# Patient Record
Sex: FEMALE | Race: WHITE | Employment: OTHER | URBAN - METROPOLITAN AREA
[De-identification: names, ages, dates, MRNs, and addresses within clinical notes are randomized per-mention and may not be internally consistent; named-entity substitution may affect disease eponyms.]

---

## 2017-01-27 ENCOUNTER — TRANSFERRED RECORDS (OUTPATIENT)
Dept: HEALTH INFORMATION MANAGEMENT | Facility: CLINIC | Age: 72
End: 2017-01-27

## 2017-03-08 DIAGNOSIS — E03.9 HYPOTHYROIDISM, UNSPECIFIED TYPE: ICD-10-CM

## 2017-03-08 DIAGNOSIS — Z13.220 LIPID SCREENING: ICD-10-CM

## 2017-03-08 DIAGNOSIS — Z79.899 MEDICATION MANAGEMENT: ICD-10-CM

## 2017-03-08 LAB
ERYTHROCYTE [DISTWIDTH] IN BLOOD BY AUTOMATED COUNT: 14.9 % (ref 10–15)
HCT VFR BLD AUTO: 44.4 % (ref 35–47)
HCV AB SERPL QL IA: NORMAL
HGB BLD-MCNC: 14.7 G/DL (ref 11.7–15.7)
MCH RBC QN AUTO: 28.5 PG (ref 26.5–33)
MCHC RBC AUTO-ENTMCNC: 33.1 G/DL (ref 31.5–36.5)
MCV RBC AUTO: 86 FL (ref 78–100)
PLATELET # BLD AUTO: 263 10E9/L (ref 150–450)
RBC # BLD AUTO: 5.15 10E12/L (ref 3.8–5.2)
WBC # BLD AUTO: 6.8 10E9/L (ref 4–11)

## 2017-03-08 PROCEDURE — 80061 LIPID PANEL: CPT | Performed by: INTERNAL MEDICINE

## 2017-03-08 PROCEDURE — 36415 COLL VENOUS BLD VENIPUNCTURE: CPT | Performed by: INTERNAL MEDICINE

## 2017-03-08 PROCEDURE — 86803 HEPATITIS C AB TEST: CPT | Performed by: INTERNAL MEDICINE

## 2017-03-08 PROCEDURE — 84443 ASSAY THYROID STIM HORMONE: CPT | Performed by: INTERNAL MEDICINE

## 2017-03-08 PROCEDURE — 85027 COMPLETE CBC AUTOMATED: CPT | Performed by: INTERNAL MEDICINE

## 2017-03-08 PROCEDURE — 80053 COMPREHEN METABOLIC PANEL: CPT | Performed by: INTERNAL MEDICINE

## 2017-03-08 NOTE — LETTER
[facility]  [address] [place] [address] [facility]  [address]  [place]  [address]  Tel: [phone]    March 10, 2017    Yola LYNNE Maria Esther  [address]  [facility] [address]        Dear Ms. Mojicaers,    TSH which is thyroid hormone is normal.   Stay on current dose of levothyroxine.   Recheck TSH in 6 months   Your total cholesterol is normal.   HDL which is called good cholesterol is normal.   Your LDL cholesterol is normal.  This is often call bad cholesterol and high levels increase the risk for heart attacks and strokes.   Your triglycerides are normal.   Hepatitis C Antibody is negative.   Basic metabolic panel which includes electrolytes and blood sugar is satisfactory.   CBC result which includes white count Hemoglobin and  Platelet Counts is normal.   ALT which is liver test is slightly elevated.   Hepatitis C Antibody is negative.     If you have any further questions or problems, please contact our office.    Sincerely,    Zoraida Braga MD/           Enclosure: Lab Results              Results for orders placed or performed in visit on 03/08/17   Hepatitis C antibody   Result Value Ref Range    Hepatitis C Antibody  NR     Nonreactive   Assay performance characteristics have not been established for newborns,   infants, and children     TSH with free T4 reflex   Result Value Ref Range    TSH 1.48 0.40 - 4.00 mU/L   Lipid Profile   Result Value Ref Range    Cholesterol 152 <200 mg/dL    Triglycerides 59 <150 mg/dL    HDL Cholesterol 72 >49 mg/dL    LDL Cholesterol Calculated 68 <100 mg/dL    Non HDL Cholesterol 80 <130 mg/dL   Comprehensive metabolic panel   Result Value Ref Range    Sodium 140 133 - 144 mmol/L    Potassium 3.7 3.4 - 5.3 mmol/L    Chloride 105 94 - 109 mmol/L    Carbon Dioxide 27 20 - 32 mmol/L    Anion Gap 8 3 - 14 mmol/L    Glucose 106 (H) 70 - 99 mg/dL    Urea Nitrogen 14 7 - 30 mg/dL    Creatinine 0.69 0.52 - 1.04 mg/dL    GFR Estimate 84 >60 mL/min/1.7m2    GFR Estimate If  Black >90   GFR Calc   >60 mL/min/1.7m2    Calcium 8.9 8.5 - 10.1 mg/dL    Bilirubin Total 0.4 0.2 - 1.3 mg/dL    Albumin 4.0 3.4 - 5.0 g/dL    Protein Total 7.9 6.8 - 8.8 g/dL    Alkaline Phosphatase 73 40 - 150 U/L    ALT 66 (H) 0 - 50 U/L    AST 35 0 - 45 U/L   CBC with platelets   Result Value Ref Range    WBC 6.8 4.0 - 11.0 10e9/L    RBC Count 5.15 3.8 - 5.2 10e12/L    Hemoglobin 14.7 11.7 - 15.7 g/dL    Hematocrit 44.4 35.0 - 47.0 %    MCV 86 78 - 100 fl    MCH 28.5 26.5 - 33.0 pg    MCHC 33.1 31.5 - 36.5 g/dL    RDW 14.9 10.0 - 15.0 %    Platelet Count 263 150 - 450 10e9/L

## 2017-03-09 LAB
ALBUMIN SERPL-MCNC: 4 G/DL (ref 3.4–5)
ALP SERPL-CCNC: 73 U/L (ref 40–150)
ALT SERPL W P-5'-P-CCNC: 66 U/L (ref 0–50)
ANION GAP SERPL CALCULATED.3IONS-SCNC: 8 MMOL/L (ref 3–14)
AST SERPL W P-5'-P-CCNC: 35 U/L (ref 0–45)
BILIRUB SERPL-MCNC: 0.4 MG/DL (ref 0.2–1.3)
BUN SERPL-MCNC: 14 MG/DL (ref 7–30)
CALCIUM SERPL-MCNC: 8.9 MG/DL (ref 8.5–10.1)
CHLORIDE SERPL-SCNC: 105 MMOL/L (ref 94–109)
CHOLEST SERPL-MCNC: 152 MG/DL
CO2 SERPL-SCNC: 27 MMOL/L (ref 20–32)
CREAT SERPL-MCNC: 0.69 MG/DL (ref 0.52–1.04)
GFR SERPL CREATININE-BSD FRML MDRD: 84 ML/MIN/1.7M2
GLUCOSE SERPL-MCNC: 106 MG/DL (ref 70–99)
HDLC SERPL-MCNC: 72 MG/DL
LDLC SERPL CALC-MCNC: 68 MG/DL
NONHDLC SERPL-MCNC: 80 MG/DL
POTASSIUM SERPL-SCNC: 3.7 MMOL/L (ref 3.4–5.3)
PROT SERPL-MCNC: 7.9 G/DL (ref 6.8–8.8)
SODIUM SERPL-SCNC: 140 MMOL/L (ref 133–144)
TRIGL SERPL-MCNC: 59 MG/DL
TSH SERPL DL<=0.005 MIU/L-ACNC: 1.48 MU/L (ref 0.4–4)

## 2017-03-10 ENCOUNTER — OFFICE VISIT (OUTPATIENT)
Dept: FAMILY MEDICINE | Facility: CLINIC | Age: 72
End: 2017-03-10
Payer: COMMERCIAL

## 2017-03-10 VITALS
HEIGHT: 63 IN | TEMPERATURE: 97.6 F | OXYGEN SATURATION: 98 % | SYSTOLIC BLOOD PRESSURE: 139 MMHG | DIASTOLIC BLOOD PRESSURE: 70 MMHG | HEART RATE: 66 BPM | BODY MASS INDEX: 30.56 KG/M2 | WEIGHT: 172.5 LBS

## 2017-03-10 DIAGNOSIS — E03.9 HYPOTHYROIDISM, UNSPECIFIED TYPE: ICD-10-CM

## 2017-03-10 DIAGNOSIS — R73.03 PREDIABETES: ICD-10-CM

## 2017-03-10 DIAGNOSIS — I10 ESSENTIAL HYPERTENSION, BENIGN: ICD-10-CM

## 2017-03-10 DIAGNOSIS — Z23 NEED FOR PNEUMOCOCCAL VACCINATION: ICD-10-CM

## 2017-03-10 DIAGNOSIS — Z23 NEED FOR VACCINATION: Primary | ICD-10-CM

## 2017-03-10 DIAGNOSIS — Z00.00 ROUTINE HISTORY AND PHYSICAL EXAMINATION OF ADULT: ICD-10-CM

## 2017-03-10 PROCEDURE — 90732 PPSV23 VACC 2 YRS+ SUBQ/IM: CPT | Performed by: INTERNAL MEDICINE

## 2017-03-10 PROCEDURE — G0439 PPPS, SUBSEQ VISIT: HCPCS | Performed by: INTERNAL MEDICINE

## 2017-03-10 PROCEDURE — G0009 ADMIN PNEUMOCOCCAL VACCINE: HCPCS | Performed by: INTERNAL MEDICINE

## 2017-03-10 RX ORDER — METFORMIN HCL 500 MG
500 TABLET, EXTENDED RELEASE 24 HR ORAL
Qty: 90 TABLET | Refills: 3 | Status: SHIPPED | OUTPATIENT
Start: 2017-03-10 | End: 2018-05-01

## 2017-03-10 RX ORDER — LEVOTHYROXINE SODIUM 112 UG/1
112 TABLET ORAL DAILY
Qty: 90 TABLET | Refills: 3 | Status: SHIPPED | OUTPATIENT
Start: 2017-03-10 | End: 2018-03-25

## 2017-03-10 RX ORDER — HYDROCHLOROTHIAZIDE 25 MG/1
12.5 TABLET ORAL DAILY
Qty: 45 TABLET | Refills: 3 | Status: SHIPPED | OUTPATIENT
Start: 2017-03-10 | End: 2017-08-21

## 2017-03-10 RX ORDER — AMLODIPINE BESYLATE 5 MG/1
5 TABLET ORAL DAILY
Qty: 90 TABLET | Refills: 3 | Status: SHIPPED | OUTPATIENT
Start: 2017-03-10 | End: 2018-05-01

## 2017-03-10 NOTE — NURSING NOTE
"Chief Complaint   Patient presents with     Wellness Visit       Initial /73 (BP Location: Right arm, Patient Position: Chair, Cuff Size: Adult Large)  Pulse 66  Temp 97.6  F (36.4  C) (Oral)  Ht 5' 3\" (1.6 m)  Wt 172 lb 8 oz (78.2 kg)  SpO2 98%  BMI 30.56 kg/m2 Estimated body mass index is 30.56 kg/(m^2) as calculated from the following:    Height as of this encounter: 5' 3\" (1.6 m).    Weight as of this encounter: 172 lb 8 oz (78.2 kg).  Medication Reconciliation: complete   Genesis Navarro MA  "

## 2017-03-10 NOTE — MR AVS SNAPSHOT
After Visit Summary   3/10/2017    Yola Mayo    MRN: [id number]           Patient Information     Date Of Birth          1945        Visit Information        Provider Department      3/10/2017 8:30 AM Zoraida Braga MD [facility]        Today's Diagnoses     Routine history and physical examination of adult    -  1    Essential hypertension, benign        Prediabetes        Hypothyroidism, unspecified type        Need for pneumococcal vaccination          Care Instructions      Preventive Health Recommendations    Female Ages 65 +    Yearly exam:     See your health care provider every year in order to  o Review health changes.   o Discuss preventive care.    o Review your medicines if your doctor has prescribed any.      You no longer need a yearly Pap test unless you've had an abnormal Pap test in the past 10 years. If you have vaginal symptoms, such as bleeding or discharge, be sure to talk with your provider about a Pap test.      Every 1 to 2 years, have a mammogram.  If you are over 69, talk with your health care provider about whether or not you want to continue having screening mammograms.      Every 10 years, have a colonoscopy. Or, have a yearly FIT test (stool test). These exams will check for colon cancer.       Have a cholesterol test every 5 years, or more often if your doctor advises it.       Have a diabetes test (fasting glucose) every three years. If you are at risk for diabetes, you should have this test more often.       At age 65, have a bone density scan (DEXA) to check for osteoporosis (brittle bone disease).    Shots:    Get a flu shot each year.    Get a tetanus shot every 10 years.    Talk to your doctor about your pneumonia vaccines. There are now two you should receive - Pneumovax (PPSV 23) and Prevnar (PCV 13).    Talk to your doctor about the shingles vaccine.    Talk to your doctor about the hepatitis B vaccine.    Nutrition:     Eat at least 5  servings of fruits and vegetables each day.      Eat whole-grain bread, whole-wheat pasta and brown rice instead of white grains and rice.      Talk to your provider about Calcium and Vitamin D.     Lifestyle    Exercise at least 150 minutes a week (30 minutes a day, 5 days a week). This will help you control your weight and prevent disease.      Limit alcohol to one drink per day.      No smoking.       Wear sunscreen to prevent skin cancer.       See your dentist twice a year for an exam and cleaning.      See your eye doctor every 1 to 2 years to screen for conditions such as glaucoma, macular degeneration and cataracts.    Pneumonia shot today   Monitor your blood pressure once a week  at home.  Bring those readings on your next visit.  Notify us if your blood pressure readings consistently stays greater than 140/90.  Follow up in a year for physical   Seek sooner medical attention if there is any new or worsening symptoms    Pneumonia shot today  Monitor your blood pressure once a week  at home.  Bring those readings on your next visit.  Notify us if your blood pressure readings consistently stays greater than 140/90.  Follow up in one year for physical   Seek sooner medical attention if there is any worsening of symptoms or problems.          Follow-ups after your visit        Who to contact     If you have questions or need follow up information about today's clinic visit or your schedule please contact [facility] directly at [phone].  Normal or non-critical lab and imaging results will be communicated to you by MyChart, letter or phone within 4 business days after the clinic has received the results. If you do not hear from us within 7 days, please contact the clinic through MyChart or phone. If you have a critical or abnormal lab result, we will notify you by phone as soon as possible.  Submit refill requests through [employer] or call your pharmacy and they will forward the refill request to  "us. Please allow 3 business days for your refill to be completed.          Additional Information About Your Visit        Care EveryWhere ID     This is your Care EveryWhere ID. This could be used by other organizations to access your Olivia medical records  [id number]        Your Vitals Were     Pulse Temperature Height Pulse Oximetry BMI (Body Mass Index)       66 97.6  F (36.4  C) (Oral) 5' 3\" (1.6 m) 98% 30.56 kg/m2        Blood Pressure from Last 3 Encounters:   03/10/17 139/70   04/29/16 142/80   04/20/16 162/83    Weight from Last 3 Encounters:   03/10/17 172 lb 8 oz (78.2 kg)   04/29/16 167 lb (75.8 kg)   04/20/16 169 lb (76.7 kg)              We Performed the Following     PNEUMOVOCCAL VACCINE 23 VALENT (PNEUMOVAX 23)          Today's Medication Changes          These changes are accurate as of: 3/10/17  9:02 AM.  If you have any questions, ask your nurse or doctor.               These medicines have changed or have updated prescriptions.        Dose/Directions    hydrochlorothiazide 25 MG tablet   Commonly known as:  HYDRODIURIL   This may have changed:  See the new instructions.   Used for:  Essential hypertension, benign   Changed by:  Zoraida Braga MD        Dose:  12.5 mg   Take 0.5 tablets (12.5 mg) by mouth daily   Quantity:  45 tablet   Refills:  3         Stop taking these medicines if you haven't already. Please contact your care team if you have questions.     aspirin  MG EC tablet   Stopped by:  Zoraida Braga MD                Where to get your medicines      These medications were sent to [facility] [address] [place] [facility] [address] [address]  [address] [address]     Phone:  [phone]     amLODIPine 5 MG tablet    hydrochlorothiazide 25 MG tablet    levothyroxine 112 MCG tablet    metFORMIN 500 MG 24 hr tablet                Primary Care Provider Office Phone # Fax #    Zoraida Braga [phone]       [facility]   "  [address] [facility] [address]  [facility] [address]        Thank you!     Thank you for choosing [facility]  for your care. Our goal is always to provide you with excellent care. Hearing back from our patients is one way we can continue to improve our services. Please take a few minutes to complete the written survey that you may receive in the mail after your visit with us. Thank you!             Your Updated Medication List - Protect others around you: Learn how to safely use, store and throw away your medicines at www.disposemymeds.org.          This list is accurate as of: 3/10/17  9:02 AM.  Always use your most recent med list.                   Brand Name Dispense Instructions for use    amLODIPine 5 MG tablet    NORVASC    90 tablet    Take 1 tablet (5 mg) by mouth daily       calcium-vitamin D 600-400 MG-UNIT per tablet    CALTRATE    60 tablet    Take 1 tablet by mouth daily       cholecalciferol 1000 UNIT tablet    vitamin D    100 tablet    Take 1 tablet (1,000 Units) by mouth daily       hydrochlorothiazide 25 MG tablet    HYDRODIURIL    45 tablet    Take 0.5 tablets (12.5 mg) by mouth daily       levothyroxine 112 MCG tablet    SYNTHROID    90 tablet    Take 1 tablet (112 mcg) by mouth daily       metFORMIN 500 MG 24 hr tablet    GLUCOPHAGE-XR    90 tablet    Take 1 tablet (500 mg) by mouth daily (with dinner)       multivitamin, therapeutic with minerals Tabs tablet      Take 1 tablet by mouth daily       VITAMIN B 12 PO      Take 1 tablet by mouth daily

## 2017-03-10 NOTE — PATIENT INSTRUCTIONS
Preventive Health Recommendations    Female Ages 65 +    Yearly exam:     See your health care provider every year in order to  o Review health changes.   o Discuss preventive care.    o Review your medicines if your doctor has prescribed any.      You no longer need a yearly Pap test unless you've had an abnormal Pap test in the past 10 years. If you have vaginal symptoms, such as bleeding or discharge, be sure to talk with your provider about a Pap test.      Every 1 to 2 years, have a mammogram.  If you are over 69, talk with your health care provider about whether or not you want to continue having screening mammograms.      Every 10 years, have a colonoscopy. Or, have a yearly FIT test (stool test). These exams will check for colon cancer.       Have a cholesterol test every 5 years, or more often if your doctor advises it.       Have a diabetes test (fasting glucose) every three years. If you are at risk for diabetes, you should have this test more often.       At age 65, have a bone density scan (DEXA) to check for osteoporosis (brittle bone disease).    Shots:    Get a flu shot each year.    Get a tetanus shot every 10 years.    Talk to your doctor about your pneumonia vaccines. There are now two you should receive - Pneumovax (PPSV 23) and Prevnar (PCV 13).    Talk to your doctor about the shingles vaccine.    Talk to your doctor about the hepatitis B vaccine.    Nutrition:     Eat at least 5 servings of fruits and vegetables each day.      Eat whole-grain bread, whole-wheat pasta and brown rice instead of white grains and rice.      Talk to your provider about Calcium and Vitamin D.     Lifestyle    Exercise at least 150 minutes a week (30 minutes a day, 5 days a week). This will help you control your weight and prevent disease.      Limit alcohol to one drink per day.      No smoking.       Wear sunscreen to prevent skin cancer.       See your dentist twice a year for an exam and cleaning.      See your  eye doctor every 1 to 2 years to screen for conditions such as glaucoma, macular degeneration and cataracts.    Pneumonia shot today   Monitor your blood pressure once a week  at home.  Bring those readings on your next visit.  Notify us if your blood pressure readings consistently stays greater than 140/90.  Follow up in a year for physical   Seek sooner medical attention if there is any new or worsening symptoms    Pneumonia shot today  Monitor your blood pressure once a week  at home.  Bring those readings on your next visit.  Notify us if your blood pressure readings consistently stays greater than 140/90.  Follow up in one year for physical   Seek sooner medical attention if there is any worsening of symptoms or problems.

## 2017-03-10 NOTE — NURSING NOTE
Screening Questionnaire for Adult Immunization    Are you sick today?   No   Do you have allergies to medications, food, a vaccine component or latex?   yes   Have you ever had a serious reaction after receiving a vaccination?   No   Do you have a long-term health problem with heart disease, lung disease, asthma, kidney disease, metabolic disease (e.g. diabetes), anemia, or other blood disorder?   No   Do you have cancer, leukemia, HIV/AIDS, or any other immune system problem?   No   In the past 3 months, have you taken medications that affect  your immune system, such as prednisone, other steroids, or anticancer drugs; drugs for the treatment of rheumatoid arthritis, Crohn s disease, or psoriasis; or have you had radiation treatments?   No   Have you had a seizure, or a brain or other nervous system problem?   No   During the past year, have you received a transfusion of blood or blood     products, or been given immune (gamma) globulin or antiviral drug?   No   For women: Are you pregnant or is there a chance you could become        pregnant during the next month?   No   Have you received any vaccinations in the past 4 weeks?   No     Immunization questionnaire answers were all negative except allergy to ace inhibitors and Oxycodone. OK per Dr. Braga.     MNVFC doesn't apply on this patient    Per orders of Dr. Braga, injection of Pneumovax 23 given by Genesis Navarro. Patient instructed to remain in clinic for 20 minutes afterwards, and to report any adverse reaction to me immediately.       Screening performed by Genesis Navarro on 3/10/2017 at 10:41 AM.

## 2017-03-10 NOTE — PROGRESS NOTES
SUBJECTIVE:                                                            Yola Mayo is a 71 year old female who presents for Preventive Visit.  Are you in the first 12 months of your Medicare Part B coverage?  No    Healthy Habits:    Do you get at least three servings of calcium containing foods daily (dairy, green leafy vegetables, etc.)? yes    Amount of exercise or daily activities, outside of work: 3 day(s) per week    Problems taking medications regularly No    Medication side effects: No    Have you had an eye exam in the past two years? yes    Do you see a dentist twice per year? yes    Do you have sleep apnea, excessive snoring or daytime drowsiness?no    COGNITIVE SCREEN  1) Repeat 3 items (Banana, Sunrise, Chair)    2) Clock draw: NORMAL  3) 3 item recall: Recalls 3 objects  Results: 3 items recalled: COGNITIVE IMPAIRMENT LESS LIKELY    Mini-CogTM Copyright S [employer]. Licensed by the author for use in [facility]; reprinted with permission (sophie@[facility]). All rights reserved.        Reviewed and updated as needed this visit by clinical staff  Tobacco  Allergies  Soc Hx        Reviewed and updated as needed this visit by Provider        Social History   Substance Use Topics     Smoking status: Never Smoker     Smokeless tobacco: Never Used     Alcohol use 0.0 oz/week     0 Standard drinks or equivalent per week      Comment: Approx 1-2 times monthy       The patient does not drink >3 drinks per day nor >7 drinks per week.    Today's PHQ-2 Score:   PHQ-2 ( 1999 Pfizer) 9/21/2015 5/19/2015   Q1: Little interest or pleasure in doing things 0 0   Q2: Feeling down, depressed or hopeless 0 0   PHQ-2 Score 0 0     PHQ-9 SCORE 3/27/2015 9/21/2015 3/31/2016   Total Score 9 - -   Total Score - 3 3     Do you feel safe in your environment - Yes    Do you have a Health Care Directive?: Yes: Advance Directive has been received and scanned.    Current providers sharing in care for this patient  include:   Patient Care Team:  Zoraida Braga MD as PCP - General (Internal Medicine)      Hearing impairment: No    Ability to successfully perform activities of daily living: Yes, no assistance needed     Fall risk:  Fallen 2 or more times in the past year?: No  Any fall with injury in the past year?: No    Home safety:  none identified    The following health maintenance items are reviewed in Epic and correct as of today:  Health Maintenance   Topic Date Due     PNEUMOCOCCAL (2 of 2 - PPSV23) 03/27/2016     TETANUS IMMUNIZATION (SYSTEM ASSIGNED)  07/28/2016     PHQ-9 Q6 MONTHS (NO INBASKET)  09/30/2016     FALL RISK ASSESSMENT  03/31/2017     DEPRESSION ACTION PLAN Q1 YR (NO INBASKET)  03/31/2017     INFLUENZA VACCINE (SYSTEM ASSIGNED)  09/01/2017     TSH Q1 YEAR (NO INBASKET)  03/08/2018     MAMMO SCREEN Q2 YR (SYSTEM ASSIGNED)  11/28/2018     COLONOSCOPY Q10 YR INBASKET MESSAGE  05/01/2019     ADVANCE DIRECTIVE PLANNING Q5 YRS (NO INBASKET)  08/20/2020     LIPID SCREEN Q5 YR FEMALE (SYSTEM ASSIGNED)  03/08/2022     DEXA SCAN SCREENING (SYSTEM ASSIGNED)  Completed     HEPATITIS C SCREENING  Completed     Pneumonia Vaccine:Adults age 65+ who received Pneumovax (PPSV23) at 65 years or older: Should be given PCV13 > 1 year after their most recent PPSV23  Mammogram Screening: Patient over age 50, mutual decision to screen reflected in health maintenance.     ROS:  Constitutional, HEENT, cardiovascular, pulmonary, gi and gu systems are negative, except as otherwise noted.    Problem list, Medication list, Allergies, and Medical histories reviewed in [facility] and updated as appropriate.    This document serves as a record of the services and decisions personally performed and made by Zoraida Braga MD. It was created on her behalf by Crys Nelson, a trained medical scribe. The creation of this document is based the provider's statements to the medical scribe.    Melva Nelson 8:49 AM, March 10,  "2017    OBJECTIVE:                                                            /73 (BP Location: Right arm, Patient Position: Chair, Cuff Size: Adult Large)  Pulse 66  Temp 97.6  F (36.4  C) (Oral)  Ht 5' 3\" (1.6 m)  Wt 172 lb 8 oz (78.2 kg)  SpO2 98%  BMI 30.56 kg/m2 Estimated body mass index is 28.67 kg/(m^2) as calculated from the following:    Height as of 4/29/16: 5' 4\" (1.626 m).    Weight as of 4/29/16: 167 lb (75.8 kg).  EXAM:   GENERAL APPEARANCE: healthy, alert and no distress  EYES: Eyes grossly normal to inspection, PERRL and conjunctivae and sclerae normal  HENT: ear canals and TM's normal, nose and mouth without ulcers or lesions, oropharynx clear and oral mucous membranes moist  NECK: no adenopathy,   RESP: lungs clear to auscultation - no rales, rhonchi or wheezes  BREAST: normal without masses, tenderness or nipple discharge and no palpable axillary masses or adenopathy  CV: regular rate and rhythm, normal S1 S2, no S3 or S4, no murmur, click or rub, mild bilateral LE edema and peripheral pulses strong  ABDOMEN: soft, nontender, no hepatosplenomegaly, no masses and bowel sounds normal  MS: no musculoskeletal defects are noted and gait is age appropriate without ataxia  SKIN: scattered cherry angiomas on back and chest, no suspicious lesions or rashes. Multiple spider veins on legs. Follows with dermatologist.  NEURO: Normal strength and tone, sensory exam grossly normal, mentation intact and speech normal  PSYCH: mentation appears normal and affect normal/bright    Reviewed health maintenance. Due for pneumococcal and Td vaccines.  Reviewed 2010 Dexa scan   ASSESSMENT / PLAN:                                                            Yola was seen today for wellness visit.    Diagnoses and all orders for this visit:    Routine history and physical examination of adult    Essential hypertension, benign  Per patient, home BP readings are under good control. Doing well with medication.  She " "will continue to monitor BP at home, if consistently high will consider increasing dose.  -     amLODIPine (NORVASC) 5 MG tablet; Take 1 tablet (5 mg) by mouth daily  -     hydrochlorothiazide (HYDRODIURIL) 25 MG tablet; Take 0.5 tablets (12.5 mg) by mouth daily    Prediabetes  Controlled   -     metFORMIN (GLUCOPHAGE-XR) 500 MG 24 hr tablet; Take 1 tablet (500 mg) by mouth daily (with dinner)  Lab Results   Component Value Date    A1C 5.8 09/18/2015    A1C 6.0 05/19/2015       Hypothyroidism, unspecified type  Controlled, doing well with medication  -     levothyroxine (SYNTHROID) 112 MCG tablet; Take 1 tablet (112 mcg) by mouth daily    Need for pneumococcal vaccination  -     PNEUMOVOCCAL VACCINE 23 VALENT (PNEUMOVAX 23)    Per patient, depression has resolved. Depression care package was removed.  Patient declined for Td as she had reaction/arm soreness in the past     End of Life Planning:  Patient currently has an advanced directive: Yes.  Practitioner is supportive of decision.    COUNSELING:  Reviewed preventive health counseling, as reflected in patient instructions       Regular exercise       Healthy diet/nutrition       Vaccinated for: Pneumococcal        Osteoporosis Prevention/Bone Health    Estimated body mass index is 28.67 kg/(m^2) as calculated from the following:    Height as of 4/29/16: 5' 4\" (1.626 m).    Weight as of 4/29/16: 167 lb (75.8 kg).     reports that she has never smoked. She has never used smokeless tobacco.    Monitor your blood pressure once a week  at home.  Bring those readings on your next visit.  Notify us if your blood pressure readings consistently stays greater than 140/90.  Follow up in one year for physical   Seek sooner medical attention if there is any worsening of symptoms or problems  Appropriate preventive services were discussed with this patient, including applicable screening as appropriate for cardiovascular disease, diabetes, osteopenia/osteoporosis, and " glaucoma.  As appropriate for age/gender, discussed screening for colorectal cancer, prostate cancer, breast cancer, and cervical cancer. Checklist reviewing preventive services available has been given to the patient.    Reviewed patients plan of care and provided an AVS. The Basic Care Plan (routine screening as documented in Health Maintenance) for Yola meets the Care Plan requirement. This Care Plan has been established and reviewed with the Patient.    Counseling Resources:  ATP IV Guidelines  Pooled Cohorts Equation Calculator  Breast Cancer Risk Calculator  FRAX Risk Assessment  ICSI Preventive Guidelines  Dietary Guidelines for Americans, 2010  [employer]'s MyPlate  ASA Prophylaxis  Lung CA Screening    The information in this document, created by the medical scribe for me, accurately reflects the services I personally performed and the decisions made by me. I have reviewed and approved this document for accuracy prior to leaving the patient care area.  Zoraida Braga MD  9:00 AM, 03/10/17    Zoraida Braga MD  [facility]

## 2017-03-10 NOTE — PROGRESS NOTES
Please Notify the patient  TSH which is thyroid hormone is normal.  Stay on current dose of levothyroxine.  Recheck TSH in 6 months  Your total cholesterol is normal.  HDL which is called good cholesterol is normal.  Your LDL cholesterol is normal.  This is often call bad cholesterol and high levels increase the risk for heart attacks and strokes.  Your triglycerides are normal.  Hepatitis C Antibody is negative.  Basic metabolic panel which includes electrolytes and blood sugar is satisfactory.  CBC result which includes white count Hemoglobin and  Platelet Counts is normal.   ALT which is liver test is slightly elevated.  Hepatitis C Antibody is negative.  Please send the copy of lab results also to this patient.

## 2017-06-28 ENCOUNTER — TRANSFERRED RECORDS (OUTPATIENT)
Dept: HEALTH INFORMATION MANAGEMENT | Facility: CLINIC | Age: 72
End: 2017-06-28

## 2017-07-01 ENCOUNTER — TRANSFERRED RECORDS (OUTPATIENT)
Dept: HEALTH INFORMATION MANAGEMENT | Facility: CLINIC | Age: 72
End: 2017-07-01

## 2017-08-04 ENCOUNTER — OFFICE VISIT (OUTPATIENT)
Dept: FAMILY MEDICINE | Facility: CLINIC | Age: 72
End: 2017-08-04
Payer: COMMERCIAL

## 2017-08-04 VITALS
OXYGEN SATURATION: 98 % | SYSTOLIC BLOOD PRESSURE: 163 MMHG | BODY MASS INDEX: 31.54 KG/M2 | TEMPERATURE: 97.1 F | HEART RATE: 63 BPM | DIASTOLIC BLOOD PRESSURE: 73 MMHG | WEIGHT: 178 LBS | HEIGHT: 63 IN

## 2017-08-04 DIAGNOSIS — M79.672 PAIN IN BOTH FEET: ICD-10-CM

## 2017-08-04 DIAGNOSIS — R35.0 URINARY FREQUENCY: ICD-10-CM

## 2017-08-04 DIAGNOSIS — R42 LIGHTHEADEDNESS: ICD-10-CM

## 2017-08-04 DIAGNOSIS — R60.0 EDEMA OF LOWER EXTREMITY: ICD-10-CM

## 2017-08-04 DIAGNOSIS — M79.671 PAIN IN BOTH FEET: ICD-10-CM

## 2017-08-04 DIAGNOSIS — I10 ESSENTIAL HYPERTENSION: Primary | ICD-10-CM

## 2017-08-04 LAB
ALBUMIN UR-MCNC: NEGATIVE MG/DL
APPEARANCE UR: CLEAR
BILIRUB UR QL STRIP: NEGATIVE
COLOR UR AUTO: YELLOW
ERYTHROCYTE [DISTWIDTH] IN BLOOD BY AUTOMATED COUNT: 14.2 % (ref 10–15)
GLUCOSE UR STRIP-MCNC: NEGATIVE MG/DL
HCT VFR BLD AUTO: 43.8 % (ref 35–47)
HGB BLD-MCNC: 14.7 G/DL (ref 11.7–15.7)
HGB UR QL STRIP: NEGATIVE
KETONES UR STRIP-MCNC: NEGATIVE MG/DL
LEUKOCYTE ESTERASE UR QL STRIP: NEGATIVE
MCH RBC QN AUTO: 29.3 PG (ref 26.5–33)
MCHC RBC AUTO-ENTMCNC: 33.6 G/DL (ref 31.5–36.5)
MCV RBC AUTO: 87 FL (ref 78–100)
NITRATE UR QL: NEGATIVE
PH UR STRIP: 7 PH (ref 5–7)
PLATELET # BLD AUTO: 264 10E9/L (ref 150–450)
RBC # BLD AUTO: 5.02 10E12/L (ref 3.8–5.2)
SP GR UR STRIP: 1.01 (ref 1–1.03)
URN SPEC COLLECT METH UR: NORMAL
UROBILINOGEN UR STRIP-ACNC: 0.2 EU/DL (ref 0.2–1)
WBC # BLD AUTO: 5.7 10E9/L (ref 4–11)

## 2017-08-04 PROCEDURE — 36415 COLL VENOUS BLD VENIPUNCTURE: CPT | Performed by: INTERNAL MEDICINE

## 2017-08-04 PROCEDURE — 99214 OFFICE O/P EST MOD 30 MIN: CPT | Mod: 25 | Performed by: INTERNAL MEDICINE

## 2017-08-04 PROCEDURE — 93000 ELECTROCARDIOGRAM COMPLETE: CPT | Performed by: INTERNAL MEDICINE

## 2017-08-04 PROCEDURE — 80053 COMPREHEN METABOLIC PANEL: CPT | Performed by: INTERNAL MEDICINE

## 2017-08-04 PROCEDURE — 85027 COMPLETE CBC AUTOMATED: CPT | Performed by: INTERNAL MEDICINE

## 2017-08-04 PROCEDURE — 81003 URINALYSIS AUTO W/O SCOPE: CPT | Performed by: INTERNAL MEDICINE

## 2017-08-04 RX ORDER — METOPROLOL SUCCINATE 25 MG/1
25 TABLET, EXTENDED RELEASE ORAL DAILY
Qty: 90 TABLET | Refills: 1 | Status: SHIPPED | OUTPATIENT
Start: 2017-08-04 | End: 2017-10-26

## 2017-08-04 NOTE — LETTER
[facility]  [address]  [address]  [place]  [address]  Tel: [phone]    August 7, 2017    Yola BATISTA Maria Esther  [address] [facility] [address] [facility] [address]        Dear Ms. Mayo,    Urine test is negative for infection.  CBC result which includes white count Hemoglobin and  Platelet Counts is normal.   The testing of your kidney function, liver function and electrolytes was satisfactory   ALT which is liver test is only slightly elevated.  But It is better than before.      If you have any further questions or problems, please contact our office.      Sincerely,    Zoraida Braga MD/JORGE HAYES      Enclosure: Lab Results    Results for orders placed or performed in visit on 08/04/17   CBC with platelets   Result Value Ref Range    WBC 5.7 4.0 - 11.0 10e9/L    RBC Count 5.02 3.8 - 5.2 10e12/L    Hemoglobin 14.7 11.7 - 15.7 g/dL    Hematocrit 43.8 35.0 - 47.0 %    MCV 87 78 - 100 fl    MCH 29.3 26.5 - 33.0 pg    MCHC 33.6 31.5 - 36.5 g/dL    RDW 14.2 10.0 - 15.0 %    Platelet Count 264 150 - 450 10e9/L   Comprehensive metabolic panel   Result Value Ref Range    Sodium 139 133 - 144 mmol/L    Potassium 4.3 3.4 - 5.3 mmol/L    Chloride 104 94 - 109 mmol/L    Carbon Dioxide 29 20 - 32 mmol/L    Anion Gap 6 3 - 14 mmol/L    Glucose 91 70 - 99 mg/dL    Urea Nitrogen 13 7 - 30 mg/dL    Creatinine 0.77 0.52 - 1.04 mg/dL    GFR Estimate 74 >60 mL/min/1.7m2    GFR Estimate If Black 89 >60 mL/min/1.7m2    Calcium 9.2 8.5 - 10.1 mg/dL    Bilirubin Total 0.3 0.2 - 1.3 mg/dL    Albumin 4.1 3.4 - 5.0 g/dL    Protein Total 7.9 6.8 - 8.8 g/dL    Alkaline Phosphatase 65 40 - 150 U/L    ALT 51 (H) 0 - 50 U/L    AST 30 0 - 45 U/L   UA reflex to Microscopic   Result Value Ref Range    Color Urine Yellow     Appearance Urine Clear     Glucose Urine Negative NEG mg/dL    Bilirubin Urine Negative NEG    Ketones Urine Negative NEG mg/dL    Specific Gravity Urine 1.015 1.003 - 1.035    Blood Urine Negative NEG    pH Urine  7.0 5.0 - 7.0 pH    Protein Albumin Urine Negative NEG mg/dL    Urobilinogen Urine 0.2 0.2 - 1.0 EU/dL    Nitrite Urine Negative NEG    Leukocyte Esterase Urine Negative NEG    Source Midstream Urine

## 2017-08-04 NOTE — MR AVS SNAPSHOT
After Visit Summary   8/4/2017    Yola Mayo    MRN: [id number]           Patient Information     Date Of Birth          1945        Visit Information        Provider Department      8/4/2017 9:30 AM Zoraida Braga MD [facility]        Today's Diagnoses     Essential hypertension    -  1    Lightheadedness        Edema of lower extremity        Urinary frequency        Pain in both feet          Care Instructions    Try wearing compression stockings for leg swelling   Start new Blood Pressure medication and it is in addition to your current medication   Monitor your blood pressure once a week  at home.  Bring those readings on your next visit.  Notify us if your blood pressure readings consistently stays greater than 140/90.  Labs today  Follow up in one month  Seek sooner medical attention if there is any worsening of symptoms or problems.            Follow-ups after your visit        Additional Services     PODIATRY/FOOT & ANKLE SURGERY REFERRAL       Your provider has referred you to: JANIE: [facility] [phone]   [email or web address]    Please be aware that coverage of these services is subject to the terms and limitations of your health insurance plan.  Call member services at your health plan with any benefit or coverage questions.      Please bring the following to your appointment:  >>   Any x-rays, CTs or MRIs which have been performed.  Contact the facility where they were done to arrange for  prior to your scheduled appointment.    >>   List of current medications   >>   This referral request   >>   Any documents/labs given to you for this referral                  Who to contact     If you have questions or need follow up information about today's clinic visit or your schedule please contact [facility] directly at [phone].  Normal or non-critical lab and imaging results will be communicated to you by  "MyChart, letter or phone within 4 business days after the clinic has received the results. If you do not hear from us within 7 days, please contact the clinic through [employer] or phone. If you have a critical or abnormal lab result, we will notify you by phone as soon as possible.  Submit refill requests through [employer] or call your pharmacy and they will forward the refill request to us. Please allow 3 business days for your refill to be completed.          Additional Information About Your Visit        [employer] Information     [employer] lets you send messages to your doctor, view your test results, renew your prescriptions, schedule appointments and more. To sign up, go to [email or web address] . Click on \"Log in\" on the left side of the screen, which will take you to the Welcome page. Then click on \"Sign up Now\" on the right side of the page.     You will be asked to enter the access code listed below, as well as some personal information. Please follow the directions to create your username and password.     Your access code is: [email or web address]  Expires: 2017 10:03 AM     Your access code will  in 90 days. If you need help or a new code, please call your [facility] clinic or [phone].        Care EveryWhere ID     This is your Care EveryWhere ID. This could be used by other organizations to access your [facility] medical records  [id number]        Your Vitals Were     Pulse Temperature Height Pulse Oximetry Breastfeeding? BMI (Body Mass Index)    63 97.1  F (36.2  C) (Oral) 5' 3\" (1.6 m) 98% No 31.53 kg/m2       Blood Pressure from Last 3 Encounters:   17 163/73   03/10/17 139/70   16 142/80    Weight from Last 3 Encounters:   17 178 lb (80.7 kg)   03/10/17 172 lb 8 oz (78.2 kg)   16 167 lb (75.8 kg)              We Performed the Following     CBC with platelets     Comprehensive metabolic panel     EKG 12-lead complete w/read - Clinics     PODIATRY/FOOT & ANKLE SURGERY REFERRAL  "    UA reflex to Microscopic          Today's Medication Changes          These changes are accurate as of: 8/4/17 10:04 AM.  If you have any questions, ask your nurse or doctor.               Start taking these medicines.        Dose/Directions    metoprolol 25 MG 24 hr tablet   Commonly known as:  TOPROL-XL   Used for:  Essential hypertension   Started by:  Zoraida Braga MD        Dose:  25 mg   Take 1 tablet (25 mg) by mouth daily   Quantity:  90 tablet   Refills:  1       order for DME   Used for:  Edema of lower extremity   Started by:  Zoraida Braga MD        Compression Stockings Knee high 20/30 compression   Quantity:  2 each   Refills:  1            Where to get your medicines      These medications were sent to [place] [address] [place] [facility] [address] [place] [address]  [address] [address]     Phone:  [phone]     metoprolol 25 MG 24 hr tablet         Some of these will need a paper prescription and others can be bought over the counter.  Ask your nurse if you have questions.     Bring a paper prescription for each of these medications     order for DME                Primary Care Provider Office Phone # Fax #    Zoraida Braga [phone]       [facility]    [address]  [facility] [address]        Equal Access to Services     MIKALA RENAE AH: Hadii aad ku hadasho Soomaali, waaxda luqadaha, qaybta kaalmada adeegyada, waxay idalmisin hayjose g prasad. So [facility] [phone].    ATENCIÓN: Si habla español, tiene a sarah disposición servicios gratuitos de asistencia lingüística. Llame al [phone].    We comply with applicable federal civil rights laws and [place] laws. We do not discriminate on the basis of race, color, national origin, age, disability sex, sexual orientation or gender identity.            Thank you!     Thank you for choosing [facility]  for your care. Our goal is always to provide  you with excellent care. Hearing back from our patients is one way we can continue to improve our services. Please take a few minutes to complete the written survey that you may receive in the mail after your visit with us. Thank you!             Your Updated Medication List - Protect others around you: Learn how to safely use, store and throw away your medicines at www.disposemymeds.org.          This list is accurate as of: 8/4/17 10:04 AM.  Always use your most recent med list.                   Brand Name Dispense Instructions for use Diagnosis    amLODIPine 5 MG tablet    NORVASC    90 tablet    Take 1 tablet (5 mg) by mouth daily    Essential hypertension, benign       aspirin 81 MG tablet      Take 81 mg by mouth daily        calcium-vitamin D 600-400 MG-UNIT per tablet    CALTRATE    60 tablet    Take 1 tablet by mouth daily    Preventive measure       cholecalciferol 1000 UNIT tablet    vitamin D    100 tablet    Take 1 tablet (1,000 Units) by mouth daily    Vitamin deficiency       hydrochlorothiazide 25 MG tablet    HYDRODIURIL    45 tablet    Take 0.5 tablets (12.5 mg) by mouth daily    Essential hypertension, benign       levothyroxine 112 MCG tablet    SYNTHROID    90 tablet    Take 1 tablet (112 mcg) by mouth daily    Hypothyroidism, unspecified type       metFORMIN 500 MG 24 hr tablet    GLUCOPHAGE-XR    90 tablet    Take 1 tablet (500 mg) by mouth daily (with dinner)    Prediabetes       metoprolol 25 MG 24 hr tablet    TOPROL-XL    90 tablet    Take 1 tablet (25 mg) by mouth daily    Essential hypertension       multivitamin, therapeutic with minerals Tabs tablet      Take 1 tablet by mouth daily        order for DME     2 each    Compression Stockings Knee high 20/30 compression    Edema of lower extremity       VITAMIN B 12 PO      Take 1 tablet by mouth daily VIT B12

## 2017-08-04 NOTE — PATIENT INSTRUCTIONS
Try wearing compression stockings for leg swelling   Start new Blood Pressure medication and it is in addition to your current medication   Monitor your blood pressure once a week  at home.  Bring those readings on your next visit.  Notify us if your blood pressure readings consistently stays greater than 140/90.  Labs today  Follow up in one month  Seek sooner medical attention if there is any worsening of symptoms or problems.

## 2017-08-04 NOTE — PROGRESS NOTES
SUBJECTIVE:                                                    Yola Mayo is a 72 year old female who presents to clinic today for the following health issues:      Hypertension Follow-up    Outpatient blood pressures are being checked at home and per patient it has been on the higher side. Results are 150's / 70's -80 .    Low Salt Diet: low salt    Reviewed echo from 2013 and previous EKG    EKG was normal and echo showed mild diastolic dysfunction   Additionally notes frequent lightheadedness  Episodes usually last a few minutes and resolve on their own  Has not fainted but feels like it is interfering with her physical activity  She has not been able to bike as she is afraid to fall    Head pain/pressure  In addition, notes mild pain near left mastoid process   Unsure if related to ear infection, sinusitis or muscular   She does feel slight pressure/earache on the left   Denies ear or nasal drainage  Hearing is minimally decreased but no issues when talking/listening through the phone    Of note, she has been noticing nocturia   Wakes up 3-4 times a night to urinate   Notes mild LE edema  She usually empties her bladder before going to bed and also restricts fluids after 7pm  She denies dysuria       Amount of exercise or physical activity: water aerobics 3 days per week and hasn't biked this summer due to lightheadedness     Problems taking medications regularly: No    Medication side effects: none    Diet: low salt      Problem list and histories reviewed & adjusted, as indicated.  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     BASAL CELL CANCER -NOSE     Family history of diabetes mellitus     Family history of ischemic heart disease     Family history of malignant neoplasm of ovary     LOC PRIM OSTEOARTH-L 1ST METATARSAL-CUNEIFORM JOINT     Hypothyroidism     DERMATITIS       Disease of tricuspid valve     Symptomatic menopausal or female climacteric states     Primary pulmonary hypertension (H)     Generalized osteoarthrosis, unspecified site     HYPERLIPIDEMIA LDL GOAL <130     Advanced directives, counseling/discussion     Health Care Home     Depressive disorder, not elsewhere classified     Degenerative arthritis of knee     Aftercare following knee joint replacement surgery, unspecified laterality     Anemia due to blood loss, acute     Essential hypertension     Past Surgical History:   Procedure Laterality Date     ARTHROPLASTY KNEE Left 2016    Procedure: ARTHROPLASTY KNEE;  Surgeon: Giuseppe Lopez MD;  Location: [facility] OR     ARTHROPLASTY MINIMALLY INVASIVE HIP  2012    Procedure:ARTHROPLASTY MINIMALLY INVASIVE HIP; RIGHT TWO INCISION MINIMALLY INVASIVE HIP ARTHROPLASTY (YUVAL)^ (NO CELL SAVER) (C-ARM); Surgeon:GIUSEPPE LOPEZ; Location:[facility] OR     C APPENDECTOMY      [facility] appi [facility]     C CHEMOSURG MOHS 1ST STAGE      Mohs-nose     C REPAIR CRUCIATE LIGAMENT,KNEE  2003    L   ACL reconstruction     C TOTAL ABDOM HYSTERECTOMY      W/ BSO fibroids, hx PID     HC COLONOSCOPY THRU STOMA, DIAGNOSTIC      normal, rec repeat 10 yrs     HYSTERECTOMY, PAP NO LONGER INDICATED       SURGICAL HISTORY OF -   Mult-~    infertility, laparotomies       Social History   Substance Use Topics     Smoking status: Never Smoker     Smokeless tobacco: Never Used     Alcohol use 0.0 oz/week     0 Standard drinks or equivalent per week      Comment: Approx 1-2 times monthy     Family History   Problem Relation Age of Onset     CANCER Mother      ovarian  of it  at 51yo.     DIABETES Father      Adult onset     Hypertension Father      C.A.D. Father      Fatal Heart attack at 70.     CEREBROVASCULAR DISEASE Father      Thyroid Disease Sister      Thyroid Disease Sister      Thyroid Disease Sister      CANCER Daughter      Thyroid Disease Daughter       Breast Cancer No family hx of      Cancer - colorectal No family hx of          Current Outpatient Prescriptions   Medication Sig Dispense Refill     aspirin 81 MG tablet Take 81 mg by mouth daily       amLODIPine (NORVASC) 5 MG tablet Take 1 tablet (5 mg) by mouth daily 90 tablet 3     hydrochlorothiazide (HYDRODIURIL) 25 MG tablet Take 0.5 tablets (12.5 mg) by mouth daily 45 tablet 3     levothyroxine (SYNTHROID) 112 MCG tablet Take 1 tablet (112 mcg) by mouth daily 90 tablet 3     metFORMIN (GLUCOPHAGE-XR) 500 MG 24 hr tablet Take 1 tablet (500 mg) by mouth daily (with dinner) 90 tablet 3     multivitamin, therapeutic with minerals (THERA-VIT-M) TABS Take 1 tablet by mouth daily       Cyanocobalamin (VITAMIN B 12 PO) Take 1 tablet by mouth daily VIT B12       cholecalciferol (VITAMIN D) 1000 UNIT tablet Take 1 tablet (1,000 Units) by mouth daily 100 tablet 3     calcium-vitamin D (CALTRATE) 600-400 MG-UNIT per tablet Take 1 tablet by mouth daily 60 tablet 11     Allergies   Allergen Reactions     Ace Inhibitors      ACE throat clearing     Oxycodone Nausea and Vomiting     Tetanus Toxoid Swelling     Hard red lump        Reviewed and updated as needed this visit by clinical staff  Tobacco  Allergies  Soc Hx      Reviewed and updated as needed this visit by Provider         ROS:  Constitutional, neuro, ENT, endocrine, pulmonary, cardiac, gastrointestinal, genitourinary, musculoskeletal, integument and psychiatric systems are negative, except as otherwise noted.    This document serves as a record of the services and decisions personally performed and made by Zoraida Braga MD. It was created on her behalf by Crys Nelson, a trained medical scribe. The creation of this document is based the provider's statements to the medical scribe.    Scrsari Nelson 9:55 AM, August 4, 2017    OBJECTIVE:                                                    /73 (BP Location: Left arm, Patient Position: Chair, Cuff  "Size: Adult Regular)  Pulse 63  Temp 97.1  F (36.2  C) (Oral)  Ht 5' 3\" (1.6 m)  Wt 178 lb (80.7 kg)  SpO2 98%  Breastfeeding? No  BMI 31.53 kg/m2  Body mass index is 31.53 kg/(m^2).    GENERAL APPEARANCE: healthy, alert and no distress  EYES: Eyes grossly normal to inspection, PERRL and conjunctivae and sclerae normal  HENT: ear canals and TM's normal and nose and mouth without ulcers or lesions  NECK: no adenopathy  RESP: lungs clear to auscultation - no rales, rhonchi or wheezes  CV: regular rates and rhythm, normal S1 S2, no S3  Mild LE edema, venous legs      ASSESSMENT/PLAN:                                                    Yola was seen today for hypertension.    Diagnoses and all orders for this visit:    Essential hypertension  -     Comprehensive metabolic panel  -     metoprolol (TOPROL-XL) 25 MG 24 hr tablet; Take 1 tablet (25 mg) by mouth daily  Needs better control as readings have been on the higher side despite current treatment   Cannot increase HCTZ as it will worsen nocturia   She did not tolerate Ace Inhibitor in the past  Will try metoprolol, hopefully this also helps with her probable diastolic dysfunction of left ventricle.  She will continue to monitor bp once a week  at home and bring those readings on next visit.    Lightheadedness  -     CBC with platelets  -     EKG 12-lead complete w/read - Clinics  Unclear cause. Can be related to BP been out of control  Hopefully controlling BP helps with lightheadedness   She will inform if any worsening and further work up can be ordered like Holter and Echocardiogram     Edema of lower extremity  -     order for DME; Compression Stockings  Knee high  20/30 compression  This should also help with nocturia     Urinary frequency  -     UA reflex to Microscopic  Will need to r/o UTI due to nocturia     Pain in both feet  -     PODIATRY/FOOT & ANKLE SURGERY REFERRAL  Podiatry referral was given as requested       Patient Instructions   Try " wearing compression stockings for leg swelling   Start new Blood Pressure medication and it is in addition to your current medication   Monitor your blood pressure once a week  at home.  Bring those readings on your next visit.  Notify us if your blood pressure readings consistently stays greater than 140/90.  Labs today  Follow up in one month  Seek sooner medical attention if there is any worsening of symptoms or problems.    The information in this document, created by the medical scribe for me, accurately reflects the services I personally performed and the decisions made by me. I have reviewed and approved this document for accuracy prior to leaving the patient care area.  Zoraida Braga MD  10:10 AM, 08/04/17  Zoraida Braga MD  [facility]

## 2017-08-05 LAB
ALBUMIN SERPL-MCNC: 4.1 G/DL (ref 3.4–5)
ALP SERPL-CCNC: 65 U/L (ref 40–150)
ALT SERPL W P-5'-P-CCNC: 51 U/L (ref 0–50)
ANION GAP SERPL CALCULATED.3IONS-SCNC: 6 MMOL/L (ref 3–14)
AST SERPL W P-5'-P-CCNC: 30 U/L (ref 0–45)
BILIRUB SERPL-MCNC: 0.3 MG/DL (ref 0.2–1.3)
BUN SERPL-MCNC: 13 MG/DL (ref 7–30)
CALCIUM SERPL-MCNC: 9.2 MG/DL (ref 8.5–10.1)
CHLORIDE SERPL-SCNC: 104 MMOL/L (ref 94–109)
CO2 SERPL-SCNC: 29 MMOL/L (ref 20–32)
CREAT SERPL-MCNC: 0.77 MG/DL (ref 0.52–1.04)
GFR SERPL CREATININE-BSD FRML MDRD: 74 ML/MIN/1.7M2
GLUCOSE SERPL-MCNC: 91 MG/DL (ref 70–99)
POTASSIUM SERPL-SCNC: 4.3 MMOL/L (ref 3.4–5.3)
PROT SERPL-MCNC: 7.9 G/DL (ref 6.8–8.8)
SODIUM SERPL-SCNC: 139 MMOL/L (ref 133–144)

## 2017-08-05 ASSESSMENT — PATIENT HEALTH QUESTIONNAIRE - PHQ9: SUM OF ALL RESPONSES TO PHQ QUESTIONS 1-9: 2

## 2017-08-05 NOTE — PROGRESS NOTES
Please Notify the patient  Urine test is negative for infection.  CBC result which includes white count Hemoglobin and  Platelet Counts is normal.   The testing of your kidney function, liver function and electrolytes was satisfactory   ALT which is liver test is only slightly elevated.  But It is better than before.  Please send the copy of lab results also to this patient.

## 2017-08-15 ENCOUNTER — RADIANT APPOINTMENT (OUTPATIENT)
Dept: GENERAL RADIOLOGY | Facility: CLINIC | Age: 72
End: 2017-08-15
Attending: PODIATRIST
Payer: COMMERCIAL

## 2017-08-15 ENCOUNTER — OFFICE VISIT (OUTPATIENT)
Dept: PODIATRY | Facility: CLINIC | Age: 72
End: 2017-08-15
Payer: COMMERCIAL

## 2017-08-15 VITALS
BODY MASS INDEX: 31.54 KG/M2 | HEIGHT: 63 IN | SYSTOLIC BLOOD PRESSURE: 135 MMHG | WEIGHT: 178 LBS | HEART RATE: 64 BPM | TEMPERATURE: 97.9 F | DIASTOLIC BLOOD PRESSURE: 67 MMHG

## 2017-08-15 DIAGNOSIS — M21.42 PES PLANUS OF BOTH FEET: ICD-10-CM

## 2017-08-15 DIAGNOSIS — M19.071 PRIMARY OSTEOARTHRITIS OF BOTH FEET: ICD-10-CM

## 2017-08-15 DIAGNOSIS — M77.50 TENDONITIS OF ANKLE: ICD-10-CM

## 2017-08-15 DIAGNOSIS — M79.672 PAIN IN BOTH FEET: ICD-10-CM

## 2017-08-15 DIAGNOSIS — M19.071 PRIMARY OSTEOARTHRITIS OF BOTH FEET: Primary | ICD-10-CM

## 2017-08-15 DIAGNOSIS — M21.41 PES PLANUS OF BOTH FEET: ICD-10-CM

## 2017-08-15 DIAGNOSIS — M19.072 PRIMARY OSTEOARTHRITIS OF BOTH FEET: ICD-10-CM

## 2017-08-15 DIAGNOSIS — M19.072 PRIMARY OSTEOARTHRITIS OF BOTH FEET: Primary | ICD-10-CM

## 2017-08-15 DIAGNOSIS — M79.671 PAIN IN BOTH FEET: ICD-10-CM

## 2017-08-15 PROCEDURE — 99203 OFFICE O/P NEW LOW 30 MIN: CPT | Performed by: PODIATRIST

## 2017-08-15 PROCEDURE — 73630 X-RAY EXAM OF FOOT: CPT | Mod: LT

## 2017-08-15 NOTE — NURSING NOTE
"Chief Complaint   Patient presents with     Foot Pain     Pain in both feet       Initial Temp 97.9  F (36.6  C) (Tympanic)  Ht 5' 3\" (1.6 m)  Wt 178 lb (80.7 kg)  BMI 31.53 kg/m2 Estimated body mass index is 31.53 kg/(m^2) as calculated from the following:    Height as of this encounter: 5' 3\" (1.6 m).    Weight as of this encounter: 178 lb (80.7 kg).  Medication Reconciliation: rod Self MA August 15, 2017 8:46 AM  "

## 2017-08-15 NOTE — MR AVS SNAPSHOT
After Visit Summary   8/15/2017    Yola Mayo    MRN: [id number]           Patient Information     Date Of Birth          1945        Visit Information        Provider Department      8/15/2017 8:45 AM Brice Tran DPM [facility]        Today's Diagnoses     Primary osteoarthritis of both feet    -  1    Tendonitis of ankle        Pain in both feet          Care Instructions    OSTEOARTHRITIS OF THE FOOT & ANKLE  Osteoarthritis is a condition characterized by the breakdown and eventual loss of cartilage in one or more joints. Cartilage (the connective tissue found at the end of the bones in the joints) protects and cushions the bones during movement. When cartilage deteriorates or is lost, symptoms develop that can restrict one s ability to easily perform daily activities.  Osteoarthritis is also known as degenerative arthritis, reflecting its nature to develop as part of the aging process. As the most common form of arthritis, osteoarthritis affects millions of Americans. Some people refer to osteoarthritis simply as arthritis, even though there are many different types of arthritis.  Osteoarthritis appears at various joints throughout the body, including the hands, feet, spine, hips, and knees. In the foot, the disease most frequently occurs in the big toe, although it is also often found in the midfoot and ankle.  CAUSES  Osteoarthritis is considered a  wear and tear  disease because the cartilage in the joint wears down with repeated stress and use over time. As the cartilage deteriorates and gets thinner, the bones lose their protective covering and eventually may rub together, causing pain and inflammation of the joint.  An injury may also lead to osteoarthritis, although it may take months or years after the injury for the condition to develop. For example, osteoarthritis in the big toe is often caused by kicking or jamming the toe, or by dropping something on the  toe. Osteoarthritis in the midfoot is often caused by dropping something on it, or by a sprain or fracture. In the ankle, osteoarthritis is usually caused by a fracture and occasionally by a severe sprain.  Sometimes osteoarthritis develops as a result of abnormal foot mechanics such as flat feet or high arches. A flat foot causes less stability in the ligaments (bands of tissue that connect bones), resulting in excessive strain on the joints, which can cause arthritis. A high arch is rigid and lacks mobility, causing a jamming of joints that creates an increased risk of arthritis.  SYMPTOMS  People with osteoarthritis in the foot or ankle experience, in varying degrees, one or more of the following: Pain and stiffness in the joint, swelling in or near the joint, or difficulty walking or bending the joint.   Some patients with osteoarthritis also develop a bone spur (a bony protrusion) at the affected joint. Shoe pressure may cause pain at the site of a bone spur, and in some cases blisters or calluses may form over its surface. Bone spurs can also limit the movement of the joint.    DIAGNOSIS  In diagnosing osteoarthritis, the foot and ankle surgeon will examine the foot thoroughly, looking for swelling in the joint, limited mobility, and pain with movement. In some cases, deformity and/or enlargement (spur) of the joint may be noted. X-rays may be ordered to evaluate the extent of the disease.  NON-SURGICAL TREATMENT  To help relieve symptoms, the surgeon may begin treating osteoarthritis with one or more of the following non-surgical approaches:  Oral medications. Nonsteroidal anti-inflammatory drugs (NSAIDs), such as ibuprofen, are often helpful in reducing the inflammation and pain. Occasionally a prescription for a steroid medication is needed to adequately reduce symptoms.   Orthotic devices. Custom orthotic devices (shoe inserts) are often prescribed to provide support to improve the foot s mechanics or  cushioning to help minimize pain.   Bracing. Bracing, which restricts motion and supports the joint, can reduce pain during walking and help prevent further deformity.   Immobilization. Protecting the foot from movement by wearing a cast or removable cast-boot may be necessary to allow the inflammation to resolve.   Steroid injections. In some cases, steroid injections are applied to the affected joint to deliver anti-inflammatory medication.   Physical therapy. Exercises to strengthen the muscles, especially when the osteoarthritis occurs in the ankle, may give the patient greater stability and help avoid injury that might worsen the condition.   DO I NEED SURGERY?  When osteoarthritis has progressed substantially or failed to improve with non-surgical treatment, surgery may be recommended. In advanced cases, surgery may be the only option. The goal of surgery is to decrease pain and improve function. The foot and ankle surgeon will consider a number of factors when selecting the procedure best suited to the patient s condition and lifestyle.    PRICE THERAPY    Many aches and pains throughout the foot and ankle can be helped with many simple treatments. This is usually described as PRICE Therapy.      P - Protection - often times, inflammation/pain in the lower extremity is not able to improve simply because the areas involved are never allowed to rest. Every step we take can bother the problematic area. Protecting those areas is an important step in the healing process. This may involve a walking cast boot, a special insert/orthotic device, an ankle brace, or simply avoiding barefoot walking.    R - Rest - in addition to protecting the foot/ankle, resting is an important, but often times difficult, treatment option. Getting off your feet when they bother you, and specifically avoiding activities that cause pain/discomfort, are very beneficial to prevent, and treat, foot/ankle pain.      I - Ice - icing regularly  can help to decrease inflammation and swelling in the foot, thus decreasing pain. Using an ice pack or a bag of frozen veggies works very well. Ice for 20 minutes multiple times per day as needed.  Do not place the ice directly on the skin as this can cause tissue damage.    C - Compression - using a compression wrap or an ACE wrap can help to decrease swelling, which can help to decrease pain. Wearing the wraps is generally not needed at night, but they should be worn on a regular basis when you are going to be on your feet for prolonged periods as gravity tends to pull fluids down to your feet/ankles.    E - Elevation - elevating your lower extremities multiple times daily for 15-20 minutes can help to decrease swelling, which works well in decreasing pain levels.    NSAID/Tylenol - Anti-inflammatories like Aleve or ibuprofen, and/or a pain medication, such as Tylenol, can help to improve pain levels and get the issue resolved sooner rather than later. Anyone with liver issues should be careful with Tylenol, and anyone with high blood pressure or heart, stomach or kidney issues should be careful with anti-inflammatories. Please ask if you have questions about these medications, including dosage.      Over the Counter Inserts    Super Feet are the most common and easiest to find.    Locations include any [employer] Store, [employer] [place] [employer] in [place] on [address] and in [place] on [address], [facility] Running Room in [facility] on [address], [facility] Running Room in [place] on [address], [employer] in [place] on [address] and [employer] Sport Shop in [place] on [address] and in [place] on [address].    Spenco can be found online and at [employer]oe Shop in [place] on [address], [employer] in [facility] on [address], Gear Running Store in [place] on [address], [employer] in [place] on [address] and [employer] in [place] on [address].    Power Step  can be a little harder to find.  Locations include [employer] in [place] on [address], [place] in [place], [employer] Store in [place] on [address] and online    [employer] - [employer]     Formerly named [facility]    **  A good high quality over the counter insert can cost around $40-$50.    Tendonitis    Tendons are the strong fibrous portions of muscles that attach to bones and allow the muscle to move a joint when it contracts. Tendons are very strong because they have a lot of force exerted on them. Sometimes tendons can become painful because they have suffered an acute injury, in which too much force was exerted at one time, or an overuse injury, in which a normal force was exerted too frequently or over a prolonged period of time. As a result, there is damage to the tendon and its surrounding soft tissue structures and they become inflamed. Because tendons do not have a great blood supply, they do not heal rapidly and the inflammation can become chronic.   Conservative treatment for tendinitis involves rest and anti-inflammatory measures. Ice is applied 15 minutes 2-3 times daily. Anti-inflammatory medications called NSAIDs (ibuprofen, example) can be taken provided they are used with caution, as they can lead to internal bleeding and increase the risk of stroke and heart attack. Often your doctor will use a special shoe or removable walking cast to immobilize the tendon, allowing it to heal without further damage from use. These devices are very useful in helping tendons heal, but they may slow you down or make you feel like your hip, knee, or back are out of alignment. This is temporary and should go away once you are out of the immobilization. You should not use a walking cast when showering or driving.   If conservative measures fail, your physician may need to surgically repair the tendon by removing any chronic inflammatory tissue and sewing it back together. Sometimes it is sewn to an  adjacent tendon with similar function for support and sometimes it is lengthened. Sometimes the bones around the tendon need to be realigned or reshaped to better support the tendon or prevent further damage. Your foot and ankle surgeon will discuss the specifics of your surgery with you, should you need it.            Follow-ups after your visit        Additional Services     ORTHOTICS REFERRAL       Please be aware that coverage of these services is subject to the terms and limitations of your health insurance plan.  Call member services at your health plan with any benefit or coverage questions.      Please bring the following to your appointment:    >>   Any x-rays, CTs or MRIs which have been performed.  Contact the facility where they were done to arrange for  prior to your scheduled appointment.  Any new CT, MRI or other procedures ordered by your specialist must be performed at a [facility] facility or coordinated by your clinic's referral office.    >>   List of current medications   >>   This referral request   >>   Any documents/labs given to you for this referral    ==This Referral PRINTS in the [facility] (ORTHOTICS & PROSTHETICS) Central scheduling office ==     The [facility] Central Scheduling staff will contact patient to arrange appointments. Central Scheduling Phone #:  [place], [place]  [phone]     Orthotics: Foot Orthotics                  Who to contact     If you have questions or need follow up information about today's clinic visit or your schedule please contact [facility] [place] directly at [phone].  Normal or non-critical lab and imaging results will be communicated to you by MyChart, letter or phone within 4 business days after the clinic has received the results. If you do not hear from us within 7 days, please contact the clinic through MyChart or phone. If you have a critical or abnormal lab result, we will notify you by phone as soon as  "possible.  Submit refill requests through [employer] or call your pharmacy and they will forward the refill request to us. Please allow 3 business days for your refill to be completed.          Additional Information About Your Visit        [employer] Information     [employer] lets you send messages to your doctor, view your test results, renew your prescriptions, schedule appointments and more. To sign up, go to [email or web address] . Click on \"Log in\" on the left side of the screen, which will take you to the Welcome page. Then click on \"Sign up Now\" on the right side of the page.     You will be asked to enter the access code listed below, as well as some personal information. Please follow the directions to create your username and password.     Your access code is: [email or web address]  Expires: 2017 10:03 AM     Your access code will  in 90 days. If you need help or a new code, please call your [facility] clinic or [phone].        Care EveryWhere ID     This is your Care EveryWhere ID. This could be used by other organizations to access your [facility] medical records  [id number]        Your Vitals Were     Temperature Height BMI (Body Mass Index)             97.9  F (36.6  C) (Tympanic) 5' 3\" (1.6 m) 31.53 kg/m2          Blood Pressure from Last 3 Encounters:   17 163/73   03/10/17 139/70   16 142/80    Weight from Last 3 Encounters:   08/15/17 178 lb (80.7 kg)   17 178 lb (80.7 kg)   03/10/17 172 lb 8 oz (78.2 kg)              We Performed the Following     ORTHOTICS REFERRAL        Primary Care Provider Office Phone # Fax #    Zoraida Braga [phone] [id number] [facility]  [place]                [place] [address]        Equal Access to Services     MIKALA RENAE : Jacqueline Lawler, venecia huddleston, qanatalia kaelsa black. [facility] [phone].    ATENCIÓN: Si radha stapleton, tiene a sarah disposición servicios " nella de asistencia lingüística. Tanisha nuñez [phone].    We comply with applicable federal civil rights laws and [place] laws. We do not discriminate on the basis of race, color, national origin, age, disability sex, sexual orientation or gender identity.            Thank you!     Thank you for choosing [facility]  for your care. Our goal is always to provide you with excellent care. Hearing back from our patients is one way we can continue to improve our services. Please take a few minutes to complete the written survey that you may receive in the mail after your visit with us. Thank you!             Your Updated Medication List - Protect others around you: Learn how to safely use, store and throw away your medicines at www.disposemymeds.org.          This list is accurate as of: 8/15/17  9:11 AM.  Always use your most recent med list.                   Brand Name Dispense Instructions for use Diagnosis    amLODIPine 5 MG tablet    NORVASC    90 tablet    Take 1 tablet (5 mg) by mouth daily    Essential hypertension, benign       aspirin 81 MG tablet      Take 81 mg by mouth daily        calcium-vitamin D 600-400 MG-UNIT per tablet    CALTRATE    60 tablet    Take 1 tablet by mouth daily    Preventive measure       cholecalciferol 1000 UNIT tablet    vitamin D    100 tablet    Take 1 tablet (1,000 Units) by mouth daily    Vitamin deficiency       hydrochlorothiazide 25 MG tablet    HYDRODIURIL    45 tablet    Take 0.5 tablets (12.5 mg) by mouth daily    Essential hypertension, benign       levothyroxine 112 MCG tablet    SYNTHROID    90 tablet    Take 1 tablet (112 mcg) by mouth daily    Hypothyroidism, unspecified type       metFORMIN 500 MG 24 hr tablet    GLUCOPHAGE-XR    90 tablet    Take 1 tablet (500 mg) by mouth daily (with dinner)    Prediabetes       metoprolol 25 MG 24 hr tablet    TOPROL-XL    90 tablet    Take 1 tablet (25 mg) by mouth daily    Essential hypertension        multivitamin, therapeutic with minerals Tabs tablet      Take 1 tablet by mouth daily        order for DME     2 each    Compression Stockings Knee high 20/30 compression    Edema of lower extremity       VITAMIN B 12 PO      Take 1 tablet by mouth daily VIT B12

## 2017-08-15 NOTE — PROGRESS NOTES
PATIENT HISTORY:  Yola Maoy is a 72 year old female who presents to clinic for b/l dorsal midfoot pain, L medial ankle pain.  I was requested to see this patient for these issues by Dr. Braga.  Intermittent pain for months with activity.  Advil can help.  2-10/10 pain.  Worse in the AM and at night.  Pt had orthotics many years ago.  No injury or sudden increase in pain/swelling.  Hx of OA of feet per pt.      Review of Systems:  Patient denies fever, chills, rash, wound, numbness, weakness, heart burn, blood in stool, chest pain with activity, calf pain when walking, shortness of breath with activity, chronic cough, easy bleeding/bruising, excessive thirst, depression, anxiety.  Patient admits to stiffness, limping, fatigue, swelling of ankle.     PAST MEDICAL HISTORY:   Past Medical History:   Diagnosis Date     BASAL CELL CANCER -NOSE      Family history of diabetes mellitus     Father     Family history of ischemic heart disease     Father     Family history of malignant neoplasm of ovary     Mother     HX DISRUPT L ANT CRUCIATE S/P REPAIR 2003      HYPERTENSION       HYPOTHYROIDISM      LOC PRIM OSTEOARTH-L 1ST METATARSAL-CUNEIFORM JOINT 3/10/2004     Major depressive disorder, single episode, mild (H) 11/24/2014     MILD-MOD TRICUSPID REGURG. MOD PULM HTN 6/23/2005    ECHO  5/05     SPLENIC CYST         PAST SURGICAL HISTORY:   Past Surgical History:   Procedure Laterality Date     ARTHROPLASTY KNEE Left 4/13/2016    Procedure: ARTHROPLASTY KNEE;  Surgeon: Giuseppe Lopez MD;  Location:  OR     ARTHROPLASTY MINIMALLY INVASIVE HIP  4/18/2012    Procedure:ARTHROPLASTY MINIMALLY INVASIVE HIP; RIGHT TWO INCISION MINIMALLY INVASIVE HIP ARTHROPLASTY (YUVAL)^ (NO CELL SAVER) (C-ARM); Surgeon:GIUSEPPE LOPEZ; Location: OR     C APPENDECTOMY  8/87    incidental appi at s     C CHEMOSURG MOHS 1ST STAGE  9-99    Mohs-nose     C REPAIR CRUCIATE LIGAMENT,KNEE  8/2003    L   ACL reconstruction     C  TOTAL ABDOM HYSTERECTOMY  8/87    W/ BSO fibroids, hx PID     HC COLONOSCOPY THRU STOMA, DIAGNOSTIC  4/03    normal, rec repeat 10 yrs     HYSTERECTOMY, PAP NO LONGER INDICATED       SURGICAL HISTORY OF -   Mult-~1978    infertility, laparotomies        MEDICATIONS:   Current Outpatient Prescriptions:      aspirin 81 MG tablet, Take 81 mg by mouth daily, Disp: , Rfl:      order for DME, Compression Stockings Knee high 20/30 compression, Disp: 2 each, Rfl: 1     metoprolol (TOPROL-XL) 25 MG 24 hr tablet, Take 1 tablet (25 mg) by mouth daily, Disp: 90 tablet, Rfl: 1     amLODIPine (NORVASC) 5 MG tablet, Take 1 tablet (5 mg) by mouth daily, Disp: 90 tablet, Rfl: 3     hydrochlorothiazide (HYDRODIURIL) 25 MG tablet, Take 0.5 tablets (12.5 mg) by mouth daily, Disp: 45 tablet, Rfl: 3     levothyroxine (SYNTHROID) 112 MCG tablet, Take 1 tablet (112 mcg) by mouth daily, Disp: 90 tablet, Rfl: 3     metFORMIN (GLUCOPHAGE-XR) 500 MG 24 hr tablet, Take 1 tablet (500 mg) by mouth daily (with dinner), Disp: 90 tablet, Rfl: 3     multivitamin, therapeutic with minerals (THERA-VIT-M) TABS, Take 1 tablet by mouth daily, Disp: , Rfl:      Cyanocobalamin (VITAMIN B 12 PO), Take 1 tablet by mouth daily VIT B12, Disp: , Rfl:      cholecalciferol (VITAMIN D) 1000 UNIT tablet, Take 1 tablet (1,000 Units) by mouth daily, Disp: 100 tablet, Rfl: 3     calcium-vitamin D (CALTRATE) 600-400 MG-UNIT per tablet, Take 1 tablet by mouth daily, Disp: 60 tablet, Rfl: 11     ALLERGIES:    Allergies   Allergen Reactions     Ace Inhibitors      ACE throat clearing     Oxycodone Nausea and Vomiting     Tetanus Toxoid Swelling     Hard red lump         SOCIAL HISTORY:   Social History     Social History     Marital status:      Spouse name: FABIAN CUADRA     Number of children: 2     Years of education: N/A     Occupational History     [employer]       [employer]     Social History Main Topics     Smoking status: Never Smoker     Smokeless  "tobacco: Never Used     Alcohol use 0.0 oz/week     0 Standard drinks or equivalent per week      Comment: Approx 1-2 times monthy     Drug use: No     Sexual activity: Yes     Partners: Male     Other Topics Concern      Service No     Blood Transfusions No     Caffeine Concern No     Occupational Exposure No     Hobby Hazards No     Sleep Concern No     Stress Concern No     Weight Concern Yes     Special Diet No     Back Care No     Exercise Yes     Approx 3 times a week for about 30 minutes     Bike Helmet Yes     Seat Belt Yes     Self-Exams Yes     Social History Narrative        FAMILY HISTORY:   Family History   Problem Relation Age of Onset     CANCER Mother      ovarian  of it  at 49yo.     DIABETES Father      Adult onset     Hypertension Father      C.A.D. Father      Fatal Heart attack at 70.     CEREBROVASCULAR DISEASE Father      Thyroid Disease Sister      Thyroid Disease Sister      Thyroid Disease Sister      CANCER Daughter      Thyroid Disease Daughter      Breast Cancer No family hx of      Cancer - colorectal No family hx of         EXAM:Vitals: /67 (BP Location: Right arm, Patient Position: Chair, Cuff Size: Adult Large)  Pulse 64  Temp 97.9  F (36.6  C) (Tympanic)  Ht 5' 3\" (1.6 m)  Wt 178 lb (80.7 kg)  BMI 31.53 kg/m2  BMI= Body mass index is 31.53 kg/(m^2).    General appearance: Patient is alert and fully cooperative with history & exam.  No sign of distress is noted during the visit.     Psychiatric: Affect is pleasant & appropriate.  Patient appears motivated to improve health.     Respiratory: Breathing is regular & unlabored while sitting.     HEENT: Hearing is intact to spoken word.  Speech is clear.  No gross evidence of visual impairment that would impact ambulation.     Dermatologic: Skin is intact to both feet without significant lesions, rash or abrasion.  No paronychia or evidence of soft tissue infection is noted.     Vascular: DP & PT pulses are intact & " regular bilaterally.  No significant edema or varicosities noted.  CFT and skin temperature are normal to both lower extremities.     Neurologic: Lower extremity sensation is intact to light touch.  No evidence of weakness or contracture in the lower extremities.  No evidence of neuropathy.     Musculoskeletal: b/l pes planus with dorsal midfoot bony enlargement, mild pain to palpation.  Mild pain to palpation along the left medial ankle, course of the posterior tibial tendon.  Patient is ambulatory without assistive device or brace.  No gross ankle deformity noted.  No foot or ankle joint effusion is noted.    XRs of feet reviewed with pt.  Midfoot degenerative changes b/l, mostly along TMTJs.  No acute findings.     ASSESSMENT:   B/l osteoarthritis of foot  B/l pes planus  L medial ankle tendonitis (posterior tibial)     PLAN:  Reviewed patient's chart in epic.  Discussed condition and treatment options including pros and cons.    Discussed arthritis.  This is a progressive issue.      Treatment options for posterior tibial tendonitis/flatfoot were discussed.      Advised ALMAGUER, NSAIDs prn, stiff soled shoes, no barefoot walking.  Orthotics ordered.  Discussed injections for arthritis, pt deferred.  Discussed surgical fusion as a last resort.    F/u prn.    Handouts given.     Brice Tran, CHUCK, FACFAS

## 2017-08-15 NOTE — PROGRESS NOTES
Weight management plan: Patient was referred to their PCP to discuss a diet and exercise plan.     KEN Self MA August 15, 2017 8:46 AM

## 2017-08-15 NOTE — PATIENT INSTRUCTIONS
OSTEOARTHRITIS OF THE FOOT & ANKLE  Osteoarthritis is a condition characterized by the breakdown and eventual loss of cartilage in one or more joints. Cartilage (the connective tissue found at the end of the bones in the joints) protects and cushions the bones during movement. When cartilage deteriorates or is lost, symptoms develop that can restrict one s ability to easily perform daily activities.  Osteoarthritis is also known as degenerative arthritis, reflecting its nature to develop as part of the aging process. As the most common form of arthritis, osteoarthritis affects millions of Americans. Some people refer to osteoarthritis simply as arthritis, even though there are many different types of arthritis.  Osteoarthritis appears at various joints throughout the body, including the hands, feet, spine, hips, and knees. In the foot, the disease most frequently occurs in the big toe, although it is also often found in the midfoot and ankle.  CAUSES  Osteoarthritis is considered a  wear and tear  disease because the cartilage in the joint wears down with repeated stress and use over time. As the cartilage deteriorates and gets thinner, the bones lose their protective covering and eventually may rub together, causing pain and inflammation of the joint.  An injury may also lead to osteoarthritis, although it may take months or years after the injury for the condition to develop. For example, osteoarthritis in the big toe is often caused by kicking or jamming the toe, or by dropping something on the toe. Osteoarthritis in the midfoot is often caused by dropping something on it, or by a sprain or fracture. In the ankle, osteoarthritis is usually caused by a fracture and occasionally by a severe sprain.  Sometimes osteoarthritis develops as a result of abnormal foot mechanics such as flat feet or high arches. A flat foot causes less stability in the ligaments (bands of tissue that connect bones), resulting in excessive  strain on the joints, which can cause arthritis. A high arch is rigid and lacks mobility, causing a jamming of joints that creates an increased risk of arthritis.  SYMPTOMS  People with osteoarthritis in the foot or ankle experience, in varying degrees, one or more of the following: Pain and stiffness in the joint, swelling in or near the joint, or difficulty walking or bending the joint.   Some patients with osteoarthritis also develop a bone spur (a bony protrusion) at the affected joint. Shoe pressure may cause pain at the site of a bone spur, and in some cases blisters or calluses may form over its surface. Bone spurs can also limit the movement of the joint.    DIAGNOSIS  In diagnosing osteoarthritis, the foot and ankle surgeon will examine the foot thoroughly, looking for swelling in the joint, limited mobility, and pain with movement. In some cases, deformity and/or enlargement (spur) of the joint may be noted. X-rays may be ordered to evaluate the extent of the disease.  NON-SURGICAL TREATMENT  To help relieve symptoms, the surgeon may begin treating osteoarthritis with one or more of the following non-surgical approaches:  Oral medications. Nonsteroidal anti-inflammatory drugs (NSAIDs), such as ibuprofen, are often helpful in reducing the inflammation and pain. Occasionally a prescription for a steroid medication is needed to adequately reduce symptoms.   Orthotic devices. Custom orthotic devices (shoe inserts) are often prescribed to provide support to improve the foot s mechanics or cushioning to help minimize pain.   Bracing. Bracing, which restricts motion and supports the joint, can reduce pain during walking and help prevent further deformity.   Immobilization. Protecting the foot from movement by wearing a cast or removable cast-boot may be necessary to allow the inflammation to resolve.   Steroid injections. In some cases, steroid injections are applied to the affected joint to deliver  anti-inflammatory medication.   Physical therapy. Exercises to strengthen the muscles, especially when the osteoarthritis occurs in the ankle, may give the patient greater stability and help avoid injury that might worsen the condition.   DO I NEED SURGERY?  When osteoarthritis has progressed substantially or failed to improve with non-surgical treatment, surgery may be recommended. In advanced cases, surgery may be the only option. The goal of surgery is to decrease pain and improve function. The foot and ankle surgeon will consider a number of factors when selecting the procedure best suited to the patient s condition and lifestyle.    PRICE THERAPY    Many aches and pains throughout the foot and ankle can be helped with many simple treatments. This is usually described as PRICE Therapy.      P - Protection - often times, inflammation/pain in the lower extremity is not able to improve simply because the areas involved are never allowed to rest. Every step we take can bother the problematic area. Protecting those areas is an important step in the healing process. This may involve a walking cast boot, a special insert/orthotic device, an ankle brace, or simply avoiding barefoot walking.    R - Rest - in addition to protecting the foot/ankle, resting is an important, but often times difficult, treatment option. Getting off your feet when they bother you, and specifically avoiding activities that cause pain/discomfort, are very beneficial to prevent, and treat, foot/ankle pain.      I - Ice - icing regularly can help to decrease inflammation and swelling in the foot, thus decreasing pain. Using an ice pack or a bag of frozen veggies works very well. Ice for 20 minutes multiple times per day as needed.  Do not place the ice directly on the skin as this can cause tissue damage.    C - Compression - using a compression wrap or an ACE wrap can help to decrease swelling, which can help to decrease pain. Wearing the wraps  is generally not needed at night, but they should be worn on a regular basis when you are going to be on your feet for prolonged periods as gravity tends to pull fluids down to your feet/ankles.    E - Elevation - elevating your lower extremities multiple times daily for 15-20 minutes can help to decrease swelling, which works well in decreasing pain levels.    NSAID/Tylenol - Anti-inflammatories like Aleve or ibuprofen, and/or a pain medication, such as Tylenol, can help to improve pain levels and get the issue resolved sooner rather than later. Anyone with liver issues should be careful with Tylenol, and anyone with high blood pressure or heart, stomach or kidney issues should be careful with anti-inflammatories. Please ask if you have questions about these medications, including dosage.      Over the Counter Inserts    Super Feet are the most common and easiest to find.    Locations include any [employer], [employer] in [place] on [address]  and in [place] on [address] [id number], [facility] in [facility] on [address], [facility] Running Room in [place] on [address], [employer] in [place] on [address] and [employer] in [facility] on [address] and in [place] on [address].    Spenco can be found online and at [employer] in [facility] on [address], [employer] in [facility] on [address], [employer] in [place] on [address], [employer] in [place] on [address] and [employer] in [place] on [address].    Power Step can be a little harder to find.  Locations include [employer] in [place] on [address], [place] in [facility], [employer] in [place] on [address] and online    [employer] - [employer]     [facility]    **  A good high quality over the counter insert can cost around $40-$50.    Tendonitis    Tendons are the strong fibrous portions of muscles that attach to bones and allow the muscle  to move a joint when it contracts. Tendons are very strong because they have a lot of force exerted on them. Sometimes tendons can become painful because they have suffered an acute injury, in which too much force was exerted at one time, or an overuse injury, in which a normal force was exerted too frequently or over a prolonged period of time. As a result, there is damage to the tendon and its surrounding soft tissue structures and they become inflamed. Because tendons do not have a great blood supply, they do not heal rapidly and the inflammation can become chronic.   Conservative treatment for tendinitis involves rest and anti-inflammatory measures. Ice is applied 15 minutes 2-3 times daily. Anti-inflammatory medications called NSAIDs (ibuprofen, example) can be taken provided they are used with caution, as they can lead to internal bleeding and increase the risk of stroke and heart attack. Often your doctor will use a special shoe or removable walking cast to immobilize the tendon, allowing it to heal without further damage from use. These devices are very useful in helping tendons heal, but they may slow you down or make you feel like your hip, knee, or back are out of alignment. This is temporary and should go away once you are out of the immobilization. You should not use a walking cast when showering or driving.   If conservative measures fail, your physician may need to surgically repair the tendon by removing any chronic inflammatory tissue and sewing it back together. Sometimes it is sewn to an adjacent tendon with similar function for support and sometimes it is lengthened. Sometimes the bones around the tendon need to be realigned or reshaped to better support the tendon or prevent further damage. Your foot and ankle surgeon will discuss the specifics of your surgery with you, should you need it.

## 2017-08-19 DIAGNOSIS — I10 ESSENTIAL HYPERTENSION, BENIGN: ICD-10-CM

## 2017-08-21 RX ORDER — HYDROCHLOROTHIAZIDE 25 MG/1
TABLET ORAL
Qty: 45 TABLET | Refills: 3 | Status: SHIPPED | OUTPATIENT
Start: 2017-08-21 | End: 2018-04-16

## 2017-10-26 ENCOUNTER — OFFICE VISIT (OUTPATIENT)
Dept: FAMILY MEDICINE | Facility: CLINIC | Age: 72
End: 2017-10-26
Payer: COMMERCIAL

## 2017-10-26 VITALS
OXYGEN SATURATION: 100 % | HEIGHT: 63 IN | SYSTOLIC BLOOD PRESSURE: 144 MMHG | DIASTOLIC BLOOD PRESSURE: 70 MMHG | BODY MASS INDEX: 32.07 KG/M2 | WEIGHT: 181 LBS | HEART RATE: 61 BPM

## 2017-10-26 DIAGNOSIS — R06.02 SOB (SHORTNESS OF BREATH) ON EXERTION: ICD-10-CM

## 2017-10-26 DIAGNOSIS — I27.20 PULMONARY HYPERTENSION (H): ICD-10-CM

## 2017-10-26 DIAGNOSIS — I10 ESSENTIAL HYPERTENSION: Primary | ICD-10-CM

## 2017-10-26 DIAGNOSIS — Z23 NEED FOR PROPHYLACTIC VACCINATION AND INOCULATION AGAINST INFLUENZA: ICD-10-CM

## 2017-10-26 PROCEDURE — 90471 IMMUNIZATION ADMIN: CPT | Performed by: INTERNAL MEDICINE

## 2017-10-26 PROCEDURE — 99214 OFFICE O/P EST MOD 30 MIN: CPT | Mod: 25 | Performed by: INTERNAL MEDICINE

## 2017-10-26 PROCEDURE — 90662 IIV NO PRSV INCREASED AG IM: CPT | Performed by: INTERNAL MEDICINE

## 2017-10-26 RX ORDER — METOPROLOL SUCCINATE 50 MG/1
50 TABLET, EXTENDED RELEASE ORAL DAILY
Qty: 90 TABLET | Refills: 1 | Status: SHIPPED | OUTPATIENT
Start: 2017-10-26 | End: 2018-02-05 | Stop reason: ALTCHOICE

## 2017-10-26 NOTE — PATIENT INSTRUCTIONS
Dose of metoprolol was increased to 50 mg daily  Schedule echocardiogram at your earliest convenience   Please call [phone] to schedule Echocardiogram .  Done at [address]  Follow up in 4 months  Seek sooner medical attention if there is any worsening of symptoms or problems.

## 2017-10-26 NOTE — PROGRESS NOTES
SUBJECTIVE:   Yola Mayo is a 72 year old female who presents to clinic today for the following health issues:      Hypertension Follow-up      Outpatient blood pressures are being checked at home.  Results initially were <140/90 mmHg but lately have been on the higher side around 140's/90's mmHg.    Low Salt Diet: yes    We recently started metoprolol 25 mg daily     Has been tolerating well along with amlodipine and HCTZ    But has noticed dyspnea on exertion     Specially when going up a flight of stairs or walking     Unsure if related to new medication     She has past history of similar symptoms which were related to mild tricuspid regurgitation and mild pulmonary HTN seen on 2013 echo    Has not had another echo since then         Amount of exercise or physical activity: 2-3 days/week, mostly water related spor    Problems taking medications regularly: No    Medication side effects: none    Diet: low salt      Problem list and histories reviewed & adjusted, as indicated.  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     BASAL CELL CANCER -NOSE     Family history of diabetes mellitus     Family history of ischemic heart disease     Family history of malignant neoplasm of ovary     LOC PRIM OSTEOARTH-L 1ST METATARSAL-CUNEIFORM JOINT     Hypothyroidism     DERMATITIS      Disease of tricuspid valve     Symptomatic menopausal or female climacteric states     Primary pulmonary hypertension (H)     Generalized osteoarthrosis, unspecified site     HYPERLIPIDEMIA LDL GOAL <130     Advanced directives, counseling/discussion     Health Care Home     Depressive disorder, not elsewhere classified     Degenerative arthritis of knee     Aftercare following knee joint replacement surgery, unspecified laterality     Anemia due to blood loss, acute     Essential hypertension     Past  Surgical History:   Procedure Laterality Date     ARTHROPLASTY KNEE Left 2016    Procedure: ARTHROPLASTY KNEE;  Surgeon: Giuseppe Lopez MD;  Location: [facility] OR     ARTHROPLASTY MINIMALLY INVASIVE HIP  2012    Procedure:ARTHROPLASTY MINIMALLY INVASIVE HIP; RIGHT TWO INCISION MINIMALLY INVASIVE HIP ARTHROPLASTY (YUVAL)^ (NO CELL SAVER) (C-ARM); Surgeon:GIUSEPPE LOPEZ; Location:[facility] OR     C APPENDECTOMY      incidental appi at hys     C CHEMOSURG MOHS 1ST STAGE      Mohs-nose     C REPAIR CRUCIATE LIGAMENT,KNEE  2003    L   ACL reconstruction     C TOTAL ABDOM HYSTERECTOMY      W/ BSO fibroids, hx PID     HC COLONOSCOPY THRU STOMA, DIAGNOSTIC      normal, rec repeat 10 yrs     HYSTERECTOMY, PAP NO LONGER INDICATED       SURGICAL HISTORY OF -   Mul-~    infertility, laparotomies       Social History   Substance Use Topics     Smoking status: Never Smoker     Smokeless tobacco: Never Used     Alcohol use 0.0 oz/week     0 Standard drinks or equivalent per week      Comment: Approx 1-2 times monthy     Family History   Problem Relation Age of Onset     CANCER Mother      ovarian  of it  at 51yo.     DIABETES Father      Adult onset     Hypertension Father      C.A.D. Father      Fatal Heart attack at 70.     CEREBROVASCULAR DISEASE Father      Thyroid Disease Sister      Thyroid Disease Sister      Thyroid Disease Sister      CANCER Daughter      Thyroid Disease Daughter      Breast Cancer No family hx of      Cancer - colorectal No family hx of          Current Outpatient Prescriptions   Medication Sig Dispense Refill     metoprolol (TOPROL-XL) 50 MG 24 hr tablet Take 1 tablet (50 mg) by mouth daily 90 tablet 1     hydrochlorothiazide (HYDRODIURIL) 25 MG tablet TAKE ONE-HALF TABLET BY MOUTH DAILY 45 tablet 3     aspirin 81 MG tablet Take 81 mg by mouth daily       order for DME Compression Stockings  Knee high  20/30 compression 2 each 1     amLODIPine (NORVASC) 5 MG tablet  "Take 1 tablet (5 mg) by mouth daily 90 tablet 3     levothyroxine (SYNTHROID) 112 MCG tablet Take 1 tablet (112 mcg) by mouth daily 90 tablet 3     metFORMIN (GLUCOPHAGE-XR) 500 MG 24 hr tablet Take 1 tablet (500 mg) by mouth daily (with dinner) 90 tablet 3     multivitamin, therapeutic with minerals (THERA-VIT-M) TABS Take 1 tablet by mouth daily       Cyanocobalamin (VITAMIN B 12 PO) Take 1 tablet by mouth daily VIT B12       cholecalciferol (VITAMIN D) 1000 UNIT tablet Take 1 tablet (1,000 Units) by mouth daily 100 tablet 3     calcium-vitamin D (CALTRATE) 600-400 MG-UNIT per tablet Take 1 tablet by mouth daily 60 tablet 11     [DISCONTINUED] metoprolol (TOPROL-XL) 25 MG 24 hr tablet Take 1 tablet (25 mg) by mouth daily 90 tablet 1     Allergies   Allergen Reactions     Ace Inhibitors      ACE throat clearing     Oxycodone Nausea and Vomiting     Tetanus Toxoid Swelling     Hard red lump      Labs reviewed in EPIC      Reviewed and updated as needed this visit by clinical staff     Reviewed and updated as needed this visit by Provider         ROS:  Constitutional, HEENT, cardiovascular, pulmonary, gi and gu systems are negative, except as otherwise noted.  All other review of system is negative  This document serves as a record of the services and decisions personally performed and made by Zoraida Braga MD. It was created on her behalf by Crys Nelson, a trained medical scribe. The creation of this document is based the provider's statements to the medical scribe.    Melva Nelson 10:27 AM, October 26, 2017    OBJECTIVE:                                                    /70 (BP Location: Right arm, Cuff Size: Adult Large)  Pulse 61  Ht 5' 3\" (1.6 m)  Wt 181 lb (82.1 kg)  SpO2 100%  Breastfeeding? No  BMI 32.06 kg/m2  Body mass index is 32.06 kg/(m^2).      GENERAL APPEARANCE: healthy, alert and no distress  EYES: Eyes grossly normal to inspection, PERRL and conjunctivae and sclerae " normal  HENT: ear canals and TM's normal and nose and mouth without ulcers or lesions  NECK: no adenopathy  RESP: lungs clear to auscultation - no rales, rhonchi or wheezes  CV: regular rates and rhythm, normal S1 S2, mild systolic murmur     Echocardiogram reviewed      ASSESSMENT/PLAN:                                                    Yola was seen today for hypertension.    Diagnoses and all orders for this visit:    Essential hypertension  Home readings on the higher side  Dose of metoprolol was increased to 50 mg daily   She will continue monitoring BP at home  Update me if readings stay >140/90mmHg  -     metoprolol (TOPROL-XL) 50 MG 24 hr tablet; Take 1 tablet (50 mg) by mouth daily    SOB (shortness of breath) on exertion'  No chest pain and nothing out of ordinary only on strenuous activities  Has past history of similar symptoms which were related to mild tricuspid regurgitation and mild pulmonary HTN seen echo from 2013  Will repeat echo to recheck   -     Echocardiogram Complete; Future    Pulmonary hypertension  Comments:  seen on echocardiogram from 2013  Orders:  -     Echocardiogram Complete; Future      Patient Instructions   Dose of metoprolol was increased to 50 mg daily  Schedule echocardiogram at your earliest convenience   Please call [phone] to schedule Echocardiogram .  Done at [address]  Follow up in 4 months  Seek sooner medical attention if there is any worsening of symptoms or problems.    The information in this document, created by the medical scribe for me, accurately reflects the services I personally performed and the decisions made by me. I have reviewed and approved this document for accuracy prior to leaving the patient care area.  Zoraida Braga MD  10:32 AM, 10/26/17    Zoraida Braga MD  [facility]

## 2017-10-26 NOTE — MR AVS SNAPSHOT
"              After Visit Summary   10/26/2017    Yola Mayo    MRN: [id number]           Patient Information     Date Of Birth          1945        Visit Information        Provider Department      10/26/2017 10:00 AM Zoraida Braga MD [facility]        Today's Diagnoses     Essential hypertension    -  1    SOB (shortness of breath) on exertion        Pulmonary hypertension          Care Instructions    Dose of metoprolol was increased to 50 mg daily  Schedule echocardiogram at your earliest convenience   Please call [phone] to schedule Echocardiogram .  Done at [address]  Follow up in 4 months  Seek sooner medical attention if there is any worsening of symptoms or problems.            Follow-ups after your visit        Future tests that were ordered for you today     Open Future Orders        Priority Expected Expires Ordered    Echocardiogram Complete Routine  10/26/2018 10/26/2017            Who to contact     If you have questions or need follow up information about today's clinic visit or your schedule please contact [facility] directly at [phone].  Normal or non-critical lab and imaging results will be communicated to you by Infectioushart, letter or phone within 4 business days after the clinic has received the results. If you do not hear from us within 7 days, please contact the clinic through [employer] or phone. If you have a critical or abnormal lab result, we will notify you by phone as soon as possible.  Submit refill requests through [employer] or call your pharmacy and they will forward the refill request to us. Please allow 3 business days for your refill to be completed.          Additional Information About Your Visit        Infectioushart Information     [employer] lets you send messages to your doctor, view your test results, renew your prescriptions, schedule appointments and more. To sign up, go to [email or web address] . Click on \"Log in\" on the left side of the " "screen, which will take you to the Welcome page. Then click on \"Sign up Now\" on the right side of the page.     You will be asked to enter the access code listed below, as well as some personal information. Please follow the directions to create your username and password.     Your access code is: [email or web address]  Expires: 2017 10:03 AM     Your access code will  in 90 days. If you need help or a new code, please call your [facility] or [phone].        Care EveryWhere ID     This is your Care EveryWhere ID. This could be used by other organizations to access your [place] medical records  [id number]        Your Vitals Were     Pulse Height Pulse Oximetry Breastfeeding? BMI (Body Mass Index)       61 5' 3\" (1.6 m) 100% No 32.06 kg/m2        Blood Pressure from Last 3 Encounters:   10/26/17 144/70   08/15/17 135/67   17 163/73    Weight from Last 3 Encounters:   10/26/17 181 lb (82.1 kg)   08/15/17 178 lb (80.7 kg)   17 178 lb (80.7 kg)                 Today's Medication Changes          These changes are accurate as of: 10/26/17 10:31 AM.  If you have any questions, ask your nurse or doctor.               These medicines have changed or have updated prescriptions.        Dose/Directions    metoprolol 50 MG 24 hr tablet   Commonly known as:  TOPROL-XL   This may have changed:    - medication strength  - how much to take   Used for:  Essential hypertension   Changed by:  Zoraida Braga MD        Dose:  50 mg   Take 1 tablet (50 mg) by mouth daily   Quantity:  90 tablet   Refills:  1            Where to get your medicines      These medications were sent to [facility] [address] [facility] [employer] [address] [facility] [address]  [address] [address]     Phone:  [phone]     metoprolol 50 MG 24 hr tablet                Primary Care Provider Office Phone # Fax #    Zoraida Braga [phone] [address]  [facility] [address]   "      Equal Access to Services     [facility] Hospital[facility]ACKELINE : Hadii aad ku hadronaldtd Lissetnikolai, wadebrada luqadaha, qaybta kasusanaelsa allen. So [facility] [phone].    ATENCIÓN: Si habla español, tiene a sarah disposición servicios gratuitos de asistencia lingüística. Tanisha al [phone].    We comply with applicable federal civil rights laws and [place] laws. We do not discriminate on the basis of race, color, national origin, age, disability, sex, sexual orientation, or gender identity.            Thank you!     Thank you for choosing [facility]  for your care. Our goal is always to provide you with excellent care. Hearing back from our patients is one way we can continue to improve our services. Please take a few minutes to complete the written survey that you may receive in the mail after your visit with us. Thank you!             Your Updated Medication List - Protect others around you: Learn how to safely use, store and throw away your medicines at www.disposemymeds.org.          This list is accurate as of: 10/26/17 10:31 AM.  Always use your most recent med list.                   Brand Name Dispense Instructions for use Diagnosis    amLODIPine 5 MG tablet    NORVASC    90 tablet    Take 1 tablet (5 mg) by mouth daily    Essential hypertension, benign       aspirin 81 MG tablet      Take 81 mg by mouth daily        calcium-vitamin D 600-400 MG-UNIT per tablet    CALTRATE    60 tablet    Take 1 tablet by mouth daily    Preventive measure       cholecalciferol 1000 UNIT tablet    vitamin D3    100 tablet    Take 1 tablet (1,000 Units) by mouth daily    Vitamin deficiency       hydrochlorothiazide 25 MG tablet    HYDRODIURIL    45 tablet    TAKE ONE-HALF TABLET BY MOUTH DAILY    Essential hypertension, benign       levothyroxine 112 MCG tablet    SYNTHROID    90 tablet    Take 1 tablet (112 mcg) by mouth daily    Hypothyroidism, unspecified type       metFORMIN 500 MG 24 hr  tablet    GLUCOPHAGE-XR    90 tablet    Take 1 tablet (500 mg) by mouth daily (with dinner)    Prediabetes       metoprolol 50 MG 24 hr tablet    TOPROL-XL    90 tablet    Take 1 tablet (50 mg) by mouth daily    Essential hypertension       multivitamin, therapeutic with minerals Tabs tablet      Take 1 tablet by mouth daily        order for DME     2 each    Compression Stockings Knee high 20/30 compression    Edema of lower extremity       VITAMIN B 12 PO      Take 1 tablet by mouth daily VIT B12

## 2017-10-26 NOTE — NURSING NOTE
"Chief Complaint   Patient presents with     Hypertension     recheck from 8-4-17 with trial metoprolol       Initial /70  Pulse 61  Ht 5' 3\" (1.6 m)  Wt 181 lb (82.1 kg)  SpO2 100%  Breastfeeding? No  BMI 32.06 kg/m2 Estimated body mass index is 32.06 kg/(m^2) as calculated from the following:    Height as of this encounter: 5' 3\" (1.6 m).    Weight as of this encounter: 181 lb (82.1 kg).  Medication Reconciliation: complete       Injectable Influenza Immunization Documentation    1.  Is the person to be vaccinated sick today?  No    2. Does the person to be vaccinated have an allergy to eggs or to a component of the vaccine?  No    3. Has the person to be vaccinated today ever had a serious reaction to influenza vaccine in the past?  No    4. Has the person to be vaccinated ever had Guillain-Eldred syndrome?  No     Form completed verbally with patient. Sveta RIDER MA       "

## 2017-10-30 ENCOUNTER — HOSPITAL ENCOUNTER (OUTPATIENT)
Dept: CARDIOLOGY | Facility: CLINIC | Age: 72
Discharge: HOME | End: 2017-10-30
Attending: INTERNAL MEDICINE | Admitting: INTERNAL MEDICINE
Payer: MEDICARE

## 2017-10-30 DIAGNOSIS — R06.02 SOB (SHORTNESS OF BREATH) ON EXERTION: ICD-10-CM

## 2017-10-30 DIAGNOSIS — I27.20 PULMONARY HYPERTENSION (H): ICD-10-CM

## 2017-10-30 PROCEDURE — 93306 TTE W/DOPPLER COMPLETE: CPT

## 2017-10-30 PROCEDURE — 93306 TTE W/DOPPLER COMPLETE: CPT | Mod: 26 | Performed by: INTERNAL MEDICINE

## 2017-10-30 NOTE — LETTER
[facility]  [address]  [place]  [address]  Tel: [phone]    2017    Yola Cuadra  [address] [place] [facility] [address]        Timothy Aceves,    This is to inform you regarding your test result.    I made attempts to contact you but could not get hold of you.  You Echocardiogram shows that you have pulmonary hypertension that means pressure on right side is higher   that could be the reason for your shortness of breath  Please make appointment to see cardiologist.  Please call the following number to make appointment .  [facility] [phone]       Sincerely,      Dr.Nasima Maria Luz MD,FACP / manoj          Resulted Orders   Echocardiogram Complete    Narrative    [id number]  [facility]  [id number]  [id number]^MARIA LUZ^MARTY^R        [facility]  [facility]  Echocardiography Laboratory  [address]  [place] [address]  Phone [phone]  Fax [phone]        Name: YOLA CUADRA  MRN: [id number]  : 1945  Study Date: 10/30/2017 02:00 PM  Age: 72 yrs  Gender: Female  Patient [place] [facility]  Reason For Study: VERA WILSON  Ordering Physician: MARTY CANSECO  Referring Physician: MARTY CANSECO  Performed By: Umu Roy RDCS     BSA: 1.9 m2  Height: 63 in  Weight: 181 lb  HR: 51  BP: 144/70 mmHg  _____________________________________________________________________________  __     Procedure  Complete Echo Adult.     _____________________________________________________________________________  __        Interpretation Summary     The visual ejection fraction is estimated at 55-60%.  Left ventricular systolic function is normal.  Right ventricular systolic pressure is elevated, consistent with mild to  moderate pulmonary hypertension.  Pulmonary pressures are higher on this study than  before.  _____________________________________________________________________________  __        Left Ventricle  The left ventricle is normal in size. There is normal left ventricular wall  thickness. The visual ejection fraction is estimated at 55-60%. Left  ventricular systolic function is normal. Grade I or early diastolic  dysfunction. E by E prime ratio is between 8 and 15, which is indeterminate  for assessment of left ventricular filling pressures.     Right Ventricle  The right ventricle is normal in size and function.     Atria  Normal left atrial size. Right atrial size is normal. There is no color  Doppler evidence of an atrial shunt.     Mitral Valve  The mitral valve leaflets are mildly thickened. The mitral valve chordae are  thickened and/or calcified. There is trace mitral regurgitation.     Tricuspid Valve  There is mild (1+) tricuspid regurgitation. The right ventricular systolic  pressure is approximated at 38.3 mmHg plus the right atrial pressure. Right  ventricular systolic pressure is elevated, consistent with mild to moderate  pulmonary hypertension.        Aortic Valve  The aortic valve is trileaflet. There is mild trileaflet aortic sclerosis.  There is discrete nodular thickening of the non- coronary cusp. No aortic  regurgitation is present. No hemodynamically significant valvular aortic  stenosis.     Pulmonic Valve  There is trace pulmonic valvular regurgitation.     Vessels  The IVC is normal in size and reactivity with respiration, suggesting normal  central venous pressure.     Pericardium  There is no pericardial effusion.     Rhythm  The rhythm was sinus bradycardia. The patient exhibited frequent PVCs.     _____________________________________________________________________________  __  MMode/2D Measurements & Calculations  IVSd: 0.69 cm  LVIDd: 4.6 cm  LVIDs: 3.3 cm  LVPWd: 0.72 cm  FS: 28.6 %  EDV(Teich): 95.3 ml  ESV(Teich): 42.7 ml  LV mass(C)d: 99.0 grams  LV mass(C)dI: 53.4  grams/m2  Ao root diam: 2.6 cm  LA dimension: 3.9 cm     asc Aorta Diam: 2.9 cm  LA/Ao: 1.5  LVOT diam: 2.1 cm  LVOT area: 3.4 cm2  LA Volume (BP): 45.1 ml  LA Volume Index (BP): 24.4 ml/m2  RWT: 0.32        Doppler Measurements & Calculations  MV E max cuco: 77.1 cm/sec  MV A max cuco: 112.6 cm/sec  MV E/A: 0.68  MV dec time: 0.20 sec  PA acc time: 0.13 sec     TR max cuco: 309.5 cm/sec  TR max P.3 mmHg  E/E' av.5  Lateral E/e': 10.2  Med E to E': 14.9  Medial E/e': 14.9           _____________________________________________________________________________  __           Report approved by: Deborah Reyes 10/30/2017 06:00 PM

## 2017-11-02 DIAGNOSIS — I27.20 PULMONARY HYPERTENSION (H): Primary | ICD-10-CM

## 2017-11-02 NOTE — PROGRESS NOTES
Please notify patient by sending following letter with copy of test results      Timothy Aceves,    This is to inform you regarding your test result.    I made attempts to contact you but could not get hold of you.  You Echocardiogram shows that you have pulmonary hypertension that means pressure on right side is higher   that could be the reason for your shortness of breath  Please make appointment to see cardiologist.  Please call the following number to make appointment .  [facility] [phone]       Sincerely,      Dr.Nasima Maria Luz MD,FACP

## 2017-11-30 ENCOUNTER — OFFICE VISIT (OUTPATIENT)
Dept: CARDIOLOGY | Facility: CLINIC | Age: 72
End: 2017-11-30
Attending: INTERNAL MEDICINE
Payer: COMMERCIAL

## 2017-11-30 VITALS
HEART RATE: 67 BPM | WEIGHT: 185 LBS | SYSTOLIC BLOOD PRESSURE: 157 MMHG | DIASTOLIC BLOOD PRESSURE: 76 MMHG | HEIGHT: 63 IN | BODY MASS INDEX: 32.78 KG/M2

## 2017-11-30 DIAGNOSIS — R06.09 DOE (DYSPNEA ON EXERTION): ICD-10-CM

## 2017-11-30 DIAGNOSIS — I10 BENIGN ESSENTIAL HYPERTENSION: Primary | ICD-10-CM

## 2017-11-30 PROCEDURE — 99204 OFFICE O/P NEW MOD 45 MIN: CPT | Performed by: INTERNAL MEDICINE

## 2017-11-30 RX ORDER — LOSARTAN POTASSIUM 25 MG/1
25 TABLET ORAL DAILY
Qty: 90 TABLET | Refills: 3 | Status: SHIPPED | OUTPATIENT
Start: 2017-11-30 | End: 2018-02-05

## 2017-11-30 NOTE — LETTER
"11/30/2017    Zoraida Braga MD  [address] [facility] [address]  [place] [address]    RE: Yola Mayo       Dear Colleague,    I had the pleasure of seeing Yola Mayo in the [facility].    Cardiology Consultation      Yola Mayo MRN# [id number]   YOB: 1945 Age: 72 year old   Date of Visit 11/30/2017     Reason for consult:  dyspnea on exertion            Assessment and Plan:     1. Dyspnea on exertion, suspect secondary to medications    Trial of discontinuing the Toprol.  Have asked her to watch for improvement in her exertional capacity but there may be more symptoms from her PVCs.    Will start losartan 25 mg daily to replace the Toprol in her antihypertensive regimen.    Follow up in two months with labs and Krystal Ekman for titration of medications and review of symptoms.    This note was transcribed using electronic voice recognition software, typographical errors may be present.                Chief Complaint:   Heart Problem (new consult )           History of Present Illness:   This patient is a very pleasant 72 year old female that I am meeting for the first time.  I reviewed her echocardiogram as she has mild to moderate pulmonary hypertension but overall pretty similar to previous 10 years ago.  She has noticed more dyspnea on exertion over the past few months.  She is also noticed that her heart rate does not get above 90 bpm on her beta blocker when exerting herself on an exercise machine.  She is on the beta blocker for PVCs and for hypertension.  She did not tolerate lisinopril secondary to cough and has not been on losartan.  She does have a little bit of nocturia with her hydrochlorothiazide but tolerable.      No exertional chest pain  No PND / orthopnea  No presyncope / syncope           Physical Exam:     Vitals: /76  Pulse 67  Ht 1.6 m (5' 3\")  Wt 83.9 kg (185 lb)  BMI 32.77 kg/m2  Constitutional:  cooperative, alert and " oriented, well developed, well nourished, in no acute distress        Skin:  warm and dry to the touch, no apparent skin lesions or masses noted        Head:  normocephalic, no masses or lesions        Eyes:  pupils equal and round, conjunctivae and lids unremarkable, sclera white, no xanthalasma, EOMS intact, no nystagmus        ENT:  no pallor or cyanosis, dentition good        Neck:  JVP normal        Chest:  normal breath sounds, clear to auscultation, normal A-P diameter, normal symmetry, normal respiratory excursion, no use of accessory muscles        Cardiac: regular rhythm occasional premature beats                Abdomen:      benign    Extremities and Back:  no deformities, clubbing, cyanosis, erythema observed        Neurological:  no gross motor deficits;affect appropriate                    Past Medical History:   I have reviewed this patient's past medical history  Past Medical History:   Diagnosis Date     BASAL CELL CANCER -NOSE      Family history of diabetes mellitus     Father     Family history of ischemic heart disease     Father     Family history of malignant neoplasm of ovary     Mother     HX DISRUPT L ANT CRUCIATE S/P REPAIR 2003      HYPERTENSION       HYPOTHYROIDISM      LOC PRIM OSTEOARTH-L 1ST METATARSAL-CUNEIFORM JOINT 3/10/2004     Major depressive disorder, single episode, mild (H) 11/24/2014     MILD-MOD TRICUSPID REGURG. MOD PULM HTN 6/23/2005    ECHO  5/05     SPLENIC CYST              Past Surgical History:   I have reviewed this patient's past surgical history  Past Surgical History:   Procedure Laterality Date     ARTHROPLASTY KNEE Left 4/13/2016    Procedure: ARTHROPLASTY KNEE;  Surgeon: Giuseppe Lopez MD;  Location:  OR     ARTHROPLASTY MINIMALLY INVASIVE HIP  4/18/2012    Procedure:ARTHROPLASTY MINIMALLY INVASIVE HIP; RIGHT TWO INCISION MINIMALLY INVASIVE HIP ARTHROPLASTY (YUVAL)^ (NO CELL SAVER) (C-ARM); Surgeon:GIUSEPPE LOPEZ; Location: OR       APPENDECTOMY      incidental appi at s     C CHEMOSURG MOHS 1ST STAGE      Mohs-nose     C REPAIR CRUCIATE LIGAMENT,KNEE  2003    L   ACL reconstruction     C TOTAL ABDOM HYSTERECTOMY      W/ BSO fibroids, hx PID     HC COLONOSCOPY THRU STOMA, DIAGNOSTIC      normal, rec repeat 10 yrs     HYSTERECTOMY, PAP NO LONGER INDICATED       SURGICAL HISTORY OF -   Mult-~    infertility, laparotomies               Social History:   I have reviewed this patient's social history  Social History   Substance Use Topics     Smoking status: Never Smoker     Smokeless tobacco: Never Used     Alcohol use 0.0 oz/week     0 Standard drinks or equivalent per week      Comment: Approx 1-2 times monthy             Family History:   I have reviewed this patient's family history  Family History   Problem Relation Age of Onset     CANCER Mother      ovarian  of it  at 51yo.     DIABETES Father      Adult onset     Hypertension Father      C.A.D. Father      Fatal Heart attack at 70.     CEREBROVASCULAR DISEASE Father      Thyroid Disease Sister      Thyroid Disease Sister      Thyroid Disease Sister      CANCER Daughter      Thyroid Disease Daughter      Breast Cancer No family hx of      Cancer - colorectal No family hx of              Allergies:     Allergies   Allergen Reactions     Ace Inhibitors      ACE throat clearing     Oxycodone Nausea and Vomiting     Tetanus Toxoid Swelling     Hard red lump              Medications:   I have reviewed this patient's current medications  Current Outpatient Prescriptions   Medication Sig Dispense Refill     losartan (COZAAR) 25 MG tablet Take 1 tablet (25 mg) by mouth daily 90 tablet 3     metoprolol (TOPROL-XL) 50 MG 24 hr tablet Take 1 tablet (50 mg) by mouth daily 90 tablet 1     hydrochlorothiazide (HYDRODIURIL) 25 MG tablet TAKE ONE-HALF TABLET BY MOUTH DAILY 45 tablet 3     aspirin 81 MG tablet Take 81 mg by mouth daily       order for DME Compression  Stockings  Knee high  20/30 compression 2 each 1     amLODIPine (NORVASC) 5 MG tablet Take 1 tablet (5 mg) by mouth daily 90 tablet 3     levothyroxine (SYNTHROID) 112 MCG tablet Take 1 tablet (112 mcg) by mouth daily 90 tablet 3     metFORMIN (GLUCOPHAGE-XR) 500 MG 24 hr tablet Take 1 tablet (500 mg) by mouth daily (with dinner) 90 tablet 3     multivitamin, therapeutic with minerals (THERA-VIT-M) TABS Take 1 tablet by mouth daily       Cyanocobalamin (VITAMIN B 12 PO) Take 1 tablet by mouth daily VIT B12       cholecalciferol (VITAMIN D) 1000 UNIT tablet Take 1 tablet (1,000 Units) by mouth daily 100 tablet 3     calcium-vitamin D (CALTRATE) 600-400 MG-UNIT per tablet Take 1 tablet by mouth daily 60 tablet 11               Review of Systems:     Review of Systems:  Skin:  Negative     Eyes:  Positive for glasses  ENT:  Negative    Respiratory:  Positive for dyspnea on exertion  Cardiovascular:    Positive for;dizziness  Gastroenterology: Negative    Genitourinary:  Negative    Musculoskeletal:  Negative    Neurologic:  Negative    Psychiatric:  Negative    Heme/Lymph/Imm:  Negative    Endocrine:  Negative                       Data:   All laboratory data reviewed  Lab Results   Component Value Date    CHOL 152 03/08/2017     Lab Results   Component Value Date    HDL 72 03/08/2017     Lab Results   Component Value Date    LDL 68 03/08/2017     Lab Results   Component Value Date    TRIG 59 03/08/2017     Lab Results   Component Value Date    CHOLHDLRATIO 1.6 03/27/2015     TSH   Date Value Ref Range Status   03/08/2017 1.48 0.40 - 4.00 mU/L Final     Last Basic Metabolic Panel:  Lab Results   Component Value Date     08/04/2017      Lab Results   Component Value Date    POTASSIUM 4.3 08/04/2017     Lab Results   Component Value Date    CHLORIDE 104 08/04/2017     Lab Results   Component Value Date    JOSEPH 9.2 08/04/2017     Lab Results   Component Value Date    CO2 29 08/04/2017     Lab Results   Component  Value Date    BUN 13 08/04/2017     Lab Results   Component Value Date    CR 0.77 08/04/2017     Lab Results   Component Value Date    GLC 91 08/04/2017     Lab Results   Component Value Date    WBC 5.7 08/04/2017     Lab Results   Component Value Date    RBC 5.02 08/04/2017     Lab Results   Component Value Date    HGB 14.7 08/04/2017     Lab Results   Component Value Date    HCT 43.8 08/04/2017     Lab Results   Component Value Date    MCV 87 08/04/2017     Lab Results   Component Value Date    MCH 29.3 08/04/2017     Lab Results   Component Value Date    MCHC 33.6 08/04/2017     Lab Results   Component Value Date    RDW 14.2 08/04/2017     Lab Results   Component Value Date     08/04/2017     Thank you for allowing me to participate in the care of your patient.    Sincerely,     Albino Howard MD     [facility]

## 2017-11-30 NOTE — PATIENT INSTRUCTIONS
We will have you stop your TOPROL and see if your energy is better. If you get more skipping heart beats, we might have to add back a smaller dose of it.    Let's add LOSARTAN which doesn't cause cough, but can work similar to and ACE-I.    If your blood pressure is consistently above 140 or under 110, please call and let us know.    See us back around Feb with labs to adjust your medications and maybe a stress test if symptoms continue.    We won't get the medications perfectly right off the bat, so please call to communicate in the meantime to make some adjustments if needed.

## 2017-11-30 NOTE — PROGRESS NOTES
"Cardiology Consultation      Yola Mayo MRN# [id number]   YOB: 1945 Age: 72 year old   Date of Visit 11/30/2017     Reason for consult:  dyspnea on exertion            Assessment and Plan:     1. Dyspnea on exertion, suspect secondary to medications    Trial of discontinuing the Toprol.  Have asked her to watch for improvement in her exertional capacity but there may be more symptoms from her PVCs.    Will start losartan 25 mg daily to replace the Toprol in her antihypertensive regimen.    Follow up in two months with labs and Krystal Tesfayeman for titration of medications and review of symptoms.    This note was transcribed using electronic voice recognition software, typographical errors may be present.                Chief Complaint:   Heart Problem (new consult )           History of Present Illness:   This patient is a very pleasant 72 year old female that I am meeting for the first time.  I reviewed her echocardiogram as she has mild to moderate pulmonary hypertension but overall pretty similar to previous 10 years ago.  She has noticed more dyspnea on exertion over the past few months.  She is also noticed that her heart rate does not get above 90 bpm on her beta blocker when exerting herself on an exercise machine.  She is on the beta blocker for PVCs and for hypertension.  She did not tolerate lisinopril secondary to cough and has not been on losartan.  She does have a little bit of nocturia with her hydrochlorothiazide but tolerable.      No exertional chest pain  No PND / orthopnea  No presyncope / syncope           Physical Exam:     Vitals: /76  Pulse 67  Ht 1.6 m (5' 3\")  Wt 83.9 kg (185 lb)  BMI 32.77 kg/m2  Constitutional:  cooperative, alert and oriented, well developed, well nourished, in no acute distress        Skin:  warm and dry to the touch, no apparent skin lesions or masses noted        Head:  normocephalic, no masses or lesions        Eyes:  pupils equal and round, " conjunctivae and lids unremarkable, sclera white, no xanthalasma, EOMS intact, no nystagmus        ENT:  no pallor or cyanosis, dentition good        Neck:  JVP normal        Chest:  normal breath sounds, clear to auscultation, normal A-P diameter, normal symmetry, normal respiratory excursion, no use of accessory muscles        Cardiac: regular rhythm occasional premature beats                Abdomen:      benign    Extremities and Back:  no deformities, clubbing, cyanosis, erythema observed        Neurological:  no gross motor deficits;affect appropriate                    Past Medical History:   I have reviewed this patient's past medical history  Past Medical History:   Diagnosis Date     BASAL CELL CANCER -NOSE      Family history of diabetes mellitus     Father     Family history of ischemic heart disease     Father     Family history of malignant neoplasm of ovary     Mother     HX DISRUPT L ANT CRUCIATE S/P REPAIR 2003      HYPERTENSION       HYPOTHYROIDISM      LOC PRIM OSTEOARTH-L 1ST METATARSAL-CUNEIFORM JOINT 3/10/2004     Major depressive disorder, single episode, mild (H) 11/24/2014     MILD-MOD TRICUSPID REGURG. MOD PULM HTN 6/23/2005    ECHO  5/05     SPLENIC CYST              Past Surgical History:   I have reviewed this patient's past surgical history  Past Surgical History:   Procedure Laterality Date     ARTHROPLASTY KNEE Left 4/13/2016    Procedure: ARTHROPLASTY KNEE;  Surgeon: Giuseppe Lopez MD;  Location: [facility] OR     ARTHROPLASTY MINIMALLY INVASIVE HIP  4/18/2012    Procedure:ARTHROPLASTY MINIMALLY INVASIVE HIP; RIGHT TWO INCISION MINIMALLY INVASIVE HIP ARTHROPLASTY (YUVAL)^ (NO CELL SAVER) (C-ARM); Surgeon:GIUSEPPE LOPEZ; Location: OR     C APPENDECTOMY  8/87    incidental appi at [facility]     C CHEMOSURG MOHS 1ST STAGE  9-99    Mohs-nose     C REPAIR CRUCIATE LIGAMENT,KNEE  8/2003    L   ACL reconstruction     C TOTAL ABDOM HYSTERECTOMY  8/87    W/ BSO fibroids, hx PID     HC  COLONOSCOPY THRU STOMA, DIAGNOSTIC      normal, rec repeat 10 yrs     HYSTERECTOMY, PAP NO LONGER INDICATED       SURGICAL HISTORY OF -   Mult-~    infertility, laparotomies               Social History:   I have reviewed this patient's social history  Social History   Substance Use Topics     Smoking status: Never Smoker     Smokeless tobacco: Never Used     Alcohol use 0.0 oz/week     0 Standard drinks or equivalent per week      Comment: Approx 1-2 times monthy             Family History:   I have reviewed this patient's family history  Family History   Problem Relation Age of Onset     CANCER Mother      ovarian  of it  at 49yo.     DIABETES Father      Adult onset     Hypertension Father      C.A.D. Father      Fatal Heart attack at 70.     CEREBROVASCULAR DISEASE Father      Thyroid Disease Sister      Thyroid Disease Sister      Thyroid Disease Sister      CANCER Daughter      Thyroid Disease Daughter      Breast Cancer No family hx of      Cancer - colorectal No family hx of              Allergies:     Allergies   Allergen Reactions     Ace Inhibitors      ACE throat clearing     Oxycodone Nausea and Vomiting     Tetanus Toxoid Swelling     Hard red lump              Medications:   I have reviewed this patient's current medications  Current Outpatient Prescriptions   Medication Sig Dispense Refill     losartan (COZAAR) 25 MG tablet Take 1 tablet (25 mg) by mouth daily 90 tablet 3     metoprolol (TOPROL-XL) 50 MG 24 hr tablet Take 1 tablet (50 mg) by mouth daily 90 tablet 1     hydrochlorothiazide (HYDRODIURIL) 25 MG tablet TAKE ONE-HALF TABLET BY MOUTH DAILY 45 tablet 3     aspirin 81 MG tablet Take 81 mg by mouth daily       order for DME Compression Stockings  Knee high  20/30 compression 2 each 1     amLODIPine (NORVASC) 5 MG tablet Take 1 tablet (5 mg) by mouth daily 90 tablet 3     levothyroxine (SYNTHROID) 112 MCG tablet Take 1 tablet (112 mcg) by mouth daily 90 tablet 3     metFORMIN  (GLUCOPHAGE-XR) 500 MG 24 hr tablet Take 1 tablet (500 mg) by mouth daily (with dinner) 90 tablet 3     multivitamin, therapeutic with minerals (THERA-VIT-M) TABS Take 1 tablet by mouth daily       Cyanocobalamin (VITAMIN B 12 PO) Take 1 tablet by mouth daily VIT B12       cholecalciferol (VITAMIN D) 1000 UNIT tablet Take 1 tablet (1,000 Units) by mouth daily 100 tablet 3     calcium-vitamin D (CALTRATE) 600-400 MG-UNIT per tablet Take 1 tablet by mouth daily 60 tablet 11               Review of Systems:     Review of Systems:  Skin:  Negative     Eyes:  Positive for glasses  ENT:  Negative    Respiratory:  Positive for dyspnea on exertion  Cardiovascular:    Positive for;dizziness  Gastroenterology: Negative    Genitourinary:  Negative    Musculoskeletal:  Negative    Neurologic:  Negative    Psychiatric:  Negative    Heme/Lymph/Imm:  Negative    Endocrine:  Negative                       Data:   All laboratory data reviewed  Lab Results   Component Value Date    CHOL 152 03/08/2017     Lab Results   Component Value Date    HDL 72 03/08/2017     Lab Results   Component Value Date    LDL 68 03/08/2017     Lab Results   Component Value Date    TRIG 59 03/08/2017     Lab Results   Component Value Date    CHOLHDLRATIO 1.6 03/27/2015     TSH   Date Value Ref Range Status   03/08/2017 1.48 0.40 - 4.00 mU/L Final     Last Basic Metabolic Panel:  Lab Results   Component Value Date     08/04/2017      Lab Results   Component Value Date    POTASSIUM 4.3 08/04/2017     Lab Results   Component Value Date    CHLORIDE 104 08/04/2017     Lab Results   Component Value Date    JOSEPH 9.2 08/04/2017     Lab Results   Component Value Date    CO2 29 08/04/2017     Lab Results   Component Value Date    BUN 13 08/04/2017     Lab Results   Component Value Date    CR 0.77 08/04/2017     Lab Results   Component Value Date    GLC 91 08/04/2017     Lab Results   Component Value Date    WBC 5.7 08/04/2017     Lab Results   Component Value  Date    RBC 5.02 08/04/2017     Lab Results   Component Value Date    HGB 14.7 08/04/2017     Lab Results   Component Value Date    HCT 43.8 08/04/2017     Lab Results   Component Value Date    MCV 87 08/04/2017     Lab Results   Component Value Date    MCH 29.3 08/04/2017     Lab Results   Component Value Date    MCHC 33.6 08/04/2017     Lab Results   Component Value Date    RDW 14.2 08/04/2017     Lab Results   Component Value Date     08/04/2017

## 2017-11-30 NOTE — MR AVS SNAPSHOT
After Visit Summary   11/30/2017    Yola Mayo    MRN: [id number]           Patient Information     Date Of Birth          1945        Visit Information        Provider Department      11/30/2017 1:15 PM Albino Howard MD [facility]        Today's Diagnoses     Benign essential hypertension    -  1    GALINDO (dyspnea on exertion)          Care Instructions    We will have you stop your TOPROL and see if your energy is better. If you get more skipping heart beats, we might have to add back a smaller dose of it.    Let's add LOSARTAN which doesn't cause cough, but can work similar to and ACE-I.    If your blood pressure is consistently above 140 or under 110, please call and let us know.    See us back around Feb with labs to adjust your medications and maybe a stress test if symptoms continue.    We won't get the medications perfectly right off the bat, so please call to communicate in the meantime to make some adjustments if needed.          Follow-ups after your visit        Additional Services     Follow-Up with Cardiac Advanced Practice Provider                 Future tests that were ordered for you today     Open Future Orders        Priority Expected Expires Ordered    Follow-Up with Cardiac Advanced Practice Provider Routine 2/1/2018 11/30/2018 11/30/2017    Basic metabolic panel Routine 2/1/2018 11/30/2018 11/30/2017            Who to contact     If you have questions or need follow up information about today's clinic visit or your schedule please contact [facility] directly at [phone].  Normal or non-critical lab and imaging results will be communicated to you by MyChart, letter or phone within 4 business days after the clinic has received the results. If you do not hear from us within 7 days, please contact the clinic through MyChart or phone. If you have a critical or abnormal lab result, we will  "notify you by phone as soon as possible.  Submit refill requests through [employer] or call your pharmacy and they will forward the refill request to us. Please allow 3 business days for your refill to be completed.          Additional Information About Your Visit        PadlocharPDV Information     [employer]eeper lets you send messages to your doctor, view your test results, renew your prescriptions, schedule appointments and more. To sign up, go to [email or web address] . Click on \"Log in\" on the left side of the screen, which will take you to the Welcome page. Then click on \"Sign up Now\" on the right side of the page.     You will be asked to enter the access code listed below, as well as some personal information. Please follow the directions to create your username and password.     Your access code is: [email or web address]  Expires: 2018  1:36 PM     Your access code will  in 90 days. If you need help or a new code, please call your [place] clinic or [phone].        Care EveryWhere ID     This is your Care EveryWhere ID. This could be used by other organizations to access your [facility] medical records  [id number]        Your Vitals Were     Pulse Height BMI (Body Mass Index)             67 1.6 m (5' 3\") 32.77 kg/m2          Blood Pressure from Last 3 Encounters:   17 157/76   10/26/17 144/70   08/15/17 135/67    Weight from Last 3 Encounters:   17 83.9 kg (185 lb)   10/26/17 82.1 kg (181 lb)   08/15/17 80.7 kg (178 lb)               Primary Care Provider Office Phone # Fax #    Zoraida Braga [phone] [address]  [facility] [address]        Equal Access to Services     [facility] CenterJACKELINE : Jacqueline Lawler, wadebrada karo, qaybta kaalmalilli doyle, elsa prasad. So [facility] [phone].    ATENCIÓN: Si habla español, tiene a sarah disposición servicios gratuitos de asistencia lingüística. Llame al [phone].    We " comply with applicable federal civil rights laws and [place] laws. We do not discriminate on the basis of race, color, national origin, age, disability, sex, sexual orientation, or gender identity.            Thank you!     Thank you for choosing [facility]  for your care. Our goal is always to provide you with excellent care. Hearing back from our patients is one way we can continue to improve our services. Please take a few minutes to complete the written survey that you may receive in the mail after your visit with us. Thank you!             Your Updated Medication List - Protect others around you: Learn how to safely use, store and throw away your medicines at www.disposemymeds.org.          This list is accurate as of: 11/30/17  1:36 PM.  Always use your most recent med list.                   Brand Name Dispense Instructions for use Diagnosis    amLODIPine 5 MG tablet    NORVASC    90 tablet    Take 1 tablet (5 mg) by mouth daily    Essential hypertension, benign       aspirin 81 MG tablet      Take 81 mg by mouth daily        calcium-vitamin D 600-400 MG-UNIT per tablet    CALTRATE    60 tablet    Take 1 tablet by mouth daily    Preventive measure       cholecalciferol 1000 UNIT tablet    vitamin D3    100 tablet    Take 1 tablet (1,000 Units) by mouth daily    Vitamin deficiency       hydrochlorothiazide 25 MG tablet    HYDRODIURIL    45 tablet    TAKE ONE-HALF TABLET BY MOUTH DAILY    Essential hypertension, benign       levothyroxine 112 MCG tablet    SYNTHROID    90 tablet    Take 1 tablet (112 mcg) by mouth daily    Hypothyroidism, unspecified type       metFORMIN 500 MG 24 hr tablet    GLUCOPHAGE-XR    90 tablet    Take 1 tablet (500 mg) by mouth daily (with dinner)    Prediabetes       metoprolol 50 MG 24 hr tablet    TOPROL-XL    90 tablet    Take 1 tablet (50 mg) by mouth daily    Essential hypertension       multivitamin, therapeutic with minerals Tabs tablet       Take 1 tablet by mouth daily        order for DME     2 each    Compression Stockings Knee high 20/30 compression    Edema of lower extremity       VITAMIN B 12 PO      Take 1 tablet by mouth daily VIT B12

## 2018-01-02 ENCOUNTER — TRANSFERRED RECORDS (OUTPATIENT)
Dept: HEALTH INFORMATION MANAGEMENT | Facility: CLINIC | Age: 73
End: 2018-01-02

## 2018-02-05 ENCOUNTER — OFFICE VISIT (OUTPATIENT)
Dept: CARDIOLOGY | Facility: CLINIC | Age: 73
End: 2018-02-05
Attending: INTERNAL MEDICINE
Payer: COMMERCIAL

## 2018-02-05 VITALS
WEIGHT: 184 LBS | BODY MASS INDEX: 32.6 KG/M2 | DIASTOLIC BLOOD PRESSURE: 83 MMHG | SYSTOLIC BLOOD PRESSURE: 150 MMHG | HEART RATE: 60 BPM | HEIGHT: 63 IN

## 2018-02-05 DIAGNOSIS — I10 BENIGN ESSENTIAL HYPERTENSION: ICD-10-CM

## 2018-02-05 DIAGNOSIS — I27.0 PRIMARY PULMONARY HYPERTENSION (H): Primary | ICD-10-CM

## 2018-02-05 DIAGNOSIS — R06.09 DOE (DYSPNEA ON EXERTION): ICD-10-CM

## 2018-02-05 DIAGNOSIS — I07.9 DISEASE OF TRICUSPID VALVE: ICD-10-CM

## 2018-02-05 LAB
ANION GAP SERPL CALCULATED.3IONS-SCNC: 13.2 MMOL/L (ref 6–17)
BUN SERPL-MCNC: 17 MG/DL (ref 7–30)
CALCIUM SERPL-MCNC: 9.6 MG/DL (ref 8.5–10.5)
CHLORIDE SERPL-SCNC: 102 MMOL/L (ref 98–107)
CO2 SERPL-SCNC: 29 MMOL/L (ref 23–29)
CREAT SERPL-MCNC: 0.93 MG/DL (ref 0.7–1.3)
GFR SERPL CREATININE-BSD FRML MDRD: 59 ML/MIN/1.7M2
GLUCOSE SERPL-MCNC: 157 MG/DL (ref 70–105)
POTASSIUM SERPL-SCNC: 4.2 MMOL/L (ref 3.5–5.1)
SODIUM SERPL-SCNC: 140 MMOL/L (ref 136–145)

## 2018-02-05 PROCEDURE — 36415 COLL VENOUS BLD VENIPUNCTURE: CPT | Performed by: INTERNAL MEDICINE

## 2018-02-05 PROCEDURE — 80048 BASIC METABOLIC PNL TOTAL CA: CPT | Performed by: INTERNAL MEDICINE

## 2018-02-05 PROCEDURE — 99214 OFFICE O/P EST MOD 30 MIN: CPT | Performed by: NURSE PRACTITIONER

## 2018-02-05 RX ORDER — LOSARTAN POTASSIUM 50 MG/1
50 TABLET ORAL DAILY
Qty: 90 TABLET | Refills: 3 | Status: SHIPPED | OUTPATIENT
Start: 2018-02-05 | End: 2018-07-09

## 2018-02-05 NOTE — LETTER
2/5/2018    Zoraida Braga MD  [address]  [facility] [address]    RE: Yola Mayo       Dear Colleague,    I had the pleasure of seeing Yola Mayo in the [facility].    Cardiology Clinic Progress Note  Yola Mayo MRN# [id number]   YOB: 1945 Age: 72 year old     Reason for visit: Hypertension           Assessment and Plan:     1. Hypertension, goal less than 130/80    Improvement of dyspnea on exertion with the discontinuation of Toprol    Elevated in clinic today at 150/83    Increase losartan to 50 mg daily    Return to clinic in 2 months with BMP         History of Presenting Illness:    Yola Mayo is a very pleasant 72 year old patient of Dr. Howard who presents today for follow-up hypertension and dyspnea on exertion.  She is a past medical history showing pulse moderate pulmonary hypertension echocardiogram that is stable from 10 years prior.  She was previously on a beta-blocker for PVCs and for hypertension.  at her last visit with Dr. Howard she noted some increased dyspnea on exertion.  He asked her to discontinue Toprol to see if her dyspnea on exertion improves,  and he had losartan 25 mg daily in place of Toprol for antihypertensive support. She had a cough previously on Lisinopril.    She states her blood pressure is traditionally in the high 130s over 70s. Since the discontinuation of the Toprol, she has noted a decrease in her dyspnea on exertion.  Today in clinic her blood pressure was 150/83, and remained elevated upon my recheck. She denies chest pain,PND, orthopnea, presyncope, edema, heart racing, or palpitations.  BMP today was stable.          This note was completed in part using Dragon voice recognition software. Although reviewed after completion, some word and grammatical errors may occur       Review of Systems:   Review of Systems:  Skin:  Negative     Eyes:  Positive for glasses  ENT:  Negative   "  Respiratory:  Positive for dyspnea on exertion (has improved)  Cardiovascular:  Negative    Gastroenterology: Negative    Genitourinary:  not assessed    Musculoskeletal:  Negative    Neurologic:  Negative    Psychiatric:  Negative    Heme/Lymph/Imm:  Positive for allergies  Endocrine:  Positive for thyroid disorder;diabetes              Physical Exam:     Vitals: /83  Pulse 60  Ht 1.6 m (5' 3\")  Wt 83.5 kg (184 lb)  BMI 32.59 kg/m2  Constitutional:  cooperative, alert and oriented, well developed, well nourished, in no acute distress        Skin:  warm and dry to the touch, no apparent skin lesions or masses noted        Head:  normocephalic, no masses or lesions        Eyes:  pupils equal and round;conjunctivae and lids unremarkable        ENT:  no pallor or cyanosis, dentition good        Neck:  JVP normal        Chest:  clear to auscultation;normal symmetry        Cardiac: regular rhythm occasional premature beats                Extremities and Back:  no edema        Neurological:  no gross motor deficits;affect appropriate               Medications:     Current Outpatient Prescriptions   Medication Sig Dispense Refill     losartan (COZAAR) 50 MG tablet Take 1 tablet (50 mg) by mouth daily 90 tablet 3     hydrochlorothiazide (HYDRODIURIL) 25 MG tablet TAKE ONE-HALF TABLET BY MOUTH DAILY 45 tablet 3     aspirin 81 MG tablet Take 81 mg by mouth daily       amLODIPine (NORVASC) 5 MG tablet Take 1 tablet (5 mg) by mouth daily 90 tablet 3     levothyroxine (SYNTHROID) 112 MCG tablet Take 1 tablet (112 mcg) by mouth daily 90 tablet 3     metFORMIN (GLUCOPHAGE-XR) 500 MG 24 hr tablet Take 1 tablet (500 mg) by mouth daily (with dinner) 90 tablet 3     multivitamin, therapeutic with minerals (THERA-VIT-M) TABS Take 1 tablet by mouth daily       Cyanocobalamin (VITAMIN B 12 PO) Take 1 tablet by mouth daily VIT B12       cholecalciferol (VITAMIN D) 1000 UNIT tablet Take 1 tablet (1,000 Units) by mouth daily 100 " tablet 3     calcium-vitamin D (CALTRATE) 600-400 MG-UNIT per tablet Take 1 tablet by mouth daily 60 tablet 11     [DISCONTINUED] losartan (COZAAR) 25 MG tablet Take 1 tablet (25 mg) by mouth daily 90 tablet 3     order for DME Compression Stockings  Knee high  20/30 compression 2 each 1           Family History   Problem Relation Age of Onset     CANCER Mother      ovarian  of it  at 49yo.     DIABETES Father      Adult onset     Hypertension Father      C.A.D. Father      Fatal Heart attack at 70.     CEREBROVASCULAR DISEASE Father      Thyroid Disease Sister      Thyroid Disease Sister      Thyroid Disease Sister      CANCER Daughter      Thyroid Disease Daughter      Breast Cancer No family hx of      Cancer - colorectal No family hx of        Social History     Social History     Marital status:      Spouse name: FABIAN CUADRA     Number of children: 2     Years of education: N/A     Occupational History     [employer]       [employer]     Social History Main Topics     Smoking status: Never Smoker     Smokeless tobacco: Never Used     Alcohol use 0.0 oz/week     0 Standard drinks or equivalent per week      Comment: Approx 1-2 times monthy     Drug use: No     Sexual activity: Yes     Partners: Male     Other Topics Concern      Service No     Blood Transfusions No     Caffeine Concern No     Occupational Exposure No     Hobby Hazards No     Sleep Concern No     Stress Concern No     Weight Concern Yes     Special Diet No     Back Care No     Exercise Yes     Approx 3 times a week for about 30 minutes     Bike Helmet Yes     Seat Belt Yes     Self-Exams Yes     Social History Narrative            Past Medical History:     Past Medical History:   Diagnosis Date     BASAL CELL CANCER -NOSE      Family history of diabetes mellitus     Father     Family history of ischemic heart disease     Father     Family history of malignant neoplasm of ovary     Mother     HX DISRUPT L ANT CRUCIATE S/P  REPAIR 2003      HYPERTENSION       HYPOTHYROIDISM      LOC PRIM OSTEOARTH-L 1ST METATARSAL-CUNEIFORM JOINT 3/10/2004     Major depressive disorder, single episode, mild (H) 11/24/2014     MILD-MOD TRICUSPID REGURG. MOD PULM HTN 6/23/2005    ECHO  5/05     SPLENIC CYST               Past Surgical History:     Past Surgical History:   Procedure Laterality Date     ARTHROPLASTY KNEE Left 4/13/2016    Procedure: ARTHROPLASTY KNEE;  Surgeon: Giuseppe Lopez MD;  Location: [facility] OR     ARTHROPLASTY MINIMALLY INVASIVE HIP  4/18/2012    Procedure:ARTHROPLASTY MINIMALLY INVASIVE HIP; RIGHT TWO INCISION MINIMALLY INVASIVE HIP ARTHROPLASTY (YUVAL)^ (NO CELL SAVER) (C-ARM); Surgeon:GIUSEPPE LOPEZ; Location:[facility] OR     C APPENDECTOMY  8/87    incidental appi at [facility]     C CHEMOSURG MOHS 1ST STAGE  9-99    Mohs-nose     C REPAIR CRUCIATE LIGAMENT,KNEE  8/2003    L   ACL reconstruction     C TOTAL ABDOM HYSTERECTOMY  8/87    W/ BSO fibroids, hx PID     HC COLONOSCOPY THRU STOMA, DIAGNOSTIC  4/03    normal, rec repeat 10 yrs     HYSTERECTOMY, PAP NO LONGER INDICATED       SURGICAL HISTORY OF -   Mult-~1978    infertility, laparotomies              Allergies:   Ace inhibitors; Oxycodone; Tetanus toxoid; and Shellfish-derived products       Data:   All laboratory data reviewed:    LAST CHOLESTEROL:  Lab Results   Component Value Date    CHOL 152 03/08/2017     Lab Results   Component Value Date    HDL 72 03/08/2017     Lab Results   Component Value Date    LDL 68 03/08/2017     Lab Results   Component Value Date    TRIG 59 03/08/2017     Lab Results   Component Value Date    CHOLHDLRATIO 1.6 03/27/2015       LAST BMP:  Lab Results   Component Value Date     02/05/2018      Lab Results   Component Value Date    POTASSIUM 4.2 02/05/2018     Lab Results   Component Value Date    CHLORIDE 102 02/05/2018     Lab Results   Component Value Date    JOSEPH 9.6 02/05/2018     Lab Results   Component Value Date    CO2 29 02/05/2018      Lab Results   Component Value Date    BUN 17 02/05/2018     Lab Results   Component Value Date    CR 0.93 02/05/2018     Lab Results   Component Value Date     02/05/2018       LAST CBC:  Lab Results   Component Value Date    WBC 5.7 08/04/2017     Lab Results   Component Value Date    RBC 5.02 08/04/2017     Lab Results   Component Value Date    HGB 14.7 08/04/2017     Lab Results   Component Value Date    HCT 43.8 08/04/2017     Lab Results   Component Value Date    MCV 87 08/04/2017     Lab Results   Component Value Date    MCH 29.3 08/04/2017     Lab Results   Component Value Date    MCHC 33.6 08/04/2017     Lab Results   Component Value Date    RDW 14.2 08/04/2017     Lab Results   Component Value Date     08/04/2017         FATMATA PEREIRA, CNP    Thank you for allowing me to participate in the care of your patient.      Sincerely,     FATMATA PEREIRA CNP     [facility]    cc:   Albino Howard MD  [address]  [place] [address]

## 2018-02-05 NOTE — MR AVS SNAPSHOT
After Visit Summary   2/5/2018    Yola Mayo    MRN: [id number]           Patient Information     Date Of Birth          1945        Visit Information        Provider Department      2/5/2018 10:00 AM Krystal Neal APRN CNP [facility]   [place]        Today's Diagnoses     Primary pulmonary hypertension (H)    -  1    GALINDO (dyspnea on exertion)        Benign essential hypertension        Disease of tricuspid valve          Care Instructions    1) Increase Losartan to 50 mg daily.     2) Continue to monitor blood pressure with goal of 130/80    3) Return in 2 months with labs work          Follow-ups after your visit        Additional Services     Follow-Up with Cardiac Advanced Practice Provider                 Future tests that were ordered for you today     Open Future Orders        Priority Expected Expires Ordered    Basic metabolic panel Routine 4/7/2018 2/5/2019 2/5/2018    Follow-Up with Cardiac Advanced Practice Provider Routine 4/7/2018 2/5/2019 2/5/2018            Who to contact     If you have questions or need follow up information about today's clinic visit or your schedule please contact [facility] directly at [phone].  Normal or non-critical lab and imaging results will be communicated to you by Adenioshart, letter or phone within 4 business days after the clinic has received the results. If you do not hear from us within 7 days, please contact the clinic through Eldariont or phone. If you have a critical or abnormal lab result, we will notify you by phone as soon as possible.  Submit refill requests through Instabeat or call your pharmacy and they will forward the refill request to us. Please allow 3 business days for your refill to be completed.          Additional Information About Your Visit        MyChart Information     Instabeat lets you send messages to your doctor, view your test results, renew your  "prescriptions, schedule appointments and more. To sign up, go to [email or web address] . Click on \"Log in\" on the left side of the screen, which will take you to the Welcome page. Then click on \"Sign up Now\" on the right side of the page.     You will be asked to enter the access code listed below, as well as some personal information. Please follow the directions to create your username and password.     Your access code is: [email or web address]  Expires: 2018  1:36 PM     Your access code will  in 90 days. If you need help or a new code, please call your [place] clinic or [phone].        Care EveryWhere ID     This is your Care EveryWhere ID. This could be used by other organizations to access your [facility] medical records  [id number]        Your Vitals Were     Pulse Height BMI (Body Mass Index)             60 1.6 m (5' 3\") 32.59 kg/m2          Blood Pressure from Last 3 Encounters:   18 150/83   17 157/76   10/26/17 144/70    Weight from Last 3 Encounters:   18 83.5 kg (184 lb)   17 83.9 kg (185 lb)   10/26/17 82.1 kg (181 lb)              We Performed the Following     Follow-Up with Cardiac Advanced Practice Provider          Today's Medication Changes          These changes are accurate as of 18 10:18 AM.  If you have any questions, ask your nurse or doctor.               These medicines have changed or have updated prescriptions.        Dose/Directions    losartan 50 MG tablet   Commonly known as:  COZAAR   This may have changed:    - medication strength  - how much to take   Used for:  Benign essential hypertension, GALINDO (dyspnea on exertion)   Changed by:  Krystal eNal, FATMATA CNP        Dose:  50 mg   Take 1 tablet (50 mg) by mouth daily   Quantity:  90 tablet   Refills:  3         Stop taking these medicines if you haven't already. Please contact your care team if you have questions.     metoprolol succinate 50 MG 24 hr tablet   Commonly known as:  TOPROL-XL "   Stopped by:  Krystal Neal APRN CNP                Where to get your medicines      These medications were sent to [facility] [id number] [facility] - [place], [place] - [address]  [address] [facility] [address] [address]     Phone:  [phone]     losartan 50 MG tablet                Primary Care Provider Office Phone # Fax #    Zoraida VIVEROS MD Maria Luz [phone] [address] [facility] [address]  [facility] [address]        Equal Access to Services     [facility]: Hadii aad ku hadasho Soomaali, waaxda luqadaha, qaybta kaalmada adeegyada, waxay idiin hayaan adeeg kharamaggie oconnell . So [facility] [phone].    ATENCIÓN: Si habla español, tiene a sarah disposición servicios gratuitos de asistencia lingüística. [facility] [phone].    We comply with applicable federal civil rights laws and [place] laws. We do not discriminate on the basis of race, color, national origin, age, disability, sex, sexual orientation, or gender identity.            Thank you!     Thank you for choosing [facility]  for your care. Our goal is always to provide you with excellent care. Hearing back from our patients is one way we can continue to improve our services. Please take a few minutes to complete the written survey that you may receive in the mail after your visit with us. Thank you!             Your Updated Medication List - Protect others around you: Learn how to safely use, store and throw away your medicines at www.disposemymeds.org.          This list is accurate as of 2/5/18 10:18 AM.  Always use your most recent med list.                   Brand Name Dispense Instructions for use Diagnosis    amLODIPine 5 MG tablet    NORVASC    90 tablet    Take 1 tablet (5 mg) by mouth daily    Essential hypertension, benign       aspirin 81 MG tablet      Take 81 mg by mouth daily        calcium-vitamin D 600-400 MG-UNIT per tablet    CALTRATE    60 tablet    Take 1 tablet by mouth  daily    Preventive measure       cholecalciferol 1000 UNIT tablet    vitamin D3    100 tablet    Take 1 tablet (1,000 Units) by mouth daily    Vitamin deficiency       hydrochlorothiazide 25 MG tablet    HYDRODIURIL    45 tablet    TAKE ONE-HALF TABLET BY MOUTH DAILY    Essential hypertension, benign       levothyroxine 112 MCG tablet    SYNTHROID    90 tablet    Take 1 tablet (112 mcg) by mouth daily    Hypothyroidism, unspecified type       losartan 50 MG tablet    COZAAR    90 tablet    Take 1 tablet (50 mg) by mouth daily    Benign essential hypertension, GALINDO (dyspnea on exertion)       metFORMIN 500 MG 24 hr tablet    GLUCOPHAGE-XR    90 tablet    Take 1 tablet (500 mg) by mouth daily (with dinner)    Prediabetes       multivitamin, therapeutic with minerals Tabs tablet      Take 1 tablet by mouth daily        order for DME     2 each    Compression Stockings Knee high 20/30 compression    Edema of lower extremity       VITAMIN B 12 PO      Take 1 tablet by mouth daily VIT B12

## 2018-02-05 NOTE — PATIENT INSTRUCTIONS
1) Increase Losartan to 50 mg daily.     2) Continue to monitor blood pressure with goal of 130/80    3) Return in 2 months with labs work

## 2018-02-05 NOTE — PROGRESS NOTES
Cardiology Clinic Progress Note  Yola Mayo MRN# [id number]   YOB: 1945 Age: 72 year old     Reason for visit: Hypertension           Assessment and Plan:     1. Hypertension, goal less than 130/80    Improvement of dyspnea on exertion with the discontinuation of Toprol    Elevated in clinic today at 150/83    Increase losartan to 50 mg daily    Return to clinic in 2 months with BMP         History of Presenting Illness:    Yola Mayo is a very pleasant 72 year old patient of Dr. Howard who presents today for follow-up hypertension and dyspnea on exertion.  She is a past medical history showing pulse moderate pulmonary hypertension echocardiogram that is stable from 10 years prior.  She was previously on a beta-blocker for PVCs and for hypertension.  at her last visit with Dr. Howard she noted some increased dyspnea on exertion.  He asked her to discontinue Toprol to see if her dyspnea on exertion improves,  and he had losartan 25 mg daily in place of Toprol for antihypertensive support. She had a cough previously on Lisinopril.    She states her blood pressure is traditionally in the high 130s over 70s. Since the discontinuation of the Toprol, she has noted a decrease in her dyspnea on exertion.  Today in clinic her blood pressure was 150/83, and remained elevated upon my recheck. She denies chest pain,PND, orthopnea, presyncope, edema, heart racing, or palpitations.  BMP today was stable.          This note was completed in part using Dragon voice recognition software. Although reviewed after completion, some word and grammatical errors may occur       Review of Systems:   Review of Systems:  Skin:  Negative     Eyes:  Positive for glasses  ENT:  Negative    Respiratory:  Positive for dyspnea on exertion (has improved)  Cardiovascular:  Negative    Gastroenterology: Negative    Genitourinary:  not assessed    Musculoskeletal:  Negative    Neurologic:  Negative    Psychiatric:  Negative   "  Heme/Lymph/Imm:  Positive for allergies  Endocrine:  Positive for thyroid disorder;diabetes              Physical Exam:     Vitals: /83  Pulse 60  Ht 1.6 m (5' 3\")  Wt 83.5 kg (184 lb)  BMI 32.59 kg/m2  Constitutional:  cooperative, alert and oriented, well developed, well nourished, in no acute distress        Skin:  warm and dry to the touch, no apparent skin lesions or masses noted        Head:  normocephalic, no masses or lesions        Eyes:  pupils equal and round;conjunctivae and lids unremarkable        ENT:  no pallor or cyanosis, dentition good        Neck:  JVP normal        Chest:  clear to auscultation;normal symmetry        Cardiac: regular rhythm occasional premature beats                Extremities and Back:  no edema        Neurological:  no gross motor deficits;affect appropriate               Medications:     Current Outpatient Prescriptions   Medication Sig Dispense Refill     losartan (COZAAR) 50 MG tablet Take 1 tablet (50 mg) by mouth daily 90 tablet 3     hydrochlorothiazide (HYDRODIURIL) 25 MG tablet TAKE ONE-HALF TABLET BY MOUTH DAILY 45 tablet 3     aspirin 81 MG tablet Take 81 mg by mouth daily       amLODIPine (NORVASC) 5 MG tablet Take 1 tablet (5 mg) by mouth daily 90 tablet 3     levothyroxine (SYNTHROID) 112 MCG tablet Take 1 tablet (112 mcg) by mouth daily 90 tablet 3     metFORMIN (GLUCOPHAGE-XR) 500 MG 24 hr tablet Take 1 tablet (500 mg) by mouth daily (with dinner) 90 tablet 3     multivitamin, therapeutic with minerals (THERA-VIT-M) TABS Take 1 tablet by mouth daily       Cyanocobalamin (VITAMIN B 12 PO) Take 1 tablet by mouth daily VIT B12       cholecalciferol (VITAMIN D) 1000 UNIT tablet Take 1 tablet (1,000 Units) by mouth daily 100 tablet 3     calcium-vitamin D (CALTRATE) 600-400 MG-UNIT per tablet Take 1 tablet by mouth daily 60 tablet 11     [DISCONTINUED] losartan (COZAAR) 25 MG tablet Take 1 tablet (25 mg) by mouth daily 90 tablet 3     order for DME " Compression Stockings  Knee high  20/30 compression 2 each 1           Family History   Problem Relation Age of Onset     CANCER Mother      ovarian  of it  at 49yo.     DIABETES Father      Adult onset     Hypertension Father      C.A.D. Father      Fatal Heart attack at 70.     CEREBROVASCULAR DISEASE Father      Thyroid Disease Sister      Thyroid Disease Sister      Thyroid Disease Sister      CANCER Daughter      Thyroid Disease Daughter      Breast Cancer No family hx of      Cancer - colorectal No family hx of        Social History     Social History     Marital status:      Spouse name: FABIAN CUADRA     Number of children: 2     Years of education: N/A     Occupational History     [employer]       [employer]     Social History Main Topics     Smoking status: Never Smoker     Smokeless tobacco: Never Used     Alcohol use 0.0 oz/week     0 Standard drinks or equivalent per week      Comment: Approx 1-2 times monthy     Drug use: No     Sexual activity: Yes     Partners: Male     Other Topics Concern      Service No     Blood Transfusions No     Caffeine Concern No     Occupational Exposure No     Hobby Hazards No     Sleep Concern No     Stress Concern No     Weight Concern Yes     Special Diet No     Back Care No     Exercise Yes     Approx 3 times a week for about 30 minutes     Bike Helmet Yes     Seat Belt Yes     Self-Exams Yes     Social History Narrative            Past Medical History:     Past Medical History:   Diagnosis Date     BASAL CELL CANCER -NOSE      Family history of diabetes mellitus     Father     Family history of ischemic heart disease     Father     Family history of malignant neoplasm of ovary     Mother     HX DISRUPT L ANT CRUCIATE S/P REPAIR       HYPERTENSION       HYPOTHYROIDISM      LOC PRIM OSTEOARTH-L 1ST METATARSAL-CUNEIFORM JOINT 3/10/2004     Major depressive disorder, single episode, mild (H) 2014     MILD-MOD TRICUSPID REGURG. MOD PULM HTN  6/23/2005    ECHO  5/05     SPLENIC CYST               Past Surgical History:     Past Surgical History:   Procedure Laterality Date     ARTHROPLASTY KNEE Left 4/13/2016    Procedure: ARTHROPLASTY KNEE;  Surgeon: Giuseppe Lopez MD;  Location: [facility] OR     ARTHROPLASTY MINIMALLY INVASIVE HIP  4/18/2012    Procedure:ARTHROPLASTY MINIMALLY INVASIVE HIP; RIGHT TWO INCISION MINIMALLY INVASIVE HIP ARTHROPLASTY (YUVAL)^ (NO CELL SAVER) (C-ARM); Surgeon:GIUSEPPE LOPEZ; Location:[facility] OR     C APPENDECTOMY  8/87    incidental appi at s     C CHEMOSURG MOHS 1ST STAGE  9-99    Mohs-nose     C REPAIR CRUCIATE LIGAMENT,KNEE  8/2003    L   ACL reconstruction     C TOTAL ABDOM HYSTERECTOMY  8/87    W/ BSO fibroids, hx PID     HC COLONOSCOPY THRU STOMA, DIAGNOSTIC  4/03    normal, rec repeat 10 yrs     HYSTERECTOMY, PAP NO LONGER INDICATED       SURGICAL HISTORY OF -   Mult-~1978    infertility, laparotomies              Allergies:   Ace inhibitors; Oxycodone; Tetanus toxoid; and Shellfish-derived products       Data:   All laboratory data reviewed:    LAST CHOLESTEROL:  Lab Results   Component Value Date    CHOL 152 03/08/2017     Lab Results   Component Value Date    HDL 72 03/08/2017     Lab Results   Component Value Date    LDL 68 03/08/2017     Lab Results   Component Value Date    TRIG 59 03/08/2017     Lab Results   Component Value Date    CHOLHDLRATIO 1.6 03/27/2015       LAST BMP:  Lab Results   Component Value Date     02/05/2018      Lab Results   Component Value Date    POTASSIUM 4.2 02/05/2018     Lab Results   Component Value Date    CHLORIDE 102 02/05/2018     Lab Results   Component Value Date    JOSEPH 9.6 02/05/2018     Lab Results   Component Value Date    CO2 29 02/05/2018     Lab Results   Component Value Date    BUN 17 02/05/2018     Lab Results   Component Value Date    CR 0.93 02/05/2018     Lab Results   Component Value Date     02/05/2018       LAST CBC:  Lab Results   Component Value  Date    WBC 5.7 08/04/2017     Lab Results   Component Value Date    RBC 5.02 08/04/2017     Lab Results   Component Value Date    HGB 14.7 08/04/2017     Lab Results   Component Value Date    HCT 43.8 08/04/2017     Lab Results   Component Value Date    MCV 87 08/04/2017     Lab Results   Component Value Date    MCH 29.3 08/04/2017     Lab Results   Component Value Date    MCHC 33.6 08/04/2017     Lab Results   Component Value Date    RDW 14.2 08/04/2017     Lab Results   Component Value Date     08/04/2017         SUE CARBONE, APRN, CNP

## 2018-03-25 DIAGNOSIS — E03.9 HYPOTHYROIDISM, UNSPECIFIED TYPE: ICD-10-CM

## 2018-03-26 RX ORDER — LEVOTHYROXINE SODIUM 112 UG/1
TABLET ORAL
Qty: 30 TABLET | Refills: 0 | Status: SHIPPED | OUTPATIENT
Start: 2018-03-26 | End: 2018-04-24

## 2018-03-26 NOTE — TELEPHONE ENCOUNTER
Medication is being filled for 1 time refill only due to:  Patient needs to be seen because it has been more than one year since last visit. Due for annual exam and labs  Amie Curiel RN

## 2018-04-16 ENCOUNTER — OFFICE VISIT (OUTPATIENT)
Dept: CARDIOLOGY | Facility: CLINIC | Age: 73
End: 2018-04-16
Attending: NURSE PRACTITIONER
Payer: COMMERCIAL

## 2018-04-16 VITALS
BODY MASS INDEX: 33.43 KG/M2 | WEIGHT: 188.7 LBS | SYSTOLIC BLOOD PRESSURE: 162 MMHG | HEIGHT: 63 IN | HEART RATE: 56 BPM | DIASTOLIC BLOOD PRESSURE: 78 MMHG

## 2018-04-16 DIAGNOSIS — I10 BENIGN ESSENTIAL HYPERTENSION: ICD-10-CM

## 2018-04-16 DIAGNOSIS — I10 ESSENTIAL HYPERTENSION, BENIGN: ICD-10-CM

## 2018-04-16 DIAGNOSIS — I07.9 DISEASE OF TRICUSPID VALVE: ICD-10-CM

## 2018-04-16 DIAGNOSIS — I27.0 PRIMARY PULMONARY HYPERTENSION (H): ICD-10-CM

## 2018-04-16 LAB
ANION GAP SERPL CALCULATED.3IONS-SCNC: 13.7 MMOL/L (ref 6–17)
BUN SERPL-MCNC: 16 MG/DL (ref 7–30)
CALCIUM SERPL-MCNC: 9.6 MG/DL (ref 8.5–10.5)
CHLORIDE SERPL-SCNC: 102 MMOL/L (ref 98–107)
CO2 SERPL-SCNC: 29 MMOL/L (ref 23–29)
CREAT SERPL-MCNC: 0.99 MG/DL (ref 0.7–1.3)
GFR SERPL CREATININE-BSD FRML MDRD: 55 ML/MIN/1.7M2
GLUCOSE SERPL-MCNC: 165 MG/DL (ref 70–105)
POTASSIUM SERPL-SCNC: 3.7 MMOL/L (ref 3.5–5.1)
SODIUM SERPL-SCNC: 141 MMOL/L (ref 136–145)

## 2018-04-16 PROCEDURE — 36415 COLL VENOUS BLD VENIPUNCTURE: CPT | Performed by: NURSE PRACTITIONER

## 2018-04-16 PROCEDURE — 80048 BASIC METABOLIC PNL TOTAL CA: CPT | Performed by: NURSE PRACTITIONER

## 2018-04-16 PROCEDURE — 99214 OFFICE O/P EST MOD 30 MIN: CPT | Performed by: NURSE PRACTITIONER

## 2018-04-16 RX ORDER — HYDROCHLOROTHIAZIDE 25 MG/1
25 TABLET ORAL DAILY
Qty: 90 TABLET | Refills: 3 | Status: SHIPPED | OUTPATIENT
Start: 2018-04-16 | End: 2019-04-25

## 2018-04-16 NOTE — PATIENT INSTRUCTIONS
1. Increase HCTZ to 1 tablet or 25 mg daily  2. Follow up in 1 month with labs  3. Continue home BP monitoring and exercise

## 2018-04-16 NOTE — PROGRESS NOTES
Cardiology Clinic Progress Note  Yola Mayo MRN# [id number]   YOB: 1945 Age: 72 year old     Reason for visit: Hypertension           Assessment and Plan:     1. Hypertension, goal less than 130/80    Slight improvement of dyspnea on exertion with the discontinuation of Toprol    Elevated in clinic today at 162/78 (home SBP readings 130-140)    Continue losartan to 50 mg daily    Increase HCTZ to 25 mg daily due to continued elevated BP and weight gain    Return to clinic in 1 months with BMP         History of Presenting Illness:    Yola Mayo is a very pleasant 72 year old patient of Dr. Howard who presents today for follow-up hypertension and dyspnea on exertion.  She is a past medical history positive for moderate pulmonary hypertension on echocardiogram that is stable from 10 years prior.  She was previously on a beta-blocker for PVCs and for hypertension.  At her last visit with Dr. Howard she noted some increased dyspnea on exertion that improved with discontinuation of Toprol XL. At our last visit her Losartan was increased to 50 mg from 25 mg daily    Today in clinic she noted an initial improvement in BP, but now she noted her BP rising again. She has also noted a 4 lbs weight gain in 2 months despite water aerobics and cardio exercises at the gym. Her dyspnea on exertion has somewhat improved in the last 2 months. She has mild bilateral lower extremity edema, but denies chest discomfort.          This note was completed in part using Dragon voice recognition software. Although reviewed after completion, some word and grammatical errors may occur       Review of Systems:   Review of Systems:  Skin:  Negative     Eyes:  Positive for glasses  ENT:  Negative    Respiratory:  Positive for    Cardiovascular:  Negative;palpitations;chest pain;lightheadedness;dizziness;syncope or near-syncope;cyanosis Positive for;fatigue;lower extremity symptoms;edema  Gastroenterology: Positive for excessive  "gas or bloating  Genitourinary:  Positive for urinary frequency;nocturia  Musculoskeletal:  Positive for back pain  Neurologic:  Positive for headaches  Psychiatric:  Negative    Heme/Lymph/Imm:  Positive for allergies  Endocrine:  Positive for thyroid disorder;diabetes              Physical Exam:     Vitals: /78 (BP Location: Right arm, Cuff Size: Adult Large)  Pulse 56  Ht 1.6 m (5' 3\")  Wt 85.6 kg (188 lb 11.2 oz)  BMI 33.43 kg/m2  Constitutional:  cooperative, alert and oriented, well developed, well nourished, in no acute distress        Skin:  warm and dry to the touch, no apparent skin lesions or masses noted        Head:  normocephalic, no masses or lesions        Eyes:  pupils equal and round;conjunctivae and lids unremarkable        ENT:  no pallor or cyanosis, dentition good        Neck:  JVP normal        Chest:  clear to auscultation;normal symmetry        Cardiac: regular rhythm occasional premature beats                Extremities and Back:           Neurological:  no gross motor deficits;affect appropriate               Medications:     Current Outpatient Prescriptions   Medication Sig Dispense Refill     hydrochlorothiazide (HYDRODIURIL) 25 MG tablet Take 1 tablet (25 mg) by mouth daily 90 tablet 3     levothyroxine (SYNTHROID/LEVOTHROID) 112 MCG tablet TAKE 1 TABLET (112 MCG) BY MOUTH DAILY 30 tablet 0     losartan (COZAAR) 50 MG tablet Take 1 tablet (50 mg) by mouth daily 90 tablet 3     aspirin 81 MG tablet Take 81 mg by mouth daily       amLODIPine (NORVASC) 5 MG tablet Take 1 tablet (5 mg) by mouth daily 90 tablet 3     metFORMIN (GLUCOPHAGE-XR) 500 MG 24 hr tablet Take 1 tablet (500 mg) by mouth daily (with dinner) 90 tablet 3     multivitamin, therapeutic with minerals (THERA-VIT-M) TABS Take 1 tablet by mouth daily       Cyanocobalamin (VITAMIN B 12 PO) Take 1 tablet by mouth daily VIT B12       cholecalciferol (VITAMIN D) 1000 UNIT tablet Take 1 tablet (1,000 Units) by mouth daily " 100 tablet 3     calcium-vitamin D (CALTRATE) 600-400 MG-UNIT per tablet Take 1 tablet by mouth daily 60 tablet 11     [DISCONTINUED] hydrochlorothiazide (HYDRODIURIL) 25 MG tablet TAKE ONE-HALF TABLET BY MOUTH DAILY 45 tablet 3     order for DME Compression Stockings  Knee high  20/30 compression (Patient not taking: Reported on 2018) 2 each 1           Family History   Problem Relation Age of Onset     CANCER Mother      ovarian  of it  at 49yo.     DIABETES Father      Adult onset     Hypertension Father      C.A.D. Father      Fatal Heart attack at 70.     CEREBROVASCULAR DISEASE Father      Thyroid Disease Sister      Thyroid Disease Sister      Thyroid Disease Sister      CANCER Daughter      Thyroid Disease Daughter      Breast Cancer No family hx of      Cancer - colorectal No family hx of        Social History     Social History     Marital status:      Spouse name: FABIAN CUADRA     Number of children: 2     Years of education: N/A     Occupational History     [employer]       [employer]     Social History Main Topics     Smoking status: Never Smoker     Smokeless tobacco: Never Used     Alcohol use 0.0 oz/week     0 Standard drinks or equivalent per week      Comment: Approx 1-2 times monthy     Drug use: No     Sexual activity: Yes     Partners: Male     Other Topics Concern      Service No     Blood Transfusions No     Caffeine Concern No     Occupational Exposure No     Hobby Hazards No     Sleep Concern No     Stress Concern No     Weight Concern Yes     Special Diet No     Back Care No     Exercise Yes     Approx 3 times a week for about 30 minutes     Bike Helmet Yes     Seat Belt Yes     Self-Exams Yes     Social History Narrative            Past Medical History:     Past Medical History:   Diagnosis Date     BASAL CELL CANCER -NOSE      Family history of diabetes mellitus     Father     Family history of ischemic heart disease     Father     Family history of malignant  neoplasm of ovary     Mother     HX DISRUPT L ANT CRUCIATE S/P REPAIR 2003      HYPERTENSION       HYPOTHYROIDISM      LOC PRIM OSTEOARTH-L 1ST METATARSAL-CUNEIFORM JOINT 3/10/2004     Major depressive disorder, single episode, mild (H) 11/24/2014     MILD-MOD TRICUSPID REGURG. MOD PULM HTN 6/23/2005    ECHO  5/05     SPLENIC CYST               Past Surgical History:     Past Surgical History:   Procedure Laterality Date     ARTHROPLASTY KNEE Left 4/13/2016    Procedure: ARTHROPLASTY KNEE;  Surgeon: Giuseppe Lopez MD;  Location: [facility] OR     ARTHROPLASTY MINIMALLY INVASIVE HIP  4/18/2012    Procedure:ARTHROPLASTY MINIMALLY INVASIVE HIP; RIGHT TWO INCISION MINIMALLY INVASIVE HIP ARTHROPLASTY (YUVAL)^ (NO CELL SAVER) (C-ARM); Surgeon:GIUSEPPE LOPEZ; Location:[facility] OR     C APPENDECTOMY  8/87    incidental appi at s     C CHEMOSURG MOHS 1ST STAGE  9-99    Mohs-nose     C REPAIR CRUCIATE LIGAMENT,KNEE  8/2003    L   ACL reconstruction     C TOTAL ABDOM HYSTERECTOMY  8/87    W/ BSO fibroids, hx PID     HC COLONOSCOPY THRU STOMA, DIAGNOSTIC  4/03    normal, rec repeat 10 yrs     HYSTERECTOMY, PAP NO LONGER INDICATED       SURGICAL HISTORY OF -   Mult-~1978    infertility, laparotomies              Allergies:   Ace inhibitors; Oxycodone; Tetanus toxoid; and Shellfish-derived products       Data:   All laboratory data reviewed:    Last Basic Metabolic Panel:  Lab Results   Component Value Date     04/16/2018      Lab Results   Component Value Date    POTASSIUM 3.7 04/16/2018     Lab Results   Component Value Date    CHLORIDE 102 04/16/2018     Lab Results   Component Value Date    JOSEPH 9.6 04/16/2018     Lab Results   Component Value Date    CO2 29 04/16/2018     Lab Results   Component Value Date    BUN 16 04/16/2018     Lab Results   Component Value Date    CR 0.99 04/16/2018     Lab Results   Component Value Date     04/16/2018       SUE CARBONE, APRN, CNP

## 2018-04-16 NOTE — LETTER
4/16/2018    Zoraida Braga MD  [address]  [facility] [address]    RE: Yola Mayo       Dear Colleague,    I had the pleasure of seeing Yola Mayo in the [facility].    Cardiology Clinic Progress Note  Yola Mayo MRN# [id number]   YOB: 1945 Age: 72 year old     Reason for visit: Hypertension           Assessment and Plan:     1. Hypertension, goal less than 130/80    Slight improvement of dyspnea on exertion with the discontinuation of Toprol    Elevated in clinic today at 162/78 (home SBP readings 130-140)    Continue losartan to 50 mg daily    Increase HCTZ to 25 mg daily due to continued elevated BP and weight gain    Return to clinic in 1 months with BMP         History of Presenting Illness:    Yola Mayo is a very pleasant 72 year old patient of Dr. Howard who presents today for follow-up hypertension and dyspnea on exertion.  She is a past medical history positive for moderate pulmonary hypertension on echocardiogram that is stable from 10 years prior.  She was previously on a beta-blocker for PVCs and for hypertension.  At her last visit with Dr. Howard she noted some increased dyspnea on exertion that improved with discontinuation of Toprol XL. At our last visit her Losartan was increased to 50 mg from 25 mg daily    Today in clinic she noted an initial improvement in BP, but now she noted her BP rising again. She has also noted a 4 lbs weight gain in 2 months despite water aerobics and cardio exercises at the gym. Her dyspnea on exertion has somewhat improved in the last 2 months. She has mild bilateral lower extremity edema, but denies chest discomfort.          This note was completed in part using Dragon voice recognition software. Although reviewed after completion, some word and grammatical errors may occur       Review of Systems:   Review of Systems:  Skin:  Negative     Eyes:  Positive for glasses  ENT:   "Negative    Respiratory:  Positive for    Cardiovascular:  Negative;palpitations;chest pain;lightheadedness;dizziness;syncope or near-syncope;cyanosis Positive for;fatigue;lower extremity symptoms;edema  Gastroenterology: Positive for excessive gas or bloating  Genitourinary:  Positive for urinary frequency;nocturia  Musculoskeletal:  Positive for back pain  Neurologic:  Positive for headaches  Psychiatric:  Negative    Heme/Lymph/Imm:  Positive for allergies  Endocrine:  Positive for thyroid disorder;diabetes              Physical Exam:     Vitals: /78 (BP Location: Right arm, Cuff Size: Adult Large)  Pulse 56  Ht 1.6 m (5' 3\")  Wt 85.6 kg (188 lb 11.2 oz)  BMI 33.43 kg/m2  Constitutional:  cooperative, alert and oriented, well developed, well nourished, in no acute distress        Skin:  warm and dry to the touch, no apparent skin lesions or masses noted        Head:  normocephalic, no masses or lesions        Eyes:  pupils equal and round;conjunctivae and lids unremarkable        ENT:  no pallor or cyanosis, dentition good        Neck:  JVP normal        Chest:  clear to auscultation;normal symmetry        Cardiac: regular rhythm occasional premature beats                Extremities and Back:           Neurological:  no gross motor deficits;affect appropriate               Medications:     Current Outpatient Prescriptions   Medication Sig Dispense Refill     hydrochlorothiazide (HYDRODIURIL) 25 MG tablet Take 1 tablet (25 mg) by mouth daily 90 tablet 3     levothyroxine (SYNTHROID/LEVOTHROID) 112 MCG tablet TAKE 1 TABLET (112 MCG) BY MOUTH DAILY 30 tablet 0     losartan (COZAAR) 50 MG tablet Take 1 tablet (50 mg) by mouth daily 90 tablet 3     aspirin 81 MG tablet Take 81 mg by mouth daily       amLODIPine (NORVASC) 5 MG tablet Take 1 tablet (5 mg) by mouth daily 90 tablet 3     metFORMIN (GLUCOPHAGE-XR) 500 MG 24 hr tablet Take 1 tablet (500 mg) by mouth daily (with dinner) 90 tablet 3     " multivitamin, therapeutic with minerals (THERA-VIT-M) TABS Take 1 tablet by mouth daily       Cyanocobalamin (VITAMIN B 12 PO) Take 1 tablet by mouth daily VIT B12       cholecalciferol (VITAMIN D) 1000 UNIT tablet Take 1 tablet (1,000 Units) by mouth daily 100 tablet 3     calcium-vitamin D (CALTRATE) 600-400 MG-UNIT per tablet Take 1 tablet by mouth daily 60 tablet 11     [DISCONTINUED] hydrochlorothiazide (HYDRODIURIL) 25 MG tablet TAKE ONE-HALF TABLET BY MOUTH DAILY 45 tablet 3     order for DME Compression Stockings  Knee high  20/30 compression (Patient not taking: Reported on 2018) 2 each 1           Family History   Problem Relation Age of Onset     CANCER Mother      ovarian  of it  at 49yo.     DIABETES Father      Adult onset     Hypertension Father      C.A.D. Father      Fatal Heart attack at 70.     CEREBROVASCULAR DISEASE Father      Thyroid Disease Sister      Thyroid Disease Sister      Thyroid Disease Sister      CANCER Daughter      Thyroid Disease Daughter      Breast Cancer No family hx of      Cancer - colorectal No family hx of        Social History     Social History     Marital status:      Spouse name: FABIAN CUADRA     Number of children: 2     Years of education: N/A     Occupational History     [employer]       [employer]     Social History Main Topics     Smoking status: Never Smoker     Smokeless tobacco: Never Used     Alcohol use 0.0 oz/week     0 Standard drinks or equivalent per week      Comment: Approx 1-2 times monthy     Drug use: No     Sexual activity: Yes     Partners: Male     Other Topics Concern      Service No     Blood Transfusions No     Caffeine Concern No     Occupational Exposure No     Hobby Hazards No     Sleep Concern No     Stress Concern No     Weight Concern Yes     Special Diet No     Back Care No     Exercise Yes     Approx 3 times a week for about 30 minutes     Bike Helmet Yes     Seat Belt Yes     Self-Exams Yes     Social  History Narrative            Past Medical History:     Past Medical History:   Diagnosis Date     BASAL CELL CANCER -NOSE      Family history of diabetes mellitus     Father     Family history of ischemic heart disease     Father     Family history of malignant neoplasm of ovary     Mother     HX DISRUPT L ANT CRUCIATE S/P REPAIR 2003      HYPERTENSION       HYPOTHYROIDISM      LOC PRIM OSTEOARTH-L 1ST METATARSAL-CUNEIFORM JOINT 3/10/2004     Major depressive disorder, single episode, mild (H) 11/24/2014     MILD-MOD TRICUSPID REGURG. MOD PULM HTN 6/23/2005    ECHO  5/05     SPLENIC CYST               Past Surgical History:     Past Surgical History:   Procedure Laterality Date     ARTHROPLASTY KNEE Left 4/13/2016    Procedure: ARTHROPLASTY KNEE;  Surgeon: Giuseppe Lopez MD;  Location: [facility] OR     ARTHROPLASTY MINIMALLY INVASIVE HIP  4/18/2012    Procedure:ARTHROPLASTY MINIMALLY INVASIVE HIP; RIGHT TWO INCISION MINIMALLY INVASIVE HIP ARTHROPLASTY (YUVAL)^ (NO CELL SAVER) (C-ARM); Surgeon:GIUSEPPE LOPEZ; Location: OR     C APPENDECTOMY  8/87    incidental appi at [facility]     C CHEMOSURG MOHS 1ST STAGE  9-99    Mohs-nose     C REPAIR CRUCIATE LIGAMENT,KNEE  8/2003    L   ACL reconstruction     C TOTAL ABDOM HYSTERECTOMY  8/87    W/ BSO fibroids, hx PID     HC COLONOSCOPY THRU STOMA, DIAGNOSTIC  4/03    normal, rec repeat 10 yrs     HYSTERECTOMY, PAP NO LONGER INDICATED       SURGICAL HISTORY OF -   Mult-~1978    infertility, laparotomies              Allergies:   Ace inhibitors; Oxycodone; Tetanus toxoid; and Shellfish-derived products       Data:   All laboratory data reviewed:    Last Basic Metabolic Panel:  Lab Results   Component Value Date     04/16/2018      Lab Results   Component Value Date    POTASSIUM 3.7 04/16/2018     Lab Results   Component Value Date    CHLORIDE 102 04/16/2018     Lab Results   Component Value Date    JOSEPH 9.6 04/16/2018     Lab Results   Component Value Date    CO2 29  04/16/2018     Lab Results   Component Value Date    BUN 16 04/16/2018     Lab Results   Component Value Date    CR 0.99 04/16/2018     Lab Results   Component Value Date     04/16/2018       FATMATA PEREIRA, LUIS FELIPE    Thank you for allowing me to participate in the care of your patient.      Sincerely,     FATMATA PEREIRA CNP     [facility]    cc:   FATMATA Cruz CNP  [facility]  [address]  [place] [address]

## 2018-04-16 NOTE — LETTER
4/16/2018    Zoraida Braga MD  [address]  [facility] [address]    RE: Yola Mayo       Dear Colleague,    I had the pleasure of seeing Yola Mayo in the [facility].    Cardiology Clinic Progress Note  Yola Mayo MRN# [id number]   YOB: 1945 Age: 72 year old     Reason for visit: Hypertension           Assessment and Plan:     1. Hypertension, goal less than 130/80    Slight improvement of dyspnea on exertion with the discontinuation of Toprol    Elevated in clinic today at 162/78 (home SBP readings 130-140)    Continue losartan to 50 mg daily    Increase HCTZ to 25 mg daily due to continued elevated BP and weight gain    Return to clinic in 1 months with BMP         History of Presenting Illness:    Yola Mayo is a very pleasant 72 year old patient of Dr. Howard who presents today for follow-up hypertension and dyspnea on exertion.  She is a past medical history positive for moderate pulmonary hypertension on echocardiogram that is stable from 10 years prior.  She was previously on a beta-blocker for PVCs and for hypertension.  At her last visit with Dr. Howard she noted some increased dyspnea on exertion that improved with discontinuation of Toprol XL. At our last visit her Losartan was increased to 50 mg from 25 mg daily    Today in clinic she noted an initial improvement in BP, but now she noted her BP rising again. She has also noted a 4 lbs weight gain in 2 months despite water aerobics and cardio exercises at the gym. Her dyspnea on exertion has somewhat improved in the last 2 months. She has mild bilateral lower extremity edema, but denies chest discomfort.          This note was completed in part using Dragon voice recognition software. Although reviewed after completion, some word and grammatical errors may occur       Review of Systems:   Review of Systems:  Skin:  Negative     Eyes:  Positive for glasses  ENT:   "Negative    Respiratory:  Positive for    Cardiovascular:  Negative;palpitations;chest pain;lightheadedness;dizziness;syncope or near-syncope;cyanosis Positive for;fatigue;lower extremity symptoms;edema  Gastroenterology: Positive for excessive gas or bloating  Genitourinary:  Positive for urinary frequency;nocturia  Musculoskeletal:  Positive for back pain  Neurologic:  Positive for headaches  Psychiatric:  Negative    Heme/Lymph/Imm:  Positive for allergies  Endocrine:  Positive for thyroid disorder;diabetes              Physical Exam:     Vitals: /78 (BP Location: Right arm, Cuff Size: Adult Large)  Pulse 56  Ht 1.6 m (5' 3\")  Wt 85.6 kg (188 lb 11.2 oz)  BMI 33.43 kg/m2  Constitutional:  cooperative, alert and oriented, well developed, well nourished, in no acute distress        Skin:  warm and dry to the touch, no apparent skin lesions or masses noted        Head:  normocephalic, no masses or lesions        Eyes:  pupils equal and round;conjunctivae and lids unremarkable        ENT:  no pallor or cyanosis, dentition good        Neck:  JVP normal        Chest:  clear to auscultation;normal symmetry        Cardiac: regular rhythm occasional premature beats                Extremities and Back:           Neurological:  no gross motor deficits;affect appropriate               Medications:     Current Outpatient Prescriptions   Medication Sig Dispense Refill     hydrochlorothiazide (HYDRODIURIL) 25 MG tablet Take 1 tablet (25 mg) by mouth daily 90 tablet 3     levothyroxine (SYNTHROID/LEVOTHROID) 112 MCG tablet TAKE 1 TABLET (112 MCG) BY MOUTH DAILY 30 tablet 0     losartan (COZAAR) 50 MG tablet Take 1 tablet (50 mg) by mouth daily 90 tablet 3     aspirin 81 MG tablet Take 81 mg by mouth daily       amLODIPine (NORVASC) 5 MG tablet Take 1 tablet (5 mg) by mouth daily 90 tablet 3     metFORMIN (GLUCOPHAGE-XR) 500 MG 24 hr tablet Take 1 tablet (500 mg) by mouth daily (with dinner) 90 tablet 3     " multivitamin, therapeutic with minerals (THERA-VIT-M) TABS Take 1 tablet by mouth daily       Cyanocobalamin (VITAMIN B 12 PO) Take 1 tablet by mouth daily VIT B12       cholecalciferol (VITAMIN D) 1000 UNIT tablet Take 1 tablet (1,000 Units) by mouth daily 100 tablet 3     calcium-vitamin D (CALTRATE) 600-400 MG-UNIT per tablet Take 1 tablet by mouth daily 60 tablet 11     [DISCONTINUED] hydrochlorothiazide (HYDRODIURIL) 25 MG tablet TAKE ONE-HALF TABLET BY MOUTH DAILY 45 tablet 3     order for DME Compression Stockings  Knee high  20/30 compression (Patient not taking: Reported on 2018) 2 each 1           Family History   Problem Relation Age of Onset     CANCER Mother      ovarian  of it  at 51yo.     DIABETES Father      Adult onset     Hypertension Father      C.A.D. Father      Fatal Heart attack at 70.     CEREBROVASCULAR DISEASE Father      Thyroid Disease Sister      Thyroid Disease Sister      Thyroid Disease Sister      CANCER Daughter      Thyroid Disease Daughter      Breast Cancer No family hx of      Cancer - colorectal No family hx of        Social History     Social History     Marital status:      Spouse name: FABIAN CUADRA     Number of children: 2     Years of education: N/A     Occupational History     [employer]       [employer]     Social History Main Topics     Smoking status: Never Smoker     Smokeless tobacco: Never Used     Alcohol use 0.0 oz/week     0 Standard drinks or equivalent per week      Comment: Approx 1-2 times monthy     Drug use: No     Sexual activity: Yes     Partners: Male     Other Topics Concern      Service No     Blood Transfusions No     Caffeine Concern No     Occupational Exposure No     Hobby Hazards No     Sleep Concern No     Stress Concern No     Weight Concern Yes     Special Diet No     Back Care No     Exercise Yes     Approx 3 times a week for about 30 minutes     Bike Helmet Yes     Seat Belt Yes     Self-Exams Yes     Social  History Narrative            Past Medical History:     Past Medical History:   Diagnosis Date     BASAL CELL CANCER -NOSE      Family history of diabetes mellitus     Father     Family history of ischemic heart disease     Father     Family history of malignant neoplasm of ovary     Mother     HX DISRUPT L ANT CRUCIATE S/P REPAIR 2003      HYPERTENSION       HYPOTHYROIDISM      LOC PRIM OSTEOARTH-L 1ST METATARSAL-CUNEIFORM JOINT 3/10/2004     Major depressive disorder, single episode, mild (H) 11/24/2014     MILD-MOD TRICUSPID REGURG. MOD PULM HTN 6/23/2005    ECHO  5/05     SPLENIC CYST               Past Surgical History:     Past Surgical History:   Procedure Laterality Date     ARTHROPLASTY KNEE Left 4/13/2016    Procedure: ARTHROPLASTY KNEE;  Surgeon: Giuseppe Lopez MD;  Location: [facility] OR     ARTHROPLASTY MINIMALLY INVASIVE HIP  4/18/2012    Procedure:ARTHROPLASTY MINIMALLY INVASIVE HIP; RIGHT TWO INCISION MINIMALLY INVASIVE HIP ARTHROPLASTY (YUVAL)^ (NO CELL SAVER) (C-ARM); Surgeon:GIUSEPPE LOPEZ; Location: OR     C APPENDECTOMY  8/87    incidental appi at [facility]     C CHEMOSURG MOHS 1ST STAGE  9-99    Mohs-nose     C REPAIR CRUCIATE LIGAMENT,KNEE  8/2003    L   ACL reconstruction     C TOTAL ABDOM HYSTERECTOMY  8/87    W/ BSO fibroids, hx PID     HC COLONOSCOPY THRU STOMA, DIAGNOSTIC  4/03    normal, rec repeat 10 yrs     HYSTERECTOMY, PAP NO LONGER INDICATED       SURGICAL HISTORY OF -   Mult-~1978    infertility, laparotomies              Allergies:   Ace inhibitors; Oxycodone; Tetanus toxoid; and Shellfish-derived products       Data:   All laboratory data reviewed:    Last Basic Metabolic Panel:  Lab Results   Component Value Date     04/16/2018      Lab Results   Component Value Date    POTASSIUM 3.7 04/16/2018     Lab Results   Component Value Date    CHLORIDE 102 04/16/2018     Lab Results   Component Value Date    JOSEPH 9.6 04/16/2018     Lab Results   Component Value Date    CO2 29  04/16/2018     Lab Results   Component Value Date    BUN 16 04/16/2018     Lab Results   Component Value Date    CR 0.99 04/16/2018     Lab Results   Component Value Date     04/16/2018       Thank you for allowing me to participate in the care of your patient.    Sincerely,     FATMATA PEREIRA [facility]

## 2018-04-16 NOTE — MR AVS SNAPSHOT
After Visit Summary   4/16/2018    Yola Mayo    MRN: [id number]           Patient Information     Date Of Birth          1945        Visit Information        Provider Department      4/16/2018 12:00 PM Krystal Neal APRN CNP [facility]   [place]        Today's Diagnoses     Benign essential hypertension        Primary pulmonary hypertension (H)        Disease of tricuspid valve        Essential hypertension, benign          Care Instructions    1. Increase HCTZ to 1 tablet or 25 mg daily  2. Follow up in 1 month with labs  3. Continue home BP monitoring and exercise            Follow-ups after your visit        Additional Services     Follow-Up with Cardiac Advanced Practice Provider                 Future tests that were ordered for you today     Open Future Orders        Priority Expected Expires Ordered    Basic metabolic panel Routine 5/16/2018 4/16/2019 4/16/2018    Follow-Up with Cardiac Advanced Practice Provider Routine 5/16/2018 4/16/2019 4/16/2018            Who to contact     If you have questions or need follow up information about today's clinic visit or your schedule please contact [facility] directly at [phone].  Normal or non-critical lab and imaging results will be communicated to you by [employer], letter or phone within 4 business days after the clinic has received the results. If you do not hear from us within 7 days, please contact the clinic through [employer] or phone. If you have a critical or abnormal lab result, we will notify you by phone as soon as possible.  Submit refill requests through [employer] or call your pharmacy and they will forward the refill request to us. Please allow 3 business days for your refill to be completed.          Additional Information About Your Visit        MyChart Information     [employer] lets you send messages to your doctor, view your test results, renew your  "prescriptions, schedule appointments and more. To sign up, go to [email or web address] . Click on \"Log in\" on the left side of the screen, which will take you to the Welcome page. Then click on \"Sign up Now\" on the right side of the page.     You will be asked to enter the access code listed below, as well as some personal information. Please follow the directions to create your username and password.     Your access code is: [email or web address]  Expires: 7/15/2018 12:18 PM     Your access code will  in 90 days. If you need help or a new code, please call your [place] clinic or [phone].        Care EveryWhere ID     This is your Care EveryWhere ID. This could be used by other organizations to access your [place] medical records  [id number]        Your Vitals Were     Pulse Height BMI (Body Mass Index)             56 1.6 m (5' 3\") 33.43 kg/m2          Blood Pressure from Last 3 Encounters:   18 162/78   18 150/83   17 157/76    Weight from Last 3 Encounters:   18 85.6 kg (188 lb 11.2 oz)   18 83.5 kg (184 lb)   17 83.9 kg (185 lb)              We Performed the Following     Follow-Up with Cardiac Advanced Practice Provider          Today's Medication Changes          These changes are accurate as of 18 12:18 PM.  If you have any questions, ask your nurse or doctor.               These medicines have changed or have updated prescriptions.        Dose/Directions    hydrochlorothiazide 25 MG tablet   Commonly known as:  HYDRODIURIL   This may have changed:  See the new instructions.   Used for:  Essential hypertension, benign   Changed by:  Krystal Neal Nettie, APRN CNP        Dose:  25 mg   Take 1 tablet (25 mg) by mouth daily   Quantity:  90 tablet   Refills:  3            Where to get your medicines      These medications were sent to [place] [address] [place] [facility] [address] [place] [address]  [address] [address]     Phone:  [phone]     " hydrochlorothiazide 25 MG tablet                Primary Care Provider Office Phone # Fax #    Zoraida JACKELINE Braga [phone] [address] [place] [address]        Equal Access to Services     MIKALA RENAE : Hadii aad ku hadronaldo Nuris, waaxda luqadaha, qaybta kaalmada adesuzanneyada, elsa idalmisin hayaan mary gabinomaggie prasad. So [facility] [phone].    ATENCIÓN: Si habla español, tiene a sarah disposición servicios gratuitos de asistencia lingüística. Llame al [phone].    We comply with applicable federal civil rights laws and [place] laws. We do not discriminate on the basis of race, color, national origin, age, disability, sex, sexual orientation, or gender identity.            Thank you!     Thank you for choosing [facility]  for your care. Our goal is always to provide you with excellent care. Hearing back from our patients is one way we can continue to improve our services. Please take a few minutes to complete the written survey that you may receive in the mail after your visit with us. Thank you!             Your Updated Medication List - Protect others around you: Learn how to safely use, store and throw away your medicines at www.disposemymeds.org.          This list is accurate as of 4/16/18 12:18 PM.  Always use your most recent med list.                   Brand Name Dispense Instructions for use Diagnosis    amLODIPine 5 MG tablet    NORVASC    90 tablet    Take 1 tablet (5 mg) by mouth daily    Essential hypertension, benign       aspirin 81 MG tablet      Take 81 mg by mouth daily        calcium-vitamin D 600-400 MG-UNIT per tablet    CALTRATE    60 tablet    Take 1 tablet by mouth daily    Preventive measure       cholecalciferol 1000 UNIT tablet    vitamin D3    100 tablet    Take 1 tablet (1,000 Units) by mouth daily    Vitamin deficiency       hydrochlorothiazide 25 MG tablet    HYDRODIURIL    90 tablet    Take 1  tablet (25 mg) by mouth daily    Essential hypertension, benign       levothyroxine 112 MCG tablet    SYNTHROID/LEVOTHROID    30 tablet    TAKE 1 TABLET (112 MCG) BY MOUTH DAILY    Hypothyroidism, unspecified type       losartan 50 MG tablet    COZAAR    90 tablet    Take 1 tablet (50 mg) by mouth daily    Benign essential hypertension, GALINDO (dyspnea on exertion)       metFORMIN 500 MG 24 hr tablet    GLUCOPHAGE-XR    90 tablet    Take 1 tablet (500 mg) by mouth daily (with dinner)    Prediabetes       multivitamin, therapeutic with minerals Tabs tablet      Take 1 tablet by mouth daily        order for DME     2 each    Compression Stockings Knee high 20/30 compression    Edema of lower extremity       VITAMIN B 12 PO      Take 1 tablet by mouth daily VIT B12

## 2018-04-24 DIAGNOSIS — E03.9 HYPOTHYROIDISM, UNSPECIFIED TYPE: ICD-10-CM

## 2018-04-25 RX ORDER — LEVOTHYROXINE SODIUM 112 UG/1
TABLET ORAL
Qty: 30 TABLET | Refills: 0 | Status: SHIPPED | OUTPATIENT
Start: 2018-04-25 | End: 2018-05-01

## 2018-04-25 NOTE — TELEPHONE ENCOUNTER
Pt has physical scheduled for 4/30 with Dr. Braga she is requesting for us to proceed with  The refill request

## 2018-04-25 NOTE — TELEPHONE ENCOUNTER
"Levothyroxine 112 mcg    Last Written Prescription Date:  03/26/18  Last Fill Quantity: 30 tablets,  # refills: 0   Last office visit: 10/26/2017 with prescribing provider:  Maria Luz   Future Office Visit:   Next 5 appointments (look out 90 days)     May 23, 2018 11:00 AM CDT   Return Visit with FATMATA Cruz CNP   [facility] ([facility])    [address] [place] [address] [phone] OPT 2                 Requested Prescriptions   Pending Prescriptions Disp Refills     levothyroxine (SYNTHROID/LEVOTHROID) 112 MCG tablet [Pharmacy Med Name: LEVOTHYROXINE 112 MCG TABLET] 30 tablet 0     Sig: TAKE 1 TABLET BY MOUTH EVERY DAY    Thyroid Protocol Failed    4/24/2018  5:13 PM       Failed - Normal TSH on file in past 12 months    Recent Labs   Lab Test  03/08/17   0827   TSH  1.48             Passed - Patient is 12 years or older       Passed - Recent (12 mo) or future (30 days) visit within the authorizing provider's specialty    Patient had office visit in the last 12 months or has a visit in the next 30 days with authorizing provider or within the authorizing provider's specialty.  See \"Patient Info\" tab in inbasket, or \"Choose Columns\" in Meds & Orders section of the refill encounter.           Passed - No active pregnancy on record    If patient is pregnant or has had a positive pregnancy test, please check TSH.         Passed - No positive pregnancy test in past 12 months    If patient is pregnant or has had a positive pregnancy test, please check TSH.            "

## 2018-04-25 NOTE — TELEPHONE ENCOUNTER
Left non detailed VM for pt asking that they callback and schedule (appt and labs)  1 month nohemi period Rx sent 3/26/2018  Will await pt's callback  Maricruz BAILEY RN

## 2018-04-25 NOTE — TELEPHONE ENCOUNTER
Prescription approved per [facility] Refill Protocol.    Emerita Arce, BS, RN, PHN

## 2018-05-01 ENCOUNTER — OFFICE VISIT (OUTPATIENT)
Dept: FAMILY MEDICINE | Facility: CLINIC | Age: 73
End: 2018-05-01
Payer: COMMERCIAL

## 2018-05-01 VITALS
DIASTOLIC BLOOD PRESSURE: 70 MMHG | OXYGEN SATURATION: 97 % | TEMPERATURE: 97.2 F | BODY MASS INDEX: 32.96 KG/M2 | SYSTOLIC BLOOD PRESSURE: 134 MMHG | HEART RATE: 71 BPM | WEIGHT: 186 LBS | HEIGHT: 63 IN

## 2018-05-01 DIAGNOSIS — Z00.00 ROUTINE HISTORY AND PHYSICAL EXAMINATION OF ADULT: Primary | ICD-10-CM

## 2018-05-01 DIAGNOSIS — I10 ESSENTIAL HYPERTENSION, BENIGN: ICD-10-CM

## 2018-05-01 DIAGNOSIS — R73.03 PREDIABETES: ICD-10-CM

## 2018-05-01 DIAGNOSIS — E03.9 HYPOTHYROIDISM, UNSPECIFIED TYPE: ICD-10-CM

## 2018-05-01 DIAGNOSIS — Z13.220 LIPID SCREENING: ICD-10-CM

## 2018-05-01 PROCEDURE — 99397 PER PM REEVAL EST PAT 65+ YR: CPT | Performed by: INTERNAL MEDICINE

## 2018-05-01 RX ORDER — LEVOTHYROXINE SODIUM 112 UG/1
112 TABLET ORAL DAILY
Qty: 90 TABLET | Refills: 3 | Status: SHIPPED | OUTPATIENT
Start: 2018-05-01 | End: 2019-05-07

## 2018-05-01 RX ORDER — METFORMIN HCL 500 MG
500 TABLET, EXTENDED RELEASE 24 HR ORAL
Qty: 90 TABLET | Refills: 3 | Status: SHIPPED | OUTPATIENT
Start: 2018-05-01 | End: 2019-05-07

## 2018-05-01 RX ORDER — AMLODIPINE BESYLATE 5 MG/1
5 TABLET ORAL DAILY
Qty: 90 TABLET | Refills: 3 | Status: SHIPPED | OUTPATIENT
Start: 2018-05-01 | End: 2018-07-20

## 2018-05-01 NOTE — PATIENT INSTRUCTIONS
Preventive Health Recommendations  Female Ages 65 +    Yearly exam:     See your health care provider every year in order to  o Review health changes.   o Discuss preventive care.    o Review your medicines if your doctor has prescribed any.      You no longer need a yearly Pap test unless you've had an abnormal Pap test in the past 10 years. If you have vaginal symptoms, such as bleeding or discharge, be sure to talk with your provider about a Pap test.      Every 1 to 2 years, have a mammogram.  If you are over 69, talk with your health care provider about whether or not you want to continue having screening mammograms.      Every 10 years, have a colonoscopy. Or, have a yearly FIT test (stool test). These exams will check for colon cancer.       Have a cholesterol test every 5 years, or more often if your doctor advises it.       Have a diabetes test (fasting glucose) every three years. If you are at risk for diabetes, you should have this test more often.       At age 65, have a bone density scan (DEXA) to check for osteoporosis (brittle bone disease).    Shots:    Get a flu shot each year.    Get a tetanus shot every 10 years.    Talk to your doctor about your pneumonia vaccines. There are now two you should receive - Pneumovax (PPSV 23) and Prevnar (PCV 13).    Talk to your doctor about the shingles vaccine.    Talk to your doctor about the hepatitis B vaccine.    Nutrition:     Eat at least 5 servings of fruits and vegetables each day.      Eat whole-grain bread, whole-wheat pasta and brown rice instead of white grains and rice.      Talk to your provider about Calcium and Vitamin D.     Lifestyle    Exercise at least 150 minutes a week (30 minutes a day, 5 days a week). This will help you control your weight and prevent disease.      Limit alcohol to one drink per day.      No smoking.       Wear sunscreen to prevent skin cancer.       See your dentist twice a year for an exam and cleaning.      See your  eye doctor every 1 to 2 years to screen for conditions such as glaucoma, macular degeneration, cataracts, etc     Preventive Health Recommendations    Female Ages 65 +    Yearly exam:     See your health care provider every year in order to  o Review health changes.   o Discuss preventive care.    o Review your medicines if your doctor has prescribed any.      You no longer need a yearly Pap test unless you've had an abnormal Pap test in the past 10 years. If you have vaginal symptoms, such as bleeding or discharge, be sure to talk with your provider about a Pap test.      Every 1 to 2 years, have a mammogram.  If you are over 69, talk with your health care provider about whether or not you want to continue having screening mammograms.      Every 10 years, have a colonoscopy. Or, have a yearly FIT test (stool test). These exams will check for colon cancer.       Have a cholesterol test every 5 years, or more often if your doctor advises it.       Have a diabetes test (fasting glucose) every three years. If you are at risk for diabetes, you should have this test more often.       At age 65, have a bone density scan (DEXA) to check for osteoporosis (brittle bone disease).    Shots:    Get a flu shot each year.    Get a tetanus shot every 10 years.    Talk to your doctor about your pneumonia vaccines. There are now two you should receive - Pneumovax (PPSV 23) and Prevnar (PCV 13).    Talk to your doctor about the shingles vaccine.    Talk to your doctor about the hepatitis B vaccine.    Nutrition:     Eat at least 5 servings of fruits and vegetables each day.      Eat whole-grain bread, whole-wheat pasta and brown rice instead of white grains and rice.      Talk to your provider about Calcium and Vitamin D.     Lifestyle    Exercise at least 150 minutes a week (30 minutes a day, 5 days a week). This will help you control your weight and prevent disease.      Limit alcohol to one drink per day.      No smoking.        Wear sunscreen to prevent skin cancer.       See your dentist twice a year for an exam and cleaning.      See your eye doctor every 1 to 2 years to screen for conditions such as glaucoma, macular degeneration and cataracts.    Schedule a fasting lab appointment at your earliest convenience  You are due for mammogram.  Please call the following number to make appointment :  [phone]  It is located at [facility]  Check with your insurance company about coverage for new Shingles vaccine, which is now available  It's called SHINGRIX and is a series of two shots, 6 months apart  Follow up in one year for physical  Seek sooner medical attention if there is any worsening of symptoms or problems

## 2018-05-01 NOTE — NURSING NOTE
"Chief Complaint   Patient presents with     Wellness Visit     Not fasting       Initial /75 (BP Location: Right arm, Cuff Size: Adult Large)  Pulse 71  Temp 97.2  F (36.2  C) (Oral)  Ht 5' 3\" (1.6 m)  Wt 186 lb (84.4 kg)  SpO2 97%  BMI 32.95 kg/m2 Estimated body mass index is 32.95 kg/(m^2) as calculated from the following:    Height as of this encounter: 5' 3\" (1.6 m).    Weight as of this encounter: 186 lb (84.4 kg).  Medication Reconciliation: complete       Yamila Gallegos CMA      "

## 2018-05-01 NOTE — PROGRESS NOTES
"   SUBJECTIVE:   CC: Yola Mayo is an 72 year old woman who presents for preventive health visit.     Healthy Habits:    Do you get at least three servings of calcium containing foods daily (dairy, green leafy vegetables, etc.)? {YES/NO, DAIRY INTAKE:357329::\"yes\"}    Amount of exercise or daily activities, outside of work: {AMOUNT EXERCISE:726127}    Problems taking medications regularly {Yes /No default:582103::\"No\"}    Medication side effects: {Yes /No default.:204008::\"No\"}    Have you had an eye exam in the past two years? {YESNOBLANK:619740}    Do you see a dentist twice per year? {YESNOBLANK:727043}    Do you have sleep apnea, excessive snoring or daytime drowsiness?{YESNOBLANK:765043}  {Outside tests to abstract? :195969}    {additional problems to add (Optional):187499}    Today's PHQ-2 Score:   PHQ-2 ( 1999 Pfizer) 10/26/2017 8/4/2017   Q1: Little interest or pleasure in doing things 0 1   Q2: Feeling down, depressed or hopeless 0 0   PHQ-2 Score 0 1     {PHQ-2 LOOK IN ASSESSMENTS (Optional) :367800}  Abuse: Current or Past(Physical, Sexual or Emotional)- {YES/NO/NA:899241}  Do you feel safe in your environment - {YES/NO/NA:376689}    Social History   Substance Use Topics     Smoking status: Never Smoker     Smokeless tobacco: Never Used     Alcohol use 0.0 oz/week     0 Standard drinks or equivalent per week      Comment: Approx 1-2 times monthy     If you drink alcohol do you typically have >3 drinks per day or >7 drinks per week? {ETOH :964403}                     Reviewed orders with patient.  Reviewed health maintenance and updated orders accordingly - {Yes/No:954742::\"Yes\"}  {Chronicprobdata (Optional):098803}    {Mammo Decision Support (Optional):931632}    Pertinent mammograms are reviewed under the imaging tab.  History of abnormal Pap smear: {PAP HX:379431}    Reviewed and updated as needed this visit by clinical staff         Reviewed and updated as needed this visit by Provider      " "  {HISTORY OPTIONS (Optional):267425}    ROS:  {FEMALE PREVENTATIVE ROS:067488}    OBJECTIVE:   There were no vitals taken for this visit.  EXAM:  {Exam Choices:854902}    ASSESSMENT/PLAN:   {Diag Picklist:748173}    COUNSELING:   {FEMALE COUNSELING MESSAGES:031166::\"Reviewed preventive health counseling, as reflected in patient instructions\"}    {BP Counseling- Complete if BP >= 120/80  (Optional):346255}     reports that she has never smoked. She has never used smokeless tobacco.  {Tobacco Cessation -- Complete if patient is a smoker (Optional):999844}  Estimated body mass index is 33.43 kg/(m^2) as calculated from the following:    Height as of 4/16/18: 5' 3\" (1.6 m).    Weight as of 4/16/18: 188 lb 11.2 oz (85.6 kg).   {Weight Management Plan (ACO) Complete if BMI is abnormal-  Ages 18-64  BMI >24.9.  Age 65+ with BMI <23 or >30 (Optional):717653}    Counseling Resources:  ATP IV Guidelines  Pooled Cohorts Equation Calculator  Breast Cancer Risk Calculator  FRAX Risk Assessment  ICSI Preventive Guidelines  Dietary Guidelines for Americans, 2010  USDA's MyPlate  ASA Prophylaxis  Lung CA Screening    Zoraida Braga MD  [facility]  "

## 2018-05-01 NOTE — MR AVS SNAPSHOT
After Visit Summary   5/1/2018    Yola Mayo    MRN: [id number]           Patient Information     Date Of Birth          1945        Visit Information        Provider Department      5/1/2018 2:30 PM Zoraida Braga MD [facility]        Today's Diagnoses     Routine history and physical examination of adult    -  1    Essential hypertension, benign        Hypothyroidism, unspecified type        Prediabetes        Lipid screening          Care Instructions      Preventive Health Recommendations  Female Ages 65 +    Yearly exam:     See your health care provider every year in order to  o Review health changes.   o Discuss preventive care.    o Review your medicines if your doctor has prescribed any.      You no longer need a yearly Pap test unless you've had an abnormal Pap test in the past 10 years. If you have vaginal symptoms, such as bleeding or discharge, be sure to talk with your provider about a Pap test.      Every 1 to 2 years, have a mammogram.  If you are over 69, talk with your health care provider about whether or not you want to continue having screening mammograms.      Every 10 years, have a colonoscopy. Or, have a yearly FIT test (stool test). These exams will check for colon cancer.       Have a cholesterol test every 5 years, or more often if your doctor advises it.       Have a diabetes test (fasting glucose) every three years. If you are at risk for diabetes, you should have this test more often.       At age 65, have a bone density scan (DEXA) to check for osteoporosis (brittle bone disease).    Shots:    Get a flu shot each year.    Get a tetanus shot every 10 years.    Talk to your doctor about your pneumonia vaccines. There are now two you should receive - Pneumovax (PPSV 23) and Prevnar (PCV 13).    Talk to your doctor about the shingles vaccine.    Talk to your doctor about the hepatitis B vaccine.    Nutrition:     Eat at least 5 servings of fruits and  vegetables each day.      Eat whole-grain bread, whole-wheat pasta and brown rice instead of white grains and rice.      Talk to your provider about Calcium and Vitamin D.     Lifestyle    Exercise at least 150 minutes a week (30 minutes a day, 5 days a week). This will help you control your weight and prevent disease.      Limit alcohol to one drink per day.      No smoking.       Wear sunscreen to prevent skin cancer.       See your dentist twice a year for an exam and cleaning.      See your eye doctor every 1 to 2 years to screen for conditions such as glaucoma, macular degeneration, cataracts, etc     Preventive Health Recommendations    Female Ages 65 +    Yearly exam:     See your health care provider every year in order to  o Review health changes.   o Discuss preventive care.    o Review your medicines if your doctor has prescribed any.      You no longer need a yearly Pap test unless you've had an abnormal Pap test in the past 10 years. If you have vaginal symptoms, such as bleeding or discharge, be sure to talk with your provider about a Pap test.      Every 1 to 2 years, have a mammogram.  If you are over 69, talk with your health care provider about whether or not you want to continue having screening mammograms.      Every 10 years, have a colonoscopy. Or, have a yearly FIT test (stool test). These exams will check for colon cancer.       Have a cholesterol test every 5 years, or more often if your doctor advises it.       Have a diabetes test (fasting glucose) every three years. If you are at risk for diabetes, you should have this test more often.       At age 65, have a bone density scan (DEXA) to check for osteoporosis (brittle bone disease).    Shots:    Get a flu shot each year.    Get a tetanus shot every 10 years.    Talk to your doctor about your pneumonia vaccines. There are now two you should receive - Pneumovax (PPSV 23) and Prevnar (PCV 13).    Talk to your doctor about the shingles  vaccine.    Talk to your doctor about the hepatitis B vaccine.    Nutrition:     Eat at least 5 servings of fruits and vegetables each day.      Eat whole-grain bread, whole-wheat pasta and brown rice instead of white grains and rice.      Talk to your provider about Calcium and Vitamin D.     Lifestyle    Exercise at least 150 minutes a week (30 minutes a day, 5 days a week). This will help you control your weight and prevent disease.      Limit alcohol to one drink per day.      No smoking.       Wear sunscreen to prevent skin cancer.       See your dentist twice a year for an exam and cleaning.      See your eye doctor every 1 to 2 years to screen for conditions such as glaucoma, macular degeneration and cataracts.    Schedule a fasting lab appointment at your earliest convenience  You are due for mammogram.  Please call the following number to make appointment :  [phone]  It is located at [facility] [address]  Check with your insurance company about coverage for new Shingles vaccine, which is now available  It's called SHINGRIX and is a series of two shots, 6 months apart  Follow up in one year for physical  Seek sooner medical attention if there is any worsening of symptoms or problems            Follow-ups after your visit        Your next 10 appointments already scheduled     May 23, 2018 10:00 AM CDT   LAB with [facility]   [facility] [place] ([facility])    [address] [place] [address] [phone]           Please do not eat 10-12 hours before your appointment if you are coming in fasting for labs on lipids, cholesterol, or glucose (sugar). This does not apply to pregnant women. Water, hot tea and black coffee (with nothing added) are okay. Do not drink other fluids, diet soda or chew gum.            May 23, 2018 11:00 AM CDT   Return Visit with FATMATA Cruz CNP   [facility] ([facility])    [facility]  "[facility] [address]  [place] [address] [phone] OPT 2              Future tests that were ordered for you today     Open Future Orders        Priority Expected Expires Ordered    Glucose Routine 5/2/2018 12/28/2018 5/1/2018    Hemoglobin A1c Routine 5/2/2018 12/28/2018 5/1/2018    TSH with free T4 reflex Routine 5/2/2018 12/28/2018 5/1/2018    Lipid panel reflex to direct LDL Fasting Routine 5/2/2018 12/28/2018 5/1/2018            Who to contact     If you have questions or need follow up information about today's clinic visit or your schedule please contact [facility] directly at [phone].  Normal or non-critical lab and imaging results will be communicated to you by MyChart, letter or phone within 4 business days after the clinic has received the results. If you do not hear from us within 7 days, please contact the clinic through MyChart or phone. If you have a critical or abnormal lab result, we will notify you by phone as soon as possible.  Submit refill requests through [employer] or call your pharmacy and they will forward the refill request to us. Please allow 3 business days for your refill to be completed.          Additional Information About Your Visit        Care EveryWhere ID     This is your Care EveryWhere ID. This could be used by other organizations to access your [place] medical records  [id number]        Your Vitals Were     Pulse Temperature Height Pulse Oximetry BMI (Body Mass Index)       71 97.2  F (36.2  C) (Oral) 5' 3\" (1.6 m) 97% 32.95 kg/m2        Blood Pressure from Last 3 Encounters:   05/01/18 134/70   04/16/18 162/78   02/05/18 150/83    Weight from Last 3 Encounters:   05/01/18 186 lb (84.4 kg)   04/16/18 188 lb 11.2 oz (85.6 kg)   02/05/18 184 lb (83.5 kg)                 Today's Medication Changes          These changes are accurate as of 5/1/18  3:03 PM.  If you have any questions, ask your nurse or doctor.               These medicines have " changed or have updated prescriptions.        Dose/Directions    levothyroxine 112 MCG tablet   Commonly known as:  SYNTHROID/LEVOTHROID   This may have changed:  See the new instructions.   Used for:  Hypothyroidism, unspecified type   Changed by:  Zoraida Braga MD        Dose:  112 mcg   Take 1 tablet (112 mcg) by mouth daily   Quantity:  90 tablet   Refills:  3            Where to get your medicines      These medications were sent to [place] [address] [facility] - [facility] [address] [address]  [address] [address], [facility] [address]     Phone:  [phone]     amLODIPine 5 MG tablet    levothyroxine 112 MCG tablet    metFORMIN 500 MG 24 hr tablet                Primary Care Provider Office Phone # Fax #    Zoraida Braga [phone] [address] [address]        Equal Access to Services     [facility]: Hadii rohan ku hadasho Nuris, waaxda luqadaha, qaybta kaalmada vivek, elsa oconnell . So [facility] [phone].    ATENCIÓN: Si habla español, tiene a sarah disposición servicios gratuitos de asistencia lingüística. Llame al [phone].    We comply with applicable federal civil rights laws and [place] laws. We do not discriminate on the basis of race, color, national origin, age, disability, sex, sexual orientation, or gender identity.            Thank you!     Thank you for choosing [facility]  for your care. Our goal is always to provide you with excellent care. Hearing back from our patients is one way we can continue to improve our services. Please take a few minutes to complete the written survey that you may receive in the mail after your visit with us. Thank you!             Your Updated Medication List - Protect others around you: Learn how to safely use, store and throw away your medicines at www.disposemymeds.org.          This list is accurate as of 5/1/18  3:03 PM.  Always use your most  recent med list.                   Brand Name Dispense Instructions for use Diagnosis    amLODIPine 5 MG tablet    NORVASC    90 tablet    Take 1 tablet (5 mg) by mouth daily    Essential hypertension, benign       aspirin 81 MG tablet      Take 81 mg by mouth daily        calcium-vitamin D 600-400 MG-UNIT per tablet    CALTRATE    60 tablet    Take 1 tablet by mouth daily    Preventive measure       cholecalciferol 1000 UNIT tablet    vitamin D3    100 tablet    Take 1 tablet (1,000 Units) by mouth daily    Vitamin deficiency       hydrochlorothiazide 25 MG tablet    HYDRODIURIL    90 tablet    Take 1 tablet (25 mg) by mouth daily    Essential hypertension, benign       levothyroxine 112 MCG tablet    SYNTHROID/LEVOTHROID    90 tablet    Take 1 tablet (112 mcg) by mouth daily    Hypothyroidism, unspecified type       losartan 50 MG tablet    COZAAR    90 tablet    Take 1 tablet (50 mg) by mouth daily    Benign essential hypertension, GALINDO (dyspnea on exertion)       metFORMIN 500 MG 24 hr tablet    GLUCOPHAGE-XR    90 tablet    Take 1 tablet (500 mg) by mouth daily (with dinner)    Prediabetes       multivitamin, therapeutic with minerals Tabs tablet      Take 1 tablet by mouth daily        order for DME     2 each    Compression Stockings Knee high 20/30 compression    Edema of lower extremity       VITAMIN B 12 PO      Take 1 tablet by mouth daily VIT B12

## 2018-05-01 NOTE — PROGRESS NOTES
SUBJECTIVE:   Yola Mayo is a 72 year old female who presents for Preventive Visit.    Are you in the first 12 months of your Medicare Part B coverage?  No    Healthy Habits:    Do you get at least three servings of calcium containing foods daily (dairy, green leafy vegetables, etc.)? yes    Amount of exercise or daily activities, outside of work: 3 day(s) per week    Problems taking medications regularly No    Medication side effects: No    Have you had an eye exam in the past two years? yes    Do you see a dentist twice per year? yes    Do you have sleep apnea, excessive snoring or daytime drowsiness?no      Ability to successfully perform activities of daily living: Yes, no assistance needed    Home safety:  lack of grab bars in the bathroom     Hearing impairment: No    Fall risk:  Fallen 2 or more times in the past year?: No  Any fall with injury in the past year?: No    COGNITIVE SCREEN  1) Repeat 3 items (Banana, Sunrise, Chair)    2) Clock draw: NORMAL  3) 3 item recall: Recalls 3 objects  Results: 3 items recalled: COGNITIVE IMPAIRMENT LESS LIKELY    Mini-CogTM Copyright JESSICA Hussein. Licensed by the author for use in [facility]; reprinted with permission (sophie@[facility]). All rights reserved.        Reviewed and updated as needed this visit by clinical staff  Tobacco  Allergies  Meds         Reviewed and updated as needed this visit by Provider        Social History   Substance Use Topics     Smoking status: Never Smoker     Smokeless tobacco: Never Used     Alcohol use 0.0 oz/week     0 Standard drinks or equivalent per week      Comment: Approx 1-2 times monthy       If you drink alcohol do you typically have >3 drinks per day or >7 drinks per week? No                        Today's PHQ-2 Score:   PHQ-2 ( 1999 [employer]) 5/1/2018 10/26/2017   Q1: Little interest or pleasure in doing things 0 0   Q2: Feeling down, depressed or hopeless 0 0   PHQ-2 Score 0 0       Do you feel safe in your  environment - Yes    Do you have a Health Care Directive?: Yes: Advance Directive has been received and scanned.    Current providers sharing in care for this patient include:   Patient Care Team:  Zoraida Braga MD as PCP - General (Internal Medicine)    The following health maintenance items are reviewed in Epic and correct as of today:  Health Maintenance   Topic Date Due     FALL RISK ASSESSMENT  03/10/2018     MAMMO SCREEN Q2 YR (SYSTEM ASSIGNED)  11/28/2018     COLONOSCOPY Q10 YR  05/01/2019     ADVANCE DIRECTIVE PLANNING Q5 YRS  08/20/2020     LIPID SCREEN Q5 YR FEMALE (SYSTEM ASSIGNED)  03/08/2022     DEXA SCAN SCREENING (SYSTEM ASSIGNED)  Completed     PNEUMOCOCCAL  Completed     INFLUENZA VACCINE  Completed     HEPATITIS C SCREENING  Completed     Labs reviewed in EPIC  Patient Active Problem List   Diagnosis     Constipation     Hemorrhage of rectum and anus     DISRUPT ANT CRUCIATE L, 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     BASAL CELL CANCER -NOSE     Family history of diabetes mellitus     Family history of ischemic heart disease     Family history of malignant neoplasm of ovary     LOC PRIM OSTEOARTH-L 1ST METATARSAL-CUNEIFORM JOINT     Hypothyroidism     DERMATITIS      Disease of tricuspid valve     Symptomatic menopausal or female climacteric states     Primary pulmonary hypertension (H)     Generalized osteoarthrosis, unspecified site     HYPERLIPIDEMIA LDL GOAL <130     Advanced directives, counseling/discussion     Health Care Home     Depressive disorder, not elsewhere classified     Degenerative arthritis of knee     Aftercare following knee joint replacement surgery, unspecified laterality     Anemia due to blood loss, acute     Essential hypertension     Pulmonary hypertension     Past Surgical History:   Procedure Laterality Date     ARTHROPLASTY KNEE Left 4/13/2016    Procedure: ARTHROPLASTY KNEE;  Surgeon: Giuseppe Mcgrath MD;  Location: [facility] OR     ARTHROPLASTY MINIMALLY INVASIVE HIP  4/18/2012     Procedure:ARTHROPLASTY MINIMALLY INVASIVE HIP; RIGHT TWO INCISION MINIMALLY INVASIVE HIP ARTHROPLASTY (YUVAL)^ (NO CELL SAVER) (C-ARM); Surgeon:LITO LOPEZ; Location:[facility] OR     C APPENDECTOMY      incidental appi at hys     C CHEMOSURG MOHS 1ST STAGE      Mohs-nose     C REPAIR CRUCIATE LIGAMENT,KNEE  2003    L   ACL reconstruction     C TOTAL ABDOM HYSTERECTOMY      W/ BSO fibroids, hx PID     [facility] COLONOSCOPY THRU STOMA, DIAGNOSTIC      normal, rec repeat 10 yrs     HYSTERECTOMY, PAP NO LONGER INDICATED       SURGICAL HISTORY OF -   Mult-~    infertility, laparotomies       Social History   Substance Use Topics     Smoking status: Never Smoker     Smokeless tobacco: Never Used     Alcohol use 0.0 oz/week     0 Standard drinks or equivalent per week      Comment: Approx 1-2 times monthy     Family History   Problem Relation Age of Onset     CANCER Mother      ovarian  of it  at 49yo.     DIABETES Father      Adult onset     Hypertension Father      C.A.D. Father      Fatal Heart attack at 70.     CEREBROVASCULAR DISEASE Father      Thyroid Disease Sister      Thyroid Disease Sister      Thyroid Disease Sister      CANCER Daughter      Thyroid Disease Daughter      Breast Cancer No family hx of      Cancer - colorectal No family hx of          Current Outpatient Prescriptions   Medication Sig Dispense Refill     amLODIPine (NORVASC) 5 MG tablet Take 1 tablet (5 mg) by mouth daily 90 tablet 3     aspirin 81 MG tablet Take 81 mg by mouth daily       calcium-vitamin D (CALTRATE) 600-400 MG-UNIT per tablet Take 1 tablet by mouth daily 60 tablet 11     cholecalciferol (VITAMIN D) 1000 UNIT tablet Take 1 tablet (1,000 Units) by mouth daily 100 tablet 3     Cyanocobalamin (VITAMIN B 12 PO) Take 1 tablet by mouth daily VIT B12       hydrochlorothiazide (HYDRODIURIL) 25 MG tablet Take 1 tablet (25 mg) by mouth daily 90 tablet 3     levothyroxine (SYNTHROID/LEVOTHROID) 112 MCG tablet Take 1  "tablet (112 mcg) by mouth daily 90 tablet 3     losartan (COZAAR) 50 MG tablet Take 1 tablet (50 mg) by mouth daily 90 tablet 3     metFORMIN (GLUCOPHAGE-XR) 500 MG 24 hr tablet Take 1 tablet (500 mg) by mouth daily (with dinner) 90 tablet 3     multivitamin, therapeutic with minerals (THERA-VIT-M) TABS Take 1 tablet by mouth daily       order for DME Compression Stockings  Knee high  20/30 compression 2 each 1     [DISCONTINUED] amLODIPine (NORVASC) 5 MG tablet Take 1 tablet (5 mg) by mouth daily 90 tablet 3     [DISCONTINUED] levothyroxine (SYNTHROID/LEVOTHROID) 112 MCG tablet TAKE 1 TABLET BY MOUTH EVERY DAY 30 tablet 0     [DISCONTINUED] metFORMIN (GLUCOPHAGE-XR) 500 MG 24 hr tablet Take 1 tablet (500 mg) by mouth daily (with dinner) 90 tablet 3     Allergies   Allergen Reactions     Ace Inhibitors      ACE throat clearing     Oxycodone Nausea and Vomiting     Tetanus Toxoid Swelling     Hard red lump      Shellfish-Derived Products Rash     possible       Pneumonia Vaccine:Adults age 65+ who received Pneumovax (PPSV23) at 65 years or older: Should be given PCV13 > 1 year after their most recent PPSV23  Mammogram Screening: Patient over age 50, mutual decision to screen reflected in health maintenance.    ROS:  Constitutional, HEENT, cardiovascular, pulmonary, GI, , musculoskeletal, neuro, skin, endocrine and psych systems are negative, except as otherwise noted.    This document serves as a record of the services and decisions personally performed and made by Zoraida Braga MD. It was created on her behalf by Leticia Diaz, a trained medical scribe. The creation of this document is based on the provider's statements to the medical scribe.  Leticia Diaz 2:45 PM May 1, 2018    OBJECTIVE:   /70  Pulse 71  Temp 97.2  F (36.2  C) (Oral)  Ht 1.6 m (5' 3\")  Wt 84.4 kg (186 lb)  SpO2 97%  BMI 32.95 kg/m2 Estimated body mass index is 32.95 kg/(m^2) as calculated from the following:    Height as of " "this encounter: 1.6 m (5' 3\").    Weight as of this encounter: 84.4 kg (186 lb).    EXAM:   GENERAL APPEARANCE: healthy, alert and no distress  EYES: Eyes grossly normal to inspection, PERRL and conjunctivae and sclerae normal  HENT: ear canals and TM's normal, nose and mouth without ulcers or lesions, oropharynx clear and oral mucous membranes moist  NECK: no adenopathy, no asymmetry, masses, or scars and thyroid normal to palpation  RESP: lungs clear to auscultation - no rales, rhonchi or wheezes  BREAST: normal without masses, tenderness or nipple discharge and no palpable axillary masses or adenopathy  CV: regular rate and rhythm, normal S1 S2, no S3 or S4, no murmur, click or rub, mild  peripheral edema and peripheral pulses strong, milld LL edema  ABDOMEN: soft, nontender, no hepatosplenomegaly, no masses and bowel sounds normal  MS: no musculoskeletal defects are noted and gait is age appropriate without ataxia  SKIN: numerous moles all over her body, cherry angiomas, prominent varicose veins, scar of left knee replacement  NEURO: Normal strength and tone, sensory exam grossly normal, mentation intact and speech normal  PSYCH: mentation appears normal and affect normal/bright    Reviewed and discussed mammogram done in 11/17  Reviewed and discussed colonoscopy done on 05/01/09  Reviewed and discussed labs done on 04/16/18  ASSESSMENT / PLAN:   Yola was seen today for wellness visit.    Diagnoses and all orders for this visit:    Routine history and physical examination of adult  Patient came in today for a wellness visit  Follows up with dermatology yearly  She is due for mammogram  She will schedule it at her convenience  Educated patient that ibuprofen, motrin, and aleve can cause GI bleeding if taken long-term    Essential hypertension, benign  -     amLODIPine (NORVASC) 5 MG tablet; Take 1 tablet (5 mg) by mouth daily  Compliant with medication  Patient checks BP multiple times daily  It has been " "controlled  Advised her to check it once weekly and report readings higher than 140/90    Hypothyroidism, unspecified type  -     levothyroxine (SYNTHROID/LEVOTHROID) 112 MCG tablet; Take 1 tablet (112 mcg) by mouth daily  -     TSH with free T4 reflex; Future  Doing well  Compliant with medication    Prediabetes  -     metFORMIN (GLUCOPHAGE-XR) 500 MG 24 hr tablet; Take 1 tablet (500 mg) by mouth daily (with dinner)  -     Glucose; Future  -     Hemoglobin A1c; Future  Doing well  Compliant with medication  Lab Results   Component Value Date    A1C 5.8 09/18/2015    A1C 6.0 05/19/2015     Lipid screening  -     Lipid panel reflex to direct LDL Fasting; Future    Patient follows dermatology yearly  Discussed compression soaks     Patient Instructions   Schedule a fasting lab appointment at your earliest convenience  You are due for mammogram.  Please call the following number to make appointment :  [phone]  It is located at [facility] [address]  Check with your insurance company about coverage for new Shingles vaccine, which is now available  It's called SHINGRIX and is a series of two shots, 6 months apart  Follow up in one year for physical  Seek sooner medical attention if there is any worsening of symptoms or problems  End of Life Planning:  Patient currently has an advanced directive: Yes.  Practitioner is supportive of decision.    COUNSELING:  Reviewed preventive health counseling, as reflected in patient instructions       Regular exercise       Healthy diet/nutrition    Estimated body mass index is 32.95 kg/(m^2) as calculated from the following:    Height as of this encounter: 1.6 m (5' 3\").    Weight as of this encounter: 84.4 kg (186 lb).  Weight management plan: Discussed healthy diet and exercise guidelines and patient will follow up in 12 months in clinic to re-evaluate.     reports that she has never smoked. She has never used smokeless tobacco.    Appropriate preventive services were discussed with this " patient, including applicable screening as appropriate for cardiovascular disease, diabetes, osteopenia/osteoporosis, and glaucoma.  As appropriate for age/gender, discussed screening for colorectal cancer, prostate cancer, breast cancer, and cervical cancer. Checklist reviewing preventive services available has been given to the patient.    Reviewed patients plan of care and provided an AVS. The Basic Care Plan (routine screening as documented in Health Maintenance) for Yola meets the Care Plan requirement. This Care Plan has been established and reviewed with the Patient.    Counseling Resources:  ATP IV Guidelines  Pooled Cohorts Equation Calculator  Breast Cancer Risk Calculator  FRAX Risk Assessment  ICSI Preventive Guidelines  Dietary Guidelines for Americans, 2010  USDA's MyPlate  ASA Prophylaxis  Lung CA Screening    The information in this document, created by the medical scribe for me, accurately reflects the services I personally performed and the decisions made by me. I have reviewed and approved this document for accuracy prior to leaving the patient care area.  May 1, 2018 3:03 PM    Zoraida Braga MD  [facility]

## 2018-05-03 DIAGNOSIS — R73.03 PREDIABETES: ICD-10-CM

## 2018-05-03 DIAGNOSIS — Z13.220 LIPID SCREENING: ICD-10-CM

## 2018-05-03 DIAGNOSIS — E03.9 HYPOTHYROIDISM, UNSPECIFIED TYPE: ICD-10-CM

## 2018-05-03 LAB
CHOLEST SERPL-MCNC: 132 MG/DL
GLUCOSE SERPL-MCNC: 108 MG/DL (ref 70–99)
HBA1C MFR BLD: 5.9 % (ref 0–5.6)
HDLC SERPL-MCNC: 80 MG/DL
LDLC SERPL CALC-MCNC: 47 MG/DL
NONHDLC SERPL-MCNC: 52 MG/DL
TRIGL SERPL-MCNC: 26 MG/DL
TSH SERPL DL<=0.005 MIU/L-ACNC: 0.78 MU/L (ref 0.4–4)

## 2018-05-03 PROCEDURE — 36415 COLL VENOUS BLD VENIPUNCTURE: CPT | Performed by: INTERNAL MEDICINE

## 2018-05-03 PROCEDURE — 83036 HEMOGLOBIN GLYCOSYLATED A1C: CPT | Performed by: INTERNAL MEDICINE

## 2018-05-03 PROCEDURE — 80061 LIPID PANEL: CPT | Performed by: INTERNAL MEDICINE

## 2018-05-03 PROCEDURE — 84443 ASSAY THYROID STIM HORMONE: CPT | Performed by: INTERNAL MEDICINE

## 2018-05-03 PROCEDURE — 82947 ASSAY GLUCOSE BLOOD QUANT: CPT | Performed by: INTERNAL MEDICINE

## 2018-05-03 RX ORDER — METFORMIN HCL 500 MG
TABLET, EXTENDED RELEASE 24 HR ORAL
Start: 2018-05-03

## 2018-05-03 NOTE — LETTER
[facility]  [address] [facility]  [address]  [place]  [address]  Tel: [phone]    May 4, 2018    Yola Mayo  [address]  [facility] [address]        Timothy Aceves,    This is to inform you regarding your test result.    Your total cholesterol is normal.  HDL which is called good cholesterol is normal.  Your LDL cholesterol is normal.  This is often call bad cholesterol and high levels increase the risk for heart attacks and strokes.  Your triglycerides are normal.  TSH which is thyroid hormone is normal.  Glucose which is your blood sugar is slightly elevated.  HbA1c which is average glucose during last 3 months is on upper end of normal  You are prediabetic  Eat low cholesterol low fat  diet and do regular physical activity.  Avoid high sugar containing food.  If you want to see dietician then let me know and I can put referral.  Follow up office visit in 6 months  Lab Results       Component                Value               Date                       A1C                      5.9                 05/03/2018                 A1C                      5.8                 09/18/2015                 A1C                      6.0                 05/19/2015            TSH which is thyroid hormone is normal.  Stay on current dose of levothyroxine.  Recheck TSH in 6 months        Sincerely,      Dr.Nasima Maria Luz MD,FACP / manoj      Enclosure: Lab Results  Resulted Orders   TSH with free T4 reflex   Result Value Ref Range    TSH 0.78 0.40 - 4.00 mU/L   Lipid panel reflex to direct LDL Fasting   Result Value Ref Range    Cholesterol 132 <200 mg/dL    Triglycerides 26 <150 mg/dL    HDL Cholesterol 80 >49 mg/dL    LDL Cholesterol Calculated 47 <100 mg/dL      Comment:      Desirable:       <100 mg/dl    Non HDL Cholesterol 52 <130 mg/dL   Glucose   Result Value Ref Range    Glucose 108 (H) 70 - 99 mg/dL   Hemoglobin A1c   Result Value Ref Range    Hemoglobin A1C 5.9 (H) 0 - 5.6 %      Comment:       Normal <5.7% Prediabetes 5.7-6.4%  Diabetes 6.5% or higher - adopted from ADA   consensus guidelines.

## 2018-05-03 NOTE — TELEPHONE ENCOUNTER
Last Written Prescription Date:  5/01/18  Last Fill Quantity: 90 tablet,  # refills: 3   Last office visit: 5/1/2018 with prescribing provider:  Maria Luz   Future Office Visit:   Next 5 appointments (look out 90 days)     May 23, 2018 11:00 AM CDT   Return Visit with FATMATA Cruz CNP   [facility] ([facility])    [address] [place] [id number] [phone] OPT 2                 Requested Prescriptions   Pending Prescriptions Disp Refills     metFORMIN (GLUCOPHAGE-XR) 500 MG 24 hr tablet [Pharmacy Med Name: METFORMIN  MG TABLET] 90 tablet 3     Sig: TAKE 1 TABLET (500 MG) BY MOUTH DAILY (WITH DINNER)    Biguanide Agents Failed    5/3/2018  3:00 AM       Failed - Patient has documented LDL within the past 12 mos.    Recent Labs   Lab Test  03/08/17   0827   LDL  68            Failed - Patient has had a Microalbumin in the past 12 mos.    No lab results found.         Failed - Patient has documented A1c within the specified period of time.    Recent Labs   Lab Test  05/03/18   0847   A1C  5.9*            Passed - Blood pressure less than 140/90 in past 6 months    BP Readings from Last 3 Encounters:   05/01/18 134/70   04/16/18 162/78   02/05/18 150/83                Passed - Patient is age 10 or older       Passed - Patient's CR is NOT>1.4 OR Patient's EGFR is NOT<45 within past 12 mos.    Recent Labs   Lab Test  04/16/18   1100   GFRESTIMATED  55*   GFRESTBLACK  67       Recent Labs   Lab Test  04/16/18   1100   CR  0.99            Passed - Patient does NOT have a diagnosis of CHF.       Passed - Patient is not pregnant       Passed - Patient has not had a positive pregnancy test within the past 12 mos.        Passed - Recent (6 mo) or future (30 days) visit within the authorizing provider's specialty    Patient had office visit in the last 6 months or has a visit in the next 30 days with authorizing provider or within the authorizing  "provider's specialty.  See \"Patient Info\" tab in inbasket, or \"Choose Columns\" in Meds & Orders section of the refill encounter.              "

## 2018-05-04 ENCOUNTER — HOSPITAL ENCOUNTER (OUTPATIENT)
Dept: MAMMOGRAPHY | Facility: CLINIC | Age: 73
Discharge: HOME | End: 2018-05-04
Attending: INTERNAL MEDICINE | Admitting: INTERNAL MEDICINE
Payer: MEDICARE

## 2018-05-04 DIAGNOSIS — Z12.31 VISIT FOR SCREENING MAMMOGRAM: ICD-10-CM

## 2018-05-04 PROCEDURE — 77063 BREAST TOMOSYNTHESIS BI: CPT

## 2018-05-04 NOTE — PROGRESS NOTES
Please notify patient by sending following letter with copy of test results      Timothy Yola,    This is to inform you regarding your test result.    Your total cholesterol is normal.  HDL which is called good cholesterol is normal.  Your LDL cholesterol is normal.  This is often call bad cholesterol and high levels increase the risk for heart attacks and strokes.  Your triglycerides are normal.  TSH which is thyroid hormone is normal.  Glucose which is your blood sugar is slightly elevated.  HbA1c which is average glucose during last 3 months is on upper end of normal  You are prediabetic  Eat low cholesterol low fat  diet and do regular physical activity.  Avoid high sugar containing food.  If you want to see dietician then let me know and I can put referral.  Follow up office visit in 6 months  Lab Results       Component                Value               Date                       A1C                      5.9                 05/03/2018                 A1C                      5.8                 09/18/2015                 A1C                      6.0                 05/19/2015            TSH which is thyroid hormone is normal.  Stay on current dose of levothyroxine.  Recheck TSH in 6 months        Sincerely,      Dr.Nasima Maria Luz MD,FACP

## 2018-05-23 ENCOUNTER — OFFICE VISIT (OUTPATIENT)
Dept: CARDIOLOGY | Facility: CLINIC | Age: 73
End: 2018-05-23
Attending: NURSE PRACTITIONER
Payer: COMMERCIAL

## 2018-05-23 VITALS
HEART RATE: 56 BPM | HEIGHT: 63 IN | DIASTOLIC BLOOD PRESSURE: 70 MMHG | SYSTOLIC BLOOD PRESSURE: 134 MMHG | WEIGHT: 184.8 LBS | BODY MASS INDEX: 32.74 KG/M2

## 2018-05-23 DIAGNOSIS — R07.89 CHEST DISCOMFORT: ICD-10-CM

## 2018-05-23 DIAGNOSIS — I10 BENIGN ESSENTIAL HYPERTENSION: ICD-10-CM

## 2018-05-23 DIAGNOSIS — I10 ESSENTIAL HYPERTENSION: Primary | ICD-10-CM

## 2018-05-23 DIAGNOSIS — I10 ESSENTIAL HYPERTENSION, BENIGN: ICD-10-CM

## 2018-05-23 LAB
ANION GAP SERPL CALCULATED.3IONS-SCNC: 6 MMOL/L (ref 3–14)
BUN SERPL-MCNC: 18 MG/DL (ref 7–30)
CALCIUM SERPL-MCNC: 9.2 MG/DL (ref 8.5–10.1)
CHLORIDE SERPL-SCNC: 104 MMOL/L (ref 94–109)
CO2 SERPL-SCNC: 30 MMOL/L (ref 20–32)
CREAT SERPL-MCNC: 0.64 MG/DL (ref 0.52–1.04)
GFR SERPL CREATININE-BSD FRML MDRD: >90 ML/MIN/1.7M2
GLUCOSE SERPL-MCNC: 125 MG/DL (ref 70–99)
POTASSIUM SERPL-SCNC: 3.4 MMOL/L (ref 3.4–5.3)
SODIUM SERPL-SCNC: 140 MMOL/L (ref 133–144)

## 2018-05-23 PROCEDURE — 80048 BASIC METABOLIC PNL TOTAL CA: CPT | Performed by: NURSE PRACTITIONER

## 2018-05-23 PROCEDURE — 36415 COLL VENOUS BLD VENIPUNCTURE: CPT | Performed by: NURSE PRACTITIONER

## 2018-05-23 PROCEDURE — 99214 OFFICE O/P EST MOD 30 MIN: CPT | Performed by: NURSE PRACTITIONER

## 2018-05-23 RX ORDER — AMLODIPINE BESYLATE 5 MG/1
TABLET ORAL
Qty: 90 TABLET | Refills: 3 | OUTPATIENT
Start: 2018-05-23

## 2018-05-23 NOTE — LETTER
5/23/2018    Zoraida Braga MD  [address]  [facility] [address]    RE: Yola Mayo       Dear Colleague,    I had the pleasure of seeing Yola Mayo in the [facility].    Cardiology Clinic Progress Note  Yola Mayo MRN# [id number]   YOB: 1945 Age: 72 year old     Reason for visit: Hypertension           Assessment and Plan:     1. Chest discomfort and dyspnea on exertion    Describes chest burning with exertion that is relieved with rest as well as improved dyspnea on exertion, but not resolved    Bradycardic and not on AV riky blocking agents    Stress echocardiogram to rule out ischemia and chronotropic incompetence    If study abnormal indicating either chronotropic incompetence or ischemia follow up to further discuss.    If study normal, follow up in 6-12 months    2. Hypertension, goal less than 130/80    Improved control with the increase of HCTZ to 25 mg daily    Continue losartan to 50 mg daily    BMP stable         History of Presenting Illness:    Yola Mayo is a very pleasant 72 year old patient of Dr. Howard who presents today for follow-up hypertension and dyspnea on exertion.  She is a past medical history positive for moderate pulmonary hypertension on echocardiogram that is stable from 10 years prior.  She was previously on a Toprol XL for PVCs and for hypertension which was discontinued due to dyspnea on exertion. Over the last few months Losartan and HCTZ have been added to her antihypertensive treatment with significant improvement in her blood pressure.    Today in clinic she notes that now that her blood pressure is controlled she has endorsed some chest burning after riding her bike.  The symptoms resolved with rest.  She did have one occasion that she had a headache with associated right arm pain that was relieved with aspirin and Tylenol.  Of note, she has resting bradycardia today at 56 bpm and she  "is not on AV riky blocking agents.  Her dyspnea on exertion has improved since the discontinuation of the Toprol XL, but has not completely resolved.          This note was completed in part using Dragon voice recognition software. Although reviewed after completion, some word and grammatical errors may occur       Review of Systems:   Review of Systems:  Skin:  Negative     Eyes:  Positive for glasses  ENT:  Negative    Respiratory:  Positive for dyspnea on exertion (has improved)  Cardiovascular:  palpitations;chest pain;lightheadedness;dizziness;syncope or near-syncope;cyanosis;fatigue;Negative Positive for;fatigue;lower extremity symptoms;edema  Gastroenterology: Positive for excessive gas or bloating  Genitourinary:  Positive for urinary frequency;nocturia  Musculoskeletal:  Negative back pain  Neurologic:  Positive for headaches  Psychiatric:  Negative    Heme/Lymph/Imm:  Positive for allergies  Endocrine:  Positive for thyroid disorder;diabetes              Physical Exam:     Vitals: /70  Pulse 56  Ht 1.6 m (5' 3\")  Wt 83.8 kg (184 lb 12.8 oz)  BMI 32.74 kg/m2  Constitutional:  cooperative, alert and oriented, well developed, well nourished, in no acute distress        Skin:  warm and dry to the touch, no apparent skin lesions or masses noted        Head:  normocephalic, no masses or lesions        Eyes:  pupils equal and round;conjunctivae and lids unremarkable        ENT:  no pallor or cyanosis, dentition good        Neck:  JVP normal        Chest:  clear to auscultation;normal symmetry        Cardiac: regular rhythm occasional premature beats                Extremities and Back:  no edema        Neurological:  no gross motor deficits;affect appropriate               Medications:     Current Outpatient Prescriptions   Medication Sig Dispense Refill     amLODIPine (NORVASC) 5 MG tablet Take 1 tablet (5 mg) by mouth daily 90 tablet 3     aspirin 81 MG tablet Take 81 mg by mouth daily       " calcium-vitamin D (CALTRATE) 600-400 MG-UNIT per tablet Take 1 tablet by mouth daily 60 tablet 11     cholecalciferol (VITAMIN D) 1000 UNIT tablet Take 1 tablet (1,000 Units) by mouth daily 100 tablet 3     Cyanocobalamin (VITAMIN B 12 PO) Take 1 tablet by mouth daily VIT B12       hydrochlorothiazide (HYDRODIURIL) 25 MG tablet Take 1 tablet (25 mg) by mouth daily 90 tablet 3     levothyroxine (SYNTHROID/LEVOTHROID) 112 MCG tablet Take 1 tablet (112 mcg) by mouth daily 90 tablet 3     losartan (COZAAR) 50 MG tablet Take 1 tablet (50 mg) by mouth daily 90 tablet 3     metFORMIN (GLUCOPHAGE-XR) 500 MG 24 hr tablet Take 1 tablet (500 mg) by mouth daily (with dinner) 90 tablet 3     multivitamin, therapeutic with minerals (THERA-VIT-M) TABS Take 1 tablet by mouth daily       order for DME Compression Stockings  Knee high  20/30 compression 2 each 1           Family History   Problem Relation Age of Onset     CANCER Mother      ovarian  of it  at 49yo.     DIABETES Father      Adult onset     Hypertension Father      C.A.D. Father      Fatal Heart attack at 70.     CEREBROVASCULAR DISEASE Father      Thyroid Disease Sister      Thyroid Disease Sister      Thyroid Disease Sister      CANCER Daughter      Thyroid Disease Daughter      Breast Cancer No family hx of      Cancer - colorectal No family hx of        Social History     Social History     Marital status:      Spouse name: FABIAN CUADRA     Number of children: 2     Years of education: N/A     Occupational History     [employer]       [employer]     Social History Main Topics     Smoking status: Never Smoker     Smokeless tobacco: Never Used     Alcohol use 0.0 oz/week     0 Standard drinks or equivalent per week      Comment: Approx 1-2 times monthy     Drug use: No     Sexual activity: Yes     Partners: Male     Other Topics Concern      Service No     Blood Transfusions No     Caffeine Concern No     Occupational Exposure No     Hobby  Hazards No     Sleep Concern No     Stress Concern No     Weight Concern Yes     Special Diet No     Back Care No     Exercise Yes     Approx 3 times a week for about 30 minutes     Bike Helmet Yes     Seat Belt Yes     Self-Exams Yes     Social History Narrative            Past Medical History:     Past Medical History:   Diagnosis Date     BASAL CELL CANCER -NOSE      Family history of diabetes mellitus     Father     Family history of ischemic heart disease     Father     Family history of malignant neoplasm of ovary     Mother     HX DISRUPT L ANT CRUCIATE S/P REPAIR 2003      HYPERTENSION       HYPOTHYROIDISM      LOC PRIM OSTEOARTH-L 1ST METATARSAL-CUNEIFORM JOINT 3/10/2004     Major depressive disorder, single episode, mild (H) 11/24/2014     MILD-MOD TRICUSPID REGURG. MOD PULM HTN 6/23/2005    ECHO  5/05     SPLENIC CYST               Past Surgical History:     Past Surgical History:   Procedure Laterality Date     ARTHROPLASTY KNEE Left 4/13/2016    Procedure: ARTHROPLASTY KNEE;  Surgeon: Giuseppe Lopez MD;  Location: [facility] OR     ARTHROPLASTY MINIMALLY INVASIVE HIP  4/18/2012    Procedure:ARTHROPLASTY MINIMALLY INVASIVE HIP; RIGHT TWO INCISION MINIMALLY INVASIVE HIP ARTHROPLASTY (YUVAL)^ (NO CELL SAVER) (C-ARM); Surgeon:GIUSEPPE LOPEZ; Location: OR     C APPENDECTOMY  8/87    incidental appi at [facility]     C CHEMOSURG MOHS 1ST STAGE  9-99    Mohs-nose     C REPAIR CRUCIATE LIGAMENT,KNEE  8/2003    L   ACL reconstruction     C TOTAL ABDOM HYSTERECTOMY  8/87    W/ BSO fibroids, hx PID     HC COLONOSCOPY THRU STOMA, DIAGNOSTIC  4/03    normal, rec repeat 10 yrs     HYSTERECTOMY, PAP NO LONGER INDICATED       SURGICAL HISTORY OF -   Mult-~1978    infertility, laparotomies              Allergies:   Ace inhibitors; Oxycodone; Tetanus toxoid; and Shellfish-derived products       Data:   All laboratory data reviewed:    Last Basic Metabolic Panel:  Lab Results   Component Value Date     05/23/2018       Lab Results   Component Value Date    POTASSIUM 3.4 05/23/2018     Lab Results   Component Value Date    CHLORIDE 104 05/23/2018     Lab Results   Component Value Date    JOSEPH 9.2 05/23/2018     Lab Results   Component Value Date    CO2 30 05/23/2018     Lab Results   Component Value Date    BUN 18 05/23/2018     Lab Results   Component Value Date    CR 0.64 05/23/2018     Lab Results   Component Value Date     05/23/2018       Thank you for allowing me to participate in the care of your patient.    Sincerely,     FATMATA PEREIRA [facility]

## 2018-05-23 NOTE — PATIENT INSTRUCTIONS
Today's Recommendations    1. Stress echoccardiogram  2. Will call with results, if normal follow up in 6-12 months. If abnormal follow up to discuss  3. Continue all other medications without changes.    Please send a [employer] message or call [phone] with questions or concerns.     Scheduling number [phone].

## 2018-05-23 NOTE — MR AVS SNAPSHOT
After Visit Summary   5/23/2018    Yola Mayo    MRN: [id number]           Patient Information     Date Of Birth          1945        Visit Information        Provider Department      5/23/2018 11:00 AM Krystal Neal APRN CNP [facility]        Today's Diagnoses     Essential hypertension    -  1    Benign essential hypertension        Chest discomfort          Care Instructions    Today's Recommendations    1. Stress echoccardiogram  2. Will call with results, if normal follow up in 6-12 months. If abnormal follow up to discuss  3. Continue all other medications without changes.    Please send a [employer] message or call [phone] with questions or concerns.     Scheduling number [phone].            Follow-ups after your visit        Future tests that were ordered for you today     Open Future Orders        Priority Expected Expires Ordered    Exercise Stress Echocardiogram Routine 5/30/2018 5/23/2019 5/23/2018            Who to contact     If you have questions or need follow up information about today's clinic visit or your schedule please contact [facility] directly at [phone].  Normal or non-critical lab and imaging results will be communicated to you by [employer], letter or phone within 4 business days after the clinic has received the results. If you do not hear from us within 7 days, please contact the clinic through [employer] or phone. If you have a critical or abnormal lab result, we will notify you by phone as soon as possible.  Submit refill requests through [employer] or call your pharmacy and they will forward the refill request to us. Please allow 3 business days for your refill to be completed.          Additional Information About Your Visit        Care EveryWhere ID     This is your Care EveryWhere ID. This could be used by other organizations to access your [facility]  "records  [id number]        Your Vitals Were     Pulse Height BMI (Body Mass Index)             56 1.6 m (5' 3\") 32.74 kg/m2          Blood Pressure from Last 3 Encounters:   05/23/18 134/70   05/01/18 134/70   04/16/18 162/78    Weight from Last 3 Encounters:   05/23/18 83.8 kg (184 lb 12.8 oz)   05/01/18 84.4 kg (186 lb)   04/16/18 85.6 kg (188 lb 11.2 oz)              We Performed the Following     Follow-Up with Cardiac Advanced Practice Provider        Primary Care Provider Office Phone # Fax #    Zoraida Braga [phone] [address] [address]        Equal Access to Services     MIKALA RENAE : Hadii aad ku hadashtd Lawler, waaxda luqadaha, qaybta kaalmada maryyada, elsa oconnell . So [facility] [phone].    ATENCIÓN: Si habla español, tiene a sarah disposición servicios gratuitos de asistencia lingüística. Llame al [phone].    We comply with applicable federal civil rights laws and [place] laws. We do not discriminate on the basis of race, color, national origin, age, disability, sex, sexual orientation, or gender identity.            Thank you!     Thank you for choosing [facility]  for your care. Our goal is always to provide you with excellent care. Hearing back from our patients is one way we can continue to improve our services. Please take a few minutes to complete the written survey that you may receive in the mail after your visit with us. Thank you!             Your Updated Medication List - Protect others around you: Learn how to safely use, store and throw away your medicines at www.disposemymeds.org.          This list is accurate as of 5/23/18 11:10 AM.  Always use your most recent med list.                   Brand Name Dispense Instructions for use Diagnosis    amLODIPine 5 MG tablet    NORVASC    90 tablet    Take 1 tablet (5 mg) by mouth daily    Essential " hypertension, benign       aspirin 81 MG tablet      Take 81 mg by mouth daily        calcium-vitamin D 600-400 MG-UNIT per tablet    CALTRATE    60 tablet    Take 1 tablet by mouth daily    Preventive measure       cholecalciferol 1000 UNIT tablet    vitamin D3    100 tablet    Take 1 tablet (1,000 Units) by mouth daily    Vitamin deficiency       hydrochlorothiazide 25 MG tablet    HYDRODIURIL    90 tablet    Take 1 tablet (25 mg) by mouth daily    Essential hypertension, benign       levothyroxine 112 MCG tablet    SYNTHROID/LEVOTHROID    90 tablet    Take 1 tablet (112 mcg) by mouth daily    Hypothyroidism, unspecified type       losartan 50 MG tablet    COZAAR    90 tablet    Take 1 tablet (50 mg) by mouth daily    Benign essential hypertension, GALINDO (dyspnea on exertion)       metFORMIN 500 MG 24 hr tablet    GLUCOPHAGE-XR    90 tablet    Take 1 tablet (500 mg) by mouth daily (with dinner)    Prediabetes       multivitamin, therapeutic with minerals Tabs tablet      Take 1 tablet by mouth daily        order for DME     2 each    Compression Stockings Knee high 20/30 compression    Edema of lower extremity       VITAMIN B 12 PO      Take 1 tablet by mouth daily VIT B12

## 2018-05-23 NOTE — PROGRESS NOTES
Cardiology Clinic Progress Note  Yola Mayo MRN# [id number]   YOB: 1945 Age: 72 year old     Reason for visit: Hypertension           Assessment and Plan:     1. Chest discomfort and dyspnea on exertion    Describes chest burning with exertion that is relieved with rest as well as improved dyspnea on exertion, but not resolved    Bradycardic and not on AV riky blocking agents    Stress echocardiogram to rule out ischemia and chronotropic incompetence    If study abnormal indicating either chronotropic incompetence or ischemia follow up to further discuss.    If study normal, follow up in 6-12 months    2. Hypertension, goal less than 130/80    Improved control with the increase of HCTZ to 25 mg daily    Continue losartan to 50 mg daily    BMP stable         History of Presenting Illness:    Yola Mayo is a very pleasant 72 year old patient of Dr. Howard who presents today for follow-up hypertension and dyspnea on exertion.  She is a past medical history positive for moderate pulmonary hypertension on echocardiogram that is stable from 10 years prior.  She was previously on a Toprol XL for PVCs and for hypertension which was discontinued due to dyspnea on exertion. Over the last few months Losartan and HCTZ have been added to her antihypertensive treatment with significant improvement in her blood pressure.    Today in clinic she notes that now that her blood pressure is controlled she has endorsed some chest burning after riding her bike.  The symptoms resolved with rest.  She did have one occasion that she had a headache with associated right arm pain that was relieved with aspirin and Tylenol.  Of note, she has resting bradycardia today at 56 bpm and she is not on AV riky blocking agents.  Her dyspnea on exertion has improved since the discontinuation of the Toprol XL, but has not completely resolved.          This note was completed in part using Dragon voice recognition software.  "Although reviewed after completion, some word and grammatical errors may occur       Review of Systems:   Review of Systems:  Skin:  Negative     Eyes:  Positive for glasses  ENT:  Negative    Respiratory:  Positive for dyspnea on exertion (has improved)  Cardiovascular:  palpitations;chest pain;lightheadedness;dizziness;syncope or near-syncope;cyanosis;fatigue;Negative Positive for;fatigue;lower extremity symptoms;edema  Gastroenterology: Positive for excessive gas or bloating  Genitourinary:  Positive for urinary frequency;nocturia  Musculoskeletal:  Negative back pain  Neurologic:  Positive for headaches  Psychiatric:  Negative    Heme/Lymph/Imm:  Positive for allergies  Endocrine:  Positive for thyroid disorder;diabetes              Physical Exam:     Vitals: /70  Pulse 56  Ht 1.6 m (5' 3\")  Wt 83.8 kg (184 lb 12.8 oz)  BMI 32.74 kg/m2  Constitutional:  cooperative, alert and oriented, well developed, well nourished, in no acute distress        Skin:  warm and dry to the touch, no apparent skin lesions or masses noted        Head:  normocephalic, no masses or lesions        Eyes:  pupils equal and round;conjunctivae and lids unremarkable        ENT:  no pallor or cyanosis, dentition good        Neck:  JVP normal        Chest:  clear to auscultation;normal symmetry        Cardiac: regular rhythm occasional premature beats                Extremities and Back:  no edema        Neurological:  no gross motor deficits;affect appropriate               Medications:     Current Outpatient Prescriptions   Medication Sig Dispense Refill     amLODIPine (NORVASC) 5 MG tablet Take 1 tablet (5 mg) by mouth daily 90 tablet 3     aspirin 81 MG tablet Take 81 mg by mouth daily       calcium-vitamin D (CALTRATE) 600-400 MG-UNIT per tablet Take 1 tablet by mouth daily 60 tablet 11     cholecalciferol (VITAMIN D) 1000 UNIT tablet Take 1 tablet (1,000 Units) by mouth daily 100 tablet 3     Cyanocobalamin (VITAMIN B 12 PO) " Take 1 tablet by mouth daily VIT B12       hydrochlorothiazide (HYDRODIURIL) 25 MG tablet Take 1 tablet (25 mg) by mouth daily 90 tablet 3     levothyroxine (SYNTHROID/LEVOTHROID) 112 MCG tablet Take 1 tablet (112 mcg) by mouth daily 90 tablet 3     losartan (COZAAR) 50 MG tablet Take 1 tablet (50 mg) by mouth daily 90 tablet 3     metFORMIN (GLUCOPHAGE-XR) 500 MG 24 hr tablet Take 1 tablet (500 mg) by mouth daily (with dinner) 90 tablet 3     multivitamin, therapeutic with minerals (THERA-VIT-M) TABS Take 1 tablet by mouth daily       order for DME Compression Stockings  Knee high  20/30 compression 2 each 1           Family History   Problem Relation Age of Onset     CANCER Mother      ovarian  of it  at 51yo.     DIABETES Father      Adult onset     Hypertension Father      C.A.D. Father      Fatal Heart attack at 70.     CEREBROVASCULAR DISEASE Father      Thyroid Disease Sister      Thyroid Disease Sister      Thyroid Disease Sister      CANCER Daughter      Thyroid Disease Daughter      Breast Cancer No family hx of      Cancer - colorectal No family hx of        Social History     Social History     Marital status:      Spouse name: FABIAN CUADRA     Number of children: 2     Years of education: N/A     Occupational History     [employer]       [employer]     Social History Main Topics     Smoking status: Never Smoker     Smokeless tobacco: Never Used     Alcohol use 0.0 oz/week     0 Standard drinks or equivalent per week      Comment: Approx 1-2 times monthy     Drug use: No     Sexual activity: Yes     Partners: Male     Other Topics Concern      Service No     Blood Transfusions No     Caffeine Concern No     Occupational Exposure No     Hobby Hazards No     Sleep Concern No     Stress Concern No     Weight Concern Yes     Special Diet No     Back Care No     Exercise Yes     Approx 3 times a week for about 30 minutes     Bike Helmet Yes     Seat Belt Yes     Self-Exams Yes      Social History Narrative            Past Medical History:     Past Medical History:   Diagnosis Date     BASAL CELL CANCER -NOSE      Family history of diabetes mellitus     Father     Family history of ischemic heart disease     Father     Family history of malignant neoplasm of ovary     Mother     HX DISRUPT L ANT CRUCIATE S/P REPAIR 2003      HYPERTENSION       HYPOTHYROIDISM      LOC PRIM OSTEOARTH-L 1ST METATARSAL-CUNEIFORM JOINT 3/10/2004     Major depressive disorder, single episode, mild (H) 11/24/2014     MILD-MOD TRICUSPID REGURG. MOD PULM HTN 6/23/2005    ECHO  5/05     SPLENIC CYST               Past Surgical History:     Past Surgical History:   Procedure Laterality Date     ARTHROPLASTY KNEE Left 4/13/2016    Procedure: ARTHROPLASTY KNEE;  Surgeon: Giuseppe Lopez MD;  Location: [facility] OR     ARTHROPLASTY MINIMALLY INVASIVE HIP  4/18/2012    Procedure:ARTHROPLASTY MINIMALLY INVASIVE HIP; RIGHT TWO INCISION MINIMALLY INVASIVE HIP ARTHROPLASTY (YUVAL)^ (NO CELL SAVER) (C-ARM); Surgeon:GIUSEPPE LOPEZ; Location: OR     C APPENDECTOMY  8/87    incidental appi at [facility]     C CHEMOSURG MOHS 1ST STAGE  9-99    Mohs-nose     C REPAIR CRUCIATE LIGAMENT,KNEE  8/2003    L   ACL reconstruction     C TOTAL ABDOM HYSTERECTOMY  8/87    W/ BSO fibroids, hx PID     HC COLONOSCOPY THRU STOMA, DIAGNOSTIC  4/03    normal, rec repeat 10 yrs     HYSTERECTOMY, PAP NO LONGER INDICATED       SURGICAL HISTORY OF -   Mult-~1978    infertility, laparotomies              Allergies:   Ace inhibitors; Oxycodone; Tetanus toxoid; and Shellfish-derived products       Data:   All laboratory data reviewed:    Last Basic Metabolic Panel:  Lab Results   Component Value Date     05/23/2018      Lab Results   Component Value Date    POTASSIUM 3.4 05/23/2018     Lab Results   Component Value Date    CHLORIDE 104 05/23/2018     Lab Results   Component Value Date    JOSEPH 9.2 05/23/2018     Lab Results   Component Value Date     CO2 30 05/23/2018     Lab Results   Component Value Date    BUN 18 05/23/2018     Lab Results   Component Value Date    CR 0.64 05/23/2018     Lab Results   Component Value Date     05/23/2018         FATMATA PEREIRA, CNP

## 2018-05-23 NOTE — TELEPHONE ENCOUNTER
"Last Written Prescription Date:  5/1/2018-PROFILE ONLY  Last Fill Quantity: 90,  # refills: 3   Last office visit: 5/1/2018 with prescribing provider:  5/1/2018   Future Office Visit:   Next 5 appointments (look out 90 days)     May 23, 2018 11:00 AM CDT   Return Visit with FATMATA Cruz CNP   [facility]    [address] [place] [id number] [phone] OPT 2                   Requested Prescriptions   Pending Prescriptions Disp Refills     amLODIPine (NORVASC) 5 MG tablet [Pharmacy Med Name: AMLODIPINE BESYLATE 5 MG TAB] 90 tablet 3     Sig: TAKE 1 TABLET (5 MG) BY MOUTH DAILY    Calcium Channel Blockers Protocol  Passed    5/23/2018  1:19 AM       Passed - Blood pressure under 140/90 in past 12 months    BP Readings from Last 3 Encounters:   05/01/18 134/70   04/16/18 162/78   02/05/18 150/83                Passed - Recent (12 mo) or future (30 days) visit within the authorizing provider's specialty    Patient had office visit in the last 12 months or has a visit in the next 30 days with authorizing provider or within the authorizing provider's specialty.  See \"Patient Info\" tab in inbasket, or \"Choose Columns\" in Meds & Orders section of the refill encounter.           Passed - Patient is age 18 or older       Passed - No active pregnancy on record       Passed - Normal serum creatinine on file in past 12 months    Recent Labs   Lab Test  04/16/18   1100   CR  0.99            Passed - No positive pregnancy test in past 12 months          "

## 2018-06-05 ENCOUNTER — HOSPITAL ENCOUNTER (OUTPATIENT)
Dept: CARDIOLOGY | Facility: CLINIC | Age: 73
Discharge: HOME | End: 2018-06-05
Attending: NURSE PRACTITIONER | Admitting: NURSE PRACTITIONER
Payer: MEDICARE

## 2018-06-05 DIAGNOSIS — R07.89 CHEST DISCOMFORT: ICD-10-CM

## 2018-06-05 DIAGNOSIS — I10 BENIGN ESSENTIAL HYPERTENSION: ICD-10-CM

## 2018-06-05 PROCEDURE — 93016 CV STRESS TEST SUPVJ ONLY: CPT | Performed by: INTERNAL MEDICINE

## 2018-06-05 PROCEDURE — 93325 DOPPLER ECHO COLOR FLOW MAPG: CPT | Mod: 26 | Performed by: INTERNAL MEDICINE

## 2018-06-05 PROCEDURE — 40000264 ECHO STRESS WITH OPTISON

## 2018-06-05 PROCEDURE — 93321 DOPPLER ECHO F-UP/LMTD STD: CPT | Mod: 26 | Performed by: INTERNAL MEDICINE

## 2018-06-05 PROCEDURE — 93350 STRESS TTE ONLY: CPT | Mod: 26 | Performed by: INTERNAL MEDICINE

## 2018-06-05 PROCEDURE — 93018 CV STRESS TEST I&R ONLY: CPT | Performed by: INTERNAL MEDICINE

## 2018-06-05 PROCEDURE — 25500064 ZZH RX 255 OP 636: Performed by: NURSE PRACTITIONER

## 2018-06-05 RX ADMIN — HUMAN ALBUMIN MICROSPHERES AND PERFLUTREN 9 ML: 10; .22 INJECTION, SOLUTION INTRAVENOUS at 09:17

## 2018-06-06 ENCOUNTER — TELEPHONE (OUTPATIENT)
Dept: CARDIOLOGY | Facility: CLINIC | Age: 73
End: 2018-06-06

## 2018-06-06 DIAGNOSIS — R07.9 CHEST PAIN: Primary | ICD-10-CM

## 2018-06-06 NOTE — TELEPHONE ENCOUNTER
RN called patient and left VM advising patient that based on her stress ECHO results we would like her to come back to clinic to discuss proceeding with a coronary angiogram. RN placed order for MAYELA f/u and provided direct phone number for patient to call back to discuss plan.

## 2018-06-13 ENCOUNTER — OFFICE VISIT (OUTPATIENT)
Dept: CARDIOLOGY | Facility: CLINIC | Age: 73
End: 2018-06-13
Attending: NURSE PRACTITIONER
Payer: COMMERCIAL

## 2018-06-13 VITALS
WEIGHT: 184 LBS | SYSTOLIC BLOOD PRESSURE: 160 MMHG | DIASTOLIC BLOOD PRESSURE: 78 MMHG | BODY MASS INDEX: 32.6 KG/M2 | HEIGHT: 63 IN | HEART RATE: 60 BPM

## 2018-06-13 DIAGNOSIS — R07.89 OTHER CHEST PAIN: ICD-10-CM

## 2018-06-13 DIAGNOSIS — I27.0 PRIMARY PULMONARY HYPERTENSION (H): Primary | ICD-10-CM

## 2018-06-13 DIAGNOSIS — I10 ESSENTIAL HYPERTENSION: ICD-10-CM

## 2018-06-13 DIAGNOSIS — R94.39 ABNORMAL CARDIOVASCULAR STRESS TEST: ICD-10-CM

## 2018-06-13 DIAGNOSIS — E78.5 HYPERLIPIDEMIA LDL GOAL <130: ICD-10-CM

## 2018-06-13 DIAGNOSIS — I07.9 DISEASE OF TRICUSPID VALVE: ICD-10-CM

## 2018-06-13 PROCEDURE — 99215 OFFICE O/P EST HI 40 MIN: CPT | Performed by: NURSE PRACTITIONER

## 2018-06-13 RX ORDER — NITROGLYCERIN 0.4 MG/1
TABLET SUBLINGUAL
Qty: 25 TABLET | Refills: 3 | Status: SHIPPED | OUTPATIENT
Start: 2018-06-13

## 2018-06-13 NOTE — PROGRESS NOTES
Cardiology Clinic Progress Note  Yola Mayo MRN# [id number]   YOB: 1945 Age: 73 year old     Reason for visit: Abnormal stress test with chest discomfort           Assessment and Plan:     1. Chest discomfort and dyspnea on exertion with abnormal stress echocardiogram    Stress echocardiogram showed distal septum, anterior, lateral wall and apex were hypokinetic at rest with mildly hypokinetic LV that does not become hyperdynamic that can be seen in hypertensive heart disease. No evidcence of stress induced ischemia on EKG.     She recent 86% of maximum predicted HR    Chest burning in recovery    Discussed with Dr. Howard and a coronary angiogram is recommended    Nitroglycerin as needed     Follow up 7-10 days post angiogram to review with BMP    2. Hyperlipidemia    Reevaluate statin therapy depending on coronary angiogram results.    3. Hypertension, goal less than 130/80    Blood pressure is elevated today, but patient is very concerned about abnormal stress test and upcoming angiogram.    Continue HCTZ 25 mg daily, amlodipine 5 mg daily and losartan 50 mg daily    Did not tolerate beta-blockers due to dyspnea on exertion.    Will discuss at follow up         History of Presenting Illness:    Yola Mayo is a very pleasant 73 year old patient of Dr. Howard who presents today for follow-up to review stress echocardiogram.  Her cardiac risk factors include hypertension, hyperlipidemia and diabetes.      Due to chest burning with exercise and shortness of breath on exertion she underwent a stress echocardiogram for assessment of both ischemia and chronotropic incompetence. The stress echocardiogram showed distal septum, anterior, lateral wall and apex were hypokinetic at rest with mildly hypokinetic LV that does not become hyperdynamic that can be seen in hypertensive heart disease. No evidcence of stress induced ischemia on EKG.  She recent 86% of maximum predicted HR.  She had chest burning  "during recovery.  Discussed the findings of the stress echocardiogram with Dr. Howard and was recommended that she proceed with invasive coronary angiography.    All risks and benefits for this procedure have been explained to this patient and accepted.  This includes but is not limited to death, heart attack, stroke, blood clots, bleeding including the need for blood transfusion and the risk thereof, allergic reaction to x-ray dye, arrhythmia necessitating cardioversion, dye nephropathy.  We talked about intracoronary stenting and the risks thereof and bypass surgery.      No history of bleeding problems or current bleeding, and no scheduled surgeries or procedures in the next year. Patient understands and wishes to proceed with it.    > 50% of this 30 minute visit was spent in care counseling and coordination.           This note was completed in part using Dragon voice recognition software. Although reviewed after completion, some word and grammatical errors may occur       Review of Systems:   Review of Systems:  Skin:  Negative     Eyes:  Positive for glasses  ENT:  Negative    Respiratory:  Positive for dyspnea on exertion  Cardiovascular:    Positive for (burning feeling in chest during and  after activity)  Gastroenterology: Negative    Genitourinary:  not assessed    Musculoskeletal:  Negative    Neurologic:  Negative    Psychiatric:  Negative    Heme/Lymph/Imm:  Positive for allergies  Endocrine:  Positive for thyroid disorder;diabetes              Physical Exam:     Vitals: /78  Pulse 60  Ht 1.6 m (5' 3\")  Wt 83.5 kg (184 lb)  BMI 32.59 kg/m2  Constitutional:  cooperative, alert and oriented, well developed, well nourished, in no acute distress        Skin:  warm and dry to the touch, no apparent skin lesions or masses noted        Head:  normocephalic, no masses or lesions        Eyes:  pupils equal and round;conjunctivae and lids unremarkable        ENT:  no pallor or cyanosis, dentition good    "     Neck:  JVP normal        Chest:  clear to auscultation;normal symmetry        Cardiac: regular rhythm occasional premature beats                Extremities and Back:  no edema        Neurological:  no gross motor deficits;affect appropriate               Medications:     Current Outpatient Prescriptions   Medication Sig Dispense Refill     amLODIPine (NORVASC) 5 MG tablet Take 1 tablet (5 mg) by mouth daily 90 tablet 3     aspirin 81 MG tablet Take 81 mg by mouth daily       calcium-vitamin D (CALTRATE) 600-400 MG-UNIT per tablet Take 1 tablet by mouth daily 60 tablet 11     cholecalciferol (VITAMIN D) 1000 UNIT tablet Take 1 tablet (1,000 Units) by mouth daily 100 tablet 3     Cyanocobalamin (VITAMIN B 12 PO) Take 1 tablet by mouth daily VIT B12       hydrochlorothiazide (HYDRODIURIL) 25 MG tablet Take 1 tablet (25 mg) by mouth daily 90 tablet 3     levothyroxine (SYNTHROID/LEVOTHROID) 112 MCG tablet Take 1 tablet (112 mcg) by mouth daily 90 tablet 3     losartan (COZAAR) 50 MG tablet Take 1 tablet (50 mg) by mouth daily 90 tablet 3     metFORMIN (GLUCOPHAGE-XR) 500 MG 24 hr tablet Take 1 tablet (500 mg) by mouth daily (with dinner) 90 tablet 3     multivitamin, therapeutic with minerals (THERA-VIT-M) TABS Take 1 tablet by mouth daily       nitroGLYcerin (NITROSTAT) 0.4 MG sublingual tablet For chest pain place 1 tablet under the tongue every 5 minutes for 3 doses. If symptoms persist 5 minutes after 1st dose call 911. 25 tablet 3     order for DME Compression Stockings  Knee high  20/30 compression 2 each 1           Family History   Problem Relation Age of Onset     CANCER Mother      ovarian  of it  at 51yo.     DIABETES Father      Adult onset     Hypertension Father      C.A.D. Father      Fatal Heart attack at 70.     CEREBROVASCULAR DISEASE Father      Thyroid Disease Sister      Thyroid Disease Sister      Thyroid Disease Sister      CANCER Daughter      Thyroid Disease Daughter      Breast Cancer  No family hx of      Cancer - colorectal No family hx of        Social History     Social History     Marital status:      Spouse name: FABIAN CUADRA     Number of children: 2     Years of education: N/A     Occupational History     [employer]       [employer]     Social History Main Topics     Smoking status: Never Smoker     Smokeless tobacco: Never Used     Alcohol use 0.0 oz/week     0 Standard drinks or equivalent per week      Comment: Approx 1-2 times monthy     Drug use: No     Sexual activity: Yes     Partners: Male     Other Topics Concern      Service No     Blood Transfusions No     Caffeine Concern No     Occupational Exposure No     Hobby Hazards No     Sleep Concern No     Stress Concern No     Weight Concern Yes     Special Diet No     Back Care No     Exercise Yes     Approx 3 times a week for about 30 minutes     Bike Helmet Yes     Seat Belt Yes     Self-Exams Yes     Social History Narrative            Past Medical History:     Past Medical History:   Diagnosis Date     BASAL CELL CANCER -NOSE      Family history of diabetes mellitus     Father     Family history of ischemic heart disease     Father     Family history of malignant neoplasm of ovary     Mother     HX DISRUPT L ANT CRUCIATE S/P REPAIR 2003      HYPERTENSION       HYPOTHYROIDISM      LOC PRIM OSTEOARTH-L 1ST METATARSAL-CUNEIFORM JOINT 3/10/2004     Major depressive disorder, single episode, mild (H) 11/24/2014     MILD-MOD TRICUSPID REGURG. MOD PULM HTN 6/23/2005    ECHO  5/05     SPLENIC CYST               Past Surgical History:     Past Surgical History:   Procedure Laterality Date     ARTHROPLASTY KNEE Left 4/13/2016    Procedure: ARTHROPLASTY KNEE;  Surgeon: Giuseppe Mcgrath MD;  Location: [facility] OR     ARTHROPLASTY MINIMALLY INVASIVE HIP  4/18/2012    Procedure:ARTHROPLASTY MINIMALLY INVASIVE HIP; RIGHT TWO INCISION MINIMALLY INVASIVE HIP ARTHROPLASTY (YUVAL)^ (NO CELL SAVER) (C-ARM); Surgeon:JOHN  LITO DELEON; Location:[facility] OR     C APPENDECTOMY  8/87    incidental appi at hys     C CHEMOSURG MOHS 1ST STAGE  9-99    Mohs-nose     C REPAIR CRUCIATE LIGAMENT,KNEE  8/2003    L   ACL reconstruction     C TOTAL ABDOM HYSTERECTOMY  8/87    W/ BSO fibroids, hx PID     HC COLONOSCOPY THRU STOMA, DIAGNOSTIC  4/03    normal, rec repeat 10 yrs     HYSTERECTOMY, PAP NO LONGER INDICATED       SURGICAL HISTORY OF -   Mult-~1978    infertility, laparotomies              Allergies:   Ace inhibitors; Oxycodone; Tetanus toxoid; and Shellfish-derived products       Data:   All laboratory data reviewed:    Last Basic Metabolic Panel:  Lab Results   Component Value Date     05/23/2018      Lab Results   Component Value Date    POTASSIUM 3.4 05/23/2018     Lab Results   Component Value Date    CHLORIDE 104 05/23/2018     Lab Results   Component Value Date    JOSEPH 9.2 05/23/2018     Lab Results   Component Value Date    CO2 30 05/23/2018     Lab Results   Component Value Date    BUN 18 05/23/2018     Lab Results   Component Value Date    CR 0.64 05/23/2018     Lab Results   Component Value Date     05/23/2018         SUE CARBONE, FATMATA, CNP

## 2018-06-13 NOTE — LETTER
6/13/2018    Zoraida Braga MD  [address]  [facility] [address]    RE: Yola Mayo       Dear Colleague,    I had the pleasure of seeing Yola Mayo in the [facility].    Cardiology Clinic Progress Note  Yola Mayo MRN# [id number]   YOB: 1945 Age: 73 year old     Reason for visit: Abnormal stress test with chest discomfort           Assessment and Plan:     1. Chest discomfort and dyspnea on exertion with abnormal stress echocardiogram    Stress echocardiogram showed distal septum, anterior, lateral wall and apex were hypokinetic at rest with mildly hypokinetic LV that does not become hyperdynamic that can be seen in hypertensive heart disease. No evidcence of stress induced ischemia on EKG.     She recent 86% of maximum predicted HR    Chest burning in recovery    Discussed with Dr. Howard and a coronary angiogram is recommended    Nitroglycerin as needed     Follow up 7-10 days post angiogram to review with BMP    2. Hyperlipidemia    Reevaluate statin therapy depending on coronary angiogram results.    3. Hypertension, goal less than 130/80    Blood pressure is elevated today, but patient is very concerned about abnormal stress test and upcoming angiogram.    Continue HCTZ 25 mg daily, amlodipine 5 mg daily and losartan 50 mg daily    Did not tolerate beta-blockers due to dyspnea on exertion.    Will discuss at follow up         History of Presenting Illness:    Yola Mayo is a very pleasant 73 year old patient of Dr. Howard who presents today for follow-up to review stress echocardiogram.  Her cardiac risk factors include hypertension, hyperlipidemia and diabetes.      Due to chest burning with exercise and shortness of breath on exertion she underwent a stress echocardiogram for assessment of both ischemia and chronotropic incompetence. The stress echocardiogram showed distal septum, anterior, lateral wall and apex were  "hypokinetic at rest with mildly hypokinetic LV that does not become hyperdynamic that can be seen in hypertensive heart disease. No evidcence of stress induced ischemia on EKG.  She recent 86% of maximum predicted HR.  She had chest burning during recovery.  Discussed the findings of the stress echocardiogram with Dr. Howard and was recommended that she proceed with invasive coronary angiography.    All risks and benefits for this procedure have been explained to this patient and accepted.  This includes but is not limited to death, heart attack, stroke, blood clots, bleeding including the need for blood transfusion and the risk thereof, allergic reaction to x-ray dye, arrhythmia necessitating cardioversion, dye nephropathy.  We talked about intracoronary stenting and the risks thereof and bypass surgery.      No history of bleeding problems or current bleeding, and no scheduled surgeries or procedures in the next year. Patient understands and wishes to proceed with it.    > 50% of this 30 minute visit was spent in care counseling and coordination.           This note was completed in part using Dragon voice recognition software. Although reviewed after completion, some word and grammatical errors may occur       Review of Systems:   Review of Systems:  Skin:  Negative     Eyes:  Positive for glasses  ENT:  Negative    Respiratory:  Positive for dyspnea on exertion  Cardiovascular:    Positive for (burning feeling in chest during and  after activity)  Gastroenterology: Negative    Genitourinary:  not assessed    Musculoskeletal:  Negative    Neurologic:  Negative    Psychiatric:  Negative    Heme/Lymph/Imm:  Positive for allergies  Endocrine:  Positive for thyroid disorder;diabetes              Physical Exam:     Vitals: /78  Pulse 60  Ht 1.6 m (5' 3\")  Wt 83.5 kg (184 lb)  BMI 32.59 kg/m2  Constitutional:  cooperative, alert and oriented, well developed, well nourished, in no acute distress        Skin:  " warm and dry to the touch, no apparent skin lesions or masses noted        Head:  normocephalic, no masses or lesions        Eyes:  pupils equal and round;conjunctivae and lids unremarkable        ENT:  no pallor or cyanosis, dentition good        Neck:  JVP normal        Chest:  clear to auscultation;normal symmetry        Cardiac: regular rhythm occasional premature beats                Extremities and Back:  no edema        Neurological:  no gross motor deficits;affect appropriate               Medications:     Current Outpatient Prescriptions   Medication Sig Dispense Refill     amLODIPine (NORVASC) 5 MG tablet Take 1 tablet (5 mg) by mouth daily 90 tablet 3     aspirin 81 MG tablet Take 81 mg by mouth daily       calcium-vitamin D (CALTRATE) 600-400 MG-UNIT per tablet Take 1 tablet by mouth daily 60 tablet 11     cholecalciferol (VITAMIN D) 1000 UNIT tablet Take 1 tablet (1,000 Units) by mouth daily 100 tablet 3     Cyanocobalamin (VITAMIN B 12 PO) Take 1 tablet by mouth daily VIT B12       hydrochlorothiazide (HYDRODIURIL) 25 MG tablet Take 1 tablet (25 mg) by mouth daily 90 tablet 3     levothyroxine (SYNTHROID/LEVOTHROID) 112 MCG tablet Take 1 tablet (112 mcg) by mouth daily 90 tablet 3     losartan (COZAAR) 50 MG tablet Take 1 tablet (50 mg) by mouth daily 90 tablet 3     metFORMIN (GLUCOPHAGE-XR) 500 MG 24 hr tablet Take 1 tablet (500 mg) by mouth daily (with dinner) 90 tablet 3     multivitamin, therapeutic with minerals (THERA-VIT-M) TABS Take 1 tablet by mouth daily       nitroGLYcerin (NITROSTAT) 0.4 MG sublingual tablet For chest pain place 1 tablet under the tongue every 5 minutes for 3 doses. If symptoms persist 5 minutes after 1st dose call 911. 25 tablet 3     order for DME Compression Stockings  Knee high  20/30 compression 2 each 1           Family History   Problem Relation Age of Onset     CANCER Mother      ovarian  of it  at 51yo.     DIABETES Father      Adult onset     Hypertension  Father      C.A.D. Father      Fatal Heart attack at 70.     CEREBROVASCULAR DISEASE Father      Thyroid Disease Sister      Thyroid Disease Sister      Thyroid Disease Sister      CANCER Daughter      Thyroid Disease Daughter      Breast Cancer No family hx of      Cancer - colorectal No family hx of        Social History     Social History     Marital status:      Spouse name: FABIAN CUADRA     Number of children: 2     Years of education: N/A     Occupational History     [employer]       [employer]     Social History Main Topics     Smoking status: Never Smoker     Smokeless tobacco: Never Used     Alcohol use 0.0 oz/week     0 Standard drinks or equivalent per week      Comment: Approx 1-2 times monthy     Drug use: No     Sexual activity: Yes     Partners: Male     Other Topics Concern      Service No     Blood Transfusions No     Caffeine Concern No     Occupational Exposure No     Hobby Hazards No     Sleep Concern No     Stress Concern No     Weight Concern Yes     Special Diet No     Back Care No     Exercise Yes     Approx 3 times a week for about 30 minutes     Bike Helmet Yes     Seat Belt Yes     Self-Exams Yes     Social History Narrative            Past Medical History:     Past Medical History:   Diagnosis Date     BASAL CELL CANCER -NOSE      Family history of diabetes mellitus     Father     Family history of ischemic heart disease     Father     Family history of malignant neoplasm of ovary     Mother     HX DISRUPT L ANT CRUCIATE S/P REPAIR 2003      HYPERTENSION       HYPOTHYROIDISM      LOC PRIM OSTEOARTH-L 1ST METATARSAL-CUNEIFORM JOINT 3/10/2004     Major depressive disorder, single episode, mild (H) 11/24/2014     MILD-MOD TRICUSPID REGURG. MOD PULM HTN 6/23/2005    ECHO  5/05     SPLENIC CYST             Past Surgical History:     Past Surgical History:   Procedure Laterality Date     ARTHROPLASTY KNEE Left 4/13/2016    Procedure: ARTHROPLASTY KNEE;  Surgeon: Alcides  Giuseppe DELEON MD;  Location: [facility] OR     ARTHROPLASTY MINIMALLY INVASIVE HIP  4/18/2012    Procedure:ARTHROPLASTY MINIMALLY INVASIVE HIP; RIGHT TWO INCISION MINIMALLY INVASIVE HIP ARTHROPLASTY (YUVAL)^ (NO CELL SAVER) (C-ARM); Surgeon:GIUSEPPE LOPEZ; Location:[facility] OR     C APPENDECTOMY  8/87    incidental appi at hys     C CHEMOSURG MOHS 1ST STAGE  9-99    Mohs-nose     C REPAIR CRUCIATE LIGAMENT,KNEE  8/2003    L   ACL reconstruction     C TOTAL ABDOM HYSTERECTOMY  8/87    W/ BSO fibroids, hx PID     HC COLONOSCOPY THRU STOMA, DIAGNOSTIC  4/03    normal, rec repeat 10 yrs     HYSTERECTOMY, PAP NO LONGER INDICATED       SURGICAL HISTORY OF -   Mult-~1978    infertility, laparotomies            Allergies:   Ace inhibitors; Oxycodone; Tetanus toxoid; and Shellfish-derived products       Data:   All laboratory data reviewed:    Last Basic Metabolic Panel:  Lab Results   Component Value Date     05/23/2018      Lab Results   Component Value Date    POTASSIUM 3.4 05/23/2018     Lab Results   Component Value Date    CHLORIDE 104 05/23/2018     Lab Results   Component Value Date    JOSEPH 9.2 05/23/2018     Lab Results   Component Value Date    CO2 30 05/23/2018     Lab Results   Component Value Date    BUN 18 05/23/2018     Lab Results   Component Value Date    CR 0.64 05/23/2018     Lab Results   Component Value Date     05/23/2018     Thank you for allowing me to participate in the care of your patient.    Sincerely,     FATMATA PEREIRA [facility]

## 2018-06-13 NOTE — PATIENT INSTRUCTIONS
Today's Recommendations    1. Coronary angiogram  2. Follow up after the procedure to discuss and check labs.  3. Nitroglycerin 1 tablet for chest burning, if not relieved with rest and/or 1 tablet can up to 3, but 5 minutes apart.  4. Please sit down when taking nitroglycerin  5. Continue all other medications without changes.    Please send a [employer] message or call [phone] with questions or concerns.     Scheduling number [phone].

## 2018-06-13 NOTE — MR AVS SNAPSHOT
After Visit Summary   6/13/2018    Yola Mayo    MRN: [id number]           Patient Information     Date Of Birth          1945        Visit Information        Provider Department      6/13/2018 10:00 AM Krystal Neal APRN CNP [facility]   [place]        Today's Diagnoses     Primary pulmonary hypertension (H)    -  1    Disease of tricuspid valve        Essential hypertension        Hyperlipidemia LDL goal <130        Abnormal cardiovascular stress test        Other chest pain          Care Instructions    Today's Recommendations    1. Coronary angiogram  2. Follow up after the procedure to discuss and check labs.  3. Nitroglycerin 1 tablet for chest burning, if not relieved with rest and/or 1 tablet can up to 3, but 5 minutes apart.  4. Please sit down when taking nitroglycerin  5. Continue all other medications without changes.    Please send a Allvoices message or call [phone] with questions or concerns.     Scheduling number [phone].            Follow-ups after your visit        Additional Services     Follow-Up with Cardiac Advanced Practice Provider (post procedure)       5-7 day post procedure follow-up.                  Future tests that were ordered for you today     Open Future Orders        Priority Expected Expires Ordered    Cardiac Cath: Coronary Angiography Adult Order Routine 6/20/2018 6/13/2019 6/13/2018    Basic metabolic panel Routine 6/27/2018 6/13/2019 6/13/2018    Follow-Up with Cardiac Advanced Practice Provider (post procedure) Routine 6/25/2018 6/13/2019 6/13/2018            Who to contact     If you have questions or need follow up information about today's clinic visit or your schedule please contact [facility] directly at [phone].  Normal or non-critical lab and imaging results will be communicated to you by MyChart, letter or phone within 4 business days after the clinic has  "received the results. If you do not hear from us within 7 days, please contact the clinic through [employer] or phone. If you have a critical or abnormal lab result, we will notify you by phone as soon as possible.  Submit refill requests through [employer]ume or call your pharmacy and they will forward the refill request to us. Please allow 3 business days for your refill to be completed.          Additional Information About Your Visit        OneBuck[employer]ume Information     [employer]ume gives you secure access to your electronic health record. If you see a primary care provider, you can also send messages to your care team and make appointments. If you have questions, please call your primary care clinic.  If you do not have a primary care provider, please call [phone] and they will assist you.        Care EveryWhere ID     This is your Care EveryWhere ID. This could be used by other organizations to access your [place] medical records  [id number]        Your Vitals Were     Pulse Height BMI (Body Mass Index)             60 1.6 m (5' 3\") 32.59 kg/m2          Blood Pressure from Last 3 Encounters:   06/13/18 160/78   05/23/18 134/70   05/01/18 134/70    Weight from Last 3 Encounters:   06/13/18 83.5 kg (184 lb)   05/23/18 83.8 kg (184 lb 12.8 oz)   05/01/18 84.4 kg (186 lb)              We Performed the Following     Follow-Up with Cardiac Advanced Practice Provider          Today's Medication Changes          These changes are accurate as of 6/13/18 10:35 AM.  If you have any questions, ask your nurse or doctor.               Start taking these medicines.        Dose/Directions    nitroGLYcerin 0.4 MG sublingual tablet   Commonly known as:  NITROSTAT   Used for:  Other chest pain, Abnormal cardiovascular stress test   Started by:  Krystal Neal APRN CNP        For chest pain place 1 tablet under the tongue every 5 minutes for 3 doses. If symptoms persist 5 minutes after 1st dose call 911.   Quantity:  25 tablet   Refills:  3 "            Where to get your medicines      These medications were sent to [facility] - [place], [place] - [address]  [address], [facility] [address]     Phone:  [phone]     nitroGLYcerin 0.4 MG sublingual tablet                Primary Care Provider Office Phone # Fax #    Zoraida JACKELINE Braga [phone] [address] [facility] [address]  [facility] [address]        Equal Access to Services     MIKALA RENAE : Hadii aad ku hadasho Soomaali, waaxda luqadaha, qaybta kaalmada adeegyada, waxay idiin hayaan adeeg kharash la'jose g . So [facility] [phone].    ATENCIÓN: Si habla español, tiene a sarah disposición servicios gratuitos de asistencia lingüística. [facility] [phone].    We comply with applicable federal civil rights laws and [place] laws. We do not discriminate on the basis of race, color, national origin, age, disability, sex, sexual orientation, or gender identity.            Thank you!     Thank you for choosing [facility]  for your care. Our goal is always to provide you with excellent care. Hearing back from our patients is one way we can continue to improve our services. Please take a few minutes to complete the written survey that you may receive in the mail after your visit with us. Thank you!             Your Updated Medication List - Protect others around you: Learn how to safely use, store and throw away your medicines at www.disposemymeds.org.          This list is accurate as of 6/13/18 10:35 AM.  Always use your most recent med list.                   Brand Name Dispense Instructions for use Diagnosis    amLODIPine 5 MG tablet    NORVASC    90 tablet    Take 1 tablet (5 mg) by mouth daily    Essential hypertension, benign       aspirin 81 MG tablet      Take 81 mg by mouth daily        calcium-vitamin D 600-400 MG-UNIT per tablet    CALTRATE    60 tablet    Take 1 tablet by mouth daily    Preventive  measure       cholecalciferol 1000 UNIT tablet    vitamin D3    100 tablet    Take 1 tablet (1,000 Units) by mouth daily    Vitamin deficiency       hydrochlorothiazide 25 MG tablet    HYDRODIURIL    90 tablet    Take 1 tablet (25 mg) by mouth daily    Essential hypertension, benign       levothyroxine 112 MCG tablet    SYNTHROID/LEVOTHROID    90 tablet    Take 1 tablet (112 mcg) by mouth daily    Hypothyroidism, unspecified type       losartan 50 MG tablet    COZAAR    90 tablet    Take 1 tablet (50 mg) by mouth daily    Benign essential hypertension, GALINDO (dyspnea on exertion)       metFORMIN 500 MG 24 hr tablet    GLUCOPHAGE-XR    90 tablet    Take 1 tablet (500 mg) by mouth daily (with dinner)    Prediabetes       multivitamin, therapeutic with minerals Tabs tablet      Take 1 tablet by mouth daily        nitroGLYcerin 0.4 MG sublingual tablet    NITROSTAT    25 tablet    For chest pain place 1 tablet under the tongue every 5 minutes for 3 doses. If symptoms persist 5 minutes after 1st dose call 911.    Other chest pain, Abnormal cardiovascular stress test       order for DME     2 each    Compression Stockings Knee high 20/30 compression    Edema of lower extremity       VITAMIN B 12 PO      Take 1 tablet by mouth daily VIT B12

## 2018-06-13 NOTE — LETTER
6/13/2018    Zoraida Braga MD  [address]  [facility] [address]    RE: Yola Mayo       Dear Colleague,    I had the pleasure of seeing Yola Mayo in the [facility].    Cardiology Clinic Progress Note  Yola Mayo MRN# [id number]   YOB: 1945 Age: 73 year old     Reason for visit: Abnormal stress test with chest discomfort           Assessment and Plan:     1. Chest discomfort and dyspnea on exertion with abnormal stress echocardiogram    Stress echocardiogram showed distal septum, anterior, lateral wall and apex were hypokinetic at rest with mildly hypokinetic LV that does not become hyperdynamic that can be seen in hypertensive heart disease. No evidcence of stress induced ischemia on EKG.     She recent 86% of maximum predicted HR    Chest burning in recovery    Discussed with Dr. Howard and a coronary angiogram is recommended    Nitroglycerin as needed     Follow up 7-10 days post angiogram to review with BMP    2. Hyperlipidemia    Reevaluate statin therapy depending on coronary angiogram results.    3. Hypertension, goal less than 130/80    Blood pressure is elevated today, but patient is very concerned about abnormal stress test and upcoming angiogram.    Continue HCTZ 25 mg daily, amlodipine 5 mg daily and losartan 50 mg daily    Did not tolerate beta-blockers due to dyspnea on exertion.    Will discuss at follow up         History of Presenting Illness:    Yola Mayo is a very pleasant 73 year old patient of Dr. Howard who presents today for follow-up to review stress echocardiogram.  Her cardiac risk factors include hypertension, hyperlipidemia and diabetes.      Due to chest burning with exercise and shortness of breath on exertion she underwent a stress echocardiogram for assessment of both ischemia and chronotropic incompetence. The stress echocardiogram showed distal septum, anterior, lateral wall and apex were  "hypokinetic at rest with mildly hypokinetic LV that does not become hyperdynamic that can be seen in hypertensive heart disease. No evidcence of stress induced ischemia on EKG.  She recent 86% of maximum predicted HR.  She had chest burning during recovery.  Discussed the findings of the stress echocardiogram with Dr. Howard and was recommended that she proceed with invasive coronary angiography.    All risks and benefits for this procedure have been explained to this patient and accepted.  This includes but is not limited to death, heart attack, stroke, blood clots, bleeding including the need for blood transfusion and the risk thereof, allergic reaction to x-ray dye, arrhythmia necessitating cardioversion, dye nephropathy.  We talked about intracoronary stenting and the risks thereof and bypass surgery.      No history of bleeding problems or current bleeding, and no scheduled surgeries or procedures in the next year. Patient understands and wishes to proceed with it.    > 50% of this 30 minute visit was spent in care counseling and coordination.           This note was completed in part using Dragon voice recognition software. Although reviewed after completion, some word and grammatical errors may occur       Review of Systems:   Review of Systems:  Skin:  Negative     Eyes:  Positive for glasses  ENT:  Negative    Respiratory:  Positive for dyspnea on exertion  Cardiovascular:    Positive for (burning feeling in chest during and  after activity)  Gastroenterology: Negative    Genitourinary:  not assessed    Musculoskeletal:  Negative    Neurologic:  Negative    Psychiatric:  Negative    Heme/Lymph/Imm:  Positive for allergies  Endocrine:  Positive for thyroid disorder;diabetes              Physical Exam:     Vitals: /78  Pulse 60  Ht 1.6 m (5' 3\")  Wt 83.5 kg (184 lb)  BMI 32.59 kg/m2  Constitutional:  cooperative, alert and oriented, well developed, well nourished, in no acute distress        Skin:  " warm and dry to the touch, no apparent skin lesions or masses noted        Head:  normocephalic, no masses or lesions        Eyes:  pupils equal and round;conjunctivae and lids unremarkable        ENT:  no pallor or cyanosis, dentition good        Neck:  JVP normal        Chest:  clear to auscultation;normal symmetry        Cardiac: regular rhythm occasional premature beats                Extremities and Back:  no edema        Neurological:  no gross motor deficits;affect appropriate               Medications:     Current Outpatient Prescriptions   Medication Sig Dispense Refill     amLODIPine (NORVASC) 5 MG tablet Take 1 tablet (5 mg) by mouth daily 90 tablet 3     aspirin 81 MG tablet Take 81 mg by mouth daily       calcium-vitamin D (CALTRATE) 600-400 MG-UNIT per tablet Take 1 tablet by mouth daily 60 tablet 11     cholecalciferol (VITAMIN D) 1000 UNIT tablet Take 1 tablet (1,000 Units) by mouth daily 100 tablet 3     Cyanocobalamin (VITAMIN B 12 PO) Take 1 tablet by mouth daily VIT B12       hydrochlorothiazide (HYDRODIURIL) 25 MG tablet Take 1 tablet (25 mg) by mouth daily 90 tablet 3     levothyroxine (SYNTHROID/LEVOTHROID) 112 MCG tablet Take 1 tablet (112 mcg) by mouth daily 90 tablet 3     losartan (COZAAR) 50 MG tablet Take 1 tablet (50 mg) by mouth daily 90 tablet 3     metFORMIN (GLUCOPHAGE-XR) 500 MG 24 hr tablet Take 1 tablet (500 mg) by mouth daily (with dinner) 90 tablet 3     multivitamin, therapeutic with minerals (THERA-VIT-M) TABS Take 1 tablet by mouth daily       nitroGLYcerin (NITROSTAT) 0.4 MG sublingual tablet For chest pain place 1 tablet under the tongue every 5 minutes for 3 doses. If symptoms persist 5 minutes after 1st dose call 911. 25 tablet 3     order for DME Compression Stockings  Knee high  20/30 compression 2 each 1           Family History   Problem Relation Age of Onset     CANCER Mother      ovarian  of it  at 51yo.     DIABETES Father      Adult onset     Hypertension  Father      C.A.D. Father      Fatal Heart attack at 70.     CEREBROVASCULAR DISEASE Father      Thyroid Disease Sister      Thyroid Disease Sister      Thyroid Disease Sister      CANCER Daughter      Thyroid Disease Daughter      Breast Cancer No family hx of      Cancer - colorectal No family hx of        Social History     Social History     Marital status:      Spouse name: FABIAN CUADRA     Number of children: 2     Years of education: N/A     Occupational History     [employer]       [employer]     Social History Main Topics     Smoking status: Never Smoker     Smokeless tobacco: Never Used     Alcohol use 0.0 oz/week     0 Standard drinks or equivalent per week      Comment: Approx 1-2 times monthy     Drug use: No     Sexual activity: Yes     Partners: Male     Other Topics Concern      Service No     Blood Transfusions No     Caffeine Concern No     Occupational Exposure No     Hobby Hazards No     Sleep Concern No     Stress Concern No     Weight Concern Yes     Special Diet No     Back Care No     Exercise Yes     Approx 3 times a week for about 30 minutes     Bike Helmet Yes     Seat Belt Yes     Self-Exams Yes     Social History Narrative            Past Medical History:     Past Medical History:   Diagnosis Date     BASAL CELL CANCER -NOSE      Family history of diabetes mellitus     Father     Family history of ischemic heart disease     Father     Family history of malignant neoplasm of ovary     Mother     HX DISRUPT L ANT CRUCIATE S/P REPAIR 2003      HYPERTENSION       HYPOTHYROIDISM      LOC PRIM OSTEOARTH-L 1ST METATARSAL-CUNEIFORM JOINT 3/10/2004     Major depressive disorder, single episode, mild (H) 11/24/2014     MILD-MOD TRICUSPID REGURG. MOD PULM HTN 6/23/2005    ECHO  5/05     SPLENIC CYST               Past Surgical History:     Past Surgical History:   Procedure Laterality Date     ARTHROPLASTY KNEE Left 4/13/2016    Procedure: ARTHROPLASTY KNEE;  Surgeon: Alcides  Giuseppe DELEON MD;  Location: [facility] OR     ARTHROPLASTY MINIMALLY INVASIVE HIP  4/18/2012    Procedure:ARTHROPLASTY MINIMALLY INVASIVE HIP; RIGHT TWO INCISION MINIMALLY INVASIVE HIP ARTHROPLASTY (YUVAL)^ (NO CELL SAVER) (C-ARM); Surgeon:GIUSEPPE LOPEZ; Location:[facility] OR     C APPENDECTOMY  8/87    incidental appi at hys     C CHEMOSURG MOHS 1ST STAGE  9-99    Mohs-nose     C REPAIR CRUCIATE LIGAMENT,KNEE  8/2003    L   ACL reconstruction     C TOTAL ABDOM HYSTERECTOMY  8/87    W/ BSO fibroids, hx PID     HC COLONOSCOPY THRU STOMA, DIAGNOSTIC  4/03    normal, rec repeat 10 yrs     HYSTERECTOMY, PAP NO LONGER INDICATED       SURGICAL HISTORY OF -   Mult-~1978    infertility, laparotomies              Allergies:   Ace inhibitors; Oxycodone; Tetanus toxoid; and Shellfish-derived products       Data:   All laboratory data reviewed:    Last Basic Metabolic Panel:  Lab Results   Component Value Date     05/23/2018      Lab Results   Component Value Date    POTASSIUM 3.4 05/23/2018     Lab Results   Component Value Date    CHLORIDE 104 05/23/2018     Lab Results   Component Value Date    JOSEPH 9.2 05/23/2018     Lab Results   Component Value Date    CO2 30 05/23/2018     Lab Results   Component Value Date    BUN 18 05/23/2018     Lab Results   Component Value Date    CR 0.64 05/23/2018     Lab Results   Component Value Date     05/23/2018         FATMATA PEREIRA, CNP    Thank you for allowing me to participate in the care of your patient.      Sincerely,     FATMATA PEREIRA CNP     [facility]    cc:   FATMATA Cruz CNP  [facility]  [address]  [place] [address]

## 2018-06-26 ENCOUNTER — TELEPHONE (OUTPATIENT)
Dept: CARDIOLOGY | Facility: CLINIC | Age: 73
End: 2018-06-26

## 2018-06-26 DIAGNOSIS — I10 BENIGN ESSENTIAL HYPERTENSION: Primary | ICD-10-CM

## 2018-06-26 RX ORDER — LIDOCAINE 40 MG/G
CREAM TOPICAL
Status: CANCELLED | OUTPATIENT
Start: 2018-06-26

## 2018-06-26 RX ORDER — ASPIRIN 81 MG/1
81 TABLET ORAL DAILY
Status: CANCELLED | OUTPATIENT
Start: 2018-06-26

## 2018-06-26 RX ORDER — POTASSIUM CHLORIDE 1500 MG/1
20 TABLET, EXTENDED RELEASE ORAL
Status: CANCELLED | OUTPATIENT
Start: 2018-06-26

## 2018-06-26 RX ORDER — SODIUM CHLORIDE 9 MG/ML
INJECTION, SOLUTION INTRAVENOUS CONTINUOUS
Status: CANCELLED | OUTPATIENT
Start: 2018-06-26

## 2018-06-26 NOTE — TELEPHONE ENCOUNTER
Called patient with Pre cath instructions:     Contrast allergy: no (listed shellfish possible, patient says she has had multiple times since she had rash and has had no issues)  Anticoagulation: no   Metformin: yes, will hold morning of procedure and two days after recheck BMP scheduled for 48 hours after procedure   Oral DM meds: yes metforming  Insulin: no  Diuretic: yes HCTZ will hold morning of procedure  Use of phosphodiesterase type 5 inhibitor: no  Aspirin: yes 81 mg   Pt informed to be NPO at midnight  Renal issues no  Pt has transportation and 24 hours post procedure monitoring set up.   Pt aware of no driving for 24 hours post procedure.     Pt aware of arrival time and location. Pt verbalized understanding of instructions.

## 2018-06-27 ENCOUNTER — HOSPITAL ENCOUNTER (OUTPATIENT)
Facility: CLINIC | Age: 73
Discharge: HOME | End: 2018-06-27
Attending: INTERNAL MEDICINE | Admitting: INTERNAL MEDICINE
Payer: MEDICARE

## 2018-06-27 ENCOUNTER — APPOINTMENT (OUTPATIENT)
Dept: CARDIOLOGY | Facility: CLINIC | Age: 73
End: 2018-06-27
Attending: NURSE PRACTITIONER
Payer: MEDICARE

## 2018-06-27 VITALS
TEMPERATURE: 97.9 F | HEIGHT: 64 IN | SYSTOLIC BLOOD PRESSURE: 149 MMHG | HEART RATE: 64 BPM | DIASTOLIC BLOOD PRESSURE: 74 MMHG | RESPIRATION RATE: 18 BRPM | OXYGEN SATURATION: 98 % | WEIGHT: 182.7 LBS | BODY MASS INDEX: 31.19 KG/M2

## 2018-06-27 DIAGNOSIS — I10 ESSENTIAL HYPERTENSION: ICD-10-CM

## 2018-06-27 DIAGNOSIS — I25.110 CORONARY ARTERY DISEASE INVOLVING NATIVE CORONARY ARTERY OF NATIVE HEART WITH UNSTABLE ANGINA PECTORIS (H): ICD-10-CM

## 2018-06-27 DIAGNOSIS — Z98.61 POSTSURGICAL PERCUTANEOUS TRANSLUMINAL CORONARY ANGIOPLASTY STATUS: ICD-10-CM

## 2018-06-27 DIAGNOSIS — R94.39 ABNORMAL CARDIOVASCULAR STRESS TEST: ICD-10-CM

## 2018-06-27 LAB
ANION GAP SERPL CALCULATED.3IONS-SCNC: 6 MMOL/L (ref 3–14)
APTT PPP: 30 SEC (ref 22–37)
BUN SERPL-MCNC: 18 MG/DL (ref 7–30)
CALCIUM SERPL-MCNC: 8.8 MG/DL (ref 8.5–10.1)
CHLORIDE SERPL-SCNC: 108 MMOL/L (ref 94–109)
CO2 SERPL-SCNC: 29 MMOL/L (ref 20–32)
CREAT SERPL-MCNC: 0.74 MG/DL (ref 0.52–1.04)
ERYTHROCYTE [DISTWIDTH] IN BLOOD BY AUTOMATED COUNT: 13.9 % (ref 10–15)
GFR SERPL CREATININE-BSD FRML MDRD: 77 ML/MIN/1.7M2
GLUCOSE SERPL-MCNC: 113 MG/DL (ref 70–99)
HCT VFR BLD AUTO: 40.9 % (ref 35–47)
HGB BLD-MCNC: 13.9 G/DL (ref 11.7–15.7)
INR PPP: 0.98 (ref 0.86–1.14)
KCT BLD-ACNC: 265 SEC (ref 75–150)
MCH RBC QN AUTO: 29.5 PG (ref 26.5–33)
MCHC RBC AUTO-ENTMCNC: 34 G/DL (ref 31.5–36.5)
MCV RBC AUTO: 87 FL (ref 78–100)
PLATELET # BLD AUTO: 239 10E9/L (ref 150–450)
POTASSIUM SERPL-SCNC: 3.5 MMOL/L (ref 3.4–5.3)
RBC # BLD AUTO: 4.71 10E12/L (ref 3.8–5.2)
SODIUM SERPL-SCNC: 143 MMOL/L (ref 133–144)
WBC # BLD AUTO: 6.3 10E9/L (ref 4–11)

## 2018-06-27 PROCEDURE — 27210759 ZZH DEVICE INFLATION CR6

## 2018-06-27 PROCEDURE — 27210795 ZZH PAD DEFIB QUICK CR4

## 2018-06-27 PROCEDURE — 27211089 ZZH KIT ACIST INJECTOR CR3

## 2018-06-27 PROCEDURE — C1887 CATHETER, GUIDING: HCPCS

## 2018-06-27 PROCEDURE — 99152 MOD SED SAME PHYS/QHP 5/>YRS: CPT

## 2018-06-27 PROCEDURE — 25000132 ZZH RX MED GY IP 250 OP 250 PS 637: Mod: GY | Performed by: INTERNAL MEDICINE

## 2018-06-27 PROCEDURE — 80048 BASIC METABOLIC PNL TOTAL CA: CPT | Performed by: INTERNAL MEDICINE

## 2018-06-27 PROCEDURE — 99153 MOD SED SAME PHYS/QHP EA: CPT

## 2018-06-27 PROCEDURE — C1760 CLOSURE DEV, VASC: HCPCS

## 2018-06-27 PROCEDURE — 85027 COMPLETE CBC AUTOMATED: CPT | Performed by: INTERNAL MEDICINE

## 2018-06-27 PROCEDURE — 40000065 ZZH STATISTIC EKG NON-CHARGEABLE

## 2018-06-27 PROCEDURE — 85347 COAGULATION TIME ACTIVATED: CPT

## 2018-06-27 PROCEDURE — A9270 NON-COVERED ITEM OR SERVICE: HCPCS | Mod: GY | Performed by: INTERNAL MEDICINE

## 2018-06-27 PROCEDURE — C1725 CATH, TRANSLUMIN NON-LASER: HCPCS

## 2018-06-27 PROCEDURE — 85730 THROMBOPLASTIN TIME PARTIAL: CPT | Performed by: INTERNAL MEDICINE

## 2018-06-27 PROCEDURE — 40000235 ZZH STATISTIC TELEMETRY

## 2018-06-27 PROCEDURE — 85610 PROTHROMBIN TIME: CPT | Performed by: INTERNAL MEDICINE

## 2018-06-27 PROCEDURE — C1874 STENT, COATED/COV W/DEL SYS: HCPCS

## 2018-06-27 PROCEDURE — 25000128 H RX IP 250 OP 636: Performed by: INTERNAL MEDICINE

## 2018-06-27 PROCEDURE — 36415 COLL VENOUS BLD VENIPUNCTURE: CPT

## 2018-06-27 PROCEDURE — 93010 ELECTROCARDIOGRAM REPORT: CPT | Mod: 59 | Performed by: INTERNAL MEDICINE

## 2018-06-27 PROCEDURE — 92928 PRQ TCAT PLMT NTRAC ST 1 LES: CPT | Mod: RC | Performed by: INTERNAL MEDICINE

## 2018-06-27 PROCEDURE — C1769 GUIDE WIRE: HCPCS

## 2018-06-27 PROCEDURE — 93458 L HRT ARTERY/VENTRICLE ANGIO: CPT | Mod: 26 | Performed by: INTERNAL MEDICINE

## 2018-06-27 PROCEDURE — 27210946 ZZH KIT HC TOTES DISP CR8

## 2018-06-27 PROCEDURE — 27210787 ZZH MANIFOLD CR2

## 2018-06-27 PROCEDURE — 25000125 ZZHC RX 250: Performed by: INTERNAL MEDICINE

## 2018-06-27 PROCEDURE — 93458 L HRT ARTERY/VENTRICLE ANGIO: CPT | Mod: XU

## 2018-06-27 PROCEDURE — 93005 ELECTROCARDIOGRAM TRACING: CPT

## 2018-06-27 PROCEDURE — 40000852 ZZH STATISTIC HEART CATH LAB OR EP LAB

## 2018-06-27 PROCEDURE — 99152 MOD SED SAME PHYS/QHP 5/>YRS: CPT | Performed by: INTERNAL MEDICINE

## 2018-06-27 PROCEDURE — C9600 PERC DRUG-EL COR STENT SING: HCPCS | Mod: RC

## 2018-06-27 RX ORDER — VERAPAMIL HYDROCHLORIDE 2.5 MG/ML
1-2.5 INJECTION, SOLUTION INTRAVENOUS
Status: DISCONTINUED | OUTPATIENT
Start: 2018-06-27 | End: 2018-06-27 | Stop reason: HOSPADM

## 2018-06-27 RX ORDER — DIPHENHYDRAMINE HYDROCHLORIDE 50 MG/ML
25-50 INJECTION INTRAMUSCULAR; INTRAVENOUS
Status: DISCONTINUED | OUTPATIENT
Start: 2018-06-27 | End: 2018-06-27 | Stop reason: HOSPADM

## 2018-06-27 RX ORDER — ATROPINE SULFATE 0.1 MG/ML
.5-1 INJECTION INTRAVENOUS
Status: DISCONTINUED | OUTPATIENT
Start: 2018-06-27 | End: 2018-06-27 | Stop reason: HOSPADM

## 2018-06-27 RX ORDER — DEXTROSE MONOHYDRATE 25 G/50ML
12.5-5 INJECTION, SOLUTION INTRAVENOUS EVERY 30 MIN PRN
Status: DISCONTINUED | OUTPATIENT
Start: 2018-06-27 | End: 2018-06-27 | Stop reason: HOSPADM

## 2018-06-27 RX ORDER — ADENOSINE 3 MG/ML
12-12000 INJECTION, SOLUTION INTRAVENOUS
Status: DISCONTINUED | OUTPATIENT
Start: 2018-06-27 | End: 2018-06-27 | Stop reason: HOSPADM

## 2018-06-27 RX ORDER — ASPIRIN 81 MG/1
81 TABLET ORAL DAILY
Status: DISCONTINUED | OUTPATIENT
Start: 2018-06-27 | End: 2018-06-27 | Stop reason: HOSPADM

## 2018-06-27 RX ORDER — NITROGLYCERIN 0.4 MG/1
0.4 TABLET SUBLINGUAL EVERY 5 MIN PRN
Status: DISCONTINUED | OUTPATIENT
Start: 2018-06-27 | End: 2018-06-27 | Stop reason: HOSPADM

## 2018-06-27 RX ORDER — METOPROLOL SUCCINATE 50 MG/1
50 TABLET, EXTENDED RELEASE ORAL DAILY
Qty: 90 TABLET | Refills: 3 | Status: SHIPPED | OUTPATIENT
Start: 2018-06-27 | End: 2018-07-09

## 2018-06-27 RX ORDER — EPINEPHRINE 1 MG/ML
0.3 INJECTION, SOLUTION, CONCENTRATE INTRAVENOUS
Status: DISCONTINUED | OUTPATIENT
Start: 2018-06-27 | End: 2018-06-27 | Stop reason: HOSPADM

## 2018-06-27 RX ORDER — METHYLPREDNISOLONE SODIUM SUCCINATE 125 MG/2ML
125 INJECTION, POWDER, LYOPHILIZED, FOR SOLUTION INTRAMUSCULAR; INTRAVENOUS
Status: DISCONTINUED | OUTPATIENT
Start: 2018-06-27 | End: 2018-06-27 | Stop reason: HOSPADM

## 2018-06-27 RX ORDER — POTASSIUM CHLORIDE 29.8 MG/ML
20 INJECTION INTRAVENOUS
Status: DISCONTINUED | OUTPATIENT
Start: 2018-06-27 | End: 2018-06-27 | Stop reason: HOSPADM

## 2018-06-27 RX ORDER — HYDRALAZINE HYDROCHLORIDE 20 MG/ML
10-20 INJECTION INTRAMUSCULAR; INTRAVENOUS
Status: DISCONTINUED | OUTPATIENT
Start: 2018-06-27 | End: 2018-06-27 | Stop reason: HOSPADM

## 2018-06-27 RX ORDER — SODIUM CHLORIDE 9 MG/ML
INJECTION, SOLUTION INTRAVENOUS CONTINUOUS
Status: DISCONTINUED | OUTPATIENT
Start: 2018-06-27 | End: 2018-06-27 | Stop reason: HOSPADM

## 2018-06-27 RX ORDER — LIDOCAINE 40 MG/G
CREAM TOPICAL
Status: DISCONTINUED | OUTPATIENT
Start: 2018-06-27 | End: 2018-06-27 | Stop reason: HOSPADM

## 2018-06-27 RX ORDER — ATORVASTATIN CALCIUM 10 MG/1
10 TABLET, FILM COATED ORAL DAILY
Qty: 90 TABLET | Refills: 3 | Status: SHIPPED | OUTPATIENT
Start: 2018-06-27 | End: 2019-05-07

## 2018-06-27 RX ORDER — FLUMAZENIL 0.1 MG/ML
0.2 INJECTION, SOLUTION INTRAVENOUS
Status: DISCONTINUED | OUTPATIENT
Start: 2018-06-27 | End: 2018-06-27 | Stop reason: HOSPADM

## 2018-06-27 RX ORDER — ASPIRIN 325 MG
325 TABLET ORAL
Status: DISCONTINUED | OUTPATIENT
Start: 2018-06-27 | End: 2018-06-27 | Stop reason: HOSPADM

## 2018-06-27 RX ORDER — PRASUGREL 10 MG/1
10-60 TABLET, FILM COATED ORAL
Status: DISCONTINUED | OUTPATIENT
Start: 2018-06-27 | End: 2018-06-27 | Stop reason: HOSPADM

## 2018-06-27 RX ORDER — IOPAMIDOL 755 MG/ML
200 INJECTION, SOLUTION INTRAVASCULAR ONCE
Status: COMPLETED | OUTPATIENT
Start: 2018-06-27 | End: 2018-06-27

## 2018-06-27 RX ORDER — LIDOCAINE HYDROCHLORIDE 10 MG/ML
30 INJECTION, SOLUTION EPIDURAL; INFILTRATION; INTRACAUDAL; PERINEURAL
Status: DISCONTINUED | OUTPATIENT
Start: 2018-06-27 | End: 2018-06-27 | Stop reason: HOSPADM

## 2018-06-27 RX ORDER — BUPIVACAINE HYDROCHLORIDE 2.5 MG/ML
1-10 INJECTION, SOLUTION EPIDURAL; INFILTRATION; INTRACAUDAL
Status: DISCONTINUED | OUTPATIENT
Start: 2018-06-27 | End: 2018-06-27 | Stop reason: HOSPADM

## 2018-06-27 RX ORDER — ATROPINE SULFATE 0.1 MG/ML
0.5 INJECTION INTRAVENOUS EVERY 5 MIN PRN
Status: DISCONTINUED | OUTPATIENT
Start: 2018-06-27 | End: 2018-06-27 | Stop reason: HOSPADM

## 2018-06-27 RX ORDER — MORPHINE SULFATE 2 MG/ML
1-2 INJECTION, SOLUTION INTRAMUSCULAR; INTRAVENOUS EVERY 5 MIN PRN
Status: DISCONTINUED | OUTPATIENT
Start: 2018-06-27 | End: 2018-06-27 | Stop reason: HOSPADM

## 2018-06-27 RX ORDER — ACETAMINOPHEN 325 MG/1
325-650 TABLET ORAL EVERY 4 HOURS PRN
Status: DISCONTINUED | OUTPATIENT
Start: 2018-06-27 | End: 2018-06-27 | Stop reason: HOSPADM

## 2018-06-27 RX ORDER — NALOXONE HYDROCHLORIDE 0.4 MG/ML
0.4 INJECTION, SOLUTION INTRAMUSCULAR; INTRAVENOUS; SUBCUTANEOUS EVERY 5 MIN PRN
Status: DISCONTINUED | OUTPATIENT
Start: 2018-06-27 | End: 2018-06-27 | Stop reason: HOSPADM

## 2018-06-27 RX ORDER — NIFEDIPINE 10 MG/1
10 CAPSULE ORAL
Status: DISCONTINUED | OUTPATIENT
Start: 2018-06-27 | End: 2018-06-27 | Stop reason: HOSPADM

## 2018-06-27 RX ORDER — FUROSEMIDE 10 MG/ML
20-100 INJECTION INTRAMUSCULAR; INTRAVENOUS
Status: DISCONTINUED | OUTPATIENT
Start: 2018-06-27 | End: 2018-06-27 | Stop reason: HOSPADM

## 2018-06-27 RX ORDER — HYDROCODONE BITARTRATE AND ACETAMINOPHEN 5; 325 MG/1; MG/1
1-2 TABLET ORAL EVERY 4 HOURS PRN
Status: DISCONTINUED | OUTPATIENT
Start: 2018-06-27 | End: 2018-06-27 | Stop reason: HOSPADM

## 2018-06-27 RX ORDER — FENTANYL CITRATE 50 UG/ML
25-50 INJECTION, SOLUTION INTRAMUSCULAR; INTRAVENOUS
Status: DISCONTINUED | OUTPATIENT
Start: 2018-06-27 | End: 2018-06-27 | Stop reason: HOSPADM

## 2018-06-27 RX ORDER — DOPAMINE HYDROCHLORIDE 160 MG/100ML
2-20 INJECTION, SOLUTION INTRAVENOUS CONTINUOUS PRN
Status: DISCONTINUED | OUTPATIENT
Start: 2018-06-27 | End: 2018-06-27 | Stop reason: HOSPADM

## 2018-06-27 RX ORDER — HEPARIN SODIUM 1000 [USP'U]/ML
1000-10000 INJECTION, SOLUTION INTRAVENOUS; SUBCUTANEOUS EVERY 5 MIN PRN
Status: DISCONTINUED | OUTPATIENT
Start: 2018-06-27 | End: 2018-06-27 | Stop reason: HOSPADM

## 2018-06-27 RX ORDER — PHENYLEPHRINE HCL IN 0.9% NACL 1 MG/10 ML
20-100 SYRINGE (ML) INTRAVENOUS
Status: DISCONTINUED | OUTPATIENT
Start: 2018-06-27 | End: 2018-06-27 | Stop reason: HOSPADM

## 2018-06-27 RX ORDER — NALOXONE HYDROCHLORIDE 0.4 MG/ML
.2-.4 INJECTION, SOLUTION INTRAMUSCULAR; INTRAVENOUS; SUBCUTANEOUS
Status: DISCONTINUED | OUTPATIENT
Start: 2018-06-27 | End: 2018-06-27 | Stop reason: HOSPADM

## 2018-06-27 RX ORDER — PROTAMINE SULFATE 10 MG/ML
1-5 INJECTION, SOLUTION INTRAVENOUS
Status: DISCONTINUED | OUTPATIENT
Start: 2018-06-27 | End: 2018-06-27 | Stop reason: HOSPADM

## 2018-06-27 RX ORDER — ENALAPRILAT 1.25 MG/ML
1.25-2.5 INJECTION INTRAVENOUS
Status: DISCONTINUED | OUTPATIENT
Start: 2018-06-27 | End: 2018-06-27 | Stop reason: HOSPADM

## 2018-06-27 RX ORDER — NITROGLYCERIN 20 MG/100ML
.07-2 INJECTION INTRAVENOUS CONTINUOUS PRN
Status: DISCONTINUED | OUTPATIENT
Start: 2018-06-27 | End: 2018-06-27 | Stop reason: HOSPADM

## 2018-06-27 RX ORDER — NITROGLYCERIN 5 MG/ML
100-500 VIAL (ML) INTRAVENOUS
Status: DISCONTINUED | OUTPATIENT
Start: 2018-06-27 | End: 2018-06-27 | Stop reason: HOSPADM

## 2018-06-27 RX ORDER — LORAZEPAM 2 MG/ML
.5-2 INJECTION INTRAMUSCULAR EVERY 4 HOURS PRN
Status: DISCONTINUED | OUTPATIENT
Start: 2018-06-27 | End: 2018-06-27 | Stop reason: HOSPADM

## 2018-06-27 RX ORDER — NITROGLYCERIN 5 MG/ML
100-200 VIAL (ML) INTRAVENOUS
Status: DISCONTINUED | OUTPATIENT
Start: 2018-06-27 | End: 2018-06-27 | Stop reason: HOSPADM

## 2018-06-27 RX ORDER — CLOPIDOGREL BISULFATE 75 MG/1
75 TABLET ORAL
Status: DISCONTINUED | OUTPATIENT
Start: 2018-06-27 | End: 2018-06-27 | Stop reason: HOSPADM

## 2018-06-27 RX ORDER — POTASSIUM CHLORIDE 1500 MG/1
20 TABLET, EXTENDED RELEASE ORAL
Status: COMPLETED | OUTPATIENT
Start: 2018-06-27 | End: 2018-06-27

## 2018-06-27 RX ORDER — ASPIRIN 81 MG/1
81-324 TABLET, CHEWABLE ORAL
Status: DISCONTINUED | OUTPATIENT
Start: 2018-06-27 | End: 2018-06-27 | Stop reason: HOSPADM

## 2018-06-27 RX ORDER — ONDANSETRON 2 MG/ML
4 INJECTION INTRAMUSCULAR; INTRAVENOUS EVERY 4 HOURS PRN
Status: DISCONTINUED | OUTPATIENT
Start: 2018-06-27 | End: 2018-06-27 | Stop reason: HOSPADM

## 2018-06-27 RX ORDER — DOBUTAMINE HYDROCHLORIDE 200 MG/100ML
2-20 INJECTION INTRAVENOUS CONTINUOUS PRN
Status: DISCONTINUED | OUTPATIENT
Start: 2018-06-27 | End: 2018-06-27 | Stop reason: HOSPADM

## 2018-06-27 RX ORDER — METOPROLOL SUCCINATE 50 MG/1
50 TABLET, EXTENDED RELEASE ORAL DAILY
Status: DISCONTINUED | OUTPATIENT
Start: 2018-06-27 | End: 2018-06-27 | Stop reason: HOSPADM

## 2018-06-27 RX ORDER — CLOPIDOGREL 300 MG/1
300-600 TABLET, FILM COATED ORAL
Status: DISCONTINUED | OUTPATIENT
Start: 2018-06-27 | End: 2018-06-27 | Stop reason: HOSPADM

## 2018-06-27 RX ORDER — PROTAMINE SULFATE 10 MG/ML
25-100 INJECTION, SOLUTION INTRAVENOUS EVERY 5 MIN PRN
Status: DISCONTINUED | OUTPATIENT
Start: 2018-06-27 | End: 2018-06-27 | Stop reason: HOSPADM

## 2018-06-27 RX ORDER — SODIUM NITROPRUSSIDE 25 MG/ML
100-200 INJECTION INTRAVENOUS
Status: DISCONTINUED | OUTPATIENT
Start: 2018-06-27 | End: 2018-06-27 | Stop reason: HOSPADM

## 2018-06-27 RX ORDER — LIDOCAINE HYDROCHLORIDE 10 MG/ML
1-10 INJECTION, SOLUTION EPIDURAL; INFILTRATION; INTRACAUDAL; PERINEURAL
Status: COMPLETED | OUTPATIENT
Start: 2018-06-27 | End: 2018-06-27

## 2018-06-27 RX ORDER — POTASSIUM CHLORIDE 7.45 MG/ML
10 INJECTION INTRAVENOUS
Status: DISCONTINUED | OUTPATIENT
Start: 2018-06-27 | End: 2018-06-27 | Stop reason: HOSPADM

## 2018-06-27 RX ORDER — NICARDIPINE HYDROCHLORIDE 2.5 MG/ML
100 INJECTION INTRAVENOUS
Status: DISCONTINUED | OUTPATIENT
Start: 2018-06-27 | End: 2018-06-27 | Stop reason: HOSPADM

## 2018-06-27 RX ORDER — NALOXONE HYDROCHLORIDE 0.4 MG/ML
.1-.4 INJECTION, SOLUTION INTRAMUSCULAR; INTRAVENOUS; SUBCUTANEOUS
Status: DISCONTINUED | OUTPATIENT
Start: 2018-06-27 | End: 2018-06-27 | Stop reason: HOSPADM

## 2018-06-27 RX ORDER — METOPROLOL TARTRATE 1 MG/ML
5 INJECTION, SOLUTION INTRAVENOUS EVERY 5 MIN PRN
Status: DISCONTINUED | OUTPATIENT
Start: 2018-06-27 | End: 2018-06-27 | Stop reason: HOSPADM

## 2018-06-27 RX ADMIN — MIDAZOLAM 1 MG: 1 INJECTION INTRAMUSCULAR; INTRAVENOUS at 08:44

## 2018-06-27 RX ADMIN — FENTANYL CITRATE 25 MCG: 50 INJECTION, SOLUTION INTRAMUSCULAR; INTRAVENOUS at 09:19

## 2018-06-27 RX ADMIN — FENTANYL CITRATE 25 MCG: 50 INJECTION, SOLUTION INTRAMUSCULAR; INTRAVENOUS at 09:08

## 2018-06-27 RX ADMIN — TICAGRELOR 180 MG: 90 TABLET ORAL at 08:55

## 2018-06-27 RX ADMIN — LIDOCAINE HYDROCHLORIDE 10 ML: 10 INJECTION, SOLUTION EPIDURAL; INFILTRATION; INTRACAUDAL; PERINEURAL at 08:42

## 2018-06-27 RX ADMIN — FENTANYL CITRATE 25 MCG: 50 INJECTION, SOLUTION INTRAMUSCULAR; INTRAVENOUS at 08:40

## 2018-06-27 RX ADMIN — MIDAZOLAM 0.5 MG: 1 INJECTION INTRAMUSCULAR; INTRAVENOUS at 08:38

## 2018-06-27 RX ADMIN — POTASSIUM CHLORIDE 20 MEQ: 1500 TABLET, EXTENDED RELEASE ORAL at 08:03

## 2018-06-27 RX ADMIN — MIDAZOLAM 0.5 MG: 1 INJECTION INTRAMUSCULAR; INTRAVENOUS at 09:21

## 2018-06-27 RX ADMIN — HEPARIN SODIUM 2000 UNITS: 1000 INJECTION, SOLUTION INTRAVENOUS; SUBCUTANEOUS at 09:10

## 2018-06-27 RX ADMIN — FENTANYL CITRATE 25 MCG: 50 INJECTION, SOLUTION INTRAMUSCULAR; INTRAVENOUS at 08:37

## 2018-06-27 RX ADMIN — IOPAMIDOL 200 ML: 755 INJECTION, SOLUTION INTRAVASCULAR at 09:45

## 2018-06-27 RX ADMIN — SODIUM CHLORIDE, PRESERVATIVE FREE: 5 INJECTION INTRAVENOUS at 07:23

## 2018-06-27 RX ADMIN — MIDAZOLAM 0.5 MG: 1 INJECTION INTRAMUSCULAR; INTRAVENOUS at 08:40

## 2018-06-27 RX ADMIN — HEPARIN SODIUM 8300 UNITS: 1000 INJECTION, SOLUTION INTRAVENOUS; SUBCUTANEOUS at 08:52

## 2018-06-27 NOTE — PROGRESS NOTES
1135- After using the bedpan there was a small firm area at the site. Pressure held for 5 minutes and site softened.

## 2018-06-27 NOTE — IP AVS SNAPSHOT
MRN:[id number]                      After Visit Summary   6/27/2018    Yola Mayo    MRN: [id number]           Visit Information        Department      6/27/2018  6:42 AM [facility]          Review of your medicines      START taking        Dose / Directions    atorvastatin 10 MG tablet   Commonly known as:  LIPITOR   Used for:  Coronary artery disease involving native coronary artery of native heart with unstable angina pectoris (H)        Dose:  10 mg   Take 1 tablet (10 mg) by mouth daily   Quantity:  90 tablet   Refills:  3       metoprolol succinate 50 MG 24 hr tablet   Commonly known as:  TOPROL-XL   Used for:  Coronary artery disease involving native coronary artery of native heart with unstable angina pectoris (H)        Dose:  50 mg   Take 1 tablet (50 mg) by mouth daily Hold IF heart rate less than 55.   Quantity:  90 tablet   Refills:  3       ticagrelor 90 MG tablet   Commonly known as:  BRILINTA   Used for:  Coronary artery disease involving native coronary artery of native heart with unstable angina pectoris (H)        Dose:  90 mg   Take 1 tablet (90 mg) by mouth 2 times daily Start this evening.   Quantity:  180 tablet   Refills:  3         CONTINUE these medicines which may have CHANGED, or have new prescriptions. If we are uncertain of the size of tablets/capsules you have at home, strength may be listed as something that might have changed.        Dose / Directions    * aspirin 81 MG tablet   This may have changed:  Another medication with the same name was added. Make sure you understand how and when to take each.        Dose:  81 mg   Take 81 mg by mouth daily   Refills:  0       * aspirin 81 MG EC tablet   This may have changed:  You were already taking a medication with the same name, and this prescription was added. Make sure you understand how and when to take each.   Used for:  Coronary artery disease involving native coronary artery of native  heart with unstable angina pectoris (H)        Dose:  81 mg   Start taking on:  6/28/2018   Take 1 tablet (81 mg) by mouth daily Start tomorrow morning.   Quantity:  90 tablet   Refills:  3       * Notice:  This list has 2 medication(s) that are the same as other medications prescribed for you. Read the directions carefully, and ask your doctor or other care provider to review them with you.      CONTINUE these medicines which have NOT CHANGED        Dose / Directions    amLODIPine 5 MG tablet   Commonly known as:  NORVASC   Used for:  Essential hypertension, benign        Dose:  5 mg   Take 1 tablet (5 mg) by mouth daily   Quantity:  90 tablet   Refills:  3       calcium-vitamin D 600-400 MG-UNIT per tablet   Commonly known as:  CALTRATE   Used for:  Preventive measure        Dose:  1 tablet   Take 1 tablet by mouth daily   Quantity:  60 tablet   Refills:  11       cholecalciferol 1000 UNIT tablet   Commonly known as:  vitamin D3   Used for:  Vitamin deficiency        Dose:  1000 Units   Take 1 tablet (1,000 Units) by mouth daily   Quantity:  100 tablet   Refills:  3       hydrochlorothiazide 25 MG tablet   Commonly known as:  HYDRODIURIL   Used for:  Essential hypertension, benign        Dose:  25 mg   Take 1 tablet (25 mg) by mouth daily   Quantity:  90 tablet   Refills:  3       levothyroxine 112 MCG tablet   Commonly known as:  SYNTHROID/LEVOTHROID   Used for:  Hypothyroidism, unspecified type        Dose:  112 mcg   Take 1 tablet (112 mcg) by mouth daily   Quantity:  90 tablet   Refills:  3       losartan 50 MG tablet   Commonly known as:  COZAAR   Used for:  Benign essential hypertension, GALINDO (dyspnea on exertion)        Dose:  50 mg   Take 1 tablet (50 mg) by mouth daily   Quantity:  90 tablet   Refills:  3       metFORMIN 500 MG 24 hr tablet   Commonly known as:  GLUCOPHAGE-XR   Used for:  Prediabetes        Dose:  500 mg   Take 1 tablet (500 mg) by mouth daily (with dinner)   Quantity:  90 tablet    Refills:  3       multivitamin, therapeutic with minerals Tabs tablet        Dose:  1 tablet   Take 1 tablet by mouth daily   Refills:  0       nitroGLYcerin 0.4 MG sublingual tablet   Commonly known as:  NITROSTAT   Used for:  Other chest pain, Abnormal cardiovascular stress test        For chest pain place 1 tablet under the tongue every 5 minutes for 3 doses. If symptoms persist 5 minutes after 1st dose call 911.   Quantity:  25 tablet   Refills:  3       order for DME   Used for:  Edema of lower extremity        Compression Stockings Knee high 20/30 compression   Quantity:  2 each   Refills:  1       VITAMIN B 12 PO        Dose:  1 tablet   Take 1 tablet by mouth daily VIT B12   Refills:  0            Where to get your medicines      Some of these will need a paper prescription and others can be bought over the counter. Ask your nurse if you have questions.     Bring a paper prescription for each of these medications     aspirin 81 MG EC tablet    atorvastatin 10 MG tablet    metoprolol succinate 50 MG 24 hr tablet    ticagrelor 90 MG tablet               Prescriptions were sent or printed at these locations (4 Prescriptions)                   Other Prescriptions                Printed at Department/Unit printer (4 of 4)         aspirin 81 MG EC tablet               atorvastatin (LIPITOR) 10 MG tablet               metoprolol succinate (TOPROL-XL) 50 MG 24 hr tablet               ticagrelor (BRILINTA) 90 MG tablet                 Protect others around you: Learn how to safely use, store and throw away your medicines at www.disposemymeds.org.         Follow-ups after your visit        Additional Services     Follow-Up with Cardiac Advanced Practice Provider                 Your next 10 appointments already scheduled     Jun 29, 2018  9:00 AM CDT   LAB with [facility]   [facility] [place] ([facility])    [address] [place] [id number] [phone]            Please do not eat 10-12 hours before your appointment if you are coming in fasting for labs on lipids, cholesterol, or glucose (sugar). This does not apply to pregnant women. Water, hot tea and black coffee (with nothing added) are okay. Do not drink other fluids, diet soda or chew gum.            Jul 09, 2018  9:10 AM CDT   LAB with [facility]   [facility] ([facility])    [address] [place] [id number]   [phone]           Please do not eat 10-12 hours before your appointment if you are coming in fasting for labs on lipids, cholesterol, or glucose (sugar). This does not apply to pregnant women. Water, hot tea and black coffee (with nothing added) are okay. Do not drink other fluids, diet soda or chew gum.            Jul 09, 2018 10:00 AM CDT   Return Visit with FATMATA Cruz CNP   [facility] ([facility])    [address] [place] [id number]   [phone] OPT 2               Care Instructions        After Care Instructions     Discharge Instructions - Follow up with [facility] Nurse Practitioner        Follow up with [facility] Nurse Practitioner at [facility] Clinic of patient preference in 7-10 days.            Discharge Instructions - IF on Metformin (Glucophage or Glucovance) or Metformin containing medications       IF on Metformin (Glucophage or Glucovance) or Metformin containing medications , schedule a Basic Metabolic Panel at [facility] Heart or Primary Clinic in 48 - 72 hours post procedure and PRIOR TO resuming the Metformin or Metformin containing medications.  Hold Metformin (Glucophage or Glucovance) or Metformin containing medications until after the Basic Metabolic Panel on the 2nd or 3rd day following the procedure.  May resume after blood draw is complete.                  Further instructions from your care team       Cardiac Stents x 2 Discharge  Instructions - Femoral  With Angioseal    After you go home:      Have an adult stay with you until tomorrow.    Drink extra fluids for 2 days.    You may resume your normal diet.    No smoking       For 24 hours - due to the sedation you received:    Relax and take it easy.    Do NOT make any important or legal decisions.    Do NOT drive or operate machines at home or at work.    Do NOT drink alcohol.    Care of Groin Puncture Site:      For the first 24 hrs - check the puncture site every 1-2 hours while awake.    For 2 days, when you cough, sneeze, laugh or move your bowels, hold your hand over the puncture site and press firmly.    Remove the bandaid after 24 hours. If there is minor oozing, apply another bandaid and remove it after 12 hours.    It is normal to have a small bruise or pea size lump at the site.    You may shower tomorrow. Do NOT take a bath, or use a hot tub or pool for at least 3 days. Do NOT scrub the site. Do not use lotion or powder near the puncture site.     Activity:            For 2 days:    No stooping or squatting    Do NOT do any heavy activity such as exercise, lifting, or straining.     No housework, yard work or any activity that make you sweat    Do NOT lift more than 10 pounds    Bleeding:      If you start bleeding from the site in your groin, lie down flat and press firmly on/above the site for 10 minutes.     Once bleeding stops, lay flat for 2 hours.     Call [facility] as soon as you can.       Call 911 right away if you have heavy bleeding or bleeding that does not stop.      Medicines:      You are taking an antiplatelet medication- Brilinta , do not stop taking it until you talk to your cardiologist.        You are on Metformin (Glucophage), do not restart it until you have blood tests (within 2 to 3 days after discharge).  After you have your blood drawn, you may restart the Metformin.     If you have stopped any medicines, check with your provider about when to restart  "them.    Follow Up Appointments:      Follow up with [facility] Nurse Practitioner at [facility] Clinic of patient preference in 7-10 days.    Cardiac Rehab will contact you for follow up care.    Call the clinic if:      You have increased pain or a large or growing hard lump around the site.    The site is red, swollen, hot or tender.    Blood or fluid is draining from the site.    You have chills or a fever greater than 101 F (38 C).    Your leg feels numb, cool or changes color.    You have hives, a rash or unusual itching.    New pain in the back or belly that you cannot control with Tylenol.    Any questions or concerns.      Other Instructions:      You received a stent - carry your stent card with you at all times.    You have an Angio-seal to your right groin. It will dissolve in 90 days. Carry the card with you until then.      [facility] [place]:    [phone] [facility] (7 days a week)         Additional Information About Your Visit        [employer] Information     [employer] gives you secure access to your electronic health record. If you see a primary care provider, you can also send messages to your care team and make appointments. If you have questions, please call your primary care clinic.  If you do not have a primary care provider, please call [phone] and they will assist you.        Care EveryWhere ID     This is your Care EveryWhere ID. This could be used by other organizations to access your [facility] medical records  [id number]        Your Vitals Were     Blood Pressure Pulse Temperature Respirations Height Weight    145/96 (BP Location: Right arm) 64 97.9  F (36.6  C) (Oral) 16 1.626 m (5' 4\") 82.9 kg (182 lb 11.2 oz)    Pulse Oximetry BMI (Body Mass Index)                98% 31.36 kg/m2           Primary Care Provider Office Phone # Fax #    Zoraida Braga [phone]      Equal Access to Services     MIKALA RENAE AH: Jacqueline edward " Nuris, toñitoda luhughadaha, qaybta kaalmada vivek, elsa idalmisin hayaan manjindersuzanne tianaherlinda laOtiliojose g shanice. So [facility] [phone].    ATENCIÓN: Si radha [place]leton, tiene a sarah disposición servicios gratuitos de asistencia lingüística. Tanisha al [phone].    We comply with applicable federal civil rights laws and [place] laws. We do not discriminate on the basis of race, color, national origin, age, disability, sex, sexual orientation, or gender identity.            Thank you!     Thank you for choosing [facility] for your care. Our goal is always to provide you with excellent care. Hearing back from our patients is one way we can continue to improve our services. Please take a few minutes to complete the written survey that you may receive in the mail after you visit with us. Thank you!             Medication List: This is a list of all your medications and when to take them. Check marks below indicate your daily home schedule. Keep this list as a reference.      Medications           Morning Afternoon Evening Bedtime As Needed    amLODIPine 5 MG tablet   Commonly known as:  NORVASC   Take 1 tablet (5 mg) by mouth daily                                * aspirin 81 MG tablet   Take 81 mg by mouth daily                                * aspirin 81 MG EC tablet   Take 1 tablet (81 mg) by mouth daily Start tomorrow morning.   Start taking on:  6/28/2018                                atorvastatin 10 MG tablet   Commonly known as:  LIPITOR   Take 1 tablet (10 mg) by mouth daily                                calcium-vitamin D 600-400 MG-UNIT per tablet   Commonly known as:  CALTRATE   Take 1 tablet by mouth daily                                cholecalciferol 1000 UNIT tablet   Commonly known as:  vitamin D3   Take 1 tablet (1,000 Units) by mouth daily                                hydrochlorothiazide 25 MG tablet   Commonly known as:  HYDRODIURIL   Take 1 tablet (25 mg) by mouth daily                                levothyroxine 112  MCG tablet   Commonly known as:  SYNTHROID/LEVOTHROID   Take 1 tablet (112 mcg) by mouth daily                                losartan 50 MG tablet   Commonly known as:  COZAAR   Take 1 tablet (50 mg) by mouth daily                                metFORMIN 500 MG 24 hr tablet   Commonly known as:  GLUCOPHAGE-XR   Take 1 tablet (500 mg) by mouth daily (with dinner)                                metoprolol succinate 50 MG 24 hr tablet   Commonly known as:  TOPROL-XL   Take 1 tablet (50 mg) by mouth daily Hold IF heart rate less than 55.                                multivitamin, therapeutic with minerals Tabs tablet   Take 1 tablet by mouth daily                                nitroGLYcerin 0.4 MG sublingual tablet   Commonly known as:  NITROSTAT   For chest pain place 1 tablet under the tongue every 5 minutes for 3 doses. If symptoms persist 5 minutes after 1st dose call 911.                                order for DME   Compression Stockings Knee high 20/30 compression                                ticagrelor 90 MG tablet   Commonly known as:  BRILINTA   Take 1 tablet (90 mg) by mouth 2 times daily Start this evening.   Last time this was given:  180 mg on 6/27/2018  8:55 AM                                VITAMIN B 12 PO   Take 1 tablet by mouth daily VIT B12                                * Notice:  This list has 2 medication(s) that are the same as other medications prescribed for you. Read the directions carefully, and ask your doctor or other care provider to review them with you.

## 2018-06-27 NOTE — PROGRESS NOTES
Care Suites Arrival    Reason for Visit: L heart cath    A/O. Denies pain. Labs WNL. IV flds infusing. Contrast D/C instructions reviewed with pt with verbal understanding received. Copy given to Pt. All questions & concerns addressed.     Skin assess issues - none    Pt resting in bed, denies additional needs at this time, call light within reach. Family at bedside.  Darrion will stay with pt overnight.       Dr at bedside to speak with pt & spouse. Consent signed at this time.    Pt to procedure area at this time.

## 2018-06-27 NOTE — PROGRESS NOTES
1300-assessed site, soft. Pt walked to BR. Came back ans laid down. Felt small hematoma, with some external bleeding noted, held pressure to site for 5 min, site now soft. Pt on bedrest for 1 hr and recheck q15min.

## 2018-06-27 NOTE — PROGRESS NOTES
Site is soft. Went over D/C paperwork, all questions answered and paperwork signed. Gave Rx medications to Pt. Pt left with all belongings. Pt escorted to ride ans placed in passenger seat.

## 2018-06-27 NOTE — IP AVS SNAPSHOT
[place] [address]    [place] [address]    Phone:  [phone]                                       After Visit Summary   6/27/2018    Yola Mayo    MRN: [id number]           After Visit Summary Signature Page     I have received my discharge instructions, and my questions have been answered. I have discussed any challenges I see with this plan with the nurse or doctor.    ..........................................................................................................................................  Patient/Patient Representative Signature      ..........................................................................................................................................  Patient Representative Print Name and Relationship to Patient    ..................................................               ................................................  Date                                            Time    ..........................................................................................................................................  Reviewed by Signature/Title    ...................................................              ..............................................  Date                                                            Time

## 2018-06-27 NOTE — DISCHARGE INSTRUCTIONS
Cardiac Stents x 2 Discharge Instructions - Femoral  With Angioseal    After you go home:      Have an adult stay with you until tomorrow.    Drink extra fluids for 2 days.    You may resume your normal diet.    No smoking       For 24 hours - due to the sedation you received:    Relax and take it easy.    Do NOT make any important or legal decisions.    Do NOT drive or operate machines at home or at work.    Do NOT drink alcohol.    Care of Groin Puncture Site:      For the first 24 hrs - check the puncture site every 1-2 hours while awake.    For 2 days, when you cough, sneeze, laugh or move your bowels, hold your hand over the puncture site and press firmly.    Remove the bandaid after 24 hours. If there is minor oozing, apply another bandaid and remove it after 12 hours.    It is normal to have a small bruise or pea size lump at the site.    You may shower tomorrow. Do NOT take a bath, or use a hot tub or pool for at least 3 days. Do NOT scrub the site. Do not use lotion or powder near the puncture site.     Activity:            For 2 days:    No stooping or squatting    Do NOT do any heavy activity such as exercise, lifting, or straining.     No housework, yard work or any activity that make you sweat    Do NOT lift more than 10 pounds    Bleeding:      If you start bleeding from the site in your groin, lie down flat and press firmly on/above the site for 10 minutes.     Once bleeding stops, lay flat for 2 hours.     Call [facility] as soon as you can.       Call 911 right away if you have heavy bleeding or bleeding that does not stop.      Medicines:      You are taking an antiplatelet medication- Brilinta , do not stop taking it until you talk to your cardiologist.        You are on Metformin (Glucophage), do not restart it until you have blood tests (within 2 to 3 days after discharge).  After you have your blood drawn, you may restart the Metformin.     If you have stopped any medicines, check with your  provider about when to restart them.    Follow Up Appointments:      Follow up with [facility] Nurse Practitioner at [facility] Clinic of patient preference in 7-10 days.    Cardiac Rehab will contact you for follow up care.    Call the clinic if:      You have increased pain or a large or growing hard lump around the site.    The site is red, swollen, hot or tender.    Blood or fluid is draining from the site.    You have chills or a fever greater than 101 F (38 C).    Your leg feels numb, cool or changes color.    You have hives, a rash or unusual itching.    New pain in the back or belly that you cannot control with Tylenol.    Any questions or concerns.      Other Instructions:      You received a stent - carry your stent card with you at all times.    You have an Angio-seal to your right groin. It will dissolve in 90 days. Carry the card with you until then.      [facility] [place]:    [phone] [facility] (7 days a week)

## 2018-06-28 ENCOUNTER — TELEPHONE (OUTPATIENT)
Dept: CARDIOLOGY | Facility: CLINIC | Age: 73
End: 2018-06-28

## 2018-06-28 DIAGNOSIS — I25.10 CAD (CORONARY ARTERY DISEASE): Primary | ICD-10-CM

## 2018-06-28 NOTE — TELEPHONE ENCOUNTER
Spoke with patient post discharge from care suites after coronary angiography.     Doing well on a cardiac standpoint, denies chest pain, shortness of breath, or  Palpitations.  Blood pressure was elevated post procedure. She was instructed to increase her metoprolol to 50mg daily and increase her Cozaar to 50mg BID. This morning blood pressure was 132/71.  Brilinta and aspirin.   Continue on all other medications.     Intervention: MALLY x 2 to prox /mid RCA    Access Site: Right Femoral Artery, she reports groin is soft, flat, and dry.     Instructions:   Patient may remove the Band-Aid after 24 hours, but if there is minor oozing apply another and may remove second Band-Aid after 12 hours. Also, no heavy exercise for 2 days, do not take a bath, or use a hot tub or pool for at least 3 days but may shower.     Reviewed with patient care of groin site to not scrub the site, for the first 2 days do not squat or stoop, and hold your hand over the puncture site when you cough, sneeze or have a bowel movement. It is normal to have a lump or bruise. No lifting >10 lbs for 3-5 days. Lie down and place firm pressure on groin site if it start bleeding.    Instructed patient to call 911 right away if the bleeding is heavy or does not stop. Call the clinic if there is a large or growing lump around the site, the site is red, swollen, hot, or tender, have fever >101 degrees F or chills, your arm or leg feels numb or cool, or hives, a rash, or unusual itching, shortness of breath, or chest discomfort.     Heart Follow Up:   BMP on 6/29/18, resume metformin if creatinine stable.   7/9/18 at 10 am with Krystal Ekelena, MAYELA, Labs prior.     After hours contact number [phone] option 2.     Anita Mejia  6/28/2018

## 2018-06-29 DIAGNOSIS — I10 BENIGN ESSENTIAL HYPERTENSION: ICD-10-CM

## 2018-06-29 LAB
ANION GAP SERPL CALCULATED.3IONS-SCNC: 14.7 MMOL/L (ref 6–17)
BUN SERPL-MCNC: 13 MG/DL (ref 7–30)
CALCIUM SERPL-MCNC: 9.7 MG/DL (ref 8.5–10.5)
CHLORIDE SERPL-SCNC: 101 MMOL/L (ref 98–107)
CO2 SERPL-SCNC: 25 MMOL/L (ref 23–29)
CREAT SERPL-MCNC: 0.91 MG/DL (ref 0.7–1.3)
GFR SERPL CREATININE-BSD FRML MDRD: 61 ML/MIN/1.7M2
GLUCOSE SERPL-MCNC: 146 MG/DL (ref 70–105)
INTERPRETATION ECG - MUSE: NORMAL
INTERPRETATION ECG - MUSE: NORMAL
POTASSIUM SERPL-SCNC: 3.7 MMOL/L (ref 3.5–5.1)
SODIUM SERPL-SCNC: 137 MMOL/L (ref 136–145)

## 2018-06-29 PROCEDURE — 80048 BASIC METABOLIC PNL TOTAL CA: CPT | Performed by: INTERNAL MEDICINE

## 2018-06-29 PROCEDURE — 36415 COLL VENOUS BLD VENIPUNCTURE: CPT | Performed by: INTERNAL MEDICINE

## 2018-07-09 ENCOUNTER — OFFICE VISIT (OUTPATIENT)
Dept: CARDIOLOGY | Facility: CLINIC | Age: 73
End: 2018-07-09
Attending: NURSE PRACTITIONER
Payer: COMMERCIAL

## 2018-07-09 VITALS
SYSTOLIC BLOOD PRESSURE: 130 MMHG | DIASTOLIC BLOOD PRESSURE: 80 MMHG | BODY MASS INDEX: 32.25 KG/M2 | WEIGHT: 182 LBS | HEART RATE: 59 BPM | OXYGEN SATURATION: 98 % | HEIGHT: 63 IN

## 2018-07-09 DIAGNOSIS — I10 BENIGN ESSENTIAL HYPERTENSION: ICD-10-CM

## 2018-07-09 DIAGNOSIS — R06.09 DOE (DYSPNEA ON EXERTION): ICD-10-CM

## 2018-07-09 DIAGNOSIS — I25.110 CORONARY ARTERY DISEASE INVOLVING NATIVE CORONARY ARTERY OF NATIVE HEART WITH UNSTABLE ANGINA PECTORIS (H): Primary | ICD-10-CM

## 2018-07-09 DIAGNOSIS — R94.39 ABNORMAL CARDIOVASCULAR STRESS TEST: ICD-10-CM

## 2018-07-09 DIAGNOSIS — I10 ESSENTIAL HYPERTENSION: ICD-10-CM

## 2018-07-09 LAB
ANION GAP SERPL CALCULATED.3IONS-SCNC: 14.2 MMOL/L (ref 6–17)
BUN SERPL-MCNC: 14 MG/DL (ref 7–30)
CALCIUM SERPL-MCNC: 9.5 MG/DL (ref 8.5–10.5)
CHLORIDE SERPL-SCNC: 102 MMOL/L (ref 98–107)
CO2 SERPL-SCNC: 27 MMOL/L (ref 23–29)
CREAT SERPL-MCNC: 0.82 MG/DL (ref 0.7–1.3)
GFR SERPL CREATININE-BSD FRML MDRD: 68 ML/MIN/1.7M2
GLUCOSE SERPL-MCNC: 102 MG/DL (ref 70–105)
POTASSIUM SERPL-SCNC: 3.2 MMOL/L (ref 3.5–5.1)
SODIUM SERPL-SCNC: 140 MMOL/L (ref 136–145)

## 2018-07-09 PROCEDURE — 36415 COLL VENOUS BLD VENIPUNCTURE: CPT | Performed by: NURSE PRACTITIONER

## 2018-07-09 PROCEDURE — 99214 OFFICE O/P EST MOD 30 MIN: CPT | Performed by: NURSE PRACTITIONER

## 2018-07-09 PROCEDURE — 80048 BASIC METABOLIC PNL TOTAL CA: CPT | Performed by: NURSE PRACTITIONER

## 2018-07-09 RX ORDER — METOPROLOL SUCCINATE 25 MG/1
25 TABLET, EXTENDED RELEASE ORAL DAILY
Qty: 90 TABLET | Refills: 3 | Status: ON HOLD | OUTPATIENT
Start: 2018-07-09 | End: 2018-10-15

## 2018-07-09 RX ORDER — CLOPIDOGREL BISULFATE 75 MG/1
75 TABLET ORAL DAILY
Qty: 90 TABLET | Refills: 3 | Status: SHIPPED | OUTPATIENT
Start: 2018-07-09 | End: 2019-06-17

## 2018-07-09 RX ORDER — LOSARTAN POTASSIUM 100 MG/1
100 TABLET ORAL DAILY
Qty: 90 TABLET | Refills: 3 | Status: SHIPPED | OUTPATIENT
Start: 2018-07-09 | End: 2019-05-07

## 2018-07-09 RX ORDER — METOPROLOL SUCCINATE 25 MG/1
50 TABLET, EXTENDED RELEASE ORAL DAILY
Qty: 90 TABLET | Refills: 3 | Status: SHIPPED | OUTPATIENT
Start: 2018-07-09 | End: 2018-07-09

## 2018-07-09 NOTE — MR AVS SNAPSHOT
After Visit Summary   7/9/2018    Yola Mayo    MRN: [id number]           Patient Information     Date Of Birth          1945        Visit Information        Provider Department      7/9/2018 10:00 AM Krystal Neal APRN CNP [facility]        Today's Diagnoses     Coronary artery disease involving native coronary artery of native heart with unstable angina pectoris (H)    -  1    Essential hypertension        Abnormal cardiovascular stress test        Benign essential hypertension        GALINDO (dyspnea on exertion)          Care Instructions    Today's Recommendations    1. DECREASE Metoprolol XL to 1/2 tablet or 25 mg daily with a refill for 25 mg tablets.  2. INCREASE Losartan to 100 mg daily  3. CONTINUE Brilinta, but when complete START Plavix.   4. On the first day of Plavix take 300 mg (4 tablets). On day 2 and moving forward take 75 mg daily for 1 year untinterrupted  5. CONTINUE Liptor  6. Continue all other medications without changes.  7. Please follow up with Dr. Howard in 6 months with stress test and check cholesterol.    Please send a [employer] message or call [phone] with questions or concerns.     Scheduling number [phone].            Follow-ups after your visit        Additional Services     Follow-Up with Cardiologist                 Your next 10 appointments already scheduled     Jul 11, 2018  1:00 PM CDT   Cardiac Evaluation with  Cardiac Rehab 3   [facility] ([facility])    [address], [address]  [facility] [address] [phone]              Future tests that were ordered for you today     Open Future Orders        Priority Expected Expires Ordered    Lipid Profile Routine 1/5/2019 7/9/2019 7/9/2018    ALT Routine 1/5/2019 7/9/2019 7/9/2018    Follow-Up with Cardiologist Routine 1/5/2019 7/9/2019 7/9/2018    Exercise Stress Echocardiogram Routine 1/5/2019 7/9/2019 7/9/2018     "        Who to contact     If you have questions or need follow up information about today's clinic visit or your schedule please contact [facility] directly at [phone].  Normal or non-critical lab and imaging results will be communicated to you by MyChart, letter or phone within 4 business days after the clinic has received the results. If you do not hear from us within 7 days, please contact the clinic through MyChart or phone. If you have a critical or abnormal lab result, we will notify you by phone as soon as possible.  Submit refill requests through [employer] or call your pharmacy and they will forward the refill request to us. Please allow 3 business days for your refill to be completed.          Additional Information About Your Visit        RIDER[employer]art Information     [employer] gives you secure access to your electronic health record. If you see a primary care provider, you can also send messages to your care team and make appointments. If you have questions, please call your primary care clinic.  If you do not have a primary care provider, please call [phone] and they will assist you.        Care EveryWhere ID     This is your Care EveryWhere ID. This could be used by other organizations to access your [place] medical records  [id number]        Your Vitals Were     Pulse Height Pulse Oximetry BMI (Body Mass Index)          59 1.6 m (5' 3\") 98% 32.24 kg/m2         Blood Pressure from Last 3 Encounters:   07/09/18 130/80   06/27/18 149/74   06/13/18 160/78    Weight from Last 3 Encounters:   07/09/18 82.6 kg (182 lb)   06/27/18 82.9 kg (182 lb 11.2 oz)   06/13/18 83.5 kg (184 lb)              We Performed the Following     Follow-Up with Cardiac Advanced Practice Provider (post procedure)          Today's Medication Changes          These changes are accurate as of 7/9/18 10:17 AM.  If you have any questions, ask your nurse or doctor.               Start taking " these medicines.        Dose/Directions    clopidogrel 75 MG tablet   Commonly known as:  PLAVIX   Used for:  Coronary artery disease involving native coronary artery of native heart with unstable angina pectoris (H)   Started by:  Krystal Neal APRN CNP        Dose:  75 mg   Take 1 tablet (75 mg) by mouth daily On day 1 take 4 tablet (300 mg) then 75 mg thereafter   Quantity:  90 tablet   Refills:  3         These medicines have changed or have updated prescriptions.        Dose/Directions    losartan 100 MG tablet   Commonly known as:  COZAAR   This may have changed:    - medication strength  - how much to take   Used for:  Benign essential hypertension, GALINDO (dyspnea on exertion)   Changed by:  Krystal Neal APRN CNP        Dose:  100 mg   Take 1 tablet (100 mg) by mouth daily   Quantity:  90 tablet   Refills:  3       metoprolol succinate 25 MG 24 hr tablet   Commonly known as:  TOPROL-XL   This may have changed:    - medication strength  - additional instructions   Used for:  Coronary artery disease involving native coronary artery of native heart with unstable angina pectoris (H)   Changed by:  Krystal Neal APRN CNP        Dose:  50 mg   Take 2 tablets (50 mg) by mouth daily   Quantity:  90 tablet   Refills:  3         Stop taking these medicines if you haven't already. Please contact your care team if you have questions.     ticagrelor 90 MG tablet   Commonly known as:  BRILINTA   Stopped by:  Krystal Neal APRN CNP                Where to get your medicines      These medications were sent to [facility] [address] [facility] [address] [address]  [address] [address]     Phone:  [phone]     clopidogrel 75 MG tablet    losartan 100 MG tablet    metoprolol succinate 25 MG 24 hr tablet                Primary Care Provider Office Phone # Fax #    Zoraida Braga [phone] [address]  [facility] [address]        Equal  Access to Services     [facility] Center[facility]ACKELINE : Hadii aad ku hadronaldtd Isidraali, wadebrada luqadaha, qaybta kasusanaelsa allen. So [facility] [phone].    ATENCIÓN: Si habla español, tiene a sarah disposición servicios gratuitos de asistencia lingüística. Llame al [phone].    We comply with applicable federal civil rights laws and [place] laws. We do not discriminate on the basis of race, color, national origin, age, disability, sex, sexual orientation, or gender identity.            Thank you!     Thank you for choosing [facility]  for your care. Our goal is always to provide you with excellent care. Hearing back from our patients is one way we can continue to improve our services. Please take a few minutes to complete the written survey that you may receive in the mail after your visit with us. Thank you!             Your Updated Medication List - Protect others around you: Learn how to safely use, store and throw away your medicines at www.disposemymeds.org.          This list is accurate as of 7/9/18 10:17 AM.  Always use your most recent med list.                   Brand Name Dispense Instructions for use Diagnosis    amLODIPine 5 MG tablet    NORVASC    90 tablet    Take 1 tablet (5 mg) by mouth daily    Essential hypertension, benign       aspirin 81 MG tablet      Take 81 mg by mouth daily        atorvastatin 10 MG tablet    LIPITOR    90 tablet    Take 1 tablet (10 mg) by mouth daily    Coronary artery disease involving native coronary artery of native heart with unstable angina pectoris (H)       calcium-vitamin D 600-400 MG-UNIT per tablet    CALTRATE    60 tablet    Take 1 tablet by mouth daily    Preventive measure       cholecalciferol 1000 UNIT tablet    vitamin D3    100 tablet    Take 1 tablet (1,000 Units) by mouth daily    Vitamin deficiency       clopidogrel 75 MG tablet    PLAVIX    90 tablet    Take 1 tablet (75 mg) by mouth  daily On day 1 take 4 tablet (300 mg) then 75 mg thereafter    Coronary artery disease involving native coronary artery of native heart with unstable angina pectoris (H)       hydrochlorothiazide 25 MG tablet    HYDRODIURIL    90 tablet    Take 1 tablet (25 mg) by mouth daily    Essential hypertension, benign       levothyroxine 112 MCG tablet    SYNTHROID/LEVOTHROID    90 tablet    Take 1 tablet (112 mcg) by mouth daily    Hypothyroidism, unspecified type       losartan 100 MG tablet    COZAAR    90 tablet    Take 1 tablet (100 mg) by mouth daily    Benign essential hypertension, GALINDO (dyspnea on exertion)       metFORMIN 500 MG 24 hr tablet    GLUCOPHAGE-XR    90 tablet    Take 1 tablet (500 mg) by mouth daily (with dinner)    Prediabetes       metoprolol succinate 25 MG 24 hr tablet    TOPROL-XL    90 tablet    Take 2 tablets (50 mg) by mouth daily    Coronary artery disease involving native coronary artery of native heart with unstable angina pectoris (H)       multivitamin, therapeutic with minerals Tabs tablet      Take 1 tablet by mouth daily        nitroGLYcerin 0.4 MG sublingual tablet    NITROSTAT    25 tablet    For chest pain place 1 tablet under the tongue every 5 minutes for 3 doses. If symptoms persist 5 minutes after 1st dose call 911.    Other chest pain, Abnormal cardiovascular stress test       order for DME     2 each    Compression Stockings Knee high 20/30 compression    Edema of lower extremity       VITAMIN B 12 PO      Take 1 tablet by mouth daily VIT B12

## 2018-07-09 NOTE — LETTER
7/9/2018    Zoraida Braga MD  [address]  [facility] [address]    RE: Yola Mayo       Dear Colleague,    [facility] the [facility].    Cardiology Clinic Progress Note  Yola Mayo MRN# [id number]   YOB: 1945 Age: 73 year old     Reason for visit: Follow up coronary angiogram           Assessment and Plan:     1. Coronary artery disease    Status post 2 drug eluting stents to the mid and distal RCA    Residual 50-60% mid LAD lesion with 100% occlusion of the first diagonal    Transition from Brilinta to Plavix due to cost with a 300 mg Plavix load on day 1 of transition then 75 mg there after    Follow-up in 6 months with Dr. Howard and a repeat stress echocardiogram for further evaluation of the LAD and first diagonal    2. Hyperlipidemia    Post angiogram started on atorvastatin 10 mg daily    Most recent LDL was 47    Repeat fasting lipids prior to next visit in 6 months    3. Hypertension, goal less than 130/80    Toprol XL 50 mg daily was commenced post coronary angiogram.  She has a history of fatigue with Toprol    Decrease Toprol-XL to 25 mg daily    Increase losartan to 100 mg daily    Continue amlodipine 5 mg daily and hydrochlorthiazide 25 mg daily         History of Presenting Illness:    Yola Mayo is a very pleasant 73 year old patient of Dr. Howard  who presents for a follow-up post coronary angiogram. Her cardiac risk factors include hypertension, hyperlipidemia and diabetes.      Due to chest burning with exercise and shortness of breath on exertion she underwent a stress echocardiogram for assessment of both ischemia and chronotropic incompetence. The stress echocardiogram showed distal septum, anterior, lateral wall and apex were hypoki complaints of netic at rest with mildly hypokinetic LV that does not become hyperdynamic that can be seen in hypertensive heart disease. No  "evidcence of stress induced ischemia on EKG.  She recent 86% of maximum predicted HR.  She had chest burning during recovery.  Therefore, an invasive coronary angiogram was recommended.     The angiogram was completed via right femoral approach.  This revealed a 50-60% narrowing of the proximal LAD with 100% occlusion of the first diagonal above the LAD lesion.  The left circumflex had a 20-30% proximal narrowing.  The right coronary artery had a 60-70% narrowing in the proximal portion and a 95% narrowing in the midportion at the takeoff of the RV branch which is 95% narrowed.  She subsequently underwent 2 drug-eluting stents that were back to back to the right coronary artery.    Post angiogram metoprolol XL was added to her regimen as well as atorvastatin 10 mg daily.  Patient states that since her procedure she has noticed increasing fatigue.  She was previously in tolerant of Toprol due to fatigue.  Her chest discomfort has completely resolved.  She will be attending cardiac rehabilitation starting later this week.            Review of Systems:   Review of Systems:  Skin:  Negative     Eyes:  Positive for glasses  ENT:  Negative    Respiratory:  Negative    Cardiovascular:  palpitations;chest pain;lightheadedness;dizziness;syncope or near-syncope;cyanosis;fatigue;Negative;edema    Gastroenterology: Negative    Genitourinary:  Positive for urinary frequency;nocturia  Musculoskeletal:  Negative back pain  Neurologic:  Negative    Psychiatric:  Negative    Heme/Lymph/Imm:  Positive for allergies  Endocrine:  Positive for thyroid disorder;diabetes              Physical Exam:     Vitals: /80 (BP Location: Left arm, Patient Position: Chair, Cuff Size: Adult Regular)  Pulse 59  Ht 1.6 m (5' 3\")  Wt 82.6 kg (182 lb)  SpO2 98%  BMI 32.24 kg/m2  Constitutional:  cooperative, alert and oriented, well developed, well nourished, in no acute distress        Skin:  warm and dry to the touch, no apparent skin " lesions or masses noted        Head:  normocephalic, no masses or lesions        Eyes:  pupils equal and round;conjunctivae and lids unremarkable        ENT:  no pallor or cyanosis, dentition good        Neck:  JVP normal        Chest:  clear to auscultation;normal symmetry        Cardiac: regular rhythm occasional premature beats                Extremities and Back:  no edema   Right femoral access site CDI without bruit. No hematoma with mild bruising    Neurological:  no gross motor deficits;affect appropriate               Medications:     Current Outpatient Prescriptions   Medication Sig Dispense Refill     amLODIPine (NORVASC) 5 MG tablet Take 1 tablet (5 mg) by mouth daily 90 tablet 3     aspirin 81 MG tablet Take 81 mg by mouth daily       atorvastatin (LIPITOR) 10 MG tablet Take 1 tablet (10 mg) by mouth daily 90 tablet 3     calcium-vitamin D (CALTRATE) 600-400 MG-UNIT per tablet Take 1 tablet by mouth daily 60 tablet 11     cholecalciferol (VITAMIN D) 1000 UNIT tablet Take 1 tablet (1,000 Units) by mouth daily 100 tablet 3     clopidogrel (PLAVIX) 75 MG tablet Take 1 tablet (75 mg) by mouth daily On day 1 take 4 tablet (300 mg) then 75 mg thereafter 90 tablet 3     Cyanocobalamin (VITAMIN B 12 PO) Take 1 tablet by mouth daily VIT B12       hydrochlorothiazide (HYDRODIURIL) 25 MG tablet Take 1 tablet (25 mg) by mouth daily 90 tablet 3     levothyroxine (SYNTHROID/LEVOTHROID) 112 MCG tablet Take 1 tablet (112 mcg) by mouth daily 90 tablet 3     losartan (COZAAR) 100 MG tablet Take 1 tablet (100 mg) by mouth daily 90 tablet 3     metFORMIN (GLUCOPHAGE-XR) 500 MG 24 hr tablet Take 1 tablet (500 mg) by mouth daily (with dinner) 90 tablet 3     metoprolol succinate (TOPROL-XL) 25 MG 24 hr tablet Take 1 tablet (25 mg) by mouth daily 90 tablet 3     multivitamin, therapeutic with minerals (THERA-VIT-M) TABS Take 1 tablet by mouth daily       nitroGLYcerin (NITROSTAT) 0.4 MG sublingual tablet For chest pain place  1 tablet under the tongue every 5 minutes for 3 doses. If symptoms persist 5 minutes after 1st dose call 911. 25 tablet 3     order for DME Compression Stockings  Knee high  20/30 compression 2 each 1     [DISCONTINUED] losartan (COZAAR) 50 MG tablet Take 1 tablet (50 mg) by mouth daily 90 tablet 3     [DISCONTINUED] metoprolol succinate (TOPROL-XL) 25 MG 24 hr tablet Take 2 tablets (50 mg) by mouth daily 90 tablet 3     [DISCONTINUED] metoprolol succinate (TOPROL-XL) 50 MG 24 hr tablet Take 1 tablet (50 mg) by mouth daily Hold IF heart rate less than 55. 90 tablet 3           Family History   Problem Relation Age of Onset     Cancer Mother      ovarian  of it  at 49yo.     Diabetes Father      Adult onset     Hypertension Father      C.A.D. Father      Fatal Heart attack at 70.     Cerebrovascular Disease Father      Thyroid Disease Sister      Thyroid Disease Sister      Thyroid Disease Sister      Cancer Daughter      Thyroid Disease Daughter      Breast Cancer No family hx of      Cancer - colorectal No family hx of        Social History     Social History     Marital status:      Spouse name: FABIAN CUADRA     Number of children: 2     Years of education: N/A     Occupational History     [employer]       [employer]     Social History Main Topics     Smoking status: Never Smoker     Smokeless tobacco: Never Used     Alcohol use 0.0 oz/week     0 Standard drinks or equivalent per week      Comment: Approx 1-2 times monthy     Drug use: No     Sexual activity: Yes     Partners: Male     Other Topics Concern      Service No     Blood Transfusions No     Caffeine Concern No     Occupational Exposure No     Hobby Hazards No     Sleep Concern No     Stress Concern No     Weight Concern Yes     Special Diet No     Back Care No     Exercise Yes     Approx 3 times a week for about 30 minutes     Bike Helmet Yes     Seat Belt Yes     Self-Exams Yes     Social History Narrative            Past  Medical History:     Past Medical History:   Diagnosis Date     BASAL CELL CANCER -NOSE      Family history of diabetes mellitus     Father     Family history of ischemic heart disease     Father     Family history of malignant neoplasm of ovary     Mother     HX DISRUPT L ANT CRUCIATE S/P REPAIR 2003      HYPERTENSION       HYPOTHYROIDISM      LOC PRIM OSTEOARTH-L 1ST METATARSAL-CUNEIFORM JOINT 3/10/2004     Major depressive disorder, single episode, mild (H) 11/24/2014     MILD-MOD TRICUSPID REGURG. MOD PULM HTN 6/23/2005    ECHO  5/05     SPLENIC CYST               Past Surgical History:     Past Surgical History:   Procedure Laterality Date     ARTHROPLASTY KNEE Left 4/13/2016    Procedure: ARTHROPLASTY KNEE;  Surgeon: Giuseppe Lopez MD;  Location: [facility] OR     ARTHROPLASTY MINIMALLY INVASIVE HIP  4/18/2012    Procedure:ARTHROPLASTY MINIMALLY INVASIVE HIP; RIGHT TWO INCISION MINIMALLY INVASIVE HIP ARTHROPLASTY (YUVAL)^ (NO CELL SAVER) (C-ARM); Surgeon:GIUSEPPE LOPEZ; Location: OR     C APPENDECTOMY  8/87    incidental appi at [facility]     C CHEMOSURG MOHS 1ST STAGE  9-99    Mohs-nose     C REPAIR CRUCIATE LIGAMENT,KNEE  8/2003    L   ACL reconstruction     C TOTAL ABDOM HYSTERECTOMY  8/87    W/ BSO fibroids, hx PID     HC COLONOSCOPY THRU STOMA, DIAGNOSTIC  4/03    normal, rec repeat 10 yrs     HYSTERECTOMY, PAP NO LONGER INDICATED       SURGICAL HISTORY OF -   Mult-~1978    infertility, laparotomies              Allergies:   Ace inhibitors; Oxycodone; Tetanus toxoid; and Shellfish-derived products       Data:   All laboratory data reviewed:    Last Basic Metabolic Panel:  Lab Results   Component Value Date     07/09/2018      Lab Results   Component Value Date    POTASSIUM 3.2 07/09/2018     Lab Results   Component Value Date    CHLORIDE 102 07/09/2018     Lab Results   Component Value Date    JOSEPH 9.5 07/09/2018     Lab Results   Component Value Date    CO2 27 07/09/2018     Lab Results   Component  Value Date    BUN 14 07/09/2018     Lab Results   Component Value Date    CR 0.82 07/09/2018     Lab Results   Component Value Date     07/09/2018           FATMATA PEREIRA, CNP    Thank you for allowing me to participate in the care of your patient.      Sincerely,     FATMATA PEREIRA CNP     [facility]    cc:   FATMATA Cruz CNP  [facility]  [address]  [place] [address]

## 2018-07-09 NOTE — LETTER
7/9/2018    Zoraida Braga MD  [address]  [facility] [address]    RE: Yola Mayo       Dear Colleague,    I had the pleasure of seeing Yola Mayo in the [facility].    Cardiology Clinic Progress Note  Yola Mayo MRN# [id number]   YOB: 1945 Age: 73 year old     Reason for visit: Follow up coronary angiogram           Assessment and Plan:     1. Coronary artery disease    Status post 2 drug eluting stents to the mid and distal RCA    Residual 50-60% mid LAD lesion with 100% occlusion of the first diagonal    Transition from Brilinta to Plavix due to cost with a 300 mg Plavix load on day 1 of transition then 75 mg there after    Follow-up in 6 months with Dr. Howard and a repeat stress echocardiogram for further evaluation of the LAD and first diagonal    2. Hyperlipidemia    Post angiogram started on atorvastatin 10 mg daily    Most recent LDL was 47    Repeat fasting lipids prior to next visit in 6 months    3. Hypertension, goal less than 130/80    Toprol XL 50 mg daily was commenced post coronary angiogram.  She has a history of fatigue with Toprol    Decrease Toprol-XL to 25 mg daily    Increase losartan to 100 mg daily    Continue amlodipine 5 mg daily and hydrochlorthiazide 25 mg daily         History of Presenting Illness:    Yola Mayo is a very pleasant 73 year old patient of Dr. Howard  who presents for a follow-up post coronary angiogram. Her cardiac risk factors include hypertension, hyperlipidemia and diabetes.      Due to chest burning with exercise and shortness of breath on exertion she underwent a stress echocardiogram for assessment of both ischemia and chronotropic incompetence. The stress echocardiogram showed distal septum, anterior, lateral wall and apex were hypoki complaints of netic at rest with mildly hypokinetic LV that does not become hyperdynamic that can be seen in hypertensive heart disease. No  "evidcence of stress induced ischemia on EKG.  She recent 86% of maximum predicted HR.  She had chest burning during recovery.  Therefore, an invasive coronary angiogram was recommended.     The angiogram was completed via right femoral approach.  This revealed a 50-60% narrowing of the proximal LAD with 100% occlusion of the first diagonal above the LAD lesion.  The left circumflex had a 20-30% proximal narrowing.  The right coronary artery had a 60-70% narrowing in the proximal portion and a 95% narrowing in the midportion at the takeoff of the RV branch which is 95% narrowed.  She subsequently underwent 2 drug-eluting stents that were back to back to the right coronary artery.    Post angiogram metoprolol XL was added to her regimen as well as atorvastatin 10 mg daily.  Patient states that since her procedure she has noticed increasing fatigue.  She was previously in tolerant of Toprol due to fatigue.  Her chest discomfort has completely resolved.  She will be attending cardiac rehabilitation starting later this week.            Review of Systems:   Review of Systems:  Skin:  Negative     Eyes:  Positive for glasses  ENT:  Negative    Respiratory:  Negative    Cardiovascular:  palpitations;chest pain;lightheadedness;dizziness;syncope or near-syncope;cyanosis;fatigue;Negative;edema    Gastroenterology: Negative    Genitourinary:  Positive for urinary frequency;nocturia  Musculoskeletal:  Negative back pain  Neurologic:  Negative    Psychiatric:  Negative    Heme/Lymph/Imm:  Positive for allergies  Endocrine:  Positive for thyroid disorder;diabetes              Physical Exam:     Vitals: /80 (BP Location: Left arm, Patient Position: Chair, Cuff Size: Adult Regular)  Pulse 59  Ht 1.6 m (5' 3\")  Wt 82.6 kg (182 lb)  SpO2 98%  BMI 32.24 kg/m2  Constitutional:  cooperative, alert and oriented, well developed, well nourished, in no acute distress        Skin:  warm and dry to the touch, no apparent skin " lesions or masses noted        Head:  normocephalic, no masses or lesions        Eyes:  pupils equal and round;conjunctivae and lids unremarkable        ENT:  no pallor or cyanosis, dentition good        Neck:  JVP normal        Chest:  clear to auscultation;normal symmetry        Cardiac: regular rhythm occasional premature beats                Extremities and Back:  no edema   Right femoral access site CDI without bruit. No hematoma with mild bruising    Neurological:  no gross motor deficits;affect appropriate               Medications:     Current Outpatient Prescriptions   Medication Sig Dispense Refill     amLODIPine (NORVASC) 5 MG tablet Take 1 tablet (5 mg) by mouth daily 90 tablet 3     aspirin 81 MG tablet Take 81 mg by mouth daily       atorvastatin (LIPITOR) 10 MG tablet Take 1 tablet (10 mg) by mouth daily 90 tablet 3     calcium-vitamin D (CALTRATE) 600-400 MG-UNIT per tablet Take 1 tablet by mouth daily 60 tablet 11     cholecalciferol (VITAMIN D) 1000 UNIT tablet Take 1 tablet (1,000 Units) by mouth daily 100 tablet 3     clopidogrel (PLAVIX) 75 MG tablet Take 1 tablet (75 mg) by mouth daily On day 1 take 4 tablet (300 mg) then 75 mg thereafter 90 tablet 3     Cyanocobalamin (VITAMIN B 12 PO) Take 1 tablet by mouth daily VIT B12       hydrochlorothiazide (HYDRODIURIL) 25 MG tablet Take 1 tablet (25 mg) by mouth daily 90 tablet 3     levothyroxine (SYNTHROID/LEVOTHROID) 112 MCG tablet Take 1 tablet (112 mcg) by mouth daily 90 tablet 3     losartan (COZAAR) 100 MG tablet Take 1 tablet (100 mg) by mouth daily 90 tablet 3     metFORMIN (GLUCOPHAGE-XR) 500 MG 24 hr tablet Take 1 tablet (500 mg) by mouth daily (with dinner) 90 tablet 3     metoprolol succinate (TOPROL-XL) 25 MG 24 hr tablet Take 1 tablet (25 mg) by mouth daily 90 tablet 3     multivitamin, therapeutic with minerals (THERA-VIT-M) TABS Take 1 tablet by mouth daily       nitroGLYcerin (NITROSTAT) 0.4 MG sublingual tablet For chest pain place  1 tablet under the tongue every 5 minutes for 3 doses. If symptoms persist 5 minutes after 1st dose call 911. 25 tablet 3     order for DME Compression Stockings  Knee high  20/30 compression 2 each 1     [DISCONTINUED] losartan (COZAAR) 50 MG tablet Take 1 tablet (50 mg) by mouth daily 90 tablet 3     [DISCONTINUED] metoprolol succinate (TOPROL-XL) 25 MG 24 hr tablet Take 2 tablets (50 mg) by mouth daily 90 tablet 3     [DISCONTINUED] metoprolol succinate (TOPROL-XL) 50 MG 24 hr tablet Take 1 tablet (50 mg) by mouth daily Hold IF heart rate less than 55. 90 tablet 3           Family History   Problem Relation Age of Onset     Cancer Mother      ovarian  of it  at 49yo.     Diabetes Father      Adult onset     Hypertension Father      C.A.D. Father      Fatal Heart attack at 70.     Cerebrovascular Disease Father      Thyroid Disease Sister      Thyroid Disease Sister      Thyroid Disease Sister      Cancer Daughter      Thyroid Disease Daughter      Breast Cancer No family hx of      Cancer - colorectal No family hx of        Social History     Social History     Marital status:      Spouse name: FABIAN CUADRA     Number of children: 2     Years of education: N/A     Occupational History     [employer]       [employer]     Social History Main Topics     Smoking status: Never Smoker     Smokeless tobacco: Never Used     Alcohol use 0.0 oz/week     0 Standard drinks or equivalent per week      Comment: Approx 1-2 times monthy     Drug use: No     Sexual activity: Yes     Partners: Male     Other Topics Concern      Service No     Blood Transfusions No     Caffeine Concern No     Occupational Exposure No     Hobby Hazards No     Sleep Concern No     Stress Concern No     Weight Concern Yes     Special Diet No     Back Care No     Exercise Yes     Approx 3 times a week for about 30 minutes     Bike Helmet Yes     Seat Belt Yes     Self-Exams Yes     Social History Narrative            Past  Medical History:     Past Medical History:   Diagnosis Date     BASAL CELL CANCER -NOSE      Family history of diabetes mellitus     Father     Family history of ischemic heart disease     Father     Family history of malignant neoplasm of ovary     Mother     HX DISRUPT L ANT CRUCIATE S/P REPAIR 2003      HYPERTENSION       HYPOTHYROIDISM      LOC PRIM OSTEOARTH-L 1ST METATARSAL-CUNEIFORM JOINT 3/10/2004     Major depressive disorder, single episode, mild (H) 11/24/2014     MILD-MOD TRICUSPID REGURG. MOD PULM HTN 6/23/2005    ECHO  5/05     SPLENIC CYST               Past Surgical History:     Past Surgical History:   Procedure Laterality Date     ARTHROPLASTY KNEE Left 4/13/2016    Procedure: ARTHROPLASTY KNEE;  Surgeon: Giuseppe Lopez MD;  Location: [facility] OR     ARTHROPLASTY MINIMALLY INVASIVE HIP  4/18/2012    Procedure:ARTHROPLASTY MINIMALLY INVASIVE HIP; RIGHT TWO INCISION MINIMALLY INVASIVE HIP ARTHROPLASTY (YUVAL)^ (NO CELL SAVER) (C-ARM); Surgeon:GIUSEPPE LOPEZ; Location: OR     C APPENDECTOMY  8/87    incidental appi at [facility]     C CHEMOSURG MOHS 1ST STAGE  9-99    Mohs-nose     C REPAIR CRUCIATE LIGAMENT,KNEE  8/2003    L   ACL reconstruction     C TOTAL ABDOM HYSTERECTOMY  8/87    W/ BSO fibroids, hx PID     HC COLONOSCOPY THRU STOMA, DIAGNOSTIC  4/03    normal, rec repeat 10 yrs     HYSTERECTOMY, PAP NO LONGER INDICATED       SURGICAL HISTORY OF -   Mult-~1978    infertility, laparotomies              Allergies:   Ace inhibitors; Oxycodone; Tetanus toxoid; and Shellfish-derived products       Data:   All laboratory data reviewed:    Last Basic Metabolic Panel:  Lab Results   Component Value Date     07/09/2018      Lab Results   Component Value Date    POTASSIUM 3.2 07/09/2018     Lab Results   Component Value Date    CHLORIDE 102 07/09/2018     Lab Results   Component Value Date    JOSEPH 9.5 07/09/2018     Lab Results   Component Value Date    CO2 27 07/09/2018     Lab Results   Component  Value Date    BUN 14 07/09/2018     Lab Results   Component Value Date    CR 0.82 07/09/2018     Lab Results   Component Value Date     07/09/2018       Thank you for allowing me to participate in the care of your patient.    Sincerely,     FATMATA PEREIRA [facility]

## 2018-07-09 NOTE — PATIENT INSTRUCTIONS
Today's Recommendations    1. DECREASE Metoprolol XL to 1/2 tablet or 25 mg daily with a refill for 25 mg tablets.  2. INCREASE Losartan to 100 mg daily  3. CONTINUE Brilinta, but when complete START Plavix.   4. On the first day of Plavix take 300 mg (4 tablets). On day 2 and moving forward take 75 mg daily for 1 year untinterrupted  5. CONTINUE Liptor  6. Continue all other medications without changes.  7. Please follow up with Dr. Howard in 6 months with stress test and check cholesterol.    Please send a [employer] message or call [phone] with questions or concerns.     Scheduling number [phone].

## 2018-07-09 NOTE — PROGRESS NOTES
Cardiology Clinic Progress Note  Yola Mayo MRN# [id number]   YOB: 1945 Age: 73 year old     Reason for visit: Follow up coronary angiogram           Assessment and Plan:     1. Coronary artery disease    Status post 2 drug eluting stents to the mid and distal RCA    Residual 50-60% mid LAD lesion with 100% occlusion of the first diagonal    Transition from Brilinta to Plavix due to cost with a 300 mg Plavix load on day 1 of transition then 75 mg there after    Follow-up in 6 months with Dr. Howard and a repeat stress echocardiogram for further evaluation of the LAD and first diagonal    2. Hyperlipidemia    Post angiogram started on atorvastatin 10 mg daily    Most recent LDL was 47    Repeat fasting lipids prior to next visit in 6 months    3. Hypertension, goal less than 130/80    Toprol XL 50 mg daily was commenced post coronary angiogram.  She has a history of fatigue with Toprol    Decrease Toprol-XL to 25 mg daily    Increase losartan to 100 mg daily    Continue amlodipine 5 mg daily and hydrochlorthiazide 25 mg daily         History of Presenting Illness:    Yola Mayo is a very pleasant 73 year old patient of Dr. Howard  who presents for a follow-up post coronary angiogram. Her cardiac risk factors include hypertension, hyperlipidemia and diabetes.      Due to chest burning with exercise and shortness of breath on exertion she underwent a stress echocardiogram for assessment of both ischemia and chronotropic incompetence. The stress echocardiogram showed distal septum, anterior, lateral wall and apex were hypoki complaints of netic at rest with mildly hypokinetic LV that does not become hyperdynamic that can be seen in hypertensive heart disease. No evidcence of stress induced ischemia on EKG.  She recent 86% of maximum predicted HR.  She had chest burning during recovery.  Therefore, an invasive coronary angiogram was recommended.     The angiogram was completed via right femoral  "approach.  This revealed a 50-60% narrowing of the proximal LAD with 100% occlusion of the first diagonal above the LAD lesion.  The left circumflex had a 20-30% proximal narrowing.  The right coronary artery had a 60-70% narrowing in the proximal portion and a 95% narrowing in the midportion at the takeoff of the RV branch which is 95% narrowed.  She subsequently underwent 2 drug-eluting stents that were back to back to the right coronary artery.    Post angiogram metoprolol XL was added to her regimen as well as atorvastatin 10 mg daily.  Patient states that since her procedure she has noticed increasing fatigue.  She was previously in tolerant of Toprol due to fatigue.  Her chest discomfort has completely resolved.  She will be attending cardiac rehabilitation starting later this week.            Review of Systems:   Review of Systems:  Skin:  Negative     Eyes:  Positive for glasses  ENT:  Negative    Respiratory:  Negative    Cardiovascular:  palpitations;chest pain;lightheadedness;dizziness;syncope or near-syncope;cyanosis;fatigue;Negative;edema    Gastroenterology: Negative    Genitourinary:  Positive for urinary frequency;nocturia  Musculoskeletal:  Negative back pain  Neurologic:  Negative    Psychiatric:  Negative    Heme/Lymph/Imm:  Positive for allergies  Endocrine:  Positive for thyroid disorder;diabetes              Physical Exam:     Vitals: /80 (BP Location: Left arm, Patient Position: Chair, Cuff Size: Adult Regular)  Pulse 59  Ht 1.6 m (5' 3\")  Wt 82.6 kg (182 lb)  SpO2 98%  BMI 32.24 kg/m2  Constitutional:  cooperative, alert and oriented, well developed, well nourished, in no acute distress        Skin:  warm and dry to the touch, no apparent skin lesions or masses noted        Head:  normocephalic, no masses or lesions        Eyes:  pupils equal and round;conjunctivae and lids unremarkable        ENT:  no pallor or cyanosis, dentition good        Neck:  JVP normal        Chest:  clear " to auscultation;normal symmetry        Cardiac: regular rhythm occasional premature beats                Extremities and Back:  no edema   Right femoral access site CDI without bruit. No hematoma with mild bruising    Neurological:  no gross motor deficits;affect appropriate               Medications:     Current Outpatient Prescriptions   Medication Sig Dispense Refill     amLODIPine (NORVASC) 5 MG tablet Take 1 tablet (5 mg) by mouth daily 90 tablet 3     aspirin 81 MG tablet Take 81 mg by mouth daily       atorvastatin (LIPITOR) 10 MG tablet Take 1 tablet (10 mg) by mouth daily 90 tablet 3     calcium-vitamin D (CALTRATE) 600-400 MG-UNIT per tablet Take 1 tablet by mouth daily 60 tablet 11     cholecalciferol (VITAMIN D) 1000 UNIT tablet Take 1 tablet (1,000 Units) by mouth daily 100 tablet 3     clopidogrel (PLAVIX) 75 MG tablet Take 1 tablet (75 mg) by mouth daily On day 1 take 4 tablet (300 mg) then 75 mg thereafter 90 tablet 3     Cyanocobalamin (VITAMIN B 12 PO) Take 1 tablet by mouth daily VIT B12       hydrochlorothiazide (HYDRODIURIL) 25 MG tablet Take 1 tablet (25 mg) by mouth daily 90 tablet 3     levothyroxine (SYNTHROID/LEVOTHROID) 112 MCG tablet Take 1 tablet (112 mcg) by mouth daily 90 tablet 3     losartan (COZAAR) 100 MG tablet Take 1 tablet (100 mg) by mouth daily 90 tablet 3     metFORMIN (GLUCOPHAGE-XR) 500 MG 24 hr tablet Take 1 tablet (500 mg) by mouth daily (with dinner) 90 tablet 3     metoprolol succinate (TOPROL-XL) 25 MG 24 hr tablet Take 1 tablet (25 mg) by mouth daily 90 tablet 3     multivitamin, therapeutic with minerals (THERA-VIT-M) TABS Take 1 tablet by mouth daily       nitroGLYcerin (NITROSTAT) 0.4 MG sublingual tablet For chest pain place 1 tablet under the tongue every 5 minutes for 3 doses. If symptoms persist 5 minutes after 1st dose call 911. 25 tablet 3     order for DME Compression Stockings  Knee high  20/30 compression 2 each 1     [DISCONTINUED] losartan (COZAAR) 50 MG  tablet Take 1 tablet (50 mg) by mouth daily 90 tablet 3     [DISCONTINUED] metoprolol succinate (TOPROL-XL) 25 MG 24 hr tablet Take 2 tablets (50 mg) by mouth daily 90 tablet 3     [DISCONTINUED] metoprolol succinate (TOPROL-XL) 50 MG 24 hr tablet Take 1 tablet (50 mg) by mouth daily Hold IF heart rate less than 55. 90 tablet 3           Family History   Problem Relation Age of Onset     Cancer Mother      ovarian  of it  at 51yo.     Diabetes Father      Adult onset     Hypertension Father      C.A.D. Father      Fatal Heart attack at 70.     Cerebrovascular Disease Father      Thyroid Disease Sister      Thyroid Disease Sister      Thyroid Disease Sister      Cancer Daughter      Thyroid Disease Daughter      Breast Cancer No family hx of      Cancer - colorectal No family hx of        Social History     Social History     Marital status:      Spouse name: FABIAN CUADRA     Number of children: 2     Years of education: N/A     Occupational History     [employer]       [employer]     Social History Main Topics     Smoking status: Never Smoker     Smokeless tobacco: Never Used     Alcohol use 0.0 oz/week     0 Standard drinks or equivalent per week      Comment: Approx 1-2 times monthy     Drug use: No     Sexual activity: Yes     Partners: Male     Other Topics Concern      Service No     Blood Transfusions No     Caffeine Concern No     Occupational Exposure No     Hobby Hazards No     Sleep Concern No     Stress Concern No     Weight Concern Yes     Special Diet No     Back Care No     Exercise Yes     Approx 3 times a week for about 30 minutes     Bike Helmet Yes     Seat Belt Yes     Self-Exams Yes     Social History Narrative            Past Medical History:     Past Medical History:   Diagnosis Date     BASAL CELL CANCER -NOSE      Family history of diabetes mellitus     Father     Family history of ischemic heart disease     Father     Family history of malignant neoplasm of ovary      Mother     HX DISRUPT L ANT CRUCIATE S/P REPAIR 2003      HYPERTENSION       HYPOTHYROIDISM      LOC PRIM OSTEOARTH-L 1ST METATARSAL-CUNEIFORM JOINT 3/10/2004     Major depressive disorder, single episode, mild (H) 11/24/2014     MILD-MOD TRICUSPID REGURG. MOD PULM HTN 6/23/2005    ECHO  5/05     SPLENIC CYST               Past Surgical History:     Past Surgical History:   Procedure Laterality Date     ARTHROPLASTY KNEE Left 4/13/2016    Procedure: ARTHROPLASTY KNEE;  Surgeon: Giuseppe Lopez MD;  Location: [facility] OR     ARTHROPLASTY MINIMALLY INVASIVE HIP  4/18/2012    Procedure:ARTHROPLASTY MINIMALLY INVASIVE HIP; RIGHT TWO INCISION MINIMALLY INVASIVE HIP ARTHROPLASTY (YUVAL)^ (NO CELL SAVER) (C-ARM); Surgeon:GIUSEPPE LOPEZ; Location: OR     C APPENDECTOMY  8/87    incidental appi at [facility]     C CHEMOSURG MOHS 1ST STAGE  9-99    Mohs-nose     C REPAIR CRUCIATE LIGAMENT,KNEE  8/2003    L   ACL reconstruction     C TOTAL ABDOM HYSTERECTOMY  8/87    W/ BSO fibroids, hx PID     HC COLONOSCOPY THRU STOMA, DIAGNOSTIC  4/03    normal, rec repeat 10 yrs     HYSTERECTOMY, PAP NO LONGER INDICATED       SURGICAL HISTORY OF -   Mult-~1978    infertility, laparotomies              Allergies:   Ace inhibitors; Oxycodone; Tetanus toxoid; and Shellfish-derived products       Data:   All laboratory data reviewed:    Last Basic Metabolic Panel:  Lab Results   Component Value Date     07/09/2018      Lab Results   Component Value Date    POTASSIUM 3.2 07/09/2018     Lab Results   Component Value Date    CHLORIDE 102 07/09/2018     Lab Results   Component Value Date    JOSEPH 9.5 07/09/2018     Lab Results   Component Value Date    CO2 27 07/09/2018     Lab Results   Component Value Date    BUN 14 07/09/2018     Lab Results   Component Value Date    CR 0.82 07/09/2018     Lab Results   Component Value Date     07/09/2018           SUE CARBONE, APRN, CNP

## 2018-07-11 ENCOUNTER — HOSPITAL ENCOUNTER (OUTPATIENT)
Dept: CARDIAC REHAB | Facility: CLINIC | Age: 73
End: 2018-07-11
Attending: INTERNAL MEDICINE
Payer: MEDICARE

## 2018-07-11 DIAGNOSIS — I25.10 CAD (CORONARY ARTERY DISEASE): ICD-10-CM

## 2018-07-11 PROCEDURE — 40000116 ZZH STATISTIC OP CR VISIT: Performed by: OCCUPATIONAL THERAPIST

## 2018-07-11 PROCEDURE — 93797 PHYS/QHP OP CAR RHAB WO ECG: CPT | Mod: XU | Performed by: OCCUPATIONAL THERAPIST

## 2018-07-11 PROCEDURE — 40000575 ZZH STATISTIC OP CARDIAC VISIT #2: Performed by: OCCUPATIONAL THERAPIST

## 2018-07-11 PROCEDURE — 93798 PHYS/QHP OP CAR RHAB W/ECG: CPT | Performed by: OCCUPATIONAL THERAPIST

## 2018-07-13 ENCOUNTER — HOSPITAL ENCOUNTER (OUTPATIENT)
Dept: CARDIAC REHAB | Facility: CLINIC | Age: 73
End: 2018-07-13
Attending: INTERNAL MEDICINE
Payer: MEDICARE

## 2018-07-13 PROCEDURE — 40000116 ZZH STATISTIC OP CR VISIT

## 2018-07-13 PROCEDURE — 93798 PHYS/QHP OP CAR RHAB W/ECG: CPT

## 2018-07-18 ENCOUNTER — HOSPITAL ENCOUNTER (OUTPATIENT)
Dept: CARDIAC REHAB | Facility: CLINIC | Age: 73
End: 2018-07-18
Attending: INTERNAL MEDICINE
Payer: MEDICARE

## 2018-07-18 PROCEDURE — 40000575 ZZH STATISTIC OP CARDIAC VISIT #2: Performed by: CLINICAL EXERCISE PHYSIOLOGIST

## 2018-07-18 PROCEDURE — 40000116 ZZH STATISTIC OP CR VISIT

## 2018-07-18 PROCEDURE — 93797 PHYS/QHP OP CAR RHAB WO ECG: CPT | Performed by: CLINICAL EXERCISE PHYSIOLOGIST

## 2018-07-18 PROCEDURE — 93798 PHYS/QHP OP CAR RHAB W/ECG: CPT

## 2018-07-20 ENCOUNTER — HOSPITAL ENCOUNTER (OUTPATIENT)
Dept: CARDIAC REHAB | Facility: CLINIC | Age: 73
End: 2018-07-20
Attending: INTERNAL MEDICINE
Payer: MEDICARE

## 2018-07-20 ENCOUNTER — MYC MEDICAL ADVICE (OUTPATIENT)
Dept: CARDIOLOGY | Facility: CLINIC | Age: 73
End: 2018-07-20

## 2018-07-20 PROCEDURE — 40000116 ZZH STATISTIC OP CR VISIT

## 2018-07-20 PROCEDURE — 93798 PHYS/QHP OP CAR RHAB W/ECG: CPT

## 2018-07-20 NOTE — TELEPHONE ENCOUNTER
Lynne message received:   I have been having headaches frequently.  My blood pressure is 106/72. I am also tired.  Could my headaches be related to any of my medications?     Last OV 7/9/18 with Krystal NP  3. Hypertension, goal less than 130/80    Toprol XL 50 mg daily was commenced post coronary angiogram.  She has a history of fatigue with Toprol    Decrease Toprol-XL to 25 mg daily    Increase losartan to 100 mg daily    Continue amlodipine 5 mg daily and hydrochlorthiazide 25 mg daily    Spoke to patient regarding her message above and she stated that she was noticing fatigue and headaches after stent placement (6/27/18) but it has seemed to be getting worse. Pt stated her systolic BP is ranging about 110-100. Denies lightheaded/dizziness, just fatigue and headaches.    Will route to Krystal NP to review and advise.

## 2018-07-20 NOTE — TELEPHONE ENCOUNTER
Spoke to patient and informed her of Krystal's recommendation below. Pt agreed to plan. Pt will contact team 4 if her symptoms do not improve. Med list updated.

## 2018-07-20 NOTE — TELEPHONE ENCOUNTER
Please have her try to stop amlodipine to see if her headaches and fatigue improve. Her fatigue could be related to low BP.     Thanks    FATMATA Shelton, CNP

## 2018-07-27 ENCOUNTER — HOSPITAL ENCOUNTER (OUTPATIENT)
Dept: CARDIAC REHAB | Facility: CLINIC | Age: 73
End: 2018-07-27
Attending: INTERNAL MEDICINE
Payer: MEDICARE

## 2018-07-27 PROCEDURE — 93798 PHYS/QHP OP CAR RHAB W/ECG: CPT | Performed by: CLINICAL EXERCISE PHYSIOLOGIST

## 2018-07-27 PROCEDURE — 40000116 ZZH STATISTIC OP CR VISIT: Performed by: CLINICAL EXERCISE PHYSIOLOGIST

## 2018-07-30 ENCOUNTER — HOSPITAL ENCOUNTER (OUTPATIENT)
Dept: CARDIAC REHAB | Facility: CLINIC | Age: 73
End: 2018-07-30
Attending: INTERNAL MEDICINE
Payer: MEDICARE

## 2018-07-30 PROCEDURE — 93797 PHYS/QHP OP CAR RHAB WO ECG: CPT | Mod: XU | Performed by: OCCUPATIONAL THERAPIST

## 2018-07-30 PROCEDURE — 40000575 ZZH STATISTIC OP CARDIAC VISIT #2: Performed by: OCCUPATIONAL THERAPIST

## 2018-07-30 PROCEDURE — 40000116 ZZH STATISTIC OP CR VISIT: Performed by: REHABILITATION PRACTITIONER

## 2018-07-30 PROCEDURE — 93798 PHYS/QHP OP CAR RHAB W/ECG: CPT | Performed by: REHABILITATION PRACTITIONER

## 2018-08-03 ENCOUNTER — HOSPITAL ENCOUNTER (OUTPATIENT)
Dept: CARDIAC REHAB | Facility: CLINIC | Age: 73
End: 2018-08-03
Attending: INTERNAL MEDICINE
Payer: MEDICARE

## 2018-08-03 PROCEDURE — 93798 PHYS/QHP OP CAR RHAB W/ECG: CPT

## 2018-08-03 PROCEDURE — 40000116 ZZH STATISTIC OP CR VISIT

## 2018-08-06 ENCOUNTER — HOSPITAL ENCOUNTER (OUTPATIENT)
Dept: CARDIAC REHAB | Facility: CLINIC | Age: 73
End: 2018-08-06
Attending: INTERNAL MEDICINE
Payer: MEDICARE

## 2018-08-06 PROCEDURE — 93797 PHYS/QHP OP CAR RHAB WO ECG: CPT | Mod: XU | Performed by: CLINICAL EXERCISE PHYSIOLOGIST

## 2018-08-06 PROCEDURE — 40000116 ZZH STATISTIC OP CR VISIT

## 2018-08-06 PROCEDURE — 40000575 ZZH STATISTIC OP CARDIAC VISIT #2: Performed by: CLINICAL EXERCISE PHYSIOLOGIST

## 2018-08-06 PROCEDURE — 93798 PHYS/QHP OP CAR RHAB W/ECG: CPT

## 2018-08-08 ENCOUNTER — HOSPITAL ENCOUNTER (OUTPATIENT)
Dept: CARDIAC REHAB | Facility: CLINIC | Age: 73
End: 2018-08-08
Attending: INTERNAL MEDICINE
Payer: MEDICARE

## 2018-08-08 PROCEDURE — 40000116 ZZH STATISTIC OP CR VISIT: Performed by: OCCUPATIONAL THERAPIST

## 2018-08-08 PROCEDURE — 93798 PHYS/QHP OP CAR RHAB W/ECG: CPT | Performed by: OCCUPATIONAL THERAPIST

## 2018-08-10 ENCOUNTER — HOSPITAL ENCOUNTER (OUTPATIENT)
Dept: CARDIAC REHAB | Facility: CLINIC | Age: 73
End: 2018-08-10
Attending: INTERNAL MEDICINE
Payer: MEDICARE

## 2018-08-10 PROCEDURE — 40000116 ZZH STATISTIC OP CR VISIT: Performed by: REHABILITATION PRACTITIONER

## 2018-08-10 PROCEDURE — 40000575 ZZH STATISTIC OP CARDIAC VISIT #2: Performed by: REHABILITATION PRACTITIONER

## 2018-08-10 PROCEDURE — 93797 PHYS/QHP OP CAR RHAB WO ECG: CPT | Mod: XU | Performed by: REHABILITATION PRACTITIONER

## 2018-08-10 PROCEDURE — 93798 PHYS/QHP OP CAR RHAB W/ECG: CPT | Performed by: REHABILITATION PRACTITIONER

## 2018-08-13 ENCOUNTER — HOSPITAL ENCOUNTER (OUTPATIENT)
Dept: CARDIAC REHAB | Facility: CLINIC | Age: 73
End: 2018-08-13
Attending: INTERNAL MEDICINE
Payer: MEDICARE

## 2018-08-13 PROCEDURE — 93798 PHYS/QHP OP CAR RHAB W/ECG: CPT

## 2018-08-13 PROCEDURE — 40000116 ZZH STATISTIC OP CR VISIT

## 2018-08-17 ENCOUNTER — HOSPITAL ENCOUNTER (OUTPATIENT)
Dept: CARDIAC REHAB | Facility: CLINIC | Age: 73
End: 2018-08-17
Attending: INTERNAL MEDICINE
Payer: MEDICARE

## 2018-08-17 PROCEDURE — 93798 PHYS/QHP OP CAR RHAB W/ECG: CPT | Performed by: CLINICAL EXERCISE PHYSIOLOGIST

## 2018-08-17 PROCEDURE — 40000116 ZZH STATISTIC OP CR VISIT: Performed by: CLINICAL EXERCISE PHYSIOLOGIST

## 2018-08-20 ENCOUNTER — HOSPITAL ENCOUNTER (OUTPATIENT)
Dept: CARDIAC REHAB | Facility: CLINIC | Age: 73
End: 2018-08-20
Attending: INTERNAL MEDICINE
Payer: MEDICARE

## 2018-08-20 PROCEDURE — 40000116 ZZH STATISTIC OP CR VISIT

## 2018-08-20 PROCEDURE — 93798 PHYS/QHP OP CAR RHAB W/ECG: CPT

## 2018-08-22 ENCOUNTER — HOSPITAL ENCOUNTER (OUTPATIENT)
Dept: CARDIAC REHAB | Facility: CLINIC | Age: 73
End: 2018-08-22
Attending: INTERNAL MEDICINE
Payer: MEDICARE

## 2018-08-22 PROCEDURE — 40000575 ZZH STATISTIC OP CARDIAC VISIT #2

## 2018-08-22 PROCEDURE — 93798 PHYS/QHP OP CAR RHAB W/ECG: CPT

## 2018-08-22 PROCEDURE — 40000116 ZZH STATISTIC OP CR VISIT

## 2018-08-22 PROCEDURE — 93797 PHYS/QHP OP CAR RHAB WO ECG: CPT

## 2018-08-24 ENCOUNTER — HOSPITAL ENCOUNTER (OUTPATIENT)
Dept: CARDIAC REHAB | Facility: CLINIC | Age: 73
End: 2018-08-24
Attending: INTERNAL MEDICINE
Payer: MEDICARE

## 2018-08-24 PROCEDURE — 40000116 ZZH STATISTIC OP CR VISIT: Performed by: OCCUPATIONAL THERAPIST

## 2018-08-24 PROCEDURE — 93798 PHYS/QHP OP CAR RHAB W/ECG: CPT | Performed by: OCCUPATIONAL THERAPIST

## 2018-08-27 ENCOUNTER — HOSPITAL ENCOUNTER (OUTPATIENT)
Dept: CARDIAC REHAB | Facility: CLINIC | Age: 73
End: 2018-08-27
Attending: INTERNAL MEDICINE
Payer: MEDICARE

## 2018-08-27 PROCEDURE — 40000116 ZZH STATISTIC OP CR VISIT

## 2018-08-27 PROCEDURE — 93798 PHYS/QHP OP CAR RHAB W/ECG: CPT

## 2018-08-29 ENCOUNTER — HOSPITAL ENCOUNTER (OUTPATIENT)
Dept: CARDIAC REHAB | Facility: CLINIC | Age: 73
End: 2018-08-29
Attending: INTERNAL MEDICINE
Payer: MEDICARE

## 2018-08-29 PROCEDURE — 93798 PHYS/QHP OP CAR RHAB W/ECG: CPT

## 2018-08-29 PROCEDURE — 40000116 ZZH STATISTIC OP CR VISIT

## 2018-08-31 ENCOUNTER — HOSPITAL ENCOUNTER (OUTPATIENT)
Dept: CARDIAC REHAB | Facility: CLINIC | Age: 73
End: 2018-08-31
Attending: INTERNAL MEDICINE
Payer: MEDICARE

## 2018-08-31 PROCEDURE — 93798 PHYS/QHP OP CAR RHAB W/ECG: CPT

## 2018-08-31 PROCEDURE — 40000116 ZZH STATISTIC OP CR VISIT

## 2018-09-05 ENCOUNTER — HOSPITAL ENCOUNTER (OUTPATIENT)
Dept: CARDIAC REHAB | Facility: CLINIC | Age: 73
End: 2018-09-05
Attending: INTERNAL MEDICINE
Payer: MEDICARE

## 2018-09-05 PROCEDURE — 93798 PHYS/QHP OP CAR RHAB W/ECG: CPT | Performed by: CLINICAL EXERCISE PHYSIOLOGIST

## 2018-09-05 PROCEDURE — 40000116 ZZH STATISTIC OP CR VISIT: Performed by: CLINICAL EXERCISE PHYSIOLOGIST

## 2018-09-10 ENCOUNTER — HOSPITAL ENCOUNTER (OUTPATIENT)
Dept: CARDIAC REHAB | Facility: CLINIC | Age: 73
End: 2018-09-10
Attending: INTERNAL MEDICINE
Payer: MEDICARE

## 2018-09-10 PROCEDURE — 93798 PHYS/QHP OP CAR RHAB W/ECG: CPT

## 2018-09-10 PROCEDURE — 40000116 ZZH STATISTIC OP CR VISIT

## 2018-09-12 ENCOUNTER — HOSPITAL ENCOUNTER (OUTPATIENT)
Dept: CARDIAC REHAB | Facility: CLINIC | Age: 73
End: 2018-09-12
Attending: INTERNAL MEDICINE
Payer: MEDICARE

## 2018-09-12 PROCEDURE — 93798 PHYS/QHP OP CAR RHAB W/ECG: CPT | Performed by: CLINICAL EXERCISE PHYSIOLOGIST

## 2018-09-12 PROCEDURE — 40000116 ZZH STATISTIC OP CR VISIT: Performed by: CLINICAL EXERCISE PHYSIOLOGIST

## 2018-09-14 ENCOUNTER — HOSPITAL ENCOUNTER (OUTPATIENT)
Dept: CARDIAC REHAB | Facility: CLINIC | Age: 73
End: 2018-09-14
Attending: INTERNAL MEDICINE
Payer: MEDICARE

## 2018-09-14 PROCEDURE — 93798 PHYS/QHP OP CAR RHAB W/ECG: CPT

## 2018-09-14 PROCEDURE — 40000116 ZZH STATISTIC OP CR VISIT

## 2018-09-17 ENCOUNTER — HOSPITAL ENCOUNTER (OUTPATIENT)
Dept: CARDIAC REHAB | Facility: CLINIC | Age: 73
End: 2018-09-17
Attending: INTERNAL MEDICINE
Payer: MEDICARE

## 2018-09-17 PROCEDURE — 40000116 ZZH STATISTIC OP CR VISIT

## 2018-09-17 PROCEDURE — 93798 PHYS/QHP OP CAR RHAB W/ECG: CPT

## 2018-09-19 ENCOUNTER — HOSPITAL ENCOUNTER (OUTPATIENT)
Dept: CARDIAC REHAB | Facility: CLINIC | Age: 73
End: 2018-09-19
Attending: INTERNAL MEDICINE
Payer: MEDICARE

## 2018-09-19 PROCEDURE — 93798 PHYS/QHP OP CAR RHAB W/ECG: CPT

## 2018-09-19 PROCEDURE — 40000116 ZZH STATISTIC OP CR VISIT

## 2018-09-21 ENCOUNTER — HOSPITAL ENCOUNTER (OUTPATIENT)
Dept: CARDIAC REHAB | Facility: CLINIC | Age: 73
End: 2018-09-21
Attending: INTERNAL MEDICINE
Payer: MEDICARE

## 2018-09-21 PROCEDURE — 40000116 ZZH STATISTIC OP CR VISIT: Performed by: OCCUPATIONAL THERAPIST

## 2018-09-21 PROCEDURE — 93798 PHYS/QHP OP CAR RHAB W/ECG: CPT | Performed by: OCCUPATIONAL THERAPIST

## 2018-09-25 ENCOUNTER — HOSPITAL ENCOUNTER (OUTPATIENT)
Dept: CARDIAC REHAB | Facility: CLINIC | Age: 73
End: 2018-09-25
Attending: INTERNAL MEDICINE
Payer: MEDICARE

## 2018-09-25 PROCEDURE — 40000575 ZZH STATISTIC OP CARDIAC VISIT #2

## 2018-09-25 PROCEDURE — 93797 PHYS/QHP OP CAR RHAB WO ECG: CPT | Mod: XU

## 2018-09-25 PROCEDURE — 40000116 ZZH STATISTIC OP CR VISIT

## 2018-09-25 PROCEDURE — 93798 PHYS/QHP OP CAR RHAB W/ECG: CPT

## 2018-10-14 ENCOUNTER — HOSPITAL ENCOUNTER (OUTPATIENT)
Facility: CLINIC | Age: 73
Setting detail: OBSERVATION
Discharge: HOME | End: 2018-10-15
Attending: EMERGENCY MEDICINE | Admitting: INTERNAL MEDICINE
Payer: MEDICARE

## 2018-10-14 ENCOUNTER — APPOINTMENT (OUTPATIENT)
Dept: GENERAL RADIOLOGY | Facility: CLINIC | Age: 73
End: 2018-10-14
Attending: EMERGENCY MEDICINE
Payer: MEDICARE

## 2018-10-14 DIAGNOSIS — I10 BENIGN ESSENTIAL HYPERTENSION: ICD-10-CM

## 2018-10-14 DIAGNOSIS — R94.39 POSITIVE CARDIAC STRESS TEST: ICD-10-CM

## 2018-10-14 DIAGNOSIS — Z98.61 CAD S/P PERCUTANEOUS CORONARY ANGIOPLASTY: ICD-10-CM

## 2018-10-14 DIAGNOSIS — R07.9 ACUTE CHEST PAIN: Primary | ICD-10-CM

## 2018-10-14 DIAGNOSIS — I25.10 CAD S/P PERCUTANEOUS CORONARY ANGIOPLASTY: ICD-10-CM

## 2018-10-14 DIAGNOSIS — R29.91 MUSCULOSKELETAL SYMPTOMS REFERABLE TO LIMBS: ICD-10-CM

## 2018-10-14 LAB
ANION GAP SERPL CALCULATED.3IONS-SCNC: 6 MMOL/L (ref 3–14)
BASOPHILS # BLD AUTO: 0 10E9/L (ref 0–0.2)
BASOPHILS NFR BLD AUTO: 0.3 %
BUN SERPL-MCNC: 18 MG/DL (ref 7–30)
CALCIUM SERPL-MCNC: 8.9 MG/DL (ref 8.5–10.1)
CHLORIDE SERPL-SCNC: 105 MMOL/L (ref 94–109)
CO2 SERPL-SCNC: 27 MMOL/L (ref 20–32)
CREAT SERPL-MCNC: 0.8 MG/DL (ref 0.52–1.04)
D DIMER PPP FEU-MCNC: 0.5 UG/ML FEU (ref 0–0.5)
DIFFERENTIAL METHOD BLD: NORMAL
EOSINOPHIL # BLD AUTO: 0.1 10E9/L (ref 0–0.7)
EOSINOPHIL NFR BLD AUTO: 1.2 %
ERYTHROCYTE [DISTWIDTH] IN BLOOD BY AUTOMATED COUNT: 14.3 % (ref 10–15)
GFR SERPL CREATININE-BSD FRML MDRD: 70 ML/MIN/1.7M2
GLUCOSE BLDC GLUCOMTR-MCNC: 105 MG/DL (ref 70–99)
GLUCOSE SERPL-MCNC: 113 MG/DL (ref 70–99)
HBA1C MFR BLD: 6 % (ref 0–5.6)
HCT VFR BLD AUTO: 40.6 % (ref 35–47)
HGB BLD-MCNC: 14 G/DL (ref 11.7–15.7)
IMM GRANULOCYTES # BLD: 0 10E9/L (ref 0–0.4)
IMM GRANULOCYTES NFR BLD: 0.2 %
LIPASE SERPL-CCNC: 225 U/L (ref 73–393)
LYMPHOCYTES # BLD AUTO: 1.2 10E9/L (ref 0.8–5.3)
LYMPHOCYTES NFR BLD AUTO: 13.3 %
MAGNESIUM SERPL-MCNC: 2 MG/DL (ref 1.6–2.3)
MCH RBC QN AUTO: 29.7 PG (ref 26.5–33)
MCHC RBC AUTO-ENTMCNC: 34.5 G/DL (ref 31.5–36.5)
MCV RBC AUTO: 86 FL (ref 78–100)
MONOCYTES # BLD AUTO: 0.8 10E9/L (ref 0–1.3)
MONOCYTES NFR BLD AUTO: 9.3 %
NEUTROPHILS # BLD AUTO: 6.8 10E9/L (ref 1.6–8.3)
NEUTROPHILS NFR BLD AUTO: 75.7 %
PLATELET # BLD AUTO: 242 10E9/L (ref 150–450)
POTASSIUM SERPL-SCNC: 4.3 MMOL/L (ref 3.4–5.3)
RBC # BLD AUTO: 4.71 10E12/L (ref 3.8–5.2)
SODIUM SERPL-SCNC: 138 MMOL/L (ref 133–144)
TROPONIN I SERPL-MCNC: 0.02 UG/L (ref 0–0.04)
TROPONIN I SERPL-MCNC: 0.02 UG/L (ref 0–0.04)
TSH SERPL DL<=0.005 MIU/L-ACNC: 3.18 MU/L (ref 0.4–4)
WBC # BLD AUTO: 8.9 10E9/L (ref 4–11)

## 2018-10-14 PROCEDURE — 25000132 ZZH RX MED GY IP 250 OP 250 PS 637: Mod: GY | Performed by: EMERGENCY MEDICINE

## 2018-10-14 PROCEDURE — 99220 ZZC INITIAL OBSERVATION CARE,LEVL III: CPT | Performed by: INTERNAL MEDICINE

## 2018-10-14 PROCEDURE — 80048 BASIC METABOLIC PNL TOTAL CA: CPT | Performed by: EMERGENCY MEDICINE

## 2018-10-14 PROCEDURE — 25000132 ZZH RX MED GY IP 250 OP 250 PS 637: Mod: GY

## 2018-10-14 PROCEDURE — 83690 ASSAY OF LIPASE: CPT | Performed by: EMERGENCY MEDICINE

## 2018-10-14 PROCEDURE — 00000146 ZZHCL STATISTIC GLUCOSE BY METER IP

## 2018-10-14 PROCEDURE — G0378 HOSPITAL OBSERVATION PER HR: HCPCS

## 2018-10-14 PROCEDURE — 93005 ELECTROCARDIOGRAM TRACING: CPT

## 2018-10-14 PROCEDURE — 85025 COMPLETE CBC W/AUTO DIFF WBC: CPT | Performed by: EMERGENCY MEDICINE

## 2018-10-14 PROCEDURE — 85379 FIBRIN DEGRADATION QUANT: CPT | Performed by: EMERGENCY MEDICINE

## 2018-10-14 PROCEDURE — A9270 NON-COVERED ITEM OR SERVICE: HCPCS | Mod: GY

## 2018-10-14 PROCEDURE — 84484 ASSAY OF TROPONIN QUANT: CPT | Performed by: EMERGENCY MEDICINE

## 2018-10-14 PROCEDURE — 83735 ASSAY OF MAGNESIUM: CPT | Performed by: EMERGENCY MEDICINE

## 2018-10-14 PROCEDURE — 25000125 ZZHC RX 250: Performed by: EMERGENCY MEDICINE

## 2018-10-14 PROCEDURE — 25000132 ZZH RX MED GY IP 250 OP 250 PS 637: Mod: GY | Performed by: INTERNAL MEDICINE

## 2018-10-14 PROCEDURE — A9270 NON-COVERED ITEM OR SERVICE: HCPCS | Mod: GY | Performed by: EMERGENCY MEDICINE

## 2018-10-14 PROCEDURE — 71046 X-RAY EXAM CHEST 2 VIEWS: CPT

## 2018-10-14 PROCEDURE — 84443 ASSAY THYROID STIM HORMONE: CPT | Performed by: EMERGENCY MEDICINE

## 2018-10-14 PROCEDURE — 99285 EMERGENCY DEPT VISIT HI MDM: CPT | Mod: 25

## 2018-10-14 PROCEDURE — 36415 COLL VENOUS BLD VENIPUNCTURE: CPT | Performed by: INTERNAL MEDICINE

## 2018-10-14 PROCEDURE — 84484 ASSAY OF TROPONIN QUANT: CPT | Mod: 91 | Performed by: INTERNAL MEDICINE

## 2018-10-14 PROCEDURE — 83036 HEMOGLOBIN GLYCOSYLATED A1C: CPT | Performed by: INTERNAL MEDICINE

## 2018-10-14 PROCEDURE — A9270 NON-COVERED ITEM OR SERVICE: HCPCS | Mod: GY | Performed by: INTERNAL MEDICINE

## 2018-10-14 RX ORDER — NALOXONE HYDROCHLORIDE 0.4 MG/ML
.1-.4 INJECTION, SOLUTION INTRAMUSCULAR; INTRAVENOUS; SUBCUTANEOUS
Status: DISCONTINUED | OUTPATIENT
Start: 2018-10-14 | End: 2018-10-15 | Stop reason: HOSPADM

## 2018-10-14 RX ORDER — ATORVASTATIN CALCIUM 10 MG/1
10 TABLET, FILM COATED ORAL DAILY
Status: DISCONTINUED | OUTPATIENT
Start: 2018-10-15 | End: 2018-10-15 | Stop reason: HOSPADM

## 2018-10-14 RX ORDER — LORAZEPAM 0.5 MG/1
0.5 TABLET ORAL 3 TIMES DAILY PRN
Status: DISCONTINUED | OUTPATIENT
Start: 2018-10-14 | End: 2018-10-15 | Stop reason: HOSPADM

## 2018-10-14 RX ORDER — NITROGLYCERIN 0.4 MG/1
0.4 TABLET SUBLINGUAL EVERY 5 MIN PRN
Status: DISCONTINUED | OUTPATIENT
Start: 2018-10-14 | End: 2018-10-14

## 2018-10-14 RX ORDER — ASPIRIN 81 MG/1
243 TABLET, CHEWABLE ORAL ONCE
Status: COMPLETED | OUTPATIENT
Start: 2018-10-14 | End: 2018-10-14

## 2018-10-14 RX ORDER — CLOPIDOGREL BISULFATE 75 MG/1
75 TABLET ORAL DAILY
Status: DISCONTINUED | OUTPATIENT
Start: 2018-10-15 | End: 2018-10-15 | Stop reason: HOSPADM

## 2018-10-14 RX ORDER — NICOTINE POLACRILEX 4 MG
15-30 LOZENGE BUCCAL
Status: DISCONTINUED | OUTPATIENT
Start: 2018-10-14 | End: 2018-10-15 | Stop reason: HOSPADM

## 2018-10-14 RX ORDER — ALUMINA, MAGNESIA, AND SIMETHICONE 2400; 2400; 240 MG/30ML; MG/30ML; MG/30ML
30 SUSPENSION ORAL EVERY 4 HOURS PRN
Status: DISCONTINUED | OUTPATIENT
Start: 2018-10-14 | End: 2018-10-15 | Stop reason: HOSPADM

## 2018-10-14 RX ORDER — LEVOTHYROXINE SODIUM 112 UG/1
112 TABLET ORAL DAILY
Status: DISCONTINUED | OUTPATIENT
Start: 2018-10-15 | End: 2018-10-15 | Stop reason: HOSPADM

## 2018-10-14 RX ORDER — METFORMIN HCL 500 MG
500 TABLET, EXTENDED RELEASE 24 HR ORAL
Status: DISCONTINUED | OUTPATIENT
Start: 2018-10-15 | End: 2018-10-15 | Stop reason: HOSPADM

## 2018-10-14 RX ORDER — ACETAMINOPHEN 325 MG/1
650 TABLET ORAL EVERY 4 HOURS PRN
Status: DISCONTINUED | OUTPATIENT
Start: 2018-10-14 | End: 2018-10-15 | Stop reason: HOSPADM

## 2018-10-14 RX ORDER — NITROGLYCERIN 0.4 MG/1
0.4 TABLET SUBLINGUAL EVERY 5 MIN PRN
Status: DISCONTINUED | OUTPATIENT
Start: 2018-10-14 | End: 2018-10-15 | Stop reason: HOSPADM

## 2018-10-14 RX ORDER — ASPIRIN 81 MG/1
81 TABLET ORAL DAILY
Status: DISCONTINUED | OUTPATIENT
Start: 2018-10-15 | End: 2018-10-15 | Stop reason: HOSPADM

## 2018-10-14 RX ORDER — HYDROMORPHONE HYDROCHLORIDE 1 MG/ML
0.3 INJECTION, SOLUTION INTRAMUSCULAR; INTRAVENOUS; SUBCUTANEOUS
Status: DISCONTINUED | OUTPATIENT
Start: 2018-10-14 | End: 2018-10-15 | Stop reason: HOSPADM

## 2018-10-14 RX ORDER — ACETAMINOPHEN 650 MG/1
650 SUPPOSITORY RECTAL EVERY 4 HOURS PRN
Status: DISCONTINUED | OUTPATIENT
Start: 2018-10-14 | End: 2018-10-15 | Stop reason: HOSPADM

## 2018-10-14 RX ORDER — NITROGLYCERIN 0.4 MG/1
TABLET SUBLINGUAL
Status: COMPLETED
Start: 2018-10-14 | End: 2018-10-14

## 2018-10-14 RX ORDER — DEXTROSE MONOHYDRATE 25 G/50ML
25-50 INJECTION, SOLUTION INTRAVENOUS
Status: DISCONTINUED | OUTPATIENT
Start: 2018-10-14 | End: 2018-10-15 | Stop reason: HOSPADM

## 2018-10-14 RX ORDER — NITROGLYCERIN 0.4 MG/1
0.4 TABLET SUBLINGUAL EVERY 5 MIN PRN
Status: COMPLETED | OUTPATIENT
Start: 2018-10-14 | End: 2018-10-14

## 2018-10-14 RX ORDER — METOPROLOL SUCCINATE 25 MG/1
25 TABLET, EXTENDED RELEASE ORAL DAILY
Status: DISCONTINUED | OUTPATIENT
Start: 2018-10-15 | End: 2018-10-15

## 2018-10-14 RX ORDER — LIDOCAINE 40 MG/G
CREAM TOPICAL
Status: DISCONTINUED | OUTPATIENT
Start: 2018-10-14 | End: 2018-10-15 | Stop reason: HOSPADM

## 2018-10-14 RX ORDER — LOSARTAN POTASSIUM 100 MG/1
100 TABLET ORAL DAILY
Status: DISCONTINUED | OUTPATIENT
Start: 2018-10-15 | End: 2018-10-15 | Stop reason: HOSPADM

## 2018-10-14 RX ADMIN — ACETAMINOPHEN 650 MG: 325 TABLET, FILM COATED ORAL at 21:50

## 2018-10-14 RX ADMIN — ASPIRIN 81 MG 243 MG: 81 TABLET ORAL at 19:15

## 2018-10-14 RX ADMIN — LIDOCAINE HYDROCHLORIDE 30 ML: 20 SOLUTION ORAL; TOPICAL at 20:07

## 2018-10-14 RX ADMIN — NITROGLYCERIN 0.4 MG: 0.4 TABLET SUBLINGUAL at 19:41

## 2018-10-14 RX ADMIN — NITROGLYCERIN 0.4 MG: 0.4 TABLET SUBLINGUAL at 19:51

## 2018-10-14 RX ADMIN — NITROGLYCERIN 0.4 MG: 0.4 TABLET SUBLINGUAL at 19:17

## 2018-10-14 ASSESSMENT — ENCOUNTER SYMPTOMS
SHORTNESS OF BREATH: 0
NAUSEA: 0
HEADACHES: 0
ABDOMINAL PAIN: 0
VOMITING: 0
FEVER: 0
DIARRHEA: 0

## 2018-10-14 NOTE — IP AVS SNAPSHOT
MRN:[id number]                      After Visit Summary   10/14/2018    Yola Mayo    MRN: [id number]           Thank you!     Thank you for choosing [facility] for your care. Our goal is always to provide you with excellent care. Hearing back from our patients is one way we can continue to improve our services. Please take a few minutes to complete the written survey that you may receive in the mail after you visit with us. Thank you!        Patient Information     Date Of Birth          1945        About your hospital stay     You were admitted on:  October 14, 2018 [facility]    You were discharged on:  October 15, 2018        Reason for your hospital stay       You were hospitalized for further evaluation and treatment of chest pain.                  Who to Call     For medical emergencies, please call 911.  For non-urgent questions about your medical care, please call your primary care provider or clinic, [phone]          Attending Provider     Provider Specialty    Dajuan Keenan MD Emergency Medicine    María Maria MD Internal Medicine       Primary Care Provider Office Phone # Fax #    Zoraida Braga [phone]      After Care Instructions     Activity       Your activity upon discharge: activity as tolerated            Diet       Follow this diet upon discharge: Resume home diet            Discharge Instructions       1) Nadolol 40 mg by mouth daily prescribed in place of Toprol XL (stop taking)  2) Amlodipine 2.5 mg by mouth daily prescribed at discharge   3) Pain control with tylenol, ibuprofen, and as needed tramadol. Do not take ibuprofen for more than three days   4) No additional medication changes   5) Follow-up with Cardiology for outpatient angiogram                  Follow-up Appointments     Follow-up and recommended labs and tests        Follow up with primary care provider, Zoraida Braga,  "within 7 days for hospital follow- up.  No follow up labs or test are needed.    Follow up with Cardiology for outpatient angiogram                  Additional Services     Follow-Up with Cardiac Advanced Practice Provider                 Pending Results     Date and Time Order Name Status Description    10/15/2018 0925 EKG 12-lead, tracing only Preliminary             Statement of Approval     Ordered          10/15/18 1631  I have reviewed and agree with all the recommendations and orders detailed in this document.  EFFECTIVE NOW     Approved and electronically signed by:  Cee Kebede PA-C             Admission Information     Date & Time Provider Department Dept. Phone    10/14/2018 María Maria MD [facility] [phone]      Your Vitals Were     Blood Pressure Pulse Temperature Respirations Height Weight    174/71 (BP Location: Right arm) 74 95.4  F (35.2  C) (Oral) 16 1.626 m (5' 4\") 82.1 kg (181 lb 1.6 oz)    Pulse Oximetry BMI (Body Mass Index)                96% 31.09 kg/m2          [facility] Information     [facility] gives you secure access to your electronic health record. If you see a primary care provider, you can also send messages to your care team and make appointments. If you have questions, please call your primary care clinic.  If you do not have a primary care provider, please call [phone] and they will assist you.        Care EveryWhere ID     This is your Care EveryWhere ID. This could be used by other organizations to access your [place] medical records  [id number]        Equal Access to Services     [facility] BATOOL : Hadii rohan adamso Sonikolai, waaxda luqadaha, qaybta kaalmada maryyada, elsa prasad. So [facility] [phone].    ATENCIÓN: Si habla español, tiene a sarah disposición servicios gratuitos de asistencia lingüística. Llame al [phone].    We comply with applicable federal civil rights laws and [place] laws. " We do not discriminate on the basis of race, color, national origin, age, disability, sex, sexual orientation, or gender identity.               Review of your medicines      START taking        Dose / Directions    acetaminophen 325 MG tablet   Commonly known as:  TYLENOL   Used for:  Musculoskeletal symptoms referable to limbs        Dose:  650 mg   Take 2 tablets (650 mg) by mouth 3 times daily   Quantity:  100 tablet   Refills:  0       amLODIPine 2.5 MG tablet   Commonly known as:  NORVASC   Used for:  Benign essential hypertension        Dose:  2.5 mg   Start taking on:  10/16/2018   Take 1 tablet (2.5 mg) by mouth daily   Quantity:  30 tablet   Refills:  0       ibuprofen 400 MG tablet   Commonly known as:  ADVIL/MOTRIN   Used for:  Musculoskeletal symptoms referable to limbs        Dose:  400 mg   Take 1 tablet (400 mg) by mouth 3 times daily   Quantity:  9 tablet   Refills:  0       nadolol 40 MG tablet   Commonly known as:  CORGARD        Dose:  40 mg   Start taking on:  10/16/2018   Take 1 tablet (40 mg) by mouth daily   Quantity:  30 tablet   Refills:  0       traMADol 50 MG tablet   Commonly known as:  ULTRAM   Used for:  Musculoskeletal symptoms referable to limbs        Dose:  50 mg   Take 1 tablet (50 mg) by mouth every 6 hours as needed for moderate pain   Quantity:  15 tablet   Refills:  0         CONTINUE these medicines which have NOT CHANGED        Dose / Directions    aspirin 81 MG tablet        Dose:  81 mg   Take 81 mg by mouth daily   Refills:  0       atorvastatin 10 MG tablet   Commonly known as:  LIPITOR   Used for:  Coronary artery disease involving native coronary artery of native heart with unstable angina pectoris (H)        Dose:  10 mg   Take 1 tablet (10 mg) by mouth daily   Quantity:  90 tablet   Refills:  3       calcium carbonate 600 mg-vitamin D 400 units 600-400 MG-UNIT per tablet   Commonly known as:  CALTRATE   Used for:  Preventive measure   Notes to Patient:  Resume taking  as you do at home        Dose:  1 tablet   Take 1 tablet by mouth daily   Quantity:  60 tablet   Refills:  11       cholecalciferol 1000 UNIT tablet   Commonly known as:  vitamin D3   Used for:  Vitamin deficiency   Notes to Patient:  Resume taking as you do at home        Dose:  1000 Units   Take 1 tablet (1,000 Units) by mouth daily   Quantity:  100 tablet   Refills:  3       clopidogrel 75 MG tablet   Commonly known as:  PLAVIX   Used for:  Coronary artery disease involving native coronary artery of native heart with unstable angina pectoris (H)        Dose:  75 mg   Take 1 tablet (75 mg) by mouth daily On day 1 take 4 tablet (300 mg) then 75 mg thereafter   Quantity:  90 tablet   Refills:  3       hydrochlorothiazide 25 MG tablet   Commonly known as:  HYDRODIURIL   Used for:  Essential hypertension, benign   Notes to Patient:  Resume taking as you do at home        Dose:  25 mg   Take 1 tablet (25 mg) by mouth daily   Quantity:  90 tablet   Refills:  3       levothyroxine 112 MCG tablet   Commonly known as:  SYNTHROID/LEVOTHROID   Used for:  Hypothyroidism, unspecified type        Dose:  112 mcg   Take 1 tablet (112 mcg) by mouth daily   Quantity:  90 tablet   Refills:  3       losartan 100 MG tablet   Commonly known as:  COZAAR   Used for:  Benign essential hypertension, GALINDO (dyspnea on exertion)        Dose:  100 mg   Take 1 tablet (100 mg) by mouth daily   Quantity:  90 tablet   Refills:  3       metFORMIN 500 MG 24 hr tablet   Commonly known as:  GLUCOPHAGE-XR   Used for:  Prediabetes        Dose:  500 mg   Take 1 tablet (500 mg) by mouth daily (with dinner)   Quantity:  90 tablet   Refills:  3       multivitamin, therapeutic with minerals Tabs tablet   Notes to Patient:  Resume taking as you do at home        Dose:  1 tablet   Take 1 tablet by mouth daily   Refills:  0       nitroGLYcerin 0.4 MG sublingual tablet   Commonly known as:  NITROSTAT   Used for:  Other chest pain, Abnormal cardiovascular stress  test        For chest pain place 1 tablet under the tongue every 5 minutes for 3 doses. If symptoms persist 5 minutes after 1st dose call 911.   Quantity:  25 tablet   Refills:  3       order for DME   Used for:  Edema of lower extremity        Compression Stockings Knee high 20/30 compression   Quantity:  2 each   Refills:  1       VITAMIN B 12 PO   Notes to Patient:  Resume taking as you do at home        Dose:  1 tablet   Take 1 tablet by mouth daily VIT B12 unknown otc dose   Refills:  0         STOP taking     metoprolol succinate 25 MG 24 hr tablet   Commonly known as:  TOPROL-XL                Where to get your medicines      These medications were sent to [facility] [place], [place] - [address]  [address], [place] [address]     Phone:  [phone]     acetaminophen 325 MG tablet    amLODIPine 2.5 MG tablet    ibuprofen 400 MG tablet    nadolol 40 MG tablet         Some of these will need a paper prescription and others can be bought over the counter. Ask your nurse if you have questions.     Bring a paper prescription for each of these medications     traMADol 50 MG tablet                Protect others around you: Learn how to safely use, store and throw away your medicines at www.disposemymeds.org.        Information about OPIOIDS     PRESCRIPTION OPIOIDS: WHAT YOU NEED TO KNOW   We gave you an opioid (narcotic) pain medicine. It is important to manage your pain, but opioids are not always the best choice. You should first try all the other options your care team gave you. Take this medicine for as short a time (and as few doses) as possible.    Some activities can increase your pain, such as bandage changes or therapy sessions. It may help to take your pain medicine 30 to 60 minutes before these activities. Reduce your stress by getting enough sleep, working on hobbies you enjoy and practicing relaxation or meditation. Talk to your care team about ways to manage your pain  beyond prescription opioids.    These medicines have risks:    DO NOT drive when on new or higher doses of pain medicine. These medicines can affect your alertness and reaction times, and you could be arrested for driving under the influence (DUI). If you need to use opioids long-term, talk to your care team about driving.    DO NOT operate heavy machinery    DO NOT do any other dangerous activities while taking these medicines.    DO NOT drink any alcohol while taking these medicines.     If the opioid prescribed includes acetaminophen, DO NOT take with any other medicines that contain acetaminophen. Read all labels carefully. Look for the word  acetaminophen  or  Tylenol.  Ask your pharmacist if you have questions or are unsure.    You can get addicted to pain medicines, especially if you have a history of addiction (chemical, alcohol or substance dependence). Talk to your care team about ways to reduce this risk.    All opioids tend to cause constipation. Drink plenty of water and eat foods that have a lot of fiber, such as fruits, vegetables, prune juice, apple juice and high-fiber cereal. Take a laxative (Miralax, milk of magnesia, Colace, Senna) if you don t move your bowels at least every other day. Other side effects include upset stomach, sleepiness, dizziness, throwing up, tolerance (needing more of the medicine to have the same effect), physical dependence and slowed breathing.    Store your pills in a secure place, locked if possible. We will not replace any lost or stolen medicine. If you don t finish your medicine, please throw away (dispose) as directed by your pharmacist. The [place] [employer]ution [employer] has more information about safe disposal: https://www.pca.[facility].[place].us/living-green/managing-unwanted-medications             Medication List: This is a list of all your medications and when to take them. Check marks below indicate your daily home schedule. Keep this list as a reference.       Medications           Morning Afternoon Evening Bedtime As Needed    acetaminophen 325 MG tablet   Commonly known as:  TYLENOL   Take 2 tablets (650 mg) by mouth 3 times daily   Last time this was given:  650 mg on 10/15/2018  1:57 PM                        Today 10/15           amLODIPine 2.5 MG tablet   Commonly known as:  NORVASC   Take 1 tablet (2.5 mg) by mouth daily   Start taking on:  10/16/2018   Last time this was given:  2.5 mg on 10/15/2018  9:38 AM            Tomorrow 10/16                       aspirin 81 MG tablet   Take 81 mg by mouth daily            Tomorrow 10/16                       atorvastatin 10 MG tablet   Commonly known as:  LIPITOR   Take 1 tablet (10 mg) by mouth daily   Last time this was given:  10 mg on 10/15/2018  8:20 AM            Tomorrow 10/16                       calcium carbonate 600 mg-vitamin D 400 units 600-400 MG-UNIT per tablet   Commonly known as:  CALTRATE   Take 1 tablet by mouth daily   Notes to Patient:  Resume taking as you do at home                                cholecalciferol 1000 UNIT tablet   Commonly known as:  vitamin D3   Take 1 tablet (1,000 Units) by mouth daily   Notes to Patient:  Resume taking as you do at home                                clopidogrel 75 MG tablet   Commonly known as:  PLAVIX   Take 1 tablet (75 mg) by mouth daily On day 1 take 4 tablet (300 mg) then 75 mg thereafter   Last time this was given:  75 mg on 10/15/2018  8:21 AM            Tomorrow 10/16                       hydrochlorothiazide 25 MG tablet   Commonly known as:  HYDRODIURIL   Take 1 tablet (25 mg) by mouth daily   Notes to Patient:  Resume taking as you do at home                                ibuprofen 400 MG tablet   Commonly known as:  ADVIL/MOTRIN   Take 1 tablet (400 mg) by mouth 3 times daily   Last time this was given:  400 mg on 10/15/2018  9:38 AM                        Today 10/15           levothyroxine 112 MCG tablet   Commonly known as:   SYNTHROID/LEVOTHROID   Take 1 tablet (112 mcg) by mouth daily   Last time this was given:  112 mcg on 10/15/2018  8:21 AM            Tomorrow 10/16                       losartan 100 MG tablet   Commonly known as:  COZAAR   Take 1 tablet (100 mg) by mouth daily   Last time this was given:  100 mg on 10/15/2018  8:21 AM                                   metFORMIN 500 MG 24 hr tablet   Commonly known as:  GLUCOPHAGE-XR   Take 1 tablet (500 mg) by mouth daily (with dinner)                    Today 10/15               multivitamin, therapeutic with minerals Tabs tablet   Take 1 tablet by mouth daily   Notes to Patient:  Resume taking as you do at home                                nadolol 40 MG tablet   Commonly known as:  CORGARD   Take 1 tablet (40 mg) by mouth daily   Start taking on:  10/16/2018            Tomorrow 10/16                       nitroGLYcerin 0.4 MG sublingual tablet   Commonly known as:  NITROSTAT   For chest pain place 1 tablet under the tongue every 5 minutes for 3 doses. If symptoms persist 5 minutes after 1st dose call 911.   Last time this was given:  0.4 mg on 10/14/2018  7:51 PM                                   order for DME   Compression Stockings Knee high 20/30 compression                                traMADol 50 MG tablet   Commonly known as:  ULTRAM   Take 1 tablet (50 mg) by mouth every 6 hours as needed for moderate pain   Last time this was given:  50 mg on 10/15/2018  9:38 AM                                   VITAMIN B 12 PO   Take 1 tablet by mouth daily VIT B12 unknown otc dose   Notes to Patient:  Resume taking as you do at home

## 2018-10-14 NOTE — ED PROVIDER NOTES
History     Chief Complaint:  Chest pain     HPI:   The history is provided by the patient.      Yola Mayo is a 73 year old female on Plavix with a history of CAD, diabetes mellitus, hypertension, hyperlipidemia, and hypothyroidism who presents to the emergency department with her spouse for evaluation of chest pain.  Of note, the patient had two cardiac stents placed in June 2018. This was done after occlusions were noted on an angiogram, however, there was never an occurrence of MI. She went through cardiac rehab after the surgery and did well with this. Other occlusions were noted during stent placement, but surgical intervention was not indicated at the time, per patient.     Yesterday, The patient reports onset of left-sided chest pain just below her breast and in her back. She had never had this pain in the past. She took Tylenol but was unable to sleep through the night due to the pain. She took Nitroglycerin around 1000 this morning with 4 Tylenol, and had good relief that lasted until about 1600 this afternoon. She took more Nitroglycerin around this time, but did not get the same relief. Due to her heart history they present for evaluation. Here, the patient says that she still has chest pain and notes that it has been constant since this afternoon, waxing and waning. She rates her pain a 7/10 in severity.  She denies any shortness of breath, abdominal pain, nausea, vomiting, diarrhea, constipation, recorded fever, headache, or visual disturbance. No history of reflux. She took all her usual medications this morning including a baby aspirin and Plavix.     CARDIAC RISK FACTORS:  Sex:    Female   Tobacco:   Negative   Hypertension:   Positive   Hyperlipidemia:  Positive   Diabetes:   Positive   Family History:  Positive     Allergies:  Ace Inhibitors   Oxycodone- nausea and vomiting   Tetanus Toxoid - swelling      Medications:    Aspirin 81 mg   Atorvastatin   Plavix   Hydrochlorothiazide  "  Levothyroxine   Cozaar   Metformin   Metoprolol succinate   Nitroglycerin   Order for DME      Past Medical History:    BCC  Diabetes mellitus   Ischemic heart disease   Malignant neoplasm of ovary   Hypertension   Hyperlipidemia   Anemia   Hypothyroidism   Major depressive disorder single episode   Tricuspid regurgitation   Splenic cyst     Past Surgical History:    Arthroplasty knee left   Arthroplasty minimally invasive hip right   Appendectomy   Mohs-nose  ACL reconstruction left   Hysterectomy with BSO   Colonoscopy  Thru stoma   Infertility laparotomies     Family History:    Mother -ovarian cancer  Father- diabetes mellitus, hypertension, CAD, cerebrovascular disease  Sister - thyroid disease  Daughter - thyroid disease, cancer    Social History:  Presents with spouse    Tobacco use: Never smoker   Alcohol use: occasional   PCP: Zoraida Braga    Marital Status:        Review of Systems   Constitutional: Negative for fever.   Eyes: Negative for visual disturbance.   Respiratory: Negative for shortness of breath.    Cardiovascular: Positive for chest pain.   Gastrointestinal: Negative for abdominal pain, diarrhea, nausea and vomiting.   Neurological: Negative for headaches.   All other systems reviewed and are negative.    Physical Exam     Patient Vitals for the past 24 hrs:   BP Temp Temp src Pulse Heart Rate Resp SpO2 Height Weight   10/14/18 2235 - - - - - 18 - - -   10/14/18 2122 157/59 96.4  F (35.8  C) Oral - 56 18 96 % 1.626 m (5' 4\") 82.1 kg (181 lb 1.6 oz)   10/14/18 2045 - - - - 63 19 97 % - -   10/14/18 2005 - - - - 64 11 92 % - -   10/14/18 2000 132/67 - - - 65 12 94 % - -   10/14/18 1955 - - - - 67 12 93 % - -   10/14/18 1950 (!) 139/95 - - - 62 12 92 % - -   10/14/18 1948 - - - - 63 13 94 % - -   10/1945 - - - - 64 13 93 % - -   10/14/18 1940 164/81 - - - 58 10 95 % - -   10/14/18 1930 154/77 - - - 61 - 94 % - -   10/14/18 1925 - - - - 67 10 93 % - -   10/14/18 1920 153/74 - - - " "68 9 93 % - -   10/14/18 1914 169/78 - - - 62 13 97 % - -   10/14/18 1903 - - - - 65 13 98 % - -   10/14/18 1852 - - - - - - 98 % - -   10/14/18 1851 193/80 - - - 71 16 - - -   10/14/18 1841 (!) 216/82 - - - - - - - -   10/14/18 1838 - 97.6  F (36.4  C) Oral 74 74 20 98 % 1.626 m (5' 4\") 81.6 kg (180 lb)        Physical Exam  General: Alert, appears well-developed and well-nourished. Cooperative.     In mild distress  HEENT:  Head:  Atraumatic  Ears:  External ears are normal  Mouth/Throat:  Oropharynx is without erythema or exudate and mucous membranes are moist.   Eyes:   Conjunctivae normal and EOM are normal. No scleral icterus.    Pupils are equal, round, and reactive to light.   Neck:   Normal range of motion. Neck supple.  CV:  Normal rate, regular rhythm, normal heart sounds and radial pulses are 2+ and symmetric.  No murmur.  Resp:  Breath sounds are clear bilaterally    Non-labored, no retractions or accessory muscle use  GI:  Abdomen is soft, no distension, no tenderness. No rebound or guarding.  No CVA tenderness bilaterally  MS:  Normal range of motion. No edema.    Normal strength in all 4 extremities.     Back atraumatic.    No midline cervical, thoracic, or lumbar tenderness  Skin:  Warm and dry.  No rash or lesions noted.  Neuro:   Alert. Normal strength.  GCS: 15  Psych: Normal mood and affect.         Emergency Department Course   ECG (18:42:03):  Rate 62 bpm. TX interval 166. QRS duration 82. QT/QTc 428/424. P-R-T axes 46 5 42. Normal sinus rhythm. Possible left atrial enlargement. Left ventricular hypertrophy. Possible lateral infarct, age undetermined. Abnormal ECG. Agree with computer interpretation. No significant changes from prior EKG on 6/27/18.  Interpreted at 1845 by Dajuan Keenan MD.     Imaging:  Radiographic findings were communicated with the patient and family who voiced understanding of the findings.     XR Chest, 2 views:  IMPRESSION: No acute cardiopulmonary disease.     Imaging " independently reviewed and agree with radiologist interpretation.     Laboratory:  1850: Troponin I: 0.21  CBC: WNL (WBC 8.9, HGB 14.0, )   BMP: Glucose 113 (H), ow WNL (Creatinine 0.80)   D-dimer Mandy: 0.5   Magnesium: 2.0  TSH with free T4 reflex: 3.18    Interventions:  1915: Aspirin 243 mg PO   1951: Nitroglycerin 0.4 mg sublingual     2007: GI Cocktail - Maalox 15 mL, Viscous Lidocaine 15 mL, 30 mL suspension PO       Emergency Department Course:  Past medical records, nursing notes, and vitals reviewed.  1902: I performed an exam of the patient and obtained history, as documented above.    IV inserted and blood drawn. This was sent to the lab for further testing, results above.     Patient had no change in pain with Nitroglycerin.     The patient was sent for a XR while in the emergency department, findings above.     I personally reviewed the laboratory results with the Patient and spouse and answered all related questions prior to admission.       Findings and plan explained to the Patient and spouse who consents to admission.     2007: Discussed the patient with Dr. Maria, who will admit the patient to a observation bed for further monitoring, evaluation, and treatment.      Impression & Plan      Medical Decision Making:  The patient is a 73 year old female with a complex past medical history pertinent for coronary artery disease with recent stent placement to the RCA who presents with left sided chest discomfort. Patient had some mild relief with Nitroglycerin this morning, but then retook Nitroglycerin this afternoon when the pain restarted in the left-side of her chest with no improvement in her symptoms. Tonight she has had pain since 4 PM and reassuringly her initial EKG showed no ischemic changes in comparison with prior EKG's. Her initial troponin is 0.021, unlikely to be representative of ACS. Patient had no improvement with Nitroglycerin here. Chest XR showed no evidence of acute  cardiopulmonary disease, and d-dimer was negative so lower concern for pulmonary embolism. Patient was given three additional baby aspirin here in the emergency department to complete a full dose aspirin. She has been taking her medications regularly as prescribed, including her Plavix. Ultimately, with patient's recent stent placement and active chest discomfort tonight, she will be admitted to observation for serial troponin and potential stress testing tomorrow. Of note she did receive a GI cocktail which moderately improved symptoms, although unclear if the chest pain is completely related to gastritis vs GERD.  Certain concern with recent stent placement this pain may represent anginal equivalent and observation admission recommended to which patient agreed.  Discussed the case with Dr. Maria, who agreed to observation admission at this time.     Diagnosis:    ICD-10-CM    1. Acute chest pain R07.9 Troponin I - Now then in 4 hours x 2      Hemoglobin A1c     Lipase     Lipase     Glucose by meter     Glucose by meter     CANCELED: Lipase       Disposition:  Admitted to Dr. Maria for further evaluation and care.      Scribe Disclosure:   I, Renard Torres, am serving as a scribe at 7:02 PM on 10/14/2018 to document services personally performed by Dajuan Keenan MD based on my observations and the provider's statements to me.       Dajuan Keenan MD  10/14/2018    EMERGENCY DEPARTMENT       Dajuan Keenan MD  10/15/18 0102

## 2018-10-14 NOTE — IP AVS SNAPSHOT
[facility] Observation Unit    [address] [place] [id number]    Phone:  [phone]                                       After Visit Summary   10/14/2018    Yola Mayo    MRN: [id number]           After Visit Summary Signature Page     I have received my discharge instructions, and my questions have been answered. I have discussed any challenges I see with this plan with the nurse or doctor.    ..........................................................................................................................................  Patient/Patient Representative Signature      ..........................................................................................................................................  Patient Representative Print Name and Relationship to Patient    ..................................................               ................................................  Date                                   Time    ..........................................................................................................................................  Reviewed by Signature/Title    ...................................................              ..............................................  Date                                               Time          22EPIC Rev 08/18

## 2018-10-15 ENCOUNTER — APPOINTMENT (OUTPATIENT)
Dept: CARDIOLOGY | Facility: CLINIC | Age: 73
End: 2018-10-15
Attending: INTERNAL MEDICINE
Payer: MEDICARE

## 2018-10-15 VITALS
BODY MASS INDEX: 30.92 KG/M2 | WEIGHT: 181.1 LBS | HEIGHT: 64 IN | RESPIRATION RATE: 16 BRPM | SYSTOLIC BLOOD PRESSURE: 174 MMHG | OXYGEN SATURATION: 96 % | HEART RATE: 74 BPM | TEMPERATURE: 95.4 F | DIASTOLIC BLOOD PRESSURE: 71 MMHG

## 2018-10-15 LAB
ALBUMIN SERPL-MCNC: 3.7 G/DL (ref 3.4–5)
ALP SERPL-CCNC: 70 U/L (ref 40–150)
ALT SERPL W P-5'-P-CCNC: 55 U/L (ref 0–50)
ANION GAP SERPL CALCULATED.3IONS-SCNC: 5 MMOL/L (ref 3–14)
AST SERPL W P-5'-P-CCNC: 35 U/L (ref 0–45)
BASOPHILS # BLD AUTO: 0.1 10E9/L (ref 0–0.2)
BASOPHILS NFR BLD AUTO: 0.7 %
BILIRUB SERPL-MCNC: 0.5 MG/DL (ref 0.2–1.3)
BUN SERPL-MCNC: 13 MG/DL (ref 7–30)
CALCIUM SERPL-MCNC: 8.8 MG/DL (ref 8.5–10.1)
CHLORIDE SERPL-SCNC: 103 MMOL/L (ref 94–109)
CO2 SERPL-SCNC: 29 MMOL/L (ref 20–32)
CREAT SERPL-MCNC: 0.71 MG/DL (ref 0.52–1.04)
DIFFERENTIAL METHOD BLD: NORMAL
EOSINOPHIL # BLD AUTO: 0.1 10E9/L (ref 0–0.7)
EOSINOPHIL NFR BLD AUTO: 0.8 %
ERYTHROCYTE [DISTWIDTH] IN BLOOD BY AUTOMATED COUNT: 14.2 % (ref 10–15)
GFR SERPL CREATININE-BSD FRML MDRD: 80 ML/MIN/1.7M2
GLUCOSE BLDC GLUCOMTR-MCNC: 102 MG/DL (ref 70–99)
GLUCOSE BLDC GLUCOMTR-MCNC: 103 MG/DL (ref 70–99)
GLUCOSE BLDC GLUCOMTR-MCNC: 85 MG/DL (ref 70–99)
GLUCOSE SERPL-MCNC: 151 MG/DL (ref 70–99)
HCT VFR BLD AUTO: 40.3 % (ref 35–47)
HGB BLD-MCNC: 13.6 G/DL (ref 11.7–15.7)
IMM GRANULOCYTES # BLD: 0 10E9/L (ref 0–0.4)
IMM GRANULOCYTES NFR BLD: 0.1 %
INTERPRETATION ECG - MUSE: NORMAL
LYMPHOCYTES # BLD AUTO: 0.9 10E9/L (ref 0.8–5.3)
LYMPHOCYTES NFR BLD AUTO: 11.8 %
MCH RBC QN AUTO: 29.4 PG (ref 26.5–33)
MCHC RBC AUTO-ENTMCNC: 33.7 G/DL (ref 31.5–36.5)
MCV RBC AUTO: 87 FL (ref 78–100)
MONOCYTES # BLD AUTO: 0.4 10E9/L (ref 0–1.3)
MONOCYTES NFR BLD AUTO: 5.4 %
NEUTROPHILS # BLD AUTO: 6 10E9/L (ref 1.6–8.3)
NEUTROPHILS NFR BLD AUTO: 81.2 %
NRBC # BLD AUTO: 0 10*3/UL
NRBC BLD AUTO-RTO: 0 /100
PLATELET # BLD AUTO: 209 10E9/L (ref 150–450)
POTASSIUM SERPL-SCNC: 4 MMOL/L (ref 3.4–5.3)
PROT SERPL-MCNC: 7.7 G/DL (ref 6.8–8.8)
RBC # BLD AUTO: 4.62 10E12/L (ref 3.8–5.2)
SODIUM SERPL-SCNC: 137 MMOL/L (ref 133–144)
TROPONIN I SERPL-MCNC: 0.02 UG/L (ref 0–0.04)
WBC # BLD AUTO: 7.4 10E9/L (ref 4–11)

## 2018-10-15 PROCEDURE — 36415 COLL VENOUS BLD VENIPUNCTURE: CPT | Performed by: INTERNAL MEDICINE

## 2018-10-15 PROCEDURE — 84484 ASSAY OF TROPONIN QUANT: CPT | Performed by: INTERNAL MEDICINE

## 2018-10-15 PROCEDURE — 93350 STRESS TTE ONLY: CPT | Mod: TC

## 2018-10-15 PROCEDURE — A9270 NON-COVERED ITEM OR SERVICE: HCPCS | Mod: GY | Performed by: INTERNAL MEDICINE

## 2018-10-15 PROCEDURE — 93321 DOPPLER ECHO F-UP/LMTD STD: CPT | Mod: 26 | Performed by: INTERNAL MEDICINE

## 2018-10-15 PROCEDURE — 80053 COMPREHEN METABOLIC PANEL: CPT | Performed by: PHYSICIAN ASSISTANT

## 2018-10-15 PROCEDURE — 99217 ZZC OBSERVATION CARE DISCHARGE: CPT | Performed by: PHYSICIAN ASSISTANT

## 2018-10-15 PROCEDURE — 96374 THER/PROPH/DIAG INJ IV PUSH: CPT

## 2018-10-15 PROCEDURE — 93350 STRESS TTE ONLY: CPT | Mod: 26 | Performed by: INTERNAL MEDICINE

## 2018-10-15 PROCEDURE — 93325 DOPPLER ECHO COLOR FLOW MAPG: CPT | Mod: 26 | Performed by: INTERNAL MEDICINE

## 2018-10-15 PROCEDURE — 00000146 ZZHCL STATISTIC GLUCOSE BY METER IP

## 2018-10-15 PROCEDURE — C9113 INJ PANTOPRAZOLE SODIUM, VIA: HCPCS | Performed by: INTERNAL MEDICINE

## 2018-10-15 PROCEDURE — 93018 CV STRESS TEST I&R ONLY: CPT | Performed by: INTERNAL MEDICINE

## 2018-10-15 PROCEDURE — 25000132 ZZH RX MED GY IP 250 OP 250 PS 637: Mod: GY | Performed by: INTERNAL MEDICINE

## 2018-10-15 PROCEDURE — 93016 CV STRESS TEST SUPVJ ONLY: CPT | Performed by: INTERNAL MEDICINE

## 2018-10-15 PROCEDURE — 36415 COLL VENOUS BLD VENIPUNCTURE: CPT | Performed by: PHYSICIAN ASSISTANT

## 2018-10-15 PROCEDURE — G0378 HOSPITAL OBSERVATION PER HR: HCPCS

## 2018-10-15 PROCEDURE — 25000128 H RX IP 250 OP 636: Performed by: INTERNAL MEDICINE

## 2018-10-15 PROCEDURE — 93005 ELECTROCARDIOGRAM TRACING: CPT | Mod: 59

## 2018-10-15 PROCEDURE — 96375 TX/PRO/DX INJ NEW DRUG ADDON: CPT | Mod: 59

## 2018-10-15 PROCEDURE — 85025 COMPLETE CBC W/AUTO DIFF WBC: CPT | Performed by: PHYSICIAN ASSISTANT

## 2018-10-15 PROCEDURE — 25500064 ZZH RX 255 OP 636: Performed by: INTERNAL MEDICINE

## 2018-10-15 PROCEDURE — 99214 OFFICE O/P EST MOD 30 MIN: CPT | Mod: 25 | Performed by: INTERNAL MEDICINE

## 2018-10-15 RX ORDER — METOPROLOL SUCCINATE 50 MG/1
50 TABLET, EXTENDED RELEASE ORAL DAILY
Status: DISCONTINUED | OUTPATIENT
Start: 2018-10-16 | End: 2018-10-15

## 2018-10-15 RX ORDER — NADOLOL 20 MG/1
40 TABLET ORAL DAILY
Status: DISCONTINUED | OUTPATIENT
Start: 2018-10-16 | End: 2018-10-15 | Stop reason: HOSPADM

## 2018-10-15 RX ORDER — IBUPROFEN 400 MG/1
400 TABLET, FILM COATED ORAL 3 TIMES DAILY
Status: DISCONTINUED | OUTPATIENT
Start: 2018-10-15 | End: 2018-10-15 | Stop reason: HOSPADM

## 2018-10-15 RX ORDER — AMLODIPINE BESYLATE 2.5 MG/1
2.5 TABLET ORAL DAILY
Qty: 30 TABLET | Refills: 0 | Status: SHIPPED | OUTPATIENT
Start: 2018-10-16 | End: 2018-10-24

## 2018-10-15 RX ORDER — NADOLOL 40 MG/1
40 TABLET ORAL DAILY
Qty: 30 TABLET | Refills: 0 | Status: SHIPPED | OUTPATIENT
Start: 2018-10-16 | End: 2018-10-24

## 2018-10-15 RX ORDER — TRAMADOL HYDROCHLORIDE 50 MG/1
50 TABLET ORAL EVERY 6 HOURS PRN
Status: DISCONTINUED | OUTPATIENT
Start: 2018-10-15 | End: 2018-10-15 | Stop reason: HOSPADM

## 2018-10-15 RX ORDER — ACETAMINOPHEN 325 MG/1
650 TABLET ORAL 3 TIMES DAILY
Status: DISCONTINUED | OUTPATIENT
Start: 2018-10-15 | End: 2018-10-15 | Stop reason: HOSPADM

## 2018-10-15 RX ORDER — AMLODIPINE BESYLATE 2.5 MG/1
2.5 TABLET ORAL DAILY
Status: DISCONTINUED | OUTPATIENT
Start: 2018-10-15 | End: 2018-10-15 | Stop reason: HOSPADM

## 2018-10-15 RX ORDER — ACETAMINOPHEN 325 MG/1
650 TABLET ORAL 3 TIMES DAILY
Qty: 100 TABLET | Refills: 0 | Status: SHIPPED | OUTPATIENT
Start: 2018-10-15 | End: 2023-03-03

## 2018-10-15 RX ORDER — IBUPROFEN 400 MG/1
400 TABLET, FILM COATED ORAL 3 TIMES DAILY
Qty: 9 TABLET | Refills: 0 | Status: SHIPPED | OUTPATIENT
Start: 2018-10-15 | End: 2020-06-12

## 2018-10-15 RX ORDER — AMLODIPINE BESYLATE 5 MG/1
10 TABLET ORAL DAILY
Status: DISCONTINUED | OUTPATIENT
Start: 2018-10-15 | End: 2018-10-15

## 2018-10-15 RX ORDER — TRAMADOL HYDROCHLORIDE 50 MG/1
50 TABLET ORAL EVERY 6 HOURS PRN
Qty: 15 TABLET | Refills: 0 | Status: SHIPPED | OUTPATIENT
Start: 2018-10-15 | End: 2019-05-07

## 2018-10-15 RX ADMIN — CLOPIDOGREL 75 MG: 75 TABLET, FILM COATED ORAL at 08:21

## 2018-10-15 RX ADMIN — Medication 0.3 MG: at 01:55

## 2018-10-15 RX ADMIN — TRAMADOL HYDROCHLORIDE 50 MG: 50 TABLET, COATED ORAL at 09:38

## 2018-10-15 RX ADMIN — ACETAMINOPHEN 650 MG: 325 TABLET, FILM COATED ORAL at 08:20

## 2018-10-15 RX ADMIN — HUMAN ALBUMIN MICROSPHERES AND PERFLUTREN 9 ML: 10; .22 INJECTION, SOLUTION INTRAVENOUS at 13:49

## 2018-10-15 RX ADMIN — ALUMINUM HYDROXIDE, MAGNESIUM HYDROXIDE, AND DIMETHICONE 30 ML: 400; 400; 40 SUSPENSION ORAL at 00:53

## 2018-10-15 RX ADMIN — ATORVASTATIN CALCIUM 10 MG: 10 TABLET, FILM COATED ORAL at 08:20

## 2018-10-15 RX ADMIN — IBUPROFEN 400 MG: 400 TABLET ORAL at 09:38

## 2018-10-15 RX ADMIN — ACETAMINOPHEN 650 MG: 325 TABLET, FILM COATED ORAL at 13:57

## 2018-10-15 RX ADMIN — ASPIRIN 81 MG: 81 TABLET, COATED ORAL at 08:21

## 2018-10-15 RX ADMIN — METOPROLOL SUCCINATE 25 MG: 25 TABLET, EXTENDED RELEASE ORAL at 08:21

## 2018-10-15 RX ADMIN — LOSARTAN POTASSIUM 100 MG: 100 TABLET, FILM COATED ORAL at 08:21

## 2018-10-15 RX ADMIN — PANTOPRAZOLE SODIUM 40 MG: 40 INJECTION, POWDER, FOR SOLUTION INTRAVENOUS at 08:22

## 2018-10-15 RX ADMIN — LEVOTHYROXINE SODIUM 112 MCG: 112 TABLET ORAL at 08:21

## 2018-10-15 RX ADMIN — AMLODIPINE BESYLATE 2.5 MG: 2.5 TABLET ORAL at 09:38

## 2018-10-15 NOTE — PLAN OF CARE
Problem: Patient Care Overview  Goal: Plan of Care/Patient Progress Review  Pt admitted at 2118. A&Ox4. VSS on RA. L/s clear. Tele NSR. Left chest and back pain at rest, rating 5/10. Gave PRN Tylenol. First troponin negative. On serial troponin check. D-Dimer WNL. Denies SOB. Up SBA. On cardiac diet/mod CHO diet. . PIV SL. Plan for Echocardiogram and Cardiology consult.

## 2018-10-15 NOTE — CONSULTS
Inpatient Cardiology Consultation   [facility]  Date of Admission:10/14/2018  Date of consult: 10/15/2018     REFERRAL SOURCE:  María Maria MD, Hospitalist Service.      PRIMARY CARDIOLOGY TEAM:  Albino Howard MD and FATMATA Ko, CNP.      REASON FOR REFERRAL:    Low back epigastric pain in a 73-year-old lady who is status post right coronary artery stenting in 06/2018.      HISTORY OF PRESENT ILLNESS:    Yola Mayo is new to me, but has established care with my cardiology partners as above.  She was recently seen by Krystal Davis in 07/2018.        The patient is a 73-year-old  female, postmenopausal, known to have type 2 diabetes mellitus (HbA1c 6% on oral agents), hypertension, excellent lipid panel (therefore only on 10 mg of atorvastatin), and a very strong family history of coronary artery disease on both sides of the family.  She is a lifelong never tobacco user and her BMI is 31 kg/m2.  Pertinently, patient is known to have coronary artery disease requiring stenting of the right coronary artery 4 months ago (06/2018).  Her symptoms leading to that were classic exertional angina occurring at 1 block that was relieved with rest.  Her exercise echocardiogram was positive. Coronary angiogram (I personally reviewed the images) showed multiple tight stenoses along a dominant right coronary artery which was successfully stented with 2 drug-eluting stents.  She also had a 60% proximal LAD lesion and a tiny diagonal that is chronically occluded (too small to intervene) and no significant disease in the circumflex.  She had normal biventricular systolic function and valves on echocardiogram.      Following her stenting, she has been consistently on aspirin and clopidogrel (switched from Brilinta due to cost effectiveness). During her initial hospitalization, she was put on Toprol-XL 25 mg for moderate hypertension (140-154/90s).  She did not tolerate higher doses due to  "significant fatigue.  Amlodipine 5 mg was discontinued recently due to headaches, presumably due to low blood pressure (headaches resolved after drug discontinuation).  She is on only 10 mg of atorvastatin due to an excellent lipid profile (total cholesterol 132, HDL 80, LDL 47, triglycerides 26).  She has been progressing well in cardiac rehabilitation where her blood pressures have been on the lower side.      Two nights ago she developed discomfort along her lower left ribcage at the back and her lower epigastrium.  The pain was partially relieved by Tylenol, is constant, sharp, non-radiating, and actually improves with walking.  She tellingly states, \"it is very different from my pain before the stents.\"  Because the pain kept recurring despite her taking Tylenol, she presented to the ER.  It is notable that during my visit with her, she was clearly in a lot of discomfort and was very tearful because the pain was debilitating her.  She is not known to have any aortic disorders or similarly concerning family history of the same.     ECG shows sinus rhythm with nonspecific T-wave changes in V1 and V2.  Her troponin-I is borderline up but flat (0.021, 0.019, 0.016).      Other labs:  Sodium 138, potassium 4.3, BUN 18, creatinine 0.8 with an estimated GFR of 70, TSH 3.18, lipid panel as above, HbA1c 6.0%, hemoglobin 14.0, platelets 242,000, leukocytes 8.9.      Coronary angiogram - 06/27/2018.  Images reviewed.  As above.      Exercise echocardiogram - 06/05/2018.  Reviewed.  As above.  Baseline LVEF 55%-60% with no resting wall motion abnormalities.      MEDICATIONS PRIOR TO ADMISSION:   1.  Aspirin 81 mg daily.   2.  Atorvastatin 10 mg daily.   3.  Clopidogrel 75 mg daily.   4.  Hydrochlorothiazide 25 mg daily.   5.  Levothyroxine 112 mcg daily.   6.  Losartan 100 mg daily.   7.  Metformin (Glucophage-XR) 500 mg daily.   8.  Metoprolol succinate (Toprol-XL) 25 mg daily.   9.  Sublingual nitroglycerin.   10.  Daily " multivitamin.   11.  Calcium with vitamin D.      Please see my attached note in Epic for review of systems, medications, allergies, past medical, surgical, social history, and vital signs.      PHYSICAL EXAMINATION:   VITAL SIGNS:  Temperature 96.5, blood pressure 162/66, pulse 74 per minute, respiratory rate 16 per minute, SATS 97% on room air, weight 81.6 kg, 180 pounds, height 1.6 meters, 5 feet 4 inches, BMI 31.0 kg/m2.   CONSTITUTIONAL:  Normal body habitus, clearly in discomfort and very tearful.  Cooperative, alert, oriented, well developed and nourished.   EYES:  No pallor.   ENT:  Good dentition.   NECK:  No thyromegaly.   RESPIRATORY:  Clear to auscultation.   CARDIOVASCULAR:  Normal JVP, carotid pulse, normal apical impulse.  No reproducible anterior chest wall discomfort.  Heart sounds are regular.  No pericardial friction rub or murmurs.   GI:  Soft, nontender.   SKIN:  No rash, erythema or ecchymosis.   MUSCULOSKELETAL:  She has classically reproducible significant discomfort on palpating at the spot just below her left ribcage on the back.     ABDOMEN:  Soft and nontender.  No hepatosplenomegaly and no pulsatile aorta.  No bruit.   NEUROPSYCHIATRIC:  Tearful, rest as above.   EXTREMITIES:  No edema.      DIAGNOSES:   1.  Non-cardiac, likely musculoskeletal back/epigastric pain.   2.  Known coronary artery disease without angina, status post stenting of the right coronary artery (06/2018) with residual 60% proximal LAD lesion.   3.  Fatigue with metoprolol succinate.   4.  Hypertension.   5.  Treated hypothyroidism.   6.  Type 2 diabetes mellitus, not on insulin, HbA1c 6.0%.   7.  Strong family history of premature coronary artery disease and fatal myocardial infarction.      ASSESSMENT/PLAN:    The patient herself voices her pain is very different from her angina which has basically not recurred since she has had her stenting done.  Her pain is very reproducible and the patient is in tears from the  discomfort.  This needs to be addressed first.  Also, she is hypertensive but this is in the setting of her discomfort.  She has side effects of headache with higher doses of amlodipine, an unspecified intolerance to lisinopril but is on maximum dose losartan, and significant fatigue with even low-dose metoprolol XL.  Although her lipid profile is very good indeed, given her strong family history on both sides of CAD, as well as presence of obstructive coronary artery disease despite having such a lipid profile, I recommend at least 40 mg of atorvastatin if she has no myalgic side effects.  This can be addressed as an outpatient.      RECOMMENDATIONS:   1.  Pain relief - Tramadol 50 mg, ibuprofen 400 mg, Tylenol (scheduled for 2 days).   2.  Trial of low-dose amlodipine 2.5 mg daily (do not escalate dose due to potential for drop in blood pressure).   3.  Discontinue metoprolol XL due to fatigue.  Instead start nadolol 40 mg daily, which is a better antihypertensive beta blocker and has less incidence of fatigue.   4.  If after 3 hours of the above, her pain has relieved, she can proceed to an exercise echocardiogram.  If the pain persists, she can be discharged home and we will arrange for an expedited stress test later this week.  Beta blocker may not be held prior to the stress test because of the potential for hypertension which might give a false positive result.   5.  Continue uninterrupted dual antiplatelet therapy with aspirin and clopidogrel.   6.  Up titrate atorvastatin dosage as an outpatient - as described above.   7.  Anticipated discharge - later today with or without the stress test prior to that.      Thank you for consulting Cardiology.         GUERLINE NEFF MD             D: 10/15/2018   T: 10/15/2018   MT: CC      Name:     MANISH CUADRA   MRN:      [id number]        Account:       [id number]   :      1945           Consult Date:  10/15/2018      Document: [id number]       cc:  Zoraida Braga MD

## 2018-10-15 NOTE — PROGRESS NOTES
PRIMARY DIAGNOSIS: CHEST PAIN  OUTPATIENT/OBSERVATION GOALS TO BE MET BEFORE DISCHARGE:  1. Ruled out acute coronary syndrome (negative or stable Troponin):  Yes  2. Pain Status: c/o 8/10 under L breast radiating to L back pain.   3. Appropriate provocative testing performed: No  - Procedure: Echo  - Interpretation of cardiac rhythm per telemetry tech: NSR    4. Cleared by Consultants (if applicable):No  5. Return to near baseline physical activity: Yes  Discharge Planner Nurse   Safe discharge environment identified: Yes  Barriers to discharge: Yes       Entered by: Rufina Mcgraw 10/15/2018      Please review provider order for any additional goals.   Nurse to notify provider when observation goals have been met and patient is ready for discharge.

## 2018-10-15 NOTE — DISCHARGE SUMMARY
[facility]    Discharge Summary  Hospitalist    Date of Admission:  10/14/2018  Date of Discharge:  10/15/2018  Discharging Provider: Cee Kebede PA-C  Date of Service (when I saw the patient): 10/15/18    Discharge Diagnoses   Atypical chest pain, likely musculoskeletal  Abnormal stress test    History of Present Illness   Yola Mayo is a 73 year old female with PMHx of CAD s/p RCA stent (6/2018), tobacco use disorder, hypothyroidism, pulmonary HTN, hyperlipidemia, HTN, and T2DM who was registered under observation status starting 10/14/18 for further evaluation of atypical chest pain. Pt hypertensive, all other vitals WNL. Pt currently stable.     No acute events reported overnight.  Patient notes pain is much improved after implantation of Tylenol, ibuprofen, and tramadol.  Afebrile.  Completed stress echo as below.  Cardiology followed patient throughout stay.    Hospital Course   Yola Mayo was admitted on 10/14/2018.  The following problems were addressed during her hospitalization:    Atypical chest pain  Abnormal stress test: Describes left thoracic paraspinous pain, reproducible upon palpation. Described some radiation up thoracic spine. No associated SOB, dizziness, lightheadedness. No recent URI sx, cough. No hx of GERD. Described as a constant pain, at times alleviated with movement. CMP, lipase, CBC WNL. Troponin negative X3. D-dimer 0.5. EKG shows NSR, note some T wave changes in V1-V2. Stress echo performed showing a small area of mid anterior ischemia (likely diagonal distribution).   -- Cardiology consulted on admission, appreciate assistance by Dr. Haley; this is likely in the distribution of her known small diagonal total occlusion (had been considered too small to intervene upon).  Do note she does have 50-60% proximal LAD lesion.  Patient was offered inpatient coronary angiography, but prefers to pursue this outpatient as her symptoms have  significantly resolved with pain medication.  Cardiology did recommend discontinuing prior to admission Toprol-XL 25 mg by mouth daily and changing it to nadolol 40 mg by mouth daily.  Amlodipine 2.5 mg by mouth daily was added to patient's antihypertensive regimen as well.  Prescriptions for these medications were sent to discharge pharmacy.  Cardiology follow-up established prior to discharge.  -Musculoskeletal pain control with scheduled Tylenol and ibuprofen per recommendation of cardiology.  Instructed patient to not use ibuprofen more than 3 days in a row as she is on ASA and Plavix and do not want to cause GI disturbance.  As needed tramadol prescribed at discharge as well.  Ice and heat as needed.  -Follow-up with primary care provider in the next week to discuss hospitalization     CAD s/p RCA stent X2 (6/2018)  Hypertension, hyperlipidemia   Pulmonary HTN: Plan as above.  Continue aspirin 81 mg by mouth daily, Lipitor 10 mg by mouth daily, Plavix 75 mg by mouth daily, hydrochlorothiazide 25 mg by mouth daily, losartan 100 mg by mouth daily.  Amlodipine and nadolol added as above.  Discontinue Toprol-XL.     T2DM, non-insulin dependent, controlled: A1C of 6 most recently.  Resume prior to admission metformin 500 mg by mouth daily at discharge.    Hypothyroidism: Continue PTA Synthroid at discharge    Cee Kebede PA-C    This patient was discussed with Dr. Maria of the Hospitalist Service who agrees with current plans as outlined above.     Significant Results and Procedures   See below    Pending Results   These results will be followed up by PCP  Unresulted Labs Ordered in the Past 30 Days of this Admission     No orders found for last 61 day(s).          Code Status   Full Code       Primary Care Physician   Zoraida Braga    Physical Exam   Temp: 95.4  F (35.2  C) Temp src: Oral BP: 174/71   Heart Rate: 69 Resp: 16 SpO2: 96 % O2 Device: None (Room air)    Vitals:    10/14/18 1838  10/14/18 2122   Weight: 81.6 kg (180 lb) 82.1 kg (181 lb 1.6 oz)     Vital Signs with Ranges  Temp:  [95.4  F (35.2  C)-96.7  F (35.9  C)] 95.4  F (35.2  C)  Heart Rate:  [58-69] 69  Resp:  [16-18] 16  BP: (158-174)/(59-73) 174/71  SpO2:  [94 %-97 %] 96 %  I/O last 3 completed shifts:  In: 100 [P.O.:100]  Out: -     CONSTITUTIONAL: Pt laying in bed, dressed in hospital garb. Appears comfortable. Cooperative with interview. Accompanied by  at bedside.  HEENT: Normocephalic, atraumatic. Negative for conjunctival redness or scleral icterus.  Oral mucosa pink and moist; negative for ulcerations, erythema, or exudates.  Dentition in good repair.   CARDIOVASCULAR: RRR, no murmurs, rubs, or extra heart sounds appreciated. Pulses +2/4 and regular in upper and lower extremities, bilaterally.   RESPIRATORY: No increased work of breathing.   CTA, bilat; no wheezes, rales, or rhonchi appreciated.  GASTROINTESTINAL:  Abdomen soft, non-distended. BS auscultated in all four quadrants. Negative for tenderness to palpation.  No masses or organomegaly noted.  MUSCULOSKELETAL: Pain reproducible upon palpation of left thoracic paraspinous region and chest well. No gross deformities noted. Normal muscle tone.   HEMATOLOGIC/LYMPHATIC/IMMUNOLOGIC:  Negative for lower extremity edema, bilaterally.  NEUROLOGIC: Alert and oriented to person, place, and time.  strength intact.   SKIN: Warm, dry, intact. No jaundice noted. Negative for suspicious lesions, rashes, bruising, open sores or abrasions.     Discharge Disposition   Discharged to home  Condition at discharge: Stable    Consultations This Hospital Stay   CARDIOLOGY IP CONSULT    Time Spent on this Encounter   I, Cee Kebede, personally saw the patient today and spent greater than 30 minutes discharging this patient.    Discharge Orders     Follow-Up with Cardiac Advanced Practice Provider     Reason for your hospital stay   You were hospitalized for  further evaluation and treatment of chest pain.     Follow-up and recommended labs and tests    Follow up with primary care provider, Zoraida Braga, within 7 days for hospital follow- up.  No follow up labs or test are needed.    Follow up with Cardiology for outpatient angiogram     Activity   Your activity upon discharge: activity as tolerated     Discharge Instructions   1) Nadolol 40 mg by mouth daily prescribed in place of Toprol XL (stop taking)  2) Amlodipine 2.5 mg by mouth daily prescribed at discharge   3) Pain control with tylenol, ibuprofen, and as needed tramadol. Do not take ibuprofen for more than three days   4) No additional medication changes   5) Follow-up with Cardiology for outpatient angiogram     Diet   Follow this diet upon discharge: Resume home diet       Discharge Medications   Discharge Medication List as of 10/15/2018  4:36 PM      START taking these medications    Details   acetaminophen (TYLENOL) 325 MG tablet Take 2 tablets (650 mg) by mouth 3 times daily, Disp-100 tablet, R-0, E-Prescribe      amLODIPine (NORVASC) 2.5 MG tablet Take 1 tablet (2.5 mg) by mouth daily, Disp-30 tablet, R-0, E-Prescribe      ibuprofen (ADVIL/MOTRIN) 400 MG tablet Take 1 tablet (400 mg) by mouth 3 times daily, Disp-9 tablet, R-0, E-Prescribe      nadolol (CORGARD) 40 MG tablet Take 1 tablet (40 mg) by mouth daily, Disp-30 tablet, R-0, E-Prescribe      traMADol (ULTRAM) 50 MG tablet Take 1 tablet (50 mg) by mouth every 6 hours as needed for moderate pain, Disp-15 tablet, R-0, Local Print         CONTINUE these medications which have NOT CHANGED    Details   aspirin 81 MG tablet Take 81 mg by mouth daily, Historical      atorvastatin (LIPITOR) 10 MG tablet Take 1 tablet (10 mg) by mouth daily, Disp-90 tablet, R-3, Local Print      calcium-vitamin D (CALTRATE) 600-400 MG-UNIT per tablet Take 1 tablet by mouth daily, Disp-60 tablet, R-11, E-Prescribe      cholecalciferol (VITAMIN D) 1000 UNIT tablet Take 1  tablet (1,000 Units) by mouth daily, Disp-100 tablet, R-3, E-Prescribe      clopidogrel (PLAVIX) 75 MG tablet Take 1 tablet (75 mg) by mouth daily On day 1 take 4 tablet (300 mg) then 75 mg thereafter, Disp-90 tablet, R-3, E-Prescribe      Cyanocobalamin (VITAMIN B 12 PO) Take 1 tablet by mouth daily VIT B12 unknown otc dose, Historical      hydrochlorothiazide (HYDRODIURIL) 25 MG tablet Take 1 tablet (25 mg) by mouth daily, Disp-90 tablet, R-3, E-Prescribe      levothyroxine (SYNTHROID/LEVOTHROID) 112 MCG tablet Take 1 tablet (112 mcg) by mouth daily, Disp-90 tablet, R-3, E-PrescribeProfile Rx: patient will contact pharmacy when needed      losartan (COZAAR) 100 MG tablet Take 1 tablet (100 mg) by mouth daily, Disp-90 tablet, R-3, E-Prescribe      metFORMIN (GLUCOPHAGE-XR) 500 MG 24 hr tablet Take 1 tablet (500 mg) by mouth daily (with dinner), Disp-90 tablet, R-3, E-PrescribeProfile Rx: patient will contact pharmacy when needed      multivitamin, therapeutic with minerals (THERA-VIT-M) TABS Take 1 tablet by mouth daily, Historical      nitroGLYcerin (NITROSTAT) 0.4 MG sublingual tablet For chest pain place 1 tablet under the tongue every 5 minutes for 3 doses. If symptoms persist 5 minutes after 1st dose call 911., Disp-25 tablet, R-3, E-Prescribe      order for DME Compression Stockings  Knee high  20/30 compressionDisp-2 each, R-1, Local Print         STOP taking these medications       metoprolol succinate (TOPROL-XL) 25 MG 24 hr tablet Comments:   Reason for Stopping:             Allergies   Allergies   Allergen Reactions     Toprol Xl [Metoprolol] Fatigue     Ace Inhibitors      ACE throat clearing     Oxycodone Nausea and Vomiting     Tetanus Toxoid Swelling     Hard red lump      Shellfish-Derived Products Rash     possible     Data   Most Recent 3 CBC's:  Recent Labs   Lab Test  10/15/18   0948  10/14/18   1850  06/27/18   0720   WBC  7.4  8.9  6.3   HGB  13.6  14.0  13.9   MCV  87  86  87   PLT  209  242   239      Most Recent 3 BMP's:  Recent Labs   Lab Test  10/15/18   0948  10/14/18   1850  07/09/18   0905   NA  137  138  140   POTASSIUM  4.0  4.3  3.2*   CHLORIDE  103  105  102   CO2  29  27  27   BUN  13  18  14   CR  0.71  0.80  0.82   ANIONGAP  5  6  14.2   JOSEPH  8.8  8.9  9.5   GLC  151*  113*  102     Most Recent 2 LFT's:  Recent Labs   Lab Test  10/15/18   0948  08/04/17   1028   AST  35  30   ALT  55*  51*   ALKPHOS  70  65   BILITOTAL  0.5  0.3     Most Recent INR's and Anticoagulation Dosing History:  Anticoagulation Dose History     Recent Dosing and Labs Latest Ref Rng & Units 6/27/2018    INR 0.86 - 1.14 0.98        Most Recent 3 Troponin's:  Recent Labs   Lab Test  10/15/18   0310  10/14/18   2245  10/14/18   1850   TROPI  0.016  0.019  0.021     Most Recent Cholesterol Panel:  Recent Labs   Lab Test  05/03/18   0847   CHOL  132   LDL  47   HDL  80   TRIG  26     Most Recent 6 Bacteria Isolates From Any Culture (See EPIC Reports for Culture Details):  Recent Labs   Lab Test  12/31/13   1430   CULT  50,000 to 100,000 colonies/mL Mixed gram positive candice Multiple species present, probable perineal contamination. Susceptibility testing  not routinely done     Most Recent TSH, T4 and A1c Labs:  Recent Labs   Lab Test  10/14/18   2245  10/14/18   1850   TSH   --   3.18   A1C  6.0*   --      Results for orders placed or performed during the hospital encounter of 10/14/18   XR Chest 2 Views    Narrative    CHEST TWO VIEWS   10/14/2018 8:15 PM     HISTORY: Chest pain.     COMPARISON: 1/27/2010.      Impression    IMPRESSION: No acute cardiopulmonary disease.     ASHLEY FERNÁNDEZ MD

## 2018-10-15 NOTE — PROGRESS NOTES
PRIMARY DIAGNOSIS: CHEST PAIN  OUTPATIENT/OBSERVATION GOALS TO BE MET BEFORE DISCHARGE:  1. Ruled out acute coronary syndrome (negative or stable Troponin):  Yes  2. Pain Status: Improved-controlled with oral pain medications.  3. Appropriate provocative testing performed: No  - Stress Test Procedure: Echo  - Interpretation of cardiac rhythm per telemetry tech: NSR    4. Cleared by Consultants (if applicable):No  5. Return to near baseline physical activity: Yes  Discharge Planner Nurse   Safe discharge environment identified: Yes  Barriers to discharge: Yes       Entered by: Rufina Mcgraw 10/15/2018      Please review provider order for any additional goals.   Nurse to notify provider when observation goals have been met and patient is ready for discharge.

## 2018-10-15 NOTE — H&P
[facility]    History and Physical  Hospitalist       Date of Admission:  10/14/2018    Assessment & Plan   Yola Mayo is a 73 year old female with history of  Coronary artery disease status post recent rca stent placement following angiogram 2 months ago, presents with left sided chest pain at rest.  Principal Problem:    Acute chest pain    Left sided epigastric pain that is radiating to the back, some localized tenderness can be noticed.  Lipase levels ordered.  Patient is an occasional alcohol user.  This pain is different from when she had a her CAD.  Currently the pain is occurring mostly at rest without any associated shortness of breath.    Continue IV fluids, cycle troponins    Consult cardiology, will order echo    Continue as needed nitroglycerin, it did not help her much in the ER.    CAD S/P PCI- rca-2 stents    Recent RCA stent placement history noted, current chest pain is different from prior.  First set of troponins negative.    Continue aspirin, Plavix, metoprolol, losartan and atorvastatin.    Hypothyroidism    Continue synthroid     Primary pulmonary hypertension (H)    Hyperlipidemia LDL goal <130    Continue statin     Essential hypertension    Continue losartan, metoprolol, hydrochlorothiazide on hold   Diabetes type 2     Continue metformin    Will place on sliding scale    DVT Prophylaxis: Pneumatic Compression Devices  Code Status: Full Code    Disposition: Expected discharge in 24 hours    María Maria MD    Primary Care Physician   Zoraiad Braga    Chief Complaint   Chest pain 2 occasions     History is obtained from the patient    History of Present Illness   Yola Mayo is a 73 year old female with a history of diabetes, hypertension, hyperlipidemia, CAD, hypothyroidism presents to the emergency department secondary to left-sided chest pain that is radiating to her back.  Her complaint started the night prior to admission while she was asleep she  suddenly noticed some soreness on her left chest wall which was radiating to the back she took some pain medication and it improved.  Again on the day of admission in the morning she was resting when she had left-sided chest pain which was radiating to the back within no associated nausea or diaphoresis and she took 2 nitro with some improvement.  Later during the day her pain returned and this time it concerned her and she came to the emergency department.  Here the nitro did not help her much and her pain is 5 out of 10 and constant.  Some of it can be reproducible by palpating her epigastric area.  Of note the patient had a PCI and angiogram 2 months ago and 2 RCA stents placed.  She has been regularly taking her medications which include aspirin and Plavix.  In the emergency department her BMP remained normal with a normal troponin TSH is 3.18 WBC count is normal, d-dimer is within normal limit and had a normal chest x-ray.  A lipase level has been ordered.  Past Medical History    I have reviewed this patient's medical history and updated it with pertinent information if needed.   Past Medical History:   Diagnosis Date     BASAL CELL CANCER -NOSE      Family history of diabetes mellitus     Father     Family history of ischemic heart disease     Father     Family history of malignant neoplasm of ovary     Mother     HX DISRUPT L ANT CRUCIATE S/P REPAIR 2003      HYPERTENSION       HYPOTHYROIDISM      LOC PRIM OSTEOARTH-L 1ST METATARSAL-CUNEIFORM JOINT 3/10/2004     Major depressive disorder, single episode, mild (H) 11/24/2014     MILD-MOD TRICUSPID REGURG. MOD PULM HTN 6/23/2005    ECHO  5/05     SPLENIC CYST        Past Surgical History   I have reviewed this patient's surgical history and updated it with pertinent information if needed.  Past Surgical History:   Procedure Laterality Date     ARTHROPLASTY KNEE Left 4/13/2016    Procedure: ARTHROPLASTY KNEE;  Surgeon: Giuseppe Mcgrath MD;  Location:  OR      ARTHROPLASTY MINIMALLY INVASIVE HIP  4/18/2012    Procedure:ARTHROPLASTY MINIMALLY INVASIVE HIP; RIGHT TWO INCISION MINIMALLY INVASIVE HIP ARTHROPLASTY (YUVAL)^ (NO CELL SAVER) (C-ARM); Surgeon:LITO LOPEZ; Location:[facility] OR     C APPENDECTOMY  8/87    incidental appi at hys     C CHEMOSURG MOHS 1ST STAGE  9-99    Mohs-nose     C REPAIR CRUCIATE LIGAMENT,KNEE  8/2003    L   ACL reconstruction     C TOTAL ABDOM HYSTERECTOMY  8/87    W/ BSO fibroids, hx PID     HC COLONOSCOPY THRU STOMA, DIAGNOSTIC  4/03    normal, rec repeat 10 yrs     HYSTERECTOMY, PAP NO LONGER INDICATED       SURGICAL HISTORY OF -   Mult-~1978    infertility, laparotomies       Prior to Admission Medications   Prior to Admission Medications   Prescriptions Last Dose Informant Patient Reported? Taking?   Cyanocobalamin (VITAMIN B 12 PO)  Self Yes No   Sig: Take 1 tablet by mouth daily VIT B12   aspirin 81 MG tablet   Yes No   Sig: Take 81 mg by mouth daily   atorvastatin (LIPITOR) 10 MG tablet   No No   Sig: Take 1 tablet (10 mg) by mouth daily   calcium-vitamin D (CALTRATE) 600-400 MG-UNIT per tablet  Self No No   Sig: Take 1 tablet by mouth daily   cholecalciferol (VITAMIN D) 1000 UNIT tablet  Self No No   Sig: Take 1 tablet (1,000 Units) by mouth daily   clopidogrel (PLAVIX) 75 MG tablet   No No   Sig: Take 1 tablet (75 mg) by mouth daily On day 1 take 4 tablet (300 mg) then 75 mg thereafter   hydrochlorothiazide (HYDRODIURIL) 25 MG tablet   No No   Sig: Take 1 tablet (25 mg) by mouth daily   levothyroxine (SYNTHROID/LEVOTHROID) 112 MCG tablet   No No   Sig: Take 1 tablet (112 mcg) by mouth daily   losartan (COZAAR) 100 MG tablet   No No   Sig: Take 1 tablet (100 mg) by mouth daily   metFORMIN (GLUCOPHAGE-XR) 500 MG 24 hr tablet   No No   Sig: Take 1 tablet (500 mg) by mouth daily (with dinner)   metoprolol succinate (TOPROL-XL) 25 MG 24 hr tablet   No No   Sig: Take 1 tablet (25 mg) by mouth daily   multivitamin, therapeutic with  minerals (THERA-VIT-M) TABS  Self Yes No   Sig: Take 1 tablet by mouth daily   nitroGLYcerin (NITROSTAT) 0.4 MG sublingual tablet   No No   Sig: For chest pain place 1 tablet under the tongue every 5 minutes for 3 doses. If symptoms persist 5 minutes after 1st dose call 911.   order for DME   No No   Sig: Compression Stockings  Knee high  20/30 compression      Facility-Administered Medications: None     Allergies   Allergies   Allergen Reactions     Ace Inhibitors      ACE throat clearing     Oxycodone Nausea and Vomiting     Tetanus Toxoid Swelling     Hard red lump      Shellfish-Derived Products Rash     possible       Social History   I have reviewed this patient's social history and updated it with pertinent information if needed. Yola Mayo  reports that she has never smoked. She has never used smokeless tobacco. She reports that she drinks alcohol. She reports that she does not use illicit drugs.    Family History   I have reviewed this patient's family history and updated it with pertinent information if needed.   Family History   Problem Relation Age of Onset     Cancer Mother      ovarian  of it  at 51yo.     Diabetes Father      Adult onset     Hypertension Father      C.A.D. Father      Fatal Heart attack at 70.     Cerebrovascular Disease Father      Thyroid Disease Sister      Thyroid Disease Sister      Thyroid Disease Sister      Cancer Daughter      Thyroid Disease Daughter      Breast Cancer No family hx of      Cancer - colorectal No family hx of        Review of Systems   The 10 point Review of Systems is negative other than noted in the HPI or here.     Physical Exam   Temp: 97.6  F (36.4  C) Temp src: Oral BP: 132/67 Pulse: 74 Heart Rate: 63 Resp: 19 SpO2: 97 % O2 Device: None (Room air)    Vital Signs with Ranges  Temp:  [97.6  F (36.4  C)] 97.6  F (36.4  C)  Pulse:  [74] 74  Heart Rate:  [58-74] 63  Resp:  [9-20] 19  BP: (132-216)/(67-95) 132/67  SpO2:  [92 %-98 %] 97 %  180 lbs 0  oz    Constitutional:Awake, alert, cooperative, no apparent distress.  Eyes: Conjunctiva and pupils examined and normal.  HEENT: Moist mucous membranes, normal dentition.  Some tenderness on palpation noted on left upper abdomen  Respiratory: Clear to auscultation bilaterally, no crackles or wheezing.  Cardiovascular: Regular rate and rhythm, normal S1 and S2, and no murmur noted.  GI: Soft, non-distended, non-tender, normal bowel sounds.  Lymph/Hematologic: No anterior cervical or supraclavicular adenopathy.  Skin: No rashes, no cyanosis, no edema.  Musculoskeletal: No joint swelling, erythema or tenderness.  Neurologic: Cranial nerves 2-12 intact, normal strength and sensation.  Psychiatric: Alert, oriented to person, place and time, no obvious anxiety or depression.    Data   Data reviewed today:  I personally reviewed   Recent Labs  Lab 10/14/18  1850   WBC 8.9   HGB 14.0   MCV 86         POTASSIUM 4.3   CHLORIDE 105   CO2 27   BUN 18   CR 0.80   ANIONGAP 6   JOSEPH 8.9   *   TROPI 0.021       Imaging:  Recent Results (from the past 24 hour(s))   XR Chest 2 Views    Narrative    CHEST TWO VIEWS   10/14/2018 8:15 PM     HISTORY: Chest pain.     COMPARISON: 1/27/2010.      Impression    IMPRESSION: No acute cardiopulmonary disease.

## 2018-10-15 NOTE — PROVIDER NOTIFICATION
MD Notification    Notified Person: MD    Notified Person Name: Dr Haley    Notification Date/Time: 10/15/18 2:50 PM     Notification Interaction: paged    Purpose of Notification: FYI stress echo results in epic    Orders Received:     Comments: Awaiting call back

## 2018-10-15 NOTE — PROGRESS NOTES
RECEIVING UNIT ED HANDOFF REVIEW    ED Nurse Handoff Report was reviewed by: Shawna Cao on October 14, 2018 at 9:04 PM

## 2018-10-15 NOTE — PLAN OF CARE
Problem: Patient Care Overview  Goal: Plan of Care/Patient Progress Review  Pt is A&OX4, VSS on RA except for elevated systolic sometimes. Received G.I cocktail x1and I.v dilaudid for back pain.Tele is NSR,trops has been -ve.She is full code, on mod CHO diet,low NA+ and fat diet. Plan for ECHO and cardio consult.

## 2018-10-15 NOTE — PHARMACY-ADMISSION MEDICATION HISTORY
Admission medication history interview status for the 10/14/2018  admission is complete. See EPIC admission navigator for prior to admission medications     Medication history source reliability:Good    Actions taken by pharmacist (provider contacted, etc): interviewed patient     Additional medication history information not noted on PTA med list :None    Medication reconciliation/reorder completed by provider prior to medication history? Yes    Time spent in this activity: 20 min    Prior to Admission medications    Medication Sig Last Dose Taking? Auth Provider   aspirin 81 MG tablet Take 81 mg by mouth daily 10/14/2018 at Unknown time Yes Reported, Patient   atorvastatin (LIPITOR) 10 MG tablet Take 1 tablet (10 mg) by mouth daily 10/14/2018 at Unknown time Yes Dick Mccarthy MD   calcium-vitamin D (CALTRATE) 600-400 MG-UNIT per tablet Take 1 tablet by mouth daily 10/14/2018 at Unknown time Yes Zoraida Braga MD   cholecalciferol (VITAMIN D) 1000 UNIT tablet Take 1 tablet (1,000 Units) by mouth daily 10/14/2018 at Unknown time Yes Zoraida Braga MD   clopidogrel (PLAVIX) 75 MG tablet Take 1 tablet (75 mg) by mouth daily On day 1 take 4 tablet (300 mg) then 75 mg thereafter 10/14/2018 at Unknown time Yes Krystal Neal APRN CNP   Cyanocobalamin (VITAMIN B 12 PO) Take 1 tablet by mouth daily VIT B12 unknown otc dose 10/14/2018 at Unknown time Yes Reported, Patient   hydrochlorothiazide (HYDRODIURIL) 25 MG tablet Take 1 tablet (25 mg) by mouth daily 10/14/2018 at Unknown time Yes Krystal Neal APRN CNP   levothyroxine (SYNTHROID/LEVOTHROID) 112 MCG tablet Take 1 tablet (112 mcg) by mouth daily 10/14/2018 at Unknown time Yes Zoraida Braga MD   losartan (COZAAR) 100 MG tablet Take 1 tablet (100 mg) by mouth daily 10/14/2018 at Unknown time Yes Krystal Neal APRN CNP   metFORMIN (GLUCOPHAGE-XR) 500 MG 24 hr tablet Take 1 tablet (500 mg) by mouth daily (with dinner) 10/14/2018 at Unknown time Yes  Zoraida Braga MD   metoprolol succinate (TOPROL-XL) 25 MG 24 hr tablet Take 1 tablet (25 mg) by mouth daily 10/14/2018 at Unknown time Yes Krystal Neal APRN CNP   multivitamin, therapeutic with minerals (THERA-VIT-M) TABS Take 1 tablet by mouth daily 10/14/2018 at Unknown time Yes Reported, Patient   nitroGLYcerin (NITROSTAT) 0.4 MG sublingual tablet For chest pain place 1 tablet under the tongue every 5 minutes for 3 doses. If symptoms persist 5 minutes after 1st dose call 911. prn Yes Krystal Neal APRN CNP   order for DME Compression Stockings  Knee high  20/30 compression   Zoraida Braga MD

## 2018-10-15 NOTE — ED NOTES
[facility]  ED Nurse Handoff Report    ED Chief complaint: Chest Pain (Pt reports L sided chest pain that wraps around under her breast and into her back. Denies SOB, nausea or diaphoresis. Pt has hx of stents x2 in June. Pt took 2 nitro at home with some relief, last dose around 1730)      ED Diagnosis:   Final diagnoses:   Acute chest pain       Code Status: Full Code    Allergies:   Allergies   Allergen Reactions     Ace Inhibitors      ACE throat clearing     Oxycodone Nausea and Vomiting     Tetanus Toxoid Swelling     Hard red lump      Shellfish-Derived Products Rash     possible       Activity level - Baseline/Home:  Independent    Activity Level - Current:   Stand with Assist     Needed?: No    Isolation: No  Infection: Not Applicable  Bariatric?: No    Vital Signs:   Vitals:    10/14/18 1950 10/14/18 1955 10/14/18 2000 10/14/18 2005   BP: (!) 139/95  132/67    Pulse:       Resp: 12 12 12 11   Temp:       TempSrc:       SpO2: 92% 93% 94% 92%   Weight:       Height:           Cardiac Rhythm: ,   Cardiac  Cardiac Rhythm: Normal sinus rhythm    Pain level: 0-10 Pain Scale: 7    Is this patient confused?: No   [place] - Suicide Severity Rating Scale Completed?  Yes  If yes, what color did the patient score?  White    Patient Report: Initial Complaint: c/o left sided CP that wraps around to back that started today. Took 2 nitroglycerin at home with relief but then the pain came back.   Focused Assessment: pt arrived to the ED with active CP, hypertensive upon arrival. I gave 3 nitroglycerin and CP was not relieved. Stated that the pain was a 7/10 but did not appear to be in distress. Gave a GI cocktail which did relieve some pain. The nitroglycerin did bring her BP down. Denies SOB. Pt did have stents placed back in June. States that this chest pain does not feel like it did when she had her MI.   Tests Performed: labs, CXR.   Abnormal Results: trop 0.021.   Treatments provided: 3  nitroglycerin, GI cocktail.     Family Comments:  at bedside. Involved with cares.     OBS brochure/video discussed/provided to patient/family: Yes              Name of person given brochure if not patient:               Relationship to patient:      ED Medications:   Medications   aspirin chewable tablet 243 mg (243 mg Oral Given 10/14/18 1915)   nitroGLYcerin (NITROSTAT) sublingual tablet 0.4 mg (0.4 mg Sublingual Given 10/14/18 1951)   lidocaine (viscous) (XYLOCAINE) 2 % 15 mL, alum & mag hydroxide-simethicone (MYLANTA ES/MAALOX  ES) 15 mL GI Cocktail (30 mLs Oral Given 10/14/18 2007)       Drips infusing?:  No    For the majority of the shift this patient was Green.   Interventions performed were monitor, meds, reposition, updated on plan.    Severe Sepsis OR Septic Shock Diagnosis Present: No    To be done/followed up on inpatient unit:  none    ED NURSE PHONE NUMBER: *[phone]

## 2018-10-15 NOTE — PROGRESS NOTES
I reviewed patient's stress test results with patient and her .  There was a small area of mid anterior ischemia at exercise capacity of 8.0 METs and low risk Duke treadmill score.  Discussed with the Dr. Mejia who interpreted the study.  This is likely in the distribution of her known small diagonal total occlusion (that had been considered too small to intervene upon).  On the other hand, she does have a 50-60% proximal LAD lesion.    From a symptomatic perspective, however, her low back/epigastric pain has completely resolved with pain medications.  Although she remains slightly hypertensive at 150-160 mmHg systolic, medication adjustments have been made.  She had a normal blood pressure response to exercise.    RECOMMENDATIONS:  1.  Discussed the option of inpatient coronary angiography with patient.  She would like to be discharged home today as she is feeling better, and pursue this as an outpatient.  I think this is a reasonable strategy.  The ischemia burden on the stress test is low and possibly related to the known diagonal occlusion.  We will also have a chance to optimize her antihypertensive and antianginal therapy as outline dim note from earlier today.    2.  I will schedule an outpatient visit with her established cardiology MAYELA Krystal Neal in 1 week.  At that time if her blood pressure is optimally controlled, an elective coronary angiogram with the fractional flow reserve (FFR) of the LAD can be considered.    3.  No exercise restriction.    4.  Patient can be discharged home today.  For her musculoskeletal type pain I would advise 3 days of scheduled Tylenol, ibuprofen and tramadol (which she received this morning).    5.  Continue uninterrupted dual antiplatelet therapy with aspirin 81 mg and clopidogrel 75 mg.    5.  Thank you for consulting cardiology.  We will sign off.    Dr. DONNY Haley MD [facility]  Cardiology

## 2018-10-15 NOTE — CONSULTS
Inpatient Cardiology Consultation   [facility]  Date of Admission:10/14/2018  Date of consult: 10/15/2018      RECOMMENDATIONS:  Inpatient cardiology consultation note has been dictated. Dictation number [id number]        Karen Haley MD [facility]  Cardiology              REVIEW OF SYSTEMS:  A comprehensive 10 point review of systems was completed and the pertinent positives are documented in history of present illness.    MEDICATIONS:  Prior to Admission Medications   Prescriptions Last Dose Informant Patient Reported? Taking?   Cyanocobalamin (VITAMIN B 12 PO) 10/14/2018 at Unknown time Self Yes Yes   Sig: Take 1 tablet by mouth daily VIT B12 unknown otc dose   aspirin 81 MG tablet 10/14/2018 at Unknown time Self Yes Yes   Sig: Take 81 mg by mouth daily   atorvastatin (LIPITOR) 10 MG tablet 10/14/2018 at Unknown time Self No Yes   Sig: Take 1 tablet (10 mg) by mouth daily   calcium-vitamin D (CALTRATE) 600-400 MG-UNIT per tablet 10/14/2018 at Unknown time Self No Yes   Sig: Take 1 tablet by mouth daily   cholecalciferol (VITAMIN D) 1000 UNIT tablet 10/14/2018 at Unknown time Self No Yes   Sig: Take 1 tablet (1,000 Units) by mouth daily   clopidogrel (PLAVIX) 75 MG tablet 10/14/2018 at Unknown time Self No Yes   Sig: Take 1 tablet (75 mg) by mouth daily On day 1 take 4 tablet (300 mg) then 75 mg thereafter   hydrochlorothiazide (HYDRODIURIL) 25 MG tablet 10/14/2018 at Unknown time Self No Yes   Sig: Take 1 tablet (25 mg) by mouth daily   levothyroxine (SYNTHROID/LEVOTHROID) 112 MCG tablet 10/14/2018 at Unknown time Self No Yes   Sig: Take 1 tablet (112 mcg) by mouth daily   losartan (COZAAR) 100 MG tablet 10/14/2018 at Unknown time Self No Yes   Sig: Take 1 tablet (100 mg) by mouth daily   metFORMIN (GLUCOPHAGE-XR) 500 MG 24 hr tablet 10/14/2018 at Unknown time Self No Yes   Sig: Take 1 tablet (500 mg) by mouth daily (with dinner)   metoprolol succinate (TOPROL-XL) 25 MG 24 hr tablet 10/14/2018 at  Unknown time Self No Yes   Sig: Take 1 tablet (25 mg) by mouth daily   multivitamin, therapeutic with minerals (THERA-VIT-M) TABS 10/14/2018 at Unknown time Self Yes Yes   Sig: Take 1 tablet by mouth daily   nitroGLYcerin (NITROSTAT) 0.4 MG sublingual tablet prn Self No Yes   Sig: For chest pain place 1 tablet under the tongue every 5 minutes for 3 doses. If symptoms persist 5 minutes after 1st dose call 911.   order for DME  Self No No   Sig: Compression Stockings  Knee high  20/30 compression      Facility-Administered Medications: None       ALLERGIES:  Allergies   Allergen Reactions     Toprol Xl [Metoprolol] Fatigue     Ace Inhibitors      ACE throat clearing     Oxycodone Nausea and Vomiting     Tetanus Toxoid Swelling     Hard red lump      Shellfish-Derived Products Rash     possible       PAST MEDICAL HISTORY:  Past Medical History:   Diagnosis Date     BASAL CELL CANCER -NOSE      Family history of diabetes mellitus     Father     Family history of ischemic heart disease     Father     Family history of malignant neoplasm of ovary     Mother     HX DISRUPT L ANT CRUCIATE S/P REPAIR 2003      HYPERTENSION       HYPOTHYROIDISM      LOC PRIM OSTEOARTH-L 1ST METATARSAL-CUNEIFORM JOINT 3/10/2004     Major depressive disorder, single episode, mild (H) 11/24/2014     MILD-MOD TRICUSPID REGURG. MOD PULM HTN 6/23/2005    ECHO  5/05     SPLENIC CYST        PAST SURGICAL HISTORY:  Past Surgical History:   Procedure Laterality Date     ARTHROPLASTY KNEE Left 4/13/2016    Procedure: ARTHROPLASTY KNEE;  Surgeon: Giuseppe Lopez MD;  Location:  OR     ARTHROPLASTY MINIMALLY INVASIVE HIP  4/18/2012    Procedure:ARTHROPLASTY MINIMALLY INVASIVE HIP; RIGHT TWO INCISION MINIMALLY INVASIVE HIP ARTHROPLASTY (YUVAL)^ (NO CELL SAVER) (C-ARM); Surgeon:GIUSEPPE LOPEZ; Location: OR     C APPENDECTOMY  8/87    incidental appi at [facility]     C CHEMOSURG MOHS 1ST STAGE  9-99    Mohs-nose     C REPAIR CRUCIATE LIGAMENT,KNEE   2003    L   ACL reconstruction     C TOTAL ABDOM HYSTERECTOMY      W/ BSO fibroids, hx PID     HC COLONOSCOPY THRU STOMA, DIAGNOSTIC      normal, rec repeat 10 yrs     HYSTERECTOMY, PAP NO LONGER INDICATED       SURGICAL HISTORY OF -   Mult-~    infertility, laparotomies       SOCIAL HISTORY:   Yola Mayo  reports that she has never smoked. She has never used smokeless tobacco. She reports that she drinks alcohol. She reports that she does not use illicit drugs.    FAMILY HISTORY:  Family History   Problem Relation Age of Onset     Cancer Mother      ovarian  of it  at 51yo.     Diabetes Father      Adult onset     Hypertension Father      C.A.D. Father      Fatal Heart attack at 70.     Cerebrovascular Disease Father      Thyroid Disease Sister      Thyroid Disease Sister      Thyroid Disease Sister      Cancer Daughter      Thyroid Disease Daughter      Breast Cancer No family hx of      Cancer - colorectal No family hx of        PHYSICAL EXAMINATION:  Temp: 96.5  F (35.8  C) Temp src: Oral BP: 162/66 Pulse: 74 Heart Rate: 60 Resp: 16 SpO2: 97 % O2 Device: None (Room air)    10/10 0700 - 10/15 0659  In: 100 [P.O.:100]  Out: -   Net: 100  Vitals:    10/14/18 1838 10/14/18 2122   Weight: 81.6 kg (180 lb) 82.1 kg (181 lb 1.6 oz)           Karen Haley MD

## 2018-10-15 NOTE — PROGRESS NOTES
PRIMARY DIAGNOSIS: CHEST PAIN  OUTPATIENT/OBSERVATION GOALS TO BE MET BEFORE DISCHARGE:  1. Ruled out acute coronary syndrome (negative or stable Troponin):  Yes  2. Pain Status: Pain free  3. Appropriate provocative testing performed: Yes  - Stress Test Procedure: Echo  - Interpretation of cardiac rhythm per telemetry tech: NSR    4. Cleared by Consultants (if applicable):Yes  5. Return to near baseline physical activity: Yes  Discharge Planner Nurse   Safe discharge environment identified: Yes  Barriers to discharge: No- PA notified        Entered by: Rufina Mcgraw 10/15/2018      Please review provider order for any additional goals.   Nurse to notify provider when observation goals have been met and patient is ready for discharge.

## 2018-10-15 NOTE — PLAN OF CARE
Problem: Patient Care Overview  Goal: Plan of Care/Patient Progress Review   Observation goals PRIOR TO DISCHARGE     Comments: List all goals to be met before discharge home:   - Serial troponins and stress test complete-Not met  - Seen and cleared by consultant if applicable -Not met  - Adequate pain control on oral analgesia -Not met  - Vital signs normal or at patient baseline -partially met, systolic slightly elevated  - Safe disposition plan has been identified -Not met  - Nurse to notify provider when observation goals have been met and patient is ready for discharge

## 2018-10-15 NOTE — PLAN OF CARE
Problem: Patient Care Overview  Goal: Plan of Care/Patient Progress Review  Outcome: Adequate for Discharge Date Met: 10/15/18  A&Ox4. Up independently. Cardiac, Mod CHO diet-  & 103.  BP elevated, otherwise VSS on RA. C/o 7-8/10 under L breast radiating to L back pain- given tylenol, ibuprofen, and tramadol with relief. Tele NSR. Trop (-)x3. Echo stress abnormal. Cardiology consult complete. Pt to have heart cath as outpatient.     Pt given discharge instructions. Questions answered. Discharge medications given and reviewed with patient. Follow up appointment to be scheduled by cardiology. Pt to DC home with .

## 2018-10-16 ENCOUNTER — CARE COORDINATION (OUTPATIENT)
Dept: CARDIOLOGY | Facility: CLINIC | Age: 73
End: 2018-10-16

## 2018-10-16 ENCOUNTER — TELEPHONE (OUTPATIENT)
Dept: FAMILY MEDICINE | Facility: CLINIC | Age: 73
End: 2018-10-16

## 2018-10-16 NOTE — TELEPHONE ENCOUNTER
ED / Discharge Outreach Protocol    Patient Contact    Cardiology care coordination did hospital outreach call    Next 5 appointments (look out 90 days)     Oct 24, 2018  1:30 PM CDT   Return Visit with FATMATA Cruz CNP   [facility] ([facility])    [address] [place] [id number] [phone] OPT 2                Lilian ROGERS RN

## 2018-10-16 NOTE — PROGRESS NOTES
Patient was evaluated by cardiology while inpatient for low back epigastric pain. Called patient to discuss any post hospital d/c questions, review PTA medications, and confirm f/u appts. Left a VM to call RN back.     Patient called back denied any questions regarding new medications or changes to PTA medications. Patient denied any SOB, or light headedness. States she felt funny/lightheaded? last noc but related that to the tramadol. Pt states she is still having epigastric/back discomfort but it has improved since her discharge from the hospital. RN connected pt to scheduling to schedule follow up appt. Patient advised to call clinic with any cardiac related questions or concerns prior to this loree't. Patient verbalized understanding and agreed with plan.

## 2018-10-18 ENCOUNTER — HOSPITAL ENCOUNTER (EMERGENCY)
Facility: CLINIC | Age: 73
Discharge: HOME | End: 2018-10-18
Attending: EMERGENCY MEDICINE | Admitting: EMERGENCY MEDICINE
Payer: MEDICARE

## 2018-10-18 VITALS
OXYGEN SATURATION: 98 % | HEIGHT: 64 IN | WEIGHT: 180 LBS | BODY MASS INDEX: 30.73 KG/M2 | DIASTOLIC BLOOD PRESSURE: 66 MMHG | HEART RATE: 69 BPM | TEMPERATURE: 97.9 F | SYSTOLIC BLOOD PRESSURE: 145 MMHG | RESPIRATION RATE: 18 BRPM

## 2018-10-18 DIAGNOSIS — B02.9 HERPES ZOSTER WITHOUT COMPLICATION: ICD-10-CM

## 2018-10-18 LAB — INTERPRETATION ECG - MUSE: NORMAL

## 2018-10-18 PROCEDURE — 25000132 ZZH RX MED GY IP 250 OP 250 PS 637: Mod: GY | Performed by: EMERGENCY MEDICINE

## 2018-10-18 PROCEDURE — A9270 NON-COVERED ITEM OR SERVICE: HCPCS | Mod: GY | Performed by: EMERGENCY MEDICINE

## 2018-10-18 PROCEDURE — 25000131 ZZH RX MED GY IP 250 OP 636 PS 637: Mod: GY | Performed by: EMERGENCY MEDICINE

## 2018-10-18 PROCEDURE — 99283 EMERGENCY DEPT VISIT LOW MDM: CPT

## 2018-10-18 RX ORDER — ONDANSETRON 4 MG/1
4 TABLET, ORALLY DISINTEGRATING ORAL ONCE
Status: COMPLETED | OUTPATIENT
Start: 2018-10-18 | End: 2018-10-18

## 2018-10-18 RX ORDER — PREDNISONE 10 MG/1
TABLET ORAL
Qty: 32 TABLET | Refills: 0 | Status: SHIPPED | OUTPATIENT
Start: 2018-10-18 | End: 2018-10-28

## 2018-10-18 RX ORDER — HYDROCODONE BITARTRATE AND ACETAMINOPHEN 5; 325 MG/1; MG/1
1 TABLET ORAL EVERY 6 HOURS PRN
Qty: 10 TABLET | Refills: 0 | Status: SHIPPED | OUTPATIENT
Start: 2018-10-18 | End: 2019-05-07

## 2018-10-18 RX ORDER — HYDROCODONE BITARTRATE AND ACETAMINOPHEN 5; 325 MG/1; MG/1
1 TABLET ORAL ONCE
Status: COMPLETED | OUTPATIENT
Start: 2018-10-18 | End: 2018-10-18

## 2018-10-18 RX ORDER — ACYCLOVIR 800 MG/1
800 TABLET ORAL
Qty: 35 TABLET | Refills: 0 | Status: SHIPPED | OUTPATIENT
Start: 2018-10-18 | End: 2019-05-07

## 2018-10-18 RX ORDER — ONDANSETRON 4 MG/1
4 TABLET, ORALLY DISINTEGRATING ORAL EVERY 8 HOURS PRN
Qty: 10 TABLET | Refills: 0 | Status: SHIPPED | OUTPATIENT
Start: 2018-10-18 | End: 2018-10-21

## 2018-10-18 RX ADMIN — HYDROCODONE BITARTRATE AND ACETAMINOPHEN 1 TABLET: 5; 325 TABLET ORAL at 06:18

## 2018-10-18 RX ADMIN — ONDANSETRON 4 MG: 4 TABLET, ORALLY DISINTEGRATING ORAL at 06:19

## 2018-10-18 NOTE — ED AVS SNAPSHOT
Emergency Department    [address] [facility] [address]    Phone:  [phone]    Fax:  [phone]                                       Yola Mayo   MRN: [id number]    Department:   Emergency Department   Date of Visit:  10/18/2018           Patient Information     Date Of Birth          1945        Your diagnoses for this visit were:     Herpes zoster without complication        You were seen by Giuseppe Islas MD.      Follow-up Information     Follow up with Zoraida Braga MD. Schedule an appointment as soon as possible for a visit in 3 days.    Specialty:  Internal Medicine    Why:  For wound re-check    Contact information:    [address] [id number] [phone]          Follow up with  Emergency Department.    Specialty:  EMERGENCY MEDICINE    Why:  If symptoms worsen    Contact information:    [address] [facility] [address] [phone]        Discharge Instructions         Shingles (Herpes Zoster)     Talk to your healthcare provider about the shingles vaccine.     Shingles is also called herpes zoster. It is a painful skin rash caused by the herpes zoster virus. This is the same virus that causes chickenpox. After a person has chickenpox, the virus remains inactive in the nerve cells. Years later, the virus can become active again and travel to the skin. Most people have shingles only once, but it is possible to have it more than once.  What are the risk factors for shingles?  Anyone who has ever had chickenpox can develop shingles. But your risk is greater if you:    Are 50 years of age or older    Have an illness that weakens your immune system, such as HIV/AIDS    Have cancer, especially Hodgkin disease or lymphoma    Take medicines that weaken your immune system  What are the symptoms of shingles?    The first sign of shingles is usually pain, burning, tingling, or itching on one part  of your face or body. You may also feel as if you have the flu, with fever and chills.    A red rash with small blisters appears within a few days. The rash may appear as follows:   ? The blisters can occur anywhere, but they re most common on the back, chest, or abdomen.  ? They usually appear on only one side of the body, spreading along the nerve pathway where the virus was inactive.   ? The rash can also form around an eye, along one side of the face or neck, or in the mouth.  ? In a few people, usually those with weakened immune systems, shingles appear on more than one part of the body at once.    After a few days, the blisters become dry and form a crust. The crust falls off in days to weeks. The blisters generally do not leave scars.  How is shingles treated?  For most people, shingles heals on its own in a few weeks. But treatment is recommended to help relieve pain, speed healing, and reduce the risk of complications. Antiviral medicines are prescribed within the first 72 hours of the appearance of the rash. To lessen symptoms:    Apply ice packs (wrapped in a thin towel) or cool compresses, or soak in a cool bath.    Use calamine lotion to calm itchy skin.    Ask your healthcare provider about over-the-counter pain relievers. If your pain is severe, your healthcare provider may prescribe stronger pain medicines.  What are the complications of shingles?  Shingles often goes away with no lasting effects. But some people have serious problems long after the blisters have healed:    Postherpetic neuralgia. This is the most common complication. It is severe nerve pain at the place where the rash used to be. It can last for months, or even years after you have had shingles. Medicines can be prescribed to help relieve the pain and improve quality of life.    Bacterial infection. Shingles blisters may become infected with bacteria. Antibiotic medicine is used to treat the infection.    Eye problems. A person with  shingles on the face should see his or her healthcare provider right away. Shingles can cause serious problems with vision, and even blindness.  Very rarely shingles can also lead to pneumonia, hearing problems, brain inflammation, or even death.   When to seek medical care  Contact your healthcare provider if you experience any of the following:    Symptoms that don t go away with treatment    A rash or blisters near your eye    Increased drainage, fever, or rash after treatment, or severe pain that doesn t go away   How can shingles be prevented?  You can only get shingles if you have had chickenpox in the past. Those who have never had chickenpox can get the virus from you. Although instead of developing shingles, the person may get chickenpox. Until your blisters form scabs, avoid contact with others, especially the following:    Pregnant women who have never had chickenpox or the vaccine    Infants who were born early (prematurely) or who had low weight at birth    People with weak immune system (for example, people receiving chemotherapy for cancer, people who have had organ transplants, or people with HIV infections)     The shingles vaccine  Shingles vaccines are available to help prevent shingles or make it less painful. Vaccination is recommended for adults 50 and older, even if you've had shingles in the past. Talk with your healthcare provider about the most appropriate time for you to get vaccinated, and which vaccine is best for you.   Date Last Reviewed: 10/1/2016    7571-8450 [employer]. [address] [place] [address]. All rights reserved. This information is not intended as a substitute for professional medical care. Always follow your healthcare professional's instructions.          Shingles  Shingles is a viral infection caused by the same virus as chicken pox. Anyone who has had chicken pox may get shingles later in life. The virus stays in the body, but remains dormant  (asleep). Shingles often occurs in older persons or persons with lowered immunity. But it can affect anyone at any age.  Shingles starts as a tingling patch of skin on one side of the body. Small, painful blisters may then appear. The rash does not spread to the rest of the body.  Exposure to shingles cannot cause shingles. However, it can cause chicken pox in anyone who has not had chicken pox or has not been vaccinated. The contagious period ends when all blisters have crusted over (generally about 2 weeks after the illness begins).  After the blisters heal, the affected skin may be sensitive or painful for months (neuralgia). This often gradually goes away.  A shingles vaccine is available. This can help prevent shingles or make it less painful. It is generally recommended for adults over the age of 60 who have had chicken pox in the past, but who have never had shingles. Adults over 60 who have had neither chicken pox nor shingles can prevent both diseases with the chicken pox vaccine. Ask your healthcare provider about these vaccines.  Home care    Medicines may be prescribed to help relieve pain. Take these medicines as directed. Ask your healthcare provider or pharmacist before using over-the-counter medicines for helping treat pain and itching.    In certain cases, antiviral medicines may be prescribed to reduce pain, shorten the illness, and prevent neuralgia. Take these medicines as directed.    Compresses made from a solution of cool water mixed with cornstarch or baking soda may help relieve pain and itching.     Gently wash skin daily with soap and water to help prevent infection.  Be certain to rinse off all of the soap, which can be irritating.    Trim fingernails and try not to scratch. Scratching the sores may leave scars.    Stay home from work or school until all blisters have formed a crust and you are no longer contagious.  Follow-up care  Follow up with your healthcare provider or as directed  by our staff.  When to seek medical advice    Fever of 100.4 F (38 C) or higher, or as directed by your healthcare provider    Affected skin is on the face or neck and any of the following occur:  ? Headache  ? Eye pain  ? Changes in vision  ? Sores near the eye  ? Weakness of facial muscles    Pain, redness, or swelling of a joint    Signs of skin infection: colored drainage from the sores, warmth, increasing redness, or increasing pain  Date Last Reviewed: 9/25/2015 2000-2017 [employer]. [address] [place] [address]. All rights reserved. This information is not intended as a substitute for professional medical care. Always follow your healthcare professional's instructions.          Your next 10 appointments already scheduled     Oct 24, 2018  1:30 PM CDT   Return Visit with FATMATA [facility] ([facility])    [address] [place] [id number] [phone] OPT 2              24 Hour Appointment Hotline       To make an appointment at any [facility], call [phone] ([phone]). If you don't have a family doctor or clinic, we will help you find one. [facility] are conveniently located to serve the needs of you and your family.             Review of your medicines      START taking        Dose / Directions Last dose taken    acyclovir 800 MG tablet   Commonly known as:  ZOVIRAX   Dose:  800 mg   Quantity:  35 tablet        Take 1 tablet (800 mg) by mouth 5 times daily   Refills:  0        HYDROcodone-acetaminophen 5-325 MG per tablet   Commonly known as:  NORCO   Dose:  1 tablet   Quantity:  10 tablet        Take 1 tablet by mouth every 6 hours as needed for severe pain   Refills:  0        ondansetron 4 MG ODT tab   Commonly known as:  ZOFRAN ODT   Dose:  4 mg   Quantity:  10 tablet        Take 1 tablet (4 mg) by mouth every 8 hours as needed for nausea   Refills:  0         predniSONE 10 MG tablet   Commonly known as:  DELTASONE   Quantity:  32 tablet        Take 4 tablets daily for 5 days,  take 2 tablets daily for 3 days, take 1 tablet daily for 3 days, take half a tablet for 3 days.   Refills:  0          Our records show that you are taking the medicines listed below. If these are incorrect, please call your family doctor or clinic.        Dose / Directions Last dose taken    acetaminophen 325 MG tablet   Commonly known as:  TYLENOL   Dose:  650 mg   Quantity:  100 tablet        Take 2 tablets (650 mg) by mouth 3 times daily   Refills:  0        amLODIPine 2.5 MG tablet   Commonly known as:  NORVASC   Dose:  2.5 mg   Quantity:  30 tablet        Take 1 tablet (2.5 mg) by mouth daily   Refills:  0        aspirin 81 MG tablet   Dose:  81 mg        Take 81 mg by mouth daily   Refills:  0        atorvastatin 10 MG tablet   Commonly known as:  LIPITOR   Dose:  10 mg   Quantity:  90 tablet        Take 1 tablet (10 mg) by mouth daily   Refills:  3        calcium carbonate 600 mg-vitamin D 400 units 600-400 MG-UNIT per tablet   Commonly known as:  CALTRATE   Dose:  1 tablet   Quantity:  60 tablet        Take 1 tablet by mouth daily   Refills:  11        cholecalciferol 1000 UNIT tablet   Commonly known as:  vitamin D3   Dose:  1000 Units   Quantity:  100 tablet        Take 1 tablet (1,000 Units) by mouth daily   Refills:  3        clopidogrel 75 MG tablet   Commonly known as:  PLAVIX   Dose:  75 mg   Quantity:  90 tablet        Take 1 tablet (75 mg) by mouth daily On day 1 take 4 tablet (300 mg) then 75 mg thereafter   Refills:  3        hydrochlorothiazide 25 MG tablet   Commonly known as:  HYDRODIURIL   Dose:  25 mg   Quantity:  90 tablet        Take 1 tablet (25 mg) by mouth daily   Refills:  3        ibuprofen 400 MG tablet   Commonly known as:  ADVIL/MOTRIN   Dose:  400 mg   Quantity:  9 tablet        Take 1 tablet (400 mg) by mouth 3 times daily   Refills:  0        levothyroxine 112  MCG tablet   Commonly known as:  SYNTHROID/LEVOTHROID   Dose:  112 mcg   Quantity:  90 tablet        Take 1 tablet (112 mcg) by mouth daily   Refills:  3        losartan 100 MG tablet   Commonly known as:  COZAAR   Dose:  100 mg   Quantity:  90 tablet        Take 1 tablet (100 mg) by mouth daily   Refills:  3        metFORMIN 500 MG 24 hr tablet   Commonly known as:  GLUCOPHAGE-XR   Dose:  500 mg   Quantity:  90 tablet        Take 1 tablet (500 mg) by mouth daily (with dinner)   Refills:  3        multivitamin, therapeutic with minerals Tabs tablet   Dose:  1 tablet        Take 1 tablet by mouth daily   Refills:  0        nadolol 40 MG tablet   Commonly known as:  CORGARD   Dose:  40 mg   Quantity:  30 tablet        Take 1 tablet (40 mg) by mouth daily   Refills:  0        nitroGLYcerin 0.4 MG sublingual tablet   Commonly known as:  NITROSTAT   Quantity:  25 tablet        For chest pain place 1 tablet under the tongue every 5 minutes for 3 doses. If symptoms persist 5 minutes after 1st dose call 911.   Refills:  3        order for DME   Quantity:  2 each        Compression Stockings Knee high 20/30 compression   Refills:  1        traMADol 50 MG tablet   Commonly known as:  ULTRAM   Dose:  50 mg   Quantity:  15 tablet        Take 1 tablet (50 mg) by mouth every 6 hours as needed for moderate pain   Refills:  0        VITAMIN B 12 PO   Dose:  1 tablet        Take 1 tablet by mouth daily VIT B12 unknown otc dose   Refills:  0                Information about OPIOIDS     PRESCRIPTION OPIOIDS: WHAT YOU NEED TO KNOW   We gave you an opioid (narcotic) pain medicine. It is important to manage your pain, but opioids are not always the best choice. You should first try all the other options your care team gave you. Take this medicine for as short a time (and as few doses) as possible.    Some activities can increase your pain, such as bandage changes or therapy sessions. It may help to take your pain medicine 30 to 60  minutes before these activities. Reduce your stress by getting enough sleep, working on hobbies you enjoy and practicing relaxation or meditation. Talk to your care team about ways to manage your pain beyond prescription opioids.    These medicines have risks:    DO NOT drive when on new or higher doses of pain medicine. These medicines can affect your alertness and reaction times, and you could be arrested for driving under the influence (DUI). If you need to use opioids long-term, talk to your care team about driving.    DO NOT operate heavy machinery    DO NOT do any other dangerous activities while taking these medicines.    DO NOT drink any alcohol while taking these medicines.     If the opioid prescribed includes acetaminophen, DO NOT take with any other medicines that contain acetaminophen. Read all labels carefully. Look for the word  acetaminophen  or  Tylenol.  Ask your pharmacist if you have questions or are unsure.    You can get addicted to pain medicines, especially if you have a history of addiction (chemical, alcohol or substance dependence). Talk to your care team about ways to reduce this risk.    All opioids tend to cause constipation. Drink plenty of water and eat foods that have a lot of fiber, such as fruits, vegetables, prune juice, apple juice and high-fiber cereal. Take a laxative (Miralax, milk of magnesia, Colace, Senna) if you don t move your bowels at least every other day. Other side effects include upset stomach, sleepiness, dizziness, throwing up, tolerance (needing more of the medicine to have the same effect), physical dependence and slowed breathing.    Store your pills in a secure place, locked if possible. We will not replace any lost or stolen medicine. If you don t finish your medicine, please throw away (dispose) as directed by your pharmacist. The [place] [employer] has more information about safe disposal:  https://www.pca.[email or web address].[email or web address]/living-green/[email or web address]-unwanted-medications        Prescriptions were sent or printed at these locations (4 Prescriptions)                   Other Prescriptions                Printed at Department/Unit printer (4 of 4)         acyclovir (ZOVIRAX) 800 MG tablet               HYDROcodone-acetaminophen (NORCO) 5-325 MG per tablet               ondansetron (ZOFRAN ODT) 4 MG ODT tab               predniSONE (DELTASONE) 10 MG tablet                Orders Needing Specimen Collection     None      Pending Results     No orders found from 10/16/2018 to 10/19/2018.            Pending Culture Results     No orders found from 10/16/2018 to 10/19/2018.            Pending Results Instructions     If you had any lab results that were not finalized at the time of your Discharge, you can call the ED Lab Result RN at [phone]. You will be contacted by this team for any positive Lab results or changes in treatment. The nurses are available 7 days a week from 10A to 6:30P.  You can leave a message 24 hours per day and they will return your call.        Test Results From Your Hospital Stay               Clinical Quality Measure: Blood Pressure Screening     Your blood pressure was checked while you were in the emergency department today. The last reading we obtained was  BP: 167/59 . Please read the guidelines below about what these numbers mean and what you should do about them.  If your systolic blood pressure (the top number) is less than 120 and your diastolic blood pressure (the bottom number) is less than 80, then your blood pressure is normal. There is nothing more that you need to do about it.  If your systolic blood pressure (the top number) is 120-139 or your diastolic blood pressure (the bottom number) is 80-89, your blood pressure may be higher than it should be. You should have your blood pressure rechecked within a year by a primary care provider.  If your systolic blood pressure (the top  number) is 140 or greater or your diastolic blood pressure (the bottom number) is 90 or greater, you may have high blood pressure. High blood pressure is treatable, but if left untreated over time it can put you at risk for heart attack, stroke, or kidney failure. You should have your blood pressure rechecked by a primary care provider within the next 4 weeks.  If your provider in the emergency department today gave you specific instructions to follow-up with your doctor or provider even sooner than that, you should follow that instruction and not wait for up to 4 weeks for your follow-up visit.        Thank you for choosing [facility]       Thank you for choosing [facility] for your care. Our goal is always to provide you with excellent care. Hearing back from our patients is one way we can continue to improve our services. Please take a few minutes to complete the written survey that you may receive in the mail after you visit with us. Thank you!        [facility] Information     [facility] gives you secure access to your electronic health record. If you see a primary care provider, you can also send messages to your care team and make appointments. If you have questions, please call your primary care clinic.  If you do not have a primary care provider, please call [phone] and they will assist you.        Care EveryWhere ID     This is your Care EveryWhere ID. This could be used by other organizations to access your [facility] medical records  [id number]        Equal Access to Services     MIKALA RENAE : Hadii rohan Lawler, wadebrada karo, qaybta kaalelsa concepcion. So [facility] [phone].    ATENCIÓN: Si habla español, tiene a sarah disposición servicios gratuitos de asistencia lingüística. Llame al [phone].    We comply with applicable federal civil rights laws and [place] laws. We do not discriminate on the basis of race, color, national origin, age,  disability, sex, sexual orientation, or gender identity.            After Visit Summary       This is your record. Keep this with you and show to your community pharmacist(s) and doctor(s) at your next visit.

## 2018-10-18 NOTE — DISCHARGE INSTRUCTIONS
Shingles (Herpes Zoster)     Talk to your healthcare provider about the shingles vaccine.     Shingles is also called herpes zoster. It is a painful skin rash caused by the herpes zoster virus. This is the same virus that causes chickenpox. After a person has chickenpox, the virus remains inactive in the nerve cells. Years later, the virus can become active again and travel to the skin. Most people have shingles only once, but it is possible to have it more than once.  What are the risk factors for shingles?  Anyone who has ever had chickenpox can develop shingles. But your risk is greater if you:    Are 50 years of age or older    Have an illness that weakens your immune system, such as HIV/AIDS    Have cancer, especially Hodgkin disease or lymphoma    Take medicines that weaken your immune system  What are the symptoms of shingles?    The first sign of shingles is usually pain, burning, tingling, or itching on one part of your face or body. You may also feel as if you have the flu, with fever and chills.    A red rash with small blisters appears within a few days. The rash may appear as follows:   ? The blisters can occur anywhere, but they re most common on the back, chest, or abdomen.  ? They usually appear on only one side of the body, spreading along the nerve pathway where the virus was inactive.   ? The rash can also form around an eye, along one side of the face or neck, or in the mouth.  ? In a few people, usually those with weakened immune systems, shingles appear on more than one part of the body at once.    After a few days, the blisters become dry and form a crust. The crust falls off in days to weeks. The blisters generally do not leave scars.  How is shingles treated?  For most people, shingles heals on its own in a few weeks. But treatment is recommended to help relieve pain, speed healing, and reduce the risk of complications. Antiviral medicines are prescribed within the first 72 hours of the  appearance of the rash. To lessen symptoms:    Apply ice packs (wrapped in a thin towel) or cool compresses, or soak in a cool bath.    Use calamine lotion to calm itchy skin.    Ask your healthcare provider about over-the-counter pain relievers. If your pain is severe, your healthcare provider may prescribe stronger pain medicines.  What are the complications of shingles?  Shingles often goes away with no lasting effects. But some people have serious problems long after the blisters have healed:    Postherpetic neuralgia. This is the most common complication. It is severe nerve pain at the place where the rash used to be. It can last for months, or even years after you have had shingles. Medicines can be prescribed to help relieve the pain and improve quality of life.    Bacterial infection. Shingles blisters may become infected with bacteria. Antibiotic medicine is used to treat the infection.    Eye problems. A person with shingles on the face should see his or her healthcare provider right away. Shingles can cause serious problems with vision, and even blindness.  Very rarely shingles can also lead to pneumonia, hearing problems, brain inflammation, or even death.   When to seek medical care  Contact your healthcare provider if you experience any of the following:    Symptoms that don t go away with treatment    A rash or blisters near your eye    Increased drainage, fever, or rash after treatment, or severe pain that doesn t go away   How can shingles be prevented?  You can only get shingles if you have had chickenpox in the past. Those who have never had chickenpox can get the virus from you. Although instead of developing shingles, the person may get chickenpox. Until your blisters form scabs, avoid contact with others, especially the following:    Pregnant women who have never had chickenpox or the vaccine    Infants who were born early (prematurely) or who had low weight at birth    People with weak immune  system (for example, people receiving chemotherapy for cancer, people who have had organ transplants, or people with HIV infections)     The shingles vaccine  Shingles vaccines are available to help prevent shingles or make it less painful. Vaccination is recommended for adults 50 and older, even if you've had shingles in the past. Talk with your healthcare provider about the most appropriate time for you to get vaccinated, and which vaccine is best for you.   Date Last Reviewed: 10/1/2016    1557-0674 [employer]. [address] [place] [address], [place] [address]. All rights reserved. This information is not intended as a substitute for professional medical care. Always follow your healthcare professional's instructions.          Shingles  Shingles is a viral infection caused by the same virus as chicken pox. Anyone who has had chicken pox may get shingles later in life. The virus stays in the body, but remains dormant (asleep). Shingles often occurs in older persons or persons with lowered immunity. But it can affect anyone at any age.  Shingles starts as a tingling patch of skin on one side of the body. Small, painful blisters may then appear. The rash does not spread to the rest of the body.  Exposure to shingles cannot cause shingles. However, it can cause chicken pox in anyone who has not had chicken pox or has not been vaccinated. The contagious period ends when all blisters have crusted over (generally about 2 weeks after the illness begins).  After the blisters heal, the affected skin may be sensitive or painful for months (neuralgia). This often gradually goes away.  A shingles vaccine is available. This can help prevent shingles or make it less painful. It is generally recommended for adults over the age of 60 who have had chicken pox in the past, but who have never had shingles. Adults over 60 who have had neither chicken pox nor shingles can prevent both diseases with the chicken pox vaccine. Ask  your healthcare provider about these vaccines.  Home care    Medicines may be prescribed to help relieve pain. Take these medicines as directed. Ask your healthcare provider or pharmacist before using over-the-counter medicines for helping treat pain and itching.    In certain cases, antiviral medicines may be prescribed to reduce pain, shorten the illness, and prevent neuralgia. Take these medicines as directed.    Compresses made from a solution of cool water mixed with cornstarch or baking soda may help relieve pain and itching.     Gently wash skin daily with soap and water to help prevent infection.  Be certain to rinse off all of the soap, which can be irritating.    Trim fingernails and try not to scratch. Scratching the sores may leave scars.    Stay home from work or school until all blisters have formed a crust and you are no longer contagious.  Follow-up care  Follow up with your healthcare provider or as directed by our staff.  When to seek medical advice    Fever of 100.4 F (38 C) or higher, or as directed by your healthcare provider    Affected skin is on the face or neck and any of the following occur:  ? Headache  ? Eye pain  ? Changes in vision  ? Sores near the eye  ? Weakness of facial muscles    Pain, redness, or swelling of a joint    Signs of skin infection: colored drainage from the sores, warmth, increasing redness, or increasing pain  Date Last Reviewed: 9/25/2015 2000-2017 [employer]. [address] [place] [address], [place] [address]. All rights reserved. This information is not intended as a substitute for professional medical care. Always follow your healthcare professional's instructions.

## 2018-10-18 NOTE — ED PROVIDER NOTES
"  History     Chief Complaint:\    Flank Pain    HPI   Yola Mayo is a 73 year old female who presents with continued abdominal and flank pain. She was recently discharged here 2 days ago after a thorough cardiac workup for her chest and flank pian, though now she states that she has a large rash that extends from her left flank to her abdomin. The rash is painful, has vesicles that are blistering over, and it constant. Denies any change in her chest pain, states this is the same as before.     Allergies:  Toprol XL  Ace inhibitors  Oxycodone  Tetanus toxoid  Shellfish-derived products     Medications:    Zovirax  Norco  Zofran  Deltasone  Norvasc  Lipitor  Aspirin  Caltrate  Plavix  Hydrodiuril  Synthroid/levothroid   Cozaar  Gluophage  Corgard  Nitrostat  Ultram     Past Medical History:    Basal cell cancer, nose  Hypertension  Hypothyroidism  Splenic cyst  Mild mod thricuspid regurg  Loc prim osteoarth    Past Surgical History:    Arthoplasty knee, hip  C Chemosurg MOHS 1 stag  Colonoscopy  Constipation  Hyperlipidemia   Dermatitis  Depression  Anemia due to blood loss   C.A.D.    Family History:    Mother: Ovarian cancer  Father: Diabetes, Hypertension, C.A.D., Cerebrovascular disease  Sister: Thyroid disease  Daughter: Cancer, Thyroid disease    Social History:  Smoking Status: Never Smoker  Smokeless Tobacco: Never Used  Alcohol Use: No  Marital Status:       Review of Systems   Skin: Positive for rash.   All other systems reviewed and are negative.      Physical Exam     Patient Vitals for the past 24 hrs:   BP Temp Temp src Pulse Heart Rate Resp SpO2 Height Weight   10/18/18 0657 145/66 - - - 73 - 98 % - -   10/18/18 0551 167/59 97.9  F (36.6  C) Oral 69 - 18 95 % 1.626 m (5' 4\") 81.6 kg (180 lb)     Physical Exam  Vitals: reviewed by me  General: Pt seen on [facility], pleasant, cooperative, and alert to conversation  Eyes: Tracking well, clear conjunctiva BL  ENT: MMM, midline trachea. "   Lungs:   No tachypnea, no accessory muscle use. No respiratory distress.   CV: Rate as above, regular rhythm.    Abd: Soft, non tender, no guarding, no rebound. Non distended  MSK: no peripheral edema or joint effusion.  No evidence of trauma  Skin: Does have a painful, raised, red and scabbing, vesicular rash that travels from left of paraspinal area wrapping around upper left abdomen, roughly in the T6-T7 distribution.  No evidence of secondary infection.  Neuro: Clear speech and no facial droop.  Psych: Not RIS, no e/o AH/VH      Emergency Department Course     Interventions:  0618 Norco 1 tablet PO  0619 Zofran 4 mg PO    Emergency Department Course:    0551 Nursing notes and vitals reviewed.    0647 I personally reviewed the exam results with the patient and answered all related questions prior to discharge.    Impression & Plan      Medical Decision Making:  Yola Mayo is a 73 year old female who presents to the emergency department today for what a appears to be Herpes zoster. I think this diagnosis is supported by that she has a large dermatoma rash coming from her left flank around to her abdomin. It does not cross midline, there is no evidence of infection, and it is vesicular. I do think she is a candidate for prednisone as well as a acyclovir as this is well in the 72 hour window of rash onset. Unclear if this is exactly the same type of pain she had while she was here in the hospital, which she had a thorough cardiac workout, and I see no indication at this is cardiac issue again as skin rash rash here in the ER, and this is clearly the source of her pain she states. We will discharge with very clear return to the ED precautions and wound check in the next 48-72 hours. Red flags for when to come back to the ED and signs of secondary infection were discussed, all questions were answered.     Diagnosis:    ICD-10-CM    1. Herpes zoster without complication B02.9      Disposition:   The patient is  discharged to home.    Discharge Medications:  Discharge Medication List as of 10/18/2018  6:51 AM      START taking these medications    Details   acyclovir (ZOVIRAX) 800 MG tablet Take 1 tablet (800 mg) by mouth 5 times daily, Disp-35 tablet, R-0, Local Print      HYDROcodone-acetaminophen (NORCO) 5-325 MG per tablet Take 1 tablet by mouth every 6 hours as needed for severe pain, Disp-10 tablet, R-0, Local Print      ondansetron (ZOFRAN ODT) 4 MG ODT tab Take 1 tablet (4 mg) by mouth every 8 hours as needed for nausea, Disp-10 tablet, R-0, Local Print      predniSONE (DELTASONE) 10 MG tablet Take 4 tablets daily for 5 days,  take 2 tablets daily for 3 days, take 1 tablet daily for 3 days, take half a tablet for 3 days., Disp-32 tablet, R-0, Local Print           Scribe Disclosure:  I, Orla Severson, am serving as a scribe at 6:49 AM on 10/18/2018 to document services personally performed by Giuseppe Islas based on my observations and the provider's statements to me.     EMERGENCY DEPARTMENT       Giuseppe Islas MD  10/18/18 2987

## 2018-10-18 NOTE — ED AVS SNAPSHOT
Emergency Department    [address] [facility] [address]    Phone:  [phone]    Fax:  [phone]                                       Yola Mayo   MRN: [id number]    Department:   Emergency Department   Date of Visit:  10/18/2018           After Visit Summary Signature Page     I have received my discharge instructions, and my questions have been answered. I have discussed any challenges I see with this plan with the nurse or doctor.    ..........................................................................................................................................  Patient/Patient Representative Signature      ..........................................................................................................................................  Patient Representative Print Name and Relationship to Patient    ..................................................               ................................................  Date                                   Time    ..........................................................................................................................................  Reviewed by Signature/Title    ...................................................              ..............................................  Date                                               Time          22EPIC Rev 08/18

## 2018-10-24 ENCOUNTER — OFFICE VISIT (OUTPATIENT)
Dept: CARDIOLOGY | Facility: CLINIC | Age: 73
End: 2018-10-24
Payer: COMMERCIAL

## 2018-10-24 VITALS
BODY MASS INDEX: 32.11 KG/M2 | DIASTOLIC BLOOD PRESSURE: 70 MMHG | SYSTOLIC BLOOD PRESSURE: 122 MMHG | HEIGHT: 63 IN | HEART RATE: 48 BPM | WEIGHT: 181.2 LBS

## 2018-10-24 DIAGNOSIS — R94.39 POSITIVE CARDIAC STRESS TEST: ICD-10-CM

## 2018-10-24 DIAGNOSIS — I10 BENIGN ESSENTIAL HYPERTENSION: ICD-10-CM

## 2018-10-24 DIAGNOSIS — I25.10 CAD S/P PERCUTANEOUS CORONARY ANGIOPLASTY: ICD-10-CM

## 2018-10-24 DIAGNOSIS — Z98.61 CAD S/P PERCUTANEOUS CORONARY ANGIOPLASTY: ICD-10-CM

## 2018-10-24 PROCEDURE — 99214 OFFICE O/P EST MOD 30 MIN: CPT | Performed by: NURSE PRACTITIONER

## 2018-10-24 RX ORDER — METOPROLOL SUCCINATE 25 MG/1
50 TABLET, EXTENDED RELEASE ORAL DAILY
Qty: 90 TABLET | Refills: 3 | Status: SHIPPED | OUTPATIENT
Start: 2018-10-24 | End: 2019-05-07

## 2018-10-24 RX ORDER — METOPROLOL SUCCINATE 50 MG/1
50 TABLET, EXTENDED RELEASE ORAL DAILY
Qty: 90 TABLET | Refills: 3 | Status: SHIPPED | OUTPATIENT
Start: 2018-10-24 | End: 2018-10-24

## 2018-10-24 NOTE — PATIENT INSTRUCTIONS
Today's Recommendations    1. STOP nadolol and amlodipine  2. Restart metoprolol XL 25 mg daily  3. Continue all other medications without changes.  4. Please follow up with Dr. Howard in February 2019.    Please send a [facility] message or call [phone] with questions or concerns.     Scheduling number [phone].

## 2018-10-24 NOTE — MR AVS SNAPSHOT
After Visit Summary   10/24/2018    Yola Mayo    MRN: [id number]           Patient Information     Date Of Birth          1945        Visit Information        Provider Department      10/24/2018 1:30 PM Krystal Neal APRN CNP [facility]        Today's Diagnoses     CAD S/P PCI- rca-2 stents        Positive cardiac stress test        Benign essential hypertension          Care Instructions    Today's Recommendations    1. STOP nadolol and amlodipine  2. Restart metoprolol XL 50 mg daily  3. Continue all other medications without changes.  4. Please follow up with Dr. Howard in February 2019.    Please send a [employer] message or call [phone] with questions or concerns.     Scheduling number [phone].            Follow-ups after your visit        Who to contact     If you have questions or need follow up information about today's clinic visit or your schedule please contact [facility] directly at [phone].  Normal or non-critical lab and imaging results will be communicated to you by [employer], letter or phone within 4 business days after the clinic has received the results. If you do not hear from us within 7 days, please contact the clinic through [employer] or phone. If you have a critical or abnormal lab result, we will notify you by phone as soon as possible.  Submit refill requests through [employer] or call your pharmacy and they will forward the refill request to us. Please allow 3 business days for your refill to be completed.          Additional Information About Your Visit        [employer] Information     [employer] gives you secure access to your electronic health record. If you see a primary care provider, you can also send messages to your care team and make appointments. If you have questions, please call your primary care clinic.  If you do not have a primary care provider, please call [phone] and  "they will assist you.        Care EveryWhere ID     This is your Care EveryWhere ID. This could be used by other organizations to access your [facility] medical records  [id number]        Your Vitals Were     Pulse Height BMI (Body Mass Index)             48 1.6 m (5' 3\") 32.1 kg/m2          Blood Pressure from Last 3 Encounters:   10/24/18 122/70   10/18/18 145/66   10/15/18 174/71    Weight from Last 3 Encounters:   10/24/18 82.2 kg (181 lb 3.2 oz)   10/18/18 81.6 kg (180 lb)   10/14/18 82.1 kg (181 lb 1.6 oz)              We Performed the Following     Follow-Up with Cardiac Advanced Practice Provider          Today's Medication Changes          These changes are accurate as of 10/24/18  1:58 PM.  If you have any questions, ask your nurse or doctor.               Start taking these medicines.        Dose/Directions    metoprolol succinate 50 MG 24 hr tablet   Commonly known as:  TOPROL-XL   Used for:  Benign essential hypertension   Started by:  Krystal Neal APRN CNP        Dose:  50 mg   Take 1 tablet (50 mg) by mouth daily   Quantity:  90 tablet   Refills:  3         Stop taking these medicines if you haven't already. Please contact your care team if you have questions.     amLODIPine 2.5 MG tablet   Commonly known as:  NORVASC   Stopped by:  Krystal Neal APRN CNP           nadolol 40 MG tablet   Commonly known as:  CORGARD   Stopped by:  Krystal Neal APRN CNP                Where to get your medicines      These medications were sent to [facility] [address] [place] [facility] [address] [address]  [address] [address]     Phone:  [phone]     metoprolol succinate 50 MG 24 hr tablet                Primary Care Provider Office Phone # Fax #    Zoraida Braga [phone] [address]  [facility] [address]        Equal Access to Services     MIKALA RENAE AH: Jacqueline Lawler, waaxda luqadaha, qaybta eduarda doyle, " elsa dunbarsuzanne hackett'aan ah. So [facility] [phone].    ATENCIÓN: Si abdulkadirla daya, tiene a sarah disposición servicios gratuitos de asistencia lingüística. Tanisha nuñez [phone].    We comply with applicable federal civil rights laws and [place] laws. We do not discriminate on the basis of race, color, national origin, age, disability, sex, sexual orientation, or gender identity.            Thank you!     Thank you for choosing [facility]  for your care. Our goal is always to provide you with excellent care. Hearing back from our patients is one way we can continue to improve our services. Please take a few minutes to complete the written survey that you may receive in the mail after your visit with us. Thank you!             Your Updated Medication List - Protect others around you: Learn how to safely use, store and throw away your medicines at www.disposemymeds.org.          This list is accurate as of 10/24/18  1:58 PM.  Always use your most recent med list.                   Brand Name Dispense Instructions for use Diagnosis    acetaminophen 325 MG tablet    TYLENOL    100 tablet    Take 2 tablets (650 mg) by mouth 3 times daily    Musculoskeletal symptoms referable to limbs       acyclovir 800 MG tablet    ZOVIRAX    35 tablet    Take 1 tablet (800 mg) by mouth 5 times daily        aspirin 81 MG tablet      Take 81 mg by mouth daily        atorvastatin 10 MG tablet    LIPITOR    90 tablet    Take 1 tablet (10 mg) by mouth daily    Coronary artery disease involving native coronary artery of native heart with unstable angina pectoris (H)       calcium carbonate 600 mg-vitamin D 400 units 600-400 MG-UNIT per tablet    CALTRATE    60 tablet    Take 1 tablet by mouth daily    Preventive measure       cholecalciferol 1000 UNIT tablet    vitamin D3    100 tablet    Take 1 tablet (1,000 Units) by mouth daily    Vitamin deficiency       clopidogrel 75 MG tablet     PLAVIX    90 tablet    Take 1 tablet (75 mg) by mouth daily On day 1 take 4 tablet (300 mg) then 75 mg thereafter    Coronary artery disease involving native coronary artery of native heart with unstable angina pectoris (H)       hydrochlorothiazide 25 MG tablet    HYDRODIURIL    90 tablet    Take 1 tablet (25 mg) by mouth daily    Essential hypertension, benign       HYDROcodone-acetaminophen 5-325 MG per tablet    NORCO    10 tablet    Take 1 tablet by mouth every 6 hours as needed for severe pain        ibuprofen 400 MG tablet    ADVIL/MOTRIN    9 tablet    Take 1 tablet (400 mg) by mouth 3 times daily    Musculoskeletal symptoms referable to limbs       levothyroxine 112 MCG tablet    SYNTHROID/LEVOTHROID    90 tablet    Take 1 tablet (112 mcg) by mouth daily    Hypothyroidism, unspecified type       losartan 100 MG tablet    COZAAR    90 tablet    Take 1 tablet (100 mg) by mouth daily    Benign essential hypertension, GALINDO (dyspnea on exertion)       metFORMIN 500 MG 24 hr tablet    GLUCOPHAGE-XR    90 tablet    Take 1 tablet (500 mg) by mouth daily (with dinner)    Prediabetes       metoprolol succinate 50 MG 24 hr tablet    TOPROL-XL    90 tablet    Take 1 tablet (50 mg) by mouth daily    Benign essential hypertension       multivitamin, therapeutic with minerals Tabs tablet      Take 1 tablet by mouth daily        nitroGLYcerin 0.4 MG sublingual tablet    NITROSTAT    25 tablet    For chest pain place 1 tablet under the tongue every 5 minutes for 3 doses. If symptoms persist 5 minutes after 1st dose call 911.    Other chest pain, Abnormal cardiovascular stress test       order for DME     2 each    Compression Stockings Knee high 20/30 compression    Edema of lower extremity       predniSONE 10 MG tablet    DELTASONE    32 tablet    Take 4 tablets daily for 5 days,  take 2 tablets daily for 3 days, take 1 tablet daily for 3 days, take half a tablet for 3 days.        traMADol 50 MG tablet    ULTRAM    15  tablet    Take 1 tablet (50 mg) by mouth every 6 hours as needed for moderate pain    Musculoskeletal symptoms referable to limbs       VITAMIN B 12 PO      Take 1 tablet by mouth daily VIT B12 unknown otc dose

## 2018-10-24 NOTE — LETTER
10/24/2018    Zoraida Braga MD  [address]  [place]                [place] [address]    RE: Yola Mayo       Dear Colleague,    I had the pleasure of seeing Yola Mayo in the [facility].    Cardiology Clinic Progress Note  Yola Mayo MRN# [id number]   YOB: 1945 Age: 73 year old     Reason for visit: Follow up hospital discharge           Assessment and Plan:     1. Coronary artery disease    Status post 2 drug eluting stents to the mid and distal RCA    Residual 50-60% mid LAD lesion with 100% occlusion of the first diagonal    Hospital admission in October 2018 for back and rib cage pain secondary to shingles    Recent stress echocardiogram showed small area of mid anterior ischemia 10/2018    DAPT with aspirin and Brilinta uninterrupted until June 2019. Then aspirin lifelong    Follow-up with Dr. Howard in February.    2. Hyperlipidemia    Post angiogram started on atorvastatin 10 mg daily    Most recent LDL was 47    Repeat fasting lipids prior to next visit in 6 months    3. Hypertension, goal less than 130/80    Discontinue nadolol due to bradycardia and fatigue    Discontinue amlodipine due to headaches    Restart Toprol-XL to 25 mg daily    Continue losartan to 100 mg daily and hydrochlorthiazide 25 mg daily         History of Presenting Illness:    Yola Mayo is a very pleasant 73 year old patient of Dr. Howard  who presents for a follow-up post hospital discharge. Her past medical history includes coronary artery disease status post PCI to the mid and distal RCA, hypertension, hyperlipidemia, very strong family history of premature coronary artery disease, and diabetes.      Due to symptoms of exertional angina after 1 block relieved with rest, she underwent a stress echocardiogram that was abnormal. Subsequently, a coronary angiogram revealed a 50-60% narrowing of the proximal LAD with 100% occlusion of the first diagonal above the LAD  lesion.  The left circumflex had a 20-30% proximal narrowing.  The right coronary artery had a 60-70% narrowing in the proximal portion and a 95% narrowing in the midportion at the takeoff of the RV branch which is 95% narrowed.  She received 2 drug-eluting stents that were back to back to the right coronary artery with resolution of her symptoms.     On 10/14/18 she presented to the hospital with complaints of discomfort on her left lower rib cage to her back and her lower epigastrium.  The pain is relieved with Tylenol and with sharp, constant and nonradiating.  She described as very different than her previous anginal symptoms.  She is evaluated by cardiology and she underwent a stress echocardiogram that showed a small area of mid anterior ischemia.  Due to elevated blood pressure she was re-trialed on amlodipine 2.5 mg daily (previously did not tolerate due to headaches) and her metoprolol XL was discontinued and she was started on nadolol 40 mg daily.  She was discharged home and recommended to return to clinic today as an outpatient to discuss her symptoms.    She then again presented to the emergency department on 10/18/2018 with ongoing abdominal and flank pain and large rash that extends from her left flank to her her abdomen with painful vesicles and blistering.  She was diagnosed with shingles and prescribed acyclovir.      She also notes a recurrence of her headaches with the today she states that with treatment of the shingles and pain relief her symptoms are nearly resolved.  Amlodipine and increased fatigue with the nadolol.  She requests to be discontinued off of amlodipine and nadolol and restarted metoprolol XL.            Review of Systems:   Review of Systems:  Skin:  Positive for rash   Eyes:  Positive for glasses  ENT:  Negative    Respiratory:  Negative dyspnea on exertion;shortness of breath;cough;wheezing  Cardiovascular:  palpitations;chest pain;lightheadedness;dizziness;syncope or  "near-syncope;cyanosis;fatigue;Negative;edema Positive for;dizziness;fatigue (burning feeling in chest during and  after activity)  Gastroenterology: Negative    Genitourinary:  Positive for urinary frequency;nocturia  Musculoskeletal:  Positive for back pain  Neurologic:  Positive for headaches  Psychiatric:  Negative    Heme/Lymph/Imm:  Positive for allergies  Endocrine:  Positive for thyroid disorder;diabetes              Physical Exam:     Vitals: /70  Pulse (!) 48  Ht 1.6 m (5' 3\")  Wt 82.2 kg (181 lb 3.2 oz)  BMI 32.1 kg/m2  Constitutional:  cooperative, alert and oriented, well developed, well nourished, in no acute distress        Skin:  warm and dry to the touch, no apparent skin lesions or masses noted        Head:  normocephalic, no masses or lesions        Eyes:  pupils equal and round;conjunctivae and lids unremarkable        ENT:  no pallor or cyanosis, dentition good        Neck:  JVP normal        Chest:  clear to auscultation;normal symmetry        Cardiac: regular rhythm occasional premature beats;bradycardic                Extremities and Back:  no edema        Neurological:  no gross motor deficits;affect appropriate               Medications:     Current Outpatient Prescriptions   Medication Sig Dispense Refill     acetaminophen (TYLENOL) 325 MG tablet Take 2 tablets (650 mg) by mouth 3 times daily 100 tablet 0     acyclovir (ZOVIRAX) 800 MG tablet Take 1 tablet (800 mg) by mouth 5 times daily 35 tablet 0     aspirin 81 MG tablet Take 81 mg by mouth daily       atorvastatin (LIPITOR) 10 MG tablet Take 1 tablet (10 mg) by mouth daily 90 tablet 3     calcium-vitamin D (CALTRATE) 600-400 MG-UNIT per tablet Take 1 tablet by mouth daily 60 tablet 11     cholecalciferol (VITAMIN D) 1000 UNIT tablet Take 1 tablet (1,000 Units) by mouth daily 100 tablet 3     clopidogrel (PLAVIX) 75 MG tablet Take 1 tablet (75 mg) by mouth daily On day 1 take 4 tablet (300 mg) then 75 mg thereafter 90 tablet 3 "     Cyanocobalamin (VITAMIN B 12 PO) Take 1 tablet by mouth daily VIT B12 unknown otc dose       hydrochlorothiazide (HYDRODIURIL) 25 MG tablet Take 1 tablet (25 mg) by mouth daily 90 tablet 3     levothyroxine (SYNTHROID/LEVOTHROID) 112 MCG tablet Take 1 tablet (112 mcg) by mouth daily 90 tablet 3     losartan (COZAAR) 100 MG tablet Take 1 tablet (100 mg) by mouth daily 90 tablet 3     metFORMIN (GLUCOPHAGE-XR) 500 MG 24 hr tablet Take 1 tablet (500 mg) by mouth daily (with dinner) 90 tablet 3     metoprolol succinate (TOPROL-XL) 50 MG 24 hr tablet Take 1 tablet (50 mg) by mouth daily 90 tablet 3     multivitamin, therapeutic with minerals (THERA-VIT-M) TABS Take 1 tablet by mouth daily       nitroGLYcerin (NITROSTAT) 0.4 MG sublingual tablet For chest pain place 1 tablet under the tongue every 5 minutes for 3 doses. If symptoms persist 5 minutes after 1st dose call 911. 25 tablet 3     order for DME Compression Stockings  Knee high  20/30 compression 2 each 1     predniSONE (DELTASONE) 10 MG tablet Take 4 tablets daily for 5 days,  take 2 tablets daily for 3 days, take 1 tablet daily for 3 days, take half a tablet for 3 days. 32 tablet 0     HYDROcodone-acetaminophen (NORCO) 5-325 MG per tablet Take 1 tablet by mouth every 6 hours as needed for severe pain (Patient not taking: Reported on 10/24/2018) 10 tablet 0     ibuprofen (ADVIL/MOTRIN) 400 MG tablet Take 1 tablet (400 mg) by mouth 3 times daily (Patient not taking: Reported on 10/24/2018) 9 tablet 0     traMADol (ULTRAM) 50 MG tablet Take 1 tablet (50 mg) by mouth every 6 hours as needed for moderate pain (Patient not taking: Reported on 10/24/2018) 15 tablet 0           Family History   Problem Relation Age of Onset     Cancer Mother      ovarian  of it  at 49yo.     Diabetes Father      Adult onset     Hypertension Father      C.A.D. Father      Fatal Heart attack at 70.     Cerebrovascular Disease Father      Thyroid Disease Sister      Thyroid Disease  Sister      Thyroid Disease Sister      Cancer Daughter      Thyroid Disease Daughter      Breast Cancer No family hx of      Cancer - colorectal No family hx of        Social History     Social History     Marital status:      Spouse name: FABIAN CUADRA     Number of children: 2     Years of education: N/A     Occupational History     [employer]       0[employer]     Social History Main Topics     Smoking status: Never Smoker     Smokeless tobacco: Never Used     Alcohol use 0.0 oz/week     0 Standard drinks or equivalent per week      Comment: occasional     Drug use: No     Sexual activity: Yes     Partners: Male     Other Topics Concern      Service No     Blood Transfusions No     Caffeine Concern No     Occupational Exposure No     Hobby Hazards No     Sleep Concern No     Stress Concern No     Weight Concern Yes     Special Diet No     Back Care No     Exercise Yes     Approx 3 times a week for about 30 minutes     Bike Helmet Yes     Seat Belt Yes     Self-Exams Yes     Social History Narrative            Past Medical History:     Past Medical History:   Diagnosis Date     BASAL CELL CANCER -NOSE      Family history of diabetes mellitus     Father     Family history of ischemic heart disease     Father     Family history of malignant neoplasm of ovary     Mother     HX DISRUPT L ANT CRUCIATE S/P REPAIR 2003      HYPERTENSION       HYPOTHYROIDISM      LOC PRIM OSTEOARTH-L 1ST METATARSAL-CUNEIFORM JOINT 3/10/2004     Major depressive disorder, single episode, mild (H) 11/24/2014     MILD-MOD TRICUSPID REGURG. MOD PULM HTN 6/23/2005    ECHO  5/05     SPLENIC CYST               Past Surgical History:     Past Surgical History:   Procedure Laterality Date     ARTHROPLASTY KNEE Left 4/13/2016    Procedure: ARTHROPLASTY KNEE;  Surgeon: Giuseppe Mcgrath MD;  Location: [facility] OR     ARTHROPLASTY MINIMALLY INVASIVE HIP  4/18/2012    Procedure:ARTHROPLASTY MINIMALLY INVASIVE HIP; RIGHT TWO INCISION  MINIMALLY INVASIVE HIP ARTHROPLASTY (YUVAL)^ (NO CELL SAVER) (C-ARM); Surgeon:LITO LOPEZ; Location:[facility] OR     C APPENDECTOMY  8/87    incidental appi at hys     C CHEMOSURG MOHS 1ST STAGE  9-99    Mohs-nose     C REPAIR CRUCIATE LIGAMENT,KNEE  8/2003    L   ACL reconstruction     C TOTAL ABDOM HYSTERECTOMY  8/87    W/ BSO fibroids, hx PID     HC COLONOSCOPY THRU STOMA, DIAGNOSTIC  4/03    normal, rec repeat 10 yrs     HYSTERECTOMY, PAP NO LONGER INDICATED       SURGICAL HISTORY OF -   Mult-~1978    infertility, laparotomies              Allergies:   Toprol xl [metoprolol]; Ace inhibitors; Oxycodone; Tetanus toxoid; and Shellfish-derived products       Data:   All laboratory data reviewed:    Last Comprehensive Metabolic Panel:  Sodium   Date Value Ref Range Status   10/15/2018 137 133 - 144 mmol/L Final     Potassium   Date Value Ref Range Status   10/15/2018 4.0 3.4 - 5.3 mmol/L Final     Chloride   Date Value Ref Range Status   10/15/2018 103 94 - 109 mmol/L Final     Carbon Dioxide   Date Value Ref Range Status   10/15/2018 29 20 - 32 mmol/L Final     Anion Gap   Date Value Ref Range Status   10/15/2018 5 3 - 14 mmol/L Final     Glucose   Date Value Ref Range Status   10/15/2018 151 (H) 70 - 99 mg/dL Final     Urea Nitrogen   Date Value Ref Range Status   10/15/2018 13 7 - 30 mg/dL Final     Creatinine   Date Value Ref Range Status   10/15/2018 0.71 0.52 - 1.04 mg/dL Final     GFR Estimate   Date Value Ref Range Status   10/15/2018 80 >60 mL/min/1.7m2 Final     Comment:     Non  GFR Calc     Calcium   Date Value Ref Range Status   10/15/2018 8.8 8.5 - 10.1 mg/dL Final     Lab Results   Component Value Date    CHOL 132 05/03/2018    CHOL 152 03/08/2017     Lab Results   Component Value Date    HDL 80 05/03/2018    HDL 72 03/08/2017     Lab Results   Component Value Date    LDL 47 05/03/2018    LDL 68 03/08/2017     Lab Results   Component Value Date    TRIG 26 05/03/2018    TRIG 59  03/08/2017     Lab Results   Component Value Date    CHOLHDLRATIO 1.6 03/27/2015    CHOLHDLRATIO 2.2 03/11/2014         Thank you for allowing me to participate in the care of your patient.    Sincerely,     FATMATA PEREIRA [facility]

## 2018-10-24 NOTE — LETTER
10/24/2018    Zoraida Braga MD  [address]  [place]                [place] [address]    RE: Yola Mayo       Dear Colleague,    I had the pleasure of seeing Yola Mayo in the [facility].    Cardiology Clinic Progress Note  Yola Mayo MRN# [id number]   YOB: 1945 Age: 73 year old     Reason for visit: Follow up hospital discharge           Assessment and Plan:     1. Coronary artery disease    Status post 2 drug eluting stents to the mid and distal RCA    Residual 50-60% mid LAD lesion with 100% occlusion of the first diagonal    Hospital admission in October 2018 for back and rib cage pain secondary to shingles    Recent stress echocardiogram showed small area of mid anterior ischemia 10/2018    DAPT with aspirin and Brilinta uninterrupted until June 2019. Then aspirin lifelong    Follow-up with Dr. Howard in February.    2. Hyperlipidemia    Post angiogram started on atorvastatin 10 mg daily    Most recent LDL was 47    Repeat fasting lipids prior to next visit in 6 months    3. Hypertension, goal less than 130/80    Discontinue nadolol due to bradycardia and fatigue    Discontinue amlodipine due to headaches    Restart Toprol-XL to 25 mg daily    Continue losartan to 100 mg daily and hydrochlorthiazide 25 mg daily         History of Presenting Illness:    Yola Mayo is a very pleasant 73 year old patient of Dr. Howard  who presents for a follow-up post hospital discharge. Her past medical history includes coronary artery disease status post PCI to the mid and distal RCA, hypertension, hyperlipidemia, very strong family history of premature coronary artery disease, and diabetes.      Due to symptoms of exertional angina after 1 block relieved with rest, she underwent a stress echocardiogram that was abnormal. Subsequently, a coronary angiogram revealed a 50-60% narrowing of the proximal LAD with 100% occlusion of the first diagonal above the LAD  lesion.  The left circumflex had a 20-30% proximal narrowing.  The right coronary artery had a 60-70% narrowing in the proximal portion and a 95% narrowing in the midportion at the takeoff of the RV branch which is 95% narrowed.  She received 2 drug-eluting stents that were back to back to the right coronary artery with resolution of her symptoms.     On 10/14/18 she presented to the hospital with complaints of discomfort on her left lower rib cage to her back and her lower epigastrium.  The pain is relieved with Tylenol and with sharp, constant and nonradiating.  She described as very different than her previous anginal symptoms.  She is evaluated by cardiology and she underwent a stress echocardiogram that showed a small area of mid anterior ischemia.  Due to elevated blood pressure she was re-trialed on amlodipine 2.5 mg daily (previously did not tolerate due to headaches) and her metoprolol XL was discontinued and she was started on nadolol 40 mg daily.  She was discharged home and recommended to return to clinic today as an outpatient to discuss her symptoms.    She then again presented to the emergency department on 10/18/2018 with ongoing abdominal and flank pain and large rash that extends from her left flank to her her abdomen with painful vesicles and blistering.  She was diagnosed with shingles and prescribed acyclovir.      She also notes a recurrence of her headaches with the today she states that with treatment of the shingles and pain relief her symptoms are nearly resolved.  Amlodipine and increased fatigue with the nadolol.  She requests to be discontinued off of amlodipine and nadolol and restarted metoprolol XL.            Review of Systems:   Review of Systems:  Skin:  Positive for rash   Eyes:  Positive for glasses  ENT:  Negative    Respiratory:  Negative dyspnea on exertion;shortness of breath;cough;wheezing  Cardiovascular:  palpitations;chest pain;lightheadedness;dizziness;syncope or  "near-syncope;cyanosis;fatigue;Negative;edema Positive for;dizziness;fatigue (burning feeling in chest during and  after activity)  Gastroenterology: Negative    Genitourinary:  Positive for urinary frequency;nocturia  Musculoskeletal:  Positive for back pain  Neurologic:  Positive for headaches  Psychiatric:  Negative    Heme/Lymph/Imm:  Positive for allergies  Endocrine:  Positive for thyroid disorder;diabetes              Physical Exam:     Vitals: /70  Pulse (!) 48  Ht 1.6 m (5' 3\")  Wt 82.2 kg (181 lb 3.2 oz)  BMI 32.1 kg/m2  Constitutional:  cooperative, alert and oriented, well developed, well nourished, in no acute distress        Skin:  warm and dry to the touch, no apparent skin lesions or masses noted        Head:  normocephalic, no masses or lesions        Eyes:  pupils equal and round;conjunctivae and lids unremarkable        ENT:  no pallor or cyanosis, dentition good        Neck:  JVP normal        Chest:  clear to auscultation;normal symmetry        Cardiac: regular rhythm occasional premature beats;bradycardic                Extremities and Back:  no edema        Neurological:  no gross motor deficits;affect appropriate               Medications:     Current Outpatient Prescriptions   Medication Sig Dispense Refill     acetaminophen (TYLENOL) 325 MG tablet Take 2 tablets (650 mg) by mouth 3 times daily 100 tablet 0     acyclovir (ZOVIRAX) 800 MG tablet Take 1 tablet (800 mg) by mouth 5 times daily 35 tablet 0     aspirin 81 MG tablet Take 81 mg by mouth daily       atorvastatin (LIPITOR) 10 MG tablet Take 1 tablet (10 mg) by mouth daily 90 tablet 3     calcium-vitamin D (CALTRATE) 600-400 MG-UNIT per tablet Take 1 tablet by mouth daily 60 tablet 11     cholecalciferol (VITAMIN D) 1000 UNIT tablet Take 1 tablet (1,000 Units) by mouth daily 100 tablet 3     clopidogrel (PLAVIX) 75 MG tablet Take 1 tablet (75 mg) by mouth daily On day 1 take 4 tablet (300 mg) then 75 mg thereafter 90 tablet 3 "     Cyanocobalamin (VITAMIN B 12 PO) Take 1 tablet by mouth daily VIT B12 unknown otc dose       hydrochlorothiazide (HYDRODIURIL) 25 MG tablet Take 1 tablet (25 mg) by mouth daily 90 tablet 3     levothyroxine (SYNTHROID/LEVOTHROID) 112 MCG tablet Take 1 tablet (112 mcg) by mouth daily 90 tablet 3     losartan (COZAAR) 100 MG tablet Take 1 tablet (100 mg) by mouth daily 90 tablet 3     metFORMIN (GLUCOPHAGE-XR) 500 MG 24 hr tablet Take 1 tablet (500 mg) by mouth daily (with dinner) 90 tablet 3     metoprolol succinate (TOPROL-XL) 50 MG 24 hr tablet Take 1 tablet (50 mg) by mouth daily 90 tablet 3     multivitamin, therapeutic with minerals (THERA-VIT-M) TABS Take 1 tablet by mouth daily       nitroGLYcerin (NITROSTAT) 0.4 MG sublingual tablet For chest pain place 1 tablet under the tongue every 5 minutes for 3 doses. If symptoms persist 5 minutes after 1st dose call 911. 25 tablet 3     order for DME Compression Stockings  Knee high  20/30 compression 2 each 1     predniSONE (DELTASONE) 10 MG tablet Take 4 tablets daily for 5 days,  take 2 tablets daily for 3 days, take 1 tablet daily for 3 days, take half a tablet for 3 days. 32 tablet 0     HYDROcodone-acetaminophen (NORCO) 5-325 MG per tablet Take 1 tablet by mouth every 6 hours as needed for severe pain (Patient not taking: Reported on 10/24/2018) 10 tablet 0     ibuprofen (ADVIL/MOTRIN) 400 MG tablet Take 1 tablet (400 mg) by mouth 3 times daily (Patient not taking: Reported on 10/24/2018) 9 tablet 0     traMADol (ULTRAM) 50 MG tablet Take 1 tablet (50 mg) by mouth every 6 hours as needed for moderate pain (Patient not taking: Reported on 10/24/2018) 15 tablet 0           Family History   Problem Relation Age of Onset     Cancer Mother      ovarian  of it  at 51yo.     Diabetes Father      Adult onset     Hypertension Father      C.A.D. Father      Fatal Heart attack at 70.     Cerebrovascular Disease Father      Thyroid Disease Sister      Thyroid Disease  Sister      Thyroid Disease Sister      Cancer Daughter      Thyroid Disease Daughter      Breast Cancer No family hx of      Cancer - colorectal No family hx of        Social History     Social History     Marital status:      Spouse name: FABIAN CUADRA     Number of children: 2     Years of education: N/A     Occupational History     [employer]       [employer]     Social History Main Topics     Smoking status: Never Smoker     Smokeless tobacco: Never Used     Alcohol use 0.0 oz/week     0 Standard drinks or equivalent per week      Comment: occasional     Drug use: No     Sexual activity: Yes     Partners: Male     Other Topics Concern      Service No     Blood Transfusions No     Caffeine Concern No     Occupational Exposure No     Hobby Hazards No     Sleep Concern No     Stress Concern No     Weight Concern Yes     Special Diet No     Back Care No     Exercise Yes     Approx 3 times a week for about 30 minutes     Bike Helmet Yes     Seat Belt Yes     Self-Exams Yes     Social History Narrative            Past Medical History:     Past Medical History:   Diagnosis Date     BASAL CELL CANCER -NOSE      Family history of diabetes mellitus     Father     Family history of ischemic heart disease     Father     Family history of malignant neoplasm of ovary     Mother     HX DISRUPT L ANT CRUCIATE S/P REPAIR 2003      HYPERTENSION       HYPOTHYROIDISM      LOC PRIM OSTEOARTH-L 1ST METATARSAL-CUNEIFORM JOINT 3/10/2004     Major depressive disorder, single episode, mild (H) 11/24/2014     MILD-MOD TRICUSPID REGURG. MOD PULM HTN 6/23/2005    ECHO  5/05     SPLENIC CYST               Past Surgical History:     Past Surgical History:   Procedure Laterality Date     ARTHROPLASTY KNEE Left 4/13/2016    Procedure: ARTHROPLASTY KNEE;  Surgeon: Giuseppe Mcgrath MD;  Location: [facility] OR     ARTHROPLASTY MINIMALLY INVASIVE HIP  4/18/2012    Procedure:ARTHROPLASTY MINIMALLY INVASIVE HIP; RIGHT TWO INCISION  MINIMALLY INVASIVE HIP ARTHROPLASTY (YUVAL)^ (NO CELL SAVER) (C-ARM); Surgeon:LITO LOPEZ; Location:[facility] OR     C APPENDECTOMY  8/87    incidental appi at hys     C CHEMOSURG MOHS 1ST STAGE  9-99    Mohs-nose     C REPAIR CRUCIATE LIGAMENT,KNEE  8/2003    L   ACL reconstruction     C TOTAL ABDOM HYSTERECTOMY  8/87    W/ BSO fibroids, hx PID     HC COLONOSCOPY THRU STOMA, DIAGNOSTIC  4/03    normal, rec repeat 10 yrs     HYSTERECTOMY, PAP NO LONGER INDICATED       SURGICAL HISTORY OF -   Mult-~1978    infertility, laparotomies              Allergies:   Toprol xl [metoprolol]; Ace inhibitors; Oxycodone; Tetanus toxoid; and Shellfish-derived products       Data:   All laboratory data reviewed:    Last Comprehensive Metabolic Panel:  Sodium   Date Value Ref Range Status   10/15/2018 137 133 - 144 mmol/L Final     Potassium   Date Value Ref Range Status   10/15/2018 4.0 3.4 - 5.3 mmol/L Final     Chloride   Date Value Ref Range Status   10/15/2018 103 94 - 109 mmol/L Final     Carbon Dioxide   Date Value Ref Range Status   10/15/2018 29 20 - 32 mmol/L Final     Anion Gap   Date Value Ref Range Status   10/15/2018 5 3 - 14 mmol/L Final     Glucose   Date Value Ref Range Status   10/15/2018 151 (H) 70 - 99 mg/dL Final     Urea Nitrogen   Date Value Ref Range Status   10/15/2018 13 7 - 30 mg/dL Final     Creatinine   Date Value Ref Range Status   10/15/2018 0.71 0.52 - 1.04 mg/dL Final     GFR Estimate   Date Value Ref Range Status   10/15/2018 80 >60 mL/min/1.7m2 Final     Comment:     Non  GFR Calc     Calcium   Date Value Ref Range Status   10/15/2018 8.8 8.5 - 10.1 mg/dL Final     Lab Results   Component Value Date    CHOL 132 05/03/2018    CHOL 152 03/08/2017     Lab Results   Component Value Date    HDL 80 05/03/2018    HDL 72 03/08/2017     Lab Results   Component Value Date    LDL 47 05/03/2018    LDL 68 03/08/2017     Lab Results   Component Value Date    TRIG 26 05/03/2018    TRIG 59  03/08/2017     Lab Results   Component Value Date    CHOLHDLRATIO 1.6 03/27/2015    CHOLHDLRATIO 2.2 03/11/2014           FATMATA PEREIRA, CNP    Thank you for allowing me to participate in the care of your patient.      Sincerely,     FATMATA PEREIRA CNP     [facility]    cc:   Zoraida Braga MD  [address] [address]

## 2018-10-24 NOTE — PROGRESS NOTES
Cardiology Clinic Progress Note  Yola Mayo MRN# [id number]   YOB: 1945 Age: 73 year old     Reason for visit: Follow up hospital discharge           Assessment and Plan:     1. Coronary artery disease    Status post 2 drug eluting stents to the mid and distal RCA    Residual 50-60% mid LAD lesion with 100% occlusion of the first diagonal    Hospital admission in October 2018 for back and rib cage pain secondary to shingles    Recent stress echocardiogram showed small area of mid anterior ischemia 10/2018    DAPT with aspirin and Brilinta uninterrupted until June 2019. Then aspirin lifelong    Follow-up with Dr. Howard in February.    2. Hyperlipidemia    Post angiogram started on atorvastatin 10 mg daily    Most recent LDL was 47    Repeat fasting lipids prior to next visit in 6 months    3. Hypertension, goal less than 130/80    Discontinue nadolol due to bradycardia and fatigue    Discontinue amlodipine due to headaches    Restart Toprol-XL to 25 mg daily    Continue losartan to 100 mg daily and hydrochlorthiazide 25 mg daily         History of Presenting Illness:    Yola Mayo is a very pleasant 73 year old patient of Dr. Howard  who presents for a follow-up post hospital discharge. Her past medical history includes coronary artery disease status post PCI to the mid and distal RCA, hypertension, hyperlipidemia, very strong family history of premature coronary artery disease, and diabetes.      Due to symptoms of exertional angina after 1 block relieved with rest, she underwent a stress echocardiogram that was abnormal. Subsequently, a coronary angiogram revealed a 50-60% narrowing of the proximal LAD with 100% occlusion of the first diagonal above the LAD lesion.  The left circumflex had a 20-30% proximal narrowing.  The right coronary artery had a 60-70% narrowing in the proximal portion and a 95% narrowing in the midportion at the takeoff of the RV branch which is 95% narrowed.  She  received 2 drug-eluting stents that were back to back to the right coronary artery with resolution of her symptoms.     On 10/14/18 she presented to the hospital with complaints of discomfort on her left lower rib cage to her back and her lower epigastrium.  The pain is relieved with Tylenol and with sharp, constant and nonradiating.  She described as very different than her previous anginal symptoms.  She is evaluated by cardiology and she underwent a stress echocardiogram that showed a small area of mid anterior ischemia.  Due to elevated blood pressure she was re-trialed on amlodipine 2.5 mg daily (previously did not tolerate due to headaches) and her metoprolol XL was discontinued and she was started on nadolol 40 mg daily.  She was discharged home and recommended to return to clinic today as an outpatient to discuss her symptoms.    She then again presented to the emergency department on 10/18/2018 with ongoing abdominal and flank pain and large rash that extends from her left flank to her her abdomen with painful vesicles and blistering.  She was diagnosed with shingles and prescribed acyclovir.      She also notes a recurrence of her headaches with the today she states that with treatment of the shingles and pain relief her symptoms are nearly resolved.  Amlodipine and increased fatigue with the nadolol.  She requests to be discontinued off of amlodipine and nadolol and restarted metoprolol XL.            Review of Systems:   Review of Systems:  Skin:  Positive for rash   Eyes:  Positive for glasses  ENT:  Negative    Respiratory:  Negative dyspnea on exertion;shortness of breath;cough;wheezing  Cardiovascular:  palpitations;chest pain;lightheadedness;dizziness;syncope or near-syncope;cyanosis;fatigue;Negative;edema Positive for;dizziness;fatigue (burning feeling in chest during and  after activity)  Gastroenterology: Negative    Genitourinary:  Positive for urinary frequency;nocturia  Musculoskeletal:   "Positive for back pain  Neurologic:  Positive for headaches  Psychiatric:  Negative    Heme/Lymph/Imm:  Positive for allergies  Endocrine:  Positive for thyroid disorder;diabetes              Physical Exam:     Vitals: /70  Pulse (!) 48  Ht 1.6 m (5' 3\")  Wt 82.2 kg (181 lb 3.2 oz)  BMI 32.1 kg/m2  Constitutional:  cooperative, alert and oriented, well developed, well nourished, in no acute distress        Skin:  warm and dry to the touch, no apparent skin lesions or masses noted        Head:  normocephalic, no masses or lesions        Eyes:  pupils equal and round;conjunctivae and lids unremarkable        ENT:  no pallor or cyanosis, dentition good        Neck:  JVP normal        Chest:  clear to auscultation;normal symmetry        Cardiac: regular rhythm occasional premature beats;bradycardic                Extremities and Back:  no edema        Neurological:  no gross motor deficits;affect appropriate               Medications:     Current Outpatient Prescriptions   Medication Sig Dispense Refill     acetaminophen (TYLENOL) 325 MG tablet Take 2 tablets (650 mg) by mouth 3 times daily 100 tablet 0     acyclovir (ZOVIRAX) 800 MG tablet Take 1 tablet (800 mg) by mouth 5 times daily 35 tablet 0     aspirin 81 MG tablet Take 81 mg by mouth daily       atorvastatin (LIPITOR) 10 MG tablet Take 1 tablet (10 mg) by mouth daily 90 tablet 3     calcium-vitamin D (CALTRATE) 600-400 MG-UNIT per tablet Take 1 tablet by mouth daily 60 tablet 11     cholecalciferol (VITAMIN D) 1000 UNIT tablet Take 1 tablet (1,000 Units) by mouth daily 100 tablet 3     clopidogrel (PLAVIX) 75 MG tablet Take 1 tablet (75 mg) by mouth daily On day 1 take 4 tablet (300 mg) then 75 mg thereafter 90 tablet 3     Cyanocobalamin (VITAMIN B 12 PO) Take 1 tablet by mouth daily VIT B12 unknown otc dose       hydrochlorothiazide (HYDRODIURIL) 25 MG tablet Take 1 tablet (25 mg) by mouth daily 90 tablet 3     levothyroxine (SYNTHROID/LEVOTHROID) " 112 MCG tablet Take 1 tablet (112 mcg) by mouth daily 90 tablet 3     losartan (COZAAR) 100 MG tablet Take 1 tablet (100 mg) by mouth daily 90 tablet 3     metFORMIN (GLUCOPHAGE-XR) 500 MG 24 hr tablet Take 1 tablet (500 mg) by mouth daily (with dinner) 90 tablet 3     metoprolol succinate (TOPROL-XL) 50 MG 24 hr tablet Take 1 tablet (50 mg) by mouth daily 90 tablet 3     multivitamin, therapeutic with minerals (THERA-VIT-M) TABS Take 1 tablet by mouth daily       nitroGLYcerin (NITROSTAT) 0.4 MG sublingual tablet For chest pain place 1 tablet under the tongue every 5 minutes for 3 doses. If symptoms persist 5 minutes after 1st dose call 911. 25 tablet 3     order for DME Compression Stockings  Knee high  20/30 compression 2 each 1     predniSONE (DELTASONE) 10 MG tablet Take 4 tablets daily for 5 days,  take 2 tablets daily for 3 days, take 1 tablet daily for 3 days, take half a tablet for 3 days. 32 tablet 0     HYDROcodone-acetaminophen (NORCO) 5-325 MG per tablet Take 1 tablet by mouth every 6 hours as needed for severe pain (Patient not taking: Reported on 10/24/2018) 10 tablet 0     ibuprofen (ADVIL/MOTRIN) 400 MG tablet Take 1 tablet (400 mg) by mouth 3 times daily (Patient not taking: Reported on 10/24/2018) 9 tablet 0     traMADol (ULTRAM) 50 MG tablet Take 1 tablet (50 mg) by mouth every 6 hours as needed for moderate pain (Patient not taking: Reported on 10/24/2018) 15 tablet 0           Family History   Problem Relation Age of Onset     Cancer Mother      ovarian  of it  at 49yo.     Diabetes Father      Adult onset     Hypertension Father      C.A.D. Father      Fatal Heart attack at 70.     Cerebrovascular Disease Father      Thyroid Disease Sister      Thyroid Disease Sister      Thyroid Disease Sister      Cancer Daughter      Thyroid Disease Daughter      Breast Cancer No family hx of      Cancer - colorectal No family hx of        Social History     Social History     Marital status:       Spouse name: FABIAN CUADRA     Number of children: 2     Years of education: N/A     Occupational History     [employer]       [employer]     Social History Main Topics     Smoking status: Never Smoker     Smokeless tobacco: Never Used     Alcohol use 0.0 oz/week     0 Standard drinks or equivalent per week      Comment: occasional     Drug use: No     Sexual activity: Yes     Partners: Male     Other Topics Concern      Service No     Blood Transfusions No     Caffeine Concern No     Occupational Exposure No     Hobby Hazards No     Sleep Concern No     Stress Concern No     Weight Concern Yes     Special Diet No     Back Care No     Exercise Yes     Approx 3 times a week for about 30 minutes     Bike Helmet Yes     Seat Belt Yes     Self-Exams Yes     Social History Narrative            Past Medical History:     Past Medical History:   Diagnosis Date     BASAL CELL CANCER -NOSE      Family history of diabetes mellitus     Father     Family history of ischemic heart disease     Father     Family history of malignant neoplasm of ovary     Mother     HX DISRUPT L ANT CRUCIATE S/P REPAIR 2003      HYPERTENSION       HYPOTHYROIDISM      LOC PRIM OSTEOARTH-L 1ST METATARSAL-CUNEIFORM JOINT 3/10/2004     Major depressive disorder, single episode, mild (H) 11/24/2014     MILD-MOD TRICUSPID REGURG. MOD PULM HTN 6/23/2005    ECHO  5/05     SPLENIC CYST               Past Surgical History:     Past Surgical History:   Procedure Laterality Date     ARTHROPLASTY KNEE Left 4/13/2016    Procedure: ARTHROPLASTY KNEE;  Surgeon: Giuseppe Lopez MD;  Location: [facility] OR     ARTHROPLASTY MINIMALLY INVASIVE HIP  4/18/2012    Procedure:ARTHROPLASTY MINIMALLY INVASIVE HIP; RIGHT TWO INCISION MINIMALLY INVASIVE HIP ARTHROPLASTY (YUVAL)^ (NO CELL SAVER) (C-ARM); Surgeon:GIUSEPPE LOPEZ; Location: OR     C APPENDECTOMY  8/87    incidental appi at hys     C CHEMOSURG MOHS 1ST STAGE  9-99    Mohs-nose     C REPAIR  CRUCIATE LIGAMENT,KNEE  8/2003    L   ACL reconstruction     C TOTAL ABDOM HYSTERECTOMY  8/87    W/ BSO fibroids, hx PID     HC COLONOSCOPY THRU STOMA, DIAGNOSTIC  4/03    normal, rec repeat 10 yrs     HYSTERECTOMY, PAP NO LONGER INDICATED       SURGICAL HISTORY OF -   Mult-~1978    infertility, laparotomies              Allergies:   Toprol xl [metoprolol]; Ace inhibitors; Oxycodone; Tetanus toxoid; and Shellfish-derived products       Data:   All laboratory data reviewed:    Last Comprehensive Metabolic Panel:  Sodium   Date Value Ref Range Status   10/15/2018 137 133 - 144 mmol/L Final     Potassium   Date Value Ref Range Status   10/15/2018 4.0 3.4 - 5.3 mmol/L Final     Chloride   Date Value Ref Range Status   10/15/2018 103 94 - 109 mmol/L Final     Carbon Dioxide   Date Value Ref Range Status   10/15/2018 29 20 - 32 mmol/L Final     Anion Gap   Date Value Ref Range Status   10/15/2018 5 3 - 14 mmol/L Final     Glucose   Date Value Ref Range Status   10/15/2018 151 (H) 70 - 99 mg/dL Final     Urea Nitrogen   Date Value Ref Range Status   10/15/2018 13 7 - 30 mg/dL Final     Creatinine   Date Value Ref Range Status   10/15/2018 0.71 0.52 - 1.04 mg/dL Final     GFR Estimate   Date Value Ref Range Status   10/15/2018 80 >60 mL/min/1.7m2 Final     Comment:     Non  GFR Calc     Calcium   Date Value Ref Range Status   10/15/2018 8.8 8.5 - 10.1 mg/dL Final     Lab Results   Component Value Date    CHOL 132 05/03/2018    CHOL 152 03/08/2017     Lab Results   Component Value Date    HDL 80 05/03/2018    HDL 72 03/08/2017     Lab Results   Component Value Date    LDL 47 05/03/2018    LDL 68 03/08/2017     Lab Results   Component Value Date    TRIG 26 05/03/2018    TRIG 59 03/08/2017     Lab Results   Component Value Date    CHOLHDLRATIO 1.6 03/27/2015    CHOLHDLRATIO 2.2 03/11/2014           SUE CARBONE, APRN, CNP

## 2019-02-26 ENCOUNTER — TRANSFERRED RECORDS (OUTPATIENT)
Dept: HEALTH INFORMATION MANAGEMENT | Facility: CLINIC | Age: 74
End: 2019-02-26

## 2019-03-01 ENCOUNTER — OFFICE VISIT (OUTPATIENT)
Dept: CARDIOLOGY | Facility: CLINIC | Age: 74
End: 2019-03-01
Payer: MEDICARE

## 2019-03-01 VITALS
BODY MASS INDEX: 32.66 KG/M2 | DIASTOLIC BLOOD PRESSURE: 62 MMHG | SYSTOLIC BLOOD PRESSURE: 154 MMHG | WEIGHT: 184.3 LBS | HEIGHT: 63 IN | HEART RATE: 60 BPM

## 2019-03-01 DIAGNOSIS — I10 ESSENTIAL HYPERTENSION: ICD-10-CM

## 2019-03-01 DIAGNOSIS — I25.10 CORONARY ARTERY DISEASE INVOLVING NATIVE CORONARY ARTERY OF NATIVE HEART WITHOUT ANGINA PECTORIS: Primary | ICD-10-CM

## 2019-03-01 DIAGNOSIS — I10 ESSENTIAL HYPERTENSION, BENIGN: ICD-10-CM

## 2019-03-01 DIAGNOSIS — I25.110 CORONARY ARTERY DISEASE INVOLVING NATIVE CORONARY ARTERY OF NATIVE HEART WITH UNSTABLE ANGINA PECTORIS (H): ICD-10-CM

## 2019-03-01 LAB
ALT SERPL W P-5'-P-CCNC: 40 U/L (ref 5–30)
CHOLEST SERPL-MCNC: 127 MG/DL
HDLC SERPL-MCNC: 71 MG/DL
LDLC SERPL CALC-MCNC: 46 MG/DL
NONHDLC SERPL-MCNC: 56 MG/DL
TRIGL SERPL-MCNC: 49 MG/DL

## 2019-03-01 PROCEDURE — 80061 LIPID PANEL: CPT | Performed by: NURSE PRACTITIONER

## 2019-03-01 PROCEDURE — 99214 OFFICE O/P EST MOD 30 MIN: CPT | Performed by: INTERNAL MEDICINE

## 2019-03-01 PROCEDURE — 84460 ALANINE AMINO (ALT) (SGPT): CPT | Performed by: NURSE PRACTITIONER

## 2019-03-01 PROCEDURE — 36415 COLL VENOUS BLD VENIPUNCTURE: CPT | Performed by: NURSE PRACTITIONER

## 2019-03-01 ASSESSMENT — MIFFLIN-ST. JEOR: SCORE: 1310.11

## 2019-03-01 NOTE — PATIENT INSTRUCTIONS
Please keep an eye on your blood pressures and if they're above 130-140 consistently, then we might want to help your diuretic (water pill) by either increasing the dose of your HYDROCHLOROTHIAZIDE or adding a combination helper pill like TRIAMTERENE.    Check the pressures especially on the days after salty meals. You might want to take an extra 1/2 pill of your HYDROCHLOROTHIAZIDE for a day or two.    In July, you may stop the CLOPIDOGREL.    Why don't you come back in 3 months to see Krystal and she can check your blood pressures.

## 2019-03-01 NOTE — LETTER
3/1/2019    Zoraida Braga MD  [address]  [facility] [address]    RE: Yola Mayo       Dear Colleague,    I had the pleasure of seeing Yola Mayo in the [facility].    Cardiology Progress Note          Assessment and Plan:     1. Coronary artery disease status post PCI of the RCA mid 2018    May discontinue clopidogrel mid 2019.    Continue medical management of the diagonal which does show up as ischemia on the stress test, but asymptomatic.      2. Hypertension, borderline control    Patient will continue to follow.  Could add triamterene to her hydrochlorothiazide if blood pressures remain elevated.    Follow-up in 3 months with Krystal Neal to revisit      This note was transcribed using electronic voice recognition software and there may be typographical errors present.                Interval History:   The patient is a very pleasant 73 year old whom I have been following, she had exertional chest burning in the middle of last year.  Stress test was positive for ischemia and cardiac catheterization showed severe stenosis of the RCA.  This was treated with percutaneous revascularization and her symptoms resolved.  She had residual diagonal disease that did show up on later stress test October 2018.  She does not feel any exertional chest discomfort at this time.  She feels like things are going pretty well.  Her blood pressure at home today was 127/76.  She has not had high blood pressure at home.                     Review of Systems:   Review of Systems:  Skin:  Negative     Eyes:  Positive for glasses  ENT:  Negative    Respiratory:  Negative    Cardiovascular:  Negative    Gastroenterology: Negative    Genitourinary:  Negative    Musculoskeletal:  Positive for back pain  Neurologic:  Positive for headaches  Psychiatric:  Negative    Heme/Lymph/Imm:  Positive for allergies  Endocrine:  Positive for thyroid disorder;diabetes               "Physical Exam:     Vitals: /62   Pulse 60   Ht 1.6 m (5' 3\")   Wt 83.6 kg (184 lb 4.8 oz)   BMI 32.65 kg/m     Constitutional:  cooperative, alert and oriented, well developed, well nourished, in no acute distress        Skin:  warm and dry to the touch, no apparent skin lesions or masses noted        Head:  normocephalic, no masses or lesions        Eyes:  pupils equal and round;conjunctivae and lids unremarkable        ENT:  no pallor or cyanosis, dentition good        Neck:  JVP normal        Chest:  clear to auscultation;normal symmetry        Cardiac: regular rhythm                  Abdomen:      benign    Extremities and Back:  no edema        Neurological:  no gross motor deficits;affect appropriate                 Medications:     Current Outpatient Medications   Medication Sig Dispense Refill     acetaminophen (TYLENOL) 325 MG tablet Take 2 tablets (650 mg) by mouth 3 times daily 100 tablet 0     aspirin 81 MG tablet Take 81 mg by mouth daily       atorvastatin (LIPITOR) 10 MG tablet Take 1 tablet (10 mg) by mouth daily 90 tablet 3     calcium-vitamin D (CALTRATE) 600-400 MG-UNIT per tablet Take 1 tablet by mouth daily 60 tablet 11     cholecalciferol (VITAMIN D) 1000 UNIT tablet Take 1 tablet (1,000 Units) by mouth daily 100 tablet 3     clopidogrel (PLAVIX) 75 MG tablet Take 1 tablet (75 mg) by mouth daily On day 1 take 4 tablet (300 mg) then 75 mg thereafter 90 tablet 3     Cyanocobalamin (VITAMIN B 12 PO) Take 1 tablet by mouth daily VIT B12 unknown otc dose       hydrochlorothiazide (HYDRODIURIL) 25 MG tablet Take 1 tablet (25 mg) by mouth daily 90 tablet 3     levothyroxine (SYNTHROID/LEVOTHROID) 112 MCG tablet Take 1 tablet (112 mcg) by mouth daily 90 tablet 3     losartan (COZAAR) 100 MG tablet Take 1 tablet (100 mg) by mouth daily 90 tablet 3     metFORMIN (GLUCOPHAGE-XR) 500 MG 24 hr tablet Take 1 tablet (500 mg) by mouth daily (with dinner) 90 tablet 3     metoprolol succinate " (TOPROL-XL) 25 MG 24 hr tablet Take 2 tablets (50 mg) by mouth daily (Patient taking differently: Take 25 mg by mouth daily ) 90 tablet 3     multivitamin, therapeutic with minerals (THERA-VIT-M) TABS Take 1 tablet by mouth daily       nitroGLYcerin (NITROSTAT) 0.4 MG sublingual tablet For chest pain place 1 tablet under the tongue every 5 minutes for 3 doses. If symptoms persist 5 minutes after 1st dose call 911. 25 tablet 3     order for DME Compression Stockings  Knee high  20/30 compression 2 each 1     acyclovir (ZOVIRAX) 800 MG tablet Take 1 tablet (800 mg) by mouth 5 times daily 35 tablet 0     HYDROcodone-acetaminophen (NORCO) 5-325 MG per tablet Take 1 tablet by mouth every 6 hours as needed for severe pain (Patient not taking: Reported on 10/24/2018) 10 tablet 0     ibuprofen (ADVIL/MOTRIN) 400 MG tablet Take 1 tablet (400 mg) by mouth 3 times daily (Patient not taking: Reported on 10/24/2018) 9 tablet 0     traMADol (ULTRAM) 50 MG tablet Take 1 tablet (50 mg) by mouth every 6 hours as needed for moderate pain (Patient not taking: Reported on 10/24/2018) 15 tablet 0                Data:   All laboratory data reviewed  Results for orders placed or performed in visit on 03/01/19 (from the past 24 hour(s))   ALT   Result Value Ref Range    ALT 40 (H) 5 - 30 U/L   Lipid Profile   Result Value Ref Range    Cholesterol 127 <200 mg/dL    Triglycerides 49 <150 mg/dL    HDL Cholesterol 71 >49 mg/dL    LDL Cholesterol Calculated 46 <100 mg/dL    Non HDL Cholesterol 56 <130 mg/dL       All laboratory data reviewed  Lab Results   Component Value Date    CHOL 127 03/01/2019     Lab Results   Component Value Date    HDL 71 03/01/2019     Lab Results   Component Value Date    LDL 46 03/01/2019     Lab Results   Component Value Date    TRIG 49 03/01/2019     Lab Results   Component Value Date    CHOLHDLRATIO 1.6 03/27/2015     TSH   Date Value Ref Range Status   10/14/2018 3.18 0.40 - 4.00 mU/L Final     Last Basic  Metabolic Panel:  Lab Results   Component Value Date     10/15/2018      Lab Results   Component Value Date    POTASSIUM 4.0 10/15/2018     Lab Results   Component Value Date    CHLORIDE 103 10/15/2018     Lab Results   Component Value Date    JOSEPH 8.8 10/15/2018     Lab Results   Component Value Date    CO2 29 10/15/2018     Lab Results   Component Value Date    BUN 13 10/15/2018     Lab Results   Component Value Date    CR 0.71 10/15/2018     Lab Results   Component Value Date     10/15/2018     Lab Results   Component Value Date    WBC 7.4 10/15/2018     Lab Results   Component Value Date    RBC 4.62 10/15/2018     Lab Results   Component Value Date    HGB 13.6 10/15/2018     Lab Results   Component Value Date    HCT 40.3 10/15/2018     Lab Results   Component Value Date    MCV 87 10/15/2018     Lab Results   Component Value Date    MCH 29.4 10/15/2018     Lab Results   Component Value Date    MCHC 33.7 10/15/2018     Lab Results   Component Value Date    RDW 14.2 10/15/2018     Lab Results   Component Value Date     10/15/2018                 Thank you for allowing me to participate in the care of your patient.      Sincerely,     Albino Howard MD     [facility]    cc:   No referring provider defined for this encounter.

## 2019-03-01 NOTE — LETTER
3/1/2019    Zoraida Braga MD  [address]  [facility] [address]    RE: Yola Mayo       Dear Colleague,    I had the pleasure of seeing Yola Mayo in the [facility].    Cardiology Progress Note          Assessment and Plan:     1. Coronary artery disease status post PCI of the RCA mid 2018    May discontinue clopidogrel mid 2019.    Continue medical management of the diagonal which does show up as ischemia on the stress test, but asymptomatic.      2. Hypertension, borderline control    Patient will continue to follow.  Could add triamterene to her hydrochlorothiazide if blood pressures remain elevated.    Follow-up in 3 months with Krystal Neal to revisit      This note was transcribed using electronic voice recognition software and there may be typographical errors present.                Interval History:   The patient is a very pleasant 73 year old whom I have been following, she had exertional chest burning in the middle of last year.  Stress test was positive for ischemia and cardiac catheterization showed severe stenosis of the RCA.  This was treated with percutaneous revascularization and her symptoms resolved.  She had residual diagonal disease that did show up on later stress test October 2018.  She does not feel any exertional chest discomfort at this time.  She feels like things are going pretty well.  Her blood pressure at home today was 127/76.  She has not had high blood pressure at home.                     Review of Systems:   Review of Systems:  Skin:  Negative     Eyes:  Positive for glasses  ENT:  Negative    Respiratory:  Negative    Cardiovascular:  Negative    Gastroenterology: Negative    Genitourinary:  Negative    Musculoskeletal:  Positive for back pain  Neurologic:  Positive for headaches  Psychiatric:  Negative    Heme/Lymph/Imm:  Positive for allergies  Endocrine:  Positive for thyroid disorder;diabetes               "Physical Exam:     Vitals: /62   Pulse 60   Ht 1.6 m (5' 3\")   Wt 83.6 kg (184 lb 4.8 oz)   BMI 32.65 kg/m     Constitutional:  cooperative, alert and oriented, well developed, well nourished, in no acute distress        Skin:  warm and dry to the touch, no apparent skin lesions or masses noted        Head:  normocephalic, no masses or lesions        Eyes:  pupils equal and round;conjunctivae and lids unremarkable        ENT:  no pallor or cyanosis, dentition good        Neck:  JVP normal        Chest:  clear to auscultation;normal symmetry        Cardiac: regular rhythm                  Abdomen:      benign    Extremities and Back:  no edema        Neurological:  no gross motor deficits;affect appropriate                 Medications:     Current Outpatient Medications   Medication Sig Dispense Refill     acetaminophen (TYLENOL) 325 MG tablet Take 2 tablets (650 mg) by mouth 3 times daily 100 tablet 0     aspirin 81 MG tablet Take 81 mg by mouth daily       atorvastatin (LIPITOR) 10 MG tablet Take 1 tablet (10 mg) by mouth daily 90 tablet 3     calcium-vitamin D (CALTRATE) 600-400 MG-UNIT per tablet Take 1 tablet by mouth daily 60 tablet 11     cholecalciferol (VITAMIN D) 1000 UNIT tablet Take 1 tablet (1,000 Units) by mouth daily 100 tablet 3     clopidogrel (PLAVIX) 75 MG tablet Take 1 tablet (75 mg) by mouth daily On day 1 take 4 tablet (300 mg) then 75 mg thereafter 90 tablet 3     Cyanocobalamin (VITAMIN B 12 PO) Take 1 tablet by mouth daily VIT B12 unknown otc dose       hydrochlorothiazide (HYDRODIURIL) 25 MG tablet Take 1 tablet (25 mg) by mouth daily 90 tablet 3     levothyroxine (SYNTHROID/LEVOTHROID) 112 MCG tablet Take 1 tablet (112 mcg) by mouth daily 90 tablet 3     losartan (COZAAR) 100 MG tablet Take 1 tablet (100 mg) by mouth daily 90 tablet 3     metFORMIN (GLUCOPHAGE-XR) 500 MG 24 hr tablet Take 1 tablet (500 mg) by mouth daily (with dinner) 90 tablet 3     metoprolol succinate " (TOPROL-XL) 25 MG 24 hr tablet Take 2 tablets (50 mg) by mouth daily (Patient taking differently: Take 25 mg by mouth daily ) 90 tablet 3     multivitamin, therapeutic with minerals (THERA-VIT-M) TABS Take 1 tablet by mouth daily       nitroGLYcerin (NITROSTAT) 0.4 MG sublingual tablet For chest pain place 1 tablet under the tongue every 5 minutes for 3 doses. If symptoms persist 5 minutes after 1st dose call 911. 25 tablet 3     order for DME Compression Stockings  Knee high  20/30 compression 2 each 1     acyclovir (ZOVIRAX) 800 MG tablet Take 1 tablet (800 mg) by mouth 5 times daily 35 tablet 0     HYDROcodone-acetaminophen (NORCO) 5-325 MG per tablet Take 1 tablet by mouth every 6 hours as needed for severe pain (Patient not taking: Reported on 10/24/2018) 10 tablet 0     ibuprofen (ADVIL/MOTRIN) 400 MG tablet Take 1 tablet (400 mg) by mouth 3 times daily (Patient not taking: Reported on 10/24/2018) 9 tablet 0     traMADol (ULTRAM) 50 MG tablet Take 1 tablet (50 mg) by mouth every 6 hours as needed for moderate pain (Patient not taking: Reported on 10/24/2018) 15 tablet 0                Data:   All laboratory data reviewed  Results for orders placed or performed in visit on 03/01/19 (from the past 24 hour(s))   ALT   Result Value Ref Range    ALT 40 (H) 5 - 30 U/L   Lipid Profile   Result Value Ref Range    Cholesterol 127 <200 mg/dL    Triglycerides 49 <150 mg/dL    HDL Cholesterol 71 >49 mg/dL    LDL Cholesterol Calculated 46 <100 mg/dL    Non HDL Cholesterol 56 <130 mg/dL       All laboratory data reviewed  Lab Results   Component Value Date    CHOL 127 03/01/2019     Lab Results   Component Value Date    HDL 71 03/01/2019     Lab Results   Component Value Date    LDL 46 03/01/2019     Lab Results   Component Value Date    TRIG 49 03/01/2019     Lab Results   Component Value Date    CHOLHDLRATIO 1.6 03/27/2015     TSH   Date Value Ref Range Status   10/14/2018 3.18 0.40 - 4.00 mU/L Final     Last Basic  Metabolic Panel:  Lab Results   Component Value Date     10/15/2018      Lab Results   Component Value Date    POTASSIUM 4.0 10/15/2018     Lab Results   Component Value Date    CHLORIDE 103 10/15/2018     Lab Results   Component Value Date    JOSEPH 8.8 10/15/2018     Lab Results   Component Value Date    CO2 29 10/15/2018     Lab Results   Component Value Date    BUN 13 10/15/2018     Lab Results   Component Value Date    CR 0.71 10/15/2018     Lab Results   Component Value Date     10/15/2018     Lab Results   Component Value Date    WBC 7.4 10/15/2018     Lab Results   Component Value Date    RBC 4.62 10/15/2018     Lab Results   Component Value Date    HGB 13.6 10/15/2018     Lab Results   Component Value Date    HCT 40.3 10/15/2018     Lab Results   Component Value Date    MCV 87 10/15/2018     Lab Results   Component Value Date    MCH 29.4 10/15/2018     Lab Results   Component Value Date    MCHC 33.7 10/15/2018     Lab Results   Component Value Date    RDW 14.2 10/15/2018     Lab Results   Component Value Date     10/15/2018         Thank you for allowing me to participate in the care of your patient.    Sincerely,     Albino Howard MD     [facility]

## 2019-03-01 NOTE — PROGRESS NOTES
"Cardiology Progress Note          Assessment and Plan:     1. Coronary artery disease status post PCI of the RCA mid 2018    May discontinue clopidogrel mid 2019.    Continue medical management of the diagonal which does show up as ischemia on the stress test, but asymptomatic.      2. Hypertension, borderline control    Patient will continue to follow.  Could add triamterene to her hydrochlorothiazide if blood pressures remain elevated.    Follow-up in 3 months with Krystal Neal to revisit      This note was transcribed using electronic voice recognition software and there may be typographical errors present.                Interval History:   The patient is a very pleasant 73 year old whom I have been following, she had exertional chest burning in the middle of last year.  Stress test was positive for ischemia and cardiac catheterization showed severe stenosis of the RCA.  This was treated with percutaneous revascularization and her symptoms resolved.  She had residual diagonal disease that did show up on later stress test October 2018.  She does not feel any exertional chest discomfort at this time.  She feels like things are going pretty well.  Her blood pressure at home today was 127/76.  She has not had high blood pressure at home.                     Review of Systems:   Review of Systems:  Skin:  Negative     Eyes:  Positive for glasses  ENT:  Negative    Respiratory:  Negative    Cardiovascular:  Negative    Gastroenterology: Negative    Genitourinary:  Negative    Musculoskeletal:  Positive for back pain  Neurologic:  Positive for headaches  Psychiatric:  Negative    Heme/Lymph/Imm:  Positive for allergies  Endocrine:  Positive for thyroid disorder;diabetes              Physical Exam:     Vitals: /62   Pulse 60   Ht 1.6 m (5' 3\")   Wt 83.6 kg (184 lb 4.8 oz)   BMI 32.65 kg/m    Constitutional:  cooperative, alert and oriented, well developed, well nourished, in no acute distress        Skin:  warm " and dry to the touch, no apparent skin lesions or masses noted        Head:  normocephalic, no masses or lesions        Eyes:  pupils equal and round;conjunctivae and lids unremarkable        ENT:  no pallor or cyanosis, dentition good        Neck:  JVP normal        Chest:  clear to auscultation;normal symmetry        Cardiac: regular rhythm                  Abdomen:      benign    Extremities and Back:  no edema        Neurological:  no gross motor deficits;affect appropriate                 Medications:     Current Outpatient Medications   Medication Sig Dispense Refill     acetaminophen (TYLENOL) 325 MG tablet Take 2 tablets (650 mg) by mouth 3 times daily 100 tablet 0     aspirin 81 MG tablet Take 81 mg by mouth daily       atorvastatin (LIPITOR) 10 MG tablet Take 1 tablet (10 mg) by mouth daily 90 tablet 3     calcium-vitamin D (CALTRATE) 600-400 MG-UNIT per tablet Take 1 tablet by mouth daily 60 tablet 11     cholecalciferol (VITAMIN D) 1000 UNIT tablet Take 1 tablet (1,000 Units) by mouth daily 100 tablet 3     clopidogrel (PLAVIX) 75 MG tablet Take 1 tablet (75 mg) by mouth daily On day 1 take 4 tablet (300 mg) then 75 mg thereafter 90 tablet 3     Cyanocobalamin (VITAMIN B 12 PO) Take 1 tablet by mouth daily VIT B12 unknown otc dose       hydrochlorothiazide (HYDRODIURIL) 25 MG tablet Take 1 tablet (25 mg) by mouth daily 90 tablet 3     levothyroxine (SYNTHROID/LEVOTHROID) 112 MCG tablet Take 1 tablet (112 mcg) by mouth daily 90 tablet 3     losartan (COZAAR) 100 MG tablet Take 1 tablet (100 mg) by mouth daily 90 tablet 3     metFORMIN (GLUCOPHAGE-XR) 500 MG 24 hr tablet Take 1 tablet (500 mg) by mouth daily (with dinner) 90 tablet 3     metoprolol succinate (TOPROL-XL) 25 MG 24 hr tablet Take 2 tablets (50 mg) by mouth daily (Patient taking differently: Take 25 mg by mouth daily ) 90 tablet 3     multivitamin, therapeutic with minerals (THERA-VIT-M) TABS Take 1 tablet by mouth daily       nitroGLYcerin  (NITROSTAT) 0.4 MG sublingual tablet For chest pain place 1 tablet under the tongue every 5 minutes for 3 doses. If symptoms persist 5 minutes after 1st dose call 911. 25 tablet 3     order for DME Compression Stockings  Knee high  20/30 compression 2 each 1     acyclovir (ZOVIRAX) 800 MG tablet Take 1 tablet (800 mg) by mouth 5 times daily 35 tablet 0     HYDROcodone-acetaminophen (NORCO) 5-325 MG per tablet Take 1 tablet by mouth every 6 hours as needed for severe pain (Patient not taking: Reported on 10/24/2018) 10 tablet 0     ibuprofen (ADVIL/MOTRIN) 400 MG tablet Take 1 tablet (400 mg) by mouth 3 times daily (Patient not taking: Reported on 10/24/2018) 9 tablet 0     traMADol (ULTRAM) 50 MG tablet Take 1 tablet (50 mg) by mouth every 6 hours as needed for moderate pain (Patient not taking: Reported on 10/24/2018) 15 tablet 0                Data:   All laboratory data reviewed  Results for orders placed or performed in visit on 03/01/19 (from the past 24 hour(s))   ALT   Result Value Ref Range    ALT 40 (H) 5 - 30 U/L   Lipid Profile   Result Value Ref Range    Cholesterol 127 <200 mg/dL    Triglycerides 49 <150 mg/dL    HDL Cholesterol 71 >49 mg/dL    LDL Cholesterol Calculated 46 <100 mg/dL    Non HDL Cholesterol 56 <130 mg/dL       All laboratory data reviewed  Lab Results   Component Value Date    CHOL 127 03/01/2019     Lab Results   Component Value Date    HDL 71 03/01/2019     Lab Results   Component Value Date    LDL 46 03/01/2019     Lab Results   Component Value Date    TRIG 49 03/01/2019     Lab Results   Component Value Date    CHOLHDLRATIO 1.6 03/27/2015     TSH   Date Value Ref Range Status   10/14/2018 3.18 0.40 - 4.00 mU/L Final     Last Basic Metabolic Panel:  Lab Results   Component Value Date     10/15/2018      Lab Results   Component Value Date    POTASSIUM 4.0 10/15/2018     Lab Results   Component Value Date    CHLORIDE 103 10/15/2018     Lab Results   Component Value Date    JOSEPH  8.8 10/15/2018     Lab Results   Component Value Date    CO2 29 10/15/2018     Lab Results   Component Value Date    BUN 13 10/15/2018     Lab Results   Component Value Date    CR 0.71 10/15/2018     Lab Results   Component Value Date     10/15/2018     Lab Results   Component Value Date    WBC 7.4 10/15/2018     Lab Results   Component Value Date    RBC 4.62 10/15/2018     Lab Results   Component Value Date    HGB 13.6 10/15/2018     Lab Results   Component Value Date    HCT 40.3 10/15/2018     Lab Results   Component Value Date    MCV 87 10/15/2018     Lab Results   Component Value Date    MCH 29.4 10/15/2018     Lab Results   Component Value Date    MCHC 33.7 10/15/2018     Lab Results   Component Value Date    RDW 14.2 10/15/2018     Lab Results   Component Value Date     10/15/2018

## 2019-03-26 ENCOUNTER — TRANSFERRED RECORDS (OUTPATIENT)
Dept: HEALTH INFORMATION MANAGEMENT | Facility: CLINIC | Age: 74
End: 2019-03-26

## 2019-04-05 ENCOUNTER — TRANSFERRED RECORDS (OUTPATIENT)
Dept: HEALTH INFORMATION MANAGEMENT | Facility: CLINIC | Age: 74
End: 2019-04-05

## 2019-04-25 DIAGNOSIS — I10 ESSENTIAL HYPERTENSION, BENIGN: ICD-10-CM

## 2019-04-25 RX ORDER — HYDROCHLOROTHIAZIDE 25 MG/1
25 TABLET ORAL DAILY
Qty: 90 TABLET | Refills: 3 | Status: SHIPPED | OUTPATIENT
Start: 2019-04-25 | End: 2020-04-07

## 2019-05-07 ENCOUNTER — OFFICE VISIT (OUTPATIENT)
Dept: FAMILY MEDICINE | Facility: CLINIC | Age: 74
End: 2019-05-07
Payer: MEDICARE

## 2019-05-07 VITALS
TEMPERATURE: 97.5 F | HEIGHT: 63 IN | OXYGEN SATURATION: 96 % | DIASTOLIC BLOOD PRESSURE: 74 MMHG | SYSTOLIC BLOOD PRESSURE: 138 MMHG | WEIGHT: 181.2 LBS | BODY MASS INDEX: 32.11 KG/M2 | HEART RATE: 65 BPM

## 2019-05-07 DIAGNOSIS — Z79.899 MEDICATION MANAGEMENT: ICD-10-CM

## 2019-05-07 DIAGNOSIS — E03.9 HYPOTHYROIDISM, UNSPECIFIED TYPE: ICD-10-CM

## 2019-05-07 DIAGNOSIS — Z13.220 LIPID SCREENING: ICD-10-CM

## 2019-05-07 DIAGNOSIS — Z00.01 ENCOUNTER FOR GENERAL ADULT MEDICAL EXAMINATION WITH ABNORMAL FINDINGS: Primary | ICD-10-CM

## 2019-05-07 DIAGNOSIS — Z86.19 HISTORY OF SHINGLES: ICD-10-CM

## 2019-05-07 DIAGNOSIS — Z85.828 HISTORY OF BASAL CELL CARCINOMA: ICD-10-CM

## 2019-05-07 DIAGNOSIS — R73.03 PREDIABETES: ICD-10-CM

## 2019-05-07 DIAGNOSIS — Z12.11 SCREEN FOR COLON CANCER: ICD-10-CM

## 2019-05-07 DIAGNOSIS — Z95.5 S/P DRUG ELUTING CORONARY STENT PLACEMENT: ICD-10-CM

## 2019-05-07 DIAGNOSIS — I27.20 PULMONARY HYPERTENSION (H): ICD-10-CM

## 2019-05-07 DIAGNOSIS — I10 BENIGN ESSENTIAL HYPERTENSION: ICD-10-CM

## 2019-05-07 LAB
ALBUMIN SERPL-MCNC: 3.6 G/DL (ref 3.4–5)
ALP SERPL-CCNC: 63 U/L (ref 40–150)
ALT SERPL W P-5'-P-CCNC: 77 U/L (ref 0–50)
ANION GAP SERPL CALCULATED.3IONS-SCNC: 11 MMOL/L (ref 3–14)
AST SERPL W P-5'-P-CCNC: 43 U/L (ref 0–45)
BILIRUB SERPL-MCNC: 0.5 MG/DL (ref 0.2–1.3)
BUN SERPL-MCNC: 16 MG/DL (ref 7–30)
CALCIUM SERPL-MCNC: 9.2 MG/DL (ref 8.5–10.1)
CHLORIDE SERPL-SCNC: 106 MMOL/L (ref 94–109)
CHOLEST SERPL-MCNC: 120 MG/DL
CO2 SERPL-SCNC: 25 MMOL/L (ref 20–32)
CREAT SERPL-MCNC: 0.84 MG/DL (ref 0.52–1.04)
GFR SERPL CREATININE-BSD FRML MDRD: 69 ML/MIN/{1.73_M2}
GLUCOSE SERPL-MCNC: 118 MG/DL (ref 70–99)
HBA1C MFR BLD: 6.5 % (ref 0–5.6)
HDLC SERPL-MCNC: 75 MG/DL
LDLC SERPL CALC-MCNC: 36 MG/DL
NONHDLC SERPL-MCNC: 45 MG/DL
POTASSIUM SERPL-SCNC: 3.3 MMOL/L (ref 3.4–5.3)
PROT SERPL-MCNC: 7.3 G/DL (ref 6.8–8.8)
SODIUM SERPL-SCNC: 142 MMOL/L (ref 133–144)
TRIGL SERPL-MCNC: 45 MG/DL
TSH SERPL DL<=0.005 MIU/L-ACNC: 0.94 MU/L (ref 0.4–4)

## 2019-05-07 PROCEDURE — 36415 COLL VENOUS BLD VENIPUNCTURE: CPT | Performed by: INTERNAL MEDICINE

## 2019-05-07 PROCEDURE — G0439 PPPS, SUBSEQ VISIT: HCPCS | Performed by: INTERNAL MEDICINE

## 2019-05-07 PROCEDURE — 83036 HEMOGLOBIN GLYCOSYLATED A1C: CPT | Performed by: INTERNAL MEDICINE

## 2019-05-07 PROCEDURE — 80061 LIPID PANEL: CPT | Performed by: INTERNAL MEDICINE

## 2019-05-07 PROCEDURE — 99213 OFFICE O/P EST LOW 20 MIN: CPT | Mod: 25 | Performed by: INTERNAL MEDICINE

## 2019-05-07 PROCEDURE — 80053 COMPREHEN METABOLIC PANEL: CPT | Performed by: INTERNAL MEDICINE

## 2019-05-07 PROCEDURE — 82274 ASSAY TEST FOR BLOOD FECAL: CPT | Performed by: INTERNAL MEDICINE

## 2019-05-07 PROCEDURE — 84443 ASSAY THYROID STIM HORMONE: CPT | Performed by: INTERNAL MEDICINE

## 2019-05-07 RX ORDER — LOSARTAN POTASSIUM 100 MG/1
100 TABLET ORAL DAILY
Qty: 90 TABLET | Refills: 3 | Status: SHIPPED | OUTPATIENT
Start: 2019-05-07 | End: 2020-06-12

## 2019-05-07 RX ORDER — METOPROLOL SUCCINATE 25 MG/1
25 TABLET, EXTENDED RELEASE ORAL DAILY
Qty: 90 TABLET | Refills: 3 | Status: SHIPPED | OUTPATIENT
Start: 2019-05-07 | End: 2020-04-08

## 2019-05-07 RX ORDER — ATORVASTATIN CALCIUM 10 MG/1
10 TABLET, FILM COATED ORAL DAILY
Qty: 90 TABLET | Refills: 3 | Status: SHIPPED | OUTPATIENT
Start: 2019-05-07 | End: 2020-04-08

## 2019-05-07 RX ORDER — LEVOTHYROXINE SODIUM 112 UG/1
112 TABLET ORAL DAILY
Qty: 90 TABLET | Refills: 3 | Status: SHIPPED | OUTPATIENT
Start: 2019-05-07 | End: 2020-04-08

## 2019-05-07 RX ORDER — METFORMIN HCL 500 MG
500 TABLET, EXTENDED RELEASE 24 HR ORAL
Qty: 90 TABLET | Refills: 3 | Status: SHIPPED | OUTPATIENT
Start: 2019-05-07 | End: 2019-05-09

## 2019-05-07 ASSESSMENT — MIFFLIN-ST. JEOR: SCORE: 1296.05

## 2019-05-07 ASSESSMENT — ACTIVITIES OF DAILY LIVING (ADL): CURRENT_FUNCTION: NO ASSISTANCE NEEDED

## 2019-05-07 NOTE — PATIENT INSTRUCTIONS
Patient Education   Personalized Prevention Plan  You are due for the preventive services outlined below.  Your care team is available to assist you in scheduling these services.  If you have already completed any of these items, please share that information with your care team to update in your medical record.  Health Maintenance Due   Topic Date Due     Zoster (Shingles) Vaccine (2 of 3) 09/23/2011     Annual Wellness Visit  05/01/2019     FALL RISK ASSESSMENT  05/01/2019     Colonoscopy - ever 10 years  05/01/2019     Preventive Health Recommendations    See your health care provider every year to    Review health changes.     Discuss preventive care.      Review your medicines if your doctor has prescribed any.    You no longer need a yearly Pap test unless you've had an abnormal Pap test in the past 10 years. If you have vaginal symptoms, such as bleeding or discharge, be sure to talk with your provider about a Pap test.    Every 1 to 2 years, have a mammogram.  If you are over 69, talk with your health care provider about whether or not you want to continue having screening mammograms.    Every 10 years, have a colonoscopy. Or, have a yearly FIT test (stool test). These exams will check for colon cancer.     Have a cholesterol test every 5 years, or more often if your doctor advises it.     Have a diabetes test (fasting glucose) every three years. If you are at risk for diabetes, you should have this test more often.     At age 65, have a bone density scan (DEXA) to check for osteoporosis (brittle bone disease).    Shots:    Get a flu shot each year.    Get a tetanus shot every 10 years.    Talk to your doctor about your pneumonia vaccines. There are now two you should receive - Pneumovax (PPSV 23) and Prevnar (PCV 13).    Talk to your pharmacist about the shingles vaccine.    Talk to your doctor about the hepatitis B vaccine.    Nutrition:     Eat at least 5 servings of fruits and vegetables each  day.    Eat whole-grain bread, whole-wheat pasta and brown rice instead of white grains and rice.    Get adequate Calcium and Vitamin D.     Lifestyle    Exercise at least 150 minutes a week (30 minutes a day, 5 days a week). This will help you control your weight and prevent disease.    Limit alcohol to one drink per day.    No smoking.     Wear sunscreen to prevent skin cancer.     See your dentist twice a year for an exam and cleaning.    See your eye doctor every 1 to 2 years to screen for conditions such as glaucoma, macular degeneration and cataracts.    Personalized Prevention Plan  You are due for the preventive services outlined below.  Your care team is available to assist you in scheduling these services.  If you have already completed any of these items, please share that information with your care team to update in your medical record.  Health Maintenance Due   Topic Date Due     Zoster (Shingles) Vaccine (2 of 3) 09/23/2011     Annual Wellness Visit  05/01/2019     FALL RISK ASSESSMENT  05/01/2019     Colonoscopy - ever 10 years  05/01/2019      labs today  There is this is a new shingles vaccine available called shingrex  It is a series of 2 shots 2-6 months apart.  Considered more than 90% effective.  Please go to any pharmacy to get the  vaccine  Follow up in one year for physical  Seek sooner medical attention if there is any worsening of symptoms or problems.

## 2019-05-07 NOTE — PROGRESS NOTES
"SUBJECTIVE:   Yola Mayo is a 73 year old female who presents for Preventive Visit.    Are you in the first 12 months of your Medicare coverage?  No    Healthy Habits:    In general, how would you rate your overall health?  Good    Frequency of exercise:  4-5 days/week    Duration of exercise:  45-60 minutes    Do you usually eat at least 4 servings of fruit and vegetables a day, include whole grains    & fiber and avoid regularly eating high fat or \"junk\" foods?  Yes    Taking medications regularly:  Yes    Barriers to taking medications:  None    Medication side effects:  None    Ability to successfully perform activities of daily living:  No assistance needed    Home Safety:  No safety concerns identified    Hearing Impairment:  No hearing concerns    In the past 6 months, have you been bothered by leaking of urine?  No    In general, how would you rate your overall mental or emotional health?  Good      PHQ-2 Total Score:    Additional concerns today:  No    Do you feel safe in your environment? Yes    Do you have a Health Care Directive? Yes: Advance Directive has been received and scanned.      Fall risk  Fallen 2 or more times in the past year?: No  Any fall with injury in the past year?: No    Cognitive Screening   1) Repeat 3 items (Leader, Season, Table)    2) Clock draw: NORMAL  3) 3 item recall: Recalls 3 objects  Results: 3 items recalled: COGNITIVE IMPAIRMENT LESS LIKELY    Mini-CogTM Copyright JESSICA Hussein. Licensed by the author for use in [facility]; reprinted with permission (sophie@.[facility]). All rights reserved.      Do you have sleep apnea, excessive snoring or daytime drowsiness?: no    Reviewed and updated as needed this visit by clinical staff  Tobacco  Allergies  Meds         Reviewed and updated as needed this visit by Provider        Social History     Tobacco Use     Smoking status: Never Smoker     Smokeless tobacco: Never Used   Substance Use Topics     Alcohol use: Yes " "    Alcohol/week: 0.0 oz     Comment: occasional     If you drink alcohol do you typically have >3 drinks per day or >7 drinks per week? No    Alcohol Use 5/7/2019   Prescreen: >3 drinks/day or >7 drinks/week? No               Current providers sharing in care for this patient include:   Patient Care Team:  Zoraida Braga MD as PCP - General (Internal Medicine)  Zoraida Braga MD as Assigned PCP    The following health maintenance items are reviewed in Epic and correct as of today:  Health Maintenance   Topic Date Due     ZOSTER IMMUNIZATION (2 of 3) 09/23/2011     MEDICARE ANNUAL WELLNESS VISIT  05/01/2019     FALL RISK ASSESSMENT  05/01/2019     COLONOSCOPY Q10 YR  05/01/2019     MAMMO SCREEN Q2 YR (SYSTEM ASSIGNED)  05/04/2020     ADVANCE DIRECTIVE PLANNING Q5 YRS  10/15/2023     LIPID SCREEN Q5 YR FEMALE (SYSTEM ASSIGNED)  03/01/2024     DEXA SCAN SCREENING (SYSTEM ASSIGNED)  Completed     INFLUENZA VACCINE  Completed     HEPATITIS C SCREENING  Completed     IPV IMMUNIZATION  Aged Out     MENINGITIS IMMUNIZATION  Aged Out     Labs reviewed in EPIC      Review of Systems  Constitutional, HEENT, cardiovascular, pulmonary, GI, , musculoskeletal, neuro, skin, endocrine and psych systems are negative, except as otherwise noted.  Patient was seen by the cardiologist and had a stent placement  She recently had basal cell carcinoma removed from her leg and she follows with dermatology on regular basis  She had shingle on her chest  Having some shingle pain  Having questions about cholesterol-lowering medication  She does not want any more medications  She is fasting today and would like to have another cholesterol testing done    OBJECTIVE:   /74 (BP Location: Right arm, Patient Position: Sitting, Cuff Size: Adult Regular)   Pulse 65   Temp 97.5  F (36.4  C) (Oral)   Ht 1.6 m (5' 3\")   Wt 82.2 kg (181 lb 3.2 oz)   SpO2 96%   BMI 32.10 kg/m   Estimated body mass index is 32.1 kg/m  as calculated from " "the following:    Height as of this encounter: 1.6 m (5' 3\").    Weight as of this encounter: 82.2 kg (181 lb 3.2 oz).  Physical Exam  GENERAL: healthy, alert and no distress  EYES: Eyes grossly normal to inspection, PERRL and conjunctivae and sclerae normal  HENT: ear canals and TM's normal, nose and mouth without ulcers or lesions  NECK: no adenopathy,   RESP: lungs clear to auscultation - no rales, rhonchi or wheezes  BREAST: normal without masses, tenderness or nipple discharge and no palpable axillary masses or adenopathy  CV: regular rate and rhythm, normal S1 S2, no S3 or S4, no murmur, click or rub, she has peripheral edema and peripheral pulses strong  ABDOMEN: soft, nontender, no hepatosplenomegaly, no masses and bowel sounds normal  MS: no gross musculoskeletal defects noted, no edema  SKIN: no suspicious lesions or rashes  muliple cherry angiomas   Prominent veins in LE  Bunion on both feet  NEURO: Normal strength and tone, mentation intact and speech normal  PSYCH: mentation appears normal, affect normal/bright        ASSESSMENT / PLAN:   Yola was seen today for physical.    Diagnoses and all orders for this visit:    Encounter for general adult medical examination with abnormal findings  Performed  Colonoscopy was in 2009 and she would prefer to do fit test  Last mammogram was May 4, 2018  She has not received the Shingrix yet due to insurance reason    Screen for colon cancer  -     Fecal colorectal cancer screen (FIT); Future    Lipid screening  -     Lipid panel reflex to direct LDL Fasting      Prediabetes  -     Hemoglobin A1c  -     metFORMIN (GLUCOPHAGE-XR) 500 MG 24 hr tablet; Take 1 tablet (500 mg) by mouth daily (with dinner)  She is also taking vitamin B12 as understands that it affects B12 absorption    Essential hypertension, benign  Well-controlled    Hypothyroidism, unspecified type  -     TSH with free T4 reflex  -     levothyroxine (SYNTHROID/LEVOTHROID) 112 MCG tablet; Take 1 " tablet (112 mcg) by mouth daily    Pulmonary hypertension (H)  Seen on Echocardiogram  Currently asymptomatic    History of shingles  Comments:  10/2018    Medication management  -     Comprehensive metabolic panel    History of basal cell carcinoma  Comments:  saw dermatologist and got removed from leg    Benign essential hypertension  -     metoprolol succinate ER (TOPROL-XL) 25 MG 24 hr tablet; Take 1 tablet (25 mg) by mouth daily  -     losartan (COZAAR) 100 MG tablet; Take 1 tablet (100 mg) by mouth daily    S/P drug eluting coronary stent placement  Comments:    Status post 2 drug eluting stents to the mid and distal RCA      Orders:  -     atorvastatin (LIPITOR) 10 MG tablet; Take 1 tablet (10 mg) by mouth daily    She had several questions which were answered including: Question about cholesterol  Explained to her that despite having normal numbers she needs to be on medication due to drug-eluting stents and coronary artery disease    Is followed by cardiology and plan is to discontinue her Plavix around mid of this year  Patient was very appreciative that she was referred to cardiology  And she said she felt bad as she did not get the shingles vaccine per our recommendation and ended up with shingles.  She could not afford it and her insurance would not cover it.  Planning to get it in future    She is followed by dermatology regularly    Disclaimer: This note consists of symbols derived from keyboarding, dictation and/or voice recognition software. As a result, there may be errors in the script that have gone undetected. Please consider this when interpreting information found in this chart.    End of Life Planning:  Patient currently has an advanced directive: Yes.  Practitioner is supportive of decision.    COUNSELING:  Reviewed preventive health counseling, as reflected in patient instructions       Regular exercise       Healthy diet/nutrition       Colon cancer screening    BP Readings from Last 1  "Encounters:   05/07/19 138/74     Estimated body mass index is 32.1 kg/m  as calculated from the following:    Height as of this encounter: 1.6 m (5' 3\").    Weight as of this encounter: 82.2 kg (181 lb 3.2 oz).      Weight management plan: Discussed healthy diet and exercise guidelines     reports that she has never smoked. She has never used smokeless tobacco.      Appropriate preventive services were discussed with this patient, including applicable screening as appropriate for cardiovascular disease, diabetes, osteopenia/osteoporosis, and glaucoma.  As appropriate for age/gender, discussed screening for colorectal cancer, prostate cancer, breast cancer, and cervical cancer. Checklist reviewing preventive services available has been given to the patient.    Reviewed patients plan of care and provided an AVS. The Basic Care Plan (routine screening as documented in Health Maintenance) for Yola meets the Care Plan requirement. This Care Plan has been established and reviewed with the Patient.    Counseling Resources:  ATP IV Guidelines  Pooled Cohorts Equation Calculator  Breast Cancer Risk Calculator  FRAX Risk Assessment  ICSI Preventive Guidelines  Dietary Guidelines for Americans, 2010  USDA's MyPlate  ASA Prophylaxis  Lung CA Screening    Zoraida Braga MD  [facility]    Identified Health Risks:  "

## 2019-05-07 NOTE — LETTER
[facility]  [address] [facility]  [address]  [place]  [address]  Tel: [phone]    May 8, 2019    Yola Mayo  [address]  [facility] [address]        Dear Ms. Maria Esther,    This is to inform you regarding your test result.     HbA1c which is average glucose during last 3 months is 6.5%.  That means you are diabetic per definition.   you are already on metformin.  I recommend that you should see diabetic educator   diabetics patients should:  1. Be on baby aspirin daily which you do  2. Be on cholesterol-lowering medications which you are  3. See an ophthalmologist yearly  4. Have diabetic foot exam yearly  5. See primary care provider every 3-4 months  It is important that you follow healthy eating and exercise habits for diabetic control   I will contact you and discuss this further.  TSH which is thyroid hormone is normal.  Stay on current dose of levothyroxine.  Recheck TSH in 6 months  Potassium is low  Eat food rich in potassium  Glucose which is your blood sugar is slightly elevated.  ALT which is liver test is elevated.  Your total cholesterol is normal.  HDL which is called good cholesterol is normal.  Your LDL cholesterol is normal.  This is often call bad cholesterol and high levels increase the risk for heart attacks and strokes.  Your triglycerides are normal.    If you have any further questions or problems, please contact our office.      Sincerely,    Zoraida Braga MD/JOYCELYN          Enclosure: Lab Results  Results for orders placed or performed in visit on 05/07/19   TSH with free T4 reflex   Result Value Ref Range    TSH 0.94 0.40 - 4.00 mU/L   Comprehensive metabolic panel   Result Value Ref Range    Sodium 142 133 - 144 mmol/L    Potassium 3.3 (L) 3.4 - 5.3 mmol/L    Chloride 106 94 - 109 mmol/L    Carbon Dioxide 25 20 - 32 mmol/L    Anion Gap 11 3 - 14 mmol/L    Glucose 118 (H) 70 - 99 mg/dL    Urea Nitrogen 16 7 - 30 mg/dL    Creatinine 0.84 0.52 - 1.04 mg/dL    GFR  Estimate 69 >60 mL/min/[1.73_m2]    GFR Estimate If Black 79 >60 mL/min/[1.73_m2]    Calcium 9.2 8.5 - 10.1 mg/dL    Bilirubin Total 0.5 0.2 - 1.3 mg/dL    Albumin 3.6 3.4 - 5.0 g/dL    Protein Total 7.3 6.8 - 8.8 g/dL    Alkaline Phosphatase 63 40 - 150 U/L    ALT 77 (H) 0 - 50 U/L    AST 43 0 - 45 U/L   Lipid panel reflex to direct LDL Fasting   Result Value Ref Range    Cholesterol 120 <200 mg/dL    Triglycerides 45 <150 mg/dL    HDL Cholesterol 75 >49 mg/dL    LDL Cholesterol Calculated 36 <100 mg/dL    Non HDL Cholesterol 45 <130 mg/dL   Hemoglobin A1c   Result Value Ref Range    Hemoglobin A1C 6.5 (H) 0 - 5.6 %

## 2019-05-08 ENCOUNTER — HOSPITAL ENCOUNTER (OUTPATIENT)
Dept: MAMMOGRAPHY | Facility: CLINIC | Age: 74
Discharge: HOME | End: 2019-05-08
Attending: INTERNAL MEDICINE | Admitting: INTERNAL MEDICINE
Payer: MEDICARE

## 2019-05-08 DIAGNOSIS — Z12.31 VISIT FOR SCREENING MAMMOGRAM: ICD-10-CM

## 2019-05-08 PROCEDURE — 77063 BREAST TOMOSYNTHESIS BI: CPT

## 2019-05-08 NOTE — RESULT ENCOUNTER NOTE
Timothy Aceves,    This is to inform you regarding your test result.    HbA1c which is average glucose during last 3 months is 6.5%.  That means you are diabetic per definition.   you are already on metformin.  I recommend that you should see diabetic educator   diabetics patients should:  1. Be on baby aspirin daily which you do  2. Be on cholesterol-lowering medications which you are  3. See an ophthalmologist yearly  4. Have diabetic foot exam yearly  5. See primary care provider every 3-4 months  It is important that you follow healthy eating and exercise habits for diabetic control   I will contact you and discuss this further.  TSH which is thyroid hormone is normal.  Stay on current dose of levothyroxine.  Recheck TSH in 6 months  Potassium is low  Eat food rich in potassium  Glucose which is your blood sugar is slightly elevated.  ALT which is liver test is elevated.  Your total cholesterol is normal.  HDL which is called good cholesterol is normal.  Your LDL cholesterol is normal.  This is often call bad cholesterol and high levels increase the risk for heart attacks and strokes.  Your triglycerides are normal.      Sincerely,      Dr.Nasima Maria Luz MD,FACP

## 2019-05-09 DIAGNOSIS — E11.9 TYPE 2 DIABETES MELLITUS WITHOUT COMPLICATION, WITHOUT LONG-TERM CURRENT USE OF INSULIN (H): ICD-10-CM

## 2019-05-09 DIAGNOSIS — E11.9 TYPE 2 DIABETES MELLITUS WITHOUT COMPLICATION, WITHOUT LONG-TERM CURRENT USE OF INSULIN (H): Primary | ICD-10-CM

## 2019-05-09 RX ORDER — METFORMIN HCL 500 MG
1000 TABLET, EXTENDED RELEASE 24 HR ORAL
Qty: 180 TABLET | Refills: 3 | Status: SHIPPED | OUTPATIENT
Start: 2019-05-09 | End: 2020-06-09

## 2019-05-09 NOTE — RESULT ENCOUNTER NOTE
I spoke to you on the phone but sending you a note also.  You have new diagnosis of type 2 diabetes  We are increasing the dose of metformin and you will take 500 mg 2 tablets daily  You should take OTC vitamin B12 1000 micrograms every day.  You are on Metformin that affects the absorption of vitamin B12.  New prescription is sent  I also sent a prescription of glucometer test strips and lancets  Please make the appointment to see diabetic educator by calling the following number:  [facility] [place]: Diabetes Education - [facility] [phone]

## 2019-05-11 LAB — HEMOCCULT STL QL IA: NEGATIVE

## 2019-05-12 NOTE — RESULT ENCOUNTER NOTE
Timothy Aceves,    This is to inform you regarding your test result.    Fecal colorectal cancer screen (FIT) is negative.      Sincerely,      Dr.Nasima Maria Luz MD,FACP

## 2019-05-13 DIAGNOSIS — Z12.11 SCREEN FOR COLON CANCER: ICD-10-CM

## 2019-05-29 ENCOUNTER — ALLIED HEALTH/NURSE VISIT (OUTPATIENT)
Dept: EDUCATION SERVICES | Facility: CLINIC | Age: 74
End: 2019-05-29
Payer: MEDICARE

## 2019-05-29 VITALS — BODY MASS INDEX: 32.58 KG/M2 | WEIGHT: 183.9 LBS

## 2019-05-29 DIAGNOSIS — E11.9 TYPE 2 DIABETES MELLITUS WITHOUT COMPLICATION, WITHOUT LONG-TERM CURRENT USE OF INSULIN (H): Primary | ICD-10-CM

## 2019-05-29 PROCEDURE — G0108 DIAB MANAGE TRN  PER INDIV: HCPCS

## 2019-05-29 RX ORDER — BLOOD-GLUCOSE METER
1 EACH MISCELLANEOUS ONCE
Qty: 1 KIT | Refills: 0 | COMMUNITY
Start: 2019-05-29

## 2019-05-29 NOTE — PROGRESS NOTES
"Diabetes Self-Management Education & Support    Diabetes Education Self Management & Training    SUBJECTIVE/OBJECTIVE:  Presents for: Initial Assessment for new diagnosis  Accompanied by: Self  Diabetes education in the past 24mo: No  Focus of Visit: Monitoring, Healthy Eating  Diabetes type: Type 2  Date of diagnosis: 5/9/19  Disease course: Worsening  How confident are you filling out medical forms by yourself:: Quite a bit  Transportation concerns: No  Other concerns:: Glasses  Cultural Influences/Ethnic Background:  American    Diabetes Symptoms & Complications  Blurred vision: No  Fatigue: No  Neuropathy: No  Foot ulcerations: No  Polydipsia: No  Polyphagia: No  Polyuria: Yes  Visual change: No  Weakness: Yes  Weight loss: No  Slow healing wounds: No  Recent Infection(s): Yes(Shingles in October - still has symptoms )  Symptom course: Stable  Weight trend: Stable  Autonomic neuropathy: No  CVA: No  Heart disease: No  Nephropathy: No  Peripheral neuropathy: No  Peripheral Vascular Disease: No  Retinopathy: No  Sexual dysfunction: No    Patient Problem List and Family Medical History reviewed for relevant medical history, current medical status, and diabetes risk factors.    Vitals:  Wt 83.4 kg (183 lb 14.4 oz)   BMI 32.58 kg/m    Estimated body mass index is 32.58 kg/m  as calculated from the following:    Height as of 5/7/19: 1.6 m (5' 3\").    Weight as of this encounter: 83.4 kg (183 lb 14.4 oz).   Last 3 BP:   BP Readings from Last 3 Encounters:   05/07/19 138/74   03/01/19 154/62   10/24/18 122/70       History   Smoking Status     Never Smoker   Smokeless Tobacco     Never Used       Labs:  Lab Results   Component Value Date    A1C 6.5 05/07/2019     Lab Results   Component Value Date     05/07/2019     Lab Results   Component Value Date    LDL 36 05/07/2019     HDL Cholesterol   Date Value Ref Range Status   05/07/2019 75 >49 mg/dL Final   ]  GFR Estimate   Date Value Ref Range Status   05/07/2019 " "69 >60 mL/min/[1.73_m2] Final     Comment:     Non  GFR Calc  Starting 12/18/2018, serum creatinine based estimated GFR (eGFR) will be   calculated using the Chronic Kidney Disease Epidemiology Collaboration   (CKD-EPI) equation.       GFR Estimate If Black   Date Value Ref Range Status   05/07/2019 79 >60 mL/min/[1.73_m2] Final     Comment:      GFR Calc  Starting 12/18/2018, serum creatinine based estimated GFR (eGFR) will be   calculated using the Chronic Kidney Disease Epidemiology Collaboration   (CKD-EPI) equation.       Lab Results   Component Value Date    CR 0.84 05/07/2019     No results found for: MICROALBUMIN    Healthy Eating  Healthy Eating Assessed Today: Yes  Cultural/Baptism diet restrictions?: No  Patient on a regular basis: Eats 3 meals a day  Meal planning: Low salt, Calorie counting(more fruits & vegetables )  Meals include: Breakfast, Lunch, Dinner, Snacks  Breakfast: 1/2 banana, grapes, gokul before gym AND bowl of cereal OR egg & toast after gym, coffee with SF creamer   Lunch: 1/2 sandwich OR smoothie OR soup, water   Dinner: meat (pork chop or salmon or chicken), vegetables, starch sometimes but not all of the time, water   Snacks: nuts, candy & dark chocolate last winter \"when depressed\", fruit - kiwis, cherries  Beverages: Water, Coffee  Has patient met with a dietitian in the past?: No    Being Active  Being Active Assessed Today: Yes  Exercise:: Yes  Days per week of moderate to strenuous exercise (like a brisk walk): 5  On average, minutes per day of exercise at this level: 40  How intense was your typical exercise? : Moderate (like brisk walking)([facility] )  Exercise Minutes per Week: 200  Barrier to exercise: None    Monitoring  Monitoring Assessed Today: Yes  Did patient bring glucose meter to appointment? : No(Meter taught today )    Taking Medications  Diabetes Medication(s)     Biguanides       metFORMIN " (GLUCOPHAGE-XR) 500 MG 24 hr tablet    Take 2 tablets (1,000 mg) by mouth daily (with dinner)          Taking Medication Assessed Today: Yes  Current Treatments: Oral Agent (monotherapy)  Problems taking diabetes medications regularly?: No  Diabetes medication side effects?: No  Treatment Compliance: All of the time    Problem Solving  Problem Solving Assessed Today: No    Reducing Risks  Reducing Risks Assessed Today: Yes  Diabetes Risks: Family History, Age over 45 years, Hyperlipidemia  CAD Risks: Diabetes Mellitus, Hypertension, Family history, Obesity  Has dilated eye exam at least once a year?: Yes  Sees dentist every 6 months?: Yes  Sees podiatrist (foot doctor)?: Yes    Healthy Coping  Healthy Coping Assessed Today: Yes  Emotional response to diabetes: Ready to learn, Acceptance  Informal Support system:: Children  Stage of change: PREPARATION (Decided to change - considering how)  Difficulty affording diabetes management supplies?: No  Support resources: None  Patient Activation Measure Survey Score:  LOY Score (Last Two) 2/15/2011   LOY Raw Score 43   Activation Score 68.5   LOY Level 4       ASSESSMENT:  Patient comes in today after new diagnosis of T2DM. She states her daughter is a nurse practitioner who works with children with diabetes. Her daughter had suggested she start on metformin when she was in the pre-diabetes range to prevent progression so patient wasn't surprised when she was found to have diabetes. She has increased her metformin dosing and is tolerating this well. She was sick through the winter with Shingles and so had not been exercising at all but has now restarted.     Goals Addressed as of 5/29/2019 at 11:03 AM        Patient Stated      Monitoring (pt-stated)     Added 5/29/19 by May Singh RD     My Goal: I will check blood sugar once daily    What I need to meet my goal: meter, blood sugar log     I plan to meet my goal by this date: at follow up appointment, 6/26             Patient's most recent   Lab Results   Component Value Date    A1C 6.5 05/07/2019    is meeting goal of <7.0    INTERVENTION:   Diabetes knowledge and skills assessment:     Patient is knowledgeable in diabetes management concepts related to: Healthy Eating and Being Active    Patient needs further education on the following diabetes management concepts: Healthy Eating, Being Active, Monitoring, Taking Medication, Problem Solving, Reducing Risks and Healthy Coping    Based on learning assessment above, most appropriate setting for further diabetes education would be: Group class or Individual setting.    Education provided today on:  AADE Self-Care Behaviors:  Diabetes Pathophysiology  Healthy Eating: carbohydrate counting, consistency in amount, composition, and timing of food intake, weight reduction, heart healthy diet, portion control, plate planning method and label reading  Being Active: relationship to blood glucose, describe appropriate activity program and precautions to take  Monitoring: purpose, proper technique, log and interpret results, individual blood glucose targets, frequency of monitoring and proper sharps disposal  Taking Medication: action of prescribed medication, side effects of prescribed medications and when to take medications  Problem Solving: high blood glucose - causes, signs/symptoms, treatment and prevention and low blood glucose - causes, signs/symptoms, treatment and prevention  Patient was instructed on Contour Next EZ meter and was able to provide an accurate return demonstration. Patient's blood glucose reading today was 112 mg/dL.    Opportunities for ongoing education and support in diabetes-self management were discussed.    Pt verbalized understanding of concepts discussed and recommendations provided today.       Education Materials Provided:  [place] Understanding Diabetes Booklet, BG Log Sheet, My Plate Planner and Contour Next EZ meter kit    PLAN:  See Patient  Instructions for co-developed, patient-stated behavior change goals.  AVS printed and provided to patient today. See Follow-Up section for recommended follow-up.    May Singh RD, LD   Time Spent: 50 minutes  Encounter Type: Individual    Any diabetes medication dose changes were made via the CDE Protocol and Collaborative Practice Agreement with the patient's referring provider. A copy of this encounter was shared with the provider.

## 2019-06-17 ENCOUNTER — OFFICE VISIT (OUTPATIENT)
Dept: CARDIOLOGY | Facility: CLINIC | Age: 74
End: 2019-06-17
Attending: INTERNAL MEDICINE
Payer: MEDICARE

## 2019-06-17 VITALS
OXYGEN SATURATION: 97 % | HEART RATE: 62 BPM | HEIGHT: 63 IN | SYSTOLIC BLOOD PRESSURE: 126 MMHG | BODY MASS INDEX: 32.43 KG/M2 | DIASTOLIC BLOOD PRESSURE: 82 MMHG | WEIGHT: 183 LBS

## 2019-06-17 DIAGNOSIS — I25.10 CORONARY ARTERY DISEASE INVOLVING NATIVE CORONARY ARTERY OF NATIVE HEART WITHOUT ANGINA PECTORIS: ICD-10-CM

## 2019-06-17 DIAGNOSIS — I10 ESSENTIAL HYPERTENSION, BENIGN: ICD-10-CM

## 2019-06-17 PROCEDURE — 99214 OFFICE O/P EST MOD 30 MIN: CPT | Performed by: NURSE PRACTITIONER

## 2019-06-17 ASSESSMENT — MIFFLIN-ST. JEOR: SCORE: 1299.21

## 2019-06-17 NOTE — PROGRESS NOTES
Cardiology Clinic Progress Note  Yola Mayo MRN# [id number]   YOB: 1945 Age: 74 year old          Assessment and Plan:     1. Coronary artery disease    Status post 2 drug eluting stents to the mid and distal RCA 6/27/2019    Residual 50-60% mid LAD lesion with 100% occlusion of the first diagonal    Stress echocardiogram showed small area of mid anterior ischemia from 10/2018    Can discontinue Plavix as 1 year post PCI    No recurrent symptoms of exertional angina    Follow up in 1 year with Dr. Howard or sooner if needed    2. Hyperlipidemia    Most recent LDL was 36 5/2019    Post angiogram started on atorvastatin 10 mg daily    3. Hypertension, goal less than 130/80    Toprol-XL to 25 mg daily, losartan to 100 mg daily and hydrochlorthiazide 25 mg daily         History of Presenting Illness:    Yola Mayo is a very pleasant 74 year old patient of Dr. Howard  who presents for a hypertension check. Her past medical history includes coronary artery disease status post PCI to the mid and distal RCA 6/2018, hypertension, hyperlipidemia, very strong family history of premature coronary artery disease, and diabetes.      Due to symptoms of exertional angina after 1 block relieved with rest, she underwent a stress echocardiogram that was abnormal. Subsequently, a coronary angiogram revealed a 50-60% narrowing of the proximal LAD with 100% occlusion of the first diagonal above the LAD lesion.  The left circumflex had a 20-30% proximal narrowing.  The right coronary artery had a 60-70% narrowing in the proximal portion and a 95% narrowing in the midportion at the takeoff of the RV branch which is 95% narrowed.  She received 2 drug-eluting stents that were back to back to the right coronary artery with resolution of her symptoms.     Today in clinic she has had no recurrence of her exertional angina.  Discussed rationale for continuing atorvastatin despite having excellent lipid control off statin  "therapy.            Review of Systems:   Review of Systems:  Skin:  Negative rash   Eyes:  Positive for glasses  ENT:  Negative    Respiratory:  Negative    Cardiovascular:  Negative Positive for;fatigue  Gastroenterology: Negative    Genitourinary:  Negative urinary frequency;nocturia  Musculoskeletal:  Positive for back pain  Neurologic:  Positive for headaches  Psychiatric:  Negative    Heme/Lymph/Imm:  Positive for allergies  Endocrine:  Positive for thyroid disorder;diabetes              Physical Exam:     Vitals: /82   Pulse 62   Ht 1.6 m (5' 3\")   Wt 83 kg (183 lb)   SpO2 97%   BMI 32.42 kg/m    Constitutional:  cooperative, alert and oriented, well developed, well nourished, in no acute distress        Skin:  warm and dry to the touch, no apparent skin lesions or masses noted        Head:  normocephalic, no masses or lesions        Eyes:  pupils equal and round;conjunctivae and lids unremarkable        ENT:  no pallor or cyanosis, dentition good        Neck:  not assessed this visit        Chest:  clear to auscultation;normal symmetry        Cardiac: regular rhythm occasional premature beats;bradycardic                Extremities and Back:  no edema   Right femoral access site CDI without bruit. No hematoma with mild bruising    Neurological:  no gross motor deficits;affect appropriate               Medications:     Current Outpatient Medications   Medication Sig Dispense Refill     acetaminophen (TYLENOL) 325 MG tablet Take 2 tablets (650 mg) by mouth 3 times daily 100 tablet 0     aspirin 81 MG tablet Take 81 mg by mouth daily       atorvastatin (LIPITOR) 10 MG tablet Take 1 tablet (10 mg) by mouth daily 90 tablet 3     blood glucose (CONTOUR NEXT TEST) test strip Use to test blood sugar 1 time daily or as directed. 50 each 11     blood glucose monitoring (NITO MICROLET) lancets Use to test blood sugar 1 time daily or as directed. 100 each 3     calcium-vitamin D (CALTRATE) 600-400 MG-UNIT per " tablet Take 1 tablet by mouth daily 60 tablet 11     cholecalciferol (VITAMIN D) 1000 UNIT tablet Take 1 tablet (1,000 Units) by mouth daily 100 tablet 3     CONTOUR NEXT EZ (CONTOUR NEXT EZ W/DEVICE KIT) w/Device KIT 1 kit once 1 kit 0     Cyanocobalamin (VITAMIN B 12 PO) Take 1 tablet by mouth daily VIT B12 unknown otc dose       hydrochlorothiazide (HYDRODIURIL) 25 MG tablet Take 1 tablet (25 mg) by mouth daily 90 tablet 3     ibuprofen (ADVIL/MOTRIN) 400 MG tablet Take 1 tablet (400 mg) by mouth 3 times daily 9 tablet 0     levothyroxine (SYNTHROID/LEVOTHROID) 112 MCG tablet Take 1 tablet (112 mcg) by mouth daily 90 tablet 3     losartan (COZAAR) 100 MG tablet Take 1 tablet (100 mg) by mouth daily 90 tablet 3     metFORMIN (GLUCOPHAGE-XR) 500 MG 24 hr tablet Take 2 tablets (1,000 mg) by mouth daily (with dinner) 180 tablet 3     metoprolol succinate ER (TOPROL-XL) 25 MG 24 hr tablet Take 1 tablet (25 mg) by mouth daily 90 tablet 3     multivitamin, therapeutic with minerals (THERA-VIT-M) TABS Take 1 tablet by mouth daily       nitroGLYcerin (NITROSTAT) 0.4 MG sublingual tablet For chest pain place 1 tablet under the tongue every 5 minutes for 3 doses. If symptoms persist 5 minutes after 1st dose call 911. 25 tablet 3           Family History   Problem Relation Age of Onset     Cancer Mother         ovarian  of it  at 49yo.     Diabetes Father         Adult onset     Hypertension Father      C.A.D. Father         Fatal Heart attack at 70.     Cerebrovascular Disease Father      Thyroid Disease Sister      Thyroid Disease Sister      Thyroid Disease Sister      Cancer Daughter      Thyroid Disease Daughter      Breast Cancer No family hx of      Cancer - colorectal No family hx of        Social History     Socioeconomic History     Marital status:      Spouse name: FABIAN CUADRA     Number of children: 2     Years of education: Not on file     Highest education level: Not on file   Occupational History      Occupation: [employer]     Employer: [email or web address]   Social Needs     Financial resource strain: Not on file     Food insecurity:     Worry: Not on file     Inability: Not on file     Transportation needs:     Medical: Not on file     Non-medical: Not on file   Tobacco Use     Smoking status: Never Smoker     Smokeless tobacco: Never Used   Substance and Sexual Activity     Alcohol use: Yes     Alcohol/week: 0.0 oz     Comment: occasional     Drug use: No     Sexual activity: Yes     Partners: Male   Lifestyle     Physical activity:     Days per week: Not on file     Minutes per session: Not on file     Stress: Not on file   Relationships     Social connections:     Talks on phone: Not on file     Gets together: Not on file     Attends Moravian service: Not on file     Active member of club or organization: Not on file     Attends meetings of clubs or organizations: Not on file     Relationship status: Not on file     Intimate partner violence:     Fear of current or ex partner: Not on file     Emotionally abused: Not on file     Physically abused: Not on file     Forced sexual activity: Not on file   Other Topics Concern      Service No     Blood Transfusions No     Caffeine Concern No     Occupational Exposure No     Hobby Hazards No     Sleep Concern No     Stress Concern No     Weight Concern Yes     Special Diet No     Back Care No     Exercise Yes     Comment: Approx 3 times a week for about 30 minutes     Bike Helmet Yes     Seat Belt Yes     Self-Exams Yes     Parent/sibling w/ CABG, MI or angioplasty before 65F 55M? Not Asked   Social History Narrative     Not on file            Past Medical History:     Past Medical History:   Diagnosis Date     BASAL CELL CANCER -NOSE      Family history of diabetes mellitus     Father     Family history of ischemic heart disease     Father     Family history of malignant neoplasm of ovary     Mother     HX DISRUPT L ANT CRUCIATE S/P REPAIR 2003       HYPERTENSION       HYPOTHYROIDISM      LOC PRIM OSTEOARTH-L 1ST METATARSAL-CUNEIFORM JOINT 3/10/2004     Major depressive disorder, single episode, mild (H) 11/24/2014     MILD-MOD TRICUSPID REGURG. MOD PULM HTN 6/23/2005    ECHO  5/05     SPLENIC CYST               Past Surgical History:     Past Surgical History:   Procedure Laterality Date     ARTHROPLASTY KNEE Left 4/13/2016    Procedure: ARTHROPLASTY KNEE;  Surgeon: Giuseppe Lopez MD;  Location: [facility] OR     ARTHROPLASTY MINIMALLY INVASIVE HIP  4/18/2012    Procedure:ARTHROPLASTY MINIMALLY INVASIVE HIP; RIGHT TWO INCISION MINIMALLY INVASIVE HIP ARTHROPLASTY (YUVAL)^ (NO CELL SAVER) (C-ARM); Surgeon:GIUSEPPE LOPEZ; Location:[facility] OR     C APPENDECTOMY  8/87    [facility]     C CHEMOSURG MOHS 1ST STAGE  9-99    Mohs-nose     C REPAIR CRUCIATE LIGAMENT,KNEE  8/2003    L   ACL reconstruction     C TOTAL ABDOM HYSTERECTOMY  8/87    W/ BSO fibroids, hx PID     HC COLONOSCOPY THRU STOMA, DIAGNOSTIC  4/03    normal, rec repeat 10 yrs     HYSTERECTOMY, PAP NO LONGER INDICATED       SURGICAL HISTORY OF -   Mult-~1978    infertility, laparotomies              Allergies:   Toprol xl [metoprolol]; Ace inhibitors; Oxycodone; Tetanus toxoid; and Shellfish-derived products       Data:   All laboratory data reviewed:    Last Comprehensive Metabolic Panel:  Sodium   Date Value Ref Range Status   05/07/2019 142 133 - 144 mmol/L Final     Potassium   Date Value Ref Range Status   05/07/2019 3.3 (L) 3.4 - 5.3 mmol/L Final     Chloride   Date Value Ref Range Status   05/07/2019 106 94 - 109 mmol/L Final     Carbon Dioxide   Date Value Ref Range Status   05/07/2019 25 20 - 32 mmol/L Final     Anion Gap   Date Value Ref Range Status   05/07/2019 11 3 - 14 mmol/L Final     Glucose   Date Value Ref Range Status   05/07/2019 118 (H) 70 - 99 mg/dL Final     Urea Nitrogen   Date Value Ref Range Status   05/07/2019 16 7 - 30 mg/dL Final     Creatinine   Date Value Ref  Range Status   05/07/2019 0.84 0.52 - 1.04 mg/dL Final     GFR Estimate   Date Value Ref Range Status   05/07/2019 69 >60 mL/min/[1.73_m2] Final     Comment:     Non  GFR Calc  Starting 12/18/2018, serum creatinine based estimated GFR (eGFR) will be   calculated using the Chronic Kidney Disease Epidemiology Collaboration   (CKD-EPI) equation.       Calcium   Date Value Ref Range Status   05/07/2019 9.2 8.5 - 10.1 mg/dL Final     Lab Results   Component Value Date    CHOL 132 05/03/2018    CHOL 152 03/08/2017     Lab Results   Component Value Date    HDL 80 05/03/2018    HDL 72 03/08/2017     Lab Results   Component Value Date    LDL 47 05/03/2018    LDL 68 03/08/2017     Lab Results   Component Value Date    TRIG 26 05/03/2018    TRIG 59 03/08/2017     Lab Results   Component Value Date    CHOLHDLRATIO 1.6 03/27/2015    CHOLHDLRATIO 2.2 03/11/2014           SUE CARBONE APRN, CNP

## 2019-06-17 NOTE — PATIENT INSTRUCTIONS
Today's Recommendations    1. Ok to stop Plavix  2. Continue all other medications without changes.  3. Please follow up with Dr. Howard in 1 year (or Krystal if he is not available).    Please send a [employer] message or call [phone] with questions or concerns.     Scheduling number [phone].

## 2019-06-17 NOTE — LETTER
6/17/2019    Zoraida Braga MD  [address]  [place] [address]    RE: Yola Mayo       Dear Colleague,    I had the pleasure of seeing Yola Mayo in the [facility].    Cardiology Clinic Progress Note  Yola Mayo MRN# [id number]   YOB: 1945 Age: 74 year old          Assessment and Plan:     1. Coronary artery disease    Status post 2 drug eluting stents to the mid and distal RCA 6/27/2019    Residual 50-60% mid LAD lesion with 100% occlusion of the first diagonal    Stress echocardiogram showed small area of mid anterior ischemia from 10/2018    Can discontinue Plavix as 1 year post PCI    No recurrent symptoms of exertional angina    Follow up in 1 year with Dr. Howard or sooner if needed    2. Hyperlipidemia    Most recent LDL was 36 5/2019    Post angiogram started on atorvastatin 10 mg daily    3. Hypertension, goal less than 130/80    Toprol-XL to 25 mg daily, losartan to 100 mg daily and hydrochlorthiazide 25 mg daily         History of Presenting Illness:    Yola Mayo is a very pleasant 74 year old patient of Dr. Howard  who presents for a hypertension check. Her past medical history includes coronary artery disease status post PCI to the mid and distal RCA 6/2018, hypertension, hyperlipidemia, very strong family history of premature coronary artery disease, and diabetes.      Due to symptoms of exertional angina after 1 block relieved with rest, she underwent a stress echocardiogram that was abnormal. Subsequently, a coronary angiogram revealed a 50-60% narrowing of the proximal LAD with 100% occlusion of the first diagonal above the LAD lesion.  The left circumflex had a 20-30% proximal narrowing.  The right coronary artery had a 60-70% narrowing in the proximal portion and a 95% narrowing in the midportion at the takeoff of the RV branch which is 95% narrowed.  She received 2 drug-eluting stents that were back to back to the right  "coronary artery with resolution of her symptoms.     Today in clinic she has had no recurrence of her exertional angina.  Discussed rationale for continuing atorvastatin despite having excellent lipid control off statin therapy.            Review of Systems:   Review of Systems:  Skin:  Negative rash   Eyes:  Positive for glasses  ENT:  Negative    Respiratory:  Negative    Cardiovascular:  Negative Positive for;fatigue  Gastroenterology: Negative    Genitourinary:  Negative urinary frequency;nocturia  Musculoskeletal:  Positive for back pain  Neurologic:  Positive for headaches  Psychiatric:  Negative    Heme/Lymph/Imm:  Positive for allergies  Endocrine:  Positive for thyroid disorder;diabetes              Physical Exam:     Vitals: /82   Pulse 62   Ht 1.6 m (5' 3\")   Wt 83 kg (183 lb)   SpO2 97%   BMI 32.42 kg/m     Constitutional:  cooperative, alert and oriented, well developed, well nourished, in no acute distress        Skin:  warm and dry to the touch, no apparent skin lesions or masses noted        Head:  normocephalic, no masses or lesions        Eyes:  pupils equal and round;conjunctivae and lids unremarkable        ENT:  no pallor or cyanosis, dentition good        Neck:  not assessed this visit        Chest:  clear to auscultation;normal symmetry        Cardiac: regular rhythm occasional premature beats;bradycardic                Extremities and Back:  no edema   Right femoral access site CDI without bruit. No hematoma with mild bruising    Neurological:  no gross motor deficits;affect appropriate               Medications:     Current Outpatient Medications   Medication Sig Dispense Refill     acetaminophen (TYLENOL) 325 MG tablet Take 2 tablets (650 mg) by mouth 3 times daily 100 tablet 0     aspirin 81 MG tablet Take 81 mg by mouth daily       atorvastatin (LIPITOR) 10 MG tablet Take 1 tablet (10 mg) by mouth daily 90 tablet 3     blood glucose (CONTOUR NEXT TEST) test strip Use to test " blood sugar 1 time daily or as directed. 50 each 11     blood glucose monitoring (NITO MICROLET) lancets Use to test blood sugar 1 time daily or as directed. 100 each 3     calcium-vitamin D (CALTRATE) 600-400 MG-UNIT per tablet Take 1 tablet by mouth daily 60 tablet 11     cholecalciferol (VITAMIN D) 1000 UNIT tablet Take 1 tablet (1,000 Units) by mouth daily 100 tablet 3     CONTOUR NEXT EZ (CONTOUR NEXT EZ W/DEVICE KIT) w/Device KIT 1 kit once 1 kit 0     Cyanocobalamin (VITAMIN B 12 PO) Take 1 tablet by mouth daily VIT B12 unknown otc dose       hydrochlorothiazide (HYDRODIURIL) 25 MG tablet Take 1 tablet (25 mg) by mouth daily 90 tablet 3     ibuprofen (ADVIL/MOTRIN) 400 MG tablet Take 1 tablet (400 mg) by mouth 3 times daily 9 tablet 0     levothyroxine (SYNTHROID/LEVOTHROID) 112 MCG tablet Take 1 tablet (112 mcg) by mouth daily 90 tablet 3     losartan (COZAAR) 100 MG tablet Take 1 tablet (100 mg) by mouth daily 90 tablet 3     metFORMIN (GLUCOPHAGE-XR) 500 MG 24 hr tablet Take 2 tablets (1,000 mg) by mouth daily (with dinner) 180 tablet 3     metoprolol succinate ER (TOPROL-XL) 25 MG 24 hr tablet Take 1 tablet (25 mg) by mouth daily 90 tablet 3     multivitamin, therapeutic with minerals (THERA-VIT-M) TABS Take 1 tablet by mouth daily       nitroGLYcerin (NITROSTAT) 0.4 MG sublingual tablet For chest pain place 1 tablet under the tongue every 5 minutes for 3 doses. If symptoms persist 5 minutes after 1st dose call 911. 25 tablet 3           Family History   Problem Relation Age of Onset     Cancer Mother         ovarian  of it  at 51yo.     Diabetes Father         Adult onset     Hypertension Father      C.A.D. Father         Fatal Heart attack at 70.     Cerebrovascular Disease Father      Thyroid Disease Sister      Thyroid Disease Sister      Thyroid Disease Sister      Cancer Daughter      Thyroid Disease Daughter      Breast Cancer No family hx of      Cancer - colorectal No family hx of         Social History     Socioeconomic History     Marital status:      Spouse name: FABIAN CUADRA     Number of children: 2     Years of education: Not on file     Highest education level: Not on file   Occupational History     Occupation: [employer]     Employer: [email or web address]   Social Needs     Financial resource strain: Not on file     Food insecurity:     Worry: Not on file     Inability: Not on file     Transportation needs:     Medical: Not on file     Non-medical: Not on file   Tobacco Use     Smoking status: Never Smoker     Smokeless tobacco: Never Used   Substance and Sexual Activity     Alcohol use: Yes     Alcohol/week: 0.0 oz     Comment: occasional     Drug use: No     Sexual activity: Yes     Partners: Male   Lifestyle     Physical activity:     Days per week: Not on file     Minutes per session: Not on file     Stress: Not on file   Relationships     Social connections:     Talks on phone: Not on file     Gets together: Not on file     Attends Religion service: Not on file     Active member of club or organization: Not on file     Attends meetings of clubs or organizations: Not on file     Relationship status: Not on file     Intimate partner violence:     Fear of current or ex partner: Not on file     Emotionally abused: Not on file     Physically abused: Not on file     Forced sexual activity: Not on file   Other Topics Concern      Service No     Blood Transfusions No     Caffeine Concern No     Occupational Exposure No     Hobby Hazards No     Sleep Concern No     Stress Concern No     Weight Concern Yes     Special Diet No     Back Care No     Exercise Yes     Comment: Approx 3 times a week for about 30 minutes     Bike Helmet Yes     Seat Belt Yes     Self-Exams Yes     Parent/sibling w/ CABG, MI or angioplasty before 65F 55M? Not Asked   Social History Narrative     Not on file            Past Medical History:     Past Medical History:   Diagnosis Date     BASAL CELL CANCER  -NOSE      Family history of diabetes mellitus     Father     Family history of ischemic heart disease     Father     Family history of malignant neoplasm of ovary     Mother     HX DISRUPT L ANT CRUCIATE S/P REPAIR 2003      HYPERTENSION       HYPOTHYROIDISM      LOC PRIM OSTEOARTH-L 1ST METATARSAL-CUNEIFORM JOINT 3/10/2004     Major depressive disorder, single episode, mild (H) 11/24/2014     MILD-MOD TRICUSPID REGURG. MOD PULM HTN 6/23/2005    ECHO  5/05     SPLENIC CYST               Past Surgical History:     Past Surgical History:   Procedure Laterality Date     ARTHROPLASTY KNEE Left 4/13/2016    Procedure: ARTHROPLASTY KNEE;  Surgeon: Giuseppe Lopez MD;  Location: [facility] OR     ARTHROPLASTY MINIMALLY INVASIVE HIP  4/18/2012    Procedure:ARTHROPLASTY MINIMALLY INVASIVE HIP; RIGHT TWO INCISION MINIMALLY INVASIVE HIP ARTHROPLASTY (YUVAL)^ (NO CELL SAVER) (C-ARM); Surgeon:GIUSEPPE LOPEZ; Location: OR     C APPENDECTOMY  8/87    incidental appi at [facility]     C CHEMOSURG MOHS 1ST STAGE  9-99    Mohs-nose     C REPAIR CRUCIATE LIGAMENT,KNEE  8/2003    L   ACL reconstruction     C TOTAL ABDOM HYSTERECTOMY  8/87    W/ BSO fibroids, hx PID     HC COLONOSCOPY THRU STOMA, DIAGNOSTIC  4/03    normal, rec repeat 10 yrs     HYSTERECTOMY, PAP NO LONGER INDICATED       SURGICAL HISTORY OF -   Mult-~1978    infertility, laparotomies              Allergies:   Toprol xl [metoprolol]; Ace inhibitors; Oxycodone; Tetanus toxoid; and Shellfish-derived products       Data:   All laboratory data reviewed:    Last Comprehensive Metabolic Panel:  Sodium   Date Value Ref Range Status   05/07/2019 142 133 - 144 mmol/L Final     Potassium   Date Value Ref Range Status   05/07/2019 3.3 (L) 3.4 - 5.3 mmol/L Final     Chloride   Date Value Ref Range Status   05/07/2019 106 94 - 109 mmol/L Final     Carbon Dioxide   Date Value Ref Range Status   05/07/2019 25 20 - 32 mmol/L Final     Anion Gap   Date Value Ref Range Status   05/07/2019  11 3 - 14 mmol/L Final     Glucose   Date Value Ref Range Status   05/07/2019 118 (H) 70 - 99 mg/dL Final     Urea Nitrogen   Date Value Ref Range Status   05/07/2019 16 7 - 30 mg/dL Final     Creatinine   Date Value Ref Range Status   05/07/2019 0.84 0.52 - 1.04 mg/dL Final     GFR Estimate   Date Value Ref Range Status   05/07/2019 69 >60 mL/min/[1.73_m2] Final     Comment:     Non  GFR Calc  Starting 12/18/2018, serum creatinine based estimated GFR (eGFR) will be   calculated using the Chronic Kidney Disease Epidemiology Collaboration   (CKD-EPI) equation.       Calcium   Date Value Ref Range Status   05/07/2019 9.2 8.5 - 10.1 mg/dL Final     Lab Results   Component Value Date    CHOL 132 05/03/2018    CHOL 152 03/08/2017     Lab Results   Component Value Date    HDL 80 05/03/2018    HDL 72 03/08/2017     Lab Results   Component Value Date    LDL 47 05/03/2018    LDL 68 03/08/2017     Lab Results   Component Value Date    TRIG 26 05/03/2018    TRIG 59 03/08/2017     Lab Results   Component Value Date    CHOLHDLRATIO 1.6 03/27/2015    CHOLHDLRATIO 2.2 03/11/2014           FATMATA PEREIRA, CNP    Thank you for allowing me to participate in the care of your patient.      Sincerely,     FATMATA PEREIRA CNP     [facility]

## 2019-06-26 ENCOUNTER — ALLIED HEALTH/NURSE VISIT (OUTPATIENT)
Dept: EDUCATION SERVICES | Facility: CLINIC | Age: 74
End: 2019-06-26
Payer: MEDICARE

## 2019-06-26 VITALS — BODY MASS INDEX: 32.43 KG/M2 | WEIGHT: 183.1 LBS

## 2019-06-26 DIAGNOSIS — E11.9 TYPE 2 DIABETES MELLITUS WITHOUT COMPLICATION, WITHOUT LONG-TERM CURRENT USE OF INSULIN (H): Primary | ICD-10-CM

## 2019-06-26 NOTE — PROGRESS NOTES
"Diabetes Self-Management Education & Support    Diabetes Education Self Management & Training    SUBJECTIVE/OBJECTIVE:  Presents for: Follow-up  Accompanied by: Self  Diabetes education in the past 24mo: Yes  Focus of Visit: Assistance w/ making life changes, Reducing Risks, Taking Medication  Diabetes type: Type 2  Date of diagnosis: 5/9/19  Disease course: Stable  How confident are you filling out medical forms by yourself:: Quite a bit  Transportation concerns: No  Other concerns:: Glasses  Cultural Influences/Ethnic Background:  American    Diabetes Symptoms & Complications  Blurred vision: No  Fatigue: No  Neuropathy: No  Foot ulcerations: No  Polydipsia: No  Polyphagia: No  Polyuria: Yes  Visual change: No  Weakness: Yes  Weight loss: No  Slow healing wounds: No  Recent Infection(s): Yes(Shingles in October - still has symptoms )  Symptom course: Stable  Weight trend: Stable  Autonomic neuropathy: No  CVA: No  Heart disease: No  Nephropathy: No  Peripheral neuropathy: No  Peripheral Vascular Disease: No  Retinopathy: No  Sexual dysfunction: No    Patient Problem List and Family Medical History reviewed for relevant medical history, current medical status, and diabetes risk factors.    Vitals:  Wt 83.1 kg (183 lb 1.6 oz)   BMI 32.43 kg/m    Estimated body mass index is 32.43 kg/m  as calculated from the following:    Height as of 6/17/19: 1.6 m (5' 3\").    Weight as of this encounter: 83.1 kg (183 lb 1.6 oz).   Last 3 BP:   BP Readings from Last 3 Encounters:   06/17/19 126/82   05/07/19 138/74   03/01/19 154/62       History   Smoking Status     Never Smoker   Smokeless Tobacco     Never Used       Labs:  Lab Results   Component Value Date    A1C 6.5 05/07/2019     Lab Results   Component Value Date     05/07/2019     Lab Results   Component Value Date    LDL 36 05/07/2019     HDL Cholesterol   Date Value Ref Range Status   05/07/2019 75 >49 mg/dL Final   ]  GFR Estimate   Date Value Ref Range Status " "  2019 69 >60 mL/min/[1.73_m2] Final     Comment:     Non  GFR Calc  Starting 2018, serum creatinine based estimated GFR (eGFR) will be   calculated using the Chronic Kidney Disease Epidemiology Collaboration   (CKD-EPI) equation.       GFR Estimate If Black   Date Value Ref Range Status   2019 79 >60 mL/min/[1.73_m2] Final     Comment:      GFR Calc  Starting 2018, serum creatinine based estimated GFR (eGFR) will be   calculated using the Chronic Kidney Disease Epidemiology Collaboration   (CKD-EPI) equation.       Lab Results   Component Value Date    CR 0.84 2019     No results found for: MICROALBUMIN    Healthy Eating  Healthy Eating Assessed Today: Yes  Cultural/Yazidi diet restrictions?: No  Patient on a regular basis: Eats 3 meals a day  Meal planning: Low salt, Calorie counting, Smaller portions(more fruits & vegetables )  Meals include: Breakfast, Lunch, Dinner, Snacks  Breakfast: 1/2 banana, grapes, gokul before gym AND bowl of cereal OR egg & toast after gym, coffee with SF creamer   Lunch: 1/2 sandwich OR smoothie OR soup, water   Dinner: meat (pork chop or salmon or chicken), vegetables, starch sometimes but not all of the time, water   Snacks: nuts, candy & dark chocolate last winter \"when depressed\", fruit - kiwis, cherries  Beverages: Water, Coffee  Has patient met with a dietitian in the past?: No    Being Active  Being Active Assessed Today: Yes  Exercise:: Yes  How intense was your typical exercise? : Light (like stretching or slow walking)  Barrier to exercise: None    Monitoring  Monitoring Assessed Today: Yes  Did patient bring glucose meter to appointment? : Yes  Blood Glucose Meter: Cream Stylet  Home Glucose (Sugar) Monitorin-2 times per day  Blood glucose trend: Fluctuating minimally  Low Glucose Range (mg/dL):   High Glucose Range (mg/dL): 130-140  Overall Range (mg/dL):           Taking " Medications  Diabetes Medication(s)     Biguanides       metFORMIN (GLUCOPHAGE-XR) 500 MG 24 hr tablet    Take 2 tablets (1,000 mg) by mouth daily (with dinner)          Taking Medication Assessed Today: Yes  Current Treatments: Oral Agent (monotherapy)  Problems taking diabetes medications regularly?: No  Diabetes medication side effects?: No  Treatment Compliance: All of the time    Problem Solving  Problem Solving Assessed Today: No    Reducing Risks  Reducing Risks Assessed Today: Yes  Diabetes Risks: Family History, Age over 45 years, Hyperlipidemia  CAD Risks: Diabetes Mellitus, Hypertension, Family history, Obesity  Has dilated eye exam at least once a year?: Yes  Sees dentist every 6 months?: Yes  Sees podiatrist (foot doctor)?: Yes    Healthy Coping  Healthy Coping Assessed Today: Yes  Emotional response to diabetes: Ready to learn, Acceptance  Informal Support system:: Children  Stage of change: PREPARATION (Decided to change - considering how)  Difficulty affording diabetes management supplies?: No  Support resources: None  Patient Activation Measure Survey Score:  LOY Score (Last Two) 2/15/2011   LOY Raw Score 43   Activation Score 68.5   LOY Level 4       ASSESSMENT:  Patient is frustrated that she hasn't seen improvement in weight after increasing exercise some and working on portion control. She does not have any immediate questions or concerns about diabetes today except is wondering what she should do with monitoring blood sugar.     Patient's most recent   Lab Results   Component Value Date    A1C 6.5 05/07/2019    is meeting goal of <7.0    INTERVENTION:   Diabetes knowledge and skills assessment:     Patient is knowledgeable in diabetes management concepts related to: Healthy Eating, Being Active, Monitoring and Taking Medication    Patient needs further education on the following diabetes management concepts: Monitoring and Reducing Risks    Based on learning assessment above, most appropriate  setting for further diabetes education would be: Group class or Individual setting.    Education provided today on:  AADE Self-Care Behaviors:  Monitoring: frequency of monitoring - recommended going down to a few times/week since blood sugars are all well within goal range  Taking Medication: action of prescribed medication and discussed reasons for dose adjustments, optimal dose of metformin but plan to keep patient's dosing where it is at for now  Reducing Risks: major complications of diabetes, prevention, early diagnostic measures and treatment of complications, foot care, annual eye exam, aspirin therapy, A1C - goals, relating to blood glucose levels, how often to check, lipids levels and goals and blood pressure and goals  Healthy Coping: utilize support systems    Opportunities for ongoing education and support in diabetes-self management were discussed.    Pt verbalized understanding of concepts discussed and recommendations provided today.       Education Materials Provided:  Diabetes Care Goals    PLAN:  See Patient Instructions for co-developed, patient-stated behavior change goals.  AVS printed and provided to patient today. See Follow-Up section for recommended follow-up.    May Singh RD, LD   Time Spent: 15 minutes  Encounter Type: Individual    Any diabetes medication dose changes were made via the CDE Protocol and Collaborative Practice Agreement with the patient's referring provider. A copy of this encounter was shared with the provider.

## 2019-06-26 NOTE — PATIENT INSTRUCTIONS
Bring blood glucose meter and logbook with you to all doctor and follow-up appointments.    Diabetes Education Telephone Visit Follow-up:    We realize your time is valuable and your health is important! We offer a convenient Telephone Visit follow up! It s a quick way to check in for a medication dose adjustment without having to come back to clinic as soon.    Telephone Visits are often covered by insurance. Please check with your insurance plan to see if this type of visit is covered. If not, the cost is less expensive than an office visit:      Up to 10 minutes (Code 13665): $30    11-20 minutes (Code 19610): $59    More than 20 minutes (Code 48765): $85    Talk with your Diabetes Educator if you want to learn more.      [facility]:  For Your Diabetes Education and Nutrition Appointments Call:  [phone]   For Diabetes Education or Nutrition Related Questions:   Phone: [phone]  E-mail: [email or web address]  Fax: [phone]   If you need a medication refill please contact your pharmacy. Please allow 3 business days for your refills to be completed.

## 2019-10-03 ENCOUNTER — HEALTH MAINTENANCE LETTER (OUTPATIENT)
Age: 74
End: 2019-10-03

## 2019-12-16 ENCOUNTER — HEALTH MAINTENANCE LETTER (OUTPATIENT)
Age: 74
End: 2019-12-16

## 2020-02-19 ENCOUNTER — TRANSFERRED RECORDS (OUTPATIENT)
Dept: HEALTH INFORMATION MANAGEMENT | Facility: CLINIC | Age: 75
End: 2020-02-19

## 2020-04-07 ENCOUNTER — MYC REFILL (OUTPATIENT)
Dept: FAMILY MEDICINE | Facility: CLINIC | Age: 75
End: 2020-04-07

## 2020-04-07 DIAGNOSIS — E03.9 HYPOTHYROIDISM, UNSPECIFIED TYPE: ICD-10-CM

## 2020-04-07 DIAGNOSIS — Z95.5 S/P DRUG ELUTING CORONARY STENT PLACEMENT: ICD-10-CM

## 2020-04-07 DIAGNOSIS — I10 ESSENTIAL HYPERTENSION, BENIGN: ICD-10-CM

## 2020-04-07 DIAGNOSIS — I10 BENIGN ESSENTIAL HYPERTENSION: ICD-10-CM

## 2020-04-07 RX ORDER — LEVOTHYROXINE SODIUM 112 UG/1
112 TABLET ORAL DAILY
Qty: 90 TABLET | Refills: 3 | Status: CANCELLED | OUTPATIENT
Start: 2020-04-07

## 2020-04-07 NOTE — TELEPHONE ENCOUNTER
"Requested Prescriptions   Pending Prescriptions Disp Refills     levothyroxine (SYNTHROID/LEVOTHROID) 112 MCG tablet [Pharmacy Med Name: LEVOTHYROXINE 112 MCG TABLET]  Last Written Prescription Date:  5/7/2019  Last Fill Quantity: 90 tablet,  # refills: 3   Last office visit: 5/7/2019 with prescribing provider:  Maria Luz   Future Office Visit:     90 tablet 3     Sig: TAKE 1 TABLET (112 MCG) BY MOUTH DAILY       Thyroid Protocol Passed - 4/7/2020 11:11 AM        Passed - Patient is 12 years or older        Passed - Recent (12 mo) or future (30 days) visit within the authorizing provider's specialty     Patient has had an office visit with the authorizing provider or a provider within the authorizing providers department within the previous 12 mos or has a future within next 30 days. See \"Patient Info\" tab in inbasket, or \"Choose Columns\" in Meds & Orders section of the refill encounter.              Passed - Medication is active on med list        Passed - Normal TSH on file in past 12 months     Recent Labs   Lab Test 05/07/19  0911   TSH 0.94              Passed - No active pregnancy on record     If patient is pregnant or has had a positive pregnancy test, please check TSH.          Passed - No positive pregnancy test in past 12 months     If patient is pregnant or has had a positive pregnancy test, please check TSH.             metoprolol succinate ER (TOPROL-XL) 25 MG 24 hr tablet [Pharmacy Med Name: METOPROLOL SUCC ER 25 MG TAB]  Last Written Prescription Date:  5/7/2019  Last Fill Quantity: 90 tablet,  # refills: 3   Last office visit: 5/7/2019 with prescribing provider:  Maria Luz   Future Office Visit:     90 tablet 3     Sig: TAKE 1 TABLET BY MOUTH EVERY DAY       Beta-Blockers Protocol Passed - 4/7/2020 11:11 AM        Passed - Blood pressure under 140/90 in past 12 months     BP Readings from Last 3 Encounters:   06/17/19 126/82   05/07/19 138/74   03/01/19 154/62                 Passed - Patient is age 6 or " "older        Passed - Recent (12 mo) or future (30 days) visit within the authorizing provider's specialty     Patient has had an office visit with the authorizing provider or a provider within the authorizing providers department within the previous 12 mos or has a future within next 30 days. See \"Patient Info\" tab in inbasket, or \"Choose Columns\" in Meds & Orders section of the refill encounter.              Passed - Medication is active on med list           atorvastatin (LIPITOR) 10 MG tablet [Pharmacy Med Name: ATORVASTATIN 10 MG TABLET]  Last Written Prescription Date:  5/7/2019  Last Fill Quantity: 90 tablet,  # refills: 3   Last office visit: 5/7/2019 with prescribing provider:  Maria Luz   Future Office Visit:     90 tablet 3     Sig: TAKE 1 TABLET BY MOUTH EVERY DAY       Statins Protocol Passed - 4/7/2020 11:11 AM        Passed - LDL on file in past 12 months     Recent Labs   Lab Test 05/07/19  0911   LDL 36             Passed - No abnormal creatine kinase in past 12 months     No lab results found.             Passed - Recent (12 mo) or future (30 days) visit within the authorizing provider's specialty     Patient has had an office visit with the authorizing provider or a provider within the authorizing providers department within the previous 12 mos or has a future within next 30 days. See \"Patient Info\" tab in inbasket, or \"Choose Columns\" in Meds & Orders section of the refill encounter.              Passed - Medication is active on med list        Passed - Patient is age 18 or older        Passed - No active pregnancy on record        Passed - No positive pregnancy test in past 12 months           hydrochlorothiazide (HYDRODIURIL) 25 MG tablet [Pharmacy Med Name: HYDROCHLOROTHIAZIDE 25 MG TAB]  Last Written Prescription Date:  4/25/2019  Last Fill Quantity: 90 tablet,  # refills: 3   Last office visit: 5/7/2019 with prescribing provider:  Maria Luz   Future Office Visit:     90 tablet 3     Sig: TAKE 1 " "TABLET BY MOUTH EVERY DAY       Diuretics (Including Combos) Protocol Failed - 4/7/2020 11:11 AM        Failed - Normal serum potassium on file in past 12 months     Recent Labs   Lab Test 05/07/19  0911   POTASSIUM 3.3*                    Passed - Blood pressure under 140/90 in past 12 months     BP Readings from Last 3 Encounters:   06/17/19 126/82   05/07/19 138/74   03/01/19 154/62                 Passed - Recent (12 mo) or future (30 days) visit within the authorizing provider's specialty     Patient has had an office visit with the authorizing provider or a provider within the authorizing providers department within the previous 12 mos or has a future within next 30 days. See \"Patient Info\" tab in inbasket, or \"Choose Columns\" in Meds & Orders section of the refill encounter.              Passed - Medication is active on med list        Passed - Patient is age 18 or older        Passed - No active pregancy on record        Passed - Normal serum creatinine on file in past 12 months     Recent Labs   Lab Test 05/07/19  0911   CR 0.84              Passed - Normal serum sodium on file in past 12 months     Recent Labs   Lab Test 05/07/19  0911                 Passed - No positive pregnancy test in past 12 months              "

## 2020-04-08 RX ORDER — HYDROCHLOROTHIAZIDE 25 MG/1
TABLET ORAL
Qty: 90 TABLET | Refills: 3 | OUTPATIENT
Start: 2020-04-08

## 2020-04-08 RX ORDER — LEVOTHYROXINE SODIUM 112 UG/1
112 TABLET ORAL DAILY
Qty: 90 TABLET | Refills: 0 | Status: SHIPPED | OUTPATIENT
Start: 2020-04-08 | End: 2020-06-12

## 2020-04-08 RX ORDER — METOPROLOL SUCCINATE 25 MG/1
TABLET, EXTENDED RELEASE ORAL
Qty: 90 TABLET | Refills: 0 | Status: SHIPPED | OUTPATIENT
Start: 2020-04-08 | End: 2020-06-12

## 2020-04-08 RX ORDER — ATORVASTATIN CALCIUM 10 MG/1
TABLET, FILM COATED ORAL
Qty: 90 TABLET | Refills: 0 | Status: SHIPPED | OUTPATIENT
Start: 2020-04-08 | End: 2020-06-12

## 2020-04-08 NOTE — TELEPHONE ENCOUNTER
Duplicate.    Filled per [facility] protocol.     HILARIO JerniganN, RN  [employer] [facility]

## 2020-05-01 DIAGNOSIS — E03.9 HYPOTHYROIDISM, UNSPECIFIED TYPE: ICD-10-CM

## 2020-05-01 RX ORDER — LEVOTHYROXINE SODIUM 112 UG/1
112 TABLET ORAL DAILY
Qty: 90 TABLET | Refills: 0 | OUTPATIENT
Start: 2020-05-01

## 2020-05-05 ENCOUNTER — MYC REFILL (OUTPATIENT)
Dept: FAMILY MEDICINE | Facility: CLINIC | Age: 75
End: 2020-05-05

## 2020-05-05 DIAGNOSIS — E03.9 HYPOTHYROIDISM, UNSPECIFIED TYPE: ICD-10-CM

## 2020-05-05 RX ORDER — LEVOTHYROXINE SODIUM 112 UG/1
112 TABLET ORAL DAILY
Qty: 90 TABLET | Refills: 0 | Status: CANCELLED | OUTPATIENT
Start: 2020-05-05

## 2020-06-06 DIAGNOSIS — E11.9 TYPE 2 DIABETES MELLITUS WITHOUT COMPLICATION, WITHOUT LONG-TERM CURRENT USE OF INSULIN (H): ICD-10-CM

## 2020-06-08 NOTE — TELEPHONE ENCOUNTER
Routing to [facility] to contact patient to inform due for appointment. Please route back once scheduled. Thank you.   Also sent [employer] message to pt

## 2020-06-12 ENCOUNTER — VIRTUAL VISIT (OUTPATIENT)
Dept: FAMILY MEDICINE | Facility: CLINIC | Age: 75
End: 2020-06-12
Payer: MEDICARE

## 2020-06-12 ENCOUNTER — MYC MEDICAL ADVICE (OUTPATIENT)
Dept: FAMILY MEDICINE | Facility: CLINIC | Age: 75
End: 2020-06-12

## 2020-06-12 ENCOUNTER — E-VISIT (OUTPATIENT)
Dept: FAMILY MEDICINE | Facility: CLINIC | Age: 75
End: 2020-06-12

## 2020-06-12 VITALS — DIASTOLIC BLOOD PRESSURE: 80 MMHG | SYSTOLIC BLOOD PRESSURE: 130 MMHG

## 2020-06-12 DIAGNOSIS — E03.9 HYPOTHYROIDISM, UNSPECIFIED TYPE: ICD-10-CM

## 2020-06-12 DIAGNOSIS — Z53.9 ERRONEOUS ENCOUNTER--DISREGARD: Primary | ICD-10-CM

## 2020-06-12 DIAGNOSIS — I10 BENIGN ESSENTIAL HYPERTENSION: ICD-10-CM

## 2020-06-12 DIAGNOSIS — E11.9 TYPE 2 DIABETES MELLITUS WITHOUT COMPLICATION, WITHOUT LONG-TERM CURRENT USE OF INSULIN (H): Primary | ICD-10-CM

## 2020-06-12 DIAGNOSIS — Z95.5 S/P DRUG ELUTING CORONARY STENT PLACEMENT: ICD-10-CM

## 2020-06-12 DIAGNOSIS — Z12.11 COLON CANCER SCREENING: ICD-10-CM

## 2020-06-12 DIAGNOSIS — R35.0 URINARY FREQUENCY: ICD-10-CM

## 2020-06-12 PROCEDURE — 99214 OFFICE O/P EST MOD 30 MIN: CPT | Mod: 95 | Performed by: INTERNAL MEDICINE

## 2020-06-12 RX ORDER — HYDROCHLOROTHIAZIDE 25 MG/1
25 TABLET ORAL DAILY
Qty: 90 TABLET | Refills: 3 | Status: SHIPPED | OUTPATIENT
Start: 2020-06-12 | End: 2021-03-19

## 2020-06-12 RX ORDER — METOPROLOL SUCCINATE 25 MG/1
25 TABLET, EXTENDED RELEASE ORAL DAILY
Qty: 90 TABLET | Refills: 3 | Status: SHIPPED | OUTPATIENT
Start: 2020-06-12 | End: 2021-03-19

## 2020-06-12 RX ORDER — LEVOTHYROXINE SODIUM 112 UG/1
112 TABLET ORAL DAILY
Qty: 90 TABLET | Refills: 3 | Status: SHIPPED | OUTPATIENT
Start: 2020-06-12 | End: 2021-03-19

## 2020-06-12 RX ORDER — ATORVASTATIN CALCIUM 10 MG/1
10 TABLET, FILM COATED ORAL DAILY
Qty: 90 TABLET | Refills: 3 | Status: SHIPPED | OUTPATIENT
Start: 2020-06-12 | End: 2021-03-19

## 2020-06-12 RX ORDER — LOSARTAN POTASSIUM 100 MG/1
100 TABLET ORAL DAILY
Qty: 90 TABLET | Refills: 3 | Status: SHIPPED | OUTPATIENT
Start: 2020-06-12 | End: 2021-03-19

## 2020-06-12 ASSESSMENT — PATIENT HEALTH QUESTIONNAIRE - PHQ9
SUM OF ALL RESPONSES TO PHQ QUESTIONS 1-9: 4
SUM OF ALL RESPONSES TO PHQ QUESTIONS 1-9: 4
10. IF YOU CHECKED OFF ANY PROBLEMS, HOW DIFFICULT HAVE THESE PROBLEMS MADE IT FOR YOU TO DO YOUR WORK, TAKE CARE OF THINGS AT HOME, OR GET ALONG WITH OTHER PEOPLE: NOT DIFFICULT AT ALL

## 2020-06-12 NOTE — PROGRESS NOTES
"Yola Mayo is a 75 year old female who is being evaluated via a billable video visit.      The patient has been notified of following:     \"This video visit will be conducted via a call between you and your physician/provider. We have found that certain health care needs can be provided without the need for an in-person physical exam.  This service lets us provide the care you need with a video conversation.  If a prescription is necessary we can send it directly to your pharmacy.  If lab work is needed we can place an order for that and you can then stop by our lab to have the test done at a later time.    Video visits are billed at different rates depending on your insurance coverage.  Please reach out to your insurance provider with any questions.    If during the course of the call the physician/provider feels a video visit is not appropriate, you will not be charged for this service.\"    Patient has given verbal consent for Video visit? Yes    Will anyone else be joining your video visit? No    Subjective     Yola Mayo is a 75 year old female who presents today via video visit for the following health issues:    HPI  Diabetes Follow-up    How often are you checking your blood sugar? A few times a week  What time of day are you checking your blood sugars (select all that apply)?  Before meals  Have you had any blood sugars above 200?  No  Have you had any blood sugars below 70?  No    What symptoms do you notice when your blood sugar is low?  None    What concerns do you have today about your diabetes? None     Do you have any of these symptoms? (Select all that apply)  No numbness or tingling in feet.  No redness, sores or blisters on feet.  No complaints of excessive thirst.  No reports of blurry vision.  No significant changes to weight.        Hyperlipidemia Follow-Up      Are you regularly taking any medication or supplement to lower your cholesterol?   Yes- atorvastatin    Are you having " muscle aches or other side effects that you think could be caused by your cholesterol lowering medication?  No    Hypertension Follow-up      Do you check your blood pressure regularly outside of the clinic? Yes     Are you following a low salt diet? Yes    Are your blood pressures ever more than 140 on the top number (systolic) OR more   than 90 on the bottom number (diastolic), for example 140/90? No    BP Readings from Last 2 Encounters:   06/17/19 126/82   05/07/19 138/74     Hemoglobin A1C (%)   Date Value   05/07/2019 6.5 (H)   10/14/2018 6.0 (H)     LDL Cholesterol Calculated (mg/dL)   Date Value   05/07/2019 36   03/01/2019 46         How many servings of fruits and vegetables do you eat daily?  4 or more    On average, how many sweetened beverages do you drink each day (Examples: soda, juice, sweet tea, etc.  Do NOT count diet or artificially sweetened beverages)?   0    How many days per week do you exercise enough to make your heart beat faster? 5    How many minutes a day do you exercise enough to make your heart beat faster? 30 - 60    How many days per week do you miss taking your medication? 0         Video Start Time: 10:20 AM    Patient had Difficulty with amwell connection.   so we used doximity.  Doing well.  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     BASAL CELL CANCER -NOSE     Family history of diabetes mellitus     Family history of ischemic heart disease     Family history of malignant neoplasm of ovary     LOC PRIM OSTEOARTH-L 1ST METATARSAL-CUNEIFORM JOINT     Hypothyroidism     DERMATITIS      Disease of tricuspid valve     Symptomatic menopausal or female climacteric states     Primary pulmonary hypertension (H)     Generalized osteoarthrosis, unspecified site     Hyperlipidemia LDL  goal <130     Health Care Home     Depressive disorder, not elsewhere classified     Degenerative arthritis of knee     Aftercare following knee joint replacement surgery, unspecified laterality     Anemia due to blood loss, acute     Essential hypertension     Pulmonary hypertension (H)     Acute chest pain     CAD S/P PCI- rca-2 stents     History of shingles     History of basal cell carcinoma     S/P drug eluting coronary stent placement     Type 2 diabetes mellitus without complication, without long-term current use of insulin (H)     Past Surgical History:   Procedure Laterality Date     ARTHROPLASTY KNEE Left 2016    Procedure: ARTHROPLASTY KNEE;  Surgeon: Giuseppe Lopez MD;  Location: [facility] OR     ARTHROPLASTY MINIMALLY INVASIVE HIP  2012    Procedure:ARTHROPLASTY MINIMALLY INVASIVE HIP; RIGHT TWO INCISION MINIMALLY INVASIVE HIP ARTHROPLASTY (YUVAL)^ (NO CELL SAVER) (C-ARM); Surgeon:GIUSEPPE LOPEZ; Location: OR     C APPENDECTOMY      incidental appi at [facility]     C CHEMOSURG MOHS 1ST STAGE      Mohs-nose     C REPAIR CRUCIATE LIGAMENT,KNEE  2003    L   ACL reconstruction     C TOTAL ABDOM HYSTERECTOMY      W/ BSO fibroids, hx PID     HC COLONOSCOPY THRU STOMA, DIAGNOSTIC      normal, rec repeat 10 yrs     HYSTERECTOMY, PAP NO LONGER INDICATED       SURGICAL HISTORY OF -   Mult-~    infertility, laparotomies       Social History     Tobacco Use     Smoking status: Never Smoker     Smokeless tobacco: Never Used   Substance Use Topics     Alcohol use: Yes     Alcohol/week: 0.0 standard drinks     Comment: occasional     Family History   Problem Relation Age of Onset     Cancer Mother         ovarian  of it  at 49yo.     Diabetes Father         Adult onset     Hypertension Father      C.A.D. Father         Fatal Heart attack at 70.     Cerebrovascular Disease Father      Thyroid Disease Sister      Thyroid Disease Sister      Thyroid Disease Sister      Cancer  Daughter      Thyroid Disease Daughter      Breast Cancer No family hx of      Cancer - colorectal No family hx of          Current Outpatient Medications   Medication Sig Dispense Refill     acetaminophen (TYLENOL) 325 MG tablet Take 2 tablets (650 mg) by mouth 3 times daily 100 tablet 0     amoxicillin (AMOXIL) 500 MG capsule Take 4 capsules half to one hour before the procedure. 4 capsule 3     aspirin 81 MG tablet Take 81 mg by mouth daily       atorvastatin (LIPITOR) 10 MG tablet Take 1 tablet (10 mg) by mouth daily for cholestrol 90 tablet 3     blood glucose (CONTOUR NEXT TEST) test strip Use to test blood sugar 1 time daily or as directed. 100 each 2     blood glucose monitoring (NITO MICROLET) lancets Use to test blood sugar 1 time daily or as directed. 100 each 3     calcium-vitamin D (CALTRATE) 600-400 MG-UNIT per tablet Take 1 tablet by mouth daily 60 tablet 11     cholecalciferol (VITAMIN D) 1000 UNIT tablet Take 1 tablet (1,000 Units) by mouth daily 100 tablet 3     CONTOUR NEXT EZ (CONTOUR NEXT EZ W/DEVICE KIT) w/Device KIT 1 kit once 1 kit 0     Cyanocobalamin (VITAMIN B 12 PO) Take 1 tablet by mouth daily VIT B12 unknown otc dose       hydrochlorothiazide (HYDRODIURIL) 25 MG tablet Take 1 tablet (25 mg) by mouth daily for Blood Pressure 90 tablet 3     levothyroxine (SYNTHROID/LEVOTHROID) 112 MCG tablet Take 1 tablet (112 mcg) by mouth daily for thyroid 90 tablet 3     losartan (COZAAR) 100 MG tablet Take 1 tablet (100 mg) by mouth daily for Blood Pressure 90 tablet 3     metFORMIN (GLUCOPHAGE-XR) 500 MG 24 hr tablet Take 2 tablets (1,000 mg) by mouth daily (with dinner) 180 tablet 0     metoprolol succinate ER (TOPROL-XL) 25 MG 24 hr tablet Take 1 tablet (25 mg) by mouth daily for Blood Pressure 90 tablet 3     multivitamin, therapeutic with minerals (THERA-VIT-M) TABS Take 1 tablet by mouth daily       nitroGLYcerin (NITROSTAT) 0.4 MG sublingual tablet For chest pain place 1 tablet under the  "tongue every 5 minutes for 3 doses. If symptoms persist 5 minutes after 1st dose call 911. 25 tablet 3       Reviewed and updated as needed this visit by Provider         Review of Systems   Constitutional, HEENT, cardiovascular, pulmonary, GI, , musculoskeletal, neuro, skin, endocrine and psych systems are negative, except as otherwise noted.      Objective    There were no vitals taken for this visit.  Estimated body mass index is 32.43 kg/m  as calculated from the following:    Height as of 6/17/19: 1.6 m (5' 3\").    Weight as of 6/26/19: 83.1 kg (183 lb 1.6 oz).  Physical Exam     GENERAL: Healthy, alert and no distress  EYES: Eyes grossly normal to inspection.  No discharge or erythema, or obvious scleral/conjunctival abnormalities.  RESP: No audible wheeze, cough, or visible cyanosis.  No visible retractions or increased work of breathing.    SKIN: Visible skin clear. No significant rash, abnormal pigmentation or lesions.  NEURO: Cranial nerves grossly intact.  Mentation and speech appropriate for age.  PSYCH: Mentation appears normal, affect normal/bright, judgement and insight intact, normal speech and appearance well-groomed.              Assessment & Plan     Yola was seen today for recheck medication.    Diagnoses and all orders for this visit:    Type 2 diabetes mellitus without complication, without long-term current use of insulin (H)  -     TSH with free T4 reflex; Future  -     Lipid panel reflex to direct LDL Fasting; Future  -     Comprehensive metabolic panel; Future  -     Hemoglobin A1c; Future  -     Albumin Random Urine Quantitative with Creat Ratio; Future  Lab Results   Component Value Date    A1C 6.5 05/07/2019    A1C 6.0 10/14/2018    A1C 5.9 05/03/2018    A1C 5.8 09/18/2015    A1C 6.0 05/19/2015       S/P drug eluting coronary stent placement  Comments:    Status post 2 drug eluting stents to the mid and distal RCA      Orders:  -     atorvastatin (LIPITOR) 10 MG tablet; Take 1 " "tablet (10 mg) by mouth daily for cholestrol  Doing well     Hypothyroidism, unspecified type  -     levothyroxine (SYNTHROID/LEVOTHROID) 112 MCG tablet; Take 1 tablet (112 mcg) by mouth daily for thyroid    Benign essential hypertension  -     losartan (COZAAR) 100 MG tablet; Take 1 tablet (100 mg) by mouth daily for Blood Pressure  -     metoprolol succinate ER (TOPROL-XL) 25 MG 24 hr tablet; Take 1 tablet (25 mg) by mouth daily for Blood Pressure  -     hydrochlorothiazide (HYDRODIURIL) 25 MG tablet; Take 1 tablet (25 mg) by mouth daily for Blood Pressure  Well controlled     Colon cancer screening  -     Fecal colorectal cancer screen (FIT); Future  Does not want colonoscopy.    Urinary frequency  -     UA with Microscopic reflex to Culture; Future  Patient has urinary frequency as she takes hydrochlorothiazide in evening   Advised to take every morning and take other Blood Pressure medication at night   Will do UA to rule out any infection.       BMI:   Estimated body mass index is 32.43 kg/m  as calculated from the following:    Height as of 6/17/19: 1.6 m (5' 3\").    Weight as of 6/26/19: 83.1 kg (183 lb 1.6 oz).   Weight management plan: Discussed healthy diet and exercise guidelines    Preventive health counseling was also done.    Disclaimer: This note consists of symbols derived from keyboarding, dictation and/or voice recognition software. As a result, there may be errors in the script that have gone undetected. Please consider this when interpreting information found in this chart.    See Patient Instructions  Patient Instructions   Continue present management   Take hydrochlorothiazide in morning to avoid getting up at night for urination.  Schedule labs   Follow up in 4 months  Seek sooner medical attention if there is any worsening of symptoms or problems.        Return in about 4 months (around 10/12/2020) for wellness visit.    Zoraida Braga MD  [facility]      Video-Visit " Details    Type of service:  Video Visit    Video End Time:10:28 AM    Originating Location (pt. Location): Home    Distant Location (provider location):  [facility]     Platform used for Video Visit: Doximity    Return in about 4 months (around 10/12/2020) for wellness visit.       Zoraida Braga MD

## 2020-06-12 NOTE — PATIENT INSTRUCTIONS
Continue present management   Take hydrochlorothiazide in morning to avoid getting up at night for urination.  Schedule labs   Follow up in 4 months  Seek sooner medical attention if there is any worsening of symptoms or problems.

## 2020-06-17 DIAGNOSIS — R35.0 URINARY FREQUENCY: ICD-10-CM

## 2020-06-17 DIAGNOSIS — N39.0 UTI (URINARY TRACT INFECTION), UNCOMPLICATED: Primary | ICD-10-CM

## 2020-06-17 DIAGNOSIS — Z12.11 COLON CANCER SCREENING: ICD-10-CM

## 2020-06-17 DIAGNOSIS — E11.9 TYPE 2 DIABETES MELLITUS WITHOUT COMPLICATION, WITHOUT LONG-TERM CURRENT USE OF INSULIN (H): ICD-10-CM

## 2020-06-17 DIAGNOSIS — R82.90 NONSPECIFIC FINDING ON EXAMINATION OF URINE: Primary | ICD-10-CM

## 2020-06-17 LAB
ALBUMIN SERPL-MCNC: 3.6 G/DL (ref 3.4–5)
ALBUMIN UR-MCNC: NEGATIVE MG/DL
ALP SERPL-CCNC: 59 U/L (ref 40–150)
ALT SERPL W P-5'-P-CCNC: 51 U/L (ref 0–50)
ANION GAP SERPL CALCULATED.3IONS-SCNC: 7 MMOL/L (ref 3–14)
APPEARANCE UR: CLEAR
AST SERPL W P-5'-P-CCNC: 29 U/L (ref 0–45)
BACTERIA #/AREA URNS HPF: ABNORMAL /HPF
BILIRUB SERPL-MCNC: 0.4 MG/DL (ref 0.2–1.3)
BILIRUB UR QL STRIP: NEGATIVE
BUN SERPL-MCNC: 13 MG/DL (ref 7–30)
CALCIUM SERPL-MCNC: 8.8 MG/DL (ref 8.5–10.1)
CHLORIDE SERPL-SCNC: 104 MMOL/L (ref 94–109)
CHOLEST SERPL-MCNC: 121 MG/DL
CO2 SERPL-SCNC: 26 MMOL/L (ref 20–32)
COLOR UR AUTO: YELLOW
CREAT SERPL-MCNC: 0.68 MG/DL (ref 0.52–1.04)
CREAT UR-MCNC: 91 MG/DL
GFR SERPL CREATININE-BSD FRML MDRD: 85 ML/MIN/{1.73_M2}
GLUCOSE SERPL-MCNC: 109 MG/DL (ref 70–99)
GLUCOSE UR STRIP-MCNC: NEGATIVE MG/DL
HBA1C MFR BLD: 5.9 % (ref 0–5.6)
HDLC SERPL-MCNC: 80 MG/DL
HGB UR QL STRIP: NEGATIVE
KETONES UR STRIP-MCNC: NEGATIVE MG/DL
LDLC SERPL CALC-MCNC: 31 MG/DL
LEUKOCYTE ESTERASE UR QL STRIP: ABNORMAL
MICROALBUMIN UR-MCNC: 9 MG/L
MICROALBUMIN/CREAT UR: 9.75 MG/G CR (ref 0–25)
NITRATE UR QL: NEGATIVE
NON-SQ EPI CELLS #/AREA URNS LPF: ABNORMAL /LPF
NONHDLC SERPL-MCNC: 41 MG/DL
PH UR STRIP: 7 PH (ref 5–7)
POTASSIUM SERPL-SCNC: 3.5 MMOL/L (ref 3.4–5.3)
PROT SERPL-MCNC: 7.7 G/DL (ref 6.8–8.8)
RBC #/AREA URNS AUTO: ABNORMAL /HPF
SODIUM SERPL-SCNC: 137 MMOL/L (ref 133–144)
SOURCE: ABNORMAL
SP GR UR STRIP: 1.01 (ref 1–1.03)
TRIGL SERPL-MCNC: 49 MG/DL
TSH SERPL DL<=0.005 MIU/L-ACNC: 1.37 MU/L (ref 0.4–4)
UROBILINOGEN UR STRIP-ACNC: 0.2 EU/DL (ref 0.2–1)
WBC #/AREA URNS AUTO: ABNORMAL /HPF

## 2020-06-17 PROCEDURE — 81001 URINALYSIS AUTO W/SCOPE: CPT | Performed by: INTERNAL MEDICINE

## 2020-06-17 PROCEDURE — 80061 LIPID PANEL: CPT | Performed by: INTERNAL MEDICINE

## 2020-06-17 PROCEDURE — 87086 URINE CULTURE/COLONY COUNT: CPT | Performed by: INTERNAL MEDICINE

## 2020-06-17 PROCEDURE — 80053 COMPREHEN METABOLIC PANEL: CPT | Performed by: INTERNAL MEDICINE

## 2020-06-17 PROCEDURE — 36415 COLL VENOUS BLD VENIPUNCTURE: CPT | Performed by: INTERNAL MEDICINE

## 2020-06-17 PROCEDURE — 83036 HEMOGLOBIN GLYCOSYLATED A1C: CPT | Performed by: INTERNAL MEDICINE

## 2020-06-17 PROCEDURE — 82043 UR ALBUMIN QUANTITATIVE: CPT | Performed by: INTERNAL MEDICINE

## 2020-06-17 PROCEDURE — 84443 ASSAY THYROID STIM HORMONE: CPT | Performed by: INTERNAL MEDICINE

## 2020-06-17 RX ORDER — CEPHALEXIN 500 MG/1
500 CAPSULE ORAL 2 TIMES DAILY
Qty: 10 CAPSULE | Refills: 0 | Status: SHIPPED | OUTPATIENT
Start: 2020-06-17 | End: 2020-06-25

## 2020-06-17 NOTE — RESULT ENCOUNTER NOTE
Timothy Aceves,    This is to inform you regarding your test result.    TSH which is thyroid hormone is normal.      Sincerely,      Dr.Nasima Maria Luz MD,FACP

## 2020-06-17 NOTE — RESULT ENCOUNTER NOTE
Timothy Aceves,    This is to inform you regarding your test result.    I spoke to you on phone but sending you a result note also.  Your urine test is positive for bladder infection  Take cephalexin 500 mg twice daily for 5 days  Script is sent to pharmacy.  Urine for Microalbumin is normal.  Your total cholesterol is normal.  HDL which is called good cholesterol is normal.  Your LDL cholesterol is normal.  This is often call bad cholesterol and high levels increase the risk for heart attacks and strokes.  Your triglycerides are normal.  HbA1c which is average glucose during last 3 months is 5.9%.  Your diabetes is under good control.  The testing of your kidney function, liver function and electrolytes was satisfactory     Sincerely,      Dr.Nasima Maria Luz MD,FACP

## 2020-06-18 LAB
BACTERIA SPEC CULT: NORMAL
SPECIMEN SOURCE: NORMAL

## 2020-06-18 PROCEDURE — 82274 ASSAY TEST FOR BLOOD FECAL: CPT | Performed by: INTERNAL MEDICINE

## 2020-06-18 NOTE — RESULT ENCOUNTER NOTE
Timothy Aceves,    This is to inform you regarding your test result.    Urine culture did not grow any significant organism.    Sincerely,      Dr.Nasima Maria Luz MD,FACP

## 2020-06-22 LAB — HEMOCCULT STL QL IA: NEGATIVE

## 2020-06-22 NOTE — RESULT ENCOUNTER NOTE
Timothy Acevse,    This is to inform you regarding your test result.    Fecal colorectal cancer screen (FIT) is negative.      Sincerely,      Dr.Nasima Maria Luz MD,FACP

## 2020-06-24 RX ORDER — METFORMIN HCL 500 MG
1000 TABLET, EXTENDED RELEASE 24 HR ORAL
Qty: 180 TABLET | Refills: 3 | OUTPATIENT
Start: 2020-06-24

## 2020-06-24 NOTE — TELEPHONE ENCOUNTER
This refill request is a duplicate request, previously received or sent.  Sent denial notification to pharmacy.  Mai Sparks, Triage RN  [facility]

## 2020-09-05 DIAGNOSIS — E11.9 TYPE 2 DIABETES MELLITUS WITHOUT COMPLICATION, WITHOUT LONG-TERM CURRENT USE OF INSULIN (H): ICD-10-CM

## 2020-09-08 RX ORDER — METFORMIN HCL 500 MG
1000 TABLET, EXTENDED RELEASE 24 HR ORAL
Qty: 180 TABLET | Refills: 0 | Status: SHIPPED | OUTPATIENT
Start: 2020-09-08 | End: 2020-12-14

## 2020-10-30 NOTE — TELEPHONE ENCOUNTER
hydrochlorothiazide (HYDRODIURIL) 25 MG tablet      Last Written Prescription Date: 3/10/17  Last Fill Quantity: 45, # refills: 3  Last Office Visit with G, P or [facility] prescribing provider: 8/4/17       Potassium   Date Value Ref Range Status   08/04/2017 4.3 3.4 - 5.3 mmol/L Final     Creatinine   Date Value Ref Range Status   08/04/2017 0.77 0.52 - 1.04 mg/dL Final     BP Readings from Last 3 Encounters:   08/15/17 135/67   08/04/17 163/73   03/10/17 139/70           Peggy Choi RT(R)     [New Patient/Consultation] : a new patient/consultation for

## 2020-12-11 DIAGNOSIS — E11.9 TYPE 2 DIABETES MELLITUS WITHOUT COMPLICATION, WITHOUT LONG-TERM CURRENT USE OF INSULIN (H): ICD-10-CM

## 2020-12-14 RX ORDER — METFORMIN HCL 500 MG
1000 TABLET, EXTENDED RELEASE 24 HR ORAL
Qty: 180 TABLET | Refills: 0 | Status: SHIPPED | OUTPATIENT
Start: 2020-12-14 | End: 2021-03-19

## 2020-12-14 NOTE — TELEPHONE ENCOUNTER
Medication is being filled for 1 time refill only due to:  Patient needs to be seen because over 6 mo since last OV.

## 2020-12-17 DIAGNOSIS — E11.9 TYPE 2 DIABETES MELLITUS WITHOUT COMPLICATION, WITHOUT LONG-TERM CURRENT USE OF INSULIN (H): ICD-10-CM

## 2020-12-18 NOTE — TELEPHONE ENCOUNTER
Filled per [facility] protocol. Patient had virtual visit with PCP.    HILARIO JerniganN, RN  [employer] [facility]

## 2021-01-15 ENCOUNTER — HEALTH MAINTENANCE LETTER (OUTPATIENT)
Age: 76
End: 2021-01-15

## 2021-02-02 ENCOUNTER — IMMUNIZATION (OUTPATIENT)
Dept: NURSING | Facility: CLINIC | Age: 76
End: 2021-02-02
Payer: MEDICARE

## 2021-02-02 PROCEDURE — 91300 PR COVID VAC PFIZER DIL RECON 30 MCG/0.3 ML IM: CPT

## 2021-02-02 PROCEDURE — 0001A PR COVID VAC PFIZER DIL RECON 30 MCG/0.3 ML IM: CPT

## 2021-02-23 ENCOUNTER — IMMUNIZATION (OUTPATIENT)
Dept: NURSING | Facility: CLINIC | Age: 76
End: 2021-02-23
Attending: INTERNAL MEDICINE
Payer: MEDICARE

## 2021-02-23 PROCEDURE — 0002A PR COVID VAC PFIZER DIL RECON 30 MCG/0.3 ML IM: CPT

## 2021-02-23 PROCEDURE — 91300 PR COVID VAC PFIZER DIL RECON 30 MCG/0.3 ML IM: CPT

## 2021-03-18 ASSESSMENT — ACTIVITIES OF DAILY LIVING (ADL): CURRENT_FUNCTION: NO ASSISTANCE NEEDED

## 2021-03-19 ENCOUNTER — OFFICE VISIT (OUTPATIENT)
Dept: FAMILY MEDICINE | Facility: CLINIC | Age: 76
End: 2021-03-19
Payer: MEDICARE

## 2021-03-19 VITALS
TEMPERATURE: 97.1 F | SYSTOLIC BLOOD PRESSURE: 175 MMHG | HEART RATE: 65 BPM | BODY MASS INDEX: 32.43 KG/M2 | HEIGHT: 63 IN | WEIGHT: 183 LBS | DIASTOLIC BLOOD PRESSURE: 84 MMHG | OXYGEN SATURATION: 95 %

## 2021-03-19 DIAGNOSIS — I16.0 HYPERTENSIVE URGENCY: ICD-10-CM

## 2021-03-19 DIAGNOSIS — Z00.01 ENCOUNTER FOR GENERAL ADULT MEDICAL EXAMINATION WITH ABNORMAL FINDINGS: Primary | ICD-10-CM

## 2021-03-19 DIAGNOSIS — J34.89 SINUS PAIN: ICD-10-CM

## 2021-03-19 DIAGNOSIS — I27.20 PULMONARY HYPERTENSION (H): ICD-10-CM

## 2021-03-19 DIAGNOSIS — Z13.0 SCREENING FOR DEFICIENCY ANEMIA: ICD-10-CM

## 2021-03-19 DIAGNOSIS — E03.9 HYPOTHYROIDISM, UNSPECIFIED TYPE: ICD-10-CM

## 2021-03-19 DIAGNOSIS — M25.511 ACUTE PAIN OF RIGHT SHOULDER: ICD-10-CM

## 2021-03-19 DIAGNOSIS — E11.9 TYPE 2 DIABETES MELLITUS WITHOUT COMPLICATION, WITHOUT LONG-TERM CURRENT USE OF INSULIN (H): ICD-10-CM

## 2021-03-19 DIAGNOSIS — I10 BENIGN ESSENTIAL HYPERTENSION: ICD-10-CM

## 2021-03-19 DIAGNOSIS — Z95.5 S/P DRUG ELUTING CORONARY STENT PLACEMENT: ICD-10-CM

## 2021-03-19 DIAGNOSIS — L71.9 ROSACEA: ICD-10-CM

## 2021-03-19 LAB
ANION GAP SERPL CALCULATED.3IONS-SCNC: 3 MMOL/L (ref 3–14)
BUN SERPL-MCNC: 11 MG/DL (ref 7–30)
CALCIUM SERPL-MCNC: 9.3 MG/DL (ref 8.5–10.1)
CHLORIDE SERPL-SCNC: 104 MMOL/L (ref 94–109)
CHOLEST SERPL-MCNC: 135 MG/DL
CO2 SERPL-SCNC: 30 MMOL/L (ref 20–32)
CREAT SERPL-MCNC: 0.7 MG/DL (ref 0.52–1.04)
ERYTHROCYTE [DISTWIDTH] IN BLOOD BY AUTOMATED COUNT: 14.6 % (ref 10–15)
GFR SERPL CREATININE-BSD FRML MDRD: 84 ML/MIN/{1.73_M2}
GLUCOSE SERPL-MCNC: 122 MG/DL (ref 70–99)
HBA1C MFR BLD: 6 % (ref 0–5.6)
HCT VFR BLD AUTO: 40.7 % (ref 35–47)
HDLC SERPL-MCNC: 80 MG/DL
HGB BLD-MCNC: 14.1 G/DL (ref 11.7–15.7)
LDLC SERPL CALC-MCNC: 42 MG/DL
MCH RBC QN AUTO: 30.7 PG (ref 26.5–33)
MCHC RBC AUTO-ENTMCNC: 34.6 G/DL (ref 31.5–36.5)
MCV RBC AUTO: 89 FL (ref 78–100)
NONHDLC SERPL-MCNC: 55 MG/DL
PLATELET # BLD AUTO: 278 10E9/L (ref 150–450)
POTASSIUM SERPL-SCNC: 3.6 MMOL/L (ref 3.4–5.3)
RBC # BLD AUTO: 4.6 10E12/L (ref 3.8–5.2)
SODIUM SERPL-SCNC: 137 MMOL/L (ref 133–144)
TRIGL SERPL-MCNC: 67 MG/DL
TSH SERPL DL<=0.005 MIU/L-ACNC: 1.93 MU/L (ref 0.4–4)
WBC # BLD AUTO: 7.2 10E9/L (ref 4–11)

## 2021-03-19 PROCEDURE — 36415 COLL VENOUS BLD VENIPUNCTURE: CPT | Performed by: INTERNAL MEDICINE

## 2021-03-19 PROCEDURE — 85027 COMPLETE CBC AUTOMATED: CPT | Performed by: INTERNAL MEDICINE

## 2021-03-19 PROCEDURE — 99214 OFFICE O/P EST MOD 30 MIN: CPT | Mod: 25 | Performed by: INTERNAL MEDICINE

## 2021-03-19 PROCEDURE — 84443 ASSAY THYROID STIM HORMONE: CPT | Performed by: INTERNAL MEDICINE

## 2021-03-19 PROCEDURE — 80048 BASIC METABOLIC PNL TOTAL CA: CPT | Performed by: INTERNAL MEDICINE

## 2021-03-19 PROCEDURE — G0439 PPPS, SUBSEQ VISIT: HCPCS | Performed by: INTERNAL MEDICINE

## 2021-03-19 PROCEDURE — 80061 LIPID PANEL: CPT | Performed by: INTERNAL MEDICINE

## 2021-03-19 PROCEDURE — 83036 HEMOGLOBIN GLYCOSYLATED A1C: CPT | Performed by: INTERNAL MEDICINE

## 2021-03-19 PROCEDURE — 99207 PR FOOT EXAM NO CHARGE: CPT | Mod: 25 | Performed by: INTERNAL MEDICINE

## 2021-03-19 RX ORDER — ATORVASTATIN CALCIUM 10 MG/1
10 TABLET, FILM COATED ORAL DAILY
Qty: 90 TABLET | Refills: 3 | Status: SHIPPED | OUTPATIENT
Start: 2021-03-19 | End: 2022-03-25

## 2021-03-19 RX ORDER — LOSARTAN POTASSIUM 100 MG/1
100 TABLET ORAL DAILY
Qty: 90 TABLET | Refills: 3 | Status: SHIPPED | OUTPATIENT
Start: 2021-03-19 | End: 2022-03-25

## 2021-03-19 RX ORDER — HYDROCHLOROTHIAZIDE 25 MG/1
25 TABLET ORAL DAILY
Qty: 90 TABLET | Refills: 3 | Status: SHIPPED | OUTPATIENT
Start: 2021-03-19 | End: 2022-03-25

## 2021-03-19 RX ORDER — METFORMIN HCL 500 MG
1000 TABLET, EXTENDED RELEASE 24 HR ORAL
Qty: 180 TABLET | Refills: 3 | Status: SHIPPED | OUTPATIENT
Start: 2021-03-19 | End: 2022-03-08

## 2021-03-19 RX ORDER — METOPROLOL SUCCINATE 25 MG/1
25 TABLET, EXTENDED RELEASE ORAL DAILY
Qty: 90 TABLET | Refills: 3 | Status: SHIPPED | OUTPATIENT
Start: 2021-03-19 | End: 2021-09-30

## 2021-03-19 RX ORDER — HYDRALAZINE HYDROCHLORIDE 25 MG/1
25 TABLET, FILM COATED ORAL 3 TIMES DAILY
Qty: 90 TABLET | Refills: 1 | Status: SHIPPED | OUTPATIENT
Start: 2021-03-19 | End: 2021-05-10

## 2021-03-19 RX ORDER — DOXYCYCLINE 100 MG/1
100 TABLET ORAL 2 TIMES DAILY
Qty: 20 TABLET | Refills: 0 | Status: SHIPPED | OUTPATIENT
Start: 2021-03-19 | End: 2021-04-05

## 2021-03-19 RX ORDER — LEVOTHYROXINE SODIUM 112 UG/1
112 TABLET ORAL DAILY
Qty: 90 TABLET | Refills: 3 | Status: SHIPPED | OUTPATIENT
Start: 2021-03-19 | End: 2022-03-25

## 2021-03-19 ASSESSMENT — ACTIVITIES OF DAILY LIVING (ADL): CURRENT_FUNCTION: NO ASSISTANCE NEEDED

## 2021-03-19 ASSESSMENT — MIFFLIN-ST. JEOR: SCORE: 1294.21

## 2021-03-19 NOTE — PROGRESS NOTES
"SUBJECTIVE:   Yola Mayo is a 75 year old female who presents for Preventive Visit.      Patient has been advised of split billing requirements and indicates understanding: Yes   Are you in the first 12 months of your Medicare coverage?  No    Healthy Habits:     In general, how would you rate your overall health?  Fair    Frequency of exercise:  4-5 days/week    Duration of exercise:  15-30 minutes    Do you usually eat at least 4 servings of fruit and vegetables a day, include whole grains    & fiber and avoid regularly eating high fat or \"junk\" foods?  Yes    Taking medications regularly:  Yes    Medication side effects:  Muscle aches and Significant flushing    Ability to successfully perform activities of daily living:  No assistance needed    Home Safety:  No safety concerns identified    Hearing Impairment:  No hearing concerns    In the past 6 months, have you been bothered by leaking of urine? Yes    In general, how would you rate your overall mental or emotional health?  Fair      PHQ-2 Total Score: 2    Do you feel safe in your environment? Yes    Have you ever done Advance Care Planning? (For example, a Health Directive, POLST, or a discussion with a medical provider or your loved ones about your wishes): Yes, advance care planning is on file.       Fall risk  Fallen 2 or more times in the past year?: No  Any fall with injury in the past year?: No    Cognitive Screening   1) Repeat 3 items (Leader, Season, Table)    2) Clock draw: NORMAL   3) 3 item recall: Recalls 3 objects  Results: normal clock and recalled 3 objects     Mini-CogTM Copyright JESSICA Hussein. Licensed by the author for use in [facility]; reprinted with permission (sophie@.[facility]). All rights reserved.      Do you have sleep apnea, excessive snoring or daytime drowsiness?: no    Reviewed and updated as needed this visit by clinical staff  Tobacco  Allergies  Meds  Problems             Reviewed and updated as needed this " "visit by Provider   Allergies  Meds  Problems            Social History     Tobacco Use     Smoking status: Never Smoker     Smokeless tobacco: Never Used   Substance Use Topics     Alcohol use: Yes     Alcohol/week: 0.0 standard drinks     Comment: occasional         Alcohol Use 3/18/2021   Prescreen: >3 drinks/day or >7 drinks/week? No   Prescreen: >3 drinks/day or >7 drinks/week? -           Flush concerns    Current providers sharing in care for this patient include:   Patient Care Team:  Zoraida Braga MD as PCP - General (Internal Medicine)  Zoraida Braga MD as Assigned PCP  May Singh RD as Diabetes Educator (Dietitian, Registered)    The following health maintenance items are reviewed in Epic and correct as of today:  Health Maintenance   Topic Date Due     FALL RISK ASSESSMENT  05/07/2020     INFLUENZA VACCINE (1) 09/01/2020     PHQ-9  12/12/2020     EYE EXAM  02/19/2021     MAMMO SCREENING  05/08/2021     MICROALBUMIN  06/17/2021     COLORECTAL CANCER SCREENING  06/18/2021     A1C  09/19/2021     MEDICARE ANNUAL WELLNESS VISIT  03/19/2022     BMP  03/19/2022     LIPID  03/19/2022     DIABETIC FOOT EXAM  03/19/2022     DEXA  05/05/2025     ADVANCE CARE PLANNING  03/19/2026     HEPATITIS C SCREENING  Completed     DEPRESSION ACTION PLAN  Completed     Pneumococcal Vaccine: Pediatrics (0 to 5 Years) and At-Risk Patients (6 to 64 Years)  Completed     Pneumococcal Vaccine: 65+ Years  Completed     ZOSTER IMMUNIZATION  Completed     COVID-19 Vaccine  Completed     IPV IMMUNIZATION  Aged Out     MENINGITIS IMMUNIZATION  Aged Out     DTAP/TDAP/TD IMMUNIZATION  Discontinued       Review of Systems  Constitutional, HEENT, cardiovascular, pulmonary, GI, , musculoskeletal, neuro, skin, endocrine and psych systems are negative, except as otherwise noted.    OBJECTIVE:   BP (!) 175/84   Pulse 65   Temp 97.1  F (36.2  C) (Temporal)   Ht 1.6 m (5' 3\")   Wt 83 kg (183 lb)   SpO2 95%   BMI 32.42 kg/m  " " Estimated body mass index is 32.42 kg/m  as calculated from the following:    Height as of this encounter: 1.6 m (5' 3\").    Weight as of this encounter: 83 kg (183 lb).  Physical Exam  GENERAL: healthy, alert and no distress  EYES: Eyes grossly normal to inspection, PERRL and conjunctivae and sclerae normal  HENT: ear canals and TM's normal, nose and mouth without ulcers or lesions  NECK: no adenopathy, no asymmetry, masses, or scars and thyroid normal to palpation  RESP: lungs clear to auscultation - no rales, rhonchi or wheezes  BREAST: normal without masses, tenderness or nipple discharge and no palpable axillary masses or adenopathy  CV: regular rate and rhythm, normal S1 S2, no S3 she has peripheral edema  ABDOMEN: soft, nontender, no hepatosplenomegaly, no masses and bowel sounds normal  MS: no gross musculoskeletal defects noted, no edema  SKIN: no suspicious lesions or rashes  NEURO: Normal strength and tone, mentation intact and speech normal  PSYCH: mentation appears normal, affect normal/bright  Numerous moles and freckles   Numerous spider veins on both LE  Varicose veins  Goes to dermatology specialist  Has rosacea      ASSESSMENT / PLAN:   Yola was seen today for physical.    Diagnoses and all orders for this visit:    Encounter for general adult medical examination with abnormal findings  Preventive health counseling was also done.  She goes to dermatology specialist.    Screening for deficiency anemia  -     CBC with platelets    Pulmonary hypertension (H)  Stable    Type 2 diabetes mellitus without complication, without long-term current use of insulin (H)  -     metFORMIN (GLUCOPHAGE-XR) 500 MG 24 hr tablet; Take 2 tablets (1,000 mg) by mouth daily (with dinner)  -     TSH with free T4 reflex  -     Lipid panel reflex to direct LDL Fasting  -     Hemoglobin A1c  -     Basic metabolic panel  -     SC FOOT EXAM NO CHARGE  Lab Results   Component Value Date    A1C 6.0 03/19/2021    A1C 5.9 " 06/17/2020    A1C 6.5 05/07/2019    A1C 6.0 10/14/2018    A1C 5.9 05/03/2018     Her blood sugars are under good control    S/P drug eluting coronary stent placement  Comments:    Status post 2 drug eluting stents to the mid and distal RCA      Orders:  -     atorvastatin (LIPITOR) 10 MG tablet; Take 1 tablet (10 mg) by mouth daily for cholestrol  Currently not having any chest pain or shortness of breath    Benign essential hypertension  -     hydrochlorothiazide (HYDRODIURIL) 25 MG tablet; Take 1 tablet (25 mg) by mouth daily for Blood Pressure  -     losartan (COZAAR) 100 MG tablet; Take 1 tablet (100 mg) by mouth daily for Blood Pressure  -     metoprolol succinate ER (TOPROL-XL) 25 MG 24 hr tablet; Take 1 tablet (25 mg) by mouth daily for Blood Pressure  Blood pressure is on higher side  She is not having any headache, chest pain or shortness of breath  She has been monitoring at home  It is not as high as it was recorded here  She says usually is in 140- 150  I added hydralazine 25 mg 3 times a day  Follow-up in 1 week  Keep appointment with cardiology  If you develop any chest pain shortness of breath or headache or any blood in the urine then seek attention    Hypothyroidism, unspecified type  -     levothyroxine (SYNTHROID/LEVOTHROID) 112 MCG tablet; Take 1 tablet (112 mcg) by mouth daily for thyroid  -     TSH with free T4 reflex    Acute pain of right shoulder  She has some pain on the lateral aspect of the right shoulder  Appears like tendinitis  She is able to move it  It is like tendinitis  I discussed the option of seeing orthopedics or physical therapy  At this point she says it is managed with Tylenol  If problem persist or does not improve then she would consider that    Rosacea  -     doxycycline monohydrate (ADOXA) 100 MG tablet; Take 1 tablet (100 mg) by mouth 2 times daily  She has very typical butterfly rash  At this point I recommended doxycycline because she also has sinusitis  This  "doxycycline \"  After that MetroGel can be prescribed  She will see a dermatologist  She has her own dermatologist    Sinus pain  Comments:  right maxillary  Orders:  -     doxycycline monohydrate (ADOXA) 100 MG tablet; Take 1 tablet (100 mg) by mouth 2 times daily  She is having sinus pain on the right side  She is having thick drainage  Doxy was prescribed  Drink plenty of fluids.  Take extra rest.  Use saline nasal spray.  Take Tylenol as needed for pain      Hypertensive urgency  -     hydrALAZINE (APRESOLINE) 25 MG tablet; Take 1 tablet (25 mg) by mouth 3 times daily  Blood pressure was quite high but then it came down  Added hydralazine  Advised to monitor at home  Follow-up in 1 week      Patient has been advised of split billing requirements and indicates understanding: Yes  COUNSELING:  Reviewed preventive health counseling, as reflected in patient instructions       Regular exercise       Healthy diet/nutrition    Estimated body mass index is 32.42 kg/m  as calculated from the following:    Height as of this encounter: 1.6 m (5' 3\").    Weight as of this encounter: 83 kg (183 lb).    Weight management plan: Discussed healthy diet and exercise guidelines    She reports that she has never smoked. She has never used smokeless tobacco.      Appropriate preventive services were discussed with this patient, including applicable screening as appropriate for cardiovascular disease, diabetes, osteopenia/osteoporosis, and glaucoma.  As appropriate for age/gender, discussed screening for colorectal cancer, prostate cancer, breast cancer, and cervical cancer. Checklist reviewing preventive services available has been given to the patient.    Reviewed patients plan of care and provided an AVS. The Basic Care Plan (routine screening as documented in Health Maintenance) for Yola meets the Care Plan requirement. This Care Plan has been established and reviewed with the Patient.    Counseling Resources:  ATP IV " Guidelines  Pooled Cohorts Equation Calculator  Breast Cancer Risk Calculator  Breast Cancer: Medication to Reduce Risk  FRAX Risk Assessment  ICSI Preventive Guidelines  Dietary Guidelines for Americans, 2010  USDA's MyPlate  ASA Prophylaxis  Lung CA Screening    Zoraida Braga MD  [facility]    Identified Health Risks:

## 2021-03-19 NOTE — PROGRESS NOTES
The patient was provided with suggestions to help her develop a healthy physical lifestyle.  Information on urinary incontinence and treatment options given to patient.  The patient was provided with suggestions to help her develop a healthy emotional lifestyle.

## 2021-03-19 NOTE — RESULT ENCOUNTER NOTE
Timothy Aceves,    This is to inform you regarding your test result.    Your total cholesterol is normal.  HDL which is called good cholesterol is normal.  Your LDL cholesterol is normal.  This is often call bad cholesterol and high levels increase the risk for heart attacks and strokes.  Your triglycerides are normal.  Basic metabolic panel which includes electrolytes and kidney fucntion is normal  Glucose which is your blood sugar is slightly elevated.  HbA1c which is average glucose during last 3 months is 6%  Your diabetes is under control but numbers have gone up slightly compared to the last time  CBC result which includes white count Hemoglobin and  Platelet Counts is normal.       Lab Results       Component                Value               Date                       A1C                      6.0                 03/19/2021                 A1C                      5.9                 06/17/2020                 A1C                      6.5                 05/07/2019                 A1C                      6.0                 10/14/2018                 A1C                      5.9                 05/03/2018                    Sincerely,      Dr.Nasima Maria Luz MD,FACP

## 2021-03-19 NOTE — PATIENT INSTRUCTIONS
Labs today  You are due for mammogram.  Please call the following number to make appointment :  [phone]  It is located in [address]  Labs today  Drink plenty of fluids.  Take extra rest.  Use saline nasal spray.  Take Tylenol as needed for pain  Make appointment with dermatology  Keep appointment with cardiology  Check Blood Pressure daily   Start taking hydralazine 25 mg three times a day  Hold for SBP less than 100  Follow up in one week  Seek sooner medical attention if there is any worsening of symptoms or problems.            Patient Education   Personalized Prevention Plan  You are due for the preventive services outlined below.  Your care team is available to assist you in scheduling these services.  If you have already completed any of these items, please share that information with your care team to update in your medical record.  Health Maintenance Due   Topic Date Due     Diabetic Foot Exam  Never done     FALL RISK ASSESSMENT  05/07/2020     Flu Vaccine (1) 09/01/2020     Depression Assessment  12/12/2020     A1C Lab  12/17/2020     Eye Exam  02/19/2021     Preventive Health Recommendations    See your health care provider every year to    Review health changes.     Discuss preventive care.      Review your medicines if your doctor has prescribed any.    You no longer need a yearly Pap test unless you've had an abnormal Pap test in the past 10 years. If you have vaginal symptoms, such as bleeding or discharge, be sure to talk with your provider about a Pap test.    Every 1 to 2 years, have a mammogram.  If you are over 69, talk with your health care provider about whether or not you want to continue having screening mammograms.    Every 10 years, have a colonoscopy. Or, have a yearly FIT test (stool test). These exams will check for colon cancer.     Have a cholesterol test every 5 years, or more often if your doctor advises it.     Have a diabetes test (fasting glucose) every three years. If you  are at risk for diabetes, you should have this test more often.     At age 65, have a bone density scan (DEXA) to check for osteoporosis (brittle bone disease).    Shots:    Get a flu shot each year.    Get a tetanus shot every 10 years.    Talk to your doctor about your pneumonia vaccines. There are now two you should receive - Pneumovax (PPSV 23) and Prevnar (PCV 13).    Talk to your pharmacist about the shingles vaccine.    Talk to your doctor about the hepatitis B vaccine.    Nutrition:     Eat at least 5 servings of fruits and vegetables each day.    Eat whole-grain bread, whole-wheat pasta and brown rice instead of white grains and rice.    Get adequate Calcium and Vitamin D.     Lifestyle    Exercise at least 150 minutes a week (30 minutes a day, 5 days a week). This will help you control your weight and prevent disease.    Limit alcohol to one drink per day.    No smoking.     Wear sunscreen to prevent skin cancer.     See your dentist twice a year for an exam and cleaning.    See your eye doctor every 1 to 2 years to screen for conditions such as glaucoma, macular degeneration and cataracts.    Personalized Prevention Plan  You are due for the preventive services outlined below.  Your care team is available to assist you in scheduling these services.  If you have already completed any of these items, please share that information with your care team to update in your medical record.  Health Maintenance   Topic Date Due     DIABETIC FOOT EXAM  Never done     FALL RISK ASSESSMENT  05/07/2020     INFLUENZA VACCINE (1) 09/01/2020     PHQ-9  12/12/2020     A1C  12/17/2020     EYE EXAM  02/19/2021     MAMMO SCREENING  05/08/2021     BMP  06/17/2021     LIPID  06/17/2021     MICROALBUMIN  06/17/2021     COLORECTAL CANCER SCREENING  06/18/2021     MEDICARE ANNUAL WELLNESS VISIT  03/19/2022     DEXA  05/05/2025     ADVANCE CARE PLANNING  03/19/2026     HEPATITIS C SCREENING  Completed     DEPRESSION ACTION PLAN   Completed     Pneumococcal Vaccine: Pediatrics (0 to 5 Years) and At-Risk Patients (6 to 64 Years)  Completed     Pneumococcal Vaccine: 65+ Years  Completed     ZOSTER IMMUNIZATION  Completed     COVID-19 Vaccine  Completed     IPV IMMUNIZATION  Aged Out     MENINGITIS IMMUNIZATION  Aged Out     DTAP/TDAP/TD IMMUNIZATION  Discontinued     Your Health Risk Assessment indicates you feel you are not in good health    A healthy lifestyle helps keep the body fit and the mind alert. It helps protect you from disease, helps you fight disease, and helps prevent chronic disease (disease that doesn't go away) from getting worse. This is important as you get older and begin to notice twinges in muscles and joints and a decline in the strength and stamina you once took for granted. A healthy lifestyle includes good healthcare, good nutrition, weight control, recreation, and regular exercise. Avoid harmful substances and do what you can to keep safe. Another part of a healthy lifestyle is stay mentally active and socially involved.    Good healthcare     Have a wellness visit every year.     If you have new symptoms, let us know right away. Don't wait until the next checkup.     Take medicines exactly as prescribed and keep your medicines in a safe place. Tell us if your medicine causes problems.   Healthy diet and weight control     Eat 3 or 4 small, nutritious, low-fat, high-fiber meals a day. Include a variety of fruits, vegetables, and whole-grain foods.     Make sure you get enough calcium in your diet. Calcium, vitamin D, and exercise help prevent osteoporosis (bone thinning).     If you live alone, try eating with others when you can. That way you get a good meal and have company while you eat it.     Try to keep a healthy weight. If you eat more calories than your body uses for energy, it will be stored as fat and you will gain weight.     Recreation   Recreation is not limited to sports and team events. It includes any  activity that provides relaxation, interest, enjoyment, and exercise. Recreation provides an outlet for physical, mental, and social energy. It can give a sense of worth and achievement. It can help you stay healthy.    Mental Exercise and Social Involvement  Mental and emotional health is as important as physical health. Keep in touch with friends and family. Stay as active as possible. Continue to learn and challenge yourself.   Things you can do to stay mentally active are:    Learn something new, like a foreign language or musical instrument.     Play SCRABBLE or do crossword puzzles. If you cannot find people to play these games with you at home, you can play them with others on your computer through the Internet.     Join a games club--anything from card games to chess or checkers or lawn bowling.     Start a new hobby.     Go back to school.     Volunteer.     Read.   Keep up with world events.    Urinary Incontinence, Female (Adult)   Urinary incontinence means loss of bladder control. This problem affects many women, especially as they get older. If you have incontinence, you may be embarrassed to ask for help. But know that this problem can be treated.   Types of Incontinence  There are different types of incontinence. Two of the main types are described here. You can have more than one type.     Stress incontinence. With this type, urine leaks when pressure (stress) is put on the bladder. This may happen when you cough, sneeze, or laugh. Stress incontinence most often occurs because the pelvic floor muscles that support the bladder and urethra are weak. This can happen after pregnancy and vaginal childbirth or a hysterectomy. It can also be due to excess body weight or hormone changes.    Urge incontinence (also called overactive bladder). With this type, a sudden urge to urinate is felt often. This may happen even though there may not be much urine in the bladder. The need to urinate often during the  night is common. Urge incontinence most often occurs because of bladder spasms. This may be due to bladder irritation or infection. Damage to bladder nerves or pelvic muscles, constipation, and certain medicines can also lead to urge incontinence.  Treatment depends on the cause. Further evaluation is needed to find the type you have. This will likely include an exam and certain tests. Based on the results, you and your healthcare provider can then plan treatment. Until a diagnosis is made, the home care tips below can help ease symptoms.   Home care    Do pelvic floor muscle exercises, if they are prescribed. The pelvic floor muscles help support the bladder and urethra. Many women find that their symptoms improve when doing special exercises that strengthen these muscles. To do the exercises, contract the muscles you would use to stop your stream of urine. But do this when you re not urinating. Hold for 10 seconds, then relax. Repeat 10 to 20 times in a row, at least 3 times a day. Your healthcare provider may give you other instructions for how to do the exercises and how often.    Keep a bladder diary. This helps track how often and how much you urinate over a set period of time. Bring this diary with you to your next visit with the provider. The information can help your provider learn more about your bladder problem.    Lose weight, if advised to by your provider. Extra weight puts pressure on the bladder. Your provider can help you create a weight-loss plan that s right for you. This may include exercising more and making certain diet changes.    Don't have foods and drinks that may irritate the bladder. These can include alcohol and caffeinated drinks.    Quit smoking. Smoking and other tobacco use can lead to a long-term (chronic) cough that strains the pelvic floor muscles. Smoking may also damage the bladder and urethra. Talk with your provider about treatments or methods you can use to quit  smoking.    If drinking large amounts of fluid makes you have symptoms, you may be advised to limit your fluid intake. You may also be advised to drink most of your fluids during the day and to limit fluids at night.    If you re worried about urine leakage or accidents, you may wear absorbent pads to catch urine. Change the pads often. This helps reduce discomfort. It may also reduce the risk of skin or bladder infections.    Follow-up care  Follow up with your healthcare provider, or as directed. It may take some to find the right treatment for your problem. But healthy lifestyle changes can be made right away. These include such things as exercising on a regular basis, eating a healthy diet, losing weight (if needed), and quitting smoking. Your treatment plan may include special therapies or medicines. Certain procedures or surgery may also be options. Talk about any questions you have with your provider.   When to seek medical advice  Call the healthcare provider right away if any of these occur:    Fever of 100.4 F (38 C) or higher, or as directed by your provider    Bladder pain or fullness    Belly swelling    Nausea or vomiting    Back pain    Weakness, dizziness, or fainting  [employer] last reviewed this educational content on 1/1/2020 2000-2020 [employer]. All rights reserved. This information is not intended as a substitute for professional medical care. Always follow your healthcare professional's instructions.        Your Health Risk Assessment indicates you feel you are not in good emotional health.    Recreation   Recreation is not limited to sports and team events. It includes any activity that provides relaxation, interest, enjoyment, and exercise. Recreation provides an outlet for physical, mental, and social energy. It can give a sense of worth and achievement. It can help you stay healthy.    Mental Exercise and Social Involvement  Mental and emotional health is as important as  physical health. Keep in touch with friends and family. Stay as active as possible. Continue to learn and challenge yourself.   Things you can do to stay mentally active are:    Learn something new, like a foreign language or musical instrument.     Play SCRABBLE or do crossword puzzles. If you cannot find people to play these games with you at home, you can play them with others on your computer through the Internet.     Join a games club--anything from card games to chess or checkers or lawn bowling.     Start a new hobby.     Go back to school.     Volunteer.     Read.   Keep up with world events.

## 2021-03-19 NOTE — LETTER
March 22, 2021      Yola Mayo  [address]  [facility] [address]        Dear ,    We are writing to inform you of your test results.    Timothy Aceves,     This is to inform you regarding your test result.     TSH which is thyroid hormone is normal.   Your total cholesterol is normal.   HDL which is called good cholesterol is normal.   Your LDL cholesterol is normal.  This is often call bad cholesterol and high levels increase the risk for heart attacks and strokes.   Your triglycerides are normal.   Basic metabolic panel which includes electrolytes and kidney fucntion is normal   Glucose which is your blood sugar is slightly elevated.   HbA1c which is average glucose during last 3 months is 6%   Your diabetes is under control but numbers have gone up slightly compared to the last time   CBC result which includes white count Hemoglobin and  Platelet Counts is norm       Lab Results        Component                Value               Date                        A1C                      6.0                 03/19/2021                  A1C                      5.9                 06/17/2020                  A1C                      6.5                 05/07/2019                  A1C                      6.0                 10/14/2018                  A1C                      5.9                 05/03/2018                           Resulted Orders   TSH with free T4 reflex   Result Value Ref Range    TSH 1.93 0.40 - 4.00 mU/L   Lipid panel reflex to direct LDL Fasting   Result Value Ref Range    Cholesterol 135 <200 mg/dL    Triglycerides 67 <150 mg/dL      Comment:      Fasting specimen    HDL Cholesterol 80 >49 mg/dL    LDL Cholesterol Calculated 42 <100 mg/dL      Comment:      Desirable:       <100 mg/dl    Non HDL Cholesterol 55 <130 mg/dL   Hemoglobin A1c   Result Value Ref Range    Hemoglobin A1C 6.0 (H) 0 - 5.6 %      Comment:      Normal <5.7% Prediabetes 5.7-6.4%  Diabetes 6.5% or  higher - adopted from ADA   consensus guidelines.     Basic metabolic panel   Result Value Ref Range    Sodium 137 133 - 144 mmol/L    Potassium 3.6 3.4 - 5.3 mmol/L    Chloride 104 94 - 109 mmol/L    Carbon Dioxide 30 20 - 32 mmol/L    Anion Gap 3 3 - 14 mmol/L    Glucose 122 (H) 70 - 99 mg/dL      Comment:      Fasting specimen    Urea Nitrogen 11 7 - 30 mg/dL    Creatinine 0.70 0.52 - 1.04 mg/dL    GFR Estimate 84 >60 mL/min/[1.73_m2]      Comment:      Non  GFR Calc  Starting 12/18/2018, serum creatinine based estimated GFR (eGFR) will be   calculated using the Chronic Kidney Disease Epidemiology Collaboration   (CKD-EPI) equation.      GFR Estimate If Black >90 >60 mL/min/[1.73_m2]      Comment:       GFR Calc  Starting 12/18/2018, serum creatinine based estimated GFR (eGFR) will be   calculated using the Chronic Kidney Disease Epidemiology Collaboration   (CKD-EPI) equation.      Calcium 9.3 8.5 - 10.1 mg/dL   CBC with platelets   Result Value Ref Range    WBC 7.2 4.0 - 11.0 10e9/L    RBC Count 4.60 3.8 - 5.2 10e12/L    Hemoglobin 14.1 11.7 - 15.7 g/dL    Hematocrit 40.7 35.0 - 47.0 %    MCV 89 78 - 100 fl    MCH 30.7 26.5 - 33.0 pg    MCHC 34.6 31.5 - 36.5 g/dL    RDW 14.6 10.0 - 15.0 %    Platelet Count 278 150 - 450 10e9/L       If you have any questions or concerns, please call the clinic at the number listed above.       Sincerely,       Dr.Nasima Maria Luz MD,FACP

## 2021-03-21 NOTE — RESULT ENCOUNTER NOTE
Please notify patient by sending following letter with copy of test results      Timothy Aceves,    This is to inform you regarding your test result.    TSH which is thyroid hormone is normal.  Your total cholesterol is normal.  HDL which is called good cholesterol is normal.  Your LDL cholesterol is normal.  This is often call bad cholesterol and high levels increase the risk for heart attacks and strokes.  Your triglycerides are normal.  Basic metabolic panel which includes electrolytes and kidney fucntion is normal  Glucose which is your blood sugar is slightly elevated.  HbA1c which is average glucose during last 3 months is 6%  Your diabetes is under control but numbers have gone up slightly compared to the last time  CBC result which includes white count Hemoglobin and  Platelet Counts is norm      Lab Results       Component                Value               Date                       A1C                      6.0                 03/19/2021                 A1C                      5.9                 06/17/2020                 A1C                      6.5                 05/07/2019                 A1C                      6.0                 10/14/2018                 A1C                      5.9                 05/03/2018              Sincerely,      Dr.Nasima Maria Luz MD,FACP

## 2021-03-24 ENCOUNTER — HOSPITAL ENCOUNTER (OUTPATIENT)
Dept: MAMMOGRAPHY | Facility: CLINIC | Age: 76
Discharge: HOME | End: 2021-03-24
Attending: INTERNAL MEDICINE | Admitting: INTERNAL MEDICINE
Payer: MEDICARE

## 2021-03-24 DIAGNOSIS — Z12.31 VISIT FOR SCREENING MAMMOGRAM: ICD-10-CM

## 2021-03-24 PROCEDURE — 77063 BREAST TOMOSYNTHESIS BI: CPT

## 2021-03-24 NOTE — RESULT ENCOUNTER NOTE
Timothy Aceves,    This is to inform you regarding your test result.    You need additional imaging of breast  Please call and schedule diagnostic mammogram and ultrasound     Sincerely,      Dr.Nasima Maria Luz MD,FACP

## 2021-03-26 ENCOUNTER — OFFICE VISIT (OUTPATIENT)
Dept: FAMILY MEDICINE | Facility: CLINIC | Age: 76
End: 2021-03-26
Payer: MEDICARE

## 2021-03-26 VITALS
TEMPERATURE: 97.7 F | BODY MASS INDEX: 32.42 KG/M2 | HEART RATE: 69 BPM | HEIGHT: 63 IN | OXYGEN SATURATION: 98 % | SYSTOLIC BLOOD PRESSURE: 162 MMHG | DIASTOLIC BLOOD PRESSURE: 82 MMHG

## 2021-03-26 DIAGNOSIS — L71.9 ROSACEA: ICD-10-CM

## 2021-03-26 DIAGNOSIS — I16.0 HYPERTENSIVE URGENCY: ICD-10-CM

## 2021-03-26 DIAGNOSIS — R21 BUTTERFLY RASH: Primary | ICD-10-CM

## 2021-03-26 LAB
CRP SERPL-MCNC: 5.8 MG/L (ref 0–8)
ERYTHROCYTE [SEDIMENTATION RATE] IN BLOOD BY WESTERGREN METHOD: 30 MM/H (ref 0–30)

## 2021-03-26 PROCEDURE — 99214 OFFICE O/P EST MOD 30 MIN: CPT | Performed by: INTERNAL MEDICINE

## 2021-03-26 PROCEDURE — 86140 C-REACTIVE PROTEIN: CPT | Performed by: INTERNAL MEDICINE

## 2021-03-26 PROCEDURE — 36415 COLL VENOUS BLD VENIPUNCTURE: CPT | Performed by: INTERNAL MEDICINE

## 2021-03-26 PROCEDURE — 85652 RBC SED RATE AUTOMATED: CPT | Performed by: INTERNAL MEDICINE

## 2021-03-26 PROCEDURE — 86039 ANTINUCLEAR ANTIBODIES (ANA): CPT | Performed by: INTERNAL MEDICINE

## 2021-03-26 PROCEDURE — 86038 ANTINUCLEAR ANTIBODIES: CPT | Performed by: INTERNAL MEDICINE

## 2021-03-26 RX ORDER — METRONIDAZOLE 7.5 MG/G
GEL TOPICAL 2 TIMES DAILY
Qty: 45 G | Refills: 1 | Status: SHIPPED | OUTPATIENT
Start: 2021-03-26 | End: 2021-06-08

## 2021-03-26 ASSESSMENT — PATIENT HEALTH QUESTIONNAIRE - PHQ9: SUM OF ALL RESPONSES TO PHQ QUESTIONS 1-9: 5

## 2021-03-26 NOTE — RESULT ENCOUNTER NOTE
Timothy Aceves,    This is to inform you regarding your test result.    Sed rate is normal  Other test results are pending.      Sincerely,      Dr.Nasima Maria Luz MD,FACP

## 2021-03-26 NOTE — PATIENT INSTRUCTIONS
Labs today  Use Metrogel at [facility] rash  Keep appointment with cardiology and dermatology  Monitor your blood pressure twice  week  at home.  Bring those readings on your next visit.  Notify us if your blood pressure readings consistently stays greater than 140/90.  Follow up in 2 months  Seek sooner medical attention if there is any worsening of symptoms or problems.      Patient Education     Rosacea   Rosacea is a long-lasting (chronic) skin condition that often affects the face. In the early stages it causes easy flushing or blushing. Redness may become long-term (permanent) as the small blood vessels of the face widen (dilate). You may have small, red, pus-filled bumps (pustules). It looks like a bad case of acne, and has been called adult acne. But it's not caused by the same things that cause acne.  You will have flare-ups that come and go. They may happen every few weeks or every few months. Experts don't know what causes rosacea. If not treated, it tends to get worse over time.  Some older men often have a more severe form of rosacea (rhinophyma). The oil glands on the skin of the nose become larger and the nose gets bigger. The cheeks also become puffy. Alcohol may make the flushing worse. But this condition is not caused by alcohol use.  Rosacea can be treated with gels and creams for the skin (topical). You may need antibiotic medicine by mouth for more severe cases. You should see improvement in the first 4 weeks. Dilated blood vessels can be treated with a small electric needle or laser surgery. Rhinophyma can be treated with surgery. Or it may be treated by surgically scraping the skin (dermabrasion).  Home care    Don't have foods or drinks that make your face red or flushed. These include hot drinks, spicy foods, caffeine, and alcohol.    Exercise indoors or in a cool area so you won't get overheated.    Limit your sun exposure. Use a sunscreen with at least SPF 30 on your face every day.    Stay  out of extreme hot or extreme cold weather.    Don't scrub your face. That will irritate the skin and make the redness worse.    Don't use harsh soaps or moisturizers with irritating ingredients. You may need to use hypoallergenic cosmetics.    Don't use over-the-counter treatments unless your healthcare provider advises it. Some of these treatments may make rosacea worse.    Try to figure out what triggers your flares. This might be stress, sun exposure, or certain foods.    Follow-up care  Follow up with your healthcare provider, or as advised. Contact your provider if your condition does not get better after taking the medicines you were given. Getting treatment early can stop rosacea from getting worse.  When to seek medical advice  Call your healthcare provider right away if you have redness, burning, or a gritty feeling in your eyes.  [email or web address] last reviewed this educational content on 11/1/2019 2000-2020 [employer]. All rights reserved. This information is not intended as a substitute for professional medical care. Always follow your healthcare professional's instructions.           Patient Education     Treating Rosacea     Use sunscreen with at least SPF 15 or more every day.    There's no cure for rosacea. But rosacea can be controlled in most cases, particularly with medical treatment to manage your symptoms.   Your healthcare provider may prescribe one or more treatments to put on your skin each day. You may also be given medicines to take by mouth if you have more severe rosacea symptoms. To relieve rosacea eye symptoms, you may need prescription eye drops. Stay away from things that can easily cause your rosacea to flare up. These include spicy foods, alcohol, getting embarrassed, and going from a cold environment to a warm environment.    You may have surgery to fix the more severe forms of rosacea that affect the nose (rhinophyma). The term means the redness and swelling that cause  the nose to enlarge.   Your role in medical treatment  How well your treatment works depends partly on you. Follow your healthcare provider s treatment plan. Rosacea symptoms often get better with medicines. But they tend to get worse again if you stop taking the medicines. If your symptoms continue or get worse, ask about other treatment options, including combinations of treatments.   Rosacea self-care  Besides sticking with your treatment plan, follow these tips to care for your skin:    Wash your face twice a day with a gentle facial cleanser. Rinse your skin well with warm (not hot) water. Pat your skin dry with a cotton towel.    Don t scrub your skin or use sponges, brushes, or other abrasive tools. Doing so can irritate your skin.    Don't use harsh scrubs or astringents. These products can irritate your skin.    If you shave your face, use an electric razor.    Apply sunscreen with at least SPF 15 or more every day. Sun exposure can make rosacea symptoms worse.    Choose skin care products and cosmetics that don't irritate the skin, and are oil-free and fragrance-free. If your rosacea tends to include pimples, look for the word noncomedogenic on your products.    Don't put steroids on the skin sores. Steroids may make rosacea worse.   Next step  Learning about rosacea and treating it early is the first step toward controlling this disease. With proper treatment and self-care, you can manage your symptoms and feel better about your skin. The National Rosacea Society is a great resource for information.   What causes rosacea flare-ups?  It s often hard to pinpoint the factors that cause rosacea flare-ups. Different people can be affected by different triggers. Common triggers include weather extremes, sun exposure, alcoholic or hot beverages, spicy food, physical exertion, stress, illness, some skin products, and medicines. To prevent flare-ups, keep a list of things that seem to make your rosacea worse and  try to stay away from them.   [employer] last reviewed this educational content on 8/1/2019 2000-2020 [employer]. All rights reserved. This information is not intended as a substitute for professional medical care. Always follow your healthcare professional's instructions.

## 2021-03-26 NOTE — PROGRESS NOTES
Assessment & Plan     Yola was seen today for recheck.    Diagnoses and all orders for this visit:    Butterfly rash  -     CRP, inflammation  -     Anti Nuclear Cristal IgG by IFA with Reflex  -     ESR: Erythrocyte sedimentation rate   she has butterfly rash.It looks like rosacea.    Doxycycline helped her little bit  I prescribed MetroGel  She has upcoming appointment with dermatologist  She has her own dermatologist  The appointment is sometime in April      Rosacea  -     metroNIDAZOLE (METROGEL) 0.75 % external gel; Apply topically 2 times daily    Hypertensive urgency    Her blood pressure has improved a lot  Her home blood pressure readings are under excellent control  It was higher here but when I checked it after resting and supporting her arm with proper technique it did improve  She has upcoming appointment with cardiology  Monitor your blood pressure once a week  at home.  Bring those readings on your next visit.  Notify us if your blood pressure readings consistently stays greater than 140/90.    Disclaimer: This note consists of symbols derived from keyboarding, dictation and/or voice recognition software. As a result, there may be errors in the script that have gone undetected. Please consider this when interpreting information found in this chart.    30 minutes spent on the date of the encounter doing chart review, history and exam, documentation and further activities as noted above    She has butterfly rash [id number]}     See Patient Instructions  Patient Instructions     Labs today  Use Metrogel at butterfly rash  Keep appointment with cardiology and dermatology  Monitor your blood pressure twice  week  at home.  Bring those readings on your next visit.  Notify us if your blood pressure readings consistently stays greater than 140/90.  Follow up in 2 months  Seek sooner medical attention if there is any worsening of symptoms or problems.      Patient Education     Rosacea   Rosacea is a long-lasting  (chronic) skin condition that often affects the face. In the early stages it causes easy flushing or blushing. Redness may become long-term (permanent) as the small blood vessels of the face widen (dilate). You may have small, red, pus-filled bumps (pustules). It looks like a bad case of acne, and has been called adult acne. But it's not caused by the same things that cause acne.  You will have flare-ups that come and go. They may happen every few weeks or every few months. Experts don't know what causes rosacea. If not treated, it tends to get worse over time.  Some older men often have a more severe form of rosacea (rhinophyma). The oil glands on the skin of the nose become larger and the nose gets bigger. The cheeks also become puffy. Alcohol may make the flushing worse. But this condition is not caused by alcohol use.  Rosacea can be treated with gels and creams for the skin (topical). You may need antibiotic medicine by mouth for more severe cases. You should see improvement in the first 4 weeks. Dilated blood vessels can be treated with a small electric needle or laser surgery. Rhinophyma can be treated with surgery. Or it may be treated by surgically scraping the skin (dermabrasion).  Home care    Don't have foods or drinks that make your face red or flushed. These include hot drinks, spicy foods, caffeine, and alcohol.    Exercise indoors or in a cool area so you won't get overheated.    Limit your sun exposure. Use a sunscreen with at least SPF 30 on your face every day.    Stay out of extreme hot or extreme cold weather.    Don't scrub your face. That will irritate the skin and make the redness worse.    Don't use harsh soaps or moisturizers with irritating ingredients. You may need to use hypoallergenic cosmetics.    Don't use over-the-counter treatments unless your healthcare provider advises it. Some of these treatments may make rosacea worse.    Try to figure out what triggers your flares. This might  be stress, sun exposure, or certain foods.    Follow-up care  Follow up with your healthcare provider, or as advised. Contact your provider if your condition does not get better after taking the medicines you were given. Getting treatment early can stop rosacea from getting worse.  When to seek medical advice  Call your healthcare provider right away if you have redness, burning, or a gritty feeling in your eyes.  Remember [employer] last reviewed this educational content on 11/1/2019 2000-2020 [employer]. All rights reserved. This information is not intended as a substitute for professional medical care. Always follow your healthcare professional's instructions.           Patient Education     Treating Rosacea     Use sunscreen with at least SPF 15 or more every day.    There's no cure for rosacea. But rosacea can be controlled in most cases, particularly with medical treatment to manage your symptoms.   Your healthcare provider may prescribe one or more treatments to put on your skin each day. You may also be given medicines to take by mouth if you have more severe rosacea symptoms. To relieve rosacea eye symptoms, you may need prescription eye drops. Stay away from things that can easily cause your rosacea to flare up. These include spicy foods, alcohol, getting embarrassed, and going from a cold environment to a warm environment.    You may have surgery to fix the more severe forms of rosacea that affect the nose (rhinophyma). The term means the redness and swelling that cause the nose to enlarge.   Your role in medical treatment  How well your treatment works depends partly on you. Follow your healthcare provider s treatment plan. Rosacea symptoms often get better with medicines. But they tend to get worse again if you stop taking the medicines. If your symptoms continue or get worse, ask about other treatment options, including combinations of treatments.   Rosacea self-care  Besides sticking with your  treatment plan, follow these tips to care for your skin:    Wash your face twice a day with a gentle facial cleanser. Rinse your skin well with warm (not hot) water. Pat your skin dry with a cotton towel.    Don t scrub your skin or use sponges, brushes, or other abrasive tools. Doing so can irritate your skin.    Don't use harsh scrubs or astringents. These products can irritate your skin.    If you shave your face, use an electric razor.    Apply sunscreen with at least SPF 15 or more every day. Sun exposure can make rosacea symptoms worse.    Choose skin care products and cosmetics that don't irritate the skin, and are oil-free and fragrance-free. If your rosacea tends to include pimples, look for the word noncomedogenic on your products.    Don't put steroids on the skin sores. Steroids may make rosacea worse.   Next step  Learning about rosacea and treating it early is the first step toward controlling this disease. With proper treatment and self-care, you can manage your symptoms and feel better about your skin. The National Rosacea Society is a great resource for information.   What causes rosacea flare-ups?  It s often hard to pinpoint the factors that cause rosacea flare-ups. Different people can be affected by different triggers. Common triggers include weather extremes, sun exposure, alcoholic or hot beverages, spicy food, physical exertion, stress, illness, some skin products, and medicines. To prevent flare-ups, keep a list of things that seem to make your rosacea worse and try to stay away from them.   [employer] last reviewed this educational content on 8/1/2019 2000-2020 [employer]. All rights reserved. This information is not intended as a substitute for professional medical care. Always follow your healthcare professional's instructions.               Return in about 2 months (around 5/26/2021) for Hypertension, medication follow up.    Zoraida Braga MD  [facility]  "TRINITY Funez is a 75 year old who presents for the following health issues     HPI     Follow up for Blood Pressure     she has appointment with dermatology on april 13th  Has upcoming appointment with cardiology  151/86  130/69  141/71  124/76  125/79    Home blood pressure readings are under good control   sinus infection is improving      Review of Systems   Constitutional, HEENT, cardiovascular, pulmonary, gi and gu systems are negative, except as otherwise noted.      Objective    BP (!) 162/82   Pulse 69   Temp 97.7  F (36.5  C) (Temporal)   Ht 1.6 m (5' 3\")   SpO2 98%   BMI 32.42 kg/m    Body mass index is 32.42 kg/m .  Physical Exam       GENERAL APPEARANCE: healthy, alert and no distress  EYES: Eyes grossly normal to inspection, PERRL and conjunctivae and sclerae normal  HENT: ear canals and TM's normal and nose and mouth without ulcers or lesions  NECK: no adenopathy  RESP: lungs clear to auscultation - no rales, rhonchi or wheezes  CV: regular rates and rhythm, normal S1 S2, no S3    She has butterfly rash which looks like rosacea        "

## 2021-03-27 NOTE — RESULT ENCOUNTER NOTE
Timothy Aceves,    This is to inform you regarding your test result.    C-reactive protein and sed rate which is test to check for inflammation is normal        Sincerely,      Dr.Nasima Maria Luz MD,FACP

## 2021-03-29 LAB
ANA PAT SER IF-IMP: ABNORMAL
ANA SER QL IF: ABNORMAL
ANA TITR SER IF: ABNORMAL {TITER}

## 2021-03-31 ENCOUNTER — HOSPITAL ENCOUNTER (OUTPATIENT)
Dept: MAMMOGRAPHY | Facility: CLINIC | Age: 76
End: 2021-03-31
Attending: INTERNAL MEDICINE
Payer: MEDICARE

## 2021-03-31 DIAGNOSIS — Z11.59 ENCOUNTER FOR SCREENING FOR OTHER VIRAL DISEASES: ICD-10-CM

## 2021-03-31 DIAGNOSIS — R92.8 ABNORMAL MAMMOGRAM: ICD-10-CM

## 2021-03-31 PROCEDURE — 76642 ULTRASOUND BREAST LIMITED: CPT | Mod: RT

## 2021-03-31 NOTE — RESULT ENCOUNTER NOTE
Timothy Aceves,    This is to inform you regarding your test result.    Your SOCRATES test for lupus is borderline positive  Please take the copy of all your lab results when you see your dermatologist  She may consider more investigation for this   How is your rash on your face ?    Sincerely,      Dr.Nasima Maria Luz MD,FACP

## 2021-04-02 ENCOUNTER — TRANSFERRED RECORDS (OUTPATIENT)
Dept: HEALTH INFORMATION MANAGEMENT | Facility: CLINIC | Age: 76
End: 2021-04-02

## 2021-04-02 LAB
RETINOPATHY: NEGATIVE
RETINOPATHY: NEGATIVE

## 2021-04-03 DIAGNOSIS — Z11.59 ENCOUNTER FOR SCREENING FOR OTHER VIRAL DISEASES: ICD-10-CM

## 2021-04-03 LAB
SARS-COV-2 RNA RESP QL NAA+PROBE: NORMAL
SPECIMEN SOURCE: NORMAL

## 2021-04-03 PROCEDURE — U0005 INFEC AGEN DETEC AMPLI PROBE: HCPCS | Performed by: INTERNAL MEDICINE

## 2021-04-03 PROCEDURE — U0003 INFECTIOUS AGENT DETECTION BY NUCLEIC ACID (DNA OR RNA); SEVERE ACUTE RESPIRATORY SYNDROME CORONAVIRUS 2 (SARS-COV-2) (CORONAVIRUS DISEASE [COVID-19]), AMPLIFIED PROBE TECHNIQUE, MAKING USE OF HIGH THROUGHPUT TECHNOLOGIES AS DESCRIBED BY CMS-2020-01-R: HCPCS | Performed by: INTERNAL MEDICINE

## 2021-04-04 LAB
LABORATORY COMMENT REPORT: NORMAL
SARS-COV-2 RNA RESP QL NAA+PROBE: NEGATIVE
SPECIMEN SOURCE: NORMAL

## 2021-04-04 NOTE — RESULT ENCOUNTER NOTE
Timothy Aceves,    This is to inform you regarding your test result.    Your PCR test for Covid-19 was negative .        Sincerely,      Dr.Nasima Maria Luz MD,FACP

## 2021-04-05 ENCOUNTER — OFFICE VISIT (OUTPATIENT)
Dept: CARDIOLOGY | Facility: CLINIC | Age: 76
End: 2021-04-05
Payer: MEDICARE

## 2021-04-05 VITALS
HEART RATE: 76 BPM | WEIGHT: 184 LBS | HEIGHT: 63 IN | SYSTOLIC BLOOD PRESSURE: 138 MMHG | BODY MASS INDEX: 32.6 KG/M2 | DIASTOLIC BLOOD PRESSURE: 72 MMHG

## 2021-04-05 DIAGNOSIS — I10 ESSENTIAL HYPERTENSION, BENIGN: Primary | ICD-10-CM

## 2021-04-05 DIAGNOSIS — I25.10 CORONARY ARTERY DISEASE INVOLVING NATIVE CORONARY ARTERY OF NATIVE HEART WITHOUT ANGINA PECTORIS: ICD-10-CM

## 2021-04-05 PROCEDURE — 99214 OFFICE O/P EST MOD 30 MIN: CPT | Performed by: NURSE PRACTITIONER

## 2021-04-05 ASSESSMENT — MIFFLIN-ST. JEOR: SCORE: 1298.75

## 2021-04-05 NOTE — PATIENT INSTRUCTIONS
Today's Recommendations    1. Continue exercising up 150 minutes a week  2. Continue all other medications without changes.  3. Please follow up with Krystal in 1 to 2 months.    Please send a [email or web address] message or call [phone] with questions or concerns.     Scheduling number [phone].

## 2021-04-05 NOTE — LETTER
4/5/2021    Zoraida Braga MD  [address]  [facility] [address]    RE: Yola Mayo       Dear Colleague,    I had the pleasure of seeing Yola Mayo in the [facility].  Cardiology Clinic Progress Note  Yola Mayo MRN# [id number]   YOB: 1945 Age: 75 year old     Primary cardiologist: Dr. Howard     Reason for visit: Annual follow-up    History of presenting illness:    Yola Mayo, a pleasant 75 year old patient who has a past medical history of coronary artery disease status post PCI to the mid and distal RCA 6/2018, hypertension, hyperlipidemia, very strong family history of premature coronary artery disease, and diabetes.       Due to symptoms of exertional angina after 1 block relieved with rest, she underwent a stress echocardiogram that was abnormal. Subsequently, a coronary angiogram revealed a 50-60% narrowing of the proximal LAD with 100% occlusion of the first diagonal above the LAD lesion.  The left circumflex had a 20-30% proximal narrowing.  The right coronary artery had a 60-70% narrowing in the proximal portion and a 95% narrowing in the midportion at the takeoff of the RV branch which is 95% narrowed.  She received 2 drug-eluting stents that were back to back to the right coronary artery with resolution of her symptoms.     Today Dee Dee returns for a follow-up.  She states that she has concerns regarding a banding chest tingling when she wakes up.  She notices the symptom when she lies on her right side and it resolves when she lies on her back.  Also she has right arm discomfort and has been diagnosed with bursitis and is treating it with Tylenol that improves her symptoms.  Recently hydralazine has been added given elevated blood pressures.  She brings in her home blood pressures that show her blood pressure is well controlled.    Over the last summer she was walking up to 2 miles a day  "without symptoms.  During the winter she was not as active and has recently started walking again.  She states that she is only able to walk 1 mile and has to stop due to fatigue and feeling \"winded\".  Her symptoms are not as severe as prior to her previous stenting in 2018.  She is undergoing a breast biopsy on Wednesday as there was abnormalities noted on a breast ultrasound and mammogram.           Assessment and Plan:     ASSESSMENT:    1. Coronary artery disease    Status post 2 drug eluting stents to the mid and distal RCA 6/27/2019    Residual 50-60% mid LAD lesion with 100% occlusion of the first diagonal    2. Hyperlipidemia, LDL goal<70    Most recent LDL was 42 3/19/21    Post angiogram started on atorvastatin 10 mg daily     3. Hypertension, goal less than 140/90    Toprol-XL to 25 mg daily, losartan to 100 mg daily and hydrochlorthiazide 25 mg daily hydralazine was recently added to her regiment    Home blood pressure readings shows blood pressure is well controlled with average around 130/70       PLAN:     1. Continue daily exercise for approximately 150 minutes a week  2. Try to increase endurance, but if symptoms progress and becomes more short of breath please let her clinic  3. Return to clinic in 1 to 2 months for follow-up            Review of Systems:     Review of Systems:  Skin:  Positive for rash   Eyes:  Positive for glasses  ENT:  Negative    Respiratory:  Positive for shortness of breath  Cardiovascular:  Negative Positive for;fatigue  Gastroenterology: Negative excessive gas or bloating  Genitourinary:  Negative urinary frequency;nocturia  Musculoskeletal:  Negative back pain  Neurologic:  Positive for headaches  Psychiatric:  Negative sleep disturbances;anxiety  Heme/Lymph/Imm:  Positive for allergies  Endocrine:  Positive for thyroid disorder;diabetes            Physical Exam:     hysical Exam:  Vitals: /72   Pulse 76   Ht 1.6 m (5' 3\")   Wt 83.5 kg (184 lb)   BMI 32.59 kg/m  "     Constitutional:  cooperative, alert and oriented, well developed, well nourished, in no acute distress        Skin:  warm and dry to the touch, no apparent skin lesions or masses noted        Head:  normocephalic, no masses or lesions        Eyes:  pupils equal and round;conjunctivae and lids unremarkable        ENT:  no pallor or cyanosis, dentition good        Neck:  not assessed this visit        Chest:  clear to auscultation;normal symmetry        Cardiac: regular rhythm occasional premature beats;bradycardic                Abdomen:      benign    Vascular:                                        Extremities and Back:  no edema   Right femoral access site CDI without bruit. No hematoma with mild bruising    Neurological:  no gross motor deficits;affect appropriate               Medications:     Current Outpatient Medications   Medication Sig Dispense Refill     acetaminophen (TYLENOL) 325 MG tablet Take 2 tablets (650 mg) by mouth 3 times daily 100 tablet 0     amoxicillin (AMOXIL) 500 MG capsule Take 4 capsules half to one hour before the procedure. 4 capsule 3     aspirin 81 MG tablet Take 81 mg by mouth daily       atorvastatin (LIPITOR) 10 MG tablet Take 1 tablet (10 mg) by mouth daily for cholestrol 90 tablet 3     calcium-vitamin D (CALTRATE) 600-400 MG-UNIT per tablet Take 1 tablet by mouth daily 60 tablet 11     cholecalciferol (VITAMIN D) 1000 UNIT tablet Take 1 tablet (1,000 Units) by mouth daily 100 tablet 3     Cyanocobalamin (VITAMIN B 12 PO) Take 1 tablet by mouth daily VIT B12 unknown otc dose       hydrALAZINE (APRESOLINE) 25 MG tablet Take 1 tablet (25 mg) by mouth 3 times daily 90 tablet 1     hydrochlorothiazide (HYDRODIURIL) 25 MG tablet Take 1 tablet (25 mg) by mouth daily for Blood Pressure 90 tablet 3     levothyroxine (SYNTHROID/LEVOTHROID) 112 MCG tablet Take 1 tablet (112 mcg) by mouth daily for thyroid 90 tablet 3     losartan (COZAAR) 100 MG tablet Take 1 tablet (100 mg) by mouth  daily for Blood Pressure 90 tablet 3     metFORMIN (GLUCOPHAGE-XR) 500 MG 24 hr tablet Take 2 tablets (1,000 mg) by mouth daily (with dinner) 180 tablet 3     metoprolol succinate ER (TOPROL-XL) 25 MG 24 hr tablet Take 1 tablet (25 mg) by mouth daily for Blood Pressure 90 tablet 3     metroNIDAZOLE (METROGEL) 0.75 % external gel Apply topically 2 times daily 45 g 1     multivitamin, therapeutic with minerals (THERA-VIT-M) TABS Take 1 tablet by mouth daily       nitroGLYcerin (NITROSTAT) 0.4 MG sublingual tablet For chest pain place 1 tablet under the tongue every 5 minutes for 3 doses. If symptoms persist 5 minutes after 1st dose call 911. 25 tablet 3     blood glucose (CONTOUR NEXT TEST) test strip Use to test blood sugar 1 time daily or as directed. 100 each 2     blood glucose monitoring (NITO MICROLET) lancets Use to test blood sugar 1 time daily or as directed. 100 each 3     CONTOUR NEXT EZ (CONTOUR NEXT EZ W/DEVICE KIT) w/Device KIT 1 kit once 1 kit 0       Family History   Problem Relation Age of Onset     Cancer Mother         ovarian  of it  at 51yo.     Other Cancer Mother      Diabetes Father         Adult onset     Hypertension Father      C.A.D. Father         Fatal Heart attack at 70.     Cerebrovascular Disease Father      Thyroid Disease Sister      Thyroid Disease Sister      Thyroid Disease Sister      Cancer Daughter      Thyroid Disease Daughter      Breast Cancer No family hx of      Cancer - colorectal No family hx of        Social History     Socioeconomic History     Marital status:      Spouse name: FABIAN CUADRA     Number of children: 2     Years of education: Not on file     Highest education level: Not on file   Occupational History     Occupation: [employer]     Employer: [employer]   Social Needs     Financial resource strain: Not on file     Food insecurity     Worry: Not on file     Inability: Not on file     Transportation needs     Medical: Not on file      Non-medical: Not on file   Tobacco Use     Smoking status: Never Smoker     Smokeless tobacco: Never Used   Substance and Sexual Activity     Alcohol use: Yes     Alcohol/week: 0.0 standard drinks     Comment: occasional     Drug use: No     Sexual activity: Yes     Partners: Male   Lifestyle     Physical activity     Days per week: Not on file     Minutes per session: Not on file     Stress: Not on file   Relationships     Social connections     Talks on phone: Not on file     Gets together: Not on file     Attends [employer] service: Not on file     Active member of club or organization: Not on file     Attends meetings of clubs or organizations: Not on file     Relationship status: Not on file     Intimate partner violence     Fear of current or ex partner: Not on file     Emotionally abused: Not on file     Physically abused: Not on file     Forced sexual activity: Not on file   Other Topics Concern      Service No     Blood Transfusions No     Caffeine Concern No     Occupational Exposure No     Hobby Hazards No     Sleep Concern No     Stress Concern No     Weight Concern Yes     Special Diet No     Back Care No     Exercise Yes     Comment: Approx 3 times a week for about 30 minutes     Bike Helmet Yes     Seat Belt Yes     Self-Exams Yes     Parent/sibling w/ CABG, MI or angioplasty before 65F 55M? Not Asked   Social History Narrative     Not on file            Past Medical History:     Past Medical History:   Diagnosis Date     BASAL CELL CANCER -NOSE      Diabetes (H)      Family history of diabetes mellitus     Father     Family history of ischemic heart disease     Father     Family history of malignant neoplasm of ovary     Mother     HX DISRUPT L ANT CRUCIATE S/P REPAIR 2003      HYPERTENSION       HYPOTHYROIDISM      LOC PRIM OSTEOARTH-L 1ST METATARSAL-CUNEIFORM JOINT 3/10/2004     Major depressive disorder, single episode, mild (H) 11/24/2014     MILD-MOD TRICUSPID REGURG. MOD PULM HTN  6/23/2005    ECHO  5/05     SPLENIC CYST               Past Surgical History:     Past Surgical History:   Procedure Laterality Date     ARTHROPLASTY KNEE Left 4/13/2016    Procedure: ARTHROPLASTY KNEE;  Surgeon: Giuseppe Lopez MD;  Location: [facility] OR     ARTHROPLASTY MINIMALLY INVASIVE HIP  4/18/2012    Procedure:ARTHROPLASTY MINIMALLY INVASIVE HIP; RIGHT TWO INCISION MINIMALLY INVASIVE HIP ARTHROPLASTY (YUVAL)^ (NO CELL SAVER) (C-ARM); Surgeon:GIUSEPPE LOPEZ; Location:[facility] OR     C APPENDECTOMY  8/87    incidental appi at s     C CHEMOSURG MOHS 1ST STAGE  9-99    Mohs-nose     C REPAIR CRUCIATE LIGAMENT,KNEE  8/2003    L   ACL reconstruction     C TOTAL ABDOM HYSTERECTOMY  8/87    W/ BSO fibroids, hx PID     HC COLONOSCOPY THRU STOMA, DIAGNOSTIC  4/03    normal, rec repeat 10 yrs     HYSTERECTOMY, PAP NO LONGER INDICATED       SURGICAL HISTORY OF -   Mult-~1978    infertility, laparotomies              Allergies:   Toprol xl [metoprolol], Ace inhibitors, Oxycodone, Tetanus toxoid, and Shellfish-derived products       Data:   All laboratory data reviewed:    Recent Labs   Lab Test 03/19/21  0857 06/17/20  0815 05/07/19  0911   LDL 42 31 36   HDL 80 80 75   NHDL 55 41 45   CHOL 135 121 120   TRIG 67 49 45   TSH 1.93 1.37 0.94       Lab Results   Component Value Date    WBC 7.2 03/19/2021    RBC 4.60 03/19/2021    HGB 14.1 03/19/2021    HCT 40.7 03/19/2021    MCV 89 03/19/2021    MCH 30.7 03/19/2021    MCHC 34.6 03/19/2021    RDW 14.6 03/19/2021     03/19/2021       Lab Results   Component Value Date     03/19/2021    POTASSIUM 3.6 03/19/2021    CHLORIDE 104 03/19/2021    CO2 30 03/19/2021    ANIONGAP 3 03/19/2021     (H) 03/19/2021    BUN 11 03/19/2021    CR 0.70 03/19/2021    GFRESTIMATED 84 03/19/2021    GFRESTBLACK >90 03/19/2021    JOSEPH 9.3 03/19/2021      Lab Results   Component Value Date    AST 29 06/17/2020    ALT 51 (H) 06/17/2020       Lab Results   Component Value Date    A1C  6.0 (H) 03/19/2021       Lab Results   Component Value Date    INR 0.98 06/27/2018         FATMATA PEREIRA [facility]  Pager: [phone]  RN phone: [phone]    cc:   No referring provider defined for this encounter.

## 2021-04-05 NOTE — PROGRESS NOTES
"  Cardiology Clinic Progress Note  Yola Mayo MRN# [id number]   YOB: 1945 Age: 75 year old     Primary cardiologist: Dr. Howard     Reason for visit: Annual follow-up    History of presenting illness:    Yola Mayo, a pleasant 75 year old patient who has a past medical history of coronary artery disease status post PCI to the mid and distal RCA 6/2018, hypertension, hyperlipidemia, very strong family history of premature coronary artery disease, and diabetes.       Due to symptoms of exertional angina after 1 block relieved with rest, she underwent a stress echocardiogram that was abnormal. Subsequently, a coronary angiogram revealed a 50-60% narrowing of the proximal LAD with 100% occlusion of the first diagonal above the LAD lesion.  The left circumflex had a 20-30% proximal narrowing.  The right coronary artery had a 60-70% narrowing in the proximal portion and a 95% narrowing in the midportion at the takeoff of the RV branch which is 95% narrowed.  She received 2 drug-eluting stents that were back to back to the right coronary artery with resolution of her symptoms.     Today Dee Dee returns for a follow-up.  She states that she has concerns regarding a banding chest tingling when she wakes up.  She notices the symptom when she lies on her right side and it resolves when she lies on her back.  Also she has right arm discomfort and has been diagnosed with bursitis and is treating it with Tylenol that improves her symptoms.  Recently hydralazine has been added given elevated blood pressures.  She brings in her home blood pressures that show her blood pressure is well controlled.    Over the last summer she was walking up to 2 miles a day without symptoms.  During the winter she was not as active and has recently started walking again.  She states that she is only able to walk 1 mile and has to stop due to fatigue and feeling \"winded\".  Her symptoms are not as severe as prior to her previous " "stenting in 2018.  She is undergoing a breast biopsy on Wednesday as there was abnormalities noted on a breast ultrasound and mammogram.           Assessment and Plan:     ASSESSMENT:    1. Coronary artery disease    Status post 2 drug eluting stents to the mid and distal RCA 6/27/2019    Residual 50-60% mid LAD lesion with 100% occlusion of the first diagonal    2. Hyperlipidemia, LDL goal<70    Most recent LDL was 42 3/19/21    Post angiogram started on atorvastatin 10 mg daily     3. Hypertension, goal less than 140/90    Toprol-XL to 25 mg daily, losartan to 100 mg daily and hydrochlorthiazide 25 mg daily hydralazine was recently added to her regiment    Home blood pressure readings shows blood pressure is well controlled with average around 130/70       PLAN:     1. Continue daily exercise for approximately 150 minutes a week  2. Try to increase endurance, but if symptoms progress and becomes more short of breath please let her clinic  3. Return to clinic in 1 to 2 months for follow-up            Review of Systems:     Review of Systems:  Skin:  Positive for rash   Eyes:  Positive for glasses  ENT:  Negative    Respiratory:  Positive for shortness of breath  Cardiovascular:  Negative Positive for;fatigue  Gastroenterology: Negative excessive gas or bloating  Genitourinary:  Negative urinary frequency;nocturia  Musculoskeletal:  Negative back pain  Neurologic:  Positive for headaches  Psychiatric:  Negative sleep disturbances;anxiety  Heme/Lymph/Imm:  Positive for allergies  Endocrine:  Positive for thyroid disorder;diabetes            Physical Exam:     hysical Exam:  Vitals: /72   Pulse 76   Ht 1.6 m (5' 3\")   Wt 83.5 kg (184 lb)   BMI 32.59 kg/m      Constitutional:  cooperative, alert and oriented, well developed, well nourished, in no acute distress        Skin:  warm and dry to the touch, no apparent skin lesions or masses noted        Head:  normocephalic, no masses or lesions        Eyes:  " pupils equal and round;conjunctivae and lids unremarkable        ENT:  no pallor or cyanosis, dentition good        Neck:  not assessed this visit        Chest:  clear to auscultation;normal symmetry        Cardiac: regular rhythm occasional premature beats;bradycardic                Abdomen:      benign    Vascular:                                        Extremities and Back:  no edema   Right femoral access site CDI without bruit. No hematoma with mild bruising    Neurological:  no gross motor deficits;affect appropriate               Medications:     Current Outpatient Medications   Medication Sig Dispense Refill     acetaminophen (TYLENOL) 325 MG tablet Take 2 tablets (650 mg) by mouth 3 times daily 100 tablet 0     amoxicillin (AMOXIL) 500 MG capsule Take 4 capsules half to one hour before the procedure. 4 capsule 3     aspirin 81 MG tablet Take 81 mg by mouth daily       atorvastatin (LIPITOR) 10 MG tablet Take 1 tablet (10 mg) by mouth daily for cholestrol 90 tablet 3     calcium-vitamin D (CALTRATE) 600-400 MG-UNIT per tablet Take 1 tablet by mouth daily 60 tablet 11     cholecalciferol (VITAMIN D) 1000 UNIT tablet Take 1 tablet (1,000 Units) by mouth daily 100 tablet 3     Cyanocobalamin (VITAMIN B 12 PO) Take 1 tablet by mouth daily VIT B12 unknown otc dose       hydrALAZINE (APRESOLINE) 25 MG tablet Take 1 tablet (25 mg) by mouth 3 times daily 90 tablet 1     hydrochlorothiazide (HYDRODIURIL) 25 MG tablet Take 1 tablet (25 mg) by mouth daily for Blood Pressure 90 tablet 3     levothyroxine (SYNTHROID/LEVOTHROID) 112 MCG tablet Take 1 tablet (112 mcg) by mouth daily for thyroid 90 tablet 3     losartan (COZAAR) 100 MG tablet Take 1 tablet (100 mg) by mouth daily for Blood Pressure 90 tablet 3     metFORMIN (GLUCOPHAGE-XR) 500 MG 24 hr tablet Take 2 tablets (1,000 mg) by mouth daily (with dinner) 180 tablet 3     metoprolol succinate ER (TOPROL-XL) 25 MG 24 hr tablet Take 1 tablet (25 mg) by mouth daily for  Blood Pressure 90 tablet 3     metroNIDAZOLE (METROGEL) 0.75 % external gel Apply topically 2 times daily 45 g 1     multivitamin, therapeutic with minerals (THERA-VIT-M) TABS Take 1 tablet by mouth daily       nitroGLYcerin (NITROSTAT) 0.4 MG sublingual tablet For chest pain place 1 tablet under the tongue every 5 minutes for 3 doses. If symptoms persist 5 minutes after 1st dose call 911. 25 tablet 3     blood glucose (CONTOUR NEXT TEST) test strip Use to test blood sugar 1 time daily or as directed. 100 each 2     blood glucose monitoring (NITO MICROLET) lancets Use to test blood sugar 1 time daily or as directed. 100 each 3     CONTOUR NEXT EZ (CONTOUR NEXT EZ W/DEVICE KIT) w/Device KIT 1 kit once 1 kit 0       Family History   Problem Relation Age of Onset     Cancer Mother         ovarian  of it  at 49yo.     Other Cancer Mother      Diabetes Father         Adult onset     Hypertension Father      C.A.D. Father         Fatal Heart attack at 70.     Cerebrovascular Disease Father      Thyroid Disease Sister      Thyroid Disease Sister      Thyroid Disease Sister      Cancer Daughter      Thyroid Disease Daughter      Breast Cancer No family hx of      Cancer - colorectal No family hx of        Social History     Socioeconomic History     Marital status:      Spouse name: FABIAN CUADRA     Number of children: 2     Years of education: Not on file     Highest education level: Not on file   Occupational History     Occupation: [employer]     Employer: [employer]   Social Needs     Financial resource strain: Not on file     Food insecurity     Worry: Not on file     Inability: Not on file     Transportation needs     Medical: Not on file     Non-medical: Not on file   Tobacco Use     Smoking status: Never Smoker     Smokeless tobacco: Never Used   Substance and Sexual Activity     Alcohol use: Yes     Alcohol/week: 0.0 standard drinks     Comment: occasional     Drug use: No     Sexual activity: Yes      Partners: Male   Lifestyle     Physical activity     Days per week: Not on file     Minutes per session: Not on file     Stress: Not on file   Relationships     Social connections     Talks on phone: Not on file     Gets together: Not on file     Attends Nondenominational service: Not on file     Active member of club or organization: Not on file     Attends meetings of clubs or organizations: Not on file     Relationship status: Not on file     Intimate partner violence     Fear of current or ex partner: Not on file     Emotionally abused: Not on file     Physically abused: Not on file     Forced sexual activity: Not on file   Other Topics Concern      Service No     Blood Transfusions No     Caffeine Concern No     Occupational Exposure No     Hobby Hazards No     Sleep Concern No     Stress Concern No     Weight Concern Yes     Special Diet No     Back Care No     Exercise Yes     Comment: Approx 3 times a week for about 30 minutes     Bike Helmet Yes     Seat Belt Yes     Self-Exams Yes     Parent/sibling w/ CABG, MI or angioplasty before 65F 55M? Not Asked   Social History Narrative     Not on file            Past Medical History:     Past Medical History:   Diagnosis Date     BASAL CELL CANCER -NOSE      Diabetes (H)      Family history of diabetes mellitus     Father     Family history of ischemic heart disease     Father     Family history of malignant neoplasm of ovary     Mother     HX DISRUPT L ANT CRUCIATE S/P REPAIR 2003      HYPERTENSION       HYPOTHYROIDISM      LOC PRIM OSTEOARTH-L 1ST METATARSAL-CUNEIFORM JOINT 3/10/2004     Major depressive disorder, single episode, mild (H) 11/24/2014     MILD-MOD TRICUSPID REGURG. MOD PULM HTN 6/23/2005    ECHO  5/05     SPLENIC CYST               Past Surgical History:     Past Surgical History:   Procedure Laterality Date     ARTHROPLASTY KNEE Left 4/13/2016    Procedure: ARTHROPLASTY KNEE;  Surgeon: Giuseppe Mcgrath MD;  Location:  OR      ARTHROPLASTY MINIMALLY INVASIVE HIP  4/18/2012    Procedure:ARTHROPLASTY MINIMALLY INVASIVE HIP; RIGHT TWO INCISION MINIMALLY INVASIVE HIP ARTHROPLASTY (YUVAL)^ (NO CELL SAVER) (C-ARM); Surgeon:LITO LOPEZ; Location:[facility] OR     C APPENDECTOMY  8/87    incidental appi at [facility]     C CHEMOSURG MOHS 1ST STAGE  9-99    Mohs-nose     C REPAIR CRUCIATE LIGAMENT,KNEE  8/2003    L   ACL reconstruction     C TOTAL ABDOM HYSTERECTOMY  8/87    W/ BSO fibroids, hx PID     HC COLONOSCOPY THRU STOMA, DIAGNOSTIC  4/03    normal, rec repeat 10 yrs     HYSTERECTOMY, PAP NO LONGER INDICATED       SURGICAL HISTORY OF -   Mult-~1978    infertility, laparotomies              Allergies:   Toprol xl [metoprolol], Ace inhibitors, Oxycodone, Tetanus toxoid, and Shellfish-derived products       Data:   All laboratory data reviewed:    Recent Labs   Lab Test 03/19/21  0857 06/17/20  0815 05/07/19  0911   LDL 42 31 36   HDL 80 80 75   NHDL 55 41 45   CHOL 135 121 120   TRIG 67 49 45   TSH 1.93 1.37 0.94       Lab Results   Component Value Date    WBC 7.2 03/19/2021    RBC 4.60 03/19/2021    HGB 14.1 03/19/2021    HCT 40.7 03/19/2021    MCV 89 03/19/2021    MCH 30.7 03/19/2021    MCHC 34.6 03/19/2021    RDW 14.6 03/19/2021     03/19/2021       Lab Results   Component Value Date     03/19/2021    POTASSIUM 3.6 03/19/2021    CHLORIDE 104 03/19/2021    CO2 30 03/19/2021    ANIONGAP 3 03/19/2021     (H) 03/19/2021    BUN 11 03/19/2021    CR 0.70 03/19/2021    GFRESTIMATED 84 03/19/2021    GFRESTBLACK >90 03/19/2021    JOSEPH 9.3 03/19/2021      Lab Results   Component Value Date    AST 29 06/17/2020    ALT 51 (H) 06/17/2020       Lab Results   Component Value Date    A1C 6.0 (H) 03/19/2021       Lab Results   Component Value Date    INR 0.98 06/27/2018         FATMATA PEREIRA CNP  [facility]  Pager: [phone]  RN phone: [phone]

## 2021-04-07 ENCOUNTER — HOSPITAL ENCOUNTER (OUTPATIENT)
Dept: MAMMOGRAPHY | Facility: CLINIC | Age: 76
End: 2021-04-07
Attending: INTERNAL MEDICINE
Payer: MEDICARE

## 2021-04-07 DIAGNOSIS — R92.8 ABNORMAL MAMMOGRAM: ICD-10-CM

## 2021-04-07 PROCEDURE — 88377 M/PHMTRC ALYS ISHQUANT/SEMIQ: CPT | Mod: 26 | Performed by: MEDICAL GENETICS

## 2021-04-07 PROCEDURE — 88377 M/PHMTRC ALYS ISHQUANT/SEMIQ: CPT | Mod: TC | Performed by: PATHOLOGY

## 2021-04-07 PROCEDURE — 88305 TISSUE EXAM BY PATHOLOGIST: CPT | Mod: 26 | Performed by: PATHOLOGY

## 2021-04-07 PROCEDURE — 250N000009 HC RX 250: Performed by: INTERNAL MEDICINE

## 2021-04-07 PROCEDURE — 999N001020 HC STATISTIC H-SEND OUTS PREP: Performed by: INTERNAL MEDICINE

## 2021-04-07 PROCEDURE — 272N000031 US BREAST BIOPSY CORE NEEDLE RIGHT

## 2021-04-07 PROCEDURE — 999N001019 HC STATISTIC H-FISH PROCESS B/S: Performed by: INTERNAL MEDICINE

## 2021-04-07 PROCEDURE — 88360 TUMOR IMMUNOHISTOCHEM/MANUAL: CPT | Mod: 26 | Performed by: PATHOLOGY

## 2021-04-07 PROCEDURE — 88360 TUMOR IMMUNOHISTOCHEM/MANUAL: CPT | Mod: TC | Performed by: INTERNAL MEDICINE

## 2021-04-07 PROCEDURE — 88305 TISSUE EXAM BY PATHOLOGIST: CPT | Mod: TC | Performed by: INTERNAL MEDICINE

## 2021-04-07 PROCEDURE — 88342 IMHCHEM/IMCYTCHM 1ST ANTB: CPT | Mod: 26 | Performed by: PATHOLOGY

## 2021-04-07 PROCEDURE — 999N000065 MA POST PROCEDURE RIGHT

## 2021-04-07 PROCEDURE — 88342 IMHCHEM/IMCYTCHM 1ST ANTB: CPT | Mod: TC,XU | Performed by: INTERNAL MEDICINE

## 2021-04-07 RX ADMIN — LIDOCAINE HYDROCHLORIDE 5 ML: 10 INJECTION, SOLUTION INFILTRATION; PERINEURAL at 14:30

## 2021-04-07 NOTE — DISCHARGE INSTRUCTIONS

## 2021-04-09 LAB — COPATH REPORT: NORMAL

## 2021-04-12 ENCOUNTER — TELEPHONE (OUTPATIENT)
Dept: FAMILY MEDICINE | Facility: CLINIC | Age: 76
End: 2021-04-12

## 2021-04-12 DIAGNOSIS — C50.911 MALIGNANT NEOPLASM OF RIGHT BREAST IN FEMALE, ESTROGEN RECEPTOR POSITIVE, UNSPECIFIED SITE OF BREAST (H): Primary | ICD-10-CM

## 2021-04-12 DIAGNOSIS — Z17.0 MALIGNANT NEOPLASM OF RIGHT BREAST IN FEMALE, ESTROGEN RECEPTOR POSITIVE, UNSPECIFIED SITE OF BREAST (H): Primary | ICD-10-CM

## 2021-04-12 NOTE — TELEPHONE ENCOUNTER
I spoke to the patient  She has new diagnosis of breast cancer estrogen progesterone positive  HER-2 result is pending  She already has appointment to see Dr. Giuseppe Rogers  Advised to keep that appointment  I also referred her to oncologist  Referral is placed  Phone number is provided  I told the patient if she does not hear from them in 2 to 3 days that give them a call  Provided the name of the providers  She had no further questions  Dr.Nasima Maria Luz MD

## 2021-04-12 NOTE — PROGRESS NOTES
Malignant Path:  Pathology report reviewed with our breast radiologist Dr. Dick Blake, who confirmed the recent breast imaging is concordant with the final surgical pathology results below.    I phoned Ms. Yola Cuadra, confirmed her full name, date of birth, and notified patient of Ultrasound Guided Right Breast Biopsy results showing Invasive Ductal Carcinoma.  Estrogen/ Progesterone Receptors are (+), and HER2 is still pending.  Informed patient we will call her once results are available.    Patient states no problems with biopsy site.  Recommended follow up is Surgical Consultation.  Surgical Consult has been arranged with Dr Giuseppe Rogers on 4/15/21 at 12:00p.m., with a check in time of 11:45a.m. [facility].   Patient has directions and phone numbers.    Questions were answered and I explained my role as Breast Care Nurse Coordinator in assisting her with appointments, resources and social support.  New diagnosis information packet will be available for patient at surgical consult.  I will follow up with the patient. She has my phone number if she has further questions.  Patient verbalized understanding and agrees with the plan of care.  Ordering provider- Dr. Braga has been notified of the results, recommendations for follow up, and scheduled surgical consultation.  I will forward this note, along with the pathology results.   Cora Anthony RN, BSN    Cora Anthony RN, BSN  Breast Care Nurse Coordinator  [facility]  [phone]        Patient Name: YOLA CUADRA   MR#: [id number]   Specimen #: [id number]   Collected: 4/7/2021   Received: 4/7/2021   Reported: 4/9/2021 15:46   Ordering Phy(s): MARTY BRAGA   Additional Phy(s): DICK BLAKE     SPECIMEN(S):   Right breast ultrasound core biopsy, 9:00, 4.0cm FN     FINAL DIAGNOSIS:   Right breast ultrasound core biopsy, 9:00,  4.0cm FN:   - Invasive ductal carcinoma   - Will grade: II (of possible III)        - Will score 6 (of possible 9)        - Scoring criteria: tubules = 3, nuclear pleomorphism = 2, mitosis =   1.   - Lymphovascular invasion: None seen.   - Ductal carcinoma in-situ: None seen.   - Estrogen receptor:  POSITIVE (100% strong positive)   - Progesterone receptor:  POSITIVE (95% Strong to moderate positive)   - Her2/marek:  FISH analysis will be performed and reported by the   [facility]   [facility] in a separate report.   Additional findings: Rare calcification associated with malignancy     COMMENT:   This case was seen in intradepartmental consultation.     The breast reporting protocol is based on AJCC/UICC TNM, 8th edition.   Protocol web posting date: January 2018     Electronically signed out by:     JACKELINE Hathaway M.D.

## 2021-04-13 LAB — COPATH REPORT: NORMAL

## 2021-04-15 ENCOUNTER — TELEPHONE (OUTPATIENT)
Dept: SURGERY | Facility: CLINIC | Age: 76
End: 2021-04-15

## 2021-04-15 ENCOUNTER — TELEPHONE (OUTPATIENT)
Dept: FAMILY MEDICINE | Facility: CLINIC | Age: 76
End: 2021-04-15

## 2021-04-15 ENCOUNTER — OFFICE VISIT (OUTPATIENT)
Dept: SURGERY | Facility: CLINIC | Age: 76
End: 2021-04-15
Payer: MEDICARE

## 2021-04-15 VITALS
WEIGHT: 180 LBS | SYSTOLIC BLOOD PRESSURE: 146 MMHG | HEIGHT: 63 IN | BODY MASS INDEX: 31.89 KG/M2 | DIASTOLIC BLOOD PRESSURE: 78 MMHG | HEART RATE: 72 BPM | OXYGEN SATURATION: 98 %

## 2021-04-15 DIAGNOSIS — Z11.59 ENCOUNTER FOR SCREENING FOR OTHER VIRAL DISEASES: ICD-10-CM

## 2021-04-15 DIAGNOSIS — C50.911 INVASIVE DUCTAL CARCINOMA OF BREAST, FEMALE, RIGHT (H): ICD-10-CM

## 2021-04-15 PROCEDURE — 99205 OFFICE O/P NEW HI 60 MIN: CPT | Performed by: SURGERY

## 2021-04-15 ASSESSMENT — MIFFLIN-ST. JEOR: SCORE: 1280.6

## 2021-04-15 NOTE — TELEPHONE ENCOUNTER
Dr. Braga,  Pt is having a lumpectomy on 04/23. She had a office visit with you on 03/26.  Can you addend the visit notes to clear her for surgery or will she need to come in for a preop?

## 2021-04-15 NOTE — NURSING NOTE
Introduced self and explained my role of breast nurse navigator to patient. Accompanied Dee Dee to her surgical consultation today with Dr. Rogers.      Dee Dee was given the new patient packet which includes educational material and support resources such as American Cancer Society: For Women Facing Breast Cancer, What You Need to Know about Breast Conserving Surgery, [employer], Fighting Cancer through Diet and Lifestyle, and Cancer Risk Management.      At the end of the consultation, we reviewed plan of care and education.   -Plan to proceed with seed localized, right breast lumpectomy, right SLNB.     Informed Dee Dee to wear comfortable clothing morning of procedure and supportive bra with clasps in the front and no under wire.     Dee Dee has my contact information and knows to contact me in the future with any questions/concerns.     Diana Swartz RN, BSN, OCN  Breast Nurse Navigator  [facility]  [facility] [place]   Phone: [phone]

## 2021-04-15 NOTE — LETTER
April 15, 2021          Zoraida Braga MD  [address]  [place] [address]      RE:   Yola Mayo 1945      Dear Colleague,    Thank you for referring your patient, Yola Mayo, to [facility] [place] location. Please see a copy of my visit note below.    Surgery Consultation, [facility] [place] Rogers MD, MD     Yola Mayo MRN# [id number]   YOB: 1945 Age: 75 year old      PCP:  Zoraida Braga [phone]     Chief Complaint: New diagnosis right breast cancer     Pt was seen in consultation from Zoraida Braga.     History of Present Illness:  Yola Mayo is a 75 year old female who presented with an area of suspicion in the right breast on screening mammogram.  This was an area of architectural distortion in the upper outer right breast and follow-up ultrasound was obtained.  This revealed a 1-1/2 cm area of suspicion and biopsy was performed.  This revealed grade 2 invasive ductal carcinoma.  This was ER/NV strongly positive, HER-2/marek negative.  Patient has no previous history of breast issues.  No previous breast biopsies or infections.  No significant family history of breast cancer.     She is here to discuss her new diagnosis.      Assessment/plan: Pleasant 75-year-old female with a new diagnosis of grade 2 invasive ductal carcinoma in the right breast.  This is ER/NV strongly positive, HER-2/marek negative.  This is a small area, less than 2 cm.  I have recommended a seed localized right lumpectomy with sentinel node biopsy.  I explained the breast conservation therapy is typically followed by radiation.  Breast conservation therapy including radiation carries a similar 20-year survival to mastectomy.  We discussed the use of antiestrogen therapy in early stage breast cancer.  I briefly touched on the use of chemotherapy and breast cancer patients, and explained that final indications and  recommendations for chemotherapy would likely be made after Oncotype is evaluated.  Patient is interested in getting the surgery scheduled as soon as possible.  Surgical co-morbities include constipation, diabetes, hypothyroidism, hyperlipidemia.         Again, thank you for allowing me to participate in the care of your patient.      Sincerely,      Giuseppe Rogers MD

## 2021-04-15 NOTE — NURSING NOTE
Breast Patients    BREAST PATIENTS (ALL)    1-Do you have any of the following symptoms? Other: none  2-In which breast are you having the symptoms? right  3-Have you had a Mammogram? Sterling Walsh - Date:  4/2/21  4-Have you ever had a breast cyst drained? No  5-Have you ever had a breast biopsy? Yes:  Right  -  Date:  4/7/21  6-Have you ever had a Breast Cancer? No   7-Is there a history of Breast Cancer in your family? No  8-Have you ever had Ovarian Cancer? No  9-Is there a history of Ovarian Cancer in your family? Yes   Relationship to you:    Mother  10-Summarize your caffeine intake (i.e. coffee, tea, chocolate, soda etc.): 1 cup of coffee a day, chocolate    BREAST PATIENTS (FEMALE)    11-What age did your periods begin? 12  12-Date your last menstrual period began? hysterectomy at age 42  13-Number of full-term pregnancies: 1  14-Your age when your first child was born? 27  15-Did you nurse your children? Yes  16-Are you pregnant now? No  17-Have you begun menopause? Yes  18-Have you had either ovary removed? Yes  19-Do you have breast implants? No   20-Do you use hormone replacement therapy?  Yes   21-Have you taken oral contraceptive pills?  Yes  22-Have you had an intrauterine device (IUD) placed?  No  23-What is your current bra size?  38B

## 2021-04-15 NOTE — TELEPHONE ENCOUNTER
Type of surgery: seed localized right breast lumpectomy and right SLN biopsy  Location of surgery: [facility]  Date and time of surgery: 4/23/21 12:40pm  Surgeon: Dr Rogers  Pre-Op Appt Date: pt to schedule  Post-Op Appt Date: pt to schedule   Packet sent out: Yes  Pre-cert/Authorization completed:  Not Applicable  Date: 4/15/21

## 2021-04-15 NOTE — PROGRESS NOTES
Surgery Consultation, [facility]         Giuseppe Rogers MD, MD    Yola Mayo MRN# [id number]   YOB: 1945 Age: 75 year old     PCP:  Zoraida Braga [phone]    Chief Complaint: New diagnosis right breast cancer    Pt was seen in consultation from Zoraida Braga.    History of Present Illness:  Yola Mayo is a 75 year old female who presented with an area of suspicion in the right breast on screening mammogram.  This was an area of architectural distortion in the upper outer right breast and follow-up ultrasound was obtained.  This revealed a 1-1/2 cm area of suspicion and biopsy was performed.  This revealed grade 2 invasive ductal carcinoma.  This was ER/VT strongly positive, HER-2/marek negative.  Patient has no previous history of breast issues.  No previous breast biopsies or infections.  No significant family history of breast cancer.  She is here to discuss her new diagnosis.    PMH:  Yola Mayo  has a past medical history of BASAL CELL CANCER -NOSE, Diabetes (H), Family history of diabetes mellitus, Family history of ischemic heart disease, Family history of malignant neoplasm of ovary, HX DISRUPT L ANT CRUCIATE S/P REPAIR 2003, HYPERTENSION , HYPOTHYROIDISM, LOC PRIM OSTEOARTH-L 1ST METATARSAL-CUNEIFORM JOINT (3/10/2004), Major depressive disorder, single episode, mild (H) (11/24/2014), MILD-MOD TRICUSPID REGURG. MOD PULM HTN (6/23/2005), and SPLENIC CYST.  PSH:  Yola Mayo  has a past surgical history that includes TOTAL ABDOM HYSTERECTOMY (8/87); CHEMOSURG MOHS 1ST STAGE (9-99); REPAIR CRUCIATE LIGAMENT,KNEE (8/2003); surgical history of -  (Mult-~1978); COLONOSCOPY THRU STOMA, DIAGNOSTIC (4/03); APPENDECTOMY (8/87); hysterectomy, pap no longer indicated; Arthroplasty minimally invasive hip (4/18/2012); and Arthroplasty knee (Left, 4/13/2016).    Home medications and allergies reviewed.    Social History:  Yola Mayo  reports that she  "has never smoked. She has never used smokeless tobacco. She reports current alcohol use. She reports that she does not use drugs.  Family History:  Yola Mayo family history includes C.A.D. in her father; Cancer in her daughter and mother; Cerebrovascular Disease in her father; Diabetes in her father; Hypertension in her father; Other Cancer in her mother; Thyroid Disease in her daughter, sister, sister, and sister.    ROS:  The 10 point Review of Systems is negative other than noted in the HPI.  No fevers or chills.  No recent weight loss or weight gain.    Physical Exam:  Blood pressure (!) 146/78, pulse 72, height 1.6 m (5' 3\"), weight 81.6 kg (180 lb), SpO2 98 %, not currently breastfeeding.  180 lbs 0 oz  Pleasant older female in no distress.  Patient has a pleasant affect and communicates well.   Pupils equal round and reactive to light.   No cervical lymphadenopathy or thyromegaly.   Lung fields clear, breathing comfortably.   Heart normal sinus rhythm.  No murmurs rubs or gallops.  Bilateral breast exam performed.  No discrete masses on either side.  Some bruising along the right breast at the biopsy site.  No axillary lymphadenopathy.  Skin warm, dry.  No obvious rashes or lesions.    All new lab and imaging data was reviewed.  This includes mammographic images, outside clinic notes, and pathology reports.     Assessment/plan: Adriana 75-year-old female with a new diagnosis of grade 2 invasive ductal carcinoma in the right breast.  This is ER/HI strongly positive, HER-2/marek negative.  This is a small area, less than 2 cm.  I have recommended a seed localized right lumpectomy with sentinel node biopsy.  I explained the breast conservation therapy is typically followed by radiation.  Breast conservation therapy including radiation carries a similar 20-year survival to mastectomy.  We discussed the use of antiestrogen therapy in early stage breast cancer.  I briefly touched on the use of chemotherapy " and breast cancer patients, and explained that final indications and recommendations for chemotherapy would likely be made after Oncotype is evaluated.  Patient is interested in getting the surgery scheduled as soon as possible.  Surgical co-morbities include constipation, diabetes, hypothyroidism, hyperlipidemia.    Ace Rogers M.D.  [facility]  [phone]    Total time spent 60 minutes, 50 minutes of which was spent discussing treatment, diagnosis, and prognosis.    Please route or send letter to:  Primary Care Provider (PCP) and Referring Provider

## 2021-04-19 ENCOUNTER — VIRTUAL VISIT (OUTPATIENT)
Dept: FAMILY MEDICINE | Facility: CLINIC | Age: 76
End: 2021-04-19
Payer: MEDICARE

## 2021-04-19 VITALS — SYSTOLIC BLOOD PRESSURE: 135 MMHG | DIASTOLIC BLOOD PRESSURE: 72 MMHG

## 2021-04-19 DIAGNOSIS — L71.9 ROSACEA: ICD-10-CM

## 2021-04-19 DIAGNOSIS — Z95.5 S/P DRUG ELUTING CORONARY STENT PLACEMENT: ICD-10-CM

## 2021-04-19 DIAGNOSIS — C50.911 MALIGNANT NEOPLASM OF RIGHT BREAST IN FEMALE, ESTROGEN RECEPTOR POSITIVE, UNSPECIFIED SITE OF BREAST (H): ICD-10-CM

## 2021-04-19 DIAGNOSIS — Z01.818 PREOP GENERAL PHYSICAL EXAM: Primary | ICD-10-CM

## 2021-04-19 DIAGNOSIS — E11.9 TYPE 2 DIABETES MELLITUS WITHOUT COMPLICATION, WITHOUT LONG-TERM CURRENT USE OF INSULIN (H): ICD-10-CM

## 2021-04-19 DIAGNOSIS — Z17.0 MALIGNANT NEOPLASM OF RIGHT BREAST IN FEMALE, ESTROGEN RECEPTOR POSITIVE, UNSPECIFIED SITE OF BREAST (H): ICD-10-CM

## 2021-04-19 DIAGNOSIS — I10 ESSENTIAL HYPERTENSION: ICD-10-CM

## 2021-04-19 PROCEDURE — 99442 PR PHYSICIAN TELEPHONE EVALUATION 11-20 MIN: CPT | Mod: 95 | Performed by: INTERNAL MEDICINE

## 2021-04-19 NOTE — PATIENT INSTRUCTIONS
Avoid aspirin 7 days before the surgery. Avoid nonsteroidal anti-inflammatory pain medication like ibuprofen, Motrin, or Aleve 7 days before the surgery.  Tylenol is okay to use for pain.  Avoid any OTC multivitamins or herbal supplement 7 days before surgery   Resume after surgery  Avoid aspirin 7 days before the surgery. Avoid nonsteroidal anti-inflammatory pain medication like ibuprofen, Motrin, or Aleve 7 days before the surgery.  Tylenol is okay to use for pain.  Avoid any OTC multivitamins or herbal supplement 7 days before surgery   Resume after surgery  Do not take hydrochlorothiazide on morning of surgery

## 2021-04-19 NOTE — PROGRESS NOTES
Dee Dee is a 75 year old who is being evaluated via a billable telephone visit.      What phone number would you like to be contacted at? [phone]  How would you like to obtain your AVS? Lynne      Phone call duration: 11 minutes        PREOPERATIVE EVALUATION:  Today's date: 4/19/2021    Yola Mayo is a 75 year old female who presents for a preoperative evaluation.    Surgical Information:  Surgery/Procedure: SEED LOCALIZED RIGHT BREAST LUMPECTOMY. RIGHT SENTINEL LYMPH NODE BIOPSY, RIGHT  Surgery Location:  OR  Surgeon: Giuseppe Roldan,  Surgery Date: 04/23/2021  Time of Surgery: 12:40 PM  Where patient plans to recover: At home with family  Fax number for surgical facility: Note does not need to be faxed, will be available electronically in Epic.    Type of Anesthesia Anticipated: General        Preop Questions 4/19/2021   1. Have you ever had a heart attack or stroke? no   2. Have you ever had surgery on your heart or blood vessels, such as a stent placement, a coronary artery bypass, or surgery on an artery in your head, neck, heart, or legs? yes   3. Do you have chest pain with activity? no   4. Do you have a history of  heart failure? no   5. Do you currently have a cold, bronchitis or symptoms of other infection? no   6. Do you have a cough, shortness of breath, or wheezing? no   7. Do you or anyone in your family have previous history of blood clots? no   8. Do you or does anyone in your family have a serious bleeding problem such as prolonged bleeding following surgeries or cuts? no   9. Have you ever had problems with anemia or been told to take iron pills? no   10. Have you had any abnormal blood loss such as black, tarry or bloody stools, or abnormal vaginal bleeding? no   11. Have you ever had a blood transfusion? no   12. Are you willing to have a blood transfusion if it is medically needed before, during, or after your surgery? no   13. Have you or any of your relatives ever had  problems with anesthesia? no   14. Do you have sleep apnea, excessive snoring or daytime drowsiness? no   15. Do you have any artifical heart valves or other implanted medical devices like a pacemaker, defibrillator, or continuous glucose monitor? no   16. Do you have artificial joints? Yes, Left knee, Right hip   17. Are you allergic to latex? no   18. Is there any chance that you may be pregnant? no       Assessment & Plan     The proposed surgical procedure is considered LOW risk.    Yola was seen today for pre-op exam.    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  This is ER/IL strongly positive, HER-2/marek negative.    This is a small area, less than 2 cm.    Her surgeon has recommended a seed localized right lumpectomy with sentinel node biopsy.  Is a scheduled for that    Malignant neoplasm of right breast in female, estrogen receptor positive, unspecified site of breast (H)  Patient is going to have lumpectomy with sentinel node biopsy    Rosacea  Per patient it is responding to Brian  She was seen by her dermatologist also  Met[employer]Gel is working    Type 2 diabetes mellitus without complication, without long-term current use of insulin (H)  Well-controlled  Lab Results   Component Value Date    A1C 6.0 03/19/2021    A1C 5.9 06/17/2020    A1C 6.5 05/07/2019    A1C 6.0 10/14/2018    A1C 5.9 05/03/2018       S/P drug eluting coronary stent placement  She had drug eluting stents to mid and distal RCA on June 27, 2019  She is on aspirin, beta-blockers and statin  Currently asymptomatic  She was seen by cardiology on April 5, 2021  Please see their note    Hypertension:  At home her blood pressure is under good control  She is on  hydrochlorothiazide, Toprol XL, losartan and hydralazine was recently added  This is controlling her blood pressure really well and at home blood pressure is under control    Patient is intolerant to opioids  Per patient she ends up throwing up  She requested that opioids should be avoided    Risks and Recommendations:  The patient has the following additional risks and recommendations for perioperative complications:   - No identified additional risk factors other than previously addressed    Medication Instructions:  Patient is to take all scheduled medications on the day of surgery EXCEPT for modifications listed below:     Do not take hydrochlorothiazide on morning of surgery  Do not take Metformin on morning of surgery  Avoid aspirin 7 days before the surgery. Avoid nonsteroidal anti-inflammatory pain medication like ibuprofen, Motrin, or Aleve 7 days before the surgery.  Tylenol is okay to use for pain.  Avoid any OTC multivitamins or herbal supplement 7 days before surgery   Resume after surgery  She can resume after the surgery  She can continue other medication with sips of water    RECOMMENDATION:  APPROVAL GIVEN to proceed with proposed procedure, without further diagnostic evaluation.        Subjective      HPI related to upcoming procedure:       Health Care Directive:  Patient has a Health Care Directive on file      Preoperative Review of :   reviewed - no record of controlled substances prescribed.      Status of Chronic Conditions:  See problem list for active medical problems.  Problems all longstanding and stable, except as noted/documented.  See ROS for pertinent symptoms related to these conditions.      Review of Systems  Constitutional, neuro, ENT, endocrine, pulmonary, cardiac, gastrointestinal, genitourinary, musculoskeletal, integument and psychiatric systems are negative, except as otherwise noted.    Patient Active Problem List    Diagnosis Date Noted     Primary pulmonary  hypertension (H) 05/02/2007     Priority: High     Disease of tricuspid valve 06/23/2005     Priority: High     ECHO  5/05  Problem list name updated by automated process. Provider to review       Invasive ductal carcinoma of breast, female, right (H) 04/15/2021     Priority: Medium     Rosacea 03/19/2021     Priority: Medium     Type 2 diabetes mellitus without complication, without long-term current use of insulin (H) 05/09/2019     Priority: Medium     History of shingles 05/07/2019     Priority: Medium     History of basal cell carcinoma 05/07/2019     Priority: Medium     S/P drug eluting coronary stent placement 05/07/2019     Priority: Medium     Acute chest pain 10/14/2018     Priority: Medium     CAD S/P PCI- rca-2 stents 10/14/2018     Priority: Medium     Pulmonary hypertension (H) 11/02/2017     Priority: Medium     Seen on Echocardiogram        Essential hypertension 04/29/2016     Priority: Medium     Aftercare following knee joint replacement surgery, unspecified laterality 04/18/2016     Priority: Medium     Anemia due to blood loss, acute 04/18/2016     Priority: Medium     Degenerative arthritis of knee 04/13/2016     Priority: Medium     Depressive disorder, not elsewhere classified 11/24/2014     Priority: Medium     Hyperlipidemia LDL goal <130 10/31/2010     Priority: Medium     Generalized osteoarthrosis, unspecified site 08/29/2007     Priority: Medium     Symptomatic menopausal or female climacteric states 05/17/2006     Priority: Medium     DERMATITIS  04/10/2005     Priority: Medium     Hypothyroidism 05/04/2004     Priority: Medium     Problem list name updated by automated process. Provider to review       LOC PRIM OSTEOARTH-L 1ST METATARSAL-CUNEIFORM JOINT 03/10/2004     Priority: Medium     DISRUPT ANT CRUCIATE L, 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 02/29/2004     Priority: Medium     BASAL CELL CANCER -NOSE 02/29/2004     Priority: Medium     Problem list name updated by automated process. Provider to  review and confirm       Family history of diabetes mellitus 02/29/2004     Priority: Medium     Family history of ischemic heart disease 02/29/2004     Priority: Medium     Family history of malignant neoplasm of ovary 02/29/2004     Priority: Medium     Constipation 02/18/2003     Priority: Medium     Problem list name updated by automated process. Provider to review       Hemorrhage of rectum and anus 02/18/2003     Priority: Medium     hemorrhoid only, no bleeding       Health Care Home 04/23/2012     Priority: Low     EMERGENCY CARE PLAN  Presenting Problem Signs and Symptoms Treatment Plan    Questions or concerns during clinic hours    I will call the clinic directly     Questions or concerns outside clinic hours    I will call the 24 hour nurse line at [phone]    Patient needs to schedule an appointment    I will call the 24 hour scheduling team at [phone] or clinic directly    Same day treatment     I will call the clinic first, nurse line if after hours, urgent care and express care if needed                        Damari Leon RN, BSN  Care Coordinator  DX V65.8 REPLACED WITH [id number] HEALTH CARE HOME (04/08/2013)        Past Medical History:   Diagnosis Date     BASAL CELL CANCER -NOSE      Diabetes (H)      Family history of diabetes mellitus     Father     Family history of ischemic heart disease     Father     Family history of malignant neoplasm of ovary     Mother     HX DISRUPT L ANT CRUCIATE S/P REPAIR 2003      HYPERTENSION       HYPOTHYROIDISM      LOC PRIM OSTEOARTH-L 1ST METATARSAL-CUNEIFORM JOINT 3/10/2004     Major depressive disorder, single episode, mild (H) 11/24/2014     MILD-MOD TRICUSPID REGURG. MOD PULM HTN 6/23/2005    ECHO  5/05     SPLENIC CYST      Past Surgical History:   Procedure Laterality Date     ARTHROPLASTY KNEE Left 4/13/2016    Procedure: ARTHROPLASTY KNEE;  Surgeon: Giuseppe Mcgrath MD;  Location:  OR     ARTHROPLASTY MINIMALLY INVASIVE HIP  4/18/2012     Procedure:ARTHROPLASTY MINIMALLY INVASIVE HIP; RIGHT TWO INCISION MINIMALLY INVASIVE HIP ARTHROPLASTY (YUVAL)^ (NO CELL SAVER) (C-ARM); Surgeon:LITO LOPEZ; Location:[facility] OR     C APPENDECTOMY  8/87    incidental appi at [facility]     C CHEMOSURG MOHS 1ST STAGE  9-99    Mohs-nose     C REPAIR CRUCIATE LIGAMENT,KNEE  8/2003    L   ACL reconstruction     C TOTAL ABDOM HYSTERECTOMY  8/87    W/ BSO fibroids, hx PID     HC COLONOSCOPY THRU STOMA, DIAGNOSTIC  4/03    normal, rec repeat 10 yrs     HYSTERECTOMY, PAP NO LONGER INDICATED       SURGICAL HISTORY OF -   Mult-~1978    infertility, laparotomies     Current Outpatient Medications   Medication Sig Dispense Refill     acetaminophen (TYLENOL) 325 MG tablet Take 2 tablets (650 mg) by mouth 3 times daily 100 tablet 0     amoxicillin (AMOXIL) 500 MG capsule Take 4 capsules half to one hour before the procedure. 4 capsule 3     aspirin 81 MG tablet Take 81 mg by mouth daily       atorvastatin (LIPITOR) 10 MG tablet Take 1 tablet (10 mg) by mouth daily for cholestrol 90 tablet 3     blood glucose (CONTOUR NEXT TEST) test strip Use to test blood sugar 1 time daily or as directed. 100 each 2     blood glucose monitoring (CarenaET) lancets Use to test blood sugar 1 time daily or as directed. 100 each 3     calcium-vitamin D (CALTRATE) 600-400 MG-UNIT per tablet Take 1 tablet by mouth daily 60 tablet 11     cholecalciferol (VITAMIN D) 1000 UNIT tablet Take 1 tablet (1,000 Units) by mouth daily 100 tablet 3     CONTOUR NEXT EZ (CONTOUR NEXT EZ W/DEVICE KIT) w/Device KIT 1 kit once 1 kit 0     Cyanocobalamin (VITAMIN B 12 PO) Take 1 tablet by mouth daily VIT B12 unknown otc dose       hydrALAZINE (APRESOLINE) 25 MG tablet Take 1 tablet (25 mg) by mouth 3 times daily 90 tablet 1     hydrochlorothiazide (HYDRODIURIL) 25 MG tablet Take 1 tablet (25 mg) by mouth daily for Blood Pressure 90 tablet 3     levothyroxine (SYNTHROID/LEVOTHROID) 112 MCG tablet Take 1 tablet (112  mcg) by mouth daily for thyroid 90 tablet 3     losartan (COZAAR) 100 MG tablet Take 1 tablet (100 mg) by mouth daily for Blood Pressure 90 tablet 3     metFORMIN (GLUCOPHAGE-XR) 500 MG 24 hr tablet Take 2 tablets (1,000 mg) by mouth daily (with dinner) 180 tablet 3     metoprolol succinate ER (TOPROL-XL) 25 MG 24 hr tablet Take 1 tablet (25 mg) by mouth daily for Blood Pressure 90 tablet 3     metroNIDAZOLE (METROGEL) 0.75 % external gel Apply topically 2 times daily 45 g 1     multivitamin, therapeutic with minerals (THERA-VIT-M) TABS Take 1 tablet by mouth daily       nitroGLYcerin (NITROSTAT) 0.4 MG sublingual tablet For chest pain place 1 tablet under the tongue every 5 minutes for 3 doses. If symptoms persist 5 minutes after 1st dose call 911. 25 tablet 3       Allergies   Allergen Reactions     Toprol Xl [Metoprolol] Fatigue     Ace Inhibitors      ACE throat clearing     Oxycodone Nausea and Vomiting     Tetanus Toxoid Swelling     Hard red lump         Social History     Tobacco Use     Smoking status: Never Smoker     Smokeless tobacco: Never Used   Substance Use Topics     Alcohol use: Yes     Alcohol/week: 0.0 standard drinks     Comment: occasional       History   Drug Use No         Objective     There were no vitals taken for this visit.    Physical Exam  Physical Exam   healthy, alert and no distress  PSYCH: Alert and oriented times 3; coherent speech, normal   rate and volume, able to articulate logical thoughts, able   to abstract reason, no tangential thoughts, no hallucinations   or delusions  Her affect is normal  RESP: No cough, no audible wheezing, able to talk in full sentences  Remainder of exam unable to be completed due to telephone visits    Recent Labs   Lab Test 03/19/21  0857 06/17/20  0815   HGB 14.1  --      --     137   POTASSIUM 3.6 3.5   CR 0.70 0.68   A1C 6.0* 5.9*        Diagnostics:  No labs were ordered during this visit.   No EKG required for low risk surgery  (cataract, skin procedure, breast biopsy, etc).    Revised Cardiac Risk Index (RCRI):  The patient has the following serious cardiovascular risks for perioperative complications:   - No serious cardiac risks = 0 points     RCRI Interpretation: 0 points: Class I (very low risk - 0.4% complication rate)       Signed Electronically by: Zoraida Braga MD  Copy of this evaluation report is provided to requesting physician.

## 2021-04-20 DIAGNOSIS — Z11.59 ENCOUNTER FOR SCREENING FOR OTHER VIRAL DISEASES: ICD-10-CM

## 2021-04-20 LAB
LABORATORY COMMENT REPORT: NORMAL
SARS-COV-2 RNA RESP QL NAA+PROBE: NEGATIVE
SARS-COV-2 RNA RESP QL NAA+PROBE: NORMAL
SPECIMEN SOURCE: NORMAL
SPECIMEN SOURCE: NORMAL

## 2021-04-20 PROCEDURE — U0005 INFEC AGEN DETEC AMPLI PROBE: HCPCS | Performed by: SURGERY

## 2021-04-20 PROCEDURE — U0003 INFECTIOUS AGENT DETECTION BY NUCLEIC ACID (DNA OR RNA); SEVERE ACUTE RESPIRATORY SYNDROME CORONAVIRUS 2 (SARS-COV-2) (CORONAVIRUS DISEASE [COVID-19]), AMPLIFIED PROBE TECHNIQUE, MAKING USE OF HIGH THROUGHPUT TECHNOLOGIES AS DESCRIBED BY CMS-2020-01-R: HCPCS | Performed by: SURGERY

## 2021-04-22 ENCOUNTER — TELEPHONE (OUTPATIENT)
Dept: SURGERY | Facility: CLINIC | Age: 76
End: 2021-04-22

## 2021-04-22 NOTE — TELEPHONE ENCOUNTER
Dee Dee is scheduled for seed localized right breast lumpectomy, right SLNB on 4/23/2021 with Dr. Rogers. Pre procedure call placed to patient.     The following are our updated visitor restrictions:   -Adult surgical patients can have one visitor only before surgery. Once the surgery starts, your visitor will be asked to leave the hospital. They may come back to visit you if you are transferred to the floor within visitation hours.   -Visitation hours for adult inpatients are 8 a.m. to 8:30 p.m.  -One visitor is allowed for hospital visits, clinic appointments, and emergency department visits. It must be the same individual for the patient s entire stay.  -One or two visitors may attend a patient discharge meeting for hands-on training.  -Please wear a mask to your appointment.   -Please arrive at 10:15 morning of your surgery at [facility].     Diana Swartz RN, BSN, OCN  Breast Nurse Navigator  [facility]  [facility]  Phone: [phone]

## 2021-04-23 ENCOUNTER — ANESTHESIA EVENT (OUTPATIENT)
Dept: SURGERY | Facility: CLINIC | Age: 76
End: 2021-04-23
Payer: MEDICARE

## 2021-04-23 ENCOUNTER — HOSPITAL ENCOUNTER (OUTPATIENT)
Dept: MAMMOGRAPHY | Facility: CLINIC | Age: 76
End: 2021-04-23
Attending: SURGERY
Payer: MEDICARE

## 2021-04-23 ENCOUNTER — APPOINTMENT (OUTPATIENT)
Dept: SURGERY | Facility: PHYSICIAN GROUP | Age: 76
End: 2021-04-23
Payer: MEDICARE

## 2021-04-23 ENCOUNTER — HOSPITAL ENCOUNTER (OUTPATIENT)
Dept: NUCLEAR MEDICINE | Facility: CLINIC | Age: 76
Setting detail: NUCLEAR MEDICINE
Discharge: HOME | End: 2021-04-23
Attending: SURGERY | Admitting: SURGERY
Payer: MEDICARE

## 2021-04-23 ENCOUNTER — HOSPITAL ENCOUNTER (OUTPATIENT)
Facility: CLINIC | Age: 76
Discharge: HOME | End: 2021-04-23
Attending: SURGERY | Admitting: SURGERY
Payer: MEDICARE

## 2021-04-23 ENCOUNTER — ANESTHESIA (OUTPATIENT)
Dept: SURGERY | Facility: CLINIC | Age: 76
End: 2021-04-23
Payer: MEDICARE

## 2021-04-23 ENCOUNTER — TRANSFERRED RECORDS (OUTPATIENT)
Dept: HEALTH INFORMATION MANAGEMENT | Facility: CLINIC | Age: 76
End: 2021-04-23

## 2021-04-23 VITALS
SYSTOLIC BLOOD PRESSURE: 143 MMHG | OXYGEN SATURATION: 100 % | HEIGHT: 63 IN | HEART RATE: 53 BPM | RESPIRATION RATE: 16 BRPM | WEIGHT: 181 LBS | DIASTOLIC BLOOD PRESSURE: 67 MMHG | TEMPERATURE: 98.2 F | BODY MASS INDEX: 32.07 KG/M2

## 2021-04-23 DIAGNOSIS — C50.911 INVASIVE DUCTAL CARCINOMA OF BREAST, FEMALE, RIGHT (H): ICD-10-CM

## 2021-04-23 LAB
GLUCOSE SERPL-MCNC: 106 MG/DL (ref 70–99)
POTASSIUM SERPL-SCNC: 3.3 MMOL/L (ref 3.4–5.3)

## 2021-04-23 PROCEDURE — 88307 TISSUE EXAM BY PATHOLOGIST: CPT | Mod: TC | Performed by: SURGERY

## 2021-04-23 PROCEDURE — 38792 RA TRACER ID OF SENTINL NODE: CPT | Mod: MG

## 2021-04-23 PROCEDURE — 999N000141 HC STATISTIC PRE-PROCEDURE NURSING ASSESSMENT: Performed by: SURGERY

## 2021-04-23 PROCEDURE — 250N000011 HC RX IP 250 OP 636: Performed by: NURSE ANESTHETIST, CERTIFIED REGISTERED

## 2021-04-23 PROCEDURE — 710N000009 HC RECOVERY PHASE 1, LEVEL 1, PER MIN: Performed by: SURGERY

## 2021-04-23 PROCEDURE — 272N000001 HC OR GENERAL SUPPLY STERILE: Performed by: SURGERY

## 2021-04-23 PROCEDURE — 88342 IMHCHEM/IMCYTCHM 1ST ANTB: CPT | Mod: TC | Performed by: SURGERY

## 2021-04-23 PROCEDURE — 250N000009 HC RX 250: Performed by: SURGERY

## 2021-04-23 PROCEDURE — 370N000017 HC ANESTHESIA TECHNICAL FEE, PER MIN: Performed by: SURGERY

## 2021-04-23 PROCEDURE — 250N000025 HC SEVOFLURANE, PER MIN: Performed by: SURGERY

## 2021-04-23 PROCEDURE — 343N000001 HC RX 343: Performed by: SURGERY

## 2021-04-23 PROCEDURE — A9520 TC99 TILMANOCEPT DIAG 0.5MCI: HCPCS | Performed by: SURGERY

## 2021-04-23 PROCEDURE — 360N000082 HC SURGERY LEVEL 2 W/ FLUORO, PER MIN: Performed by: SURGERY

## 2021-04-23 PROCEDURE — 999N000065 MA POST PROCEDURE RIGHT

## 2021-04-23 PROCEDURE — 999N000104 MA BREAST SPECIMEN RIGHT OR

## 2021-04-23 PROCEDURE — 84132 ASSAY OF SERUM POTASSIUM: CPT | Performed by: ANESTHESIOLOGY

## 2021-04-23 PROCEDURE — A4648 IMPLANTABLE TISSUE MARKER: HCPCS

## 2021-04-23 PROCEDURE — 250N000009 HC RX 250: Performed by: NURSE ANESTHETIST, CERTIFIED REGISTERED

## 2021-04-23 PROCEDURE — 88342 IMHCHEM/IMCYTCHM 1ST ANTB: CPT | Mod: 26 | Performed by: PATHOLOGY

## 2021-04-23 PROCEDURE — 250N000013 HC RX MED GY IP 250 OP 250 PS 637: Performed by: ANESTHESIOLOGY

## 2021-04-23 PROCEDURE — 710N000012 HC RECOVERY PHASE 2, PER MINUTE: Performed by: SURGERY

## 2021-04-23 PROCEDURE — 88307 TISSUE EXAM BY PATHOLOGIST: CPT | Mod: 26 | Performed by: PATHOLOGY

## 2021-04-23 PROCEDURE — 82947 ASSAY GLUCOSE BLOOD QUANT: CPT | Performed by: ANESTHESIOLOGY

## 2021-04-23 PROCEDURE — 258N000003 HC RX IP 258 OP 636: Performed by: NURSE ANESTHETIST, CERTIFIED REGISTERED

## 2021-04-23 PROCEDURE — 36415 COLL VENOUS BLD VENIPUNCTURE: CPT | Performed by: ANESTHESIOLOGY

## 2021-04-23 PROCEDURE — 250N000011 HC RX IP 250 OP 636: Performed by: SURGERY

## 2021-04-23 RX ORDER — KETOROLAC TROMETHAMINE 30 MG/ML
INJECTION, SOLUTION INTRAMUSCULAR; INTRAVENOUS PRN
Status: DISCONTINUED | OUTPATIENT
Start: 2021-04-23 | End: 2021-04-23

## 2021-04-23 RX ORDER — BUPIVACAINE HYDROCHLORIDE AND EPINEPHRINE 5; 5 MG/ML; UG/ML
INJECTION, SOLUTION PERINEURAL PRN
Status: DISCONTINUED | OUTPATIENT
Start: 2021-04-23 | End: 2021-04-23 | Stop reason: HOSPADM

## 2021-04-23 RX ORDER — LIDOCAINE 40 MG/G
CREAM TOPICAL
Status: DISCONTINUED | OUTPATIENT
Start: 2021-04-23 | End: 2021-04-23 | Stop reason: HOSPADM

## 2021-04-23 RX ORDER — ONDANSETRON 2 MG/ML
4 INJECTION INTRAMUSCULAR; INTRAVENOUS EVERY 30 MIN PRN
Status: DISCONTINUED | OUTPATIENT
Start: 2021-04-23 | End: 2021-04-23 | Stop reason: HOSPADM

## 2021-04-23 RX ORDER — TRAMADOL HYDROCHLORIDE 50 MG/1
50 TABLET ORAL EVERY 6 HOURS PRN
Qty: 10 TABLET | Refills: 0 | Status: SHIPPED | OUTPATIENT
Start: 2021-04-23 | End: 2021-04-26

## 2021-04-23 RX ORDER — SODIUM CHLORIDE, SODIUM LACTATE, POTASSIUM CHLORIDE, CALCIUM CHLORIDE 600; 310; 30; 20 MG/100ML; MG/100ML; MG/100ML; MG/100ML
INJECTION, SOLUTION INTRAVENOUS CONTINUOUS
Status: DISCONTINUED | OUTPATIENT
Start: 2021-04-23 | End: 2021-04-23 | Stop reason: HOSPADM

## 2021-04-23 RX ORDER — NALOXONE HYDROCHLORIDE 0.4 MG/ML
0.4 INJECTION, SOLUTION INTRAMUSCULAR; INTRAVENOUS; SUBCUTANEOUS
Status: DISCONTINUED | OUTPATIENT
Start: 2021-04-23 | End: 2021-04-23 | Stop reason: HOSPADM

## 2021-04-23 RX ORDER — SODIUM CHLORIDE, SODIUM LACTATE, POTASSIUM CHLORIDE, CALCIUM CHLORIDE 600; 310; 30; 20 MG/100ML; MG/100ML; MG/100ML; MG/100ML
INJECTION, SOLUTION INTRAVENOUS CONTINUOUS PRN
Status: DISCONTINUED | OUTPATIENT
Start: 2021-04-23 | End: 2021-04-23

## 2021-04-23 RX ORDER — NALOXONE HYDROCHLORIDE 0.4 MG/ML
0.2 INJECTION, SOLUTION INTRAMUSCULAR; INTRAVENOUS; SUBCUTANEOUS
Status: DISCONTINUED | OUTPATIENT
Start: 2021-04-23 | End: 2021-04-23 | Stop reason: HOSPADM

## 2021-04-23 RX ORDER — LIDOCAINE HYDROCHLORIDE 20 MG/ML
INJECTION, SOLUTION INFILTRATION; PERINEURAL PRN
Status: DISCONTINUED | OUTPATIENT
Start: 2021-04-23 | End: 2021-04-23

## 2021-04-23 RX ORDER — ONDANSETRON 4 MG/1
4 TABLET, ORALLY DISINTEGRATING ORAL EVERY 30 MIN PRN
Status: DISCONTINUED | OUTPATIENT
Start: 2021-04-23 | End: 2021-04-23 | Stop reason: HOSPADM

## 2021-04-23 RX ORDER — HYDRALAZINE HYDROCHLORIDE 20 MG/ML
2.5-5 INJECTION INTRAMUSCULAR; INTRAVENOUS EVERY 10 MIN PRN
Status: DISCONTINUED | OUTPATIENT
Start: 2021-04-23 | End: 2021-04-23 | Stop reason: HOSPADM

## 2021-04-23 RX ORDER — CEFAZOLIN SODIUM 2 G/100ML
2 INJECTION, SOLUTION INTRAVENOUS SEE ADMIN INSTRUCTIONS
Status: DISCONTINUED | OUTPATIENT
Start: 2021-04-23 | End: 2021-04-23 | Stop reason: HOSPADM

## 2021-04-23 RX ORDER — PROPOFOL 10 MG/ML
INJECTION, EMULSION INTRAVENOUS PRN
Status: DISCONTINUED | OUTPATIENT
Start: 2021-04-23 | End: 2021-04-23

## 2021-04-23 RX ORDER — ONDANSETRON 2 MG/ML
INJECTION INTRAMUSCULAR; INTRAVENOUS PRN
Status: DISCONTINUED | OUTPATIENT
Start: 2021-04-23 | End: 2021-04-23

## 2021-04-23 RX ORDER — MEPERIDINE HYDROCHLORIDE 25 MG/ML
12.5 INJECTION INTRAMUSCULAR; INTRAVENOUS; SUBCUTANEOUS
Status: DISCONTINUED | OUTPATIENT
Start: 2021-04-23 | End: 2021-04-23 | Stop reason: HOSPADM

## 2021-04-23 RX ORDER — PROPOFOL 10 MG/ML
INJECTION, EMULSION INTRAVENOUS CONTINUOUS PRN
Status: DISCONTINUED | OUTPATIENT
Start: 2021-04-23 | End: 2021-04-23

## 2021-04-23 RX ORDER — ACETAMINOPHEN 325 MG/1
975 TABLET ORAL ONCE
Status: COMPLETED | OUTPATIENT
Start: 2021-04-23 | End: 2021-04-23

## 2021-04-23 RX ORDER — CEFAZOLIN SODIUM 2 G/100ML
2 INJECTION, SOLUTION INTRAVENOUS
Status: DISCONTINUED | OUTPATIENT
Start: 2021-04-23 | End: 2021-04-23 | Stop reason: HOSPADM

## 2021-04-23 RX ADMIN — SODIUM CHLORIDE, POTASSIUM CHLORIDE, SODIUM LACTATE AND CALCIUM CHLORIDE: 600; 310; 30; 20 INJECTION, SOLUTION INTRAVENOUS at 12:18

## 2021-04-23 RX ADMIN — LIDOCAINE HYDROCHLORIDE 5 ML: 10 INJECTION, SOLUTION INFILTRATION; PERINEURAL at 10:53

## 2021-04-23 RX ADMIN — KETOROLAC TROMETHAMINE 30 MG: 30 INJECTION, SOLUTION INTRAMUSCULAR at 13:08

## 2021-04-23 RX ADMIN — ONDANSETRON 4 MG: 2 INJECTION INTRAMUSCULAR; INTRAVENOUS at 13:08

## 2021-04-23 RX ADMIN — TILMANOCEPT 0.52 MILLICURIE: KIT at 11:40

## 2021-04-23 RX ADMIN — CEFAZOLIN SODIUM 2 G: 2 INJECTION, SOLUTION INTRAVENOUS at 12:28

## 2021-04-23 RX ADMIN — PROPOFOL 150 MCG/KG/MIN: 10 INJECTION, EMULSION INTRAVENOUS at 12:21

## 2021-04-23 RX ADMIN — PROPOFOL 150 MG: 10 INJECTION, EMULSION INTRAVENOUS at 12:23

## 2021-04-23 RX ADMIN — PHENYLEPHRINE HYDROCHLORIDE 100 MCG: 10 INJECTION INTRAVENOUS at 12:56

## 2021-04-23 RX ADMIN — DEXMEDETOMIDINE HYDROCHLORIDE 10 MCG: 100 INJECTION, SOLUTION INTRAVENOUS at 12:27

## 2021-04-23 RX ADMIN — ACETAMINOPHEN 975 MG: 325 TABLET, FILM COATED ORAL at 12:01

## 2021-04-23 RX ADMIN — DEXMEDETOMIDINE HYDROCHLORIDE 10 MCG: 100 INJECTION, SOLUTION INTRAVENOUS at 12:36

## 2021-04-23 RX ADMIN — LIDOCAINE HYDROCHLORIDE 80 MG: 20 INJECTION, SOLUTION INFILTRATION; PERINEURAL at 12:22

## 2021-04-23 RX ADMIN — PROPOFOL 50 MG: 10 INJECTION, EMULSION INTRAVENOUS at 12:24

## 2021-04-23 ASSESSMENT — MIFFLIN-ST. JEOR: SCORE: 1285.14

## 2021-04-23 NOTE — OR NURSING
Discharge instructions reviewed with abimbola Maier, over the phone in presence of pt. All questions answered. No further concerns at this point. Teach back method used to ensure patient and family/friend understanding. Narcotics sent home with seal in place. IV removed. All belongings returned. Pt escorted out of hospital via wheelchair.

## 2021-04-23 NOTE — PROGRESS NOTES
SBAR Seed Localization    SITUATION:  Patient to breast imaging center for imaging guided seed localizations before breast lumpectomy or excision biopsy with sentinel node injection.    BACKGROUND:  Breast imaging cancer, breast abnormality  Ordered procedure completed: Yes  Special needs identified: No     ASSESSMENT:  SBAR report called to patient care unit because of unexpected event in radiology: No  Allergies and medication list reviewed prior to procedure. Yes  Skin cleansed with ChloraPrep One-Step.  Anesthesia: approximately 3ml of 1% Lidocaine injection subcutaneous before seed insertion administered by the radiologist.   Gauze dressing over insertion site(s).  Post procedure mammogram completed: Yes    Patient tolerance:well    RECOMMENDATIONS:  Patient transferred to Same Day Surgery in stable condition via wheelchair with Breast Imaging Staff.    Please call [facility] [phone] if there are any questions.

## 2021-04-23 NOTE — ANESTHESIA PREPROCEDURE EVALUATION
Anesthesia Pre-Procedure Evaluation    Patient: Yola Mayo   MRN: [id number] : 1945        Preoperative Diagnosis: Invasive ductal carcinoma of breast, female, right (H) [C50.911]   Procedure : Procedure(s):  SEED LOCALIZED RIGHT BREAST LUMPECTOMY  RIGHT SENTINEL LYMPH NODE BIOPSY     Past Medical History:   Diagnosis Date     Anemia due to blood loss      BASAL CELL CANCER -NOSE      Dermatitis      Diabetes (H)      Family history of diabetes mellitus     Father     Family history of ischemic heart disease     Father     Family history of malignant neoplasm of ovary     Mother     HX DISRUPT L ANT CRUCIATE S/P REPAIR       Hyperlipidemia      HYPERTENSION       HYPOTHYROIDISM      LOC PRIM OSTEOARTH-L 1ST METATARSAL-CUNEIFORM JOINT 3/10/2004     Major depressive disorder, single episode, mild (H) 2014     MILD-MOD TRICUSPID REGURG. MOD PULM HTN 2005    ECHO       Rosacea      Shingles      SPLENIC CYST       Past Surgical History:   Procedure Laterality Date     ARTHROPLASTY KNEE Left 2016    Procedure: ARTHROPLASTY KNEE;  Surgeon: Giuseppe Lopez MD;  Location: [facility] OR     ARTHROPLASTY MINIMALLY INVASIVE HIP  2012    Procedure:ARTHROPLASTY MINIMALLY INVASIVE HIP; RIGHT TWO INCISION MINIMALLY INVASIVE HIP ARTHROPLASTY (YUVAL)^ (NO CELL SAVER) (C-ARM); Surgeon:GIUSEPPE LOPEZ; Location:[facility] OR     C APPENDECTOMY      incidental appi at [facility]     C CHEMOSURG MOHS 1ST STAGE      Mohs-nose     C REPAIR CRUCIATE LIGAMENT,KNEE  2003    L   ACL reconstruction     C TOTAL ABDOM HYSTERECTOMY      W/ BSO fibroids, hx PID     CARDIAC SURGERY  2019    mid and distal RCA stents     ENT SURGERY      mohs procedure on nose     HC COLONOSCOPY THRU STOMA, DIAGNOSTIC      normal, rec repeat 10 yrs     HYSTERECTOMY, PAP NO LONGER INDICATED       SURGICAL HISTORY OF -   Mult-~    infertility, laparotomies      Allergies   Allergen Reactions     Toprol Xl  [Metoprolol] Fatigue     Ace Inhibitors      ACE throat clearing     Oxycodone Nausea and Vomiting     Tetanus Toxoid Swelling     Hard red lump       Social History     Tobacco Use     Smoking status: Never Smoker     Smokeless tobacco: Never Used   Substance Use Topics     Alcohol use: Yes     Alcohol/week: 0.0 standard drinks     Comment: occasional      Wt Readings from Last 1 Encounters:   04/15/21 81.6 kg (180 lb)        Anesthesia Evaluation   Pt has had prior anesthetic.     History of anesthetic complications  - PONV.      ROS/MED HX  ENT/Pulmonary:    (-) sleep apnea   Neurologic:       Cardiovascular:     (+) Dyslipidemia hypertension--CAD --stent-2019. 1 Drug Eluting Stent. valvular problems/murmurs moderate TR;. pulmonary hypertension,     METS/Exercise Tolerance:     Hematologic:       Musculoskeletal:       GI/Hepatic:     (+) GERD,     Renal/Genitourinary:       Endo:     (+) type II DM, thyroid problem, hypothyroidism,     Psychiatric/Substance Use:       Infectious Disease:       Malignancy:   (+) Malignancy, History of Breast.    Other:            Physical Exam    Airway        Mallampati: II   TM distance: > 3 FB   Neck ROM: full   Mouth opening: > 3 cm    Respiratory Devices and Support         Dental  no notable dental history         Cardiovascular   cardiovascular exam normal          Pulmonary   pulmonary exam normal                OUTSIDE LABS:  CBC:   Lab Results   Component Value Date    WBC 7.2 03/19/2021    WBC 7.4 10/15/2018    HGB 14.1 03/19/2021    HGB 13.6 10/15/2018    HCT 40.7 03/19/2021    HCT 40.3 10/15/2018     03/19/2021     10/15/2018     BMP:   Lab Results   Component Value Date     03/19/2021     06/17/2020    POTASSIUM 3.6 03/19/2021    POTASSIUM 3.5 06/17/2020    CHLORIDE 104 03/19/2021    CHLORIDE 104 06/17/2020    CO2 30 03/19/2021    CO2 26 06/17/2020    BUN 11 03/19/2021    BUN 13 06/17/2020    CR 0.70 03/19/2021    CR 0.68 06/17/2020    GLC  122 (H) 03/19/2021     (H) 06/17/2020     COAGS:   Lab Results   Component Value Date    PTT 30 06/27/2018    INR 0.98 06/27/2018     POC:   Lab Results   Component Value Date     (H) 10/15/2018     HEPATIC:   Lab Results   Component Value Date    ALBUMIN 3.6 06/17/2020    PROTTOTAL 7.7 06/17/2020    ALT 51 (H) 06/17/2020    AST 29 06/17/2020    ALKPHOS 59 06/17/2020    BILITOTAL 0.4 06/17/2020     OTHER:   Lab Results   Component Value Date    A1C 6.0 (H) 03/19/2021    JOSEPH 9.3 03/19/2021    PHOS 3.4 04/19/2012    MAG 2.0 10/14/2018    LIPASE 225 10/14/2018    TSH 1.93 03/19/2021    T4 1.23 06/10/2003    CRP 5.8 03/26/2021    SED 30 03/26/2021       Anesthesia Plan    ASA Status:  3   NPO Status:  NPO Appropriate    Anesthesia Type: General.     - Airway: LMA   Induction: Intravenous.   Maintenance: TIVA.        Consents    Anesthesia Plan(s) and associated risks, benefits, and realistic alternatives discussed. Questions answered and patient/representative(s) expressed understanding.     - Discussed with:  Patient         Postoperative Care       PONV prophylaxis: Ondansetron (or other 5HT-3), Background Propofol Infusion     Comments:    Avoid narcotics; precedex gtt; tylenol pre-op;             MICHELLE MESSER MD

## 2021-04-23 NOTE — ANESTHESIA CARE TRANSFER NOTE
Patient: Yola Mayo    Procedure(s):  SEED LOCALIZED RIGHT BREAST LUMPECTOMY  RIGHT SENTINEL LYMPH NODE BIOPSY    Diagnosis: Invasive ductal carcinoma of breast, female, right (H) [C50.911]  Diagnosis Additional Information: No value filed.    Anesthesia Type:   General     Note:    Oropharynx: oropharynx clear of all foreign objects  Level of Consciousness: awake  Oxygen Supplementation: face mask  Level of Supplemental Oxygen (L/min / FiO2): 10  Independent Airway: airway patency satisfactory and stable  Dentition: dentition unchanged  Vital Signs Stable: post-procedure vital signs reviewed and stable  Report to RN Given: handoff report given  Patient transferred to: PACU    Handoff Report: Identifed the Patient, Identified the Reponsible Provider, Reviewed the pertinent medical history, Discussed the surgical course, Reviewed Intra-OP anesthesia mangement and issues during anesthesia, Set expectations for post-procedure period and Allowed opportunity for questions and acknowledgement of understanding      Vitals: (Last set prior to Anesthesia Care Transfer)  CRNA VITALS  4/23/2021 1258 - 4/23/2021 1335      4/23/2021             Resp Rate (observed):  16    EKG:  Sinus rhythm        Electronically Signed By: FATMATA Gatica CRNA  April 23, 2021  1:35 PM

## 2021-04-23 NOTE — BRIEF OP NOTE
[facility]    Brief Operative Note    Pre-operative diagnosis: Invasive ductal carcinoma of breast, female, right (H) [C50.911]  Post-operative diagnosis Same as pre-operative diagnosis    Procedure: Procedure(s):  SEED LOCALIZED RIGHT BREAST LUMPECTOMY  RIGHT SENTINEL LYMPH NODE BIOPSY  Surgeon: Surgeon(s) and Role:     * Giuseppe Rogers MD - Primary     * Peter Willard PA-C  Anesthesia: General   Estimated blood loss: 5cc  Drains: None  Specimens:   ID Type Source Tests Collected by Time Destination   A : right breast lump Tissue Breast, Right SURGICAL PATHOLOGY EXAM Giuseppe Rogers MD 4/23/2021 12:41 PM    B : right axillary lymph node sentinal Tissue Axilla, Right SURGICAL PATHOLOGY EXAM Giuseppe Rogers MD 4/23/2021 12:42 PM      Findings:   See op note.  Complications: None.  Implants: * No implants in log *

## 2021-04-23 NOTE — ANESTHESIA POSTPROCEDURE EVALUATION
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    Anesthesia Type:  General    Note:  Disposition: Outpatient   Postop Pain Control: Uneventful            Sign Out: Well controlled pain   PONV: No   Neuro/Psych: Uneventful            Sign Out: Acceptable/Baseline neuro status   Airway/Respiratory: Uneventful            Sign Out: Acceptable/Baseline resp. status   CV/Hemodynamics: Uneventful            Sign Out: Acceptable CV status; No obvious hypovolemia; No obvious fluid overload   Other NRE: NONE   DID A NON-ROUTINE EVENT OCCUR? No           Last vitals:  Vitals:    04/23/21 1331 04/23/21 1345 04/23/21 1400   BP: 119/60 131/70 135/74   Pulse: 63 68 64   Resp: 16 16 16   Temp:      SpO2: 94% 93% 99%       Last vitals prior to Anesthesia Care Transfer:  CRNA VITALS  4/23/2021 1258 - 4/23/2021 1358      4/23/2021             Resp Rate (observed):  16    EKG:  Sinus rhythm          Electronically Signed By: MICHELLE MESSER MD  April 23, 2021  2:12 PM

## 2021-04-23 NOTE — DISCHARGE INSTRUCTIONS
Today you were given 975 mg of Tylenol at 1200. The recommended daily maximum dose is 4000 mg.    Today you received Toradol, an antiinflammatory medication similar to Ibuprofen.  You should not take other antiinflammatory medication, such as Ibuprofen, Motrin, Advil, Aleve, Naprosyn, etc until 7pm.     Same Day Surgery Discharge Instructions for  Sedation and General Anesthesia       It's not unusual to feel dizzy, light-headed or faint for up to 24 hours after surgery or while taking pain medication.  If you have these symptoms: sit for a few minutes before standing and have someone assist you when you get up to walk or use the bathroom.      You should rest and relax for the next 24 hours. We recommend you make arrangements to have an adult stay with you for at least 24 hours after your discharge.  Avoid hazardous and strenuous activity.      DO NOT DRIVE any vehicle or operate mechanical equipment for 24 hours following the end of your surgery.  Even though you may feel normal, your reactions may be affected by the medication you have received.      Do not drink alcoholic beverages for 24 hours following surgery.       Slowly progress to your regular diet as you feel able. It's not unusual to feel nauseated and/or vomit after receiving anesthesia.  If you develop these symptoms, drink clear liquids (apple juice, ginger ale, broth, 7-up, etc. ) until you feel better.  If your nausea and vomiting persists for 24 hours, please notify your surgeon.        All narcotic pain medications, along with inactivity and anesthesia, can cause constipation. Drinking plenty of liquids and increasing fiber intake will help.      For any questions of a medical nature, call your surgeon.      Do not make important decisions for 24 hours.      If you had general anesthesia, you may have a sore throat for a couple of days related to the breathing tube used during surgery.  You may use Cepacol lozenges to help with this discomfort.   If it worsens or if you develop a fever, contact your surgeon.       If you feel your pain is not well managed with the pain medications prescribed by your surgeon, please contact your surgeon's office to let them know so they can address your concerns.       CoVid 19 Information    We want to give you information regarding Covid. Please consult your primary care provider with any questions you might have.     Patient who have symptoms (cough, fever, or shortness of breath), need to isolate for 7 days from when symptoms started OR 72 hours after fever resolves (without fever reducing medications) AND improvement of respiratory symptoms (whichever is longer).      Isolate yourself at home (in own room/own bathroom if possible)    Do Not allow any visitors    Do Not go to work or school    Do Not go to Islam,  centers, shopping, or other public places.    Do Not shake hands.    Avoid close and intimate contact with others (hugging, kissing).    Follow CDC recommendations for household cleaning of frequently touched services.     After the initial 7 days, continue to isolate yourself from household members as much as possible. To continue decrease the risk of community spread and exposure, you and any members of your household should limit activities in public for 14 days after starting home isolation.     You can reference the following CDC link for helpful home isolation/care tips:  https://www.cdc.gov/coronavirus/2019-ncov/downloads/10Things.pdf    Protect Others:    Cover Your Mouth and Nose with a mask, disposable tissue or wash cloth to avoid spreading germs to others.    Wash your hands and face frequently with soap and water    Call Your Primary Doctor If: Breathing difficulty develops or you become worse.    For more information about COVID19 and options for caring for yourself at home, please visit the CDC website at https://www.cdc.gov/coronavirus/2019-ncov/about/steps-when-sick.html  For more  options for care at [facility], please visit our website at [email or web address]        [facility]  Discharge Instructions: Post-Operative Breast Surgery    ACTIVITY    Take frequent short walks and increase your activity gradually.      Avoid strenuous physical activity or heavy lifting greater than 15-20 lbs. for 1-2 weeks with arm on the surgery side.  You may climb stairs.    Gentle rotation and stretching of your arms and shoulders will prevent joint stiffness.    You may drive without restrictions when you are not using any prescription pain medication and feel comfortable in a car.    You may return to work/school when you are comfortable without any prescription pain medication.    WOUND CARE    You may remove your ACE wrap/dressing and shower 48 hours after the surgery.  Pat your incisions dry and leave them open to air.  Re-apply dressing (Band-Aids or gauze/tape) as needed for drainage.    You may have steri-strips (looks like white tape) or skin glue on your incisions.  You may peel off the steri-strips 2 weeks after your surgery if they have not peeled off on their own.  If you have skin glue, it will peel up and fall off on its own.    Do not soak your incisions in a tub or pool for 2 weeks.     Do not apply any lotions, creams, or ointments to your incisions.    A ridge under your incisions is normal and will gradually resolve.    Wear a supportive bra for 1-2 weeks, day and night.    DIET    Start with liquids, then gradually resume your regular diet as tolerated.     Drink plenty of liquids to stay hydrated.    PAIN    Expect some tenderness and discomfort at the incision site(s).  Use the prescribed pain medication at your discretion.  Expect gradual resolution of your pain over several days.    You may take ibuprofen with food (unless you have been told not to) instead of or in addition to your prescribed pain medication.  If you are  taking Norco or Percocet, do not take any additional acetaminophen/Tylenol.    Do not drink alcohol or drive while you are taking pain medications.    EXPECTATIONS    Pain medications can cause constipation.  Limit use when possible.  Take over the counter stool softener/stimulant, such as Colace or Senna, 1-2 times a day with plenty of water.  You may take a mild over the counter laxative, such as Miralax or a suppository, as needed.      You may discontinue these medications once you are having regular bowel movements and/or are no longer taking your narcotic pain medication.    Blue dye may have been used during your surgery to locate lymph nodes and can cause your urine to be blue/green for several days after surgery.  This is not a cause for concern and will resolve on its own.     RETURN APPOINTMENT    Follow up with your surgeon in 2 weeks.  Please call the office at [phone] to schedule your appointment.      CALL OUR OFFICE -[phone] IF YOU HAVE:     Chills or fever above 101 F.    Increased redness, warmth, or drainage at your incisions.    Significant bleeding.    Pain not relieved by your pain medication or rest.    Increasing pain after the first 48 hours.    Any other concerns or questions.           Revised September 2020

## 2021-04-23 NOTE — OP NOTE
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    PREOPERATIVE MEDICATIONS:  Ancef    SURGEON:  Giuseppe Rogers MD    ASSISTANT:  Peter Willard PA-C  A first assistant was necessary owing to challenging retraction, visualization, and exposure.    INDICATIONS:  New diagnosis right breast cancer.    DESCRIPTION OF PROCEDURE:  The patient was taken to the operating suite, and LMA was placed.  The right breast and axilla were prepped and draped in a sterile fashion.  Surgeon-initiated timeout was acknowledged.  We made a circumareolar incision and took this down through skin and subcutaneous tissue.  We dissected toward the seed and followed it until we were oriented directly above it.  We then created our partial mastectomy specimen by getting around the tissue deep to and around the seed in accordance with the post-placement mammograms.  We took this down to the chest wall and came around the tumor.  Specimen was then removed and inked according to protocol.  The lumpectomy cavity had clips placed circumferentially.  We then mobilized the surrounding tissue, both superficially and at the level of the fascia.  This tissue was then reapproximated with 3-0 Vicryl suture to close the cavity and fill the defect.    We then set about performing our sentinel lymph node dissection.  A transverse incision was made through a skin crease into the axilla, and I took this down through skin and subcutaneous tissue.  We followed an area of elevated radiotracer counts and encountered riky tissue.  This was removed and sent as sentinel lymph node biopsy.   We then did a tracer count of the remaining axillary bed and found no significant residual radiotracer uptake.  These nodes were sent for permanent section  according to the Z11 trial.  The wounds were irrigated at this point and closed in layers.  Specimen mammogram was performed and we received word from the [facility] that the specimen contained the lesion, clip and seed.  The wounds were dressed, and the patient was awakened and taken to the PACU in stable condition.  At the conclusion of the case, all lap and needle counts were correct.      ESTIMATED BLOOD LOSS: 5 mL    INTRAOPERATIVE FINDINGS:  Specimen mammogram demonstrated lesion, clip, and seed.  Grossly normal lymph node.    Giuseppe Rogers MD, MD

## 2021-04-27 LAB — COPATH REPORT: NORMAL

## 2021-04-29 ENCOUNTER — TELEPHONE (OUTPATIENT)
Dept: SURGERY | Facility: CLINIC | Age: 76
End: 2021-04-29

## 2021-04-29 ENCOUNTER — CARE COORDINATION (OUTPATIENT)
Dept: SURGERY | Facility: CLINIC | Age: 76
End: 2021-04-29

## 2021-04-29 NOTE — TELEPHONE ENCOUNTER
Dee Dee is s/p seed localized right breast lumpectomy right SLNB on 4/23/2021 with Dr. Rogers. Post op call placed to patient.    Dee Dee reports healing well at home. She has no post op complaints at this time.    She is scheduled for the following appointments:    1.) Medical Oncology consult with Dr. Grullon on 5/5 at 11 am.     2.) Post op appointment with Dr. Rogers on 5/6 at 8:30 am     2.) Radiation Oncology consult (virtual) with Dr. Contreras on 5/10 at 10 am.     Diana Swartz RN, BSN, OCN, CBCN  Breast Nurse Navigator  [facility]  Phone: [phone]

## 2021-04-29 NOTE — PROGRESS NOTES
Oncotype DX requested on case #: [id number] at request of Dr. Rogers.     Diana Swartz RN, BSN, OCN, CBCN  Breast Nurse Navigator  [facility]  Phone: [phone]

## 2021-05-01 NOTE — PROGRESS NOTES
"[facility]    Hematology/Oncology New Patient Note      Today's Date: 05/05/21    Reason for Consult: Right breast cancer.    Yola Mayo is a 75 year old female who is being evaluated via a billable video visit.      The patient has been notified of following:     \"This video visit will be conducted via a call between you and your physician/provider. We have found that certain health care needs can be provided without the need for an in-person physical exam.  This service lets us provide the care you need with a video conversation during the COVID-19 pandemic.  If a prescription is necessary we can send it directly to your pharmacy.  If lab work is needed we can place an order for that and you can then stop by our lab to have the test done at a later time.    Video visits are billed at different rates depending on your insurance coverage.  Please reach out to your insurance provider with any questions.    If during the course of the call the physician/provider feels a video visit is not appropriate, you will not be charged for this service.\"    Patient has given verbal consent for Video visit? Yes    How would you like to obtain your AVS? Lynne    Patient would like the video invitation sent by: Text to cell phone: [phone]        Video-Visit Details    Type of service:  Video Visit      Originating Location (pt. Location): Home    Distant Location (provider location):  [facility]     Mode of Communication:  Video Conference via Doximity    Video visit duration: 15 minutes      HISTORY OF PRESENT ILLNESS: Yola Mayo is a 75 year old female who presents with the following oncologic history:  1. 3/24/2021: Mammogram showed distortion in right upper outer breast; left breast negative.  2. 3/31/2021: Right breast U/S at 9:00, 4 cm from nipple showed mass measuring 1.1 x 1 x 1.2 cm; no enlarged right axillary lymph nodes seen.  3. 4/07/2021: Right breast " biopsy at 9:00 showed grade 2 invasive ductal carcinoma, ER positive at 100%, [place] positive at 95%, HER-2/marek FISH negative.  4. 4/23/2021: Underwent right breast lumpectomy and sentinel lymph node excision under care of Dr. Ace Rogers. Pathology showed grade 2 invasive ductal carcinoma measuring 1.5 cm, low grade DCIS, LCIS, margins negative; 2 lymph nodes negative. Oncotype DX results pending.    Dee Dee reports some tenderness at the lymph node excision. Otherwise, she is feeling relatively well.    REVIEW OF SYSTEMS:   14 point ROS was reviewed and is negative other than as noted above in HPI.       HOME MEDICATIONS:  Current Outpatient Medications   Medication Sig Dispense Refill     acetaminophen (TYLENOL) 325 MG tablet Take 2 tablets (650 mg) by mouth 3 times daily 100 tablet 0     amoxicillin (AMOXIL) 500 MG capsule Take 4 capsules half to one hour before the procedure. 4 capsule 3     aspirin 81 MG tablet Take 81 mg by mouth daily       atorvastatin (LIPITOR) 10 MG tablet Take 1 tablet (10 mg) by mouth daily for cholestrol 90 tablet 3     blood glucose (CONTOUR NEXT TEST) test strip Use to test blood sugar 1 time daily or as directed. 100 each 2     blood glucose monitoring (NITO MICROLET) lancets Use to test blood sugar 1 time daily or as directed. 100 each 3     calcium-vitamin D (CALTRATE) 600-400 MG-UNIT per tablet Take 1 tablet by mouth daily 60 tablet 11     cholecalciferol (VITAMIN D) 1000 UNIT tablet Take 1 tablet (1,000 Units) by mouth daily 100 tablet 3     CONTOUR NEXT EZ (CONTOUR NEXT EZ W/DEVICE KIT) w/Device KIT 1 kit once 1 kit 0     Cyanocobalamin (VITAMIN B 12 PO) Take 1 tablet by mouth daily VIT B12 unknown otc dose       hydrALAZINE (APRESOLINE) 25 MG tablet Take 1 tablet (25 mg) by mouth 3 times daily 90 tablet 1     hydrochlorothiazide (HYDRODIURIL) 25 MG tablet Take 1 tablet (25 mg) by mouth daily for Blood Pressure 90 tablet 3     levothyroxine (SYNTHROID/LEVOTHROID) 112 MCG tablet Take 1  tablet (112 mcg) by mouth daily for thyroid 90 tablet 3     losartan (COZAAR) 100 MG tablet Take 1 tablet (100 mg) by mouth daily for Blood Pressure 90 tablet 3     metFORMIN (GLUCOPHAGE-XR) 500 MG 24 hr tablet Take 2 tablets (1,000 mg) by mouth daily (with dinner) 180 tablet 3     metoprolol succinate ER (TOPROL-XL) 25 MG 24 hr tablet Take 1 tablet (25 mg) by mouth daily for Blood Pressure 90 tablet 3     metroNIDAZOLE (METROGEL) 0.75 % external gel Apply topically 2 times daily 45 g 1     multivitamin, therapeutic with minerals (THERA-VIT-M) TABS Take 1 tablet by mouth daily       nitroGLYcerin (NITROSTAT) 0.4 MG sublingual tablet For chest pain place 1 tablet under the tongue every 5 minutes for 3 doses. If symptoms persist 5 minutes after 1st dose call 914. 18 tablet 3         ALLERGIES:  Allergies   Allergen Reactions     Toprol Xl [Metoprolol] Fatigue     Ace Inhibitors      ACE throat clearing     Oxycodone Nausea and Vomiting     Tetanus Toxoid Swelling     Hard red lump          PAST MEDICAL HISTORY:  Past Medical History:   Diagnosis Date     Anemia due to blood loss      BASAL CELL CANCER -NOSE      Dermatitis      Diabetes (H)      Family history of diabetes mellitus     Father     Family history of ischemic heart disease     Father     Family history of malignant neoplasm of ovary     Mother     HX DISRUPT L ANT CRUCIATE S/P REPAIR 2003      Hyperlipidemia      HYPERTENSION       HYPOTHYROIDISM      LOC PRIM OSTEOARTH-L 1ST METATARSAL-CUNEIFORM JOINT 3/10/2004     Major depressive disorder, single episode, mild (H) 11/24/2014     MILD-MOD TRICUSPID REGURG. MOD PULM HTN 6/23/2005    ECHO  5/05     Obese      PONV (postoperative nausea and vomiting)      Rosacea      Shingles      SPLENIC CYST          PAST SURGICAL HISTORY:  Past Surgical History:   Procedure Laterality Date     ARTHROPLASTY KNEE Left 4/13/2016    Procedure: ARTHROPLASTY KNEE;  Surgeon: Giuseppe Mcgrath MD;  Location:  OR      ARTHROPLASTY MINIMALLY INVASIVE HIP  4/18/2012    Procedure:ARTHROPLASTY MINIMALLY INVASIVE HIP; RIGHT TWO INCISION MINIMALLY INVASIVE HIP ARTHROPLASTY (YUVAL)^ (NO CELL SAVER) (C-ARM); Surgeon:GIUSEPPE LOPEZ; Location: OR     BIOPSY NODE SENTINEL Right 4/23/2021    Procedure: RIGHT SENTINEL LYMPH NODE BIOPSY;  Surgeon: Giuseppe Rogers MD;  Location:  OR     C APPENDECTOMY  8/87    incidental appi at hys     C CHEMOSURG MOHS 1ST STAGE  9-99    Mohs-nose     C REPAIR CRUCIATE LIGAMENT,KNEE  8/2003    L   ACL reconstruction     C TOTAL ABDOM HYSTERECTOMY  8/87    W/ BSO fibroids, hx PID     CARDIAC SURGERY  06/27/2019    mid and distal RCA stents     ENT SURGERY      mohs procedure on nose     HC COLONOSCOPY THRU STOMA, DIAGNOSTIC  4/03    normal, rec repeat 10 yrs     HYSTERECTOMY, PAP NO LONGER INDICATED       LUMPECTOMY BREAST WITH SEED LOCALIZATION Right 4/23/2021    Procedure: SEED LOCALIZED RIGHT BREAST LUMPECTOMY;  Surgeon: Giuseppe Rogers MD;  Location:  OR     SURGICAL HISTORY OF -   Mult-~1978    infertility, laparotomies         SOCIAL HISTORY:  Social History     Socioeconomic History     Marital status:      Spouse name: FABIAN CUADRA     Number of children: 2     Years of education: Not on file     Highest education level: Not on file   Occupational History     Occupation: [employer]= [id number]     Employer: [employer]   Social Needs     Financial resource strain: Not on file     Food insecurity     Worry: Not on file     Inability: Not on file     Transportation needs     Medical: Not on file     Non-medical: Not on file   Tobacco Use     Smoking status: Never Smoker     Smokeless tobacco: Never Used   Substance and Sexual Activity     Alcohol use: Yes     Alcohol/week: 0.0 standard drinks     Comment: occasional     Drug use: No     Sexual activity: Yes     Partners: Male   Lifestyle     Physical activity     Days per week: Not on file     Minutes per session: Not on file      Stress: Not on file   Relationships     Social connections     Talks on phone: Not on file     Gets together: Not on file     Attends Voodoo service: Not on file     Active member of club or organization: Not on file     Attends meetings of clubs or organizations: Not on file     Relationship status: Not on file     Intimate partner violence     Fear of current or ex partner: Not on file     Emotionally abused: Not on file     Physically abused: Not on file     Forced sexual activity: Not on file   Other Topics Concern      Service No     Blood Transfusions No     Caffeine Concern No     Occupational Exposure No     Hobby Hazards No     Sleep Concern No     Stress Concern No     Weight Concern Yes     Special Diet No     Back Care No     Exercise Yes     Comment: Approx 3 times a week for about 30 minutes     Bike Helmet Yes     Seat Belt Yes     Self-Exams Yes     Parent/sibling w/ CABG, MI or angioplasty before 65F 55M? Not Asked   Social History Narrative     Not on file         FAMILY HISTORY:  Family History   Problem Relation Age of Onset     Cancer Mother         ovarian  of it  at 49yo.     Other Cancer Mother      Diabetes Father         Adult onset     Hypertension Father      C.A.D. Father         Fatal Heart attack at 70.     Cerebrovascular Disease Father      Thyroid Disease Sister      Thyroid Disease Sister      Thyroid Disease Sister      Cancer Daughter      Thyroid Disease Daughter      Breast Cancer No family hx of      Cancer - colorectal No family hx of          PHYSICAL EXAM:  Vital signs:  There were no vitals taken for this visit.   GENERAL: No acute distress.  EYES: No scleral icterus. No overt erythema.  RESPIRATORY: No cough.  No labored breathing.  MUSCULOSKELETAL: Range of motion in the neck, shoulders, and arms appear normal.  SKIN: No overt rashes, discolorations, or lesions over the face and neck.  NEUROLOGIC: Alert.  No overt tremors.  PSYCHIATRIC: Normal affect and  mood.  Does not appear anxious.    The rest of a comprehensive physical examination is deferred due to PHE (public health emergency) video visit restrictions.    LABS:  CBC RESULTS:   Recent Labs   Lab Test 03/19/21  0857   WBC 7.2   RBC 4.60   HGB 14.1   HCT 40.7   MCV 89   MCH 30.7   MCHC 34.6   RDW 14.6          Recent Labs   Lab Test 04/23/21  1132 03/19/21  0857 06/17/20  0815   NA  --  137 137   POTASSIUM 3.3* 3.6 3.5   CHLORIDE  --  104 104   CO2  --  30 26   ANIONGAP  --  3 7   * 122* 109*   BUN  --  11 13   CR  --  0.70 0.68   JOSEPH  --  9.3 8.8         PATHOLOGY:  Reviewed as per HPI.    IMAGING:  Reviewed as per HPI.    ASSESSMENT/PLAN:  Yola Mayo is a 75 year old female with the following issues:  1. Stage IA, [id number], grade 2 invasive ductal carcinoma of right upper outer breast, ER positive, SD positive HER-2/marek negative, Oncotype DX results pending  --I discussed with Dee Dee that she has overall favorable prognosis based on her early stage breast cancer with low clinical risk and strong ER expression and HER-2 negative expression.  --Oncotype DX is pending to assess genomic risk.  Will discuss these results when available.  --At minimum, I recommended a discussion about adjuvant radiation with Dr. Contreras scheduled for 5/10.  --I also recommended adjuvant endocrine therapy with anastrazole.  --I discussed the potential side effects of anastrazole, including but not limited to: fatigue, myalgias/arthralgias, bone density loss, decrease sexual drive, vaginal dryness, nausea, headache, difficulty sleeping, hot flashes, night sweats, small cardiovascular risk.  --I recommended a baseline DEXA scan and advised adequate calcium and vitamin D intake.  --She had a normal CBC drawn from 3/19/2021 so this will not be repeated at present time but will obtain baseline CMP.    2. Coronary artery disease  --Discussed with Dee Dee that aromatase inhibitors can slightly increase cardiovascular risk  but controllable with her cardiac medications.  --She will continue on aspirin, beta-blocker, and statin.     3. Diabetes mellitus type 2  --Well-controlled. She will continue metformin and has her A1C monitored by Dr. Braga.    Return in 3 months.    Elida Grullon MD  Hematology/Oncology  [facility]    Total time spent: 45 minutes in review of imaging, labs, patient evaluation, discussion of plan of care, test orders and documentation.

## 2021-05-04 ENCOUNTER — TELEPHONE (OUTPATIENT)
Dept: ONCOLOGY | Facility: CLINIC | Age: 76
End: 2021-05-04

## 2021-05-04 NOTE — TELEPHONE ENCOUNTER
Called Dee Dee regarding her video visit tomorrow with Dr. Grullon. Informed her that visit will be virtual, she had thought it would be in person. She is fine with virtual appointment. Dee Dee stated that she would like to use Doximity through her smart phone. Yuridia Harrison RN,BSN,OCN

## 2021-05-05 ENCOUNTER — PRE VISIT (OUTPATIENT)
Dept: ONCOLOGY | Facility: CLINIC | Age: 76
End: 2021-05-05

## 2021-05-05 ENCOUNTER — VIRTUAL VISIT (OUTPATIENT)
Dept: ONCOLOGY | Facility: CLINIC | Age: 76
End: 2021-05-05
Attending: INTERNAL MEDICINE
Payer: MEDICARE

## 2021-05-05 ENCOUNTER — TELEPHONE (OUTPATIENT)
Dept: ONCOLOGY | Facility: CLINIC | Age: 76
End: 2021-05-05

## 2021-05-05 VITALS — BODY MASS INDEX: 31.89 KG/M2 | WEIGHT: 180 LBS

## 2021-05-05 DIAGNOSIS — Z17.0 MALIGNANT NEOPLASM OF UPPER-OUTER QUADRANT OF RIGHT BREAST IN FEMALE, ESTROGEN RECEPTOR POSITIVE (H): Primary | ICD-10-CM

## 2021-05-05 DIAGNOSIS — C50.411 MALIGNANT NEOPLASM OF UPPER-OUTER QUADRANT OF RIGHT BREAST IN FEMALE, ESTROGEN RECEPTOR POSITIVE (H): Primary | ICD-10-CM

## 2021-05-05 DIAGNOSIS — C50.911 INVASIVE DUCTAL CARCINOMA OF BREAST, FEMALE, RIGHT (H): Primary | ICD-10-CM

## 2021-05-05 DIAGNOSIS — E11.9 TYPE 2 DIABETES MELLITUS WITHOUT COMPLICATION, WITHOUT LONG-TERM CURRENT USE OF INSULIN (H): ICD-10-CM

## 2021-05-05 DIAGNOSIS — I25.10 CAD S/P PERCUTANEOUS CORONARY ANGIOPLASTY: ICD-10-CM

## 2021-05-05 DIAGNOSIS — M85.9 DISORDER OF BONE DENSITY AND STRUCTURE, UNSPECIFIED: ICD-10-CM

## 2021-05-05 DIAGNOSIS — Z98.61 CAD S/P PERCUTANEOUS CORONARY ANGIOPLASTY: ICD-10-CM

## 2021-05-05 PROCEDURE — 99204 OFFICE O/P NEW MOD 45 MIN: CPT | Mod: 95 | Performed by: INTERNAL MEDICINE

## 2021-05-05 ASSESSMENT — PAIN SCALES - GENERAL: PAINLEVEL: NO PAIN (0)

## 2021-05-05 NOTE — PROGRESS NOTES
Dee Dee is a 75 year old who is being evaluated via a billable video visit.      How would you like to obtain your AVS? Fruitfulllhart  If the video visit is dropped, the invitation should be resent by: Text to cell phone: [phone]  Will anyone else be joining your video visit? No      Video Start Time:   Video-Visit Details    Type of service:  Video Visit    Video End Time    Originating Location (pt. Location): Home    Distant Location (provider location):  [facility]     Platform used for Video Visit: Flower

## 2021-05-05 NOTE — TELEPHONE ENCOUNTER
RECORDS STATUS - BREAST    RECORDS REQUESTED FROM: EPIC   DATE REQUESTED: 5/5/2021   NOTES DETAILS STATUS   OFFICE NOTE from referring provider Complete Zoraida Braga MD   OFFICE NOTE from medical oncologist N/A    OFFICE NOTE from surgeon Complete See Breast Biopsy In EPIC   OFFICE NOTE from radiation oncologist     DISCHARGE SUMMARY from hospital Complete 4/23/2021 Invasive ductal carcinoma of breast, female, right (H)    DISCHARGE REPORT from the ER     OPERATIVE REPORT Complete See Breast Biopsy in EPIC   MEDICATION LIST Complete [facility]   CLINICAL TRIAL TREATMENTS TO DATE     LABS     PATHOLOGY REPORTS  (Tissue diagnosis, Stage, ER/MI percentage positive and intensity of staining, HER2 IHC, FISH, and all biopsies from breast and any distant metastasis)                 Complete- internal breast biopsies  4/23/2021 Surgical Breast Biopsy   A: Right breast, lumpectomy   - Invasive ductal carcinoma, Will grade 2   - Ductal carcinoma in situ, low grade   - Lobular carcinoma in situ   - Tumor size: 1.5 cm   - Margins: Negative   - Please see synoptic report below     B: Lymph nodes, right axillary, sentinel, biopsy   - Two lymph nodes negative for malignancy (0/2)     4/7/2021   Right breast ultrasound core biopsy, 9:00, 4.0cm FN:   - Invasive ductal carcinoma   - Will grade: II (of possible III)        - Will score 6 (of possible 9)        - Scoring criteria: tubules = 3, nuclear pleomorphism = 2, mitosis =   1.   - Lymphovascular invasion: None seen.   - Ductal carcinoma in-situ: None seen.   - Estrogen receptor:  POSITIVE (100% strong positive)   - Progesterone receptor:  POSITIVE (95% Strong to moderate positive)   - Her2/marek:  FISH analysis will be performed and reported by the   [facility] in a separate report.   Additional findings: Rare calcification associated with malignancy    GENONOMIC TESTING     TYPE:   (Next Generation Sequencing, including  Foundation One testing, and Oncotype score) Complete  Her 2 Aline Fish 4/7/2021   IMAGING (NEED IMAGES & REPORT)     CT SCANS     MRI     NM Lymph Complete 4/23/2021   MAMMO Complete MA 4/23/2021, 4/7/2021, 3/24/2021   ULTRASOUND Complete US Breast 4/23/2021, 4/7/2021, 3/31/2021   PET     BONE SCAN     BRAIN MRI

## 2021-05-05 NOTE — LETTER
"    5/5/2021         RE: Yola Mayo  [address] [facility] [address]        Dear Colleague,    Thank you for referring your patient, Yola Mayo, to the [facility]. Please see a copy of my visit note below.    [facility]    Hematology/Oncology New Patient Note      Today's Date: 05/05/21    Reason for Consult: Right breast cancer.    Yola Mayo is a 75 year old female who is being evaluated via a billable video visit.      The patient has been notified of following:     \"This video visit will be conducted via a call between you and your physician/provider. We have found that certain health care needs can be provided without the need for an in-person physical exam.  This service lets us provide the care you need with a video conversation during the COVID-19 pandemic.  If a prescription is necessary we can send it directly to your pharmacy.  If lab work is needed we can place an order for that and you can then stop by our lab to have the test done at a later time.    Video visits are billed at different rates depending on your insurance coverage.  Please reach out to your insurance provider with any questions.    If during the course of the call the physician/provider feels a video visit is not appropriate, you will not be charged for this service.\"    Patient has given verbal consent for Video visit? Yes    How would you like to obtain your AVS? Nicolasaharronda    Patient would like the video invitation sent by: Text to cell phone: [phone]        Video-Visit Details    Type of service:  Video Visit      Originating Location (pt. Location): Home    Distant Location (provider location):  [facility]     Mode of Communication:  Video Conference via [employer]    Video visit duration: 15 minutes      HISTORY OF PRESENT ILLNESS: Yola Mayo is a 75 year old female who presents with the following oncologic history:  1. " 3/24/2021: Mammogram showed distortion in right upper outer breast; left breast negative.  2. 3/31/2021: Right breast U/S at 9:00, 4 cm from nipple showed mass measuring 1.1 x 1 x 1.2 cm; no enlarged right axillary lymph nodes seen.  3. 4/07/2021: Right breast biopsy at 9:00 showed grade 2 invasive ductal carcinoma, ER positive at 100%, ID positive at 95%, HER-2/marek FISH negative.  4. 4/23/2021: Underwent right breast lumpectomy and sentinel lymph node excision under care of Dr. Ace Rogers. Pathology showed grade 2 invasive ductal carcinoma measuring 1.5 cm, low grade DCIS, LCIS, margins negative; 2 lymph nodes negative. Oncotype DX results pending.    Dee Dee reports some tenderness at the lymph node excision. Otherwise, she is feeling relatively well.    REVIEW OF SYSTEMS:   14 point ROS was reviewed and is negative other than as noted above in HPI.       HOME MEDICATIONS:  Current Outpatient Medications   Medication Sig Dispense Refill     acetaminophen (TYLENOL) 325 MG tablet Take 2 tablets (650 mg) by mouth 3 times daily 100 tablet 0     amoxicillin (AMOXIL) 500 MG capsule Take 4 capsules half to one hour before the procedure. 4 capsule 3     aspirin 81 MG tablet Take 81 mg by mouth daily       atorvastatin (LIPITOR) 10 MG tablet Take 1 tablet (10 mg) by mouth daily for cholestrol 90 tablet 3     blood glucose (CONTOUR NEXT TEST) test strip Use to test blood sugar 1 time daily or as directed. 100 each 2     blood glucose monitoring (NITO MICROLET) lancets Use to test blood sugar 1 time daily or as directed. 100 each 3     calcium-vitamin D (CALTRATE) 600-400 MG-UNIT per tablet Take 1 tablet by mouth daily 60 tablet 11     cholecalciferol (VITAMIN D) 1000 UNIT tablet Take 1 tablet (1,000 Units) by mouth daily 100 tablet 3     CONTOUR NEXT EZ (CONTOUR NEXT EZ W/DEVICE KIT) w/Device KIT 1 kit once 1 kit 0     Cyanocobalamin (VITAMIN B 12 PO) Take 1 tablet by mouth daily VIT B12 unknown otc dose       hydrALAZINE  (APRESOLINE) 25 MG tablet Take 1 tablet (25 mg) by mouth 3 times daily 90 tablet 1     hydrochlorothiazide (HYDRODIURIL) 25 MG tablet Take 1 tablet (25 mg) by mouth daily for Blood Pressure 90 tablet 3     levothyroxine (SYNTHROID/LEVOTHROID) 112 MCG tablet Take 1 tablet (112 mcg) by mouth daily for thyroid 90 tablet 3     losartan (COZAAR) 100 MG tablet Take 1 tablet (100 mg) by mouth daily for Blood Pressure 90 tablet 3     metFORMIN (GLUCOPHAGE-XR) 500 MG 24 hr tablet Take 2 tablets (1,000 mg) by mouth daily (with dinner) 180 tablet 3     metoprolol succinate ER (TOPROL-XL) 25 MG 24 hr tablet Take 1 tablet (25 mg) by mouth daily for Blood Pressure 90 tablet 3     metroNIDAZOLE (METROGEL) 0.75 % external gel Apply topically 2 times daily 45 g 1     multivitamin, therapeutic with minerals (THERA-VIT-M) TABS Take 1 tablet by mouth daily       nitroGLYcerin (NITROSTAT) 0.4 MG sublingual tablet For chest pain place 1 tablet under the tongue every 5 minutes for 3 doses. If symptoms persist 5 minutes after 1st dose call 911. 25 tablet 3         ALLERGIES:  Allergies   Allergen Reactions     Toprol Xl [Metoprolol] Fatigue     Ace Inhibitors      ACE throat clearing     Oxycodone Nausea and Vomiting     Tetanus Toxoid Swelling     Hard red lump          PAST MEDICAL HISTORY:  Past Medical History:   Diagnosis Date     Anemia due to blood loss      BASAL CELL CANCER -NOSE      Dermatitis      Diabetes (H)      Family history of diabetes mellitus     Father     Family history of ischemic heart disease     Father     Family history of malignant neoplasm of ovary     Mother     HX DISRUPT L ANT CRUCIATE S/P REPAIR 2003      Hyperlipidemia      HYPERTENSION       HYPOTHYROIDISM      LOC PRIM OSTEOARTH-L 1ST METATARSAL-CUNEIFORM JOINT 3/10/2004     Major depressive disorder, single episode, mild (H) 11/24/2014     MILD-MOD TRICUSPID REGURG. MOD PULM HTN 6/23/2005    ECHO  5/05     Obese      PONV (postoperative nausea and  vomiting)      Rosacea      Shingles      SPLENIC CYST          PAST SURGICAL HISTORY:  Past Surgical History:   Procedure Laterality Date     ARTHROPLASTY KNEE Left 4/13/2016    Procedure: ARTHROPLASTY KNEE;  Surgeon: Giuseppe Lopez MD;  Location: [facility] OR     ARTHROPLASTY MINIMALLY INVASIVE HIP  4/18/2012    Procedure:ARTHROPLASTY MINIMALLY INVASIVE HIP; RIGHT TWO INCISION MINIMALLY INVASIVE HIP ARTHROPLASTY (YUVAL)^ (NO CELL SAVER) (C-ARM); Surgeon:GIUSEPPE LOPEZ; Location: OR     BIOPSY NODE SENTINEL Right 4/23/2021    Procedure: RIGHT SENTINEL LYMPH NODE BIOPSY;  Surgeon: Giuseppe Rogers MD;  Location:  OR     C APPENDECTOMY  8/87    incidental appi at hys     C CHEMOSURG MOHS 1ST STAGE  9-99    Mohs-nose     C REPAIR CRUCIATE LIGAMENT,KNEE  8/2003    L   ACL reconstruction     C TOTAL ABDOM HYSTERECTOMY  8/87    W/ BSO fibroids, hx PID     CARDIAC SURGERY  06/27/2019    mid and distal RCA stents     ENT SURGERY      mohs procedure on nose     HC COLONOSCOPY THRU STOMA, DIAGNOSTIC  4/03    normal, rec repeat 10 yrs     HYSTERECTOMY, PAP NO LONGER INDICATED       LUMPECTOMY BREAST WITH SEED LOCALIZATION Right 4/23/2021    Procedure: SEED LOCALIZED RIGHT BREAST LUMPECTOMY;  Surgeon: Giuseppe Rogers MD;  Location:  OR     SURGICAL HISTORY OF -   Mult-~1978    infertility, laparotomies         SOCIAL HISTORY:  Social History     Socioeconomic History     Marital status:      Spouse name: FABIAN CUADRA     Number of children: 2     Years of education: Not on file     Highest education level: Not on file   Occupational History     Occupation: [employer]     Employer: [employer]   Social Needs     Financial resource strain: Not on file     Food insecurity     Worry: Not on file     Inability: Not on file     Transportation needs     Medical: Not on file     Non-medical: Not on file   Tobacco Use     Smoking status: Never Smoker     Smokeless tobacco: Never Used   Substance and  Sexual Activity     Alcohol use: Yes     Alcohol/week: 0.0 standard drinks     Comment: occasional     Drug use: No     Sexual activity: Yes     Partners: Male   Lifestyle     Physical activity     Days per week: Not on file     Minutes per session: Not on file     Stress: Not on file   Relationships     Social connections     Talks on phone: Not on file     Gets together: Not on file     Attends Confucianism service: Not on file     Active member of club or organization: Not on file     Attends meetings of clubs or organizations: Not on file     Relationship status: Not on file     Intimate partner violence     Fear of current or ex partner: Not on file     Emotionally abused: Not on file     Physically abused: Not on file     Forced sexual activity: Not on file   Other Topics Concern      Service No     Blood Transfusions No     Caffeine Concern No     Occupational Exposure No     Hobby Hazards No     Sleep Concern No     Stress Concern No     Weight Concern Yes     Special Diet No     Back Care No     Exercise Yes     Comment: Approx 3 times a week for about 30 minutes     Bike Helmet Yes     Seat Belt Yes     Self-Exams Yes     Parent/sibling w/ CABG, MI or angioplasty before 65F 55M? Not Asked   Social History Narrative     Not on file         FAMILY HISTORY:  Family History   Problem Relation Age of Onset     Cancer Mother         ovarian  of it  at 51yo.     Other Cancer Mother      Diabetes Father         Adult onset     Hypertension Father      C.A.D. Father         Fatal Heart attack at 70.     Cerebrovascular Disease Father      Thyroid Disease Sister      Thyroid Disease Sister      Thyroid Disease Sister      Cancer Daughter      Thyroid Disease Daughter      Breast Cancer No family hx of      Cancer - colorectal No family hx of          PHYSICAL EXAM:  Vital signs:  There were no vitals taken for this visit.   GENERAL: No acute distress.  EYES: No scleral icterus. No overt  erythema.  RESPIRATORY: No cough.  No labored breathing.  MUSCULOSKELETAL: Range of motion in the neck, shoulders, and arms appear normal.  SKIN: No overt rashes, discolorations, or lesions over the face and neck.  NEUROLOGIC: Alert.  No overt tremors.  PSYCHIATRIC: Normal affect and mood.  Does not appear anxious.    The rest of a comprehensive physical examination is deferred due to PHE (public health emergency) video visit restrictions.    LABS:  CBC RESULTS:   Recent Labs   Lab Test 03/19/21  0857   WBC 7.2   RBC 4.60   HGB 14.1   HCT 40.7   MCV 89   MCH 30.7   MCHC 34.6   RDW 14.6          Recent Labs   Lab Test 04/23/21  1132 03/19/21  0857 06/17/20  0815   NA  --  137 137   POTASSIUM 3.3* 3.6 3.5   CHLORIDE  --  104 104   CO2  --  30 26   ANIONGAP  --  3 7   * 122* 109*   BUN  --  11 13   CR  --  0.70 0.68   JOSEPH  --  9.3 8.8         PATHOLOGY:  Reviewed as per HPI.    IMAGING:  Reviewed as per HPI.    ASSESSMENT/PLAN:  Yola Mayo is a 75 year old female with the following issues:  1. Stage IA, [id number], grade 2 invasive ductal carcinoma of right upper outer breast, ER positive, DE positive HER-2/marek negative, Oncotype DX results pending  --I discussed with Dee Dee that she has overall favorable prognosis based on her early stage breast cancer with low clinical risk and strong ER expression and HER-2 negative expression.  --Oncotype DX is pending to assess genomic risk.  Will discuss these results when available.  --At minimum, I recommended a discussion about adjuvant radiation with Dr. Contreras scheduled for 5/10.  --I also recommended adjuvant endocrine therapy with anastrazole.  --I discussed the potential side effects of anastrazole, including but not limited to: fatigue, myalgias/arthralgias, bone density loss, decrease sexual drive, vaginal dryness, nausea, headache, difficulty sleeping, hot flashes, night sweats, small cardiovascular risk.  --I recommended a baseline DEXA scan and  advised adequate calcium and vitamin D intake.  --She had a normal CBC drawn from 3/19/2021 so this will not be repeated at present time but will obtain baseline CMP.    2. Coronary artery disease  --Discussed with Dee Dee that aromatase inhibitors can slightly increase cardiovascular risk but controllable with her cardiac medications.  --She will continue on aspirin, beta-blocker, and statin.     3. Diabetes mellitus type 2  --Well-controlled. She will continue metformin and has her A1C monitored by Dr. Braga.    Return in 3 months.    Elida Grullon MD  Hematology/Oncology  [facility]    Total time spent: 45 minutes in review of imaging, labs, patient evaluation, discussion of plan of care, test orders and documentation.    Dee Dee is a 75 year old who is being evaluated via a billable video visit.      How would you like to obtain your AVS? MyChart  If the video visit is dropped, the invitation should be resent by: Text to cell phone: [phone]  Will anyone else be joining your video visit? No      Video Start Time:   Video-Visit Details    Type of service:  Video Visit    Video End Time    Originating Location (pt. Location): Home    Distant Location (provider location):  [facility]     Platform used for Video Visit: Flower      Again, thank you for allowing me to participate in the care of your patient.        Sincerely,        Elida Grullon MD

## 2021-05-05 NOTE — TELEPHONE ENCOUNTER
Called Dee Dee regarding Anastrazole information. She was e mailed information sheet on Anastrazole. Reviewed side effects with Dee Dee including bone loss, joint pain, hot flashes, cardiac risk, mood swings. She will meet with radiation oncologist and would start Anastrazole after her radiation is complete. Writer will follow up with her in 1 month to send script for Anastrazole. Yuridia Harrison RN,BSN,OCN

## 2021-05-06 ENCOUNTER — OFFICE VISIT (OUTPATIENT)
Dept: SURGERY | Facility: CLINIC | Age: 76
End: 2021-05-06
Payer: MEDICARE

## 2021-05-06 VITALS
SYSTOLIC BLOOD PRESSURE: 156 MMHG | DIASTOLIC BLOOD PRESSURE: 66 MMHG | HEART RATE: 74 BPM | WEIGHT: 180 LBS | OXYGEN SATURATION: 98 % | HEIGHT: 63 IN | BODY MASS INDEX: 31.89 KG/M2

## 2021-05-06 DIAGNOSIS — Z09 SURGERY FOLLOW-UP EXAMINATION: Primary | ICD-10-CM

## 2021-05-06 PROCEDURE — 99024 POSTOP FOLLOW-UP VISIT: CPT | Performed by: SURGERY

## 2021-05-06 ASSESSMENT — MIFFLIN-ST. JEOR: SCORE: 1280.6

## 2021-05-06 NOTE — PROGRESS NOTES
Surgery Postop Note    Yola Mayo presents today for surgical followup.  she is doing well following right lumpectomy.  Incisions look fine with no signs of wound infection.  Final pathology revealed a 1.5 cm IDC with negative margins and two negative nodes.  She is scheduled to meet with med/onc and rad/onc next week.  I expect her to make a complete recovery.  Thank you for the opportunity to help in her care.    Ace Rogers M.D.  [facility]  [phone]    Please route or send letter to:  Primary Care Provider (PCP) and Referring Provider

## 2021-05-10 ENCOUNTER — TRANSFERRED RECORDS (OUTPATIENT)
Dept: HEALTH INFORMATION MANAGEMENT | Facility: CLINIC | Age: 76
End: 2021-05-10

## 2021-05-10 ENCOUNTER — PATIENT OUTREACH (OUTPATIENT)
Dept: ONCOLOGY | Facility: CLINIC | Age: 76
End: 2021-05-10

## 2021-05-10 DIAGNOSIS — Z79.811 USE OF AROMATASE INHIBITORS: Primary | ICD-10-CM

## 2021-05-10 DIAGNOSIS — Z13.820 SCREENING FOR OSTEOPOROSIS: ICD-10-CM

## 2021-05-10 NOTE — PROGRESS NOTES
Writer received a call from [facility] [phone] that the current ICD10 code does not meet medicare guidelines to approve bone density.     Patient is scheduled for this Wednesday 5/12 at 11am.     A new order will need to be placed with medicare approved code.    Message routed.     Maddy Sosa RN

## 2021-05-11 ENCOUNTER — DOCUMENTATION ONLY (OUTPATIENT)
Dept: ONCOLOGY | Facility: CLINIC | Age: 76
End: 2021-05-11

## 2021-05-11 NOTE — PROGRESS NOTES
I called Dee Dee and informed her that her Oncotype DX score came back at 6, with a low 9-year risk of distant recurrence of only 3% after 5 years of hormone blockade therapy and < 1% absolute chemotherapy benefit.  Therefore, I did not recommend any adjuvant chemotherapy.  Dee Dee plans to proceed with adjuvant radiation therapy which will likely commence towards the end of May.  She will have DEXA scan tomorrow -- I discussed that as long as this does not show osteoporosis, that I would recommend she start anastrazole after she completes radiation.  She is agreeable with the above plan of care.

## 2021-05-12 ENCOUNTER — HOSPITAL ENCOUNTER (OUTPATIENT)
Facility: CLINIC | Age: 76
Setting detail: SPECIMEN
Discharge: HOME | End: 2021-05-12
Attending: INTERNAL MEDICINE | Admitting: INTERNAL MEDICINE
Payer: MEDICARE

## 2021-05-12 ENCOUNTER — INFUSION THERAPY VISIT (OUTPATIENT)
Dept: INFUSION THERAPY | Facility: CLINIC | Age: 76
End: 2021-05-12
Attending: INTERNAL MEDICINE
Payer: MEDICARE

## 2021-05-12 ENCOUNTER — HOSPITAL ENCOUNTER (OUTPATIENT)
Dept: BONE DENSITY | Facility: CLINIC | Age: 76
Discharge: HOME | End: 2021-05-12
Attending: INTERNAL MEDICINE | Admitting: INTERNAL MEDICINE
Payer: MEDICARE

## 2021-05-12 DIAGNOSIS — C50.411 MALIGNANT NEOPLASM OF UPPER-OUTER QUADRANT OF RIGHT BREAST IN FEMALE, ESTROGEN RECEPTOR POSITIVE (H): ICD-10-CM

## 2021-05-12 DIAGNOSIS — R74.8 ELEVATED LIVER ENZYMES: Primary | ICD-10-CM

## 2021-05-12 DIAGNOSIS — Z17.0 MALIGNANT NEOPLASM OF UPPER-OUTER QUADRANT OF RIGHT BREAST IN FEMALE, ESTROGEN RECEPTOR POSITIVE (H): ICD-10-CM

## 2021-05-12 DIAGNOSIS — Z13.820 SCREENING FOR OSTEOPOROSIS: ICD-10-CM

## 2021-05-12 DIAGNOSIS — Z79.811 USE OF AROMATASE INHIBITORS: ICD-10-CM

## 2021-05-12 LAB
ALBUMIN SERPL-MCNC: 3.7 G/DL (ref 3.4–5)
ALP SERPL-CCNC: 76 U/L (ref 40–150)
ALT SERPL W P-5'-P-CCNC: 79 U/L (ref 0–50)
ANION GAP SERPL CALCULATED.3IONS-SCNC: 8 MMOL/L (ref 3–14)
AST SERPL W P-5'-P-CCNC: 48 U/L (ref 0–45)
BILIRUB SERPL-MCNC: 0.5 MG/DL (ref 0.2–1.3)
BUN SERPL-MCNC: 17 MG/DL (ref 7–30)
CALCIUM SERPL-MCNC: 9.4 MG/DL (ref 8.5–10.1)
CHLORIDE SERPL-SCNC: 103 MMOL/L (ref 94–109)
CO2 SERPL-SCNC: 26 MMOL/L (ref 20–32)
CREAT SERPL-MCNC: 0.71 MG/DL (ref 0.52–1.04)
GFR SERPL CREATININE-BSD FRML MDRD: 82 ML/MIN/{1.73_M2}
GLUCOSE SERPL-MCNC: 163 MG/DL (ref 70–99)
POTASSIUM SERPL-SCNC: 3.9 MMOL/L (ref 3.4–5.3)
PROT SERPL-MCNC: 7.8 G/DL (ref 6.8–8.8)
SODIUM SERPL-SCNC: 137 MMOL/L (ref 133–144)

## 2021-05-12 PROCEDURE — 77080 DXA BONE DENSITY AXIAL: CPT

## 2021-05-12 PROCEDURE — 36415 COLL VENOUS BLD VENIPUNCTURE: CPT

## 2021-05-12 PROCEDURE — 80053 COMPREHEN METABOLIC PANEL: CPT | Performed by: INTERNAL MEDICINE

## 2021-05-12 NOTE — PROGRESS NOTES
Medical Assistant Note:  Yola Mayo presents today for lab draw.    Patient seen by provider today: No.   present during visit today: Not Applicable.    Concerns: No Concerns.    Procedure:  Lab draw site: RAC, Needle type: Butterfly, Gauge: 23.    Post Assessment:  Labs drawn without difficulty: Yes.    Discharge Plan:  Departure Mode: Ambulatory.    Face to Face Time: 4 min.    Shari Schoenberger, CMA

## 2021-05-18 NOTE — PROGRESS NOTES
Cardiology Clinic Progress Note  Yola Mayo MRN# [id number]   YOB: 1945 Age: 75 year old     Primary cardiologist: Dr. Howard    Reason for visit: Follow up shortness of breath    History of presenting illness:    Yola Mayo, a pleasant 75 year old patient who has a past medical history of coronary artery disease status post PCI to the mid and distal RCA 6/2018, hypertension, hyperlipidemia, very strong family history of premature coronary artery disease, and diabetes.       Due to symptoms of exertional angina after 1 block relieved with rest, she underwent a stress echocardiogram that was abnormal. Subsequently, a coronary angiogram revealed a 50-60% narrowing of the proximal LAD with 100% occlusion of the first diagonal above the LAD lesion.  The left circumflex had a 20-30% proximal narrowing.  The right coronary artery had a 60-70% narrowing in the proximal portion and a 95% narrowing in the midportion at the takeoff of the RV branch which is 95% narrowed.  She received 2 drug-eluting stents that were back to back to the right coronary artery with resolution of her symptoms.     Unfortunately, Dee Dee has been diagnosed with right-sided grade 2 invasive ductal carcinoma.  She met with Dr. Rogers and he recommended a localized right lumpectomy (pathology showed grade 2 invasive ductal carcinoma measuring 1.5 cm) with sentinel node biopsy (negative).  She has also established care with Dr. Elida Grullon and oncology who recommended adjuvant radiation that will start in the next week and anastrozole.     Today Dee Dee states that she is doing overall well. She eager to complete radiation therapy and become more active. At this time she has not been able to increase her physical activity due to her cancer treatment. Her shortness of breath is stable at higher levels of exertion, and she declines symptom of chest discomfort at rest. Currently, she is on hydralazine TID and is having difficulty  "remembering her midday dose. BP has been well controlled.          Assessment and Plan:     ASSESSMENT:    1. Coronary artery disease    Status post 2 drug eluting stents to the mid and distal RCA 6/27/2019    Residual 50-60% mid LAD lesion with 100% occlusion of the first diagonal    Stable    ASA and Statin     2. Hyperlipidemia, LDL goal<70    Most recent LDL was 42 3/19/21    Post angiogram started on atorvastatin 10 mg daily     3. Hypertension, goal less than 140/90    Controlled    Toprol-XL to 25 mg daily, losartan to 100 mg daily and hydrochlorthiazide 25 mg daily hydralazine was recently added to her regiment    Difficulty remembering midday dose of hydralazine    PLAN:     1. Decrease hydralazine to 25 mg BID, if BP is not within goal (<140/90) increase to 50 mg BID  2. Return to clinic in 3-4 months and follow up with Dr. Howard or sooner if needed.             Review of Systems:     Review of Systems:  Skin:  Negative     Eyes:  Negative    ENT:  Negative    Respiratory:  Positive for shortness of breath  Cardiovascular:    Positive for;fatigue  Gastroenterology: Negative    Genitourinary:  Negative    Musculoskeletal:  Negative    Neurologic:  Negative    Psychiatric:  Negative    Heme/Lymph/Imm:  Negative    Endocrine:  Positive for thyroid disorder;diabetes            Physical Exam:     hysical Exam:  Vitals: /80   Pulse 69   Ht 1.626 m (5' 4\")   Wt 82.6 kg (182 lb)   BMI 31.24 kg/m      Constitutional:  cooperative, alert and oriented, well developed, well nourished, in no acute distress        Skin:  warm and dry to the touch, no apparent skin lesions or masses noted        Head:  normocephalic, no masses or lesions        Eyes:  pupils equal and round;conjunctivae and lids unremarkable        ENT:  no pallor or cyanosis, dentition good        Neck:  not assessed this visit        Chest:  clear to auscultation;normal symmetry        Cardiac: regular rhythm occasional premature beats          "       Abdomen:  not assessed this visit   benign        Extremities and Back:  no edema        Neurological:  no gross motor deficits;affect appropriate               Medications:     Current Outpatient Medications   Medication Sig Dispense Refill     acetaminophen (TYLENOL) 325 MG tablet Take 2 tablets (650 mg) by mouth 3 times daily 100 tablet 0     aspirin 81 MG tablet Take 81 mg by mouth daily       atorvastatin (LIPITOR) 10 MG tablet Take 1 tablet (10 mg) by mouth daily for cholestrol 90 tablet 3     blood glucose (CONTOUR NEXT TEST) test strip Use to test blood sugar 1 time daily or as directed. 100 each 2     blood glucose monitoring (NITO MICROLET) lancets Use to test blood sugar 1 time daily or as directed. 100 each 3     calcium-vitamin D (CALTRATE) 600-400 MG-UNIT per tablet Take 1 tablet by mouth daily 60 tablet 11     cholecalciferol (VITAMIN D) 1000 UNIT tablet Take 1 tablet (1,000 Units) by mouth daily 100 tablet 3     CONTOUR NEXT EZ (CONTOUR NEXT EZ W/DEVICE KIT) w/Device KIT 1 kit once 1 kit 0     Cyanocobalamin (VITAMIN B 12 PO) Take 1 tablet by mouth daily VIT B12 unknown otc dose       hydrALAZINE (APRESOLINE) 25 MG tablet Take 1 tablet (25 mg) by mouth 3 times daily 270 tablet 3     hydrochlorothiazide (HYDRODIURIL) 25 MG tablet Take 1 tablet (25 mg) by mouth daily for Blood Pressure 90 tablet 3     levothyroxine (SYNTHROID/LEVOTHROID) 112 MCG tablet Take 1 tablet (112 mcg) by mouth daily for thyroid 90 tablet 3     losartan (COZAAR) 100 MG tablet Take 1 tablet (100 mg) by mouth daily for Blood Pressure 90 tablet 3     metFORMIN (GLUCOPHAGE-XR) 500 MG 24 hr tablet Take 2 tablets (1,000 mg) by mouth daily (with dinner) 180 tablet 3     metoprolol succinate ER (TOPROL-XL) 25 MG 24 hr tablet Take 1 tablet (25 mg) by mouth daily for Blood Pressure 90 tablet 3     metroNIDAZOLE (METROGEL) 0.75 % external gel Apply topically 2 times daily 45 g 1     multivitamin, therapeutic with minerals  (THERA-VIT-M) TABS Take 1 tablet by mouth daily       amoxicillin (AMOXIL) 500 MG capsule Take 4 capsules half to one hour before the procedure. (Patient not taking: Reported on 2021) 4 capsule 3     nitroGLYcerin (NITROSTAT) 0.4 MG sublingual tablet For chest pain place 1 tablet under the tongue every 5 minutes for 3 doses. If symptoms persist 5 minutes after 1st dose call 911. (Patient not taking: Reported on 2021) 25 tablet 3       Family History   Problem Relation Age of Onset     Cancer Mother         ovarian  of it  at 49yo.     Other Cancer Mother      Diabetes Father         Adult onset     Hypertension Father      C.A.D. Father         Fatal Heart attack at 70.     Cerebrovascular Disease Father      Thyroid Disease Sister      Thyroid Disease Sister      Thyroid Disease Sister      Cancer Daughter      Thyroid Disease Daughter      Breast Cancer No family hx of      Cancer - colorectal No family hx of        Social History     Socioeconomic History     Marital status:      Spouse name: FABIAN CUADRA     Number of children: 2     Years of education: Not on file     Highest education level: Not on file   Occupational History     Occupation: [employer]     Employer: [email or web address]   Social Needs     Financial resource strain: Not on file     Food insecurity     Worry: Not on file     Inability: Not on file     Transportation needs     Medical: Not on file     Non-medical: Not on file   Tobacco Use     Smoking status: Never Smoker     Smokeless tobacco: Never Used   Substance and Sexual Activity     Alcohol use: Yes     Alcohol/week: 0.0 standard drinks     Comment: occasional     Drug use: No     Sexual activity: Yes     Partners: Male   Lifestyle     Physical activity     Days per week: Not on file     Minutes per session: Not on file     Stress: Not on file   Relationships     Social connections     Talks on phone: Not on file     Gets together: Not on file     Attends [employer]  service: Not on file     Active member of club or organization: Not on file     Attends meetings of clubs or organizations: Not on file     Relationship status: Not on file     Intimate partner violence     Fear of current or ex partner: Not on file     Emotionally abused: Not on file     Physically abused: Not on file     Forced sexual activity: Not on file   Other Topics Concern      Service No     Blood Transfusions No     Caffeine Concern No     Occupational Exposure No     Hobby Hazards No     Sleep Concern No     Stress Concern No     Weight Concern Yes     Special Diet No     Back Care No     Exercise Yes     Comment: Approx 3 times a week for about 30 minutes     Bike Helmet Yes     Seat Belt Yes     Self-Exams Yes     Parent/sibling w/ CABG, MI or angioplasty before 65F 55M? Not Asked   Social History Narrative     Not on file            Past Medical History:     Past Medical History:   Diagnosis Date     Anemia due to blood loss      BASAL CELL CANCER -NOSE      Dermatitis      Diabetes (H)      Family history of diabetes mellitus     Father     Family history of ischemic heart disease     Father     Family history of malignant neoplasm of ovary     Mother     HX DISRUPT L ANT CRUCIATE S/P REPAIR 2003      Hyperlipidemia      HYPERTENSION       HYPOTHYROIDISM      LOC PRIM OSTEOARTH-L 1ST METATARSAL-CUNEIFORM JOINT 3/10/2004     Major depressive disorder, single episode, mild (H) 11/24/2014     MILD-MOD TRICUSPID REGURG. MOD PULM HTN 6/23/2005    ECHO  5/05     Obese      PONV (postoperative nausea and vomiting)      Rosacea      Shingles      SPLENIC CYST               Past Surgical History:     Past Surgical History:   Procedure Laterality Date     ARTHROPLASTY KNEE Left 4/13/2016    Procedure: ARTHROPLASTY KNEE;  Surgeon: Giuseppe Mcgrath MD;  Location: [facility] OR     ARTHROPLASTY MINIMALLY INVASIVE HIP  4/18/2012    Procedure:ARTHROPLASTY MINIMALLY INVASIVE HIP; RIGHT TWO INCISION MINIMALLY  INVASIVE HIP ARTHROPLASTY (YUVAL)^ (NO CELL SAVER) (C-ARM); Surgeon:GIUSEPPE LOPEZ; Location:[facility] OR     BIOPSY NODE SENTINEL Right 4/23/2021    Procedure: RIGHT SENTINEL LYMPH NODE BIOPSY;  Surgeon: Giuseppe Rogers MD;  Location: [facility] OR     C APPENDECTOMY  8/87    incidental appi at s     C CHEMOSURG MOHS 1ST STAGE  9-99    Mohs-nose     C REPAIR CRUCIATE LIGAMENT,KNEE  8/2003    L   ACL reconstruction     C TOTAL ABDOM HYSTERECTOMY  8/87    W/ BSO fibroids, hx PID     CARDIAC SURGERY  06/27/2019    mid and distal RCA stents     ENT SURGERY      mohs procedure on nose     HC COLONOSCOPY THRU STOMA, DIAGNOSTIC  4/03    normal, rec repeat 10 yrs     HYSTERECTOMY, PAP NO LONGER INDICATED       LUMPECTOMY BREAST WITH SEED LOCALIZATION Right 4/23/2021    Procedure: SEED LOCALIZED RIGHT BREAST LUMPECTOMY;  Surgeon: Giuseppe Rogers MD;  Location:  OR     SURGICAL HISTORY OF -   Mult-~1978    infertility, laparotomies              Allergies:   Toprol xl [metoprolol], Ace inhibitors, Oxycodone, and Tetanus toxoid       Data:   All laboratory data reviewed:    Recent Labs   Lab Test 03/19/21  0857 06/17/20  0815 05/07/19  0911   LDL 42 31 36   HDL 80 80 75   NHDL 55 41 45   CHOL 135 121 120   TRIG 67 49 45   TSH 1.93 1.37 0.94       Lab Results   Component Value Date    WBC 7.2 03/19/2021    RBC 4.60 03/19/2021    HGB 14.1 03/19/2021    HCT 40.7 03/19/2021    MCV 89 03/19/2021    MCH 30.7 03/19/2021    MCHC 34.6 03/19/2021    RDW 14.6 03/19/2021     03/19/2021       Lab Results   Component Value Date     05/12/2021    POTASSIUM 3.9 05/12/2021    CHLORIDE 103 05/12/2021    CO2 26 05/12/2021    ANIONGAP 8 05/12/2021     (H) 05/12/2021    BUN 17 05/12/2021    CR 0.71 05/12/2021    GFRESTIMATED 82 05/12/2021    GFRESTBLACK >90 05/12/2021    JOSEPH 9.4 05/12/2021      Lab Results   Component Value Date    AST 48 (H) 05/12/2021    ALT 79 (H) 05/12/2021       Lab Results   Component Value Date     A1C 6.0 (H) 03/19/2021       Lab Results   Component Value Date    INR 0.98 06/27/2018         FATMATA PEREIRA [facility]  Pager: [phone]  RN phone: [phone]

## 2021-05-19 ENCOUNTER — OFFICE VISIT (OUTPATIENT)
Dept: CARDIOLOGY | Facility: CLINIC | Age: 76
End: 2021-05-19
Attending: NURSE PRACTITIONER
Payer: MEDICARE

## 2021-05-19 VITALS
HEART RATE: 69 BPM | DIASTOLIC BLOOD PRESSURE: 80 MMHG | SYSTOLIC BLOOD PRESSURE: 138 MMHG | BODY MASS INDEX: 31.07 KG/M2 | HEIGHT: 64 IN | WEIGHT: 182 LBS

## 2021-05-19 DIAGNOSIS — I25.10 CORONARY ARTERY DISEASE INVOLVING NATIVE CORONARY ARTERY OF NATIVE HEART WITHOUT ANGINA PECTORIS: ICD-10-CM

## 2021-05-19 DIAGNOSIS — I10 ESSENTIAL HYPERTENSION, BENIGN: ICD-10-CM

## 2021-05-19 PROCEDURE — 99214 OFFICE O/P EST MOD 30 MIN: CPT | Performed by: NURSE PRACTITIONER

## 2021-05-19 ASSESSMENT — MIFFLIN-ST. JEOR: SCORE: 1305.55

## 2021-05-19 NOTE — PATIENT INSTRUCTIONS
Today's Recommendations    1. Decrease hydralazine to 25 mg twice a day  2. If BP is consistently >140/90 please increase to 50 mg (2 tablets) twice a day  3. Continue all other medications without changes.  4. Please follow up with Dr. Howard in Fall of 2021 or sooner if needed.    Please send a MeetCast message or call [phone] with questions or concerns.     Scheduling number [phone].

## 2021-05-19 NOTE — LETTER
5/19/2021    Zoraida Braga MD  [address]  [facility] [address]    RE: Yola Mayo       Dear Colleague,    I had the pleasure of seeing Yola Mayo in the [facility].  Cardiology Clinic Progress Note  Yola Mayo MRN# [id number]   YOB: 1945 Age: 75 year old     Primary cardiologist: Dr. Howard    Reason for visit: Follow up shortness of breath    History of presenting illness:    Yola Mayo, a pleasant 75 year old patient who has a past medical history of coronary artery disease status post PCI to the mid and distal RCA 6/2018, hypertension, hyperlipidemia, very strong family history of premature coronary artery disease, and diabetes.       Due to symptoms of exertional angina after 1 block relieved with rest, she underwent a stress echocardiogram that was abnormal. Subsequently, a coronary angiogram revealed a 50-60% narrowing of the proximal LAD with 100% occlusion of the first diagonal above the LAD lesion.  The left circumflex had a 20-30% proximal narrowing.  The right coronary artery had a 60-70% narrowing in the proximal portion and a 95% narrowing in the midportion at the takeoff of the RV branch which is 95% narrowed.  She received 2 drug-eluting stents that were back to back to the right coronary artery with resolution of her symptoms.     Unfortunately, Dee Dee has been diagnosed with right-sided grade 2 invasive ductal carcinoma.  She met with Dr. Rogers and he recommended a localized right lumpectomy (pathology showed grade 2 invasive ductal carcinoma measuring 1.5 cm) with sentinel node biopsy (negative).  She has also established care with Dr. Elida Grullon and oncology who recommended adjuvant radiation that will start in the next week and anastrozole.     Today Dee Dee states that she is doing overall well. She eager to complete radiation therapy and become more active. At this time she has  "not been able to increase her physical activity due to her cancer treatment. Her shortness of breath is stable at higher levels of exertion, and she declines symptom of chest discomfort at rest. Currently, she is on hydralazine TID and is having difficulty remembering her midday dose. BP has been well controlled.          Assessment and Plan:     ASSESSMENT:    1. Coronary artery disease    Status post 2 drug eluting stents to the mid and distal RCA 6/27/2019    Residual 50-60% mid LAD lesion with 100% occlusion of the first diagonal    Stable    ASA and Statin     2. Hyperlipidemia, LDL goal<70    Most recent LDL was 42 3/19/21    Post angiogram started on atorvastatin 10 mg daily     3. Hypertension, goal less than 140/90    Controlled    Toprol-XL to 25 mg daily, losartan to 100 mg daily and hydrochlorthiazide 25 mg daily hydralazine was recently added to her regiment    Difficulty remembering midday dose of hydralazine    PLAN:     1. Decrease hydralazine to 25 mg BID, if BP is not within goal (<140/90) increase to 50 mg BID  2. Return to clinic in 3-4 months and follow up with Dr. Howard or sooner if needed.             Review of Systems:     Review of Systems:  Skin:  Negative     Eyes:  Negative    ENT:  Negative    Respiratory:  Positive for shortness of breath  Cardiovascular:    Positive for;fatigue  Gastroenterology: Negative    Genitourinary:  Negative    Musculoskeletal:  Negative    Neurologic:  Negative    Psychiatric:  Negative    Heme/Lymph/Imm:  Negative    Endocrine:  Positive for thyroid disorder;diabetes            Physical Exam:     hysical Exam:  Vitals: /80   Pulse 69   Ht 1.626 m (5' 4\")   Wt 82.6 kg (182 lb)   BMI 31.24 kg/m      Constitutional:  cooperative, alert and oriented, well developed, well nourished, in no acute distress        Skin:  warm and dry to the touch, no apparent skin lesions or masses noted        Head:  normocephalic, no masses or lesions        Eyes:  pupils " equal and round;conjunctivae and lids unremarkable        ENT:  no pallor or cyanosis, dentition good        Neck:  not assessed this visit        Chest:  clear to auscultation;normal symmetry        Cardiac: regular rhythm occasional premature beats                Abdomen:  not assessed this visit   benign        Extremities and Back:  no edema        Neurological:  no gross motor deficits;affect appropriate               Medications:     Current Outpatient Medications   Medication Sig Dispense Refill     acetaminophen (TYLENOL) 325 MG tablet Take 2 tablets (650 mg) by mouth 3 times daily 100 tablet 0     aspirin 81 MG tablet Take 81 mg by mouth daily       atorvastatin (LIPITOR) 10 MG tablet Take 1 tablet (10 mg) by mouth daily for cholestrol 90 tablet 3     blood glucose (CONTOUR NEXT TEST) test strip Use to test blood sugar 1 time daily or as directed. 100 each 2     blood glucose monitoring (servtag MICROLET) lancets Use to test blood sugar 1 time daily or as directed. 100 each 3     calcium-vitamin D (CALTRATE) 600-400 MG-UNIT per tablet Take 1 tablet by mouth daily 60 tablet 11     cholecalciferol (VITAMIN D) 1000 UNIT tablet Take 1 tablet (1,000 Units) by mouth daily 100 tablet 3     CONTOUR NEXT EZ (CONTOUR NEXT EZ W/DEVICE KIT) w/Device KIT 1 kit once 1 kit 0     Cyanocobalamin (VITAMIN B 12 PO) Take 1 tablet by mouth daily VIT B12 unknown otc dose       hydrALAZINE (APRESOLINE) 25 MG tablet Take 1 tablet (25 mg) by mouth 3 times daily 270 tablet 3     hydrochlorothiazide (HYDRODIURIL) 25 MG tablet Take 1 tablet (25 mg) by mouth daily for Blood Pressure 90 tablet 3     levothyroxine (SYNTHROID/LEVOTHROID) 112 MCG tablet Take 1 tablet (112 mcg) by mouth daily for thyroid 90 tablet 3     losartan (COZAAR) 100 MG tablet Take 1 tablet (100 mg) by mouth daily for Blood Pressure 90 tablet 3     metFORMIN (GLUCOPHAGE-XR) 500 MG 24 hr tablet Take 2 tablets (1,000 mg) by mouth daily (with dinner) 180 tablet 3      metoprolol succinate ER (TOPROL-XL) 25 MG 24 hr tablet Take 1 tablet (25 mg) by mouth daily for Blood Pressure 90 tablet 3     metroNIDAZOLE (METROGEL) 0.75 % external gel Apply topically 2 times daily 45 g 1     multivitamin, therapeutic with minerals (THERA-VIT-M) TABS Take 1 tablet by mouth daily       amoxicillin (AMOXIL) 500 MG capsule Take 4 capsules half to one hour before the procedure. (Patient not taking: Reported on 2021) 4 capsule 3     nitroGLYcerin (NITROSTAT) 0.4 MG sublingual tablet For chest pain place 1 tablet under the tongue every 5 minutes for 3 doses. If symptoms persist 5 minutes after 1st dose call 911. (Patient not taking: Reported on 2021) 25 tablet 3       Family History   Problem Relation Age of Onset     Cancer Mother         ovarian  of it  at 51yo.     Other Cancer Mother      Diabetes Father         Adult onset     Hypertension Father      C.A.D. Father         Fatal Heart attack at 70.     Cerebrovascular Disease Father      Thyroid Disease Sister      Thyroid Disease Sister      Thyroid Disease Sister      Cancer Daughter      Thyroid Disease Daughter      Breast Cancer No family hx of      Cancer - colorectal No family hx of        Social History     Socioeconomic History     Marital status:      Spouse name: FABIAN CUADRA     Number of children: 2     Years of education: Not on file     Highest education level: Not on file   Occupational History     Occupation: [employer]     Employer: [employer]   Social Needs     Financial resource strain: Not on file     Food insecurity     Worry: Not on file     Inability: Not on file     Transportation needs     Medical: Not on file     Non-medical: Not on file   Tobacco Use     Smoking status: Never Smoker     Smokeless tobacco: Never Used   Substance and Sexual Activity     Alcohol use: Yes     Alcohol/week: 0.0 standard drinks     Comment: occasional     Drug use: No     Sexual activity: Yes     Partners: Male    Lifestyle     Physical activity     Days per week: Not on file     Minutes per session: Not on file     Stress: Not on file   Relationships     Social connections     Talks on phone: Not on file     Gets together: Not on file     Attends Jehov[employer]'s witness service: Not on file     Active member of club or organization: Not on file     Attends meetings of clubs or organizations: Not on file     Relationship status: Not on file     Intimate partner violence     Fear of current or ex partner: Not on file     Emotionally abused: Not on file     Physically abused: Not on file     Forced sexual activity: Not on file   Other Topics Concern      Service No     Blood Transfusions No     Caffeine Concern No     Occupational Exposure No     Hobby Hazards No     Sleep Concern No     Stress Concern No     Weight Concern Yes     Special Diet No     Back Care No     Exercise Yes     Comment: Approx 3 times a week for about 30 minutes     Bike Helmet Yes     Seat Belt Yes     Self-Exams Yes     Parent/sibling w/ CABG, MI or angioplasty before 65F 55M? Not Asked   Social History Narrative     Not on file            Past Medical History:     Past Medical History:   Diagnosis Date     Anemia due to blood loss      BASAL CELL CANCER -NOSE      Dermatitis      Diabetes (H)      Family history of diabetes mellitus     Father     Family history of ischemic heart disease     Father     Family history of malignant neoplasm of ovary     Mother     HX DISRUPT L ANT CRUCIATE S/P REPAIR 2003      Hyperlipidemia      HYPERTENSION       HYPOTHYROIDISM      LOC PRIM OSTEOARTH-L 1ST METATARSAL-CUNEIFORM JOINT 3/10/2004     Major depressive disorder, single episode, mild (H) 11/24/2014     MILD-MOD TRICUSPID REGURG. MOD PULM HTN 6/23/2005    ECHO  5/05     Obese      PONV (postoperative nausea and vomiting)      Rosacea      Shingles      SPLENIC CYST               Past Surgical History:     Past Surgical History:   Procedure Laterality Date      ARTHROPLASTY KNEE Left 4/13/2016    Procedure: ARTHROPLASTY KNEE;  Surgeon: Giuseppe Lopez MD;  Location: [facility] OR     ARTHROPLASTY MINIMALLY INVASIVE HIP  4/18/2012    Procedure:ARTHROPLASTY MINIMALLY INVASIVE HIP; RIGHT TWO INCISION MINIMALLY INVASIVE HIP ARTHROPLASTY (YUVAL)^ (NO CELL SAVER) (C-ARM); Surgeon:GIUSEPPE LOPEZ; Location:[facility] OR     BIOPSY NODE SENTINEL Right 4/23/2021    Procedure: RIGHT SENTINEL LYMPH NODE BIOPSY;  Surgeon: Giuseppe Rogers MD;  Location: [facility] OR     C APPENDECTOMY  8/87    incidental appi at hys     C CHEMOSURG MOHS 1ST STAGE  9-99    Mohs-nose     C REPAIR CRUCIATE LIGAMENT,KNEE  8/2003    L   ACL reconstruction     C TOTAL ABDOM HYSTERECTOMY  8/87    W/ BSO fibroids, hx PID     CARDIAC SURGERY  06/27/2019    mid and distal RCA stents     ENT SURGERY      mohs procedure on nose     HC COLONOSCOPY THRU STOMA, DIAGNOSTIC  4/03    normal, rec repeat 10 yrs     HYSTERECTOMY, PAP NO LONGER INDICATED       LUMPECTOMY BREAST WITH SEED LOCALIZATION Right 4/23/2021    Procedure: SEED LOCALIZED RIGHT BREAST LUMPECTOMY;  Surgeon: Giuseppe Rogers MD;  Location: [facility] OR     SURGICAL HISTORY OF -   Mult-~1978    infertility, laparotomies              Allergies:   Toprol xl [metoprolol], Ace inhibitors, Oxycodone, and Tetanus toxoid       Data:   All laboratory data reviewed:    Recent Labs   Lab Test 03/19/21  0857 06/17/20  0815 05/07/19  0911   LDL 42 31 36   HDL 80 80 75   NHDL 55 41 45   CHOL 135 121 120   TRIG 67 49 45   TSH 1.93 1.37 0.94       Lab Results   Component Value Date    WBC 7.2 03/19/2021    RBC 4.60 03/19/2021    HGB 14.1 03/19/2021    HCT 40.7 03/19/2021    MCV 89 03/19/2021    MCH 30.7 03/19/2021    MCHC 34.6 03/19/2021    RDW 14.6 03/19/2021     03/19/2021       Lab Results   Component Value Date     05/12/2021    POTASSIUM 3.9 05/12/2021    CHLORIDE 103 05/12/2021    CO2 26 05/12/2021    ANIONGAP 8 05/12/2021     (H) 05/12/2021    BUN  17 05/12/2021    CR 0.71 05/12/2021    GFRESTIMATED 82 05/12/2021    GFRESTBLACK >90 05/12/2021    JOSEPH 9.4 05/12/2021      Lab Results   Component Value Date    AST 48 (H) 05/12/2021    ALT 79 (H) 05/12/2021       Lab Results   Component Value Date    A1C 6.0 (H) 03/19/2021       Lab Results   Component Value Date    INR 0.98 06/27/2018     FATMATA PEREIRA CNP  [facility]  Pager: [phone]  RN phone: [phone]    cc:   FATMATA Cruz CNP  [facility]  [address]  [place] [id number]

## 2021-05-28 ENCOUNTER — OFFICE VISIT (OUTPATIENT)
Dept: FAMILY MEDICINE | Facility: CLINIC | Age: 76
End: 2021-05-28
Payer: MEDICARE

## 2021-05-28 VITALS
DIASTOLIC BLOOD PRESSURE: 84 MMHG | HEART RATE: 71 BPM | BODY MASS INDEX: 31.41 KG/M2 | SYSTOLIC BLOOD PRESSURE: 139 MMHG | OXYGEN SATURATION: 100 % | TEMPERATURE: 97.2 F | WEIGHT: 183 LBS

## 2021-05-28 DIAGNOSIS — C50.911 MALIGNANT NEOPLASM OF RIGHT BREAST IN FEMALE, ESTROGEN RECEPTOR POSITIVE, UNSPECIFIED SITE OF BREAST (H): ICD-10-CM

## 2021-05-28 DIAGNOSIS — Z17.0 MALIGNANT NEOPLASM OF RIGHT BREAST IN FEMALE, ESTROGEN RECEPTOR POSITIVE, UNSPECIFIED SITE OF BREAST (H): ICD-10-CM

## 2021-05-28 DIAGNOSIS — E11.9 TYPE 2 DIABETES MELLITUS WITHOUT COMPLICATION, WITHOUT LONG-TERM CURRENT USE OF INSULIN (H): ICD-10-CM

## 2021-05-28 DIAGNOSIS — L71.9 ROSACEA: ICD-10-CM

## 2021-05-28 DIAGNOSIS — I10 BENIGN HYPERTENSION: Primary | ICD-10-CM

## 2021-05-28 DIAGNOSIS — Z12.11 COLON CANCER SCREENING: ICD-10-CM

## 2021-05-28 DIAGNOSIS — Z98.890 S/P LUMPECTOMY, RIGHT BREAST: ICD-10-CM

## 2021-05-28 DIAGNOSIS — Z96.641 HISTORY OF RIGHT HIP REPLACEMENT: ICD-10-CM

## 2021-05-28 DIAGNOSIS — Z92.3 S/P RADIATION THERAPY: ICD-10-CM

## 2021-05-28 PROCEDURE — 82043 UR ALBUMIN QUANTITATIVE: CPT | Performed by: INTERNAL MEDICINE

## 2021-05-28 PROCEDURE — 99214 OFFICE O/P EST MOD 30 MIN: CPT | Mod: 95 | Performed by: INTERNAL MEDICINE

## 2021-05-28 PROCEDURE — 82274 ASSAY TEST FOR BLOOD FECAL: CPT | Performed by: INTERNAL MEDICINE

## 2021-05-28 RX ORDER — AMOXICILLIN 500 MG/1
CAPSULE ORAL
Qty: 4 CAPSULE | Refills: 3 | Status: SHIPPED | OUTPATIENT
Start: 2021-05-28 | End: 2022-10-26

## 2021-05-28 RX ORDER — HYDRALAZINE HYDROCHLORIDE 25 MG/1
25 TABLET, FILM COATED ORAL
Qty: 270 TABLET | Refills: 3
Start: 2021-05-28 | End: 2022-03-21

## 2021-05-28 NOTE — PATIENT INSTRUCTIONS
Continue present management    I have given you information about lymphedema from up to date   Follow up in 4 months  Seek sooner medical attention if there is any worsening of symptoms or problems.

## 2021-05-28 NOTE — PROGRESS NOTES
"    {PROVIDER CHARTING PREFERENCE:819199}    Subjective   Dee Dee is a 75 year old who presents for the following health issues {ACCOMPANIED BY STATEMENT (Optional):776368}    HPI     Medication Followup of ***    Taking Medication as prescribed: {.:672089::\"yes\"}    Side Effects:  {NONEORCHOOSE:395409::\"None\"}    Medication Helping Symptoms:  {.:478020::\"yes\"}     {additonal problems for provider to add (Optional):742382}    Review of Systems   {ROS COMP (Optional):995359}      Objective    There were no vitals taken for this visit.  There is no height or weight on file to calculate BMI.  Physical Exam   {Exam List (Optional):905353}    {Diagnostic Test Results (Optional):385885}    {AMBULATORY ATTESTATION (Optional):745607}        "

## 2021-05-28 NOTE — PROGRESS NOTES
Assessment & Plan   Yola was seen today for recheck.    Diagnoses and all orders for this visit:    Benign hypertension  -     hydrALAZINE (APRESOLINE) 25 MG tablet; Take 1 tablet (25 mg) by mouth 2 times daily  Blood pressure is well controlled with current medications  It includes losartan, hydrochlorothiazide, metoprolol and hydralazine  She was having difficulty taking hydralazine 3 times a day  It was changed to twice a day and if blood pressure consistently stay high then will increase the dose of hydralazine from 25 mg to 50 mg twice a day  She got those instructions from cardiology  She monitors her blood pressure at home  Home blood pressure readings were reviewed    Malignant neoplasm of right breast in female, estrogen receptor positive, unspecified site of breast (H)  She had lumpectomy  Going through radiation treatment  After that she is going to start hormonal treatment  She is followed by oncology    S/P radiation therapy  Due to history of breast cancer    S/P lumpectomy, right breast  Due to history of breast cancer  Lymph nodes were also removed which were negative  At this point I gave information about lymphedema prevention printed from patient's information in up-to-date    History of right hip replacement  Comments:  right hip and left knee  Orders:  -     amoxicillin (AMOXIL) 500 MG capsule; Take 4 capsules half to one hour before the procedure.    Type 2 diabetes mellitus without complication, without long-term current use of insulin (H)  -     Albumin Random Urine Quantitative with Creat Ratio    Rosacea  She is improving    Colon cancer screening  -     Fecal colorectal cancer screen (FIT); Future  She does not want colonoscopy but willing to do fit test    Other orders  -     REVIEW OF HEALTH MAINTENANCE PROTOCOL ORDERS      73221}     See Patient Instructions  Patient Instructions   Continue present management    I have given you information about lymphedema from up to date    Follow up in 4 months  Seek sooner medical attention if there is any worsening of symptoms or problems.        Return in about 4 months (around 9/28/2021) for Diabetes, medication follow up.    Zoraida Braga MD  [facility] is a 75 year old who presents for the following health issues     HPI     Diabetes Follow-up    How often are you checking your blood sugar? A few times a week  What time of day are you checking your blood sugars (select all that apply)?  Before meals  Have you had any blood sugars above 200?  No  Have you had any blood sugars below 70?  No    What symptoms do you notice when your blood sugar is low?  None    What concerns do you have today about your diabetes? None     Do you have any of these symptoms? (Select all that apply)  No numbness or tingling in feet.  No redness, sores or blisters on feet.  No complaints of excessive thirst.  No reports of blurry vision.  No significant changes to weight.              Hyperlipidemia Follow-Up      Are you regularly taking any medication or supplement to lower your cholesterol?   Yes- atorvastatin    Are you having muscle aches or other side effects that you think could be caused by your cholesterol lowering medication?  No    Hypertension Follow-up      Do you check your blood pressure regularly outside of the clinic? Yes     Are you following a low salt diet? Yes    Are your blood pressures ever more than 140 on the top number (systolic) OR more   than 90 on the bottom number (diastolic), for example 140/90? Yes    BP Readings from Last 2 Encounters:   05/28/21 139/84   05/19/21 138/80     Hemoglobin A1C (%)   Date Value   03/19/2021 6.0 (H)   06/17/2020 5.9 (H)     LDL Cholesterol Calculated (mg/dL)   Date Value   03/19/2021 42   06/17/2020 31         How many servings of fruits and vegetables do you eat daily?  4 or more    On average, how many sweetened beverages do you drink each day (Examples: soda,  juice, sweet tea, etc.  Do NOT count diet or artificially sweetened beverages)?   0    How many days per week do you exercise enough to make your heart beat faster? 5    How many minutes a day do you exercise enough to make your heart beat faster? 30 - 60    How many days per week do you miss taking your medication? 0    Home blood pressure readings are good  I reviewed   Rosacea has improved    She is going through radiation treatment  He has seen recently cardiology also     Constitutional, HEENT, cardiovascular, pulmonary, GI, , musculoskeletal, neuro, skin, endocrine and psych systems are negative, except as otherwise noted.      Objective    /84   Pulse 71   Temp 97.2  F (36.2  C) (Tympanic)   Wt 83 kg (183 lb)   SpO2 100%   BMI 31.41 kg/m    Body mass index is 31.41 kg/m .  Physical Exam   She is very nice and pleasant.  She is comfortable and not in any kind of distress.  She is fully alert awake oriented.  Rosacea is improving    Disclaimer: This note consists of symbols derived from keyboarding, dictation and/or voice recognition software. As a result, there may be errors in the script that have gone undetected. Please consider this when interpreting information found in this chart.

## 2021-05-29 LAB
CREAT UR-MCNC: 145 MG/DL
MICROALBUMIN UR-MCNC: 19 MG/L
MICROALBUMIN/CREAT UR: 12.9 MG/G CR (ref 0–25)

## 2021-05-29 NOTE — RESULT ENCOUNTER NOTE
Timothy Aceves,    This is to inform you regarding your test result.    Urine for Microalbumin is normal.      Sincerely,      Dr.Nasima Maria Luz MD,FACP

## 2021-05-30 LAB — HEMOCCULT STL QL IA: NEGATIVE

## 2021-06-04 NOTE — TELEPHONE ENCOUNTER
Called Dee Dee she stated that radiation is going fine and that she will complete it on 6/16/21. Writer will followup with her to get her anastrazole script sent. Yuridia Harrison RN,BSN,OCN

## 2021-06-07 DIAGNOSIS — L71.9 ROSACEA: ICD-10-CM

## 2021-06-08 RX ORDER — METRONIDAZOLE 7.5 MG/G
GEL TOPICAL
Qty: 45 G | Refills: 3 | Status: SHIPPED | OUTPATIENT
Start: 2021-06-08 | End: 2022-08-31

## 2021-06-16 ENCOUNTER — TRANSFERRED RECORDS (OUTPATIENT)
Dept: HEALTH INFORMATION MANAGEMENT | Facility: CLINIC | Age: 76
End: 2021-06-16

## 2021-06-22 RX ORDER — ANASTROZOLE 1 MG/1
1 TABLET ORAL DAILY
Qty: 90 TABLET | Refills: 3 | Status: SHIPPED | OUTPATIENT
Start: 2021-06-22 | End: 2022-03-23

## 2021-06-22 NOTE — TELEPHONE ENCOUNTER
Called Dee Dee, she has completed her radiation treatments. Script for anastrazole will be sent to [facility] in [place]. Reviewed side effects with Dee Dee. She is aware to call clinic if she experiences adverse side effects from the Anastrazole. Yuridia Harrison RN,BSN,OCN'

## 2021-08-25 NOTE — PROGRESS NOTES
[facility]    Hematology/Oncology Established Patient Note      Today's Date: 8/30/21    Reason for follow-up: Right breast cancer.    HISTORY OF PRESENT ILLNESS: Yola Mayo is a 76 year old female who presents with the following oncologic history:  1. 3/24/2021: Mammogram showed distortion in right upper outer breast; left breast negative.  2. 3/31/2021: Right breast U/S at 9:00, 4 cm from nipple showed mass measuring 1.1 x 1 x 1.2 cm; no enlarged right axillary lymph nodes seen.  3. 4/07/2021: Right breast biopsy at 9:00 showed grade 2 invasive ductal carcinoma, ER positive at 100%, ND positive at 95%, HER-2/marek FISH negative.  4. 4/23/2021: Underwent right breast lumpectomy and sentinel lymph node excision under care of Dr. Ace Rogers. Pathology showed grade 2 invasive ductal carcinoma measuring 1.5 cm, low grade DCIS, LCIS, margins negative; 2 lymph nodes negative. Oncotype DX = 6; 9-year risk of distant recurrence = 3% after 5 years of hormone blockade therapy, < 1% absolute chemotherapy benefit.  5. 5/25/2021-6/16/2021: Completed adjuvant radiation therapy to right breast. Subsequently started anastrazole.    INTERIM HISTORY:  Dee Dee reports occasional flushing but not full overt hot flashes. She has not noticed any increase in her arthritic pain.      REVIEW OF SYSTEMS:   14 point ROS was reviewed and is negative other than as noted above in HPI.       HOME MEDICATIONS:  Current Outpatient Medications   Medication Sig Dispense Refill     acetaminophen (TYLENOL) 325 MG tablet Take 2 tablets (650 mg) by mouth 3 times daily 100 tablet 0     amoxicillin (AMOXIL) 500 MG capsule Take 4 capsules half to one hour before the procedure. 4 capsule 3     anastrozole (ARIMIDEX) 1 MG tablet Take 1 tablet (1 mg) by mouth daily 90 tablet 3     aspirin 81 MG tablet Take 81 mg by mouth daily       atorvastatin (LIPITOR) 10 MG tablet Take 1 tablet (10 mg) by mouth daily for cholestrol 90 tablet 3      blood glucose (CONTOUR NEXT TEST) test strip Use to test blood sugar 1 time daily or as directed. 100 each 2     blood glucose monitoring (NITO MICROLET) lancets Use to test blood sugar 1 time daily or as directed. 100 each 3     calcium-vitamin D (CALTRATE) 600-400 MG-UNIT per tablet Take 1 tablet by mouth daily 60 tablet 11     cholecalciferol (VITAMIN D) 1000 UNIT tablet Take 1 tablet (1,000 Units) by mouth daily 100 tablet 3     CONTOUR NEXT EZ (CONTOUR NEXT EZ W/DEVICE KIT) w/Device KIT 1 kit once 1 kit 0     Cyanocobalamin (VITAMIN B 12 PO) Take 1 tablet by mouth daily VIT B12 unknown otc dose       hydrALAZINE (APRESOLINE) 25 MG tablet Take 1 tablet (25 mg) by mouth 2 times daily 270 tablet 3     hydrochlorothiazide (HYDRODIURIL) 25 MG tablet Take 1 tablet (25 mg) by mouth daily for Blood Pressure 90 tablet 3     levothyroxine (SYNTHROID/LEVOTHROID) 112 MCG tablet Take 1 tablet (112 mcg) by mouth daily for thyroid 90 tablet 3     losartan (COZAAR) 100 MG tablet Take 1 tablet (100 mg) by mouth daily for Blood Pressure 90 tablet 3     metFORMIN (GLUCOPHAGE-XR) 500 MG 24 hr tablet Take 2 tablets (1,000 mg) by mouth daily (with dinner) 180 tablet 3     metoprolol succinate ER (TOPROL-XL) 25 MG 24 hr tablet Take 1 tablet (25 mg) by mouth daily for Blood Pressure 90 tablet 3     metroNIDAZOLE (METROGEL) 0.75 % external gel APPLY TO AFFECTED AREA TWICE A DAY 45 g 3     multivitamin, therapeutic with minerals (THERA-VIT-M) TABS Take 1 tablet by mouth daily       nitroGLYcerin (NITROSTAT) 0.4 MG sublingual tablet For chest pain place 1 tablet under the tongue every 5 minutes for 3 doses. If symptoms persist 5 minutes after 1st dose call 911. 25 tablet 3         ALLERGIES:  Allergies   Allergen Reactions     Toprol Xl [Metoprolol] Fatigue     Ace Inhibitors      ACE throat clearing     Oxycodone Nausea and Vomiting     Tetanus Toxoid Swelling     Hard red lump          PAST MEDICAL HISTORY:  Past Medical History:    Diagnosis Date     Anemia due to blood loss      BASAL CELL CANCER -NOSE      Dermatitis      Diabetes (H)      Family history of diabetes mellitus     Father     Family history of ischemic heart disease     Father     Family history of malignant neoplasm of ovary     Mother     HX DISRUPT L ANT CRUCIATE S/P REPAIR 2003      Hyperlipidemia      HYPERTENSION       HYPOTHYROIDISM      LOC PRIM OSTEOARTH-L 1ST METATARSAL-CUNEIFORM JOINT 3/10/2004     Major depressive disorder, single episode, mild (H) 11/24/2014     MILD-MOD TRICUSPID REGURG. MOD PULM HTN 6/23/2005    ECHO  5/05     Obese      PONV (postoperative nausea and vomiting)      Rosacea      Shingles      SPLENIC CYST          PAST SURGICAL HISTORY:  Past Surgical History:   Procedure Laterality Date     ARTHROPLASTY KNEE Left 4/13/2016    Procedure: ARTHROPLASTY KNEE;  Surgeon: Giuseppe Lopez MD;  Location:  OR     ARTHROPLASTY MINIMALLY INVASIVE HIP  4/18/2012    Procedure:ARTHROPLASTY MINIMALLY INVASIVE HIP; RIGHT TWO INCISION MINIMALLY INVASIVE HIP ARTHROPLASTY (YUVAL)^ (NO CELL SAVER) (C-ARM); Surgeon:GIUSEPPE LOPEZ; Location: OR     BIOPSY NODE SENTINEL Right 4/23/2021    Procedure: RIGHT SENTINEL LYMPH NODE BIOPSY;  Surgeon: Giuseppe Rogers MD;  Location:  OR     C APPENDECTOMY  8/87    incidental appi at [facility]     C CHEMOSURG MOHS 1ST STAGE  9-99    Mohs-nose     C REPAIR CRUCIATE LIGAMENT,KNEE  8/2003    L   ACL reconstruction     C TOTAL ABDOM HYSTERECTOMY  8/87    W/ BSO fibroids, hx PID     CARDIAC SURGERY  06/27/2019    mid and distal RCA stents     ENT SURGERY      mohs procedure on nose     HYSTERECTOMY, PAP NO LONGER INDICATED       LUMPECTOMY BREAST WITH SEED LOCALIZATION Right 4/23/2021    Procedure: SEED LOCALIZED RIGHT BREAST LUMPECTOMY;  Surgeon: Giuseppe Rogers MD;  Location:  OR     SURGICAL HISTORY OF -   Mult-~1978    infertility, laparotomies     ZZHC COLONOSCOPY THRU STOMA, DIAGNOSTIC  4/03     normal, rec repeat 10 yrs         SOCIAL HISTORY:  Social History     Socioeconomic History     Marital status:      Spouse name: FABIAN CUADRA     Number of children: 2     Years of education: Not on file     Highest education level: Not on file   Occupational History     Occupation: [employer]     Employer: [employer]   Tobacco Use     Smoking status: Never Smoker     Smokeless tobacco: Never Used   Substance and Sexual Activity     Alcohol use: Yes     Alcohol/week: 0.0 standard drinks     Comment: occasional     Drug use: No     Sexual activity: Yes     Partners: Male   Other Topics Concern      Service No     Blood Transfusions No     Caffeine Concern No     Occupational Exposure No     Hobby Hazards No     Sleep Concern No     Stress Concern No     Weight Concern Yes     Special Diet No     Back Care No     Exercise Yes     Comment: Approx 3 times a week for about 30 minutes     Bike Helmet Yes     Seat Belt Yes     Self-Exams Yes     Parent/sibling w/ CABG, MI or angioplasty before 65F 55M? Not Asked   Social History Narrative     Not on file     Social Determinants of Health     Financial Resource Strain:      Difficulty of Paying Living Expenses:    Food Insecurity:      Worried About Running Out of Food in the Last Year:      Ran Out of Food in the Last Year:    Transportation Needs:      Lack of Transportation (Medical):      Lack of Transportation (Non-Medical):    Physical Activity:      Days of Exercise per Week:      Minutes of Exercise per Session:    Stress:      Feeling of Stress :    Social Connections:      Frequency of Communication with Friends and Family:      Frequency of Social Gatherings with Friends and Family:      Attends Jehov[employer] witness Services:      Active Member of Clubs or Organizations:      Attends Club or Organization Meetings:      Marital Status:    Intimate Partner Violence:      Fear of Current or Ex-Partner:      Emotionally Abused:      Physically Abused:       "Sexually Abused:          FAMILY HISTORY:  Family History   Problem Relation Age of Onset     Cancer Mother         ovarian  of it  at 51yo.     Other Cancer Mother      Diabetes Father         Adult onset     Hypertension Father      C.A.D. Father         Fatal Heart attack at 70.     Cerebrovascular Disease Father      Thyroid Disease Sister      Thyroid Disease Sister      Thyroid Disease Sister      Cancer Daughter      Thyroid Disease Daughter      Breast Cancer No family hx of      Cancer - colorectal No family hx of          PHYSICAL EXAM:  Vital signs:  BP (!) 154/81   Pulse 61   Temp 98  F (36.7  C)   Resp 16   Ht 1.626 m (5' 4\")   Wt 83.5 kg (184 lb)   SpO2 98%   BMI 31.58 kg/m     GENERAL/CONSTITUTIONAL: No acute distress.  EYES: No erythema or scleral icterus.  LYMPH: No cervical, supraclavicular, axillary adenopathy.   BREAST: No palpable masses in either breast. Nipples are everted bilaterally with no discharge. No erythema, ulceration.  RESPIRATORY: No audible cough or wheezing.   GASTROINTESTINAL: No hepatosplenomegaly, masses, or tenderness. No guarding.  No distention.  MUSCULOSKELETAL: Warm and well-perfused, no cyanosis, clubbing, or edema.  NEUROLOGIC: No focal motor deficits. Alert, oriented, answers questions appropriately.  INTEGUMENTARY: No rashes or jaundice.  GAIT: Steady, does not use assistive device    LABS:  CBC RESULTS: Recent Labs   Lab Test 21  0857   WBC 7.2   RBC 4.60   HGB 14.1   HCT 40.7   MCV 89   MCH 30.7   MCHC 34.6   RDW 14.6        Last Comprehensive Metabolic Panel:  Sodium   Date Value Ref Range Status   2021 137 133 - 144 mmol/L Final     Potassium   Date Value Ref Range Status   2021 3.9 3.4 - 5.3 mmol/L Final     Chloride   Date Value Ref Range Status   2021 103 94 - 109 mmol/L Final     Carbon Dioxide   Date Value Ref Range Status   2021 26 20 - 32 mmol/L Final     Anion Gap   Date Value Ref Range Status   2021 8 3 " - 14 mmol/L Final     Glucose   Date Value Ref Range Status   2021 163 (H) 70 - 99 mg/dL Final     Urea Nitrogen   Date Value Ref Range Status   2021 17 7 - 30 mg/dL Final     Creatinine   Date Value Ref Range Status   2021 0.71 0.52 - 1.04 mg/dL Final     GFR Estimate   Date Value Ref Range Status   2021 82 >60 mL/min/[1.73_m2] Final     Comment:     Non  GFR Calc  Starting 2018, serum creatinine based estimated GFR (eGFR) will be   calculated using the Chronic Kidney Disease Epidemiology Collaboration   (CKD-EPI) equation.       Calcium   Date Value Ref Range Status   2021 9.4 8.5 - 10.1 mg/dL Final     Bilirubin Total   Date Value Ref Range Status   2021 0.5 0.2 - 1.3 mg/dL Final     Alkaline Phosphatase   Date Value Ref Range Status   2021 76 40 - 150 U/L Final     ALT   Date Value Ref Range Status   2021 79 (H) 0 - 50 U/L Final     AST   Date Value Ref Range Status   2021 48 (H) 0 - 45 U/L Final       PATHOLOGY:  None new.    IMAGIN2021: DEXA showed normal bone density.    ASSESSMENT/PLAN:  Yola Mayo is a 76 year old female with the following issues:  1. 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  --She is status post lumpectomy followed by adjuvant radiation therapy.  --She is now on anastrazole and tolerating this well with no significant side effects. She has no increase in her existing arthritic pain.  --Plan for total 5 years of hormone blockade therapy.  --Baseline DEXA scan from 2021 was normal.  I advised adequate calcium and vitamin D intake as well as weight bearing exercise.  --Plan to repeat DEXA in .  --Next mammogram due 3/2022.    2. Coronary artery disease  --Discussed with Dee Dee that aromatase inhibitors can  slightly increase cardiovascular risk but controllable with her cardiac medications.  --She will continue on aspirin, beta-blocker, and statin.     3. Diabetes mellitus type 2  --Well-controlled. She will continue metformin and has her A1C monitored by Dr. Braga.    Return in 4-6 months.    Elida Grullon MD  Hematology/Oncology  [facility]    Total time spent: 30 minutes in patient evaluation, counseling, documentation, and coordination of care.

## 2021-08-27 ENCOUNTER — TRANSFERRED RECORDS (OUTPATIENT)
Dept: HEALTH INFORMATION MANAGEMENT | Facility: CLINIC | Age: 76
End: 2021-08-27

## 2021-08-30 ENCOUNTER — ONCOLOGY VISIT (OUTPATIENT)
Dept: ONCOLOGY | Facility: CLINIC | Age: 76
End: 2021-08-30
Attending: INTERNAL MEDICINE
Payer: MEDICARE

## 2021-08-30 VITALS
HEART RATE: 61 BPM | HEIGHT: 64 IN | TEMPERATURE: 98 F | OXYGEN SATURATION: 98 % | SYSTOLIC BLOOD PRESSURE: 154 MMHG | DIASTOLIC BLOOD PRESSURE: 81 MMHG | RESPIRATION RATE: 16 BRPM | BODY MASS INDEX: 31.41 KG/M2 | WEIGHT: 184 LBS

## 2021-08-30 DIAGNOSIS — C50.411 MALIGNANT NEOPLASM OF UPPER-OUTER QUADRANT OF RIGHT BREAST IN FEMALE, ESTROGEN RECEPTOR POSITIVE (H): Primary | ICD-10-CM

## 2021-08-30 DIAGNOSIS — Z17.0 MALIGNANT NEOPLASM OF UPPER-OUTER QUADRANT OF RIGHT BREAST IN FEMALE, ESTROGEN RECEPTOR POSITIVE (H): Primary | ICD-10-CM

## 2021-08-30 DIAGNOSIS — Z79.811 AROMATASE INHIBITOR USE: ICD-10-CM

## 2021-08-30 PROCEDURE — G0463 HOSPITAL OUTPT CLINIC VISIT: HCPCS

## 2021-08-30 PROCEDURE — 99214 OFFICE O/P EST MOD 30 MIN: CPT | Performed by: INTERNAL MEDICINE

## 2021-08-30 ASSESSMENT — MIFFLIN-ST. JEOR: SCORE: 1309.62

## 2021-08-30 ASSESSMENT — PAIN SCALES - GENERAL: PAINLEVEL: NO PAIN (0)

## 2021-08-30 NOTE — LETTER
8/30/2021         RE: Yola Mayo  [address] [facility] [id number]        Dear Colleague,    Thank you for referring your patient, Yola Mayo, to the [facility]. Please see a copy of my visit note below.    [facility]    Hematology/Oncology Established Patient Note      Today's Date: 8/30/21    Reason for follow-up: Right breast cancer.    HISTORY OF PRESENT ILLNESS: Yola Mayo is a 76 year old female who presents with the following oncologic history:  1. 3/24/2021: Mammogram showed distortion in right upper outer breast; left breast negative.  2. 3/31/2021: Right breast U/S at 9:00, 4 cm from nipple showed mass measuring 1.1 x 1 x 1.2 cm; no enlarged right axillary lymph nodes seen.  3. 4/07/2021: Right breast biopsy at 9:00 showed grade 2 invasive ductal carcinoma, ER positive at 100%, OR positive at 95%, HER-2/marek FISH negative.  4. 4/23/2021: Underwent right breast lumpectomy and sentinel lymph node excision under care of Dr. Ace Rogers. Pathology showed grade 2 invasive ductal carcinoma measuring 1.5 cm, low grade DCIS, LCIS, margins negative; 2 lymph nodes negative. Oncotype DX = 6; 9-year risk of distant recurrence = 3% after 5 years of hormone blockade therapy, < 1% absolute chemotherapy benefit.  5. 5/25/2021-6/16/2021: Completed adjuvant radiation therapy to right breast. Subsequently started anastrazole.    INTERIM HISTORY:  Dee Dee reports occasional flushing but not full overt hot flashes. She has not noticed any increase in her arthritic pain.      REVIEW OF SYSTEMS:   14 point ROS was reviewed and is negative other than as noted above in HPI.       HOME MEDICATIONS:  Current Outpatient Medications   Medication Sig Dispense Refill     acetaminophen (TYLENOL) 325 MG tablet Take 2 tablets (650 mg) by mouth 3 times daily 100 tablet 0     amoxicillin (AMOXIL) 500 MG capsule Take 4 capsules half to one hour before the procedure. 4  capsule 3     anastrozole (ARIMIDEX) 1 MG tablet Take 1 tablet (1 mg) by mouth daily 90 tablet 3     aspirin 81 MG tablet Take 81 mg by mouth daily       atorvastatin (LIPITOR) 10 MG tablet Take 1 tablet (10 mg) by mouth daily for cholestrol 90 tablet 3     blood glucose (CONTOUR NEXT TEST) test strip Use to test blood sugar 1 time daily or as directed. 100 each 2     blood glucose monitoring (NITO MICROLET) lancets Use to test blood sugar 1 time daily or as directed. 100 each 3     calcium-vitamin D (CALTRATE) 600-400 MG-UNIT per tablet Take 1 tablet by mouth daily 60 tablet 11     cholecalciferol (VITAMIN D) 1000 UNIT tablet Take 1 tablet (1,000 Units) by mouth daily 100 tablet 3     CONTOUR NEXT EZ (CONTOUR NEXT EZ W/DEVICE KIT) w/Device KIT 1 kit once 1 kit 0     Cyanocobalamin (VITAMIN B 12 PO) Take 1 tablet by mouth daily VIT B12 unknown otc dose       hydrALAZINE (APRESOLINE) 25 MG tablet Take 1 tablet (25 mg) by mouth 2 times daily 270 tablet 3     hydrochlorothiazide (HYDRODIURIL) 25 MG tablet Take 1 tablet (25 mg) by mouth daily for Blood Pressure 90 tablet 3     levothyroxine (SYNTHROID/LEVOTHROID) 112 MCG tablet Take 1 tablet (112 mcg) by mouth daily for thyroid 90 tablet 3     losartan (COZAAR) 100 MG tablet Take 1 tablet (100 mg) by mouth daily for Blood Pressure 90 tablet 3     metFORMIN (GLUCOPHAGE-XR) 500 MG 24 hr tablet Take 2 tablets (1,000 mg) by mouth daily (with dinner) 180 tablet 3     metoprolol succinate ER (TOPROL-XL) 25 MG 24 hr tablet Take 1 tablet (25 mg) by mouth daily for Blood Pressure 90 tablet 3     metroNIDAZOLE (METROGEL) 0.75 % external gel APPLY TO AFFECTED AREA TWICE A DAY 45 g 3     multivitamin, therapeutic with minerals (THERA-VIT-M) TABS Take 1 tablet by mouth daily       nitroGLYcerin (NITROSTAT) 0.4 MG sublingual tablet For chest pain place 1 tablet under the tongue every 5 minutes for 3 doses. If symptoms persist 5 minutes after 1st dose call 911. 25 tablet 3          ALLERGIES:  Allergies   Allergen Reactions     Toprol Xl [Metoprolol] Fatigue     Ace Inhibitors      ACE throat clearing     Oxycodone Nausea and Vomiting     Tetanus Toxoid Swelling     Hard red lump          PAST MEDICAL HISTORY:  Past Medical History:   Diagnosis Date     Anemia due to blood loss      BASAL CELL CANCER -NOSE      Dermatitis      Diabetes (H)      Family history of diabetes mellitus     Father     Family history of ischemic heart disease     Father     Family history of malignant neoplasm of ovary     Mother     HX DISRUPT L ANT CRUCIATE S/P REPAIR 2003      Hyperlipidemia      HYPERTENSION       HYPOTHYROIDISM      LOC PRIM OSTEOARTH-L 1ST METATARSAL-CUNEIFORM JOINT 3/10/2004     Major depressive disorder, single episode, mild (H) 11/24/2014     MILD-MOD TRICUSPID REGURG. MOD PULM HTN 6/23/2005    ECHO  5/05     Obese      PONV (postoperative nausea and vomiting)      Rosacea      Shingles      SPLENIC CYST          PAST SURGICAL HISTORY:  Past Surgical History:   Procedure Laterality Date     ARTHROPLASTY KNEE Left 4/13/2016    Procedure: ARTHROPLASTY KNEE;  Surgeon: Giuseppe Lopez MD;  Location:  OR     ARTHROPLASTY MINIMALLY INVASIVE HIP  4/18/2012    Procedure:ARTHROPLASTY MINIMALLY INVASIVE HIP; RIGHT TWO INCISION MINIMALLY INVASIVE HIP ARTHROPLASTY (YUVAL)^ (NO CELL SAVER) (C-ARM); Surgeon:GIUSEPPE LOPEZ; Location: OR     BIOPSY NODE SENTINEL Right 4/23/2021    Procedure: RIGHT SENTINEL LYMPH NODE BIOPSY;  Surgeon: Giuseppe Rogers MD;  Location:  OR     C APPENDECTOMY  8/87    incidental appi at [facility]     C CHEMOSURG MOHS 1ST STAGE  9-99    Mohs-nose     C REPAIR CRUCIATE LIGAMENT,KNEE  8/2003    L   ACL reconstruction     C TOTAL ABDOM HYSTERECTOMY  8/87    W/ BSO fibroids, hx PID     CARDIAC SURGERY  06/27/2019    mid and distal RCA stents     ENT SURGERY      mohs procedure on nose     HYSTERECTOMY, PAP NO LONGER INDICATED       LUMPECTOMY BREAST WITH SEED  LOCALIZATION Right 4/23/2021    Procedure: SEED LOCALIZED RIGHT BREAST LUMPECTOMY;  Surgeon: Giuseppe Rogers MD;  Location: [facility] OR     SURGICAL HISTORY OF -   Mult-~1978    infertility, laparotomies     ZZHC COLONOSCOPY THRU STOMA, DIAGNOSTIC  4/03    normal, rec repeat 10 yrs         SOCIAL HISTORY:  Social History     Socioeconomic History     Marital status:      Spouse name: FABIAN CUADRA     Number of children: 2     Years of education: Not on file     Highest education level: Not on file   Occupational History     Occupation: [email or web address]     Employer: [email or web address]   Tobacco Use     Smoking status: Never Smoker     Smokeless tobacco: Never Used   Substance and Sexual Activity     Alcohol use: Yes     Alcohol/week: 0.0 standard drinks     Comment: occasional     Drug use: No     Sexual activity: Yes     Partners: Male   Other Topics Concern      Service No     Blood Transfusions No     Caffeine Concern No     Occupational Exposure No     Hobby Hazards No     Sleep Concern No     Stress Concern No     Weight Concern Yes     Special Diet No     Back Care No     Exercise Yes     Comment: Approx 3 times a week for about 30 minutes     Bike Helmet Yes     Seat Belt Yes     Self-Exams Yes     Parent/sibling w/ CABG, MI or angioplasty before 65F 55M? Not Asked   Social History Narrative     Not on file     Social Determinants of Health     Financial Resource Strain:      Difficulty of Paying Living Expenses:    Food Insecurity:      Worried About Running Out of Food in the Last Year:      Ran Out of Food in the Last Year:    Transportation Needs:      Lack of Transportation (Medical):      Lack of Transportation (Non-Medical):    Physical Activity:      Days of Exercise per Week:      Minutes of Exercise per Session:    Stress:      Feeling of Stress :    Social Connections:      Frequency of Communication with Friends and Family:      Frequency of Social Gatherings with Friends and Family:       "Attends Christian Services:      Active Member of Clubs or Organizations:      Attends Club or Organization Meetings:      Marital Status:    Intimate Partner Violence:      Fear of Current or Ex-Partner:      Emotionally Abused:      Physically Abused:      Sexually Abused:          FAMILY HISTORY:  Family History   Problem Relation Age of Onset     Cancer Mother         ovarian  of it  at 49yo.     Other Cancer Mother      Diabetes Father         Adult onset     Hypertension Father      C.A.D. Father         Fatal Heart attack at 70.     Cerebrovascular Disease Father      Thyroid Disease Sister      Thyroid Disease Sister      Thyroid Disease Sister      Cancer Daughter      Thyroid Disease Daughter      Breast Cancer No family hx of      Cancer - colorectal No family hx of          PHYSICAL EXAM:  Vital signs:  BP (!) 154/81   Pulse 61   Temp 98  F (36.7  C)   Resp 16   Ht 1.626 m (5' 4\")   Wt 83.5 kg (184 lb)   SpO2 98%   BMI 31.58 kg/m     GENERAL/CONSTITUTIONAL: No acute distress.  EYES: No erythema or scleral icterus.  LYMPH: No cervical, supraclavicular, axillary adenopathy.   BREAST: No palpable masses in either breast. Nipples are everted bilaterally with no discharge. No erythema, ulceration.  RESPIRATORY: No audible cough or wheezing.   GASTROINTESTINAL: No hepatosplenomegaly, masses, or tenderness. No guarding.  No distention.  MUSCULOSKELETAL: Warm and well-perfused, no cyanosis, clubbing, or edema.  NEUROLOGIC: No focal motor deficits. Alert, oriented, answers questions appropriately.  INTEGUMENTARY: No rashes or jaundice.  GAIT: Steady, does not use assistive device    LABS:  CBC RESULTS: Recent Labs   Lab Test 21  0857   WBC 7.2   RBC 4.60   HGB 14.1   HCT 40.7   MCV 89   MCH 30.7   MCHC 34.6   RDW 14.6        Last Comprehensive Metabolic Panel:  Sodium   Date Value Ref Range Status   2021 137 133 - 144 mmol/L Final     Potassium   Date Value Ref Range Status "   2021 3.9 3.4 - 5.3 mmol/L Final     Chloride   Date Value Ref Range Status   2021 103 94 - 109 mmol/L Final     Carbon Dioxide   Date Value Ref Range Status   2021 26 20 - 32 mmol/L Final     Anion Gap   Date Value Ref Range Status   2021 8 3 - 14 mmol/L Final     Glucose   Date Value Ref Range Status   2021 163 (H) 70 - 99 mg/dL Final     Urea Nitrogen   Date Value Ref Range Status   2021 17 7 - 30 mg/dL Final     Creatinine   Date Value Ref Range Status   2021 0.71 0.52 - 1.04 mg/dL Final     GFR Estimate   Date Value Ref Range Status   2021 82 >60 mL/min/[1.73_m2] Final     Comment:     Non  GFR Calc  Starting 2018, serum creatinine based estimated GFR (eGFR) will be   calculated using the Chronic Kidney Disease Epidemiology Collaboration   (CKD-EPI) equation.       Calcium   Date Value Ref Range Status   2021 9.4 8.5 - 10.1 mg/dL Final     Bilirubin Total   Date Value Ref Range Status   2021 0.5 0.2 - 1.3 mg/dL Final     Alkaline Phosphatase   Date Value Ref Range Status   2021 76 40 - 150 U/L Final     ALT   Date Value Ref Range Status   2021 79 (H) 0 - 50 U/L Final     AST   Date Value Ref Range Status   2021 48 (H) 0 - 45 U/L Final       PATHOLOGY:  None new.    IMAGIN2021: DEXA showed normal bone density.    ASSESSMENT/PLAN:  Yola Mayo is a 76 year old female with the following issues:  1. 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  --She is status post lumpectomy followed by adjuvant radiation therapy.  --She is now on anastrazole and tolerating this well with no significant side effects. She has no increase in her existing arthritic pain.  --Plan for total 5 years of hormone blockade therapy.  --Baseline  "DEXA scan from 5/12/2021 was normal.  I advised adequate calcium and vitamin D intake as well as weight bearing exercise.  --Plan to repeat DEXA in 2023.  --Next mammogram due 3/2022.    2. Coronary artery disease  --Discussed with Dee Dee that aromatase inhibitors can slightly increase cardiovascular risk but controllable with her cardiac medications.  --She will continue on aspirin, beta-blocker, and statin.     3. Diabetes mellitus type 2  --Well-controlled. She will continue metformin and has her A1C monitored by Dr. Braga.    Return in 4-6 months.    Elida Grullon MD  Hematology/Oncology  [facility]    Total time spent: 30 minutes in patient evaluation, counseling, documentation, and coordination of care.      Oncology Rooming Note    August 30, 2021 2:25 PM   Yola Mayo is a 76 year old female who presents for:    Chief Complaint   Patient presents with     Oncology Clinic Visit     Initial Vitals: There were no vitals taken for this visit. Estimated body mass index is 31.41 kg/m  as calculated from the following:    Height as of 5/19/21: 1.626 m (5' 4\").    Weight as of 5/28/21: 83 kg (183 lb). There is no height or weight on file to calculate BSA.  Data Unavailable Comment: Data Unavailable   No LMP recorded. Patient has had a hysterectomy.  Allergies reviewed: Yes  Medications reviewed: Yes    Medications: Medication refills not needed today.  Pharmacy name entered into dBMEDx: [id number] [place] [id number] [facility]    Clinical concerns:  doctor was notified.      Anila Durham MA                Again, thank you for allowing me to participate in the care of your patient.        Sincerely,        Elida Grullon MD    "

## 2021-08-30 NOTE — PROGRESS NOTES
"Oncology Rooming Note    August 30, 2021 2:25 PM   Yola Mayo is a 76 year old female who presents for:    Chief Complaint   Patient presents with     Oncology Clinic Visit     Initial Vitals: There were no vitals taken for this visit. Estimated body mass index is 31.41 kg/m  as calculated from the following:    Height as of 5/19/21: 1.626 m (5' 4\").    Weight as of 5/28/21: 83 kg (183 lb). There is no height or weight on file to calculate BSA.  Data Unavailable Comment: Data Unavailable   No LMP recorded. Patient has had a hysterectomy.  Allergies reviewed: Yes  Medications reviewed: Yes    Medications: Medication refills not needed today.  Pharmacy name entered into [facility]: [id number] [place] [id number] [facility] [address] [facility]    Clinical concerns:  doctor was notified.      Anila Durham MA            "

## 2021-09-05 ENCOUNTER — HEALTH MAINTENANCE LETTER (OUTPATIENT)
Age: 76
End: 2021-09-05

## 2021-09-16 ENCOUNTER — OFFICE VISIT (OUTPATIENT)
Dept: CARDIOLOGY | Facility: CLINIC | Age: 76
End: 2021-09-16
Attending: NURSE PRACTITIONER
Payer: MEDICARE

## 2021-09-16 VITALS
OXYGEN SATURATION: 97 % | DIASTOLIC BLOOD PRESSURE: 77 MMHG | HEIGHT: 64 IN | BODY MASS INDEX: 30.95 KG/M2 | WEIGHT: 181.3 LBS | SYSTOLIC BLOOD PRESSURE: 142 MMHG | HEART RATE: 53 BPM

## 2021-09-16 DIAGNOSIS — R53.83 FATIGUE, UNSPECIFIED TYPE: ICD-10-CM

## 2021-09-16 DIAGNOSIS — I25.10 CORONARY ARTERY DISEASE INVOLVING NATIVE CORONARY ARTERY OF NATIVE HEART WITHOUT ANGINA PECTORIS: ICD-10-CM

## 2021-09-16 DIAGNOSIS — I10 ESSENTIAL HYPERTENSION, BENIGN: ICD-10-CM

## 2021-09-16 DIAGNOSIS — R06.09 DOE (DYSPNEA ON EXERTION): Primary | ICD-10-CM

## 2021-09-16 PROCEDURE — 99214 OFFICE O/P EST MOD 30 MIN: CPT | Performed by: INTERNAL MEDICINE

## 2021-09-16 ASSESSMENT — MIFFLIN-ST. JEOR: SCORE: 1297.37

## 2021-09-16 NOTE — PROGRESS NOTES
Cardiology Progress Note          Assessment and Plan:       1. Coronary artery disease status post PCI of the RCA 2019, diagonal occlusion medically managed    Continue current medical regimen.  Increase statin as tolerated in the future.      2. Dyspnea on exertion    Trial of discontinuing the Toprol due to bradycardia and possible chronotropic incompetence.  Patient will message me in a couple of weeks and if she feels better off the Toprol, she may stay off of the beta-blocker unless she is noticing angina at higher heart rates.      3. Hypertension    Patient will also check her heart rates and blood pressures and if her blood pressure goes up above 140 mmHg when off the Toprol either add some carvedilol or possibly a long-acting nitrate.    Follow-up in 6 months per patient request    30 minutes was spent with the patient, precharting and reviewing tests as well as post visit charting all done today..    This note was transcribed using electronic voice recognition software and there may be typographical errors present.                    Interval History:     The patient is a very pleasant 76 year old whom I have been following for coronary artery disease status post PCI of the RCA 2018.  Occluded diagonal noted at that time.  She has felt pretty well except for more dyspnea on exertion.  She is somewhat bradycardic in the office today.  She is on Toprol 25 mg daily.                      Review of Systems:     Review of Systems:  Skin:  Negative     Eyes:  Positive for glasses  ENT:  Negative    Respiratory:  Positive for dyspnea on exertion  Cardiovascular:  Negative    Gastroenterology: not assessed    Genitourinary:  Positive for urinary frequency;nocturia  Musculoskeletal:  Positive for arthritis  Neurologic:  Negative    Psychiatric:  Negative for anxiety;depression  Heme/Lymph/Imm:  Negative allergies  Endocrine:  Positive for thyroid disorder;diabetes              Physical Exam:     Vitals: BP (!)  "142/77   Pulse 53   Ht 1.626 m (5' 4\")   Wt 82.2 kg (181 lb 4.8 oz)   SpO2 97%   BMI 31.12 kg/m    Constitutional:  cooperative, alert and oriented, well developed, well nourished, in no acute distress        Skin:  warm and dry to the touch, no apparent skin lesions or masses noted        Head:  normocephalic, no masses or lesions        Eyes:  pupils equal and round;conjunctivae and lids unremarkable        Chest:  normal symmetry        Cardiac: regular rhythm         grade 1;early systolic murmur;RUSB        Extremities and Back:  no edema;no deformities, clubbing, cyanosis, erythema observed        Neurological:  no gross motor deficits;affect appropriate                 Medications:     Current Outpatient Medications   Medication Sig Dispense Refill     acetaminophen (TYLENOL) 325 MG tablet Take 2 tablets (650 mg) by mouth 3 times daily 100 tablet 0     anastrozole (ARIMIDEX) 1 MG tablet Take 1 tablet (1 mg) by mouth daily 90 tablet 3     aspirin 81 MG tablet Take 81 mg by mouth daily       atorvastatin (LIPITOR) 10 MG tablet Take 1 tablet (10 mg) by mouth daily for cholestrol 90 tablet 3     blood glucose (CONTOUR NEXT TEST) test strip Use to test blood sugar 1 time daily or as directed. 100 each 2     blood glucose monitoring (NITO MICROLET) lancets Use to test blood sugar 1 time daily or as directed. 100 each 3     calcium-vitamin D (CALTRATE) 600-400 MG-UNIT per tablet Take 1 tablet by mouth daily 60 tablet 11     cholecalciferol (VITAMIN D) 1000 UNIT tablet Take 1 tablet (1,000 Units) by mouth daily 100 tablet 3     CONTOUR NEXT EZ (CONTOUR NEXT EZ W/DEVICE KIT) w/Device KIT 1 kit once 1 kit 0     Cyanocobalamin (VITAMIN B 12 PO) Take 1 tablet by mouth daily VIT B12 unknown otc dose       hydrALAZINE (APRESOLINE) 25 MG tablet Take 1 tablet (25 mg) by mouth 2 times daily 270 tablet 3     hydrochlorothiazide (HYDRODIURIL) 25 MG tablet Take 1 tablet (25 mg) by mouth daily for Blood Pressure 90 tablet 3 "     levothyroxine (SYNTHROID/LEVOTHROID) 112 MCG tablet Take 1 tablet (112 mcg) by mouth daily for thyroid 90 tablet 3     losartan (COZAAR) 100 MG tablet Take 1 tablet (100 mg) by mouth daily for Blood Pressure 90 tablet 3     metFORMIN (GLUCOPHAGE-XR) 500 MG 24 hr tablet Take 2 tablets (1,000 mg) by mouth daily (with dinner) 180 tablet 3     metoprolol succinate ER (TOPROL-XL) 25 MG 24 hr tablet Take 1 tablet (25 mg) by mouth daily for Blood Pressure 90 tablet 3     metroNIDAZOLE (METROGEL) 0.75 % external gel APPLY TO AFFECTED AREA TWICE A DAY 45 g 3     multivitamin, therapeutic with minerals (THERA-VIT-M) TABS Take 1 tablet by mouth daily       nitroGLYcerin (NITROSTAT) 0.4 MG sublingual tablet For chest pain place 1 tablet under the tongue every 5 minutes for 3 doses. If symptoms persist 5 minutes after 1st dose call 911. 25 tablet 3     amoxicillin (AMOXIL) 500 MG capsule Take 4 capsules half to one hour before the procedure. (Patient not taking: Reported on 9/16/2021) 4 capsule 3                Data:   All laboratory data reviewed  No results found for this or any previous visit (from the past 24 hour(s)).    All laboratory data reviewed  Lab Results   Component Value Date    CHOL 135 03/19/2021     Lab Results   Component Value Date    HDL 80 03/19/2021     Lab Results   Component Value Date    LDL 42 03/19/2021     Lab Results   Component Value Date    TRIG 67 03/19/2021     Lab Results   Component Value Date    CHOLHDLRATIO 1.6 03/27/2015     TSH   Date Value Ref Range Status   03/19/2021 1.93 0.40 - 4.00 mU/L Final     Last Basic Metabolic Panel:  Lab Results   Component Value Date     05/12/2021      Lab Results   Component Value Date    POTASSIUM 3.9 05/12/2021     Lab Results   Component Value Date    CHLORIDE 103 05/12/2021     Lab Results   Component Value Date    JOSEPH 9.4 05/12/2021     Lab Results   Component Value Date    CO2 26 05/12/2021     Lab Results   Component Value Date    BUN 17  05/12/2021     Lab Results   Component Value Date    CR 0.71 05/12/2021     Lab Results   Component Value Date     05/12/2021     Lab Results   Component Value Date    WBC 7.2 03/19/2021     Lab Results   Component Value Date    RBC 4.60 03/19/2021     Lab Results   Component Value Date    HGB 14.1 03/19/2021     Lab Results   Component Value Date    HCT 40.7 03/19/2021     Lab Results   Component Value Date    MCV 89 03/19/2021     Lab Results   Component Value Date    MCH 30.7 03/19/2021     Lab Results   Component Value Date    MCHC 34.6 03/19/2021     Lab Results   Component Value Date    RDW 14.6 03/19/2021     Lab Results   Component Value Date     03/19/2021

## 2021-09-16 NOTE — LETTER
9/16/2021    Zoraida Braga MD  [address]  [facility] [address]    RE: Yola Mayo       Dear Colleague,    I had the pleasure of seeing Yola Mayo in the [facility].    Cardiology Progress Note          Assessment and Plan:       1. Coronary artery disease status post PCI of the RCA 2019, diagonal occlusion medically managed    Continue current medical regimen.  Increase statin as tolerated in the future.      2. Dyspnea on exertion    Trial of discontinuing the Toprol due to bradycardia and possible chronotropic incompetence.  Patient will message me in a couple of weeks and if she feels better off the Toprol, she may stay off of the beta-blocker unless she is noticing angina at higher heart rates.      3. Hypertension    Patient will also check her heart rates and blood pressures and if her blood pressure goes up above 140 mmHg when off the Toprol either add some carvedilol or possibly a long-acting nitrate.    Follow-up in 6 months per patient request    30 minutes was spent with the patient, precharting and reviewing tests as well as post visit charting all done today..    This note was transcribed using electronic voice recognition software and there may be typographical errors present.                    Interval History:     The patient is a very pleasant 76 year old whom I have been following for coronary artery disease status post PCI of the RCA 2018.  Occluded diagonal noted at that time.  She has felt pretty well except for more dyspnea on exertion.  She is somewhat bradycardic in the office today.  She is on Toprol 25 mg daily.                      Review of Systems:     Review of Systems:  Skin:  Negative     Eyes:  Positive for glasses  ENT:  Negative    Respiratory:  Positive for dyspnea on exertion  Cardiovascular:  Negative    Gastroenterology: not assessed    Genitourinary:  Positive for urinary  "frequency;nocturia  Musculoskeletal:  Positive for arthritis  Neurologic:  Negative    Psychiatric:  Negative for anxiety;depression  Heme/Lymph/Imm:  Negative allergies  Endocrine:  Positive for thyroid disorder;diabetes              Physical Exam:     Vitals: BP (!) 142/77   Pulse 53   Ht 1.626 m (5' 4\")   Wt 82.2 kg (181 lb 4.8 oz)   SpO2 97%   BMI 31.12 kg/m    Constitutional:  cooperative, alert and oriented, well developed, well nourished, in no acute distress        Skin:  warm and dry to the touch, no apparent skin lesions or masses noted        Head:  normocephalic, no masses or lesions        Eyes:  pupils equal and round;conjunctivae and lids unremarkable        Chest:  normal symmetry        Cardiac: regular rhythm         grade 1;early systolic murmur;RUSB        Extremities and Back:  no edema;no deformities, clubbing, cyanosis, erythema observed        Neurological:  no gross motor deficits;affect appropriate                 Medications:     Current Outpatient Medications   Medication Sig Dispense Refill     acetaminophen (TYLENOL) 325 MG tablet Take 2 tablets (650 mg) by mouth 3 times daily 100 tablet 0     anastrozole (ARIMIDEX) 1 MG tablet Take 1 tablet (1 mg) by mouth daily 90 tablet 3     aspirin 81 MG tablet Take 81 mg by mouth daily       atorvastatin (LIPITOR) 10 MG tablet Take 1 tablet (10 mg) by mouth daily for cholestrol 90 tablet 3     blood glucose (CONTOUR NEXT TEST) test strip Use to test blood sugar 1 time daily or as directed. 100 each 2     blood glucose monitoring (NITO MICROLET) lancets Use to test blood sugar 1 time daily or as directed. 100 each 3     calcium-vitamin D (CALTRATE) 600-400 MG-UNIT per tablet Take 1 tablet by mouth daily 60 tablet 11     cholecalciferol (VITAMIN D) 1000 UNIT tablet Take 1 tablet (1,000 Units) by mouth daily 100 tablet 3     CONTOUR NEXT EZ (CONTOUR NEXT EZ W/DEVICE KIT) w/Device KIT 1 kit once 1 kit 0     Cyanocobalamin (VITAMIN B 12 PO) Take " 1 tablet by mouth daily VIT B12 unknown otc dose       hydrALAZINE (APRESOLINE) 25 MG tablet Take 1 tablet (25 mg) by mouth 2 times daily 270 tablet 3     hydrochlorothiazide (HYDRODIURIL) 25 MG tablet Take 1 tablet (25 mg) by mouth daily for Blood Pressure 90 tablet 3     levothyroxine (SYNTHROID/LEVOTHROID) 112 MCG tablet Take 1 tablet (112 mcg) by mouth daily for thyroid 90 tablet 3     losartan (COZAAR) 100 MG tablet Take 1 tablet (100 mg) by mouth daily for Blood Pressure 90 tablet 3     metFORMIN (GLUCOPHAGE-XR) 500 MG 24 hr tablet Take 2 tablets (1,000 mg) by mouth daily (with dinner) 180 tablet 3     metoprolol succinate ER (TOPROL-XL) 25 MG 24 hr tablet Take 1 tablet (25 mg) by mouth daily for Blood Pressure 90 tablet 3     metroNIDAZOLE (METROGEL) 0.75 % external gel APPLY TO AFFECTED AREA TWICE A DAY 45 g 3     multivitamin, therapeutic with minerals (THERA-VIT-M) TABS Take 1 tablet by mouth daily       nitroGLYcerin (NITROSTAT) 0.4 MG sublingual tablet For chest pain place 1 tablet under the tongue every 5 minutes for 3 doses. If symptoms persist 5 minutes after 1st dose call 911. 25 tablet 3     amoxicillin (AMOXIL) 500 MG capsule Take 4 capsules half to one hour before the procedure. (Patient not taking: Reported on 9/16/2021) 4 capsule 3                Data:   All laboratory data reviewed  No results found for this or any previous visit (from the past 24 hour(s)).    All laboratory data reviewed  Lab Results   Component Value Date    CHOL 135 03/19/2021     Lab Results   Component Value Date    HDL 80 03/19/2021     Lab Results   Component Value Date    LDL 42 03/19/2021     Lab Results   Component Value Date    TRIG 67 03/19/2021     Lab Results   Component Value Date    CHOLHDLRATIO 1.6 03/27/2015     TSH   Date Value Ref Range Status   03/19/2021 1.93 0.40 - 4.00 mU/L Final     Last Basic Metabolic Panel:  Lab Results   Component Value Date     05/12/2021      Lab Results   Component Value  Date    POTASSIUM 3.9 05/12/2021     Lab Results   Component Value Date    CHLORIDE 103 05/12/2021     Lab Results   Component Value Date    JOSEPH 9.4 05/12/2021     Lab Results   Component Value Date    CO2 26 05/12/2021     Lab Results   Component Value Date    BUN 17 05/12/2021     Lab Results   Component Value Date    CR 0.71 05/12/2021     Lab Results   Component Value Date     05/12/2021     Lab Results   Component Value Date    WBC 7.2 03/19/2021     Lab Results   Component Value Date    RBC 4.60 03/19/2021     Lab Results   Component Value Date    HGB 14.1 03/19/2021     Lab Results   Component Value Date    HCT 40.7 03/19/2021     Lab Results   Component Value Date    MCV 89 03/19/2021     Lab Results   Component Value Date    MCH 30.7 03/19/2021     Lab Results   Component Value Date    MCHC 34.6 03/19/2021     Lab Results   Component Value Date    RDW 14.6 03/19/2021     Lab Results   Component Value Date     03/19/2021                   Thank you for allowing me to participate in the care of your patient.      Sincerely,     Albino Howard MD     [facility]  cc:   Krystal Neal, APRN CNP  [address]  [place] [id number]

## 2021-09-16 NOTE — PATIENT INSTRUCTIONS
Try stopping the METOPROLOL for two weeks and see how your energy is with exercise. If you feel chest tightness or worse in any way, call or message me in the meantime.    Message me at the end of two weeks if you are feeling good. Let me know what your blood pressures are running. Also heart rates if you can write them down.    If the blood pressures creep up when you're off the METOPROLOL, then we might adjust your medications at that time. We might either add CARVEDILOL to replace the METOPROLOL or we might change one of your other medications instead.    Try moving the HYDROCHLOROTHIAZIDE to the morning so you're not peeing as much at night.

## 2021-09-29 NOTE — PROGRESS NOTES
"    Assessment & Plan     Yola was seen today for follow up.    Diagnoses and all orders for this visit:    Type 2 diabetes mellitus without complication, without long-term current use of insulin (H)  I reviewed home blood sugar readings  Those are under excellent control  She is taking Metformin  Lab Results   Component Value Date    A1C 6.0 03/19/2021    A1C 5.9 06/17/2020    A1C 6.5 05/07/2019    A1C 6.0 10/14/2018    A1C 5.9 05/03/2018       Benign hypertension  Blood pressure readings are good  She was seen by Dr. 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  --She is status post lumpectomy followed by adjuvant radiation therapy.  --She is now on anastrazole and tolerating this well with no significant side effects. She has no increase in her existing arthritic pain.  --Plan for total 5 years of hormone blockade therapy.    S/P radiation therapy  Due to history of breast cancer    S/P lumpectomy, right breast  Due to history of breast cancer    S/P drug eluting coronary stent placement  Doing well  Note of Dr. Howard was reviewed    Rosacea  Doing well with [facility]  Follows dermatology  She has numerous moles and freckles      [id number]}     BMI:   Estimated body mass index is 31.41 kg/m  as calculated from the following:    Height as of this encounter: 1.626 m (5' 4\").    Weight as of this encounter: 83 kg (183 lb). " "      See Patient Instructions  Patient Instructions   Labs today  Continue to follow up your oncologist and cardiologist  Follow up in 6 months  Seek sooner medical attention if there is any worsening of symptoms or problems.        Return in about 6 months (around 3/30/2022) for wellness visit.    Zoraida Braga MD  [facility] TRINITY Funez is a 76 year old who presents for the following health issues     HPI       Medication Followup     Taking Medication as prescribed: yes    Side Effects:  None       She is off of metoprolol  Blood Pressure at home is good   Home blood sugar readings are staying good    Review of Systems   Constitutional, HEENT, cardiovascular, pulmonary, gi and gu systems are negative, except as otherwise noted.      Objective    /72 (BP Location: Left arm, Patient Position: Chair, Cuff Size: Adult Large)   Pulse 68   Temp 97  F (36.1  C) (Temporal)   Resp 18   Ht 1.626 m (5' 4\")   Wt 83 kg (183 lb)   SpO2 99%   BMI 31.41 kg/m    Body mass index is 31.41 kg/m .  Physical Exam       GENERAL APPEARANCE: healthy, alert and no distress  EYES: Eyes grossly normal to inspection, PERRL and conjunctivae and sclerae normal  HENT: ear canals and TM's normal and nose and mouth without ulcers or lesions  NECK: no adenopathy  RESP: lungs clear to auscultation - no rales, rhonchi or wheezes  CV: regular rates and rhythm, normal S1 S2, no S3  She has prominent veins in her lower extremity  Goes to dermatologist   Numerous moles and freckles          "

## 2021-09-30 ENCOUNTER — OFFICE VISIT (OUTPATIENT)
Dept: FAMILY MEDICINE | Facility: CLINIC | Age: 76
End: 2021-09-30
Payer: MEDICARE

## 2021-09-30 VITALS
HEIGHT: 64 IN | HEART RATE: 68 BPM | TEMPERATURE: 97 F | OXYGEN SATURATION: 99 % | DIASTOLIC BLOOD PRESSURE: 72 MMHG | BODY MASS INDEX: 31.24 KG/M2 | WEIGHT: 183 LBS | SYSTOLIC BLOOD PRESSURE: 139 MMHG | RESPIRATION RATE: 18 BRPM

## 2021-09-30 DIAGNOSIS — Z98.890 S/P LUMPECTOMY, RIGHT BREAST: ICD-10-CM

## 2021-09-30 DIAGNOSIS — E11.9 TYPE 2 DIABETES MELLITUS WITHOUT COMPLICATION, WITHOUT LONG-TERM CURRENT USE OF INSULIN (H): Primary | ICD-10-CM

## 2021-09-30 DIAGNOSIS — Z92.3 S/P RADIATION THERAPY: ICD-10-CM

## 2021-09-30 DIAGNOSIS — C50.911 INVASIVE DUCTAL CARCINOMA OF BREAST, FEMALE, RIGHT (H): ICD-10-CM

## 2021-09-30 DIAGNOSIS — Z95.5 S/P DRUG ELUTING CORONARY STENT PLACEMENT: ICD-10-CM

## 2021-09-30 DIAGNOSIS — I10 BENIGN HYPERTENSION: ICD-10-CM

## 2021-09-30 DIAGNOSIS — L71.9 ROSACEA: ICD-10-CM

## 2021-09-30 DIAGNOSIS — E03.9 HYPOTHYROIDISM, UNSPECIFIED TYPE: ICD-10-CM

## 2021-09-30 PROCEDURE — 99214 OFFICE O/P EST MOD 30 MIN: CPT | Performed by: INTERNAL MEDICINE

## 2021-09-30 PROCEDURE — 36415 COLL VENOUS BLD VENIPUNCTURE: CPT | Performed by: INTERNAL MEDICINE

## 2021-09-30 PROCEDURE — 84443 ASSAY THYROID STIM HORMONE: CPT | Performed by: INTERNAL MEDICINE

## 2021-09-30 ASSESSMENT — PATIENT HEALTH QUESTIONNAIRE - PHQ9: SUM OF ALL RESPONSES TO PHQ QUESTIONS 1-9: 4

## 2021-09-30 ASSESSMENT — MIFFLIN-ST. JEOR: SCORE: 1305.08

## 2021-09-30 NOTE — PATIENT INSTRUCTIONS
Labs today  Continue to follow up your oncologist and cardiologist  Follow up in 6 months  Seek sooner medical attention if there is any worsening of symptoms or problems.

## 2021-10-01 ENCOUNTER — TRANSFERRED RECORDS (OUTPATIENT)
Dept: MULTI SPECIALTY CLINIC | Facility: CLINIC | Age: 76
End: 2021-10-01
Payer: COMMERCIAL

## 2021-10-01 LAB
RETINOPATHY: NORMAL
TSH SERPL DL<=0.005 MIU/L-ACNC: 1.02 MU/L (ref 0.4–4)

## 2021-10-01 NOTE — RESULT ENCOUNTER NOTE
Timothy Aceves,    This is to inform you regarding your test result.    TSH which is thyroid hormone is normal.  Stay on current dose of levothyroxine.  Recheck TSH in 6 months      Sincerely,      Dr.Nasima Maria Luz MD,FACP

## 2021-10-31 ENCOUNTER — HEALTH MAINTENANCE LETTER (OUTPATIENT)
Age: 76
End: 2021-10-31

## 2022-01-01 NOTE — LETTER
8/15/2017         RE: Yola Mayo  [address] [place] [address]        Dear Colleague,    Thank you for referring your patient, Yola Mayo, to the [facility]. Please see a copy of my visit note below.    PATIENT HISTORY:  Yola Mayo is a 72 year old female who presents to clinic for b/l dorsal midfoot pain, L medial ankle pain.  I was requested to see this patient for these issues by Dr. Braga.  Intermittent pain for months with activity.  Advil can help.  2-10/10 pain.  Worse in the AM and at night.  Pt had orthotics many years ago.  No injury or sudden increase in pain/swelling.  Hx of OA of feet per pt.      Review of Systems:  Patient denies fever, chills, rash, wound, numbness, weakness, heart burn, blood in stool, chest pain with activity, calf pain when walking, shortness of breath with activity, chronic cough, easy bleeding/bruising, excessive thirst, depression, anxiety.  Patient admits to stiffness, limping, fatigue, swelling of ankle.     PAST MEDICAL HISTORY:   Past Medical History:   Diagnosis Date     BASAL CELL CANCER -NOSE      Family history of diabetes mellitus     Father     Family history of ischemic heart disease     Father     Family history of malignant neoplasm of ovary     Mother     HX DISRUPT L ANT CRUCIATE S/P REPAIR 2003      HYPERTENSION       HYPOTHYROIDISM      LOC PRIM OSTEOARTH-L 1ST METATARSAL-CUNEIFORM JOINT 3/10/2004     Major depressive disorder, single episode, mild (H) 11/24/2014     MILD-MOD TRICUSPID REGURG. MOD PULM HTN 6/23/2005    ECHO  5/05     SPLENIC CYST         PAST SURGICAL HISTORY:   Past Surgical History:   Procedure Laterality Date     ARTHROPLASTY KNEE Left 4/13/2016    Procedure: ARTHROPLASTY KNEE;  Surgeon: Giuseppe Mcgrath MD;  Location:  OR     ARTHROPLASTY MINIMALLY INVASIVE HIP  4/18/2012    Procedure:ARTHROPLASTY MINIMALLY INVASIVE HIP; RIGHT TWO INCISION MINIMALLY INVASIVE HIP ARTHROPLASTY (YUVAL)^  (NO CELL SAVER) (C-ARM); Surgeon:LITO LOPEZ; Location:[facility] OR     C APPENDECTOMY  8/87    incidental appi at hys     C CHEMOSURG MOHS 1ST STAGE  9-99    Mohs-nose     C REPAIR CRUCIATE LIGAMENT,KNEE  8/2003    L   ACL reconstruction     C TOTAL ABDOM HYSTERECTOMY  8/87    W/ BSO fibroids, hx PID     HC COLONOSCOPY THRU STOMA, DIAGNOSTIC  4/03    normal, rec repeat 10 yrs     HYSTERECTOMY, PAP NO LONGER INDICATED       SURGICAL HISTORY OF -   Mult-~1978    infertility, laparotomies        MEDICATIONS:   Current Outpatient Prescriptions:      aspirin 81 MG tablet, Take 81 mg by mouth daily, Disp: , Rfl:      order for DME, Compression Stockings Knee high 20/30 compression, Disp: 2 each, Rfl: 1     metoprolol (TOPROL-XL) 25 MG 24 hr tablet, Take 1 tablet (25 mg) by mouth daily, Disp: 90 tablet, Rfl: 1     amLODIPine (NORVASC) 5 MG tablet, Take 1 tablet (5 mg) by mouth daily, Disp: 90 tablet, Rfl: 3     hydrochlorothiazide (HYDRODIURIL) 25 MG tablet, Take 0.5 tablets (12.5 mg) by mouth daily, Disp: 45 tablet, Rfl: 3     levothyroxine (SYNTHROID) 112 MCG tablet, Take 1 tablet (112 mcg) by mouth daily, Disp: 90 tablet, Rfl: 3     metFORMIN (GLUCOPHAGE-XR) 500 MG 24 hr tablet, Take 1 tablet (500 mg) by mouth daily (with dinner), Disp: 90 tablet, Rfl: 3     multivitamin, therapeutic with minerals (THERA-VIT-M) TABS, Take 1 tablet by mouth daily, Disp: , Rfl:      Cyanocobalamin (VITAMIN B 12 PO), Take 1 tablet by mouth daily VIT B12, Disp: , Rfl:      cholecalciferol (VITAMIN D) 1000 UNIT tablet, Take 1 tablet (1,000 Units) by mouth daily, Disp: 100 tablet, Rfl: 3     calcium-vitamin D (CALTRATE) 600-400 MG-UNIT per tablet, Take 1 tablet by mouth daily, Disp: 60 tablet, Rfl: 11     ALLERGIES:    Allergies   Allergen Reactions     Ace Inhibitors      ACE throat clearing     Oxycodone Nausea and Vomiting     Tetanus Toxoid Swelling     Hard red lump         SOCIAL HISTORY:   Social History     Social History      "Marital status:      Spouse name: FABIAN CUADRA     Number of children: 2     Years of education: N/A     Occupational History     [employer]       [employer]     Social History Main Topics     Smoking status: Never Smoker     Smokeless tobacco: Never Used     Alcohol use 0.0 oz/week     0 Standard drinks or equivalent per week      Comment: Approx 1-2 times monthy     Drug use: No     Sexual activity: Yes     Partners: Male     Other Topics Concern      Service No     Blood Transfusions No     Caffeine Concern No     Occupational Exposure No     Hobby Hazards No     Sleep Concern No     Stress Concern No     Weight Concern Yes     Special Diet No     Back Care No     Exercise Yes     Approx 3 times a week for about 30 minutes     Bike Helmet Yes     Seat Belt Yes     Self-Exams Yes     Social History Narrative        FAMILY HISTORY:   Family History   Problem Relation Age of Onset     CANCER Mother      ovarian  of it  at 49yo.     DIABETES Father      Adult onset     Hypertension Father      C.A.D. Father      Fatal Heart attack at 70.     CEREBROVASCULAR DISEASE Father      Thyroid Disease Sister      Thyroid Disease Sister      Thyroid Disease Sister      CANCER Daughter      Thyroid Disease Daughter      Breast Cancer No family hx of      Cancer - colorectal No family hx of         EXAM:Vitals: /67 (BP Location: Right arm, Patient Position: Chair, Cuff Size: Adult Large)  Pulse 64  Temp 97.9  F (36.6  C) (Tympanic)  Ht 5' 3\" (1.6 m)  Wt 178 lb (80.7 kg)  BMI 31.53 kg/m2  BMI= Body mass index is 31.53 kg/(m^2).    General appearance: Patient is alert and fully cooperative with history & exam.  No sign of distress is noted during the visit.     Psychiatric: Affect is pleasant & appropriate.  Patient appears motivated to improve health.     Respiratory: Breathing is regular & unlabored while sitting.     HEENT: Hearing is intact to spoken word.  Speech is clear.  No gross evidence " of visual impairment that would impact ambulation.     Dermatologic: Skin is intact to both feet without significant lesions, rash or abrasion.  No paronychia or evidence of soft tissue infection is noted.     Vascular: DP & PT pulses are intact & regular bilaterally.  No significant edema or varicosities noted.  CFT and skin temperature are normal to both lower extremities.     Neurologic: Lower extremity sensation is intact to light touch.  No evidence of weakness or contracture in the lower extremities.  No evidence of neuropathy.     Musculoskeletal: b/l pes planus with dorsal midfoot bony enlargement, mild pain to palpation.  Mild pain to palpation along the left medial ankle, course of the posterior tibial tendon.  Patient is ambulatory without assistive device or brace.  No gross ankle deformity noted.  No foot or ankle joint effusion is noted.    XRs of feet reviewed with pt.  Midfoot degenerative changes b/l, mostly along TMTJs.  No acute findings.     ASSESSMENT:   B/l osteoarthritis of foot  B/l pes planus  L medial ankle tendonitis (posterior tibial)     PLAN:  Reviewed patient's chart in epic.  Discussed condition and treatment options including pros and cons.    Discussed arthritis.  This is a progressive issue.      Treatment options for posterior tibial tendonitis/flatfoot were discussed.      Advised ALMAGUER, NSAIDs prn, stiff soled shoes, no barefoot walking.  Orthotics ordered.  Discussed injections for arthritis, pt deferred.  Discussed surgical fusion as a last resort.    F/u prn.    Handouts given.     Brice Tran DPM, FACFAS      Weight management plan: Patient was referred to their PCP to discuss a diet and exercise plan.     KEN Self MA August 15, 2017 8:46 AM      Again, thank you for allowing me to participate in the care of your patient.        Sincerely,        Brice Tran DPM     chart(s)

## 2022-02-15 NOTE — PROGRESS NOTES
[facility]    Hematology/Oncology Established Patient Note      Today's Date: 2/28/2022    Reason for follow-up: Right breast cancer.    HISTORY OF PRESENT ILLNESS: Yola Mayo is a 76 year old female who presents with the following oncologic history:  1. 3/24/2021: Mammogram showed distortion in right upper outer breast; left breast negative.  2. 3/31/2021: Right breast U/S at 9:00, 4 cm from nipple showed mass measuring 1.1 x 1 x 1.2 cm; no enlarged right axillary lymph nodes seen.  3. 4/07/2021: Right breast biopsy at 9:00 showed grade 2 invasive ductal carcinoma, ER positive at 100%, FL positive at 95%, HER-2/marek FISH negative.  4. 4/23/2021: Underwent right breast lumpectomy and sentinel lymph node excision under care of Dr. Ace Rogers. Pathology showed grade 2 invasive ductal carcinoma measuring 1.5 cm, low grade DCIS, LCIS, margins negative; 2 lymph nodes negative. Oncotype DX = 6; 9-year risk of distant recurrence = 3% after 5 years of hormone blockade therapy, < 1% absolute chemotherapy benefit.  5. 5/25/2021-6/16/2021: Completed adjuvant radiation therapy to right breast. Subsequently started anastrazole.    INTERIM HISTORY:  Dee Dee reports jaw clicking and developing periorbital erythema for past 4 months. She reports developing redness in her right breast a week ago that has improved.      REVIEW OF SYSTEMS:   14 point ROS was reviewed and is negative other than as noted above in HPI.       HOME MEDICATIONS:  Current Outpatient Medications   Medication Sig Dispense Refill     acetaminophen (TYLENOL) 325 MG tablet Take 2 tablets (650 mg) by mouth 3 times daily 100 tablet 0     amoxicillin (AMOXIL) 500 MG capsule Take 4 capsules half to one hour before the procedure. 4 capsule 3     anastrozole (ARIMIDEX) 1 MG tablet Take 1 tablet (1 mg) by mouth daily 90 tablet 3     aspirin 81 MG tablet Take 81 mg by mouth daily       atorvastatin (LIPITOR) 10 MG tablet Take 1 tablet (10 mg) by  mouth daily for cholestrol 90 tablet 3     blood glucose (CONTOUR NEXT TEST) test strip Use to test blood sugar 1 time daily or as directed. 100 each 2     blood glucose monitoring (NITO MICROLET) lancets Use to test blood sugar 1 time daily or as directed. 100 each 3     calcium-vitamin D (CALTRATE) 600-400 MG-UNIT per tablet Take 1 tablet by mouth daily 60 tablet 11     cholecalciferol (VITAMIN D) 1000 UNIT tablet Take 1 tablet (1,000 Units) by mouth daily 100 tablet 3     CONTOUR NEXT EZ (CONTOUR NEXT EZ W/DEVICE KIT) w/Device KIT 1 kit once 1 kit 0     Cyanocobalamin (VITAMIN B 12 PO) Take 1 tablet by mouth daily VIT B12 unknown otc dose       hydrALAZINE (APRESOLINE) 25 MG tablet Take 1 tablet (25 mg) by mouth 2 times daily 270 tablet 3     hydrochlorothiazide (HYDRODIURIL) 25 MG tablet Take 1 tablet (25 mg) by mouth daily for Blood Pressure 90 tablet 3     levothyroxine (SYNTHROID/LEVOTHROID) 112 MCG tablet Take 1 tablet (112 mcg) by mouth daily for thyroid 90 tablet 3     losartan (COZAAR) 100 MG tablet Take 1 tablet (100 mg) by mouth daily for Blood Pressure 90 tablet 3     metFORMIN (GLUCOPHAGE-XR) 500 MG 24 hr tablet Take 2 tablets (1,000 mg) by mouth daily (with dinner) 180 tablet 3     metroNIDAZOLE (METROGEL) 0.75 % external gel APPLY TO AFFECTED AREA TWICE A DAY 45 g 3     multivitamin, therapeutic with minerals (THERA-VIT-M) TABS Take 1 tablet by mouth daily       nitroGLYcerin (NITROSTAT) 0.4 MG sublingual tablet For chest pain place 1 tablet under the tongue every 5 minutes for 3 doses. If symptoms persist 5 minutes after 1st dose call 911. 25 tablet 3         ALLERGIES:  Allergies   Allergen Reactions     Toprol Xl [Metoprolol] Fatigue     Ace Inhibitors      ACE throat clearing     Oxycodone Nausea and Vomiting     Tetanus Toxoid Swelling     Hard red lump          PAST MEDICAL HISTORY:  Past Medical History:   Diagnosis Date     Anemia due to blood loss      BASAL CELL CANCER -NOSE       Dermatitis      Diabetes (H)      Family history of diabetes mellitus     Father     Family history of ischemic heart disease     Father     Family history of malignant neoplasm of ovary     Mother     HX DISRUPT L ANT CRUCIATE S/P REPAIR 2003      Hyperlipidemia      HYPERTENSION       HYPOTHYROIDISM      LOC PRIM OSTEOARTH-L 1ST METATARSAL-CUNEIFORM JOINT 3/10/2004     Major depressive disorder, single episode, mild (H) 11/24/2014     MILD-MOD TRICUSPID REGURG. MOD PULM HTN 6/23/2005    ECHO  5/05     Obese      PONV (postoperative nausea and vomiting)      Rosacea      Shingles      SPLENIC CYST          PAST SURGICAL HISTORY:  Past Surgical History:   Procedure Laterality Date     ARTHROPLASTY KNEE Left 4/13/2016    Procedure: ARTHROPLASTY KNEE;  Surgeon: Giuseppe Lopez MD;  Location:  OR     ARTHROPLASTY MINIMALLY INVASIVE HIP  4/18/2012    Procedure:ARTHROPLASTY MINIMALLY INVASIVE HIP; RIGHT TWO INCISION MINIMALLY INVASIVE HIP ARTHROPLASTY (YUVAL)^ (NO CELL SAVER) (C-ARM); Surgeon:GIUSEPPE LOPEZ; Location: OR     BIOPSY NODE SENTINEL Right 4/23/2021    Procedure: RIGHT SENTINEL LYMPH NODE BIOPSY;  Surgeon: Giuseppe Rogers MD;  Location:  OR     C CHEMOSURG MOHS 1ST STAGE  9-99    Mohs-nose     CARDIAC SURGERY  06/27/2019    mid and distal RCA stents     ENT SURGERY      mohs procedure on nose     HYSTERECTOMY, PAP NO LONGER INDICATED       LUMPECTOMY BREAST WITH SEED LOCALIZATION Right 4/23/2021    Procedure: SEED LOCALIZED RIGHT BREAST LUMPECTOMY;  Surgeon: Giuseppe Rogers MD;  Location:  OR     SURGICAL HISTORY OF -   [facility]-~1978    infertility, laparotomies     [facility] APPENDECTOMY  8/87    incidental appi at [facility] REPAIR CRUCIATE LIGAMENT,KNEE  8/2003    L   ACL reconstruction     [facility] TOTAL ABDOM HYSTERECTOMY  8/87    W/ BSO fibroids, hx PID     [facility] COLONOSCOPY THRU STOMA, DIAGNOSTIC  4/03    normal, rec repeat 10 yrs         SOCIAL HISTORY:  Social History  "    Socioeconomic History     Marital status:      Spouse name: FABIAN CUADRA     Number of children: 2     Years of education: Not on file     Highest education level: Not on file   Occupational History     Occupation: [employer]     Employer: [employer]   Tobacco Use     Smoking status: Never Smoker     Smokeless tobacco: Never Used   Substance and Sexual Activity     Alcohol use: Yes     Alcohol/week: 0.0 standard drinks     Comment: occasional     Drug use: No     Sexual activity: Yes     Partners: Male   Other Topics Concern      Service No     Blood Transfusions No     Caffeine Concern No     Occupational Exposure No     Hobby Hazards No     Sleep Concern No     Stress Concern No     Weight Concern Yes     Special Diet No     Back Care No     Exercise Yes     Comment: Approx 3 times a week for about 30 minutes     Bike Helmet Yes     Seat Belt Yes     Self-Exams Yes     Parent/sibling w/ CABG, MI or angioplasty before 65F 55M? Not Asked   Social History Narrative     Not on file     Social Determinants of Health     Financial Resource Strain: Not on file   Food Insecurity: Not on file   Transportation Needs: Not on file   Physical Activity: Not on file   Stress: Not on file   Social Connections: Not on file   Intimate Partner Violence: Not on file   Housing Stability: Not on file         FAMILY HISTORY:  Family History   Problem Relation Age of Onset     Cancer Mother         ovarian  of it  at 51yo.     Other Cancer Mother      Diabetes Father         Adult onset     Hypertension Father      C.A.D. Father         Fatal Heart attack at 70.     Cerebrovascular Disease Father      Thyroid Disease Sister      Thyroid Disease Sister      Thyroid Disease Sister      Cancer Daughter      Thyroid Disease Daughter      Breast Cancer No family hx of      Cancer - colorectal No family hx of          PHYSICAL EXAM:  Vital signs:  BP (!) 180/77   Pulse 81   Resp 16   Ht 1.626 m (5' 4\")   Wt 83.5 " kg (184 lb)   SpO2 99%   BMI 31.58 kg/m     GENERAL/CONSTITUTIONAL: No acute distress.  EYES: Bilateral periorbital erythema present.  No scleral icterus.  LYMPH: No cervical, supraclavicular, axillary adenopathy.   BREAST: No palpable masses in either breast. Left breast edema with mild erythema present. Nipples are everted bilaterally with no discharge. No erythema, ulceration.  RESPIRATORY: No audible cough or wheezing.   GASTROINTESTINAL: No hepatosplenomegaly, masses, or tenderness. No guarding.  No distention.  MUSCULOSKELETAL: Warm and well-perfused, no cyanosis, clubbing, or edema.  NEUROLOGIC: No focal motor deficits. Alert, oriented, answers questions appropriately.  INTEGUMENTARY: No rashes or jaundice.  GAIT: Steady, does not use assistive device    LABS:  CBC RESULTS: Recent Labs   Lab Test 03/19/21  0857   WBC 7.2   RBC 4.60   HGB 14.1   HCT 40.7   MCV 89   MCH 30.7   MCHC 34.6   RDW 14.6        Last Comprehensive Metabolic Panel:  Sodium   Date Value Ref Range Status   05/12/2021 137 133 - 144 mmol/L Final     Potassium   Date Value Ref Range Status   05/12/2021 3.9 3.4 - 5.3 mmol/L Final     Chloride   Date Value Ref Range Status   05/12/2021 103 94 - 109 mmol/L Final     Carbon Dioxide   Date Value Ref Range Status   05/12/2021 26 20 - 32 mmol/L Final     Anion Gap   Date Value Ref Range Status   05/12/2021 8 3 - 14 mmol/L Final     Glucose   Date Value Ref Range Status   05/12/2021 163 (H) 70 - 99 mg/dL Final     Urea Nitrogen   Date Value Ref Range Status   05/12/2021 17 7 - 30 mg/dL Final     Creatinine   Date Value Ref Range Status   05/12/2021 0.71 0.52 - 1.04 mg/dL Final     GFR Estimate   Date Value Ref Range Status   05/12/2021 82 >60 mL/min/[1.73_m2] Final     Comment:     Non  GFR Calc  Starting 12/18/2018, serum creatinine based estimated GFR (eGFR) will be   calculated using the Chronic Kidney Disease Epidemiology Collaboration   (CKD-EPI) equation.       Calcium    Date Value Ref Range Status   2021 9.4 8.5 - 10.1 mg/dL Final     Bilirubin Total   Date Value Ref Range Status   2021 0.5 0.2 - 1.3 mg/dL Final     Alkaline Phosphatase   Date Value Ref Range Status   2021 76 40 - 150 U/L Final     ALT   Date Value Ref Range Status   2021 79 (H) 0 - 50 U/L Final     AST   Date Value Ref Range Status   2021 48 (H) 0 - 45 U/L Final       PATHOLOGY:  None new.    IMAGIN2021: DEXA showed normal bone density.    ASSESSMENT/PLAN:  Yola Mayo is a 76 year old female with the following issues:  1. 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  --She is status post lumpectomy followed by adjuvant radiation therapy.  --She is now on anastrazole and tolerating this well with no significant side effects.  --Plan for total 5 years of hormone blockade therapy.  --Baseline DEXA scan from 2021 was normal.  I advised adequate calcium and vitamin D intake as well as weight bearing exercise.  --Plan to repeat DEXA in .  --Next mammogram scheduled for 3/28/2022.    2. Right breast lymphedema  --Does not appear to be cellulitis as symptoms improving without antibiotics.  She is afebrile.  --Will refer to lymphedema therapy.    3. Coronary artery disease  --Aromatase inhibitors can slightly increase cardiovascular risk but controllable with her cardiac medications.  --She will continue on aspirin, beta-blocker, and statin.     4. Diabetes mellitus type 2  --Well-controlled. She will continue metformin and has her A1C monitored by Dr. Braga.    5. Essential hypertension  --Discussed her blood pressure was high today.  She is revisiting with her cardiologist soon.  She will continue on losartan, hydrochlorothiazide, and hydralazine.    6. Bilateral periorbital  erythema  --I suggested she try Claritin as this may be allergy-related.  If no improvement, I recommended she follow-up on this with Dr. Braga.    7. Jaw clicking  --No associated headaches. No current TMJ symptoms.  --She already saw her dentist who found no new issues.  --Will monitor.     Return in 4-6 months.    Elida Grullon MD  Hematology/Oncology  [facility]    Total time spent: 30 minutes in patient evaluation, counseling, documentation, and coordination of care.

## 2022-02-28 ENCOUNTER — ONCOLOGY VISIT (OUTPATIENT)
Dept: ONCOLOGY | Facility: CLINIC | Age: 77
End: 2022-02-28
Attending: INTERNAL MEDICINE
Payer: MEDICARE

## 2022-02-28 VITALS
HEART RATE: 81 BPM | SYSTOLIC BLOOD PRESSURE: 180 MMHG | WEIGHT: 184 LBS | BODY MASS INDEX: 31.41 KG/M2 | RESPIRATION RATE: 16 BRPM | HEIGHT: 64 IN | DIASTOLIC BLOOD PRESSURE: 77 MMHG | OXYGEN SATURATION: 99 %

## 2022-02-28 DIAGNOSIS — L53.8 PERIORBITAL ERYTHEMA: ICD-10-CM

## 2022-02-28 DIAGNOSIS — I89.0 LYMPHEDEMA OF BREAST: ICD-10-CM

## 2022-02-28 DIAGNOSIS — Z17.0 MALIGNANT NEOPLASM OF UPPER-OUTER QUADRANT OF RIGHT BREAST IN FEMALE, ESTROGEN RECEPTOR POSITIVE (H): Primary | ICD-10-CM

## 2022-02-28 DIAGNOSIS — C50.911 INVASIVE DUCTAL CARCINOMA OF BREAST, FEMALE, RIGHT (H): ICD-10-CM

## 2022-02-28 DIAGNOSIS — Z79.811 AROMATASE INHIBITOR USE: ICD-10-CM

## 2022-02-28 DIAGNOSIS — C50.411 MALIGNANT NEOPLASM OF UPPER-OUTER QUADRANT OF RIGHT BREAST IN FEMALE, ESTROGEN RECEPTOR POSITIVE (H): Primary | ICD-10-CM

## 2022-02-28 PROCEDURE — 99214 OFFICE O/P EST MOD 30 MIN: CPT | Performed by: INTERNAL MEDICINE

## 2022-02-28 PROCEDURE — G0463 HOSPITAL OUTPT CLINIC VISIT: HCPCS

## 2022-02-28 ASSESSMENT — PAIN SCALES - GENERAL: PAINLEVEL: MILD PAIN (2)

## 2022-02-28 NOTE — PROGRESS NOTES
"Oncology Rooming Note    February 28, 2022 8:58 AM   Yola Mayo is a 76 year old female who presents for:    Chief Complaint   Patient presents with     Oncology Clinic Visit     Initial Vitals: There were no vitals taken for this visit. Estimated body mass index is 31.41 kg/m  as calculated from the following:    Height as of 9/30/21: 1.626 m (5' 4\").    Weight as of 9/30/21: 83 kg (183 lb). There is no height or weight on file to calculate BSA.  Data Unavailable Comment: Data Unavailable   No LMP recorded. Patient has had a hysterectomy.  Allergies reviewed: Yes  Medications reviewed: Yes    Medications: Medication refills not needed today.  Pharmacy name entered into [employer]: [id number] [place] [id number] [address] FROM [facility]    Clinical concerns:  doctor was notified.      Anila Durham MA            "

## 2022-02-28 NOTE — LETTER
2/28/2022         RE: Yola Mayo  [address] [facility] [id number]        Dear Colleague,    Thank you for referring your patient, Yola Mayo, to the [facility]. Please see a copy of my visit note below.    [facility]    Hematology/Oncology Established Patient Note      Today's Date: 2/28/2022    Reason for follow-up: Right breast cancer.    HISTORY OF PRESENT ILLNESS: Yola Mayo is a 76 year old female who presents with the following oncologic history:  1. 3/24/2021: Mammogram showed distortion in right upper outer breast; left breast negative.  2. 3/31/2021: Right breast U/S at 9:00, 4 cm from nipple showed mass measuring 1.1 x 1 x 1.2 cm; no enlarged right axillary lymph nodes seen.  3. 4/07/2021: Right breast biopsy at 9:00 showed grade 2 invasive ductal carcinoma, ER positive at 100%, VA positive at 95%, HER-2/marek FISH negative.  4. 4/23/2021: Underwent right breast lumpectomy and sentinel lymph node excision under care of Dr. Ace Rogers. Pathology showed grade 2 invasive ductal carcinoma measuring 1.5 cm, low grade DCIS, LCIS, margins negative; 2 lymph nodes negative. Oncotype DX = 6; 9-year risk of distant recurrence = 3% after 5 years of hormone blockade therapy, < 1% absolute chemotherapy benefit.  5. 5/25/2021-6/16/2021: Completed adjuvant radiation therapy to right breast. Subsequently started anastrazole.    INTERIM HISTORY:  Dee Dee reports jaw clicking and developing periorbital erythema for past 4 months. She reports developing redness in her right breast a week ago that has improved.      REVIEW OF SYSTEMS:   14 point ROS was reviewed and is negative other than as noted above in HPI.       HOME MEDICATIONS:  Current Outpatient Medications   Medication Sig Dispense Refill     acetaminophen (TYLENOL) 325 MG tablet Take 2 tablets (650 mg) by mouth 3 times daily 100 tablet 0     amoxicillin (AMOXIL) 500 MG capsule Take 4  capsules half to one hour before the procedure. 4 capsule 3     anastrozole (ARIMIDEX) 1 MG tablet Take 1 tablet (1 mg) by mouth daily 90 tablet 3     aspirin 81 MG tablet Take 81 mg by mouth daily       atorvastatin (LIPITOR) 10 MG tablet Take 1 tablet (10 mg) by mouth daily for cholestrol 90 tablet 3     blood glucose (CONTOUR NEXT TEST) test strip Use to test blood sugar 1 time daily or as directed. 100 each 2     blood glucose monitoring (NITO MICROLET) lancets Use to test blood sugar 1 time daily or as directed. 100 each 3     calcium-vitamin D (CALTRATE) 600-400 MG-UNIT per tablet Take 1 tablet by mouth daily 60 tablet 11     cholecalciferol (VITAMIN D) 1000 UNIT tablet Take 1 tablet (1,000 Units) by mouth daily 100 tablet 3     CONTOUR NEXT EZ (CONTOUR NEXT EZ W/DEVICE KIT) w/Device KIT 1 kit once 1 kit 0     Cyanocobalamin (VITAMIN B 12 PO) Take 1 tablet by mouth daily VIT B12 unknown otc dose       hydrALAZINE (APRESOLINE) 25 MG tablet Take 1 tablet (25 mg) by mouth 2 times daily 270 tablet 3     hydrochlorothiazide (HYDRODIURIL) 25 MG tablet Take 1 tablet (25 mg) by mouth daily for Blood Pressure 90 tablet 3     levothyroxine (SYNTHROID/LEVOTHROID) 112 MCG tablet Take 1 tablet (112 mcg) by mouth daily for thyroid 90 tablet 3     losartan (COZAAR) 100 MG tablet Take 1 tablet (100 mg) by mouth daily for Blood Pressure 90 tablet 3     metFORMIN (GLUCOPHAGE-XR) 500 MG 24 hr tablet Take 2 tablets (1,000 mg) by mouth daily (with dinner) 180 tablet 3     metroNIDAZOLE (METROGEL) 0.75 % external gel APPLY TO AFFECTED AREA TWICE A DAY 45 g 3     multivitamin, therapeutic with minerals (THERA-VIT-M) TABS Take 1 tablet by mouth daily       nitroGLYcerin (NITROSTAT) 0.4 MG sublingual tablet For chest pain place 1 tablet under the tongue every 5 minutes for 3 doses. If symptoms persist 5 minutes after 1st dose call 498. 78 tablet 3         ALLERGIES:  Allergies   Allergen Reactions     Toprol Xl [Metoprolol] Fatigue      Ace Inhibitors      ACE throat clearing     Oxycodone Nausea and Vomiting     Tetanus Toxoid Swelling     Hard red lump          PAST MEDICAL HISTORY:  Past Medical History:   Diagnosis Date     Anemia due to blood loss      BASAL CELL CANCER -NOSE      Dermatitis      Diabetes (H)      Family history of diabetes mellitus     Father     Family history of ischemic heart disease     Father     Family history of malignant neoplasm of ovary     Mother     HX DISRUPT L ANT CRUCIATE S/P REPAIR 2003      Hyperlipidemia      HYPERTENSION       HYPOTHYROIDISM      LOC PRIM OSTEOARTH-L 1ST METATARSAL-CUNEIFORM JOINT 3/10/2004     Major depressive disorder, single episode, mild (H) 11/24/2014     MILD-MOD TRICUSPID REGURG. MOD PULM HTN 6/23/2005    ECHO  5/05     Obese      PONV (postoperative nausea and vomiting)      Rosacea      Shingles      SPLENIC CYST          PAST SURGICAL HISTORY:  Past Surgical History:   Procedure Laterality Date     ARTHROPLASTY KNEE Left 4/13/2016    Procedure: ARTHROPLASTY KNEE;  Surgeon: Giuseppe Lopez MD;  Location:  OR     ARTHROPLASTY MINIMALLY INVASIVE HIP  4/18/2012    Procedure:ARTHROPLASTY MINIMALLY INVASIVE HIP; RIGHT TWO INCISION MINIMALLY INVASIVE HIP ARTHROPLASTY (YUVAL)^ (NO CELL SAVER) (C-ARM); Surgeon:GIUSEPPE LOPEZ; Location: OR     BIOPSY NODE SENTINEL Right 4/23/2021    Procedure: RIGHT SENTINEL LYMPH NODE BIOPSY;  Surgeon: Giuseppe Rogers MD;  Location:  OR     C CHEMOSURG MOHS 1ST STAGE  9-99    Mohs-nose     CARDIAC SURGERY  06/27/2019    mid and distal RCA stents     ENT SURGERY      mohs procedure on nose     HYSTERECTOMY, PAP NO LONGER INDICATED       LUMPECTOMY BREAST WITH SEED LOCALIZATION Right 4/23/2021    Procedure: SEED LOCALIZED RIGHT BREAST LUMPECTOMY;  Surgeon: Giuseppe Rogers MD;  Location:  OR     SURGICAL HISTORY OF -   Mult-~1978    infertility, laparotomies     [facility] APPENDECTOMY  8/87    incidental appi at [facility]  REPAIR CRUCIATE LIGAMENT,KNEE  2003    L   ACL reconstruction     ZZC TOTAL ABDOM HYSTERECTOMY      W/ BSO fibroids, hx PID     ZZHC COLONOSCOPY THRU STOMA, DIAGNOSTIC      normal, rec repeat 10 yrs         SOCIAL HISTORY:  Social History     Socioeconomic History     Marital status:      Spouse name: FABIAN CUADRA     Number of children: 2     Years of education: Not on file     Highest education level: Not on file   Occupational History     Occupation: [employer]     Employer: [employer]   Tobacco Use     Smoking status: Never Smoker     Smokeless tobacco: Never Used   Substance and Sexual Activity     Alcohol use: Yes     Alcohol/week: 0.0 standard drinks     Comment: occasional     Drug use: No     Sexual activity: Yes     Partners: Male   Other Topics Concern      Service No     Blood Transfusions No     Caffeine Concern No     Occupational Exposure No     Hobby Hazards No     Sleep Concern No     Stress Concern No     Weight Concern Yes     Special Diet No     Back Care No     Exercise Yes     Comment: Approx 3 times a week for about 30 minutes     Bike Helmet Yes     Seat Belt Yes     Self-Exams Yes     Parent/sibling w/ CABG, MI or angioplasty before 65F 55M? Not Asked   Social History Narrative     Not on file     Social Determinants of Health     Financial Resource Strain: Not on file   Food Insecurity: Not on file   Transportation Needs: Not on file   Physical Activity: Not on file   Stress: Not on file   Social Connections: Not on file   Intimate Partner Violence: Not on file   Housing Stability: Not on file         FAMILY HISTORY:  Family History   Problem Relation Age of Onset     Cancer Mother         ovarian  of it  at 51yo.     Other Cancer Mother      Diabetes Father         Adult onset     Hypertension Father      C.A.D. Father         Fatal Heart attack at 70.     Cerebrovascular Disease Father      Thyroid Disease Sister      Thyroid Disease Sister      Thyroid  "Disease Sister      Cancer Daughter      Thyroid Disease Daughter      Breast Cancer No family hx of      Cancer - colorectal No family hx of          PHYSICAL EXAM:  Vital signs:  BP (!) 180/77   Pulse 81   Resp 16   Ht 1.626 m (5' 4\")   Wt 83.5 kg (184 lb)   SpO2 99%   BMI 31.58 kg/m     GENERAL/CONSTITUTIONAL: No acute distress.  EYES: Bilateral periorbital erythema present.  No scleral icterus.  LYMPH: No cervical, supraclavicular, axillary adenopathy.   BREAST: No palpable masses in either breast. Left breast edema with mild erythema present. Nipples are everted bilaterally with no discharge. No erythema, ulceration.  RESPIRATORY: No audible cough or wheezing.   GASTROINTESTINAL: No hepatosplenomegaly, masses, or tenderness. No guarding.  No distention.  MUSCULOSKELETAL: Warm and well-perfused, no cyanosis, clubbing, or edema.  NEUROLOGIC: No focal motor deficits. Alert, oriented, answers questions appropriately.  INTEGUMENTARY: No rashes or jaundice.  GAIT: Steady, does not use assistive device    LABS:  CBC RESULTS: Recent Labs   Lab Test 03/19/21  0857   WBC 7.2   RBC 4.60   HGB 14.1   HCT 40.7   MCV 89   MCH 30.7   MCHC 34.6   RDW 14.6        Last Comprehensive Metabolic Panel:  Sodium   Date Value Ref Range Status   05/12/2021 137 133 - 144 mmol/L Final     Potassium   Date Value Ref Range Status   05/12/2021 3.9 3.4 - 5.3 mmol/L Final     Chloride   Date Value Ref Range Status   05/12/2021 103 94 - 109 mmol/L Final     Carbon Dioxide   Date Value Ref Range Status   05/12/2021 26 20 - 32 mmol/L Final     Anion Gap   Date Value Ref Range Status   05/12/2021 8 3 - 14 mmol/L Final     Glucose   Date Value Ref Range Status   05/12/2021 163 (H) 70 - 99 mg/dL Final     Urea Nitrogen   Date Value Ref Range Status   05/12/2021 17 7 - 30 mg/dL Final     Creatinine   Date Value Ref Range Status   05/12/2021 0.71 0.52 - 1.04 mg/dL Final     GFR Estimate   Date Value Ref Range Status   05/12/2021 82 >60 " mL/min/[1.73_m2] Final     Comment:     Non  GFR Calc  Starting 2018, serum creatinine based estimated GFR (eGFR) will be   calculated using the Chronic Kidney Disease Epidemiology Collaboration   (CKD-EPI) equation.       Calcium   Date Value Ref Range Status   2021 9.4 8.5 - 10.1 mg/dL Final     Bilirubin Total   Date Value Ref Range Status   2021 0.5 0.2 - 1.3 mg/dL Final     Alkaline Phosphatase   Date Value Ref Range Status   2021 76 40 - 150 U/L Final     ALT   Date Value Ref Range Status   2021 79 (H) 0 - 50 U/L Final     AST   Date Value Ref Range Status   2021 48 (H) 0 - 45 U/L Final       PATHOLOGY:  None new.    IMAGIN2021: DEXA showed normal bone density.    ASSESSMENT/PLAN:  Yola Mayo is a 76 year old female with the following issues:  1. 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  --She is status post lumpectomy followed by adjuvant radiation therapy.  --She is now on anastrazole and tolerating this well with no significant side effects.  --Plan for total 5 years of hormone blockade therapy.  --Baseline DEXA scan from 2021 was normal.  I advised adequate calcium and vitamin D intake as well as weight bearing exercise.  --Plan to repeat DEXA in .  --Next mammogram scheduled for 3/28/2022.    2. Right breast lymphedema  --Does not appear to be cellulitis as symptoms improving without antibiotics.  She is afebrile.  --Will refer to lymphedema therapy.    3. Coronary artery disease  --Aromatase inhibitors can slightly increase cardiovascular risk but controllable with her cardiac medications.  --She will continue on aspirin, beta-blocker, and statin.     4. Diabetes mellitus type 2  --Well-controlled. She will continue metformin and has her A1C  "monitored by Dr. Braga.    5. Essential hypertension  --Discussed her blood pressure was high today.  She is revisiting with her cardiologist soon.  She will continue on losartan, hydrochlorothiazide, and hydralazine.    6. Bilateral periorbital erythema  --I suggested she try Claritin as this may be allergy-related.  If no improvement, I recommended she follow-up on this with Dr. Braga.    7. Jaw clicking  --No associated headaches. No current TMJ symptoms.  --She already saw her dentist who found no new issues.  --Will monitor.     Return in 4-6 months.    Elida Grullon MD  Hematology/Oncology  [facility]    Total time spent: 30 minutes in patient evaluation, counseling, documentation, and coordination of care.      Oncology Rooming Note    February 28, 2022 8:58 AM   Yola Mayo is a 76 year old female who presents for:    Chief Complaint   Patient presents with     Oncology Clinic Visit     Initial Vitals: There were no vitals taken for this visit. Estimated body mass index is 31.41 kg/m  as calculated from the following:    Height as of 9/30/21: 1.626 m (5' 4\").    Weight as of 9/30/21: 83 kg (183 lb). There is no height or weight on file to calculate BSA.  Data Unavailable Comment: Data Unavailable   No LMP recorded. Patient has had a hysterectomy.  Allergies reviewed: Yes  Medications reviewed: Yes    Medications: Medication refills not needed today.  Pharmacy name entered into [employer]: [id number] [place] [address]    Clinical concerns:  doctor was notified.      Anila Durham MA                Again, thank you for allowing me to participate in the care of your patient.        Sincerely,        Elida Grullon MD    "

## 2022-03-21 ENCOUNTER — OFFICE VISIT (OUTPATIENT)
Dept: CARDIOLOGY | Facility: CLINIC | Age: 77
End: 2022-03-21
Attending: INTERNAL MEDICINE
Payer: MEDICARE

## 2022-03-21 VITALS
WEIGHT: 184.3 LBS | DIASTOLIC BLOOD PRESSURE: 83 MMHG | SYSTOLIC BLOOD PRESSURE: 147 MMHG | BODY MASS INDEX: 31.47 KG/M2 | HEART RATE: 71 BPM | HEIGHT: 64 IN | OXYGEN SATURATION: 98 %

## 2022-03-21 DIAGNOSIS — I25.10 CORONARY ARTERY DISEASE INVOLVING NATIVE CORONARY ARTERY OF NATIVE HEART WITHOUT ANGINA PECTORIS: ICD-10-CM

## 2022-03-21 DIAGNOSIS — R53.83 FATIGUE, UNSPECIFIED TYPE: ICD-10-CM

## 2022-03-21 DIAGNOSIS — R06.09 DOE (DYSPNEA ON EXERTION): ICD-10-CM

## 2022-03-21 DIAGNOSIS — I10 BENIGN HYPERTENSION: ICD-10-CM

## 2022-03-21 DIAGNOSIS — I10 ESSENTIAL HYPERTENSION, BENIGN: ICD-10-CM

## 2022-03-21 PROCEDURE — 99213 OFFICE O/P EST LOW 20 MIN: CPT | Performed by: NURSE PRACTITIONER

## 2022-03-21 RX ORDER — HYDRALAZINE HYDROCHLORIDE 25 MG/1
TABLET, FILM COATED ORAL
Qty: 270 TABLET | Refills: 3 | Status: SHIPPED | OUTPATIENT
Start: 2022-03-21 | End: 2022-04-07

## 2022-03-21 NOTE — PROGRESS NOTES
Cardiology Clinic Progress Note  Yola Mayo MRN# [id number]   YOB: 1945 Age: 76 year old     Primary cardiologist: Dr. Howard    Reason for visit: Follow up shortness of breath    History of presenting illness:    Yola Mayo, a pleasant 76 year old patient who has a past medical history of coronary artery disease status post PCI to the mid and distal RCA 6/2018, hypertension, hyperlipidemia, very strong family history of premature coronary artery disease, and diabetes.       Due to symptoms of exertional angina after 1 block relieved with rest, she underwent a stress echocardiogram that was abnormal. Subsequently, a coronary angiogram revealed a 50-60% narrowing of the proximal LAD with 100% occlusion of the first diagonal above the LAD lesion.  The left circumflex had a 20-30% proximal narrowing.  The right coronary artery had a 60-70% narrowing in the proximal portion and a 95% narrowing in the midportion at the takeoff of the RV branch which is 95% narrowed.  She received 2 drug-eluting stents that were back to back to the right coronary artery with resolution of her symptoms.     Dee Dee was been diagnosed with right-sided grade 2 invasive ductal carcinoma in March of 2021. She is now s/p right breast lumpectomy and sentinel lymph node excision by Dr. Rogers and underwent radiation and is now on anastrozole.  She is planned to follow up with Dr. Grullon in 6 months. Her BP was elevated at her visit.     She was evaluated by Dr. Howard in September with concerns of GALINDO and Toprol was discontinued as she was bradycardic with concerns for chronotropic incompetence.     Today she report ongoing GALINDO despite stopping metoprolol. She specifically gets short of breath with walking upstairs or an incline. She reports that her home BP has also been elevated in the 140's            Assessment and Plan:     ASSESSMENT:    1. Coronary artery disease    Status post 2 drug eluting stents to the mid and distal  "RCA 6/27/2019    Residual 50-60% mid LAD lesion with 100% occlusion of the first diagonal    Recurrent GALINDO specifically when walking up stair or an incline      2. Hyperlipidemia, LDL goal<70    Most recent LDL was 42 3/19/21    Atorvastatin 10 mg daily     3. Hypertension, goal less than 140/90    Elevated today at on home readings    Losartan to 100 mg daily and hydrochlorthiazide 25 mg daily hydralazine 25 mg BID    PLAN:     1. Increase hydralazine to 50 mg in am and 25 mg in pm  2. Stress echocardiogram  3. Follow up to review stress test and recheck BP in 1 month       Today's clinic visit entailed:  Review of the result(s) of each unique test - Stress test, Echo, Cath, FLP, BMP  Ordering of each unique test  Prescription drug management  15 minutes spent on the date of the encounter doing chart review, history and exam, documentation and further activities per the note  Provider  Link to [employer] Help Grid     The level of medical decision making during this visit was of moderate complexity.           Review of Systems:     Review of Systems:  Skin:  Negative bruising   Eyes:  Positive for glasses  ENT:  Negative    Respiratory:  Positive for dyspnea on exertion  Cardiovascular:  Negative;palpitations;chest pain;lightheadedness;dizziness Positive for;fatigue  Gastroenterology: Negative heartburn;reflux  Genitourinary:  Positive for urinary frequency;nocturia  Musculoskeletal:  Positive for arthritis  Neurologic:  Negative headaches  Psychiatric:  Positive for sleep disturbances  Heme/Lymph/Imm:  Negative allergies  Endocrine:  Positive for thyroid disorder;diabetes            Physical Exam:     hysical Exam:  Vitals: BP (!) 147/83   Pulse 71   Ht 1.626 m (5' 4\")   Wt 83.6 kg (184 lb 4.8 oz)   SpO2 98%   BMI 31.64 kg/m      Constitutional:  cooperative, alert and oriented, well developed, well nourished, in no acute distress        Skin:  warm and dry to the touch, no apparent skin lesions or masses noted    "     Head:  normocephalic, no masses or lesions        Eyes:  pupils equal and round;conjunctivae and lids unremarkable        ENT:  no pallor or cyanosis        Neck:  not assessed this visit   Large V wave consistent with tricuspid regurgitation    Chest:  normal symmetry;clear to auscultation        Cardiac: regular rhythm occasional premature beats       grade 1;early systolic murmur;RUSB        Abdomen:  not assessed this visit   benign        Extremities and Back:  no edema   Right femoral access site CDI without bruit. No hematoma with mild bruising    Neurological:  no gross motor deficits;affect appropriate               Medications:     Current Outpatient Medications   Medication Sig Dispense Refill     acetaminophen (TYLENOL) 325 MG tablet Take 2 tablets (650 mg) by mouth 3 times daily 100 tablet 0     amoxicillin (AMOXIL) 500 MG capsule Take 4 capsules half to one hour before the procedure. 4 capsule 3     anastrozole (ARIMIDEX) 1 MG tablet Take 1 tablet (1 mg) by mouth daily 90 tablet 3     aspirin 81 MG tablet Take 81 mg by mouth daily       atorvastatin (LIPITOR) 10 MG tablet Take 1 tablet (10 mg) by mouth daily for cholestrol 90 tablet 3     blood glucose (CONTOUR NEXT TEST) test strip Use to test blood sugar 1 time daily or as directed. 100 each 2     blood glucose monitoring (NITO MICROLET) lancets Use to test blood sugar 1 time daily or as directed. 100 each 3     calcium-vitamin D (CALTRATE) 600-400 MG-UNIT per tablet Take 1 tablet by mouth daily 60 tablet 11     cholecalciferol (VITAMIN D) 1000 UNIT tablet Take 1 tablet (1,000 Units) by mouth daily 100 tablet 3     CONTOUR NEXT EZ (CONTOUR NEXT EZ W/DEVICE KIT) w/Device KIT 1 kit once 1 kit 0     Cyanocobalamin (VITAMIN B 12 PO) Take 1 tablet by mouth daily VIT B12 unknown otc dose       hydrALAZINE (APRESOLINE) 25 MG tablet Take 50 mg in am and 25 mg in pm 270 tablet 3     hydrochlorothiazide (HYDRODIURIL) 25 MG tablet Take 1 tablet (25 mg) by  mouth daily for Blood Pressure 90 tablet 3     levothyroxine (SYNTHROID/LEVOTHROID) 112 MCG tablet Take 1 tablet (112 mcg) by mouth daily for thyroid 90 tablet 3     losartan (COZAAR) 100 MG tablet Take 1 tablet (100 mg) by mouth daily for Blood Pressure 90 tablet 3     metFORMIN (GLUCOPHAGE-XR) 500 MG 24 hr tablet TAKE 2 TABLETS (1,000 MG) BY MOUTH DAILY (WITH DINNER) 180 tablet 3     metroNIDAZOLE (METROGEL) 0.75 % external gel APPLY TO AFFECTED AREA TWICE A DAY 45 g 3     multivitamin, therapeutic with minerals (THERA-VIT-M) TABS Take 1 tablet by mouth daily       nitroGLYcerin (NITROSTAT) 0.4 MG sublingual tablet For chest pain place 1 tablet under the tongue every 5 minutes for 3 doses. If symptoms persist 5 minutes after 1st dose call 911. 25 tablet 3       Family History   Problem Relation Age of Onset     Cancer Mother         ovarian  of it  at 49yo.     Other Cancer Mother      Diabetes Father         Adult onset     Hypertension Father      C.A.D. Father         Fatal Heart attack at 70.     Cerebrovascular Disease Father      Thyroid Disease Sister      Thyroid Disease Sister      Thyroid Disease Sister      Cancer Daughter      Thyroid Disease Daughter      Breast Cancer No family hx of      Cancer - colorectal No family hx of        Social History     Socioeconomic History     Marital status:      Spouse name: FABIAN CUADRA     Number of children: 2     Years of education: Not on file     Highest education level: Not on file   Occupational History     Occupation: [employer]     Employer: [employer]   Tobacco Use     Smoking status: Never Smoker     Smokeless tobacco: Never Used   Substance and Sexual Activity     Alcohol use: Yes     Alcohol/week: 0.0 standard drinks     Comment: occasional     Drug use: No     Sexual activity: Yes     Partners: Male   Other Topics Concern      Service No     Blood Transfusions No     Caffeine Concern No     Occupational Exposure No     Hobby  Hazards No     Sleep Concern No     Stress Concern No     Weight Concern Yes     Special Diet No     Back Care No     Exercise Yes     Comment: Approx 3 times a week for about 30 minutes     Bike Helmet Yes     Seat Belt Yes     Self-Exams Yes     Parent/sibling w/ CABG, MI or angioplasty before 65F 55M? Not Asked   Social History Narrative     Not on file     Social Determinants of Health     Financial Resource Strain: Not on file   Food Insecurity: Not on file   Transportation Needs: Not on file   Physical Activity: Not on file   Stress: Not on file   Social Connections: Not on file   Intimate Partner Violence: Not on file   Housing Stability: Not on file            Past Medical History:     Past Medical History:   Diagnosis Date     Anemia due to blood loss      BASAL CELL CANCER -NOSE      Dermatitis      Diabetes (H)      Family history of diabetes mellitus     Father     Family history of ischemic heart disease     Father     Family history of malignant neoplasm of ovary     Mother     HX DISRUPT L ANT CRUCIATE S/P REPAIR 2003      Hyperlipidemia      HYPERTENSION       HYPOTHYROIDISM      LOC PRIM OSTEOARTH-L 1ST METATARSAL-CUNEIFORM JOINT 3/10/2004     Major depressive disorder, single episode, mild (H) 11/24/2014     MILD-MOD TRICUSPID REGURG. MOD PULM HTN 6/23/2005    ECHO  5/05     Obese      PONV (postoperative nausea and vomiting)      Rosacea      Shingles      SPLENIC CYST               Past Surgical History:     Past Surgical History:   Procedure Laterality Date     ARTHROPLASTY KNEE Left 4/13/2016    Procedure: ARTHROPLASTY KNEE;  Surgeon: Giuseppe Lopez MD;  Location:  OR     ARTHROPLASTY MINIMALLY INVASIVE HIP  4/18/2012    Procedure:ARTHROPLASTY MINIMALLY INVASIVE HIP; RIGHT TWO INCISION MINIMALLY INVASIVE HIP ARTHROPLASTY (YUVAL)^ (NO CELL SAVER) (C-ARM); Surgeon:GIUSEPPE LOPEZ; Location: OR     BIOPSY NODE SENTINEL Right 4/23/2021    Procedure: RIGHT SENTINEL LYMPH NODE BIOPSY;   Surgeon: Giuseppe Rogers MD;  Location: [facility] OR     [facility]  9-99    Mohs-nose     CARDIAC SURGERY  06/27/2019    mid and distal RCA stents     ENT SURGERY      mohs procedure on nose     HYSTERECTOMY, PAP NO LONGER INDICATED       LUMPECTOMY BREAST WITH SEED LOCALIZATION Right 4/23/2021    Procedure: SEED LOCALIZED RIGHT BREAST LUMPECTOMY;  Surgeon: Giuseppe Rogers MD;  Location:  OR     SURGICAL HISTORY OF -   Mult-~1978    infertility, laparotomies     [facility] APPENDECTOMY  8/87    incidental appi at [facility] REPAIR CRUCIATE LIGAMENT,KNEE  8/2003    L   ACL reconstruction     [facility] TOTAL ABDOM HYSTERECTOMY  8/87    W/ BSO fibroids, hx PID     [facility] COLONOSCOPY THRU STOMA, DIAGNOSTIC  4/03    normal, rec repeat 10 yrs              Allergies:   Toprol xl [metoprolol], Ace inhibitors, Oxycodone, and Tetanus toxoid       Data:   All laboratory data reviewed:    Recent Labs   Lab Test 09/30/21  1020 03/19/21  0857 06/17/20  0815 05/07/19  0911   LDL  --  42 31 36   HDL  --  80 80 75   NHDL  --  55 41 45   CHOL  --  135 121 120   TRIG  --  67 49 45   TSH 1.02 1.93 1.37 0.94       Lab Results   Component Value Date    WBC 7.2 03/19/2021    RBC 4.60 03/19/2021    HGB 14.1 03/19/2021    HCT 40.7 03/19/2021    MCV 89 03/19/2021    MCH 30.7 03/19/2021    MCHC 34.6 03/19/2021    RDW 14.6 03/19/2021     03/19/2021       Lab Results   Component Value Date     05/12/2021    POTASSIUM 3.9 05/12/2021    CHLORIDE 103 05/12/2021    CO2 26 05/12/2021    ANIONGAP 8 05/12/2021     (H) 05/12/2021    BUN 17 05/12/2021    CR 0.71 05/12/2021    GFRESTIMATED 82 05/12/2021    GFRESTBLACK >90 05/12/2021    JOSEPH 9.4 05/12/2021      Lab Results   Component Value Date    AST 48 (H) 05/12/2021    ALT 79 (H) 05/12/2021       Lab Results   Component Value Date    A1C 6.0 (H) 03/19/2021       Lab Results   Component Value Date    INR 0.98 06/27/2018         FATMATA PEREIRA [facility]  Pager: [phone]  RN phone: [phone]

## 2022-03-21 NOTE — PATIENT INSTRUCTIONS
Today's Recommendations    1. Increase hydralazine to 50 mg in am and continue 25 mg in pm  2. Stress echo  3. Continue all other medications without changes.  4. Please follow up with Krystal after the stress test.    Please send a [employer] message or call [phone] to the RN team with questions or concerns.     Scheduling number [phone]    FATMATA Shelton, CNP

## 2022-03-21 NOTE — LETTER
3/21/2022    Zoraida Braga MD  [address]  [place]                [place] [address]    RE: Yola Mayo       Dear Colleague,     I had the pleasure of seeing Yola Mayo in the [facility].    Cardiology Clinic Progress Note  Yola Mayo MRN# [id number]   YOB: 1945 Age: 76 year old     Primary cardiologist: Dr. Howard    Reason for visit: Follow up shortness of breath    History of presenting illness:    Yola Mayo, a pleasant 76 year old patient who has a past medical history of coronary artery disease status post PCI to the mid and distal RCA 6/2018, hypertension, hyperlipidemia, very strong family history of premature coronary artery disease, and diabetes.       Due to symptoms of exertional angina after 1 block relieved with rest, she underwent a stress echocardiogram that was abnormal. Subsequently, a coronary angiogram revealed a 50-60% narrowing of the proximal LAD with 100% occlusion of the first diagonal above the LAD lesion.  The left circumflex had a 20-30% proximal narrowing.  The right coronary artery had a 60-70% narrowing in the proximal portion and a 95% narrowing in the midportion at the takeoff of the RV branch which is 95% narrowed.  She received 2 drug-eluting stents that were back to back to the right coronary artery with resolution of her symptoms.     Dee Dee was been diagnosed with right-sided grade 2 invasive ductal carcinoma in March of 2021. She is now s/p right breast lumpectomy and sentinel lymph node excision by Dr. Rogers and underwent radiation and is now on anastrozole.  She is planned to follow up with Dr. Grullon in 6 months. Her BP was elevated at her visit.     She was evaluated by Dr. Howard in September with concerns of GALINDO and Toprol was discontinued as she was bradycardic with concerns for chronotropic incompetence.     Today she report ongoing GALINDO despite stopping metoprolol. She specifically gets short of breath with walking  "upstairs or an incline. She reports that her home BP has also been elevated in the 140's            Assessment and Plan:     ASSESSMENT:    1. Coronary artery disease    Status post 2 drug eluting stents to the mid and distal RCA 6/27/2019    Residual 50-60% mid LAD lesion with 100% occlusion of the first diagonal    Recurrent GALINDO specifically when walking up stair or an incline      2. Hyperlipidemia, LDL goal<70    Most recent LDL was 42 3/19/21    Atorvastatin 10 mg daily     3. Hypertension, goal less than 140/90    Elevated today at on home readings    Losartan to 100 mg daily and hydrochlorthiazide 25 mg daily hydralazine 25 mg BID    PLAN:     1. Increase hydralazine to 50 mg in am and 25 mg in pm  2. Stress echocardiogram  3. Follow up to review stress test and recheck BP in 1 month       Today's clinic visit entailed:  Review of the result(s) of each unique test - Stress test, Echo, Cath, FLP, BMP  Ordering of each unique test  Prescription drug management  15 minutes spent on the date of the encounter doing chart review, history and exam, documentation and further activities per the note  Provider  Link to [employer] Help Grid     The level of medical decision making during this visit was of moderate complexity.           Review of Systems:     Review of Systems:  Skin:  Negative bruising   Eyes:  Positive for glasses  ENT:  Negative    Respiratory:  Positive for dyspnea on exertion  Cardiovascular:  Negative;palpitations;chest pain;lightheadedness;dizziness Positive for;fatigue  Gastroenterology: Negative heartburn;reflux  Genitourinary:  Positive for urinary frequency;nocturia  Musculoskeletal:  Positive for arthritis  Neurologic:  Negative headaches  Psychiatric:  Positive for sleep disturbances  Heme/Lymph/Imm:  Negative allergies  Endocrine:  Positive for thyroid disorder;diabetes            Physical Exam:     hysical Exam:  Vitals: BP (!) 147/83   Pulse 71   Ht 1.626 m (5' 4\")   Wt 83.6 kg (184 lb 4.8 " oz)   SpO2 98%   BMI 31.64 kg/m      Constitutional:  cooperative, alert and oriented, well developed, well nourished, in no acute distress        Skin:  warm and dry to the touch, no apparent skin lesions or masses noted        Head:  normocephalic, no masses or lesions        Eyes:  pupils equal and round;conjunctivae and lids unremarkable        ENT:  no pallor or cyanosis        Neck:  not assessed this visit   Large V wave consistent with tricuspid regurgitation    Chest:  normal symmetry;clear to auscultation        Cardiac: regular rhythm occasional premature beats       grade 1;early systolic murmur;RUSB        Abdomen:  not assessed this visit   benign        Extremities and Back:  no edema   Right femoral access site CDI without bruit. No hematoma with mild bruising    Neurological:  no gross motor deficits;affect appropriate               Medications:     Current Outpatient Medications   Medication Sig Dispense Refill     acetaminophen (TYLENOL) 325 MG tablet Take 2 tablets (650 mg) by mouth 3 times daily 100 tablet 0     amoxicillin (AMOXIL) 500 MG capsule Take 4 capsules half to one hour before the procedure. 4 capsule 3     anastrozole (ARIMIDEX) 1 MG tablet Take 1 tablet (1 mg) by mouth daily 90 tablet 3     aspirin 81 MG tablet Take 81 mg by mouth daily       atorvastatin (LIPITOR) 10 MG tablet Take 1 tablet (10 mg) by mouth daily for cholestrol 90 tablet 3     blood glucose (CONTOUR NEXT TEST) test strip Use to test blood sugar 1 time daily or as directed. 100 each 2     blood glucose monitoring (NITO MICROLET) lancets Use to test blood sugar 1 time daily or as directed. 100 each 3     calcium-vitamin D (CALTRATE) 600-400 MG-UNIT per tablet Take 1 tablet by mouth daily 60 tablet 11     cholecalciferol (VITAMIN D) 1000 UNIT tablet Take 1 tablet (1,000 Units) by mouth daily 100 tablet 3     CONTOUR NEXT EZ (CONTOUR NEXT EZ W/DEVICE KIT) w/Device KIT 1 kit once 1 kit 0     Cyanocobalamin (VITAMIN B  12 PO) Take 1 tablet by mouth daily VIT B12 unknown otc dose       hydrALAZINE (APRESOLINE) 25 MG tablet Take 50 mg in am and 25 mg in pm 270 tablet 3     hydrochlorothiazide (HYDRODIURIL) 25 MG tablet Take 1 tablet (25 mg) by mouth daily for Blood Pressure 90 tablet 3     levothyroxine (SYNTHROID/LEVOTHROID) 112 MCG tablet Take 1 tablet (112 mcg) by mouth daily for thyroid 90 tablet 3     losartan (COZAAR) 100 MG tablet Take 1 tablet (100 mg) by mouth daily for Blood Pressure 90 tablet 3     metFORMIN (GLUCOPHAGE-XR) 500 MG 24 hr tablet TAKE 2 TABLETS (1,000 MG) BY MOUTH DAILY (WITH DINNER) 180 tablet 3     metroNIDAZOLE (METROGEL) 0.75 % external gel APPLY TO AFFECTED AREA TWICE A DAY 45 g 3     multivitamin, therapeutic with minerals (THERA-VIT-M) TABS Take 1 tablet by mouth daily       nitroGLYcerin (NITROSTAT) 0.4 MG sublingual tablet For chest pain place 1 tablet under the tongue every 5 minutes for 3 doses. If symptoms persist 5 minutes after 1st dose call 911. 25 tablet 3       Family History   Problem Relation Age of Onset     Cancer Mother         ovarian  of it  at 49yo.     Other Cancer Mother      Diabetes Father         Adult onset     Hypertension Father      C.A.D. Father         Fatal Heart attack at 70.     Cerebrovascular Disease Father      Thyroid Disease Sister      Thyroid Disease Sister      Thyroid Disease Sister      Cancer Daughter      Thyroid Disease Daughter      Breast Cancer No family hx of      Cancer - colorectal No family hx of        Social History     Socioeconomic History     Marital status:      Spouse name: FABIAN CUADRA     Number of children: 2     Years of education: Not on file     Highest education level: Not on file   Occupational History     Occupation: [employer]     Employer: [employer]   Tobacco Use     Smoking status: Never Smoker     Smokeless tobacco: Never Used   Substance and Sexual Activity     Alcohol use: Yes     Alcohol/week: 0.0 standard  drinks     Comment: occasional     Drug use: No     Sexual activity: Yes     Partners: Male   Other Topics Concern      Service No     Blood Transfusions No     Caffeine Concern No     Occupational Exposure No     Hobby Hazards No     Sleep Concern No     Stress Concern No     Weight Concern Yes     Special Diet No     Back Care No     Exercise Yes     Comment: Approx 3 times a week for about 30 minutes     Bike Helmet Yes     Seat Belt Yes     Self-Exams Yes     Parent/sibling w/ CABG, MI or angioplasty before 65F 55M? Not Asked   Social History Narrative     Not on file     Social Determinants of Health     Financial Resource Strain: Not on file   Food Insecurity: Not on file   Transportation Needs: Not on file   Physical Activity: Not on file   Stress: Not on file   Social Connections: Not on file   Intimate Partner Violence: Not on file   Housing Stability: Not on file            Past Medical History:     Past Medical History:   Diagnosis Date     Anemia due to blood loss      BASAL CELL CANCER -NOSE      Dermatitis      Diabetes (H)      Family history of diabetes mellitus     Father     Family history of ischemic heart disease     Father     Family history of malignant neoplasm of ovary     Mother     HX DISRUPT L ANT CRUCIATE S/P REPAIR 2003      Hyperlipidemia      HYPERTENSION       HYPOTHYROIDISM      LOC PRIM OSTEOARTH-L 1ST METATARSAL-CUNEIFORM JOINT 3/10/2004     Major depressive disorder, single episode, mild (H) 11/24/2014     MILD-MOD TRICUSPID REGURG. MOD PULM HTN 6/23/2005    ECHO  5/05     Obese      PONV (postoperative nausea and vomiting)      Rosacea      Shingles      SPLENIC CYST               Past Surgical History:     Past Surgical History:   Procedure Laterality Date     ARTHROPLASTY KNEE Left 4/13/2016    Procedure: ARTHROPLASTY KNEE;  Surgeon: Giuseppe Mcgrath MD;  Location: [facility] OR     ARTHROPLASTY MINIMALLY INVASIVE HIP  4/18/2012    Procedure:ARTHROPLASTY MINIMALLY INVASIVE  HIP; RIGHT TWO INCISION MINIMALLY INVASIVE HIP ARTHROPLASTY (YUVAL)^ (NO CELL SAVER) (C-ARM); Surgeon:GIUSEPPE LOPEZ; Location: OR     BIOPSY NODE SENTINEL Right 4/23/2021    Procedure: RIGHT SENTINEL LYMPH NODE BIOPSY;  Surgeon: Giuseppe Rogers MD;  Location:  OR     C CHEMOSURG MOHS 1ST STAGE  9-99    Mohs-nose     CARDIAC SURGERY  06/27/2019    mid and distal RCA stents     ENT SURGERY      mohs procedure on nose     HYSTERECTOMY, PAP NO LONGER INDICATED       LUMPECTOMY BREAST WITH SEED LOCALIZATION Right 4/23/2021    Procedure: SEED LOCALIZED RIGHT BREAST LUMPECTOMY;  Surgeon: Giuseppe Rogers MD;  Location:  OR     SURGICAL HISTORY OF -   Mult-~1978    infertility, laparotomies     Z APPENDECTOMY  8/87    incidental appi at [facility]     Z REPAIR CRUCIATE LIGAMENT,KNEE  8/2003    L   ACL reconstruction     [facility] TOTAL ABDOM HYSTERECTOMY  8/87    W/ BSO fibroids, hx PID     [facility]  [facility]    normal, rec repeat 10 yrs              Allergies:   Toprol xl [metoprolol], Ace inhibitors, Oxycodone, and Tetanus toxoid       Data:   All laboratory data reviewed:    Recent Labs   Lab Test 09/30/21  1020 03/19/21  0857 06/17/20  0815 05/07/19  0911   LDL  --  42 31 36   HDL  --  80 80 75   NHDL  --  55 41 45   CHOL  --  135 121 120   TRIG  --  67 49 45   TSH 1.02 1.93 1.37 0.94       Lab Results   Component Value Date    WBC 7.2 03/19/2021    RBC 4.60 03/19/2021    HGB 14.1 03/19/2021    HCT 40.7 03/19/2021    MCV 89 03/19/2021    MCH 30.7 03/19/2021    MCHC 34.6 03/19/2021    RDW 14.6 03/19/2021     03/19/2021       Lab Results   Component Value Date     05/12/2021    POTASSIUM 3.9 05/12/2021    CHLORIDE 103 05/12/2021    CO2 26 05/12/2021    ANIONGAP 8 05/12/2021     (H) 05/12/2021    BUN 17 05/12/2021    CR 0.71 05/12/2021    GFRESTIMATED 82 05/12/2021    GFRESTBLACK >90 05/12/2021    JOSEPH 9.4 05/12/2021      Lab Results   Component Value Date     AST 48 (H) 05/12/2021    ALT 79 (H) 05/12/2021       Lab Results   Component Value Date    A1C 6.0 (H) 03/19/2021       Lab Results   Component Value Date    INR 0.98 06/27/2018         FATMATA PEREIRA [facility]  Pager: [phone]  RN phone: [phone]      cc:   Albino Howard MD  [address]  [place] [address]

## 2022-03-23 DIAGNOSIS — E03.9 HYPOTHYROIDISM, UNSPECIFIED TYPE: ICD-10-CM

## 2022-03-23 DIAGNOSIS — I10 BENIGN ESSENTIAL HYPERTENSION: ICD-10-CM

## 2022-03-23 DIAGNOSIS — Z95.5 S/P DRUG ELUTING CORONARY STENT PLACEMENT: ICD-10-CM

## 2022-03-23 RX ORDER — ANASTROZOLE 1 MG/1
1 TABLET ORAL DAILY
Qty: 90 TABLET | Refills: 3 | Status: SHIPPED | OUTPATIENT
Start: 2022-03-23 | End: 2023-05-03

## 2022-03-25 RX ORDER — LOSARTAN POTASSIUM 100 MG/1
100 TABLET ORAL DAILY
Qty: 90 TABLET | Refills: 3 | Status: SHIPPED | OUTPATIENT
Start: 2022-03-25 | End: 2023-02-23

## 2022-03-25 RX ORDER — HYDROCHLOROTHIAZIDE 25 MG/1
25 TABLET ORAL DAILY
Qty: 90 TABLET | Refills: 3 | Status: SHIPPED | OUTPATIENT
Start: 2022-03-25 | End: 2023-02-23

## 2022-03-25 RX ORDER — LEVOTHYROXINE SODIUM 112 UG/1
112 TABLET ORAL DAILY
Qty: 90 TABLET | Refills: 0 | Status: SHIPPED | OUTPATIENT
Start: 2022-03-25 | End: 2022-06-22

## 2022-03-25 RX ORDER — ATORVASTATIN CALCIUM 10 MG/1
10 TABLET, FILM COATED ORAL DAILY
Qty: 90 TABLET | Refills: 3 | Status: SHIPPED | OUTPATIENT
Start: 2022-03-25 | End: 2023-02-23

## 2022-03-25 NOTE — TELEPHONE ENCOUNTER
Routing refill request to provider for review/approval because:  LDL Cholesterol Calculated   Date Value Ref Range Status   03/19/2021 42 <100 mg/dL Final     Comment:     Desirable:       <100 mg/dl     BP Readings from Last 3 Encounters:   03/21/22 (!) 147/83   02/28/22 (!) 180/77   09/30/21 139/72     Appointment scheduled 4/7    Arnoldo Santos RN  [facility] Triage Nurse

## 2022-03-25 NOTE — TELEPHONE ENCOUNTER
Prescription approved per [facility] Refill Protocol.  Arnoldo Santos RN

## 2022-03-28 ENCOUNTER — HOSPITAL ENCOUNTER (OUTPATIENT)
Dept: MAMMOGRAPHY | Facility: CLINIC | Age: 77
Discharge: HOME | End: 2022-03-28
Attending: INTERNAL MEDICINE | Admitting: INTERNAL MEDICINE
Payer: MEDICARE

## 2022-03-28 DIAGNOSIS — Z17.0 MALIGNANT NEOPLASM OF UPPER-OUTER QUADRANT OF RIGHT BREAST IN FEMALE, ESTROGEN RECEPTOR POSITIVE (H): ICD-10-CM

## 2022-03-28 DIAGNOSIS — C50.411 MALIGNANT NEOPLASM OF UPPER-OUTER QUADRANT OF RIGHT BREAST IN FEMALE, ESTROGEN RECEPTOR POSITIVE (H): ICD-10-CM

## 2022-03-28 PROBLEM — C50.911 INVASIVE DUCTAL CARCINOMA OF BREAST, FEMALE, RIGHT (H): Status: ACTIVE | Noted: 2021-04-15

## 2022-03-28 PROCEDURE — 77067 SCR MAMMO BI INCL CAD: CPT

## 2022-03-29 ENCOUNTER — HOSPITAL ENCOUNTER (OUTPATIENT)
Dept: OCCUPATIONAL THERAPY | Facility: CLINIC | Age: 77
Discharge: HOME | End: 2022-03-29
Attending: INTERNAL MEDICINE
Payer: MEDICARE

## 2022-03-29 DIAGNOSIS — I89.0 LYMPHEDEMA OF BREAST: ICD-10-CM

## 2022-03-29 PROCEDURE — 97165 OT EVAL LOW COMPLEX 30 MIN: CPT | Mod: GO

## 2022-03-29 PROCEDURE — 97140 MANUAL THERAPY 1/> REGIONS: CPT | Mod: GO

## 2022-03-29 PROCEDURE — 97535 SELF CARE MNGMENT TRAINING: CPT | Mod: GO

## 2022-03-29 NOTE — PROGRESS NOTES
03/29/22 0830   Quick Adds   Quick Adds Certification   Rehab Discipline   Discipline OT   Type of Visit   Type of visit Initial Edema Evaluation   General Information   Start of care 03/29/22   Referring physician Dr. Elida Grullon   Orders Evaluate and treat as indicated   Order date 02/28/22   Medical diagnosis lymphedema   Edema onset 02/28/22   Affected body parts Trunk   Edema etiology Cancer with lymph node dissection;Radiation;Surgery   Location - Cancer with lymph node dissection R breast   Location - Radiation RUQ   Edema etiology comments Patient s/p R lumpectomy and SLND (x2, both negative), March 2021;  completed XRT   Pertinent history of current problem (PT: include personal factors and/or comorbidities that impact the POC; OT: include additional occupational profile info) Patient reports recent increased erythema and swelling R breast, infection r/o.  PMH includes obesity, DM2, hysterectomy, CAD, basal cell CA (nose); patient has not worn compression bra.   Surgical / medical history reviewed Yes   Prior level of functional mobility ind   Patient role / employment history Retired   Living environment House / [facility]   Living environment comments lives with spouse   Fall Risk Screen   Fall screen completed by OT   Have you fallen 2 or more times in the past year? No   Have you fallen and had an injury in the past year? No   Is patient a fall risk? No   Abuse Screen (yes response referral indicated)   Feels Unsafe at Home or Work/School no   Feels Threatened by Someone no   Does Anyone Try to Keep You From Having Contact with Others or Doing Things Outside Your Home? no   Physical Signs of Abuse Present no   System Outcome Measures   Outcome Measures Lymphedema   Lymphedema Life Impact Scale (score range 0-72). A higher score indicates greater impairment.   (NA, breast lymphedema)   Subjective Report   Patient report of symptoms redness, swelling in R breast   Patient / Family Goals   Patient /  family goals statement find out what to do about swelling   Pain   Patient currently in pain No   Cognitive Status   Orientation Orientation to person, place and time   Level of consciousness Alert   Follows commands and answers questions 100% of the time   Personal safety and judgement Intact   Memory Intact   Edema Exam / Assessment   Skin condition comments light erythema R breast, smaller than L; peau d'orange inferior aspect, scar adhered.  Cording extending from R axilla into medial humerus, swelling in R axilla and R lat trunk   Range of Motion   ROM comments WFL BUE   Activities of Daily Living   Activities of Daily Living ind   Planned Edema Interventions   Planned edema interventions Manual lymph drainage;Fit for compression garment;Exercises;Precautions to prevent infection / exacerbation;Education;Manual therapy;Skin care / precautions;Scar mobilization;Soft tissue mobilization;Myofascial release;Home management program development   Planned edema intervention comments recommend 1x/week/6   Clinical Impression   Criteria for skilled therapeutic intervention met Yes   Therapy diagnosis R breast lymphedema and STONE s/p CA treatment   Influenced by the following impairments / conditions Edema;Stage 1;Stage 2;Axillary Web Syndrome   Assessment of Occupational Performance 1-3 Performance Deficits   Identified Performance Deficits limited understanding of long-term symptom management, increased risk of infection and progressive soft-tissue fibrosis, STONE which could impact RUE AROM   Clinical Decision Making (Complexity) Low complexity   Treatment Frequency 1x/week   Treatment duration 6 weeks   Patient / family and/or staff in agreement with plan of care Yes   Risks and benefits of therapy have been explained Yes   Goals   Edema Eval Goals 1;2;3;4   Goal 1   Goal identifier R breast lymphedema   Goal description For decreased risk of infection and soft tissue fibrosis and improved skin integrity, patient will  report decreased discomfort and erythema in R breast with decreased scar adhesion s/p manual therapy   Target date 05/30/22   Goal 2   Goal identifier R STONE   Goal description For decreased risk of increased symptoms of STONE and cont full participation in all I/ADL, patient will report decreased symptoms of R STONE s/p manual therapy.   Target date 05/30/22   Goal 3   Goal identifier home program   Goal description Patient will be independent in HP including HEP, scar massage, self-MLD, use of compression as indicated.   Target date 05/30/22   Goal 4   Goal identifier lymphedema precautions   Goal description Pt will independently verbalize the signs/symptoms of lymphedema, precautions and how to obtain a future lymphedema referral if edema persists to preserve skin integrity, prevent infection and preserve functional mobility.      Target date 05/30/22   Total Evaluation Time   OT Eval, Low Complexity Minutes (86288) 15   Certification   Certification date from 03/29/22   Certification date to 06/29/22   Medical Diagnosis lymphedema

## 2022-03-29 NOTE — PROGRESS NOTES
[facility]        OUTPATIENT OCCUPATIONAL THERAPY EDEMA EVALUATION  PLAN OF TREATMENT FOR OUTPATIENT REHABILITATION  (COMPLETE FOR INITIAL CLAIMS ONLY)  Patient's Last Name, First Name, Yola Reeves                           Provider s Name:   [facility] Medical Record No.  [id number]     Start of Care Date:  03/29/22   Onset Date:  02/28/22   Type:  OT   Medical Diagnosis:  lymphedema   Therapy Diagnosis:  R breast lymphedema and STONE s/p CA treatment Visits from SOC:  1                                     __________________________________________________________________________________   Plan of Treatment/Functional Goals:    Manual lymph drainage, Fit for compression garment, Exercises, Precautions to prevent infection / exacerbation, Education, Manual therapy, Skin care / precautions, Scar mobilization, Soft tissue mobilization, Myofascial release, Home management program development  recommend 1x/week/6     GOALS  1. Goal description: For decreased risk of infection and soft tissue fibrosis and improved skin integrity, patient will report decreased discomfort and erythema in R breast with decreased scar adhesion s/p manual therapy       Target date: 05/30/22  2. Goal description: For decreased risk of increased symptoms of STONE and cont full participation in all I/ADL, patient will report decreased symptoms of R STONE s/p manual therapy.       Target date: 05/30/22  3. Goal description: Patient will be independent in HP including HEP, scar massage, self-MLD, use of compression as indicated.       Target date: 05/30/22  4. Goal description: Pt will independently verbalize the signs/symptoms of lymphedema, precautions and how to obtain a future lymphedema referral if edema persists to preserve skin integrity, prevent infection and preserve functional  mobility.          Target date: 05/30/22              Treatment Frequency: 1x/week   Treatment duration: 6 weeks    Madeleine Camargo OT                                    I CERTIFY THE NEED FOR THESE SERVICES FURNISHED UNDER        THIS PLAN OF TREATMENT AND WHILE UNDER MY CARE     (Physician co-signature of this document indicates review and certification of the therapy plan).                   Certification date from: 03/29/22       Certification date to: 06/29/22           Referring physician: Dr. Elida Grullon   Initial Assessment  See Epic Evaluation- Start of care: 03/29/22

## 2022-04-04 ENCOUNTER — HOSPITAL ENCOUNTER (OUTPATIENT)
Dept: OCCUPATIONAL THERAPY | Facility: CLINIC | Age: 77
Discharge: HOME | End: 2022-04-04
Payer: MEDICARE

## 2022-04-04 DIAGNOSIS — I89.0 LYMPHEDEMA OF BREAST: Primary | ICD-10-CM

## 2022-04-04 PROCEDURE — 97110 THERAPEUTIC EXERCISES: CPT | Mod: GO

## 2022-04-04 PROCEDURE — 97140 MANUAL THERAPY 1/> REGIONS: CPT | Mod: GO

## 2022-04-07 ENCOUNTER — OFFICE VISIT (OUTPATIENT)
Dept: FAMILY MEDICINE | Facility: CLINIC | Age: 77
End: 2022-04-07
Payer: MEDICARE

## 2022-04-07 VITALS
DIASTOLIC BLOOD PRESSURE: 82 MMHG | HEIGHT: 64 IN | TEMPERATURE: 96.9 F | OXYGEN SATURATION: 99 % | BODY MASS INDEX: 31.07 KG/M2 | HEART RATE: 9 BPM | WEIGHT: 182 LBS | SYSTOLIC BLOOD PRESSURE: 144 MMHG | RESPIRATION RATE: 17 BRPM

## 2022-04-07 DIAGNOSIS — I10 BENIGN HYPERTENSION: ICD-10-CM

## 2022-04-07 DIAGNOSIS — Z12.11 COLON CANCER SCREENING: ICD-10-CM

## 2022-04-07 DIAGNOSIS — E11.9 TYPE 2 DIABETES MELLITUS WITHOUT COMPLICATION, WITHOUT LONG-TERM CURRENT USE OF INSULIN (H): ICD-10-CM

## 2022-04-07 DIAGNOSIS — E03.9 HYPOTHYROIDISM, UNSPECIFIED TYPE: ICD-10-CM

## 2022-04-07 DIAGNOSIS — Z00.00 ENCOUNTER FOR MEDICARE ANNUAL WELLNESS EXAM: Primary | ICD-10-CM

## 2022-04-07 DIAGNOSIS — Z95.5 S/P DRUG ELUTING CORONARY STENT PLACEMENT: ICD-10-CM

## 2022-04-07 DIAGNOSIS — Z92.3 S/P RADIATION THERAPY: ICD-10-CM

## 2022-04-07 DIAGNOSIS — Z98.890 S/P LUMPECTOMY, RIGHT BREAST: ICD-10-CM

## 2022-04-07 DIAGNOSIS — I27.20 PULMONARY HYPERTENSION (H): ICD-10-CM

## 2022-04-07 LAB — HBA1C MFR BLD: 5.9 % (ref 0–5.6)

## 2022-04-07 PROCEDURE — 83036 HEMOGLOBIN GLYCOSYLATED A1C: CPT | Performed by: INTERNAL MEDICINE

## 2022-04-07 PROCEDURE — 80053 COMPREHEN METABOLIC PANEL: CPT | Performed by: INTERNAL MEDICINE

## 2022-04-07 PROCEDURE — G0439 PPPS, SUBSEQ VISIT: HCPCS | Performed by: INTERNAL MEDICINE

## 2022-04-07 PROCEDURE — 99214 OFFICE O/P EST MOD 30 MIN: CPT | Mod: 25 | Performed by: INTERNAL MEDICINE

## 2022-04-07 PROCEDURE — 80061 LIPID PANEL: CPT | Performed by: INTERNAL MEDICINE

## 2022-04-07 PROCEDURE — 82274 ASSAY TEST FOR BLOOD FECAL: CPT | Performed by: INTERNAL MEDICINE

## 2022-04-07 PROCEDURE — 99207 PR FOOT EXAM NO CHARGE: CPT | Mod: 25 | Performed by: INTERNAL MEDICINE

## 2022-04-07 PROCEDURE — 36415 COLL VENOUS BLD VENIPUNCTURE: CPT | Performed by: INTERNAL MEDICINE

## 2022-04-07 PROCEDURE — 84443 ASSAY THYROID STIM HORMONE: CPT | Performed by: INTERNAL MEDICINE

## 2022-04-07 RX ORDER — HYDRALAZINE HYDROCHLORIDE 25 MG/1
50 TABLET, FILM COATED ORAL 2 TIMES DAILY
Qty: 270 TABLET | Refills: 3
Start: 2022-04-07 | End: 2023-02-23

## 2022-04-07 ASSESSMENT — ENCOUNTER SYMPTOMS
CONSTIPATION: 0
SORE THROAT: 0
DIARRHEA: 0
BREAST MASS: 0
NERVOUS/ANXIOUS: 0
NAUSEA: 0
EYE PAIN: 0
HEARTBURN: 0
PALPITATIONS: 0
JOINT SWELLING: 0
PARESTHESIAS: 0
FREQUENCY: 1
MYALGIAS: 0
ABDOMINAL PAIN: 0
CHILLS: 0
ARTHRALGIAS: 1
SHORTNESS OF BREATH: 1
WEAKNESS: 0
FEVER: 0
DYSURIA: 0
HEMATURIA: 0
DIZZINESS: 0
HEADACHES: 0
COUGH: 0
HEMATOCHEZIA: 0

## 2022-04-07 ASSESSMENT — PATIENT HEALTH QUESTIONNAIRE - PHQ9: SUM OF ALL RESPONSES TO PHQ QUESTIONS 1-9: 0

## 2022-04-07 ASSESSMENT — ACTIVITIES OF DAILY LIVING (ADL): CURRENT_FUNCTION: NO ASSISTANCE NEEDED

## 2022-04-07 ASSESSMENT — PAIN SCALES - GENERAL: PAINLEVEL: MILD PAIN (2)

## 2022-04-07 NOTE — PROGRESS NOTES
"SUBJECTIVE:   Yola Mayo is a 76 year old female who presents for Preventive Visit.      Patient has been advised of split billing requirements and indicates understanding: Yes  Are you in the first 12 months of your Medicare coverage?  No    Healthy Habits:     In general, how would you rate your overall health?  Good    Frequency of exercise:  2-3 days/week    Duration of exercise:  15-30 minutes    Do you usually eat at least 4 servings of fruit and vegetables a day, include whole grains    & fiber and avoid regularly eating high fat or \"junk\" foods?  Yes    Taking medications regularly:  Yes    Medication side effects:  None    Ability to successfully perform activities of daily living:  No assistance needed    Home Safety:  No safety concerns identified    Hearing Impairment:  No hearing concerns    In the past 6 months, have you been bothered by leaking of urine?  No    In general, how would you rate your overall mental or emotional health?  Good      PHQ-2 Total Score: 0    Additional concerns today:  No    Do you feel safe in your environment? Yes    Have you ever done Advance Care Planning? (For example, a Health Directive, POLST, or a discussion with a medical provider or your loved ones about your wishes): Yes, advance care planning is on file.       Fall risk  Fallen 2 or more times in the past year?: No  Any fall with injury in the past year?: No    Cognitive Screening   1) Repeat 3 items (Leader, Season, Table)    2) Clock draw: NORMAL  3) 3 item recall: Recalls 2 objects  Gave sunset, previous years word   Results: NORMAL clock, 1-2 items recalled: COGNITIVE IMPAIRMENT LESS LIKELY    Mini-CogTM [employer]. Licensed by the author for use in [facility]; reprinted with permission (sophie@.[facility]). All rights reserved.      Do you have sleep apnea, excessive snoring or daytime drowsiness?: no    Reviewed and updated as needed this visit by clinical staff   Tobacco  Allergies     "           Reviewed and updated as needed this visit by Provider                 Social History     Tobacco Use     Smoking status: Never Smoker     Smokeless tobacco: Never Used   Substance Use Topics     Alcohol use: Yes     Alcohol/week: 0.0 standard drinks     Comment: occasional     If you drink alcohol do you typically have >3 drinks per day or >7 drinks per week? No    Alcohol Use 4/7/2022   Prescreen: >3 drinks/day or >7 drinks/week? No   Prescreen: >3 drinks/day or >7 drinks/week? -               Current providers sharing in care for this patient include:   Patient Care Team:  Zoraida Braga MD as PCP - General (Internal Medicine)  Zoraida Braga MD as Assigned PCP  May Singh RD as Diabetes Educator (Dietitian, Registered)  Giuseppe Rogers MD as Assigned Surgical Provider  Elida Grullon MD as Assigned Cancer Care Provider  Krystal Neal APRN CNP as Assigned Heart and Vascular Provider    The following health maintenance items are reviewed in Epic and correct as of today:  Health Maintenance Due   Topic Date Due     LIPID  03/19/2022     FALL RISK ASSESSMENT  03/26/2022     EYE EXAM  04/02/2022           Pertinent mammograms are reviewed under the imaging tab.    Review of Systems   Constitutional: Negative for chills and fever.   HENT: Positive for congestion. Negative for ear pain, hearing loss and sore throat.    Eyes: Negative for pain and visual disturbance.   Respiratory: Positive for shortness of breath. Negative for cough.    Cardiovascular: Negative for chest pain, palpitations and peripheral edema.   Gastrointestinal: Negative for abdominal pain, constipation, diarrhea, heartburn, hematochezia and nausea.   Breasts:  Negative for tenderness, breast mass and discharge.   Genitourinary: Positive for frequency. Negative for dysuria, genital sores, hematuria, pelvic pain, urgency, vaginal bleeding and vaginal discharge.   Musculoskeletal: Positive for arthralgias. Negative for  "joint swelling and myalgias.   Skin: Positive for rash.   Neurological: Negative for dizziness, weakness, headaches and paresthesias.   Psychiatric/Behavioral: Negative for mood changes. The patient is not nervous/anxious.      Above symptoms are minimal  Having stress test for sob   Blood Pressure is on slightly higher side at home also    OBJECTIVE:   BP (!) 144/82   Pulse (!) 9   Temp 96.9  F (36.1  C) (Temporal)   Resp 17   Ht 1.626 m (5' 4\")   Wt 82.6 kg (182 lb)   SpO2 99%   BMI 31.24 kg/m   Estimated body mass index is 31.24 kg/m  as calculated from the following:    Height as of this encounter: 1.626 m (5' 4\").    Weight as of this encounter: 82.6 kg (182 lb).  Physical Exam  GENERAL: healthy, alert and no distress  EYES: Eyes grossly normal to inspection, PERRL and conjunctivae and sclerae normal  HENT: ear canals and TM's normal, nose and mouth without ulcers or lesions  NECK: no adenopathy,  RESP: lungs clear to auscultation - no rales, rhonchi or wheezes  BREAST: normal without masses, tenderness or nipple discharge and no palpable axillary masses or adenopathy  Scar of breast surgery on right breast  Lymphedema on right breast  CV: regular rate and rhythm, normal S1 S2, no S3 she has  peripheral edema and peripheral pulses strong  ABDOMEN: soft, nontender, no hepatosplenomegaly, no masses and bowel sounds normal  SKIN: no suspicious lesions or rashes  Has numerous moles and freckles throughout the body.  Follows dermatology  Very prominent veins on both LE and bilateral leg edema  Bunion on right side   NEURO: Normal strength and tone, mentation intact and speech normal  PSYCH: mentation appears normal, affect normal/bright  Diabetic foot exam was performed  She has good dorsalis pedis and posterior tibial  Sensations were intact      ASSESSMENT / PLAN:   Yola was seen today for physical.    Diagnoses and all orders for this visit:    Encounter for Medicare annual wellness exam  Preventive " health counseling was also done.    Colon cancer screening  -     Fecal colorectal cancer screen (FIT); Future  -     Fecal colorectal cancer screen (FIT)  She had colonoscopy in 2009  Would prefer to do FIT testing  Next one will be due in May    Type 2 diabetes mellitus without complication, without long-term current use of insulin (H)  -     HEMOGLOBIN A1C; Future  -     Lipid panel reflex to direct LDL Non-fasting; Future  -     Comprehensive metabolic panel (BMP + Alb, Alk Phos, ALT, AST, Total. Bili, TP); Future  -     [place] FOOT EXAM NO CHARGE  -     HEMOGLOBIN A1C  -     Lipid panel reflex to direct LDL Non-fasting  -     Comprehensive metabolic panel (BMP + Alb, Alk Phos, ALT, AST, Total. Bili, TP)  Lab Results   Component Value Date    A1C 5.9 04/07/2022    A1C 6.0 03/19/2021    A1C 5.9 06/17/2020    A1C 6.5 05/07/2019    A1C 6.0 10/14/2018    A1C 5.9 05/03/2018     Well-controlled  She takes Metformin    Benign hypertension  -     hydrALAZINE (APRESOLINE) 25 MG tablet; Take 2 tablets (50 mg) by mouth 2 times daily  Patient is on losartan 100 mg and hydrochlorothiazide  Recently dose of hydralazine was increased by the cardiology  She was on 50 mg in the morning and 20 5 in the evening  As blood pressure is a staying on higher side I increase it to 50 mg twice a day  Advised to monitor blood pressure at home  Let us know if it continues to stay on higher side then we would titrate the dose  She does have upcoming appointment for stress test and follow-up with cardiology  Discussed the importance of taking good care of blood pressure as she has history of coronary artery disease    Hypothyroidism, unspecified type  -     TSH with free T4 reflex; Future  -     TSH with free T4 reflex  She is on levothyroxine    S/P drug eluting coronary stent placement  Follows cardiology    S/P radiation therapy  Due to breast cancer and she follows oncology  She is on hormonal treatment  Takes anastrozole    S/P lumpectomy,  "right breast  Due to breast cancer  Follows oncology      Pulmonary hypertension was seen on Echocardiogram  She is followed by cardiology    Disclaimer: This note consists of symbols derived from keyboarding, dictation and/or voice recognition software. As a result, there may be errors in the script that have gone undetected. Please consider this when interpreting information found in this chart.    Patient has been advised of split billing requirements and indicates understanding: Yes    COUNSELING:  Reviewed preventive health counseling, as reflected in patient instructions       Regular exercise       Healthy diet/nutrition       Osteoporosis prevention/bone health       Colon cancer screening    Estimated body mass index is 31.24 kg/m  as calculated from the following:    Height as of this encounter: 1.626 m (5' 4\").    Weight as of this encounter: 82.6 kg (182 lb).    Weight management plan: Discussed healthy diet and exercise guidelines    She reports that she has never smoked. She has never used smokeless tobacco.      Appropriate preventive services were discussed with this patient, including applicable screening as appropriate for cardiovascular disease, diabetes, osteopenia/osteoporosis, and glaucoma.  As appropriate for age/gender, discussed screening for colorectal cancer, prostate cancer, breast cancer, and cervical cancer. Checklist reviewing preventive services available has been given to the patient.    Reviewed patients plan of care and provided an AVS. The Basic Care Plan (routine screening as documented in Health Maintenance) for Yola meets the Care Plan requirement. This Care Plan has been established and reviewed with the Patient.    Counseling Resources:  ATP IV Guidelines  Pooled Cohorts Equation Calculator  Breast Cancer Risk Calculator  Breast Cancer: Medication to Reduce Risk  FRAX Risk Assessment  ICSI Preventive Guidelines  Dietary Guidelines for Americans, 2010  USDA's MyPlate  ASA " Prophylaxis  Lung CA Screening    Zoraida Braga MD  [facility]    Identified Health Risks:

## 2022-04-07 NOTE — Clinical Note
Please abstract the following data from this visit with this patient into the appropriate field in Epic:  Tests that can be patient reported without a hard copy:  Eye exam with ophthalmology on this date: 5/3/22 at [facility]  Other Tests found in the patient's chart through Chart Review/Care Everywhere:  {Abstract Quality List (Optional):843272}  Note to Abstraction: If this section is blank, no results were found via Chart Review/Care Everywhere.

## 2022-04-07 NOTE — PATIENT INSTRUCTIONS
Labs today  The dose of hydralazine is increased to 50 mg twice a day  Monitor your blood pressure once a week  at home.  Bring those readings on your next visit.  Notify us if your blood pressure readings consistently stays greater than 140/90.  Follow up in 6 months  Seek sooner medical attention if there is any worsening of symptoms or problems.    Patient Education   Personalized Prevention Plan  You are due for the preventive services outlined below.  Your care team is available to assist you in scheduling these services.  If you have already completed any of these items, please share that information with your care team to update in your medical record.  Health Maintenance Due   Topic Date Due     A1C Lab  09/19/2021     Cholesterol Lab  03/19/2022     Diabetic Foot Exam  03/19/2022     FALL RISK ASSESSMENT  03/26/2022     Eye Exam  04/02/2022

## 2022-04-08 LAB
ALBUMIN SERPL-MCNC: 4 G/DL (ref 3.4–5)
ALP SERPL-CCNC: 72 U/L (ref 40–150)
ALT SERPL W P-5'-P-CCNC: 71 U/L (ref 0–50)
ANION GAP SERPL CALCULATED.3IONS-SCNC: 9 MMOL/L (ref 3–14)
AST SERPL W P-5'-P-CCNC: 50 U/L (ref 0–45)
BILIRUB SERPL-MCNC: 0.3 MG/DL (ref 0.2–1.3)
BUN SERPL-MCNC: 13 MG/DL (ref 7–30)
CALCIUM SERPL-MCNC: 9.5 MG/DL (ref 8.5–10.1)
CHLORIDE BLD-SCNC: 105 MMOL/L (ref 94–109)
CHOLEST SERPL-MCNC: 128 MG/DL
CO2 SERPL-SCNC: 24 MMOL/L (ref 20–32)
CREAT SERPL-MCNC: 0.71 MG/DL (ref 0.52–1.04)
FASTING STATUS PATIENT QL REPORTED: NO
GFR SERPL CREATININE-BSD FRML MDRD: 88 ML/MIN/1.73M2
GLUCOSE BLD-MCNC: 80 MG/DL (ref 70–99)
HDLC SERPL-MCNC: 75 MG/DL
LDLC SERPL CALC-MCNC: 38 MG/DL
NONHDLC SERPL-MCNC: 53 MG/DL
POTASSIUM BLD-SCNC: 3.7 MMOL/L (ref 3.4–5.3)
PROT SERPL-MCNC: 7.7 G/DL (ref 6.8–8.8)
SODIUM SERPL-SCNC: 138 MMOL/L (ref 133–144)
TRIGL SERPL-MCNC: 74 MG/DL
TSH SERPL DL<=0.005 MIU/L-ACNC: 2.07 MU/L (ref 0.4–4)

## 2022-04-08 NOTE — RESULT ENCOUNTER NOTE
Timothy Aceves,    This is to inform you regarding your test result.    HbA1c which is average glucose during last 3 months is 5.9%  Your diabetes is under control  Other test results are pending.          Sincerely,      Dr.Nasima Maria Luz MD,FACP

## 2022-04-10 LAB — HEMOCCULT STL QL IA: NEGATIVE

## 2022-04-10 NOTE — RESULT ENCOUNTER NOTE
Timothy Aceves,    This is to inform you regarding your test result.    I tried to contact you but got the VM.  The testing of your kidney function, and electrolytes was satisfactory   Liver enzymes are elevated   I recommend liver ultrasound   Orders are placed   Please call radiology by dialing  [phone] to schedule your liver ultrasound   TSH which is thyroid hormone is normal.  Your total cholesterol is normal.  HDL which is called good cholesterol is normal.  Your LDL cholesterol is normal.  This is often call bad cholesterol and high levels increase the risk for heart attacks and strokes.  Your triglycerides are normal.  Fecal colorectal cancer screen (FIT) is negative.  HbA1c which is average glucose during last 3 months is 5.9%  Your diabetes is under control  Lab Results       Component                Value               Date                       A1C                      5.9                 04/07/2022                 A1C                      6.0                 03/19/2021                 A1C                      5.9                 06/17/2020                 A1C                      6.5                 05/07/2019                 A1C                      6.0                 10/14/2018                 A1C                      5.9                 05/03/2018                Sincerely,      Dr.Nasima Maria Luz MD,FACP

## 2022-04-14 ENCOUNTER — HOSPITAL ENCOUNTER (OUTPATIENT)
Dept: OCCUPATIONAL THERAPY | Facility: CLINIC | Age: 77
Discharge: HOME | End: 2022-04-14
Payer: MEDICARE

## 2022-04-14 DIAGNOSIS — I89.0 LYMPHEDEMA OF BREAST: Primary | ICD-10-CM

## 2022-04-14 PROCEDURE — 97140 MANUAL THERAPY 1/> REGIONS: CPT | Mod: GO

## 2022-04-26 ENCOUNTER — HOSPITAL ENCOUNTER (OUTPATIENT)
Dept: OCCUPATIONAL THERAPY | Facility: CLINIC | Age: 77
Discharge: HOME | End: 2022-04-26
Payer: MEDICARE

## 2022-04-26 DIAGNOSIS — I89.0 LYMPHEDEMA OF BREAST: Primary | ICD-10-CM

## 2022-04-26 PROCEDURE — 97140 MANUAL THERAPY 1/> REGIONS: CPT | Mod: GO | Performed by: OCCUPATIONAL THERAPIST

## 2022-05-02 ENCOUNTER — HOSPITAL ENCOUNTER (OUTPATIENT)
Dept: OCCUPATIONAL THERAPY | Facility: CLINIC | Age: 77
Discharge: HOME | End: 2022-05-02
Payer: MEDICARE

## 2022-05-02 DIAGNOSIS — I89.0 LYMPHEDEMA OF BREAST: Primary | ICD-10-CM

## 2022-05-02 PROCEDURE — 97140 MANUAL THERAPY 1/> REGIONS: CPT | Mod: GO | Performed by: OCCUPATIONAL THERAPIST

## 2022-05-03 ENCOUNTER — TRANSFERRED RECORDS (OUTPATIENT)
Dept: HEALTH INFORMATION MANAGEMENT | Facility: CLINIC | Age: 77
End: 2022-05-03
Payer: MEDICARE

## 2022-05-03 LAB — RETINOPATHY: NEGATIVE

## 2022-05-10 ENCOUNTER — HOSPITAL ENCOUNTER (OUTPATIENT)
Dept: CARDIOLOGY | Facility: CLINIC | Age: 77
Discharge: HOME | End: 2022-05-10
Attending: NURSE PRACTITIONER | Admitting: NURSE PRACTITIONER
Payer: MEDICARE

## 2022-05-10 DIAGNOSIS — R06.09 DOE (DYSPNEA ON EXERTION): ICD-10-CM

## 2022-05-10 DIAGNOSIS — I10 ESSENTIAL HYPERTENSION, BENIGN: ICD-10-CM

## 2022-05-10 DIAGNOSIS — I25.10 CORONARY ARTERY DISEASE INVOLVING NATIVE CORONARY ARTERY OF NATIVE HEART WITHOUT ANGINA PECTORIS: ICD-10-CM

## 2022-05-10 PROCEDURE — 93016 CV STRESS TEST SUPVJ ONLY: CPT | Performed by: INTERNAL MEDICINE

## 2022-05-10 PROCEDURE — 93325 DOPPLER ECHO COLOR FLOW MAPG: CPT | Mod: TC

## 2022-05-10 PROCEDURE — 93325 DOPPLER ECHO COLOR FLOW MAPG: CPT | Mod: 26 | Performed by: INTERNAL MEDICINE

## 2022-05-10 PROCEDURE — 93018 CV STRESS TEST I&R ONLY: CPT | Performed by: INTERNAL MEDICINE

## 2022-05-10 PROCEDURE — 93321 DOPPLER ECHO F-UP/LMTD STD: CPT | Mod: 26 | Performed by: INTERNAL MEDICINE

## 2022-05-10 PROCEDURE — 93350 STRESS TTE ONLY: CPT | Mod: 26 | Performed by: INTERNAL MEDICINE

## 2022-05-11 ENCOUNTER — HOSPITAL ENCOUNTER (OUTPATIENT)
Dept: OCCUPATIONAL THERAPY | Facility: CLINIC | Age: 77
Discharge: HOME | End: 2022-05-11
Payer: MEDICARE

## 2022-05-11 DIAGNOSIS — I89.0 LYMPHEDEMA OF BREAST: Primary | ICD-10-CM

## 2022-05-11 PROCEDURE — 97140 MANUAL THERAPY 1/> REGIONS: CPT | Mod: GO

## 2022-05-13 ENCOUNTER — OFFICE VISIT (OUTPATIENT)
Dept: CARDIOLOGY | Facility: CLINIC | Age: 77
End: 2022-05-13
Attending: NURSE PRACTITIONER
Payer: MEDICARE

## 2022-05-13 VITALS
HEART RATE: 79 BPM | DIASTOLIC BLOOD PRESSURE: 76 MMHG | HEIGHT: 64 IN | BODY MASS INDEX: 30.9 KG/M2 | OXYGEN SATURATION: 98 % | SYSTOLIC BLOOD PRESSURE: 135 MMHG | WEIGHT: 181 LBS

## 2022-05-13 DIAGNOSIS — E11.9 TYPE 2 DIABETES MELLITUS WITHOUT COMPLICATION, WITHOUT LONG-TERM CURRENT USE OF INSULIN (H): Primary | ICD-10-CM

## 2022-05-13 DIAGNOSIS — I25.10 CORONARY ARTERY DISEASE INVOLVING NATIVE CORONARY ARTERY OF NATIVE HEART WITHOUT ANGINA PECTORIS: ICD-10-CM

## 2022-05-13 DIAGNOSIS — R06.09 DOE (DYSPNEA ON EXERTION): ICD-10-CM

## 2022-05-13 DIAGNOSIS — I25.10 CORONARY ARTERY DISEASE INVOLVING NATIVE CORONARY ARTERY OF NATIVE HEART WITHOUT ANGINA PECTORIS: Primary | ICD-10-CM

## 2022-05-13 DIAGNOSIS — I10 ESSENTIAL HYPERTENSION, BENIGN: ICD-10-CM

## 2022-05-13 PROCEDURE — 99213 OFFICE O/P EST LOW 20 MIN: CPT | Performed by: NURSE PRACTITIONER

## 2022-05-13 NOTE — PROGRESS NOTES
Cardiology Clinic Progress Note  Yola Mayo MRN# [id number]   YOB: 1945 Age: 76 year old     Primary cardiologist: Dr. Howard    Reason for visit: Follow up stress test    History of presenting illness:    Yola Mayo, a pleasant 76 year old patient who has a past medical history significant for of coronary artery disease status post PCI to the mid and distal RCA 6/2018, hypertension, hyperlipidemia, very strong family history of premature coronary artery disease, and diabetes.       Due to symptoms of exertional angina after 1 block relieved with rest, she underwent a stress echocardiogram that was abnormal. Subsequently, a coronary angiogram revealed a 50-60% narrowing of the proximal LAD with 100% occlusion of the first diagonal above the LAD lesion.  The left circumflex had a 20-30% proximal narrowing.  The right coronary artery had a 60-70% narrowing in the proximal portion and a 95% narrowing in the midportion at the takeoff of the RV branch which is 95% narrowed.  She received 2 drug-eluting stents that were back to back to the right coronary artery with resolution of her symptoms.      Dee Dee was been diagnosed with right-sided grade 2 invasive ductal carcinoma in March of 2021. She is now s/p right breast lumpectomy and sentinel lymph node excision by Dr. Rogers and underwent radiation and is now on anastrozole.  She is planned to follow up with Dr. Grullon in 6 months. Her BP was elevated at her visit.      She was evaluated by Dr. Howard in September with concerns of GALINDO and Toprol was discontinued as she was bradycardic with concerns for chronotropic incompetence.  At her last visit she reported ongoing GALINDO despite stopping metoprolol. She specifically gets short of breath with walking upstairs or an incline. She reports that her home BP has also been elevated in the 140's.  Hydralazine was subsequently increased and underwent a stress echocardiogram that was abnormal.    Today she  reports her home blood pressure is 135/76 but was elevated in clinic today.  Her shortness of breath is ongoing and is reminiscent of prior to her RCA stenting in 2018.    All risks and benefits for this procedure have been explained to this patient and accepted.  This includes but is not limited to death, heart attack, stroke, blood clots, cardiac or vascular perforation, bleeding including the need for blood transfusion and the risk thereof, allergic reaction to x-ray dye, arrhythmia necessitating cardioversion, contrast dye nephropathy (including risks of temporary or permanent dialysis).  We talked about intracoronary stenting and the risks thereof and bypass surgery.      No history of bleeding problems or current bleeding, and no scheduled surgeries or procedures in the next year. Patient understands and wishes to proceed with it.    Contrast allergy: No  Anticoagulation: no   Metformin: Yes (hold day of and 2 days post)  Oral DM meds: no (hold day of)  Insulin: No  Diuretic: Yes,  Use of phosphodiesterase type 5 inhibitor: No  Aspirin: Yes (take night before and morning of take 4)  Pt informed to be NPO at midnight: Yes  Renal issues: No  Pt has transportation and 24 hours post procedure monitoring set up. Yes  Pt aware of no driving for 24 hours post procedure.  Yes           Assessment and Plan:     ASSESSMENT:    1. Coronary artery disease    Status post 2 drug eluting stents to the mid and distal RCA 6/27/2018    Residual 50-60% mid LAD lesion with 100% occlusion of the first diagonal    Recurrent GALINDO specifically when walking up stair or an incline  ongoing symptoms despite blood pressure management and normal stress echo    2. Hyperlipidemia, LDL goal<70    Most recent LDL was 42 3/19/21    Atorvastatin 10 mg daily     3. Hypertension, goal less than 140/90    Elevated today at on home readings    Losartan to 100 mg daily and hydrochlorthiazide 25 mg daily hydralazine 25 mg BID     PLAN:     1. Coronary  "angiogram  2. Return to clinic post procedure       Orders this Visit:  Orders Placed This Encounter   Procedures     Follow-Up with Cardiology     Case Request Cath Lab: Coronary Angiogram     No orders of the defined types were placed in this encounter.    There are no discontinued medications.    Today's clinic visit entailed:  Review of the result(s) of each unique test - Stress echo, cath  Ordering of each unique test  Prescription drug management  20 minutes spent on the date of the encounter doing chart review, history and exam, documentation and further activities per the note  Provider  Link to [facility] Help Grid     The level of medical decision making during this visit was of moderate complexity.           Review of Systems:     Review of Systems:  Skin:  Negative bruising   Eyes:  Positive for glasses  ENT:  Negative    Respiratory:  Positive for dyspnea on exertion  Cardiovascular:  Negative;palpitations;chest pain;lightheadedness;dizziness Positive for;fatigue  Gastroenterology: Negative heartburn;reflux  Genitourinary:  Positive for urinary frequency;nocturia  Musculoskeletal:  Positive for arthritis  Neurologic:  Negative headaches  Psychiatric:  Positive for sleep disturbances  Heme/Lymph/Imm:  Negative allergies  Endocrine:  Positive for thyroid disorder;diabetes            Physical Exam:     Vitals: /76   Pulse 79   Ht 1.626 m (5' 4\")   Wt 82.1 kg (181 lb)   SpO2 98%   BMI 31.07 kg/m    Constitutional: Well nourished and in no apparent distress.  Eyes: Pupils equal, round. Sclerae anicteric.   HEENT: Normocephalic, atraumatic.   Neck: Supple. Carotid pulses full and equal. No carotid bruit. No anterior cervical or supraclavicular lymphadenopathy appreciated. No JVD appreciated.  Respiratory: Breathing non-labored. Lungs clear to auscultation bilaterally. No crackles, wheezes, rhonchi, or rales.  Cardiovascular:  Regular rate and rhythm, normal S1 and S2. No murmur, rub, or gallop.  Skin: " Warm, dry. No rashes, cyanosis, or xanthelasma.  Extremities: No edema.  Neurologic: No gross motor deficits. Alert, awake, and oriented to person, place and time.  Psychiatric: Affect appropriate.             Medications:     Current Outpatient Medications   Medication Sig Dispense Refill     acetaminophen (TYLENOL) 325 MG tablet Take 2 tablets (650 mg) by mouth 3 times daily 100 tablet 0     amoxicillin (AMOXIL) 500 MG capsule Take 4 capsules half to one hour before the procedure. 4 capsule 3     anastrozole (ARIMIDEX) 1 MG tablet Take 1 tablet (1 mg) by mouth daily 90 tablet 3     aspirin 81 MG tablet Take 81 mg by mouth daily       atorvastatin (LIPITOR) 10 MG tablet TAKE 1 TABLET (10 MG) BY MOUTH DAILY FOR CHOLESTROL 90 tablet 3     blood glucose (CONTOUR NEXT TEST) test strip Use to test blood sugar 1 time daily or as directed. 100 each 2     blood glucose monitoring (NITO MICROLET) lancets Use to test blood sugar 1 time daily or as directed. 100 each 3     calcium-vitamin D (CALTRATE) 600-400 MG-UNIT per tablet Take 1 tablet by mouth daily 60 tablet 11     cholecalciferol (VITAMIN D) 1000 UNIT tablet Take 1 tablet (1,000 Units) by mouth daily 100 tablet 3     CONTOUR NEXT EZ (CONTOUR NEXT EZ W/DEVICE KIT) w/Device KIT 1 kit once 1 kit 0     Cyanocobalamin (VITAMIN B 12 PO) Take 1 tablet by mouth daily VIT B12 unknown otc dose       hydrALAZINE (APRESOLINE) 25 MG tablet Take 2 tablets (50 mg) by mouth 2 times daily 270 tablet 3     hydrochlorothiazide (HYDRODIURIL) 25 MG tablet TAKE 1 TABLET (25 MG) BY MOUTH DAILY FOR BLOOD PRESSURE 90 tablet 3     levothyroxine (SYNTHROID/LEVOTHROID) 112 MCG tablet TAKE 1 TABLET (112 MCG) BY MOUTH DAILY FOR THYROID 90 tablet 0     losartan (COZAAR) 100 MG tablet TAKE 1 TABLET (100 MG) BY MOUTH DAILY FOR BLOOD PRESSURE 90 tablet 3     metFORMIN (GLUCOPHAGE-XR) 500 MG 24 hr tablet TAKE 2 TABLETS (1,000 MG) BY MOUTH DAILY (WITH DINNER) 180 tablet 3     metroNIDAZOLE (METROGEL)  0.75 % external gel APPLY TO AFFECTED AREA TWICE A DAY 45 g 3     multivitamin, therapeutic with minerals (THERA-VIT-M) TABS Take 1 tablet by mouth daily       nitroGLYcerin (NITROSTAT) 0.4 MG sublingual tablet For chest pain place 1 tablet under the tongue every 5 minutes for 3 doses. If symptoms persist 5 minutes after 1st dose call 911. 25 tablet 3       Family History   Problem Relation Age of Onset     Cancer Mother         ovarian  of it  at 51yo.     Other Cancer Mother      Diabetes Father         Adult onset     Hypertension Father      C.A.D. Father         Fatal Heart attack at 70.     Cerebrovascular Disease Father      Thyroid Disease Sister      Thyroid Disease Sister      Thyroid Disease Sister      Cancer Daughter      Thyroid Disease Daughter      Breast Cancer No family hx of      Cancer - colorectal No family hx of        Social History     Socioeconomic History     Marital status:      Spouse name: FABIAN CUADRA     Number of children: 2     Years of education: Not on file     Highest education level: Not on file   Occupational History     Occupation: [employer]     Employer: [employer]   Tobacco Use     Smoking status: Never Smoker     Smokeless tobacco: Never Used   Substance and Sexual Activity     Alcohol use: Yes     Alcohol/week: 0.0 standard drinks     Comment: occasional     Drug use: No     Sexual activity: Yes     Partners: Male   Other Topics Concern      Service No     Blood Transfusions No     Caffeine Concern No     Occupational Exposure No     Hobby Hazards No     Sleep Concern No     Stress Concern No     Weight Concern Yes     Special Diet No     Back Care No     Exercise Yes     Comment: Approx 3 times a week for about 30 minutes     Bike Helmet Yes     Seat Belt Yes     Self-Exams Yes     Parent/sibling w/ CABG, MI or angioplasty before 65F 55M? Not Asked   Social History Narrative     Not on file     Social Determinants of Health     Financial Resource  Strain: Not on file   Food Insecurity: Not on file   Transportation Needs: Not on file   Physical Activity: Not on file   Stress: Not on file   Social Connections: Not on file   Intimate Partner Violence: Not on file   Housing Stability: Not on file            Past Medical History:     Past Medical History:   Diagnosis Date     Anemia due to blood loss      BASAL CELL CANCER -NOSE      Dermatitis      Diabetes (H)      Family history of diabetes mellitus     Father     Family history of ischemic heart disease     Father     Family history of malignant neoplasm of ovary     Mother     HX DISRUPT L ANT CRUCIATE S/P REPAIR 2003      Hyperlipidemia      HYPERTENSION       HYPOTHYROIDISM      LOC PRIM OSTEOARTH-L 1ST METATARSAL-CUNEIFORM JOINT 3/10/2004     Major depressive disorder, single episode, mild (H) 11/24/2014     MILD-MOD TRICUSPID REGURG. MOD PULM HTN 6/23/2005    ECHO  5/05     Obese      PONV (postoperative nausea and vomiting)      Rosacea      Shingles      SPLENIC CYST               Past Surgical History:     Past Surgical History:   Procedure Laterality Date     ARTHROPLASTY KNEE Left 4/13/2016    Procedure: ARTHROPLASTY KNEE;  Surgeon: Giuseppe Lopez MD;  Location:  OR     ARTHROPLASTY MINIMALLY INVASIVE HIP  4/18/2012    Procedure:ARTHROPLASTY MINIMALLY INVASIVE HIP; RIGHT TWO INCISION MINIMALLY INVASIVE HIP ARTHROPLASTY (YUVAL)^ (NO CELL SAVER) (C-ARM); Surgeon:GIUSEPPE LOPEZ; Location: OR     BIOPSY NODE SENTINEL Right 4/23/2021    Procedure: RIGHT SENTINEL LYMPH NODE BIOPSY;  Surgeon: Giuseppe Rogers MD;  Location:  OR     C CHEMOSURG MOHS 1ST STAGE  9-99    Mohs-nose     CARDIAC SURGERY  06/27/2019    mid and distal RCA stents     ENT SURGERY      mohs procedure on nose     HYSTERECTOMY, PAP NO LONGER INDICATED       LUMPECTOMY BREAST WITH SEED LOCALIZATION Right 4/23/2021    Procedure: SEED LOCALIZED RIGHT BREAST LUMPECTOMY;  Surgeon: Giuseppe Rogers MD;  Location:  [facility] OR     SURGICAL HISTORY OF -   Mult-~1978    infertility, laparotomies     Z APPENDECTOMY  8/87    incidental appi at [facility]     Z REPAIR CRUCIATE LIGAMENT,KNEE  8/2003    L   ACL reconstruction     Z TOTAL ABDOM HYSTERECTOMY  8/87    W/ BSO fibroids, hx PID     ZZHC COLONOSCOPY THRU STOMA, DIAGNOSTIC  4/03    normal, rec repeat 10 yrs              Allergies:   Toprol xl [metoprolol], Ace inhibitors, Oxycodone, and Tetanus toxoid       Data:   All laboratory data reviewed:    Recent Labs   Lab Test 04/07/22  1230 09/30/21  1020 03/19/21  0857 06/17/20  0815   LDL 38  --  42 31   HDL 75  --  80 80   NHDL 53  --  55 41   CHOL 128  --  135 121   TRIG 74  --  67 49   TSH 2.07 1.02 1.93 1.37       Lab Results   Component Value Date    WBC 7.2 03/19/2021    RBC 4.60 03/19/2021    HGB 14.1 03/19/2021    HCT 40.7 03/19/2021    MCV 89 03/19/2021    MCH 30.7 03/19/2021    MCHC 34.6 03/19/2021    RDW 14.6 03/19/2021     03/19/2021       Lab Results   Component Value Date     04/07/2022     05/12/2021    POTASSIUM 3.7 04/07/2022    POTASSIUM 3.9 05/12/2021    CHLORIDE 105 04/07/2022    CHLORIDE 103 05/12/2021    CO2 24 04/07/2022    CO2 26 05/12/2021    ANIONGAP 9 04/07/2022    ANIONGAP 8 05/12/2021    GLC 80 04/07/2022     (H) 05/12/2021    BUN 13 04/07/2022    BUN 17 05/12/2021    CR 0.71 04/07/2022    CR 0.71 05/12/2021    GFRESTIMATED 88 04/07/2022    GFRESTIMATED 82 05/12/2021    GFRESTBLACK >90 05/12/2021    JOSEPH 9.5 04/07/2022    JOSEPH 9.4 05/12/2021      Lab Results   Component Value Date    AST 50 (H) 04/07/2022    AST 48 (H) 05/12/2021    ALT 71 (H) 04/07/2022    ALT 79 (H) 05/12/2021       Lab Results   Component Value Date    A1C 5.9 (H) 04/07/2022    A1C 6.0 (H) 03/19/2021       Lab Results   Component Value Date    INR 0.98 06/27/2018         FATMATA PEREIRA [facility]  Pager: [phone]  RN phone: [phone]

## 2022-05-13 NOTE — LETTER
5/13/2022    Zoraida Braga MD  [address]  [place]                [place] [address]    RE: Yola Mayo       Dear Colleague,     I had the pleasure of seeing Yola Mayo in the [facility].    Cardiology Clinic Progress Note  Yola Mayo MRN# [id number]   YOB: 1945 Age: 76 year old     Primary cardiologist: Dr. Howard    Reason for visit: Follow up stress test    History of presenting illness:    Yola Mayo, a pleasant 76 year old patient who has a past medical history significant for of coronary artery disease status post PCI to the mid and distal RCA 6/2018, hypertension, hyperlipidemia, very strong family history of premature coronary artery disease, and diabetes.       Due to symptoms of exertional angina after 1 block relieved with rest, she underwent a stress echocardiogram that was abnormal. Subsequently, a coronary angiogram revealed a 50-60% narrowing of the proximal LAD with 100% occlusion of the first diagonal above the LAD lesion.  The left circumflex had a 20-30% proximal narrowing.  The right coronary artery had a 60-70% narrowing in the proximal portion and a 95% narrowing in the midportion at the takeoff of the RV branch which is 95% narrowed.  She received 2 drug-eluting stents that were back to back to the right coronary artery with resolution of her symptoms.      Dee Dee was been diagnosed with right-sided grade 2 invasive ductal carcinoma in March of 2021. She is now s/p right breast lumpectomy and sentinel lymph node excision by Dr. Rogers and underwent radiation and is now on anastrozole.  She is planned to follow up with Dr. Grullon in 6 months. Her BP was elevated at her visit.      She was evaluated by Dr. Howard in September with concerns of GALINDO and Toprol was discontinued as she was bradycardic with concerns for chronotropic incompetence.  At her last visit she reported ongoing GALINDO despite stopping metoprolol. She specifically gets short of  breath with walking upstairs or an incline. She reports that her home BP has also been elevated in the 140's.  Hydralazine was subsequently increased and underwent a stress echocardiogram that was abnormal.    Today she reports her home blood pressure is 135/76 but was elevated in clinic today.  Her shortness of breath is ongoing and is reminiscent of prior to her RCA stenting in 2018.    All risks and benefits for this procedure have been explained to this patient and accepted.  This includes but is not limited to death, heart attack, stroke, blood clots, cardiac or vascular perforation, bleeding including the need for blood transfusion and the risk thereof, allergic reaction to x-ray dye, arrhythmia necessitating cardioversion, contrast dye nephropathy (including risks of temporary or permanent dialysis).  We talked about intracoronary stenting and the risks thereof and bypass surgery.      No history of bleeding problems or current bleeding, and no scheduled surgeries or procedures in the next year. Patient understands and wishes to proceed with it.    Contrast allergy: No  Anticoagulation: no   Metformin: Yes (hold day of and 2 days post)  Oral DM meds: no (hold day of)  Insulin: No  Diuretic: Yes,  Use of phosphodiesterase type 5 inhibitor: No  Aspirin: Yes (take night before and morning of take 4)  Pt informed to be NPO at midnight: Yes  Renal issues: No  Pt has transportation and 24 hours post procedure monitoring set up. Yes  Pt aware of no driving for 24 hours post procedure.  Yes           Assessment and Plan:     ASSESSMENT:    1. Coronary artery disease    Status post 2 drug eluting stents to the mid and distal RCA 6/27/2018    Residual 50-60% mid LAD lesion with 100% occlusion of the first diagonal    Recurrent GALINDO specifically when walking up stair or an incline  ongoing symptoms despite blood pressure management and normal stress echo    2. Hyperlipidemia, LDL goal<70    Most recent LDL was 42  "3/19/21    Atorvastatin 10 mg daily     3. Hypertension, goal less than 140/90    Elevated today at on home readings    Losartan to 100 mg daily and hydrochlorthiazide 25 mg daily hydralazine 25 mg BID     PLAN:     1. Coronary angiogram  2. Return to clinic post procedure       Orders this Visit:  Orders Placed This Encounter   Procedures     Follow-Up with Cardiology     Case Request Cath Lab: Coronary Angiogram     No orders of the defined types were placed in this encounter.    There are no discontinued medications.    Today's clinic visit entailed:  Review of the result(s) of each unique test - Stress echo, cath  Ordering of each unique test  Prescription drug management  20 minutes spent on the date of the encounter doing chart review, history and exam, documentation and further activities per the note  Provider  Link to [facility] Help Grid     The level of medical decision making during this visit was of moderate complexity.           Review of Systems:     Review of Systems:  Skin:  Negative bruising   Eyes:  Positive for glasses  ENT:  Negative    Respiratory:  Positive for dyspnea on exertion  Cardiovascular:  Negative;palpitations;chest pain;lightheadedness;dizziness Positive for;fatigue  Gastroenterology: Negative heartburn;reflux  Genitourinary:  Positive for urinary frequency;nocturia  Musculoskeletal:  Positive for arthritis  Neurologic:  Negative headaches  Psychiatric:  Positive for sleep disturbances  Heme/Lymph/Imm:  Negative allergies  Endocrine:  Positive for thyroid disorder;diabetes            Physical Exam:     Vitals: /76   Pulse 79   Ht 1.626 m (5' 4\")   Wt 82.1 kg (181 lb)   SpO2 98%   BMI 31.07 kg/m    Constitutional: Well nourished and in no apparent distress.  Eyes: Pupils equal, round. Sclerae anicteric.   HEENT: Normocephalic, atraumatic.   Neck: Supple. Carotid pulses full and equal. No carotid bruit. No anterior cervical or supraclavicular lymphadenopathy appreciated. No JVD " appreciated.  Respiratory: Breathing non-labored. Lungs clear to auscultation bilaterally. No crackles, wheezes, rhonchi, or rales.  Cardiovascular:  Regular rate and rhythm, normal S1 and S2. No murmur, rub, or gallop.  Skin: Warm, dry. No rashes, cyanosis, or xanthelasma.  Extremities: No edema.  Neurologic: No gross motor deficits. Alert, awake, and oriented to person, place and time.  Psychiatric: Affect appropriate.             Medications:     Current Outpatient Medications   Medication Sig Dispense Refill     acetaminophen (TYLENOL) 325 MG tablet Take 2 tablets (650 mg) by mouth 3 times daily 100 tablet 0     amoxicillin (AMOXIL) 500 MG capsule Take 4 capsules half to one hour before the procedure. 4 capsule 3     anastrozole (ARIMIDEX) 1 MG tablet Take 1 tablet (1 mg) by mouth daily 90 tablet 3     aspirin 81 MG tablet Take 81 mg by mouth daily       atorvastatin (LIPITOR) 10 MG tablet TAKE 1 TABLET (10 MG) BY MOUTH DAILY FOR CHOLESTROL 90 tablet 3     blood glucose (CONTOUR NEXT TEST) test strip Use to test blood sugar 1 time daily or as directed. 100 each 2     blood glucose monitoring (NITO MICROLET) lancets Use to test blood sugar 1 time daily or as directed. 100 each 3     calcium-vitamin D (CALTRATE) 600-400 MG-UNIT per tablet Take 1 tablet by mouth daily 60 tablet 11     cholecalciferol (VITAMIN D) 1000 UNIT tablet Take 1 tablet (1,000 Units) by mouth daily 100 tablet 3     CONTOUR NEXT EZ (CONTOUR NEXT EZ W/DEVICE KIT) w/Device KIT 1 kit once 1 kit 0     Cyanocobalamin (VITAMIN B 12 PO) Take 1 tablet by mouth daily VIT B12 unknown otc dose       hydrALAZINE (APRESOLINE) 25 MG tablet Take 2 tablets (50 mg) by mouth 2 times daily 270 tablet 3     hydrochlorothiazide (HYDRODIURIL) 25 MG tablet TAKE 1 TABLET (25 MG) BY MOUTH DAILY FOR BLOOD PRESSURE 90 tablet 3     levothyroxine (SYNTHROID/LEVOTHROID) 112 MCG tablet TAKE 1 TABLET (112 MCG) BY MOUTH DAILY FOR THYROID 90 tablet 0     losartan (COZAAR) 100  MG tablet TAKE 1 TABLET (100 MG) BY MOUTH DAILY FOR BLOOD PRESSURE 90 tablet 3     metFORMIN (GLUCOPHAGE-XR) 500 MG 24 hr tablet TAKE 2 TABLETS (1,000 MG) BY MOUTH DAILY (WITH DINNER) 180 tablet 3     metroNIDAZOLE (METROGEL) 0.75 % external gel APPLY TO AFFECTED AREA TWICE A DAY 45 g 3     multivitamin, therapeutic with minerals (THERA-VIT-M) TABS Take 1 tablet by mouth daily       nitroGLYcerin (NITROSTAT) 0.4 MG sublingual tablet For chest pain place 1 tablet under the tongue every 5 minutes for 3 doses. If symptoms persist 5 minutes after 1st dose call 911. 84 tablet 3       Family History   Problem Relation Age of Onset     Cancer Mother         ovarian  of it  at 49yo.     Other Cancer Mother      Diabetes Father         Adult onset     Hypertension Father      C.A.D. Father         Fatal Heart attack at 70.     Cerebrovascular Disease Father      Thyroid Disease Sister      Thyroid Disease Sister      Thyroid Disease Sister      Cancer Daughter      Thyroid Disease Daughter      Breast Cancer No family hx of      Cancer - colorectal No family hx of        Social History     Socioeconomic History     Marital status:      Spouse name: FABIAN CUADRA     Number of children: 2     Years of education: Not on file     Highest education level: Not on file   Occupational History     Occupation: [employer]     Employer: [employer]   Tobacco Use     Smoking status: Never Smoker     Smokeless tobacco: Never Used   Substance and Sexual Activity     Alcohol use: Yes     Alcohol/week: 0.0 standard drinks     Comment: occasional     Drug use: No     Sexual activity: Yes     Partners: Male   Other Topics Concern      Service No     Blood Transfusions No     Caffeine Concern No     Occupational Exposure No     Hobby Hazards No     Sleep Concern No     Stress Concern No     Weight Concern Yes     Special Diet No     Back Care No     Exercise Yes     Comment: Approx 3 times a week for about 30 minutes      Bike Helmet Yes     Seat Belt Yes     Self-Exams Yes     Parent/sibling w/ CABG, MI or angioplasty before 65F 55M? Not Asked   Social History Narrative     Not on file     Social Determinants of Health     Financial Resource Strain: Not on file   Food Insecurity: Not on file   Transportation Needs: Not on file   Physical Activity: Not on file   Stress: Not on file   Social Connections: Not on file   Intimate Partner Violence: Not on file   Housing Stability: Not on file            Past Medical History:     Past Medical History:   Diagnosis Date     Anemia due to blood loss      BASAL CELL CANCER -NOSE      Dermatitis      Diabetes (H)      Family history of diabetes mellitus     Father     Family history of ischemic heart disease     Father     Family history of malignant neoplasm of ovary     Mother     HX DISRUPT L ANT CRUCIATE S/P REPAIR 2003      Hyperlipidemia      HYPERTENSION       HYPOTHYROIDISM      LOC PRIM OSTEOARTH-L 1ST METATARSAL-CUNEIFORM JOINT 3/10/2004     Major depressive disorder, single episode, mild (H) 11/24/2014     MILD-MOD TRICUSPID REGURG. MOD PULM HTN 6/23/2005    ECHO  5/05     Obese      PONV (postoperative nausea and vomiting)      Rosacea      Shingles      SPLENIC CYST               Past Surgical History:     Past Surgical History:   Procedure Laterality Date     ARTHROPLASTY KNEE Left 4/13/2016    Procedure: ARTHROPLASTY KNEE;  Surgeon: Giuseppe Lopez MD;  Location:  OR     ARTHROPLASTY MINIMALLY INVASIVE HIP  4/18/2012    Procedure:ARTHROPLASTY MINIMALLY INVASIVE HIP; RIGHT TWO INCISION MINIMALLY INVASIVE HIP ARTHROPLASTY (YUVAL)^ (NO CELL SAVER) (C-ARM); Surgeon:GIUSEPPE LOPEZ; Location: OR     BIOPSY NODE SENTINEL Right 4/23/2021    Procedure: RIGHT SENTINEL LYMPH NODE BIOPSY;  Surgeon: Giuseppe Rogers MD;  Location:  OR     C CHEMOSURG MOHS 1ST STAGE  9-99    Mohs-nose     CARDIAC SURGERY  06/27/2019    mid and distal RCA stents     ENT SURGERY      mohs  procedure on nose     HYSTERECTOMY, PAP NO LONGER INDICATED       LUMPECTOMY BREAST WITH SEED LOCALIZATION Right 4/23/2021    Procedure: SEED LOCALIZED RIGHT BREAST LUMPECTOMY;  Surgeon: Giuseppe Rogers MD;  Location:  OR     SURGICAL HISTORY OF -   Mult-~1978    infertility, laparotomies     [facility] APPENDECTOMY  8/87    incidental appi at [facility] REPAIR CRUCIATE LIGAMENT,KNEE  8/2003    L   ACL reconstruction     [facility] TOTAL ABDOM HYSTERECTOMY  8/87    W/ BSO fibroids, hx PID     [facility] COLONOSCOPY THRU STOMA, DIAGNOSTIC  4/03    normal, rec repeat 10 yrs              Allergies:   Toprol xl [metoprolol], Ace inhibitors, Oxycodone, and Tetanus toxoid       Data:   All laboratory data reviewed:    Recent Labs   Lab Test 04/07/22  1230 09/30/21  1020 03/19/21  0857 06/17/20  0815   LDL 38  --  42 31   HDL 75  --  80 80   NHDL 53  --  55 41   CHOL 128  --  135 121   TRIG 74  --  67 49   TSH 2.07 1.02 1.93 1.37       Lab Results   Component Value Date    WBC 7.2 03/19/2021    RBC 4.60 03/19/2021    HGB 14.1 03/19/2021    HCT 40.7 03/19/2021    MCV 89 03/19/2021    MCH 30.7 03/19/2021    MCHC 34.6 03/19/2021    RDW 14.6 03/19/2021     03/19/2021       Lab Results   Component Value Date     04/07/2022     05/12/2021    POTASSIUM 3.7 04/07/2022    POTASSIUM 3.9 05/12/2021    CHLORIDE 105 04/07/2022    CHLORIDE 103 05/12/2021    CO2 24 04/07/2022    CO2 26 05/12/2021    ANIONGAP 9 04/07/2022    ANIONGAP 8 05/12/2021    GLC 80 04/07/2022     (H) 05/12/2021    BUN 13 04/07/2022    BUN 17 05/12/2021    CR 0.71 04/07/2022    CR 0.71 05/12/2021    GFRESTIMATED 88 04/07/2022    GFRESTIMATED 82 05/12/2021    GFRESTBLACK >90 05/12/2021    JOSEPH 9.5 04/07/2022    JOSEPH 9.4 05/12/2021      Lab Results   Component Value Date    AST 50 (H) 04/07/2022    AST 48 (H) 05/12/2021    ALT 71 (H) 04/07/2022    ALT 79 (H) 05/12/2021       Lab Results   Component Value Date    A1C 5.9 (H) 04/07/2022    A1C 6.0 (H)  03/19/2021       Lab Results   Component Value Date    INR 0.98 06/27/2018         FATMATA PEREIRA CNP  [facility]  Pager: [phone]  RN phone: [phone]    Thank you for allowing me to participate in the care of your patient.      Sincerely,     FATMATA PEREIRA CNP     [facility]  cc:   FATMATA Cruz CNP  [address]  [place] [address]

## 2022-05-13 NOTE — PATIENT INSTRUCTIONS
Today's Recommendations    Coronary angiogram  Continue all medications without changes.  Please follow up with Krystal in post procedure.    Please send a [employer] message or call [phone] to the RN team with questions or concerns.     Scheduling number [phone]    FATMATA Shelton, CNP

## 2022-05-17 ENCOUNTER — LAB (OUTPATIENT)
Dept: LAB | Facility: CLINIC | Age: 77
End: 2022-05-17
Payer: MEDICARE

## 2022-05-17 DIAGNOSIS — I25.10 CORONARY ARTERY DISEASE INVOLVING NATIVE CORONARY ARTERY OF NATIVE HEART WITHOUT ANGINA PECTORIS: ICD-10-CM

## 2022-05-17 DIAGNOSIS — R06.09 DOE (DYSPNEA ON EXERTION): ICD-10-CM

## 2022-05-17 PROCEDURE — U0005 INFEC AGEN DETEC AMPLI PROBE: HCPCS

## 2022-05-17 PROCEDURE — U0003 INFECTIOUS AGENT DETECTION BY NUCLEIC ACID (DNA OR RNA); SEVERE ACUTE RESPIRATORY SYNDROME CORONAVIRUS 2 (SARS-COV-2) (CORONAVIRUS DISEASE [COVID-19]), AMPLIFIED PROBE TECHNIQUE, MAKING USE OF HIGH THROUGHPUT TECHNOLOGIES AS DESCRIBED BY CMS-2020-01-R: HCPCS

## 2022-05-18 ENCOUNTER — HOSPITAL ENCOUNTER (OUTPATIENT)
Dept: OCCUPATIONAL THERAPY | Facility: CLINIC | Age: 77
Discharge: HOME | End: 2022-05-18
Payer: MEDICARE

## 2022-05-18 ENCOUNTER — TELEPHONE (OUTPATIENT)
Dept: MEDSURG UNIT | Facility: CLINIC | Age: 77
End: 2022-05-18

## 2022-05-18 ENCOUNTER — TELEPHONE (OUTPATIENT)
Dept: CARDIOLOGY | Facility: CLINIC | Age: 77
End: 2022-05-18

## 2022-05-18 DIAGNOSIS — I25.10 CORONARY ARTERY DISEASE INVOLVING NATIVE CORONARY ARTERY OF NATIVE HEART WITHOUT ANGINA PECTORIS: Primary | ICD-10-CM

## 2022-05-18 DIAGNOSIS — I89.0 LYMPHEDEMA OF BREAST: Primary | ICD-10-CM

## 2022-05-18 LAB — SARS-COV-2 RNA RESP QL NAA+PROBE: NEGATIVE

## 2022-05-18 PROCEDURE — 97140 MANUAL THERAPY 1/> REGIONS: CPT | Mod: GO

## 2022-05-18 RX ORDER — ASPIRIN 325 MG
325 TABLET ORAL ONCE
Status: CANCELLED | OUTPATIENT
Start: 2022-05-18 | End: 2022-05-18

## 2022-05-18 RX ORDER — POTASSIUM CHLORIDE 1500 MG/1
20 TABLET, EXTENDED RELEASE ORAL
Status: CANCELLED | OUTPATIENT
Start: 2022-05-18

## 2022-05-18 RX ORDER — ASPIRIN 81 MG/1
243 TABLET, CHEWABLE ORAL ONCE
Status: CANCELLED | OUTPATIENT
Start: 2022-05-18

## 2022-05-18 RX ORDER — LIDOCAINE 40 MG/G
CREAM TOPICAL
Status: CANCELLED | OUTPATIENT
Start: 2022-05-18

## 2022-05-18 RX ORDER — SODIUM CHLORIDE 9 MG/ML
INJECTION, SOLUTION INTRAVENOUS CONTINUOUS
Status: CANCELLED | OUTPATIENT
Start: 2022-05-18

## 2022-05-18 NOTE — TELEPHONE ENCOUNTER
Chart reviewed for proc.  Pt has nursing orders and CSE in epic.  No contrast allergy.  Covid test in process now.  Admitting RN to verify status.  No call needed, note in epic from clinic with instructions.

## 2022-05-18 NOTE — TELEPHONE ENCOUNTER
Called patient with Pre cath instructions:     Contrast allergy: no  Anticoagulation: no   Metformin: yes, will hold and have BMP rechecked 5/23/22  Oral DM meds: no  Insulin: no  Diuretic: yes, will hold hydrochlorothiazide   Use of phosphodiesterase type 5 inhibitor: no  Aspirin: yes, will take 325mg morning of procedure   Pt informed to be NPO at midnight  Renal issues no  Pt has transportation and 24 hours post procedure monitoring set up.   Pt aware of no driving for 24 hours post procedure.     Pt aware of arrival time and location. Pt verbalized understanding of instructions.       COVID test 5/17/22-PENDING      ----- Message -----  From: Amina Cook  Sent: 5/13/2022   9:47 AM CDT  To: Maryan Thompson [facility] Team 4  Subject: [facility] DOS 5/19/22                                  Angiogram Orders    Location: [facility]    Procedure: Coronary Angiogram    Diagnosis: CAD/Essential hypertension, benign,    Procedure Date: 5/19/22    Patient Arrival Time: 630am    Procedure Time: 0830 (pending emergency)    Ordering Cardiologist: Dr. Howard    Performing Cardiologist: Dr. Graham    Cardiac Assessment Completed: Yes  Date: 5/13/22  Provider: Krystal    Pre-Procedure Labs completed: on admit    Post Procedure MAYELA appointment scheduled: Yes  Date: 5/23/22  Provider: Krystal     Patient Diabetic on Meds/Insulin:  Yes  If on Metformin, has 2 day post procedure BMP been scheduled: Yes  (Thursday and Friday procedures should have Monday BMP)  Patient on Coumadin/Warfarin:  No  Patient on Pradaxa/Xarelto/Eliquis:  No    Does Patient have a history of bypass:  No      COVID Test Scheduled: Yes 5/17/22    Appointment was scheduled: Face to Face    Special instructions:  Radial approach and Dr. Graham, both requested by Krystal

## 2022-05-19 ENCOUNTER — HOSPITAL ENCOUNTER (OUTPATIENT)
Facility: CLINIC | Age: 77
Discharge: HOME | End: 2022-05-19
Admitting: INTERNAL MEDICINE
Payer: MEDICARE

## 2022-05-19 VITALS
SYSTOLIC BLOOD PRESSURE: 146 MMHG | DIASTOLIC BLOOD PRESSURE: 67 MMHG | BODY MASS INDEX: 31.07 KG/M2 | RESPIRATION RATE: 16 BRPM | WEIGHT: 182 LBS | OXYGEN SATURATION: 96 % | HEIGHT: 64 IN | HEART RATE: 73 BPM

## 2022-05-19 DIAGNOSIS — Z95.5 S/P DRUG ELUTING CORONARY STENT PLACEMENT: Primary | ICD-10-CM

## 2022-05-19 DIAGNOSIS — R06.09 DOE (DYSPNEA ON EXERTION): ICD-10-CM

## 2022-05-19 DIAGNOSIS — I25.10 CORONARY ARTERY DISEASE INVOLVING NATIVE CORONARY ARTERY OF NATIVE HEART WITHOUT ANGINA PECTORIS: ICD-10-CM

## 2022-05-19 DIAGNOSIS — I10 ESSENTIAL HYPERTENSION, BENIGN: ICD-10-CM

## 2022-05-19 PROBLEM — Z98.890 STATUS POST CORONARY ANGIOGRAM: Status: ACTIVE | Noted: 2022-05-19

## 2022-05-19 LAB
ACT BLD: 263 SECONDS (ref 74–150)
ACT BLD: 369 SECONDS (ref 74–150)
ACT BLD: 390 SECONDS (ref 74–150)
ANION GAP SERPL CALCULATED.3IONS-SCNC: 6 MMOL/L (ref 3–14)
BUN SERPL-MCNC: 13 MG/DL (ref 7–30)
CALCIUM SERPL-MCNC: 8.9 MG/DL (ref 8.5–10.1)
CHLORIDE BLD-SCNC: 106 MMOL/L (ref 94–109)
CO2 SERPL-SCNC: 26 MMOL/L (ref 20–32)
CREAT SERPL-MCNC: 0.67 MG/DL (ref 0.52–1.04)
ERYTHROCYTE [DISTWIDTH] IN BLOOD BY AUTOMATED COUNT: 13.8 % (ref 10–15)
GFR SERPL CREATININE-BSD FRML MDRD: 90 ML/MIN/1.73M2
GLUCOSE BLD-MCNC: 123 MG/DL (ref 70–99)
HCT VFR BLD AUTO: 36.4 % (ref 35–47)
HGB BLD-MCNC: 12.4 G/DL (ref 11.7–15.7)
INR PPP: 0.97 (ref 0.85–1.15)
MCH RBC QN AUTO: 29.5 PG (ref 26.5–33)
MCHC RBC AUTO-ENTMCNC: 34.1 G/DL (ref 31.5–36.5)
MCV RBC AUTO: 87 FL (ref 78–100)
PLATELET # BLD AUTO: 230 10E3/UL (ref 150–450)
POTASSIUM BLD-SCNC: 3.3 MMOL/L (ref 3.4–5.3)
RBC # BLD AUTO: 4.21 10E6/UL (ref 3.8–5.2)
SODIUM SERPL-SCNC: 138 MMOL/L (ref 133–144)
WBC # BLD AUTO: 4.5 10E3/UL (ref 4–11)

## 2022-05-19 PROCEDURE — C1874 STENT, COATED/COV W/DEL SYS: HCPCS | Performed by: INTERNAL MEDICINE

## 2022-05-19 PROCEDURE — C1769 GUIDE WIRE: HCPCS | Performed by: INTERNAL MEDICINE

## 2022-05-19 PROCEDURE — 999N000071 HC STATISTIC HEART CATH LAB OR EP LAB

## 2022-05-19 PROCEDURE — C9600 PERC DRUG-EL COR STENT SING: HCPCS | Performed by: INTERNAL MEDICINE

## 2022-05-19 PROCEDURE — 999N000184 HC STATISTIC TELEMETRY

## 2022-05-19 PROCEDURE — 99152 MOD SED SAME PHYS/QHP 5/>YRS: CPT | Performed by: INTERNAL MEDICINE

## 2022-05-19 PROCEDURE — 99153 MOD SED SAME PHYS/QHP EA: CPT | Performed by: INTERNAL MEDICINE

## 2022-05-19 PROCEDURE — 92929 PR PRQ TRLUML CORONARY BM STENT W/ANGIO ADDL ART/BRNCH: CPT | Mod: LD | Performed by: INTERNAL MEDICINE

## 2022-05-19 PROCEDURE — 92978 ENDOLUMINL IVUS OCT C 1ST: CPT | Performed by: INTERNAL MEDICINE

## 2022-05-19 PROCEDURE — C1753 CATH, INTRAVAS ULTRASOUND: HCPCS | Performed by: INTERNAL MEDICINE

## 2022-05-19 PROCEDURE — 93010 ELECTROCARDIOGRAM REPORT: CPT | Mod: 76 | Performed by: INTERNAL MEDICINE

## 2022-05-19 PROCEDURE — C9601 PERC DRUG-EL COR STENT BRAN: HCPCS | Mod: LD

## 2022-05-19 PROCEDURE — 93005 ELECTROCARDIOGRAM TRACING: CPT

## 2022-05-19 PROCEDURE — 85347 COAGULATION TIME ACTIVATED: CPT | Mod: 91

## 2022-05-19 PROCEDURE — 85014 HEMATOCRIT: CPT | Performed by: INTERNAL MEDICINE

## 2022-05-19 PROCEDURE — 250N000011 HC RX IP 250 OP 636: Performed by: INTERNAL MEDICINE

## 2022-05-19 PROCEDURE — 93571 IV DOP VEL&/PRESS C FLO 1ST: CPT | Mod: 26 | Performed by: INTERNAL MEDICINE

## 2022-05-19 PROCEDURE — 93454 CORONARY ARTERY ANGIO S&I: CPT | Mod: 26 | Performed by: INTERNAL MEDICINE

## 2022-05-19 PROCEDURE — 36591 DRAW BLOOD OFF VENOUS DEVICE: CPT

## 2022-05-19 PROCEDURE — C1894 INTRO/SHEATH, NON-LASER: HCPCS | Performed by: INTERNAL MEDICINE

## 2022-05-19 PROCEDURE — 82310 ASSAY OF CALCIUM: CPT | Performed by: INTERNAL MEDICINE

## 2022-05-19 PROCEDURE — 93572 IV DOP VEL&/PRESS C FLO EA: CPT | Mod: 52,LC

## 2022-05-19 PROCEDURE — 92978 ENDOLUMINL IVUS OCT C 1ST: CPT | Mod: 26 | Performed by: INTERNAL MEDICINE

## 2022-05-19 PROCEDURE — 36415 COLL VENOUS BLD VENIPUNCTURE: CPT | Performed by: INTERNAL MEDICINE

## 2022-05-19 PROCEDURE — 92928 PRQ TCAT PLMT NTRAC ST 1 LES: CPT | Mod: LD | Performed by: INTERNAL MEDICINE

## 2022-05-19 PROCEDURE — 85610 PROTHROMBIN TIME: CPT | Performed by: INTERNAL MEDICINE

## 2022-05-19 PROCEDURE — 250N000013 HC RX MED GY IP 250 OP 250 PS 637: Performed by: INTERNAL MEDICINE

## 2022-05-19 PROCEDURE — 93572 IV DOP VEL&/PRESS C FLO EA: CPT | Mod: 26 | Performed by: INTERNAL MEDICINE

## 2022-05-19 PROCEDURE — C1725 CATH, TRANSLUMIN NON-LASER: HCPCS | Performed by: INTERNAL MEDICINE

## 2022-05-19 PROCEDURE — 272N000001 HC OR GENERAL SUPPLY STERILE: Performed by: INTERNAL MEDICINE

## 2022-05-19 PROCEDURE — 250N000009 HC RX 250: Performed by: INTERNAL MEDICINE

## 2022-05-19 PROCEDURE — 93454 CORONARY ARTERY ANGIO S&I: CPT | Performed by: INTERNAL MEDICINE

## 2022-05-19 PROCEDURE — 258N000003 HC RX IP 258 OP 636: Performed by: INTERNAL MEDICINE

## 2022-05-19 PROCEDURE — C1887 CATHETER, GUIDING: HCPCS | Performed by: INTERNAL MEDICINE

## 2022-05-19 PROCEDURE — 999N000054 HC STATISTIC EKG NON-CHARGEABLE

## 2022-05-19 PROCEDURE — 92979 ENDOLUMINL IVUS OCT C EA: CPT | Performed by: INTERNAL MEDICINE

## 2022-05-19 PROCEDURE — 93571 IV DOP VEL&/PRESS C FLO 1ST: CPT | Mod: 52,LD | Performed by: INTERNAL MEDICINE

## 2022-05-19 DEVICE — STENT CORONARY DES SYNERGY XD MR US 3.00X24MM H7493941824300: Type: IMPLANTABLE DEVICE | Status: FUNCTIONAL

## 2022-05-19 DEVICE — STENT CORONARY DES SYNERGY XD MR US 2.25X24MM H7493941824220: Type: IMPLANTABLE DEVICE | Status: FUNCTIONAL

## 2022-05-19 RX ORDER — NALOXONE HYDROCHLORIDE 0.4 MG/ML
0.4 INJECTION, SOLUTION INTRAMUSCULAR; INTRAVENOUS; SUBCUTANEOUS
Status: DISCONTINUED | OUTPATIENT
Start: 2022-05-19 | End: 2022-05-19 | Stop reason: HOSPADM

## 2022-05-19 RX ORDER — FENTANYL CITRATE 50 UG/ML
25 INJECTION, SOLUTION INTRAMUSCULAR; INTRAVENOUS
Status: DISCONTINUED | OUTPATIENT
Start: 2022-05-19 | End: 2022-05-19 | Stop reason: HOSPADM

## 2022-05-19 RX ORDER — METOPROLOL TARTRATE 1 MG/ML
5 INJECTION, SOLUTION INTRAVENOUS
Status: DISCONTINUED | OUTPATIENT
Start: 2022-05-19 | End: 2022-05-19 | Stop reason: HOSPADM

## 2022-05-19 RX ORDER — SODIUM CHLORIDE 9 MG/ML
INJECTION, SOLUTION INTRAVENOUS CONTINUOUS
Status: DISCONTINUED | OUTPATIENT
Start: 2022-05-19 | End: 2022-05-19 | Stop reason: HOSPADM

## 2022-05-19 RX ORDER — HEPARIN SODIUM 10000 [USP'U]/100ML
100-1000 INJECTION, SOLUTION INTRAVENOUS CONTINUOUS PRN
Status: DISCONTINUED | OUTPATIENT
Start: 2022-05-19 | End: 2022-05-19 | Stop reason: HOSPADM

## 2022-05-19 RX ORDER — ACETAMINOPHEN 325 MG/1
650 TABLET ORAL EVERY 4 HOURS PRN
Status: DISCONTINUED | OUTPATIENT
Start: 2022-05-19 | End: 2022-05-19 | Stop reason: HOSPADM

## 2022-05-19 RX ORDER — NITROGLYCERIN 0.4 MG/1
0.4 TABLET SUBLINGUAL EVERY 5 MIN PRN
Status: DISCONTINUED | OUTPATIENT
Start: 2022-05-19 | End: 2022-05-19 | Stop reason: HOSPADM

## 2022-05-19 RX ORDER — NITROGLYCERIN 5 MG/ML
VIAL (ML) INTRAVENOUS
Status: DISCONTINUED | OUTPATIENT
Start: 2022-05-19 | End: 2022-05-19 | Stop reason: HOSPADM

## 2022-05-19 RX ORDER — HEPARIN SODIUM 1000 [USP'U]/ML
INJECTION, SOLUTION INTRAVENOUS; SUBCUTANEOUS
Status: DISCONTINUED | OUTPATIENT
Start: 2022-05-19 | End: 2022-05-19 | Stop reason: HOSPADM

## 2022-05-19 RX ORDER — ASPIRIN 81 MG/1
243 TABLET, CHEWABLE ORAL ONCE
Status: DISCONTINUED | OUTPATIENT
Start: 2022-05-19 | End: 2022-05-19 | Stop reason: HOSPADM

## 2022-05-19 RX ORDER — FENTANYL CITRATE 50 UG/ML
INJECTION, SOLUTION INTRAMUSCULAR; INTRAVENOUS
Status: DISCONTINUED | OUTPATIENT
Start: 2022-05-19 | End: 2022-05-19 | Stop reason: HOSPADM

## 2022-05-19 RX ORDER — ASPIRIN 325 MG
325 TABLET ORAL ONCE
Status: DISCONTINUED | OUTPATIENT
Start: 2022-05-19 | End: 2022-05-19 | Stop reason: HOSPADM

## 2022-05-19 RX ORDER — ONDANSETRON 4 MG/1
4 TABLET, ORALLY DISINTEGRATING ORAL EVERY 6 HOURS PRN
Status: DISCONTINUED | OUTPATIENT
Start: 2022-05-19 | End: 2022-05-19 | Stop reason: HOSPADM

## 2022-05-19 RX ORDER — ASPIRIN 81 MG/1
81 TABLET, CHEWABLE ORAL ONCE
Status: DISCONTINUED | OUTPATIENT
Start: 2022-05-19 | End: 2022-05-19 | Stop reason: HOSPADM

## 2022-05-19 RX ORDER — HYDRALAZINE HYDROCHLORIDE 20 MG/ML
10 INJECTION INTRAMUSCULAR; INTRAVENOUS EVERY 4 HOURS PRN
Status: DISCONTINUED | OUTPATIENT
Start: 2022-05-19 | End: 2022-05-19 | Stop reason: HOSPADM

## 2022-05-19 RX ORDER — ARGATROBAN 1 MG/ML
150 INJECTION, SOLUTION INTRAVENOUS
Status: DISCONTINUED | OUTPATIENT
Start: 2022-05-19 | End: 2022-05-19 | Stop reason: HOSPADM

## 2022-05-19 RX ORDER — ASPIRIN 81 MG/1
81 TABLET ORAL DAILY
Status: DISCONTINUED | OUTPATIENT
Start: 2022-05-20 | End: 2022-05-19 | Stop reason: HOSPADM

## 2022-05-19 RX ORDER — OXYCODONE HYDROCHLORIDE 5 MG/1
5 TABLET ORAL EVERY 4 HOURS PRN
Status: DISCONTINUED | OUTPATIENT
Start: 2022-05-19 | End: 2022-05-19 | Stop reason: HOSPADM

## 2022-05-19 RX ORDER — ATROPINE SULFATE 0.1 MG/ML
0.5 INJECTION INTRAVENOUS
Status: DISCONTINUED | OUTPATIENT
Start: 2022-05-19 | End: 2022-05-19 | Stop reason: HOSPADM

## 2022-05-19 RX ORDER — NALOXONE HYDROCHLORIDE 0.4 MG/ML
0.2 INJECTION, SOLUTION INTRAMUSCULAR; INTRAVENOUS; SUBCUTANEOUS
Status: DISCONTINUED | OUTPATIENT
Start: 2022-05-19 | End: 2022-05-19 | Stop reason: HOSPADM

## 2022-05-19 RX ORDER — EPTIFIBATIDE 2 MG/ML
2 INJECTION, SOLUTION INTRAVENOUS CONTINUOUS PRN
Status: DISCONTINUED | OUTPATIENT
Start: 2022-05-19 | End: 2022-05-19 | Stop reason: HOSPADM

## 2022-05-19 RX ORDER — FLUMAZENIL 0.1 MG/ML
0.2 INJECTION, SOLUTION INTRAVENOUS
Status: DISCONTINUED | OUTPATIENT
Start: 2022-05-19 | End: 2022-05-19 | Stop reason: HOSPADM

## 2022-05-19 RX ORDER — DOBUTAMINE HYDROCHLORIDE 200 MG/100ML
2-20 INJECTION INTRAVENOUS CONTINUOUS PRN
Status: DISCONTINUED | OUTPATIENT
Start: 2022-05-19 | End: 2022-05-19 | Stop reason: HOSPADM

## 2022-05-19 RX ORDER — ARGATROBAN 1 MG/ML
350 INJECTION, SOLUTION INTRAVENOUS
Status: DISCONTINUED | OUTPATIENT
Start: 2022-05-19 | End: 2022-05-19 | Stop reason: HOSPADM

## 2022-05-19 RX ORDER — VERAPAMIL HYDROCHLORIDE 2.5 MG/ML
INJECTION, SOLUTION INTRAVENOUS
Status: DISCONTINUED | OUTPATIENT
Start: 2022-05-19 | End: 2022-05-19 | Stop reason: HOSPADM

## 2022-05-19 RX ORDER — ONDANSETRON 2 MG/ML
4 INJECTION INTRAMUSCULAR; INTRAVENOUS EVERY 6 HOURS PRN
Status: DISCONTINUED | OUTPATIENT
Start: 2022-05-19 | End: 2022-05-19 | Stop reason: HOSPADM

## 2022-05-19 RX ORDER — DOPAMINE HYDROCHLORIDE 160 MG/100ML
2-20 INJECTION, SOLUTION INTRAVENOUS CONTINUOUS PRN
Status: DISCONTINUED | OUTPATIENT
Start: 2022-05-19 | End: 2022-05-19 | Stop reason: HOSPADM

## 2022-05-19 RX ORDER — LIDOCAINE 40 MG/G
CREAM TOPICAL
Status: DISCONTINUED | OUTPATIENT
Start: 2022-05-19 | End: 2022-05-19 | Stop reason: HOSPADM

## 2022-05-19 RX ORDER — POTASSIUM CHLORIDE 1500 MG/1
20 TABLET, EXTENDED RELEASE ORAL
Status: COMPLETED | OUTPATIENT
Start: 2022-05-19 | End: 2022-05-19

## 2022-05-19 RX ORDER — OXYCODONE HYDROCHLORIDE 5 MG/1
10 TABLET ORAL EVERY 4 HOURS PRN
Status: DISCONTINUED | OUTPATIENT
Start: 2022-05-19 | End: 2022-05-19 | Stop reason: HOSPADM

## 2022-05-19 RX ORDER — EPTIFIBATIDE 2 MG/ML
180 INJECTION, SOLUTION INTRAVENOUS EVERY 10 MIN PRN
Status: DISCONTINUED | OUTPATIENT
Start: 2022-05-19 | End: 2022-05-19 | Stop reason: HOSPADM

## 2022-05-19 RX ADMIN — SODIUM CHLORIDE: 9 INJECTION, SOLUTION INTRAVENOUS at 07:05

## 2022-05-19 RX ADMIN — POTASSIUM CHLORIDE 20 MEQ: 1500 TABLET, EXTENDED RELEASE ORAL at 07:43

## 2022-05-19 NOTE — PROGRESS NOTES
Discharge instructions reviewed with pt and family, questions answered and both state understanding. Stent card given to pt, brilinta filled and given to pt.

## 2022-05-19 NOTE — Clinical Note
The first balloon was inserted into the left anterior descending.Max pressure = 8 faby. Total duration = 15 seconds.     Max pressure = 10 faby. Total duration = 15 seconds.    Balloon reinflated a second time: Max pressure = 10 faby. Total duration = 15 seconds.

## 2022-05-19 NOTE — PROGRESS NOTES
Care Suites Post Procedure Note    Patient Information  Name: Yola Mayo  Age: 76 year old    Post Procedure  Time patient returned to Care Suites: 1040  Concerns/abnormal assessment: no  If abnormal assessment, provider notified: N/A  Plan/Other: Remove TR band per protocol, recover iN CS.  Monitor.    Tiffany Joe RN

## 2022-05-19 NOTE — Clinical Note
The first balloon was inserted into the left anterior descending.Max pressure = 15 faby. Total duration = 20 seconds.

## 2022-05-19 NOTE — PROGRESS NOTES
Care Suites Admission Nursing Note    Patient Information  Name: Yola Mayo  Age: 76 year old  Reason for admission: heart cath  Care Suites arrival time: 0630    Visitor Information  Name: Darrion  Informed of visitor restrictions: Yes  1 visitor allowed per patient   Visitor must screen negative for COVID symptoms   Visitor must wear a mask  Waiting rooms closed to visitors    Patient Admission/Assessment   Pre-procedure assessment complete: Yes  If abnormal assessment/labs, provider notified: N/A  NPO: Yes  Medications held per instructions/orders: Yes  Consent: deferred  If applicable, pregnancy test status: deferred  Patient oriented to room: Yes  Education/questions answered: Yes  Plan/other:     Discharge Planning  Discharge name/phone number: Darrion spouse [phone]  Overnight post sedation caregiver: Darrion  Discharge location: home    Chhaya Carmona RN

## 2022-05-19 NOTE — Clinical Note
Stent deployed in the right coronary artery. Max pressure = 14 faby. Total duration = 20 seconds. Balloon reinflated a second time: Max pressure = 14 faby. Total duration = 20 seconds.

## 2022-05-19 NOTE — Clinical Note
The first balloon was inserted into the left anterior descending.Max pressure = 6 faby. Total duration = 12 seconds.

## 2022-05-19 NOTE — Clinical Note
Stent deployed in the left anterior descending. Max pressure = 12 faby. Total duration = 20 seconds.

## 2022-05-19 NOTE — DISCHARGE INSTRUCTIONS
Cardiac Angioplasty/Stent Discharge Instructions - Radial    After you go home:    Have an adult stay with you until tomorrow.  Drink extra fluids for 2 days.  You may resume your normal diet.  No smoking       For 24 hours - due to the sedation you received:  Relax and take it easy.  Do NOT make any important or legal decisions.  Do NOT drive or operate machines at home or at work.  Do NOT drink alcohol.    Care of Wrist Puncture Site:    For the first 24 hrs - check the puncture site every 1-2 hours while awake.  It is normal to have soreness at the puncture site and mild tingling in your hand for up to 3 days.  Remove the bandaid after 24 hours. If there is minor oozing, apply another bandaid and remove it after 12 hours.  You may shower tomorrow.  Do NOT take a bath, or use a hot tub or pool for at least 3 days. Do NOT scrub the site. Do not use lotion or powder near the puncture site.           Activity:          For 2 days:   do not use your hand or arm to support your weight (such as rising from a chair)   do not bend your wrist (such as lifting a garage door).  do not lift more than 5 pounds or exercise your arm (such as tennis, golf or bowling).  Do NOT do any heavy activity such as exercise, lifting, or straining.     Bleeding:    If you start bleeding from the site in your wrist, sit down and press firmly on/above the site for 10 minutes.   Once bleeding stops, keep arm still for 2 hours.   Call [facility] as soon as you can.       Call 911 right away if you have heavy bleeding or bleeding that does not stop.      Medicines:    If you are taking an antiplatelet medication such as Plavix, Brilinta or Effient, do not stop taking it until you talk to your cardiologist.      If you are on Metformin (Glucophage), do not restart it until you have blood tests (within 2 to 3 days after discharge).  After you have your blood drawn, you may restart the Metformin.   Take your medications, including blood thinners,  unless your provider tells you not to.    If you take Coumadin (Warfarin), have your INR checked by your provider in  3-5 days. Call your clinic to schedule this.  If you have stopped any medicines, check with your provider about when to restart them.    Follow Up Appointments:    Follow up with [facility] Nurse Practitioner at [facility] of patient preference in 7-10 days.  Cardiac Rehab will contact you for follow up care.    Call the clinic if:    You have a large or growing hard lump around the site.  The site is red, swollen, hot or tender.  Blood or fluid is draining from the site.  You have chills or a fever greater than 101 F (38 C).  Your arm feels numb, cool or changes color.  You have hives, a rash or unusual itching.  Any questions or concerns.    Other Instructions:    If you received a stent - carry your stent card with you at all times.      [facility] [place]:    [phone] [facility] (7 days a week)

## 2022-05-19 NOTE — PROGRESS NOTES
PATIENT/VISITOR WELLNESS SCREENING    Step 1 Patient Screening    1. In the last month, have you been in contact with someone who was confirmed or suspected to have Coronavirus/COVID-19? No    2. Do you have the following symptoms?  Fever/Chills? No   Cough? No   Shortness of breath? No   New loss of taste or smell? No  Sore throat? No  Muscle or body aches? No  Headaches? No  Fatigue? No  Vomiting or diarrhea? No    Step 2 Visitor Screening    1. Name of Visitor (1 visitor per patient): Darrion    2. In the last month, have you been in contact with someone who was confirmed or suspected to have Coronavirus/COVID-19? No    3. Do you have the following symptoms?  Fever/Chills? No   Cough? No   Shortness of breath? No   Skin rash? No   Loss of taste or smell? No  Sore throat? No  Runny or stuffy nose? No  Muscle or body aches? No  Headaches? No  Fatigue? No  Vomiting or diarrhea? No    If the visitor has positive symptoms, notify supervisor/manger  Per policy, the visitor will need to leave the facility     Step 3 Refer to logic grid below for actions    NO SYMPTOM(S)    ACTIONS:  1. Standard rooming process  2. Provider to assess per normal protocol  3. Implement precautions as needed and per guidelines     POSITIVE SYMPTOM(S)  If positive for ANY of the following symptoms: fever, cough, shortness of breath, rash    ACTION:  1. Continue to have the patient wear a mask   2. Room patient as soon as possible  3. Don appropriate PPE when entering room  4. Provider evaluation

## 2022-05-19 NOTE — PROGRESS NOTES
Care Suites Discharge Nursing Note    Patient Information  Name: Yola Mayo  Age: 76 year old    Discharge Education:  Discharge instructions reviewed: Yes  Additional education/resources provided: stent card rajeev  Patient/patient representative verbalizes understanding: Yes  Patient discharging on new medications: Yes  Medication education completed: Yes    Discharge Plans:   Discharge location: home  Discharge ride contacted: Yes  Approximate discharge time: 1430    Discharge Criteria:  Discharge criteria met and vital signs stable: Yes, VSS, pt denies pain, right radial site CDI no hematoma no bleeding pt up ambulating at baseline, taking PO fluids, voided.    Patient Belongs:  Patient belongings returned to patient: Yes    Chhaya Carmona RN

## 2022-05-19 NOTE — Clinical Note
Stent deployed in the left anterior descending diagonal. Max pressure = 12 faby. Total duration = 15 seconds.

## 2022-05-19 NOTE — Clinical Note
The first balloon was inserted into the left anterior descending.Max pressure = 14 faby. Total duration = 15 seconds.     Max pressure = 14 faby. Total duration = 15 seconds.    Balloon reinflated a second time: Max pressure = 14 faby. Total duration = 15 seconds.

## 2022-05-19 NOTE — Clinical Note
The first balloon was inserted into the right coronary artery.Max pressure = 12 faby. Total duration = 15 seconds.     Max pressure = 12 faby. Total duration = 15 seconds.    Balloon reinflated a second time: Max pressure = 12 faby. Total duration = 15 seconds.

## 2022-05-20 ENCOUNTER — TELEPHONE (OUTPATIENT)
Dept: CARDIOLOGY | Facility: CLINIC | Age: 77
End: 2022-05-20
Payer: MEDICARE

## 2022-05-20 NOTE — TELEPHONE ENCOUNTER
Patient was here for scheduled OP coronary angiogram secondary to GALINDO, SOB and chest pain with exertion. PMH: CAD s/p prior MALLY to RCA. 5/19/22: Coronary angiogram via RRA showed severe 2V CAD. S/P IVUS guided PCI with drug-eluting stent placement in the proximal LAD and first diagonal using bifurcation stenting. S/P IVUS guided PCI with single drug-eluting stent placement in the distal RCA. Writer attempted to call patient to discuss any post hospital d/c questions, review medication changes, and confirm f/u appts, but no answer. VM left to return my phone call. RN will confirm with patient that she was d/c with an adequate supply of the antiplatelet Plavix, and reminded of importance of taking without interruption. Pt has an Rx for PRN SL Nitroglycerin.  RN will confirm with patient that she has labs scheduled on 5/23/22 at 1230, and an OV scheduled on 5/24/22 at 1400 with MAYELA Krystal Neal at our [facility]. Cardiac rehab is scheduled on 6/2/22. TERESO Pena RN.

## 2022-05-20 NOTE — TELEPHONE ENCOUNTER
Writer returned pt's phone message. Pt denies any chest pain, SOB, fever or lightheadedness. RRA access site is healing without signs of infection, swelling or bleeding. States she has an adequate supply of Plavix and knows to take daily without interruption. Reviewed f/u appt's as below. Pt verbalized understanding without further questions. TERESO Pena RN.

## 2022-05-23 ENCOUNTER — LAB (OUTPATIENT)
Dept: LAB | Facility: CLINIC | Age: 77
End: 2022-05-23
Payer: MEDICARE

## 2022-05-23 DIAGNOSIS — E11.9 TYPE 2 DIABETES MELLITUS WITHOUT COMPLICATION, WITHOUT LONG-TERM CURRENT USE OF INSULIN (H): ICD-10-CM

## 2022-05-23 LAB
ANION GAP SERPL CALCULATED.3IONS-SCNC: 6 MMOL/L (ref 3–14)
ATRIAL RATE - MUSE: 66 BPM
ATRIAL RATE - MUSE: 73 BPM
BUN SERPL-MCNC: 16 MG/DL (ref 7–30)
CALCIUM SERPL-MCNC: 9.3 MG/DL (ref 8.5–10.1)
CHLORIDE BLD-SCNC: 102 MMOL/L (ref 94–109)
CO2 SERPL-SCNC: 26 MMOL/L (ref 20–32)
CREAT SERPL-MCNC: 0.72 MG/DL (ref 0.52–1.04)
DIASTOLIC BLOOD PRESSURE - MUSE: NORMAL MMHG
DIASTOLIC BLOOD PRESSURE - MUSE: NORMAL MMHG
GFR SERPL CREATININE-BSD FRML MDRD: 86 ML/MIN/1.73M2
GLUCOSE BLD-MCNC: 167 MG/DL (ref 70–99)
INTERPRETATION ECG - MUSE: NORMAL
INTERPRETATION ECG - MUSE: NORMAL
P AXIS - MUSE: 52 DEGREES
P AXIS - MUSE: 61 DEGREES
POTASSIUM BLD-SCNC: 3.4 MMOL/L (ref 3.4–5.3)
PR INTERVAL - MUSE: 164 MS
PR INTERVAL - MUSE: 170 MS
QRS DURATION - MUSE: 82 MS
QRS DURATION - MUSE: 86 MS
QT - MUSE: 420 MS
QT - MUSE: 430 MS
QTC - MUSE: 440 MS
QTC - MUSE: 473 MS
R AXIS - MUSE: 21 DEGREES
R AXIS - MUSE: 9 DEGREES
SODIUM SERPL-SCNC: 134 MMOL/L (ref 133–144)
SYSTOLIC BLOOD PRESSURE - MUSE: NORMAL MMHG
SYSTOLIC BLOOD PRESSURE - MUSE: NORMAL MMHG
T AXIS - MUSE: 16 DEGREES
T AXIS - MUSE: 37 DEGREES
VENTRICULAR RATE- MUSE: 66 BPM
VENTRICULAR RATE- MUSE: 73 BPM

## 2022-05-23 PROCEDURE — 36415 COLL VENOUS BLD VENIPUNCTURE: CPT | Performed by: INTERNAL MEDICINE

## 2022-05-23 PROCEDURE — 80048 BASIC METABOLIC PNL TOTAL CA: CPT | Performed by: INTERNAL MEDICINE

## 2022-05-24 ENCOUNTER — OFFICE VISIT (OUTPATIENT)
Dept: CARDIOLOGY | Facility: CLINIC | Age: 77
End: 2022-05-24
Payer: MEDICARE

## 2022-05-24 VITALS
SYSTOLIC BLOOD PRESSURE: 124 MMHG | WEIGHT: 182.7 LBS | HEIGHT: 64 IN | HEART RATE: 82 BPM | BODY MASS INDEX: 31.19 KG/M2 | DIASTOLIC BLOOD PRESSURE: 80 MMHG

## 2022-05-24 DIAGNOSIS — R06.09 DOE (DYSPNEA ON EXERTION): ICD-10-CM

## 2022-05-24 DIAGNOSIS — I25.10 CORONARY ARTERY DISEASE INVOLVING NATIVE CORONARY ARTERY OF NATIVE HEART WITHOUT ANGINA PECTORIS: ICD-10-CM

## 2022-05-24 DIAGNOSIS — I10 ESSENTIAL HYPERTENSION, BENIGN: ICD-10-CM

## 2022-05-24 DIAGNOSIS — Z95.5 S/P DRUG ELUTING CORONARY STENT PLACEMENT: ICD-10-CM

## 2022-05-24 PROCEDURE — 99213 OFFICE O/P EST LOW 20 MIN: CPT | Performed by: NURSE PRACTITIONER

## 2022-05-24 NOTE — PROGRESS NOTES
General Cardiology Clinic Progress Note  Yola Mayo MRN# [id number]   YOB: 1945 Age: 76 year old     I had the pleasure of seeing Yola when she came for follow up post coronary angiogram.  This 76 year old is a patient of Dr. Howard with history of:    1. Coronary artery disease: s/p PCI to RCA x 2 in 2018 and recent PCI to prox LAD/D1 bifurcation and distal RCA (5/19/2022)  2. Hypertension-treated  3. Hyperlipidemia-treated   4. Diabetes (HgbA1c 5.9% 4/7/2022)  5. Breast Cancer: Right sided DCIS s/p right breast lumpectomy and lymph node excision with Dr. Rogers and radiation therapy. Followed by Dr. Grullon at  Oncology  6. Pulmonary hypertension: Mild to moderate on echo from 2017    Interval History:    Today she returns for follow up post a coronary angiogram that was completed for progressive GALNIDO despite a normal stress test. The angiogram was completed via a right radial artery approach and she received bifurcation stenting to the LAD/diagonal and distal RCA below the previous stented areas.  She was discharged on DAPT with aspirin and Brilinta.  She reports that her dyspnea on exertion is improved but does have some mild residual symptoms.  Previously, post PCI in 2018 dyspnea on exertion resolved promptly after revascularization.  At that time she was on Plavix and currently is on Brilinta.    Diagnostic Testing:  Stress Testing:      Stress echo (5/2022): The patient exercised 6:35 min.There was a normal BP response to exercise. Exercise was stopped due to fatigue. A treadmill exercise test according to the Modified Tobin protocol was performed. Target Heart Rate was achieved. This was a normal stress. EKG with no evidence of stress-induced ischemia. Left ventricular cavity size decreases with exercise. Global LV systolic function augments with exercise. The visual ejection fraction is >70%. Normal left ventricular function and wall motion at rest and post-stress.    Stress echo  (2018): Abnormal stress echocardiogram with small area of mid anterior ischemia (likely diagonal distribution) at exercise capacity of 8 Mets and low risk duke treadmill score.  Angiogram:    2018: 2 vessel CAD 60% LAD lesion (not treated) and RCA with 90-95% treated with 2 MALLY    5/19/2022: Severe vessel CAD s/p MALLY to prox LAD and D1 iIFR 0.81) bifurcation and distal RCA (95% stenosis), mid LCx 55% (iFR 0.97).                 Assessment and Plan:     ASSESSMENT:    1. Coronary artery disease    Status post 2 drug eluting stents to the mid and distal RCA 6/27/2018    Residual 50-60% mid LAD lesion with 100% occlusion of the first diagonal    Recurrent GALIDNO specifically when walking up stair or an incline  ongoing symptoms despite blood pressure management and normal stress echo    Coronary angiogram 5/19/2022: Severe vessel CAD s/p MALLY to prox LAD and D1 bifurcation and distal RCA     2. Hyperlipidemia, LDL goal<70    Most recent LDL was 38 4/7/2022    Atorvastatin 10 mg daily     3. Hypertension, goal less than 140/90    Controlled    Losartan to 100 mg daily and hydrochlorthiazide 25 mg daily hydralazine 50 mg BID     PLAN:  1. Continue DAPT with aspirin and Brilinta x1 year.  If Brilinta becomes cost prohibitive or if shortness of breath does not improved with cardiac rehabilitation and ongoing exercise, we can change to Plavix with a 300 mg loading dose x1 day and then 75 mg daily thereafter.  2. Return to clinic in 6 months and follow-up with Dr. Anita Brown this Visit:  Orders Placed This Encounter   Procedures     Follow-Up with Cardiology     Orders Placed This Encounter   Medications     ticagrelor (BRILINTA) 90 MG tablet     Sig: Take 1 tablet (90 mg) by mouth 2 times daily Start this evening.     Dispense:  180 tablet     Refill:  3     Medications Discontinued During This Encounter   Medication Reason     ticagrelor (BRILINTA) 90 MG tablet Reorder       Today's clinic visit entailed:  Review of the  "result(s) of each unique test - Stress echocardiogram x2, echocardiogram, coronary angiogram x2  Prescription drug management  15 minutes spent on the date of the encounter doing chart review, history and exam, documentation and further activities per the note  Provider  Link to [facility] Help Grid     The level of medical decision making during this visit was of moderate complexity.           Review of Systems:     Review of Systems:  Skin:  Negative bruising   Eyes:  Positive for glasses  ENT:  Negative    Respiratory:  Positive for dyspnea on exertion  Cardiovascular:  Negative;palpitations;chest pain;lightheadedness;dizziness Positive for;fatigue  Gastroenterology: Negative heartburn;reflux  Genitourinary:  Positive for urinary frequency;nocturia  Musculoskeletal:  Positive for arthritis  Neurologic:  Negative headaches  Psychiatric:  Positive for sleep disturbances  Heme/Lymph/Imm:  Negative allergies  Endocrine:  Positive for thyroid disorder;diabetes            Physical Exam:     Vitals: /80   Pulse 82   Ht 1.626 m (5' 4\")   Wt 82.9 kg (182 lb 11.2 oz)   BMI 31.36 kg/m    Constitutional: Well nourished and in no apparent distress.  Eyes: Pupils equal, round. Sclerae anicteric.   HEENT: Normocephalic, atraumatic.   Neck: Supple. JVD not elevated  Respiratory: Breathing non-labored. Lungs clear to auscultation bilaterally. No crackles, wheezes, rhonchi, or rales.  Cardiovascular:  Regular rate and rhythm, normal S1 and S2. No murmur, rub, or gallop.  Skin: Warm, dry. No rashes, cyanosis, or xanthelasma.  Extremities: No edema.  Right radial artery site mildly ecchymotic with peripheral pulses palpable and CMS intact  Neurologic: No gross motor deficits. Alert, awake, and oriented to person, place and time.  Psychiatric: Affect appropriate.             Medications:     Current Outpatient Medications   Medication Sig Dispense Refill     acetaminophen (TYLENOL) 325 MG tablet Take 2 tablets (650 mg) by mouth 3 " times daily 100 tablet 0     amoxicillin (AMOXIL) 500 MG capsule Take 4 capsules half to one hour before the procedure. 4 capsule 3     anastrozole (ARIMIDEX) 1 MG tablet Take 1 tablet (1 mg) by mouth daily 90 tablet 3     aspirin (ASA) 81 MG EC tablet Take 1 tablet (81 mg) by mouth daily Start tomorrow. 30 tablet 3     aspirin 81 MG tablet Take 81 mg by mouth daily       atorvastatin (LIPITOR) 10 MG tablet TAKE 1 TABLET (10 MG) BY MOUTH DAILY FOR CHOLESTROL 90 tablet 3     blood glucose (CONTOUR NEXT TEST) test strip Use to test blood sugar 1 time daily or as directed. 100 each 2     blood glucose monitoring (NITO MICROLET) lancets Use to test blood sugar 1 time daily or as directed. 100 each 3     calcium-vitamin D (CALTRATE) 600-400 MG-UNIT per tablet Take 1 tablet by mouth daily 60 tablet 11     cholecalciferol (VITAMIN D) 1000 UNIT tablet Take 1 tablet (1,000 Units) by mouth daily 100 tablet 3     CONTOUR NEXT EZ (CONTOUR NEXT EZ W/DEVICE KIT) w/Device KIT 1 kit once 1 kit 0     Cyanocobalamin (VITAMIN B 12 PO) Take 1 tablet by mouth daily VIT B12 unknown otc dose       hydrALAZINE (APRESOLINE) 25 MG tablet Take 2 tablets (50 mg) by mouth 2 times daily 270 tablet 3     hydrochlorothiazide (HYDRODIURIL) 25 MG tablet TAKE 1 TABLET (25 MG) BY MOUTH DAILY FOR BLOOD PRESSURE 90 tablet 3     levothyroxine (SYNTHROID/LEVOTHROID) 112 MCG tablet TAKE 1 TABLET (112 MCG) BY MOUTH DAILY FOR THYROID 90 tablet 0     losartan (COZAAR) 100 MG tablet TAKE 1 TABLET (100 MG) BY MOUTH DAILY FOR BLOOD PRESSURE 90 tablet 3     metFORMIN (GLUCOPHAGE-XR) 500 MG 24 hr tablet TAKE 2 TABLETS (1,000 MG) BY MOUTH DAILY (WITH DINNER) 180 tablet 3     metroNIDAZOLE (METROGEL) 0.75 % external gel APPLY TO AFFECTED AREA TWICE A DAY 45 g 3     multivitamin, therapeutic with minerals (THERA-VIT-M) TABS Take 1 tablet by mouth daily       nitroGLYcerin (NITROSTAT) 0.4 MG sublingual tablet For chest pain place 1 tablet under the tongue every 5  minutes for 3 doses. If symptoms persist 5 minutes after 1st dose call 911. 25 tablet 3     ticagrelor (BRILINTA) 90 MG tablet Take 1 tablet (90 mg) by mouth 2 times daily Start this evening. 180 tablet 3       Family History   Problem Relation Age of Onset     Cancer Mother         ovarian  of it  at 49yo.     Other Cancer Mother      Diabetes Father         Adult onset     Hypertension Father      C.A.D. Father         Fatal Heart attack at 70.     Cerebrovascular Disease Father      Thyroid Disease Sister      Thyroid Disease Sister      Thyroid Disease Sister      Cancer Daughter      Thyroid Disease Daughter      Breast Cancer No family hx of      Cancer - colorectal No family hx of        Social History     Socioeconomic History     Marital status:      Spouse name: FABIAN CUADRA     Number of children: 2     Years of education: Not on file     Highest education level: Not on file   Occupational History     Occupation: [employer]     Employer: [employer]   Tobacco Use     Smoking status: Never Smoker     Smokeless tobacco: Never Used   Substance and Sexual Activity     Alcohol use: Yes     Alcohol/week: 0.0 standard drinks     Comment: occasional     Drug use: No     Sexual activity: Yes     Partners: Male   Other Topics Concern      Service No     Blood Transfusions No     Caffeine Concern No     Occupational Exposure No     Hobby Hazards No     Sleep Concern No     Stress Concern No     Weight Concern Yes     Special Diet No     Back Care No     Exercise Yes     Comment: Approx 3 times a week for about 30 minutes     Bike Helmet Yes     Seat Belt Yes     Self-Exams Yes     Parent/sibling w/ CABG, MI or angioplasty before 65F 55M? Not Asked   Social History Narrative     Not on file     Social Determinants of Health     Financial Resource Strain: Not on file   Food Insecurity: Not on file   Transportation Needs: Not on file   Physical Activity: Not on file   Stress: Not on file   Social  Connections: Not on file   Intimate Partner Violence: Not on file   Housing Stability: Not on file            Past Medical History:     Past Medical History:   Diagnosis Date     Anemia due to blood loss      BASAL CELL CANCER -NOSE      Dermatitis      Diabetes (H)      Family history of diabetes mellitus     Father     Family history of ischemic heart disease     Father     Family history of malignant neoplasm of ovary     Mother     HX DISRUPT L ANT CRUCIATE S/P REPAIR 2003      Hyperlipidemia      HYPERTENSION       HYPOTHYROIDISM      LOC PRIM OSTEOARTH-L 1ST METATARSAL-CUNEIFORM JOINT 3/10/2004     Major depressive disorder, single episode, mild (H) 11/24/2014     MILD-MOD TRICUSPID REGURG. MOD PULM HTN 6/23/2005    ECHO  5/05     Obese      PONV (postoperative nausea and vomiting)      Rosacea      Shingles      SPLENIC CYST               Past Surgical History:     Past Surgical History:   Procedure Laterality Date     ARTHROPLASTY KNEE Left 4/13/2016    Procedure: ARTHROPLASTY KNEE;  Surgeon: Giuseppe Lopez MD;  Location:  OR     ARTHROPLASTY MINIMALLY INVASIVE HIP  4/18/2012    Procedure:ARTHROPLASTY MINIMALLY INVASIVE HIP; RIGHT TWO INCISION MINIMALLY INVASIVE HIP ARTHROPLASTY (YUVAL)^ (NO CELL SAVER) (C-ARM); Surgeon:GIUSEPPE LOPEZ; Location: OR     BIOPSY NODE SENTINEL Right 4/23/2021    Procedure: RIGHT SENTINEL LYMPH NODE BIOPSY;  Surgeon: Giuseppe Rogers MD;  Location:  OR     C CHEMOSURG MOHS 1ST STAGE  9-99    Mohs-nose     CARDIAC SURGERY  06/27/2019    mid and distal RCA stents     CV CORONARY ANGIOGRAM N/A 5/19/2022    Procedure: Coronary Angiogram;  Surgeon: Reyes Graham MD;  Location:  HEART CARDIAC CATH LAB     CV INSTANTANEOUS WAVE-FREE RATIO N/A 5/19/2022    Procedure: Instantaneous Wave-Free Ratio;  Surgeon: Reyes Graham MD;  Location:  HEART CARDI[facility] CATH LAB     CV INTRAVASULAR ULTRASOUND N/A 5/19/2022    Procedure: Intravascular Ultrasound;  Surgeon:  Reyes Graham MD;  Location:  HEART CARD[facility] CATH LAB     CV PCI STENT DRUG ELUTING N/A 5/19/2022    Procedure: Percutaneous Coronary Intervention Stent;  Surgeon: Reyes Graham MD;  Location:  HEART CARDIAC CATH LAB     ENT SURGERY      mohs procedure on nose     HYSTERECTOMY, PAP NO LONGER INDICATED       LUMPECTOMY BREAST WITH SEED LOCALIZATION Right 4/23/2021    Procedure: SEED LOCALIZED RIGHT BREAST LUMPECTOMY;  Surgeon: Giuseppe Rogers MD;  Location:  OR     SURGICAL HISTORY OF -   Mult-~1978    infertility, laparotomies     Z APPENDECTOMY  8/87    incidental appi at [facility]     Z REPAIR CRUCIATE LIGAMENT,KNEE  8/2003    L   ACL reconstruction     Z TOTAL ABDOM HYSTERECTOMY  8/87    W/ BSO fibroids, hx PID     ZZ COLONOSCOPY THRU STOMA, DIAGNOSTIC  4/03    normal, rec repeat 10 yrs              Allergies:   Toprol xl [metoprolol], Ace inhibitors, Oxycodone, and Tetanus toxoid       Data:   All laboratory data reviewed:    Recent Labs   Lab Test 04/07/22  1230 09/30/21  1020 03/19/21  0857 06/17/20  0815   LDL 38  --  42 31   HDL 75  --  80 80   NHDL 53  --  55 41   CHOL 128  --  135 121   TRIG 74  --  67 49   TSH 2.07 1.02 1.93 1.37       Lab Results   Component Value Date    WBC 4.5 05/19/2022    WBC 7.2 03/19/2021    RBC 4.21 05/19/2022    RBC 4.60 03/19/2021    HGB 12.4 05/19/2022    HGB 14.1 03/19/2021    HCT 36.4 05/19/2022    HCT 40.7 03/19/2021    MCV 87 05/19/2022    MCV 89 03/19/2021    MCH 29.5 05/19/2022    MCH 30.7 03/19/2021    MCHC 34.1 05/19/2022    MCHC 34.6 03/19/2021    RDW 13.8 05/19/2022    RDW 14.6 03/19/2021     05/19/2022     03/19/2021       Lab Results   Component Value Date     05/23/2022     05/12/2021    POTASSIUM 3.4 05/23/2022    POTASSIUM 3.9 05/12/2021    CHLORIDE 102 05/23/2022    CHLORIDE 103 05/12/2021    CO2 26 05/23/2022    CO2 26 05/12/2021    ANIONGAP 6 05/23/2022    ANIONGAP 8 05/12/2021     (H) 05/23/2022    GLC  163 (H) 05/12/2021    BUN 16 05/23/2022    BUN 17 05/12/2021    CR 0.72 05/23/2022    CR 0.71 05/12/2021    GFRESTIMATED 86 05/23/2022    GFRESTIMATED 82 05/12/2021    GFRESTBLACK >90 05/12/2021    JOSEPH 9.3 05/23/2022    JOSEPH 9.4 05/12/2021      Lab Results   Component Value Date    AST 50 (H) 04/07/2022    AST 48 (H) 05/12/2021    ALT 71 (H) 04/07/2022    ALT 79 (H) 05/12/2021       Lab Results   Component Value Date    A1C 5.9 (H) 04/07/2022    A1C 6.0 (H) 03/19/2021       Lab Results   Component Value Date    INR 0.97 05/19/2022    INR 0.98 06/27/2018         FATMATA PEREIRA [facility]  Pager: [phone]  RN phone: [phone]

## 2022-05-24 NOTE — LETTER
5/24/2022    Zoraida Braga MD  [address]  [place]                [place] [address]    RE: Yola Mayo       Dear Colleague,     I had the pleasure of seeing Yola Mayo in the [facility].    General Cardiology Clinic Progress Note  Yola Mayo MRN# [id number]   YOB: 1945 Age: 76 year old     I had the pleasure of seeing Yola when she came for follow up post coronary angiogram.  This 76 year old is a patient of Dr. Howard with history of:    1. Coronary artery disease: s/p PCI to RCA x 2 in 2018 and recent PCI to prox LAD/D1 bifurcation and distal RCA (5/19/2022)  2. Hypertension-treated  3. Hyperlipidemia-treated   4. Diabetes (HgbA1c 5.9% 4/7/2022)  5. Breast Cancer: Right sided DCIS s/p right breast lumpectomy and lymph node excision with Dr. Rogers and radiation therapy. Followed by Dr. Grullon at  Oncology  6. Pulmonary hypertension: Mild to moderate on echo from 2017    Interval History:    Today she returns for follow up post a coronary angiogram that was completed for progressive GALINDO despite a normal stress test. The angiogram was completed via a right radial artery approach and she received bifurcation stenting to the LAD/diagonal and distal RCA below the previous stented areas.  She was discharged on DAPT with aspirin and Brilinta.  She reports that her dyspnea on exertion is improved but does have some mild residual symptoms.  Previously, post PCI in 2018 dyspnea on exertion resolved promptly after revascularization.  At that time she was on Plavix and currently is on Brilinta.    Diagnostic Testing:  Stress Testing:      Stress echo (5/2022): The patient exercised 6:35 min.There was a normal BP response to exercise. Exercise was stopped due to fatigue. A treadmill exercise test according to the Modified Tobin protocol was performed. Target Heart Rate was achieved. This was a normal stress. EKG with no evidence of stress-induced ischemia. Left  ventricular cavity size decreases with exercise. Global LV systolic function augments with exercise. The visual ejection fraction is >70%. Normal left ventricular function and wall motion at rest and post-stress.    Stress echo (2018): Abnormal stress echocardiogram with small area of mid anterior ischemia (likely diagonal distribution) at exercise capacity of 8 Mets and low risk duke treadmill score.  Angiogram:    2018: 2 vessel CAD 60% LAD lesion (not treated) and RCA with 90-95% treated with 2 MALLY    5/19/2022: Severe vessel CAD s/p MALLY to prox LAD and D1 iIFR 0.81) bifurcation and distal RCA (95% stenosis), mid LCx 55% (iFR 0.97).                 Assessment and Plan:     ASSESSMENT:    1. Coronary artery disease    Status post 2 drug eluting stents to the mid and distal RCA 6/27/2018    Residual 50-60% mid LAD lesion with 100% occlusion of the first diagonal    Recurrent GALINDO specifically when walking up stair or an incline  ongoing symptoms despite blood pressure management and normal stress echo    Coronary angiogram 5/19/2022: Severe vessel CAD s/p MALLY to prox LAD and D1 bifurcation and distal RCA     2. Hyperlipidemia, LDL goal<70    Most recent LDL was 38 4/7/2022    Atorvastatin 10 mg daily     3. Hypertension, goal less than 140/90    Controlled    Losartan to 100 mg daily and hydrochlorthiazide 25 mg daily hydralazine 50 mg BID     PLAN:  1. Continue DAPT with aspirin and Brilinta x1 year.  If Brilinta becomes cost prohibitive or if shortness of breath does not improved with cardiac rehabilitation and ongoing exercise, we can change to Plavix with a 300 mg loading dose x1 day and then 75 mg daily thereafter.  2. Return to clinic in 6 months and follow-up with Dr. Anita Brown this Visit:  Orders Placed This Encounter   Procedures     Follow-Up with Cardiology     Orders Placed This Encounter   Medications     ticagrelor (BRILINTA) 90 MG tablet     Sig: Take 1 tablet (90 mg) by mouth 2 times daily  "Start this evening.     Dispense:  180 tablet     Refill:  3     Medications Discontinued During This Encounter   Medication Reason     ticagrelor (BRILINTA) 90 MG tablet Reorder       Today's clinic visit entailed:  Review of the result(s) of each unique test - Stress echocardiogram x2, echocardiogram, coronary angiogram x2  Prescription drug management  15 minutes spent on the date of the encounter doing chart review, history and exam, documentation and further activities per the note  Provider  Link to [facility] Help Grid     The level of medical decision making during this visit was of moderate complexity.           Review of Systems:     Review of Systems:  Skin:  Negative bruising   Eyes:  Positive for glasses  ENT:  Negative    Respiratory:  Positive for dyspnea on exertion  Cardiovascular:  Negative;palpitations;chest pain;lightheadedness;dizziness Positive for;fatigue  Gastroenterology: Negative heartburn;reflux  Genitourinary:  Positive for urinary frequency;nocturia  Musculoskeletal:  Positive for arthritis  Neurologic:  Negative headaches  Psychiatric:  Positive for sleep disturbances  Heme/Lymph/Imm:  Negative allergies  Endocrine:  Positive for thyroid disorder;diabetes            Physical Exam:     Vitals: /80   Pulse 82   Ht 1.626 m (5' 4\")   Wt 82.9 kg (182 lb 11.2 oz)   BMI 31.36 kg/m    Constitutional: Well nourished and in no apparent distress.  Eyes: Pupils equal, round. Sclerae anicteric.   HEENT: Normocephalic, atraumatic.   Neck: Supple. JVD not elevated  Respiratory: Breathing non-labored. Lungs clear to auscultation bilaterally. No crackles, wheezes, rhonchi, or rales.  Cardiovascular:  Regular rate and rhythm, normal S1 and S2. No murmur, rub, or gallop.  Skin: Warm, dry. No rashes, cyanosis, or xanthelasma.  Extremities: No edema.  Right radial artery site mildly ecchymotic with peripheral pulses palpable and CMS intact  Neurologic: No gross motor deficits. Alert, awake, and oriented " to person, place and time.  Psychiatric: Affect appropriate.             Medications:     Current Outpatient Medications   Medication Sig Dispense Refill     acetaminophen (TYLENOL) 325 MG tablet Take 2 tablets (650 mg) by mouth 3 times daily 100 tablet 0     amoxicillin (AMOXIL) 500 MG capsule Take 4 capsules half to one hour before the procedure. 4 capsule 3     anastrozole (ARIMIDEX) 1 MG tablet Take 1 tablet (1 mg) by mouth daily 90 tablet 3     aspirin (ASA) 81 MG EC tablet Take 1 tablet (81 mg) by mouth daily Start tomorrow. 30 tablet 3     aspirin 81 MG tablet Take 81 mg by mouth daily       atorvastatin (LIPITOR) 10 MG tablet TAKE 1 TABLET (10 MG) BY MOUTH DAILY FOR CHOLESTROL 90 tablet 3     blood glucose (CONTOUR NEXT TEST) test strip Use to test blood sugar 1 time daily or as directed. 100 each 2     blood glucose monitoring (Advizzer MICROLET) lancets Use to test blood sugar 1 time daily or as directed. 100 each 3     calcium-vitamin D (CALTRATE) 600-400 MG-UNIT per tablet Take 1 tablet by mouth daily 60 tablet 11     cholecalciferol (VITAMIN D) 1000 UNIT tablet Take 1 tablet (1,000 Units) by mouth daily 100 tablet 3     CONTOUR NEXT EZ (CONTOUR NEXT EZ W/DEVICE KIT) w/Device KIT 1 kit once 1 kit 0     Cyanocobalamin (VITAMIN B 12 PO) Take 1 tablet by mouth daily VIT B12 unknown otc dose       hydrALAZINE (APRESOLINE) 25 MG tablet Take 2 tablets (50 mg) by mouth 2 times daily 270 tablet 3     hydrochlorothiazide (HYDRODIURIL) 25 MG tablet TAKE 1 TABLET (25 MG) BY MOUTH DAILY FOR BLOOD PRESSURE 90 tablet 3     levothyroxine (SYNTHROID/LEVOTHROID) 112 MCG tablet TAKE 1 TABLET (112 MCG) BY MOUTH DAILY FOR THYROID 90 tablet 0     losartan (COZAAR) 100 MG tablet TAKE 1 TABLET (100 MG) BY MOUTH DAILY FOR BLOOD PRESSURE 90 tablet 3     metFORMIN (GLUCOPHAGE-XR) 500 MG 24 hr tablet TAKE 2 TABLETS (1,000 MG) BY MOUTH DAILY (WITH DINNER) 180 tablet 3     metroNIDAZOLE (METROGEL) 0.75 % external gel APPLY TO AFFECTED  AREA TWICE A DAY 45 g 3     multivitamin, therapeutic with minerals (THERA-VIT-M) TABS Take 1 tablet by mouth daily       nitroGLYcerin (NITROSTAT) 0.4 MG sublingual tablet For chest pain place 1 tablet under the tongue every 5 minutes for 3 doses. If symptoms persist 5 minutes after 1st dose call 911. 25 tablet 3     ticagrelor (BRILINTA) 90 MG tablet Take 1 tablet (90 mg) by mouth 2 times daily Start this evening. 180 tablet 3       Family History   Problem Relation Age of Onset     Cancer Mother         ovarian  of it  at 49yo.     Other Cancer Mother      Diabetes Father         Adult onset     Hypertension Father      C.A.D. Father         Fatal Heart attack at 70.     Cerebrovascular Disease Father      Thyroid Disease Sister      Thyroid Disease Sister      Thyroid Disease Sister      Cancer Daughter      Thyroid Disease Daughter      Breast Cancer No family hx of      Cancer - colorectal No family hx of        Social History     Socioeconomic History     Marital status:      Spouse name: FABIAN CUADRA     Number of children: 2     Years of education: Not on file     Highest education level: Not on file   Occupational History     Occupation: [employer]     Employer: [employer]   Tobacco Use     Smoking status: Never Smoker     Smokeless tobacco: Never Used   Substance and Sexual Activity     Alcohol use: Yes     Alcohol/week: 0.0 standard drinks     Comment: occasional     Drug use: No     Sexual activity: Yes     Partners: Male   Other Topics Concern      Service No     Blood Transfusions No     Caffeine Concern No     Occupational Exposure No     Hobby Hazards No     Sleep Concern No     Stress Concern No     Weight Concern Yes     Special Diet No     Back Care No     Exercise Yes     Comment: Approx 3 times a week for about 30 minutes     Bike Helmet Yes     Seat Belt Yes     Self-Exams Yes     Parent/sibling w/ CABG, MI or angioplasty before 65F 55M? Not Asked   Social History  Narrative     Not on file     Social Determinants of Health     Financial Resource Strain: Not on file   Food Insecurity: Not on file   Transportation Needs: Not on file   Physical Activity: Not on file   Stress: Not on file   Social Connections: Not on file   Intimate Partner Violence: Not on file   Housing Stability: Not on file            Past Medical History:     Past Medical History:   Diagnosis Date     Anemia due to blood loss      BASAL CELL CANCER -NOSE      Dermatitis      Diabetes (H)      Family history of diabetes mellitus     Father     Family history of ischemic heart disease     Father     Family history of malignant neoplasm of ovary     Mother     HX DISRUPT L ANT CRUCIATE S/P REPAIR 2003      Hyperlipidemia      HYPERTENSION       HYPOTHYROIDISM      LOC PRIM OSTEOARTH-L 1ST METATARSAL-CUNEIFORM JOINT 3/10/2004     Major depressive disorder, single episode, mild (H) 11/24/2014     MILD-MOD TRICUSPID REGURG. MOD PULM HTN 6/23/2005    ECHO  5/05     Obese      PONV (postoperative nausea and vomiting)      Rosacea      Shingles      SPLENIC CYST               Past Surgical History:     Past Surgical History:   Procedure Laterality Date     ARTHROPLASTY KNEE Left 4/13/2016    Procedure: ARTHROPLASTY KNEE;  Surgeon: Giuseppe Lopez MD;  Location:  OR     ARTHROPLASTY MINIMALLY INVASIVE HIP  4/18/2012    Procedure:ARTHROPLASTY MINIMALLY INVASIVE HIP; RIGHT TWO INCISION MINIMALLY INVASIVE HIP ARTHROPLASTY (YUVAL)^ (NO CELL SAVER) (C-ARM); Surgeon:GIUSEPPE LOPEZ; Location: OR     BIOPSY NODE SENTINEL Right 4/23/2021    Procedure: RIGHT SENTINEL LYMPH NODE BIOPSY;  Surgeon: Giuseppe Rogers MD;  Location:  OR     C CHEMOSURG MOHS 1ST STAGE  9-99    Mohs-nose     CARDIAC SURGERY  06/27/2019    mid and distal RCA stents     CV CORONARY ANGIOGRAM N/A 5/19/2022    Procedure: Coronary Angiogram;  Surgeon: Reyes Graham MD;  Location:  HEART CARDIAC CATH LAB     CV INSTANTANEOUS  WAVE-FREE RATIO N/A 5/19/2022    Procedure: Instantaneous Wave-Free Ratio;  Surgeon: Reyes Graham MD;  Location:  HEART CARDIAC CATH LAB     CV INTRAVASULAR ULTRASOUND N/A 5/19/2022    Procedure: Intravascular Ultrasound;  Surgeon: Reyes Graham MD;  Location:  HEART CARDIAC CATH LAB     CV PCI STENT DRUG ELUTING N/A 5/19/2022    Procedure: Percutaneous Coronary Intervention Stent;  Surgeon: Reyes Graham MD;  Location:  HEART CARDIAC CATH LAB     ENT SURGERY      mohs procedure on nose     HYSTERECTOMY, PAP NO LONGER INDICATED       LUMPECTOMY BREAST WITH SEED LOCALIZATION Right 4/23/2021    Procedure: SEED LOCALIZED RIGHT BREAST LUMPECTOMY;  Surgeon: Giuseppe Rogers MD;  Location:  OR     SURGICAL HISTORY OF -   Mult-~1978    infertility, laparotomies     Z APPENDECTOMY  8/87    incidental appi at [facility]     Z REPAIR CRUCIATE LIGAMENT,KNEE  8/2003    L   ACL reconstruction     Z TOTAL ABDOM HYSTERECTOMY  8/87    W/ BSO fibroids, hx PID     [facility]ST[facility]  4/03    normal, rec repeat 10 yrs              Allergies:   Toprol xl [metoprolol], Ace inhibitors, Oxycodone, and Tetanus toxoid       Data:   All laboratory data reviewed:    Recent Labs   Lab Test 04/07/22  1230 09/30/21  1020 03/19/21  0857 06/17/20  0815   LDL 38  --  42 31   HDL 75  --  80 80   NHDL 53  --  55 41   CHOL 128  --  135 121   TRIG 74  --  67 49   TSH 2.07 1.02 1.93 1.37       Lab Results   Component Value Date    WBC 4.5 05/19/2022    WBC 7.2 03/19/2021    RBC 4.21 05/19/2022    RBC 4.60 03/19/2021    HGB 12.4 05/19/2022    HGB 14.1 03/19/2021    HCT 36.4 05/19/2022    HCT 40.7 03/19/2021    MCV 87 05/19/2022    MCV 89 03/19/2021    MCH 29.5 05/19/2022    MCH 30.7 03/19/2021    MCHC 34.1 05/19/2022    MCHC 34.6 03/19/2021    RDW 13.8 05/19/2022    RDW 14.6 03/19/2021     05/19/2022     03/19/2021       Lab Results   Component Value Date     05/23/2022      05/12/2021    POTASSIUM 3.4 05/23/2022    POTASSIUM 3.9 05/12/2021    CHLORIDE 102 05/23/2022    CHLORIDE 103 05/12/2021    CO2 26 05/23/2022    CO2 26 05/12/2021    ANIONGAP 6 05/23/2022    ANIONGAP 8 05/12/2021     (H) 05/23/2022     (H) 05/12/2021    BUN 16 05/23/2022    BUN 17 05/12/2021    CR 0.72 05/23/2022    CR 0.71 05/12/2021    GFRESTIMATED 86 05/23/2022    GFRESTIMATED 82 05/12/2021    GFRESTBLACK >90 05/12/2021    JOSEPH 9.3 05/23/2022    JOSEPH 9.4 05/12/2021      Lab Results   Component Value Date    AST 50 (H) 04/07/2022    AST 48 (H) 05/12/2021    ALT 71 (H) 04/07/2022    ALT 79 (H) 05/12/2021       Lab Results   Component Value Date    A1C 5.9 (H) 04/07/2022    A1C 6.0 (H) 03/19/2021       Lab Results   Component Value Date    INR 0.97 05/19/2022    INR 0.98 06/27/2018         FATMATA PEREIRA CNP  [facility]  Pager: [phone]  RN phone: [phone]    Thank you for allowing me to participate in the care of your patient.      Sincerely,     FATMATA PEREIRA CNP     [facility]  cc:   FATMATA Cruz CNP  [address]  [place] [address]

## 2022-05-24 NOTE — PATIENT INSTRUCTIONS
Today's Recommendations    Cardiac rehab  Continue aspirin and Brilinta for 1 year  If Brilinta is costly or if SOB is not improving, please call and we can change to Plavix  Continue all other medications without changes.  Please follow up with Dr. Howard or Krystal in 6 months.    Please send a [facility] message or call [phone] to the RN team with questions or concerns.     Scheduling number [phone]    FATMATA Shelton, CNP

## 2022-06-02 ENCOUNTER — HOSPITAL ENCOUNTER (OUTPATIENT)
Dept: CARDIAC REHAB | Facility: CLINIC | Age: 77
Discharge: HOME | End: 2022-06-02
Attending: INTERNAL MEDICINE
Payer: MEDICARE

## 2022-06-02 DIAGNOSIS — Z95.5 S/P DRUG ELUTING CORONARY STENT PLACEMENT: ICD-10-CM

## 2022-06-02 PROCEDURE — 93798 PHYS/QHP OP CAR RHAB W/ECG: CPT | Performed by: REHABILITATION PRACTITIONER

## 2022-06-22 DIAGNOSIS — E03.9 HYPOTHYROIDISM, UNSPECIFIED TYPE: ICD-10-CM

## 2022-06-22 RX ORDER — LEVOTHYROXINE SODIUM 112 UG/1
112 TABLET ORAL DAILY
Qty: 90 TABLET | Refills: 2 | Status: SHIPPED | OUTPATIENT
Start: 2022-06-22 | End: 2023-03-16

## 2022-06-22 NOTE — TELEPHONE ENCOUNTER
Prescription approved per [facility] Refill Protocol.    Franklin Green RN  [facility]

## 2022-06-23 ENCOUNTER — HOSPITAL ENCOUNTER (OUTPATIENT)
Dept: OCCUPATIONAL THERAPY | Facility: CLINIC | Age: 77
Discharge: HOME | End: 2022-06-23
Payer: MEDICARE

## 2022-06-23 DIAGNOSIS — I89.0 LYMPHEDEMA OF BREAST: Primary | ICD-10-CM

## 2022-06-23 PROCEDURE — 97140 MANUAL THERAPY 1/> REGIONS: CPT | Mod: GO

## 2022-08-04 ENCOUNTER — TRANSFERRED RECORDS (OUTPATIENT)
Dept: HEALTH INFORMATION MANAGEMENT | Facility: CLINIC | Age: 77
End: 2022-08-04

## 2022-08-12 NOTE — PROGRESS NOTES
[facility]    Hematology/Oncology Established Patient Note      Today's Date: 8/29/2022    Reason for follow-up: Right breast cancer.    HISTORY OF PRESENT ILLNESS: Yola Mayo is a 77 year old female who presents with the following oncologic history:  1. 3/24/2021: Mammogram showed distortion in right upper outer breast; left breast negative.  2. 3/31/2021: Right breast U/S at 9:00, 4 cm from nipple showed mass measuring 1.1 x 1 x 1.2 cm; no enlarged right axillary lymph nodes seen.  3. 4/07/2021: Right breast biopsy at 9:00 showed grade 2 invasive ductal carcinoma, ER positive at 100%, FL positive at 95%, HER-2/marek FISH negative.  4. 4/23/2021: Underwent right breast lumpectomy and sentinel lymph node excision under care of Dr. Ace Rogers. Pathology showed grade 2 invasive ductal carcinoma measuring 1.5 cm, low grade DCIS, LCIS, margins negative; 2 lymph nodes negative. Oncotype DX = 6; 9-year risk of distant recurrence = 3% after 5 years of hormone blockade therapy, < 1% absolute chemotherapy benefit.  5. 5/25/2021-6/16/2021: Completed adjuvant radiation therapy to right breast. Subsequently started anastrazole.    INTERIM HISTORY:  Dee Dee reports developing a cramp in her left lower leg resulting in a fall on 8/11/2022. She has occasional muscle cramps at night.      REVIEW OF SYSTEMS:   14 point ROS was reviewed and is negative other than as noted above in HPI.       HOME MEDICATIONS:  Current Outpatient Medications   Medication Sig Dispense Refill     levothyroxine (SYNTHROID/LEVOTHROID) 112 MCG tablet TAKE 1 TABLET (112 MCG) BY MOUTH DAILY FOR THYROID 90 tablet 2     acetaminophen (TYLENOL) 325 MG tablet Take 2 tablets (650 mg) by mouth 3 times daily 100 tablet 0     amoxicillin (AMOXIL) 500 MG capsule Take 4 capsules half to one hour before the procedure. 4 capsule 3     anastrozole (ARIMIDEX) 1 MG tablet Take 1 tablet (1 mg) by mouth daily 90 tablet 3     aspirin (ASA) 81 MG EC tablet  Take 1 tablet (81 mg) by mouth daily Start tomorrow. 30 tablet 3     aspirin 81 MG tablet Take 81 mg by mouth daily       atorvastatin (LIPITOR) 10 MG tablet TAKE 1 TABLET (10 MG) BY MOUTH DAILY FOR CHOLESTROL 90 tablet 3     blood glucose (CONTOUR NEXT TEST) test strip Use to test blood sugar 1 time daily or as directed. 100 each 2     blood glucose monitoring (NITO MICROLET) lancets Use to test blood sugar 1 time daily or as directed. 100 each 3     calcium-vitamin D (CALTRATE) 600-400 MG-UNIT per tablet Take 1 tablet by mouth daily 60 tablet 11     cholecalciferol (VITAMIN D) 1000 UNIT tablet Take 1 tablet (1,000 Units) by mouth daily 100 tablet 3     CONTOUR NEXT EZ (CONTOUR NEXT EZ W/DEVICE KIT) w/Device KIT 1 kit once 1 kit 0     Cyanocobalamin (VITAMIN B 12 PO) Take 1 tablet by mouth daily VIT B12 unknown otc dose       hydrALAZINE (APRESOLINE) 25 MG tablet Take 2 tablets (50 mg) by mouth 2 times daily 270 tablet 3     hydrochlorothiazide (HYDRODIURIL) 25 MG tablet TAKE 1 TABLET (25 MG) BY MOUTH DAILY FOR BLOOD PRESSURE 90 tablet 3     losartan (COZAAR) 100 MG tablet TAKE 1 TABLET (100 MG) BY MOUTH DAILY FOR BLOOD PRESSURE 90 tablet 3     metFORMIN (GLUCOPHAGE-XR) 500 MG 24 hr tablet TAKE 2 TABLETS (1,000 MG) BY MOUTH DAILY (WITH DINNER) 180 tablet 3     metroNIDAZOLE (METROGEL) 0.75 % external gel APPLY TO AFFECTED AREA TWICE A DAY 45 g 3     multivitamin, therapeutic with minerals (THERA-VIT-M) TABS Take 1 tablet by mouth daily       nitroGLYcerin (NITROSTAT) 0.4 MG sublingual tablet For chest pain place 1 tablet under the tongue every 5 minutes for 3 doses. If symptoms persist 5 minutes after 1st dose call 911. 25 tablet 3     ticagrelor (BRILINTA) 90 MG tablet Take 1 tablet (90 mg) by mouth 2 times daily Start this evening. 180 tablet 3         ALLERGIES:  Allergies   Allergen Reactions     Toprol Xl [Metoprolol] Fatigue     Ace Inhibitors      ACE throat clearing     Oxycodone Nausea and Vomiting      Tetanus Toxoid Swelling     Hard red lump          PAST MEDICAL HISTORY:  Past Medical History:   Diagnosis Date     Anemia due to blood loss      BASAL CELL CANCER -NOSE      Dermatitis      Diabetes (H)      Family history of diabetes mellitus     Father     Family history of ischemic heart disease     Father     Family history of malignant neoplasm of ovary     Mother     HX DISRUPT L ANT CRUCIATE S/P REPAIR 2003      Hyperlipidemia      HYPERTENSION       HYPOTHYROIDISM      LOC PRIM OSTEOARTH-L 1ST METATARSAL-CUNEIFORM JOINT 3/10/2004     Major depressive disorder, single episode, mild (H) 11/24/2014     MILD-MOD TRICUSPID REGURG. MOD PULM HTN 6/23/2005    ECHO  5/05     Obese      PONV (postoperative nausea and vomiting)      Rosacea      Shingles      SPLENIC CYST          PAST SURGICAL HISTORY:  Past Surgical History:   Procedure Laterality Date     ARTHROPLASTY KNEE Left 4/13/2016    Procedure: ARTHROPLASTY KNEE;  Surgeon: Giuseppe Lopez MD;  Location:  OR     ARTHROPLASTY MINIMALLY INVASIVE HIP  4/18/2012    Procedure:ARTHROPLASTY MINIMALLY INVASIVE HIP; RIGHT TWO INCISION MINIMALLY INVASIVE HIP ARTHROPLASTY (YUVAL)^ (NO CELL SAVER) (C-ARM); Surgeon:GIUSEPPE LOPEZ; Location: OR     BIOPSY NODE SENTINEL Right 4/23/2021    Procedure: RIGHT SENTINEL LYMPH NODE BIOPSY;  Surgeon: Giuseppe Rogers MD;  Location:  OR     C CHEMOSURG MOHS 1ST STAGE  9-99    Mohs-nose     CARDIAC SURGERY  06/27/2019    mid and distal RCA stents     CV CORONARY ANGIOGRAM N/A 5/19/2022    Procedure: Coronary Angiogram;  Surgeon: Reyes Graham MD;  Location:  HEART CARDIAC CATH LAB     CV INSTANTANEOUS WAVE-FREE RATIO N/A 5/19/2022    Procedure: Instantaneous Wave-Free Ratio;  Surgeon: Reyes Graham MD;  Location:  HEART CARDIAC CATH LAB     CV INTRAVASULAR ULTRASOUND N/A 5/19/2022    Procedure: Intravascular Ultrasound;  Surgeon: Reyes Graham MD;  Location:  HEART CARDIAC CATH LAB     CV PCI  STENT DRUG ELUTING N/A 5/19/2022    Procedure: Percutaneous Coronary Intervention Stent;  Surgeon: Reyes Graham MD;  Location:  HEART CARDIAC CATH LAB     ENT SURGERY      mohs procedure on nose     HYSTERECTOMY, PAP NO LONGER INDICATED       LUMPECTOMY BREAST WITH SEED LOCALIZATION Right 4/23/2021    Procedure: SEED LOCALIZED RIGHT BREAST LUMPECTOMY;  Surgeon: Giuseppe Rogers MD;  Location:  OR     SURGICAL HISTORY OF -   Mult-~1978    infertility, laparotomies     Z APPENDECTOMY  8/87    incidental appi at [facility] REPAIR CRUCIATE LIGAMENT,KNEE  8/2003    L   ACL reconstruction     [facility] TOTAL ABDOM HYSTERECTOMY  8/87    W/ BSO fibroids, hx PID     [facility]  [facility]    normal, rec repeat 10 yrs         SOCIAL HISTORY:  Social History     Socioeconomic History     Marital status:      Spouse name: FABIAN CUADRA     Number of children: 2     Years of education: Not on file     Highest education level: Not on file   Occupational History     Occupation: [employer]     Employer: [employer]   Tobacco Use     Smoking status: Never Smoker     Smokeless tobacco: Never Used   Substance and Sexual Activity     Alcohol use: Yes     Alcohol/week: 0.0 standard drinks     Comment: occasional     Drug use: No     Sexual activity: Yes     Partners: Male   Other Topics Concern      Service No     Blood Transfusions No     Caffeine Concern No     Occupational Exposure No     Hobby Hazards No     Sleep Concern No     Stress Concern No     Weight Concern Yes     Special Diet No     Back Care No     Exercise Yes     Comment: Approx 3 times a week for about 30 minutes     Bike Helmet Yes     Seat Belt Yes     Self-Exams Yes     Parent/sibling w/ CABG, MI or angioplasty before 65F 55M? Not Asked   Social History Narrative     Not on file     Social Determinants of Health     Financial Resource Strain: Not on file   Food Insecurity: Not on file   Transportation Needs: Not  "on file   Physical Activity: Not on file   Stress: Not on file   Social Connections: Not on file   Intimate Partner Violence: Not on file   Housing Stability: Not on file         FAMILY HISTORY:  Family History   Problem Relation Age of Onset     Cancer Mother         ovarian  of it  at 49yo.     Other Cancer Mother      Diabetes Father         Adult onset     Hypertension Father      C.A.D. Father         Fatal Heart attack at 70.     Cerebrovascular Disease Father      Thyroid Disease Sister      Thyroid Disease Sister      Thyroid Disease Sister      Cancer Daughter      Thyroid Disease Daughter      Breast Cancer No family hx of      Cancer - colorectal No family hx of          PHYSICAL EXAM:  Vital signs:  BP (!) 162/81   Pulse 83   Resp 16   Ht 1.626 m (5' 4\")   Wt 80.3 kg (177 lb)   SpO2 100%   BMI 30.38 kg/m     GENERAL/CONSTITUTIONAL: No acute distress.  EYES:  No scleral icterus.  LYMPH: No cervical, supraclavicular, axillary adenopathy.   BREAST: No palpable masses in either breast with exception of left breast post-lumpectomy changes in the upper outer quadrant; no significant edema or erythema. Nipples are everted bilaterally with no discharge. No erythema, ulceration.  RESPIRATORY: No audible cough or wheezing.   GASTROINTESTINAL: No hepatosplenomegaly, masses, or tenderness. No guarding.  No distention.  MUSCULOSKELETAL: Warm and well-perfused, no cyanosis, clubbing, or edema.  NEUROLOGIC: No focal motor deficits. Alert, oriented, answers questions appropriately.  INTEGUMENTARY: No rashes or jaundice. Ecchymosis at the left knee. Few scattered ones in the left arm.  GAIT: Steady, does not use assistive device    LABS:  CBC RESULTS: Recent Labs   Lab Test 21  0857   WBC 7.2   RBC 4.60   HGB 14.1   HCT 40.7   MCV 89   MCH 30.7   MCHC 34.6   RDW 14.6        Last Comprehensive Metabolic Panel:  Sodium   Date Value Ref Range Status   2022 134 133 - 144 mmol/L Final   2021 " 137 133 - 144 mmol/L Final     Potassium   Date Value Ref Range Status   05/23/2022 3.4 3.4 - 5.3 mmol/L Final   05/12/2021 3.9 3.4 - 5.3 mmol/L Final     Chloride   Date Value Ref Range Status   05/23/2022 102 94 - 109 mmol/L Final   05/12/2021 103 94 - 109 mmol/L Final     Carbon Dioxide   Date Value Ref Range Status   05/12/2021 26 20 - 32 mmol/L Final     Carbon Dioxide (CO2)   Date Value Ref Range Status   05/23/2022 26 20 - 32 mmol/L Final     Anion Gap   Date Value Ref Range Status   05/23/2022 6 3 - 14 mmol/L Final   05/12/2021 8 3 - 14 mmol/L Final     Glucose   Date Value Ref Range Status   05/23/2022 167 (H) 70 - 99 mg/dL Final   05/12/2021 163 (H) 70 - 99 mg/dL Final     Urea Nitrogen   Date Value Ref Range Status   05/23/2022 16 7 - 30 mg/dL Final   05/12/2021 17 7 - 30 mg/dL Final     Creatinine   Date Value Ref Range Status   05/23/2022 0.72 0.52 - 1.04 mg/dL Final   05/12/2021 0.71 0.52 - 1.04 mg/dL Final     GFR Estimate   Date Value Ref Range Status   05/23/2022 86 >60 mL/min/1.73m2 Final     Comment:     Effective December 21, 2021 eGFRcr in adults is calculated using the 2021 CKD-EPI creatinine equation which includes age and gender (Prakash et al., NEJ, DOI: 10.1056/EMUDbq5489650)   05/12/2021 82 >60 mL/min/[1.73_m2] Final     Comment:     Non  GFR Calc  Starting 12/18/2018, serum creatinine based estimated GFR (eGFR) will be   calculated using the Chronic Kidney Disease Epidemiology Collaboration   (CKD-EPI) equation.       Calcium   Date Value Ref Range Status   05/23/2022 9.3 8.5 - 10.1 mg/dL Final   05/12/2021 9.4 8.5 - 10.1 mg/dL Final     Bilirubin Total   Date Value Ref Range Status   04/07/2022 0.3 0.2 - 1.3 mg/dL Final   05/12/2021 0.5 0.2 - 1.3 mg/dL Final     Alkaline Phosphatase   Date Value Ref Range Status   04/07/2022 72 40 - 150 U/L Final   05/12/2021 76 40 - 150 U/L Final     ALT   Date Value Ref Range Status   04/07/2022 71 (H) 0 - 50 U/L Final   05/12/2021 79  (H) 0 - 50 U/L Final     AST   Date Value Ref Range Status   2022 50 (H) 0 - 45 U/L Final   2021 48 (H) 0 - 45 U/L Final       PATHOLOGY:  None new.    IMAGIN2021: DEXA showed normal bone density.    3/28/2022: Mammogram showed no suspicious findings.    ASSESSMENT/PLAN:  Yola Mayo is a 77 year old female with the following issues:  1. 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  --She is status post lumpectomy followed by adjuvant radiation therapy.  -I discussed with Dee Dee that she has no clinical evidence for recurrent breast cancer by physical exam from today.  --She is now on anastrazole and tolerating this well with no significant side effects.  --Plan for total 5 years of hormone blockade therapy.  --Baseline DEXA scan from 2021 was normal.  I advised adequate calcium and vitamin D intake as well as weight bearing exercise.  --Plan to repeat DEXA in .  -- Due for next mammogram at end of 3/2023.    2. Right breast lymphedema  --Improved with lymphedema therapy.    3. Coronary artery disease  --Aromatase inhibitors can slightly increase cardiovascular risk but controllable with her cardiac medications.  --She will continue on aspirin, beta-blocker, and statin.     4. Diabetes mellitus type 2  --Well-controlled. She will continue metformin and has her A1C monitored by Dr. Braga.    5. Essential hypertension  --Blood pressure stable.  She will continue on losartan, hydrochlorothiazide, and hydralazine.    6. Muscle cramping  --I advised starting magnesium glycinate nightly.    Return in 6 months.    Elida Grullon MD  Hematology/Oncology  [facility]    Total time spent: 30 minutes in patient evaluation, counseling, documentation, and coordination of care.

## 2022-08-29 ENCOUNTER — ONCOLOGY VISIT (OUTPATIENT)
Dept: ONCOLOGY | Facility: CLINIC | Age: 77
End: 2022-08-29
Attending: INTERNAL MEDICINE
Payer: MEDICARE

## 2022-08-29 VITALS
HEIGHT: 64 IN | WEIGHT: 177 LBS | OXYGEN SATURATION: 100 % | RESPIRATION RATE: 16 BRPM | SYSTOLIC BLOOD PRESSURE: 162 MMHG | DIASTOLIC BLOOD PRESSURE: 81 MMHG | HEART RATE: 83 BPM | BODY MASS INDEX: 30.22 KG/M2

## 2022-08-29 DIAGNOSIS — Z17.0 MALIGNANT NEOPLASM OF UPPER-OUTER QUADRANT OF RIGHT BREAST IN FEMALE, ESTROGEN RECEPTOR POSITIVE (H): Primary | ICD-10-CM

## 2022-08-29 DIAGNOSIS — E11.9 TYPE 2 DIABETES MELLITUS WITHOUT COMPLICATION, WITHOUT LONG-TERM CURRENT USE OF INSULIN (H): ICD-10-CM

## 2022-08-29 DIAGNOSIS — C50.411 MALIGNANT NEOPLASM OF UPPER-OUTER QUADRANT OF RIGHT BREAST IN FEMALE, ESTROGEN RECEPTOR POSITIVE (H): Primary | ICD-10-CM

## 2022-08-29 DIAGNOSIS — I10 ESSENTIAL HYPERTENSION: ICD-10-CM

## 2022-08-29 DIAGNOSIS — R25.2 MUSCLE CRAMPING: ICD-10-CM

## 2022-08-29 DIAGNOSIS — L71.9 ROSACEA: ICD-10-CM

## 2022-08-29 DIAGNOSIS — I89.0 LYMPHEDEMA OF BREAST: ICD-10-CM

## 2022-08-29 DIAGNOSIS — Z12.31 ENCOUNTER FOR SCREENING MAMMOGRAM FOR BREAST CANCER: ICD-10-CM

## 2022-08-29 DIAGNOSIS — Z79.811 AROMATASE INHIBITOR USE: ICD-10-CM

## 2022-08-29 PROCEDURE — 99214 OFFICE O/P EST MOD 30 MIN: CPT | Performed by: INTERNAL MEDICINE

## 2022-08-29 PROCEDURE — G0463 HOSPITAL OUTPT CLINIC VISIT: HCPCS

## 2022-08-29 ASSESSMENT — PAIN SCALES - GENERAL: PAINLEVEL: NO PAIN (0)

## 2022-08-29 NOTE — PROGRESS NOTES
"Oncology Rooming Note    August 29, 2022 9:06 AM   Yola Mayo is a 77 year old female who presents for:    Chief Complaint   Patient presents with     Oncology Clinic Visit     Initial Vitals: Resp 16   Ht 1.626 m (5' 4\")   Wt 80.3 kg (177 lb)   BMI 30.38 kg/m   Estimated body mass index is 30.38 kg/m  as calculated from the following:    Height as of this encounter: 1.626 m (5' 4\").    Weight as of this encounter: 80.3 kg (177 lb). Body surface area is 1.9 meters squared.  No Pain (0) Comment: Data Unavailable   No LMP recorded. Patient has had a hysterectomy.  Allergies reviewed: Yes  Medications reviewed: Yes    Medications: Medication refills not needed today.  Pharmacy name entered into [employer]: [id number] [place] [address] [facility] [address]    Clinical concerns:  doctor was notified.      Anila Durham MA            "

## 2022-08-29 NOTE — LETTER
8/29/2022         RE: Yola Mayo  [address] [facility] [id number]        Dear Colleague,    Thank you for referring your patient, Yola Mayo, to the [facility]. Please see a copy of my visit note below.    [facility]    Hematology/Oncology Established Patient Note      Today's Date: 8/29/2022    Reason for follow-up: Right breast cancer.    HISTORY OF PRESENT ILLNESS: Yola Mayo is a 77 year old female who presents with the following oncologic history:  1. 3/24/2021: Mammogram showed distortion in right upper outer breast; left breast negative.  2. 3/31/2021: Right breast U/S at 9:00, 4 cm from nipple showed mass measuring 1.1 x 1 x 1.2 cm; no enlarged right axillary lymph nodes seen.  3. 4/07/2021: Right breast biopsy at 9:00 showed grade 2 invasive ductal carcinoma, ER positive at 100%, WV positive at 95%, HER-2/marek FISH negative.  4. 4/23/2021: Underwent right breast lumpectomy and sentinel lymph node excision under care of Dr. Ace Rogers. Pathology showed grade 2 invasive ductal carcinoma measuring 1.5 cm, low grade DCIS, LCIS, margins negative; 2 lymph nodes negative. Oncotype DX = 6; 9-year risk of distant recurrence = 3% after 5 years of hormone blockade therapy, < 1% absolute chemotherapy benefit.  5. 5/25/2021-6/16/2021: Completed adjuvant radiation therapy to right breast. Subsequently started anastrazole.    INTERIM HISTORY:  Dee Dee reports developing a cramp in her left lower leg resulting in a fall on 8/11/2022. She has occasional muscle cramps at night.      REVIEW OF SYSTEMS:   14 point ROS was reviewed and is negative other than as noted above in HPI.       HOME MEDICATIONS:  Current Outpatient Medications   Medication Sig Dispense Refill     levothyroxine (SYNTHROID/LEVOTHROID) 112 MCG tablet TAKE 1 TABLET (112 MCG) BY MOUTH DAILY FOR THYROID 90 tablet 2     acetaminophen (TYLENOL) 325 MG tablet Take 2 tablets (650 mg) by  mouth 3 times daily 100 tablet 0     amoxicillin (AMOXIL) 500 MG capsule Take 4 capsules half to one hour before the procedure. 4 capsule 3     anastrozole (ARIMIDEX) 1 MG tablet Take 1 tablet (1 mg) by mouth daily 90 tablet 3     aspirin (ASA) 81 MG EC tablet Take 1 tablet (81 mg) by mouth daily Start tomorrow. 30 tablet 3     aspirin 81 MG tablet Take 81 mg by mouth daily       atorvastatin (LIPITOR) 10 MG tablet TAKE 1 TABLET (10 MG) BY MOUTH DAILY FOR CHOLESTROL 90 tablet 3     blood glucose (CONTOUR NEXT TEST) test strip Use to test blood sugar 1 time daily or as directed. 100 each 2     blood glucose monitoring (NITO MICROLET) lancets Use to test blood sugar 1 time daily or as directed. 100 each 3     calcium-vitamin D (CALTRATE) 600-400 MG-UNIT per tablet Take 1 tablet by mouth daily 60 tablet 11     cholecalciferol (VITAMIN D) 1000 UNIT tablet Take 1 tablet (1,000 Units) by mouth daily 100 tablet 3     CONTOUR NEXT EZ (CONTOUR NEXT EZ W/DEVICE KIT) w/Device KIT 1 kit once 1 kit 0     Cyanocobalamin (VITAMIN B 12 PO) Take 1 tablet by mouth daily VIT B12 unknown otc dose       hydrALAZINE (APRESOLINE) 25 MG tablet Take 2 tablets (50 mg) by mouth 2 times daily 270 tablet 3     hydrochlorothiazide (HYDRODIURIL) 25 MG tablet TAKE 1 TABLET (25 MG) BY MOUTH DAILY FOR BLOOD PRESSURE 90 tablet 3     losartan (COZAAR) 100 MG tablet TAKE 1 TABLET (100 MG) BY MOUTH DAILY FOR BLOOD PRESSURE 90 tablet 3     metFORMIN (GLUCOPHAGE-XR) 500 MG 24 hr tablet TAKE 2 TABLETS (1,000 MG) BY MOUTH DAILY (WITH DINNER) 180 tablet 3     metroNIDAZOLE (METROGEL) 0.75 % external gel APPLY TO AFFECTED AREA TWICE A DAY 45 g 3     multivitamin, therapeutic with minerals (THERA-VIT-M) TABS Take 1 tablet by mouth daily       nitroGLYcerin (NITROSTAT) 0.4 MG sublingual tablet For chest pain place 1 tablet under the tongue every 5 minutes for 3 doses. If symptoms persist 5 minutes after 1st dose call 911. 25 tablet 3     ticagrelor (BRILINTA)  90 MG tablet Take 1 tablet (90 mg) by mouth 2 times daily Start this evening. 180 tablet 3         ALLERGIES:  Allergies   Allergen Reactions     Toprol Xl [Metoprolol] Fatigue     Ace Inhibitors      ACE throat clearing     Oxycodone Nausea and Vomiting     Tetanus Toxoid Swelling     Hard red lump          PAST MEDICAL HISTORY:  Past Medical History:   Diagnosis Date     Anemia due to blood loss      BASAL CELL CANCER -NOSE      Dermatitis      Diabetes (H)      Family history of diabetes mellitus     Father     Family history of ischemic heart disease     Father     Family history of malignant neoplasm of ovary     Mother     HX DISRUPT L ANT CRUCIATE S/P REPAIR 2003      Hyperlipidemia      HYPERTENSION       HYPOTHYROIDISM      LOC PRIM OSTEOARTH-L 1ST METATARSAL-CUNEIFORM JOINT 3/10/2004     Major depressive disorder, single episode, mild (H) 11/24/2014     MILD-MOD TRICUSPID REGURG. MOD PULM HTN 6/23/2005    ECHO  5/05     Obese      PONV (postoperative nausea and vomiting)      Rosacea      Shingles      SPLENIC CYST          PAST SURGICAL HISTORY:  Past Surgical History:   Procedure Laterality Date     ARTHROPLASTY KNEE Left 4/13/2016    Procedure: ARTHROPLASTY KNEE;  Surgeon: Giuseppe Lopez MD;  Location:  OR     ARTHROPLASTY MINIMALLY INVASIVE HIP  4/18/2012    Procedure:ARTHROPLASTY MINIMALLY INVASIVE HIP; RIGHT TWO INCISION MINIMALLY INVASIVE HIP ARTHROPLASTY (YUVAL)^ (NO CELL SAVER) (C-ARM); Surgeon:GIUSEPPE LOPEZ; Location: OR     BIOPSY NODE SENTINEL Right 4/23/2021    Procedure: RIGHT SENTINEL LYMPH NODE BIOPSY;  Surgeon: Giuseppe Rogers MD;  Location:  OR     C CHEMOSURG MOHS 1ST STAGE  9-99    Mohs-nose     CARDIAC SURGERY  06/27/2019    mid and distal RCA stents     CV CORONARY ANGIOGRAM N/A 5/19/2022    Procedure: Coronary Angiogram;  Surgeon: Reyes Graham MD;  Location:  HEART CARDIAC CATH LAB     CV INSTANTANEOUS WAVE-FREE RATIO N/A 5/19/2022    Procedure:  Instantaneous Wave-Free Ratio;  Surgeon: Reyes Graham MD;  Location:  HEART CARDIAC CATH LAB     CV INTRAVASULAR ULTRASOUND N/A 5/19/2022    Procedure: Intravascular Ultrasound;  Surgeon: Reyes Graham MD;  Location:  HEART CARDIAC CATH LAB     CV PCI STENT DRUG ELUTING N/A 5/19/2022    Procedure: Percutaneous Coronary Intervention Stent;  Surgeon: Reyes Graham MD;  Location:  HEART CARDIAC CATH LAB     ENT SURGERY      mohs procedure on nose     HYSTERECTOMY, PAP NO LONGER INDICATED       LUMPECTOMY BREAST WITH SEED LOCALIZATION Right 4/23/2021    Procedure: SEED LOCALIZED RIGHT BREAST LUMPECTOMY;  Surgeon: Giuseppe Rogers MD;  Location:  OR     SURGICAL HISTORY OF -   Mult-~1978    infertility, laparotomies     Z APPENDECTOMY  8/87    incidental appi at [facility] REPAIR CRUCIATE LIGAMENT,KNEE  8/2003    L   ACL reconstruction     [facility] TOTAL ABDOM HYSTERECTOMY  8/87    W/ BSO fibroids, hx PID     [facility]  [facility]    normal, rec repeat 10 yrs         SOCIAL HISTORY:  Social History     Socioeconomic History     Marital status:      Spouse name: FABIAN CUADRA     Number of children: 2     Years of education: Not on file     Highest education level: Not on file   Occupational History     Occupation: [employer]     Employer: [employer]   Tobacco Use     Smoking status: Never Smoker     Smokeless tobacco: Never Used   Substance and Sexual Activity     Alcohol use: Yes     Alcohol/week: 0.0 standard drinks     Comment: occasional     Drug use: No     Sexual activity: Yes     Partners: Male   Other Topics Concern      Service No     Blood Transfusions No     Caffeine Concern No     Occupational Exposure No     Hobby Hazards No     Sleep Concern No     Stress Concern No     Weight Concern Yes     Special Diet No     Back Care No     Exercise Yes     Comment: Approx 3 times a week for about 30 minutes     Bike Helmet Yes     Seat Belt Yes      "Self-Exams Yes     Parent/sibling w/ CABG, MI or angioplasty before 65F 55M? Not Asked   Social History Narrative     Not on file     Social Determinants of Health     Financial Resource Strain: Not on file   Food Insecurity: Not on file   Transportation Needs: Not on file   Physical Activity: Not on file   Stress: Not on file   Social Connections: Not on file   Intimate Partner Violence: Not on file   Housing Stability: Not on file         FAMILY HISTORY:  Family History   Problem Relation Age of Onset     Cancer Mother         ovarian  of it  at 49yo.     Other Cancer Mother      Diabetes Father         Adult onset     Hypertension Father      C.A.D. Father         Fatal Heart attack at 70.     Cerebrovascular Disease Father      Thyroid Disease Sister      Thyroid Disease Sister      Thyroid Disease Sister      Cancer Daughter      Thyroid Disease Daughter      Breast Cancer No family hx of      Cancer - colorectal No family hx of          PHYSICAL EXAM:  Vital signs:  BP (!) 162/81   Pulse 83   Resp 16   Ht 1.626 m (5' 4\")   Wt 80.3 kg (177 lb)   SpO2 100%   BMI 30.38 kg/m     GENERAL/CONSTITUTIONAL: No acute distress.  EYES:  No scleral icterus.  LYMPH: No cervical, supraclavicular, axillary adenopathy.   BREAST: No palpable masses in either breast with exception of left breast post-lumpectomy changes in the upper outer quadrant; no significant edema or erythema. Nipples are everted bilaterally with no discharge. No erythema, ulceration.  RESPIRATORY: No audible cough or wheezing.   GASTROINTESTINAL: No hepatosplenomegaly, masses, or tenderness. No guarding.  No distention.  MUSCULOSKELETAL: Warm and well-perfused, no cyanosis, clubbing, or edema.  NEUROLOGIC: No focal motor deficits. Alert, oriented, answers questions appropriately.  INTEGUMENTARY: No rashes or jaundice. Ecchymosis at the left knee. Few scattered ones in the left arm.  GAIT: Steady, does not use assistive device    LABS:  CBC " RESULTS: Recent Labs   Lab Test 03/19/21  0857   WBC 7.2   RBC 4.60   HGB 14.1   HCT 40.7   MCV 89   MCH 30.7   MCHC 34.6   RDW 14.6        Last Comprehensive Metabolic Panel:  Sodium   Date Value Ref Range Status   05/23/2022 134 133 - 144 mmol/L Final   05/12/2021 137 133 - 144 mmol/L Final     Potassium   Date Value Ref Range Status   05/23/2022 3.4 3.4 - 5.3 mmol/L Final   05/12/2021 3.9 3.4 - 5.3 mmol/L Final     Chloride   Date Value Ref Range Status   05/23/2022 102 94 - 109 mmol/L Final   05/12/2021 103 94 - 109 mmol/L Final     Carbon Dioxide   Date Value Ref Range Status   05/12/2021 26 20 - 32 mmol/L Final     Carbon Dioxide (CO2)   Date Value Ref Range Status   05/23/2022 26 20 - 32 mmol/L Final     Anion Gap   Date Value Ref Range Status   05/23/2022 6 3 - 14 mmol/L Final   05/12/2021 8 3 - 14 mmol/L Final     Glucose   Date Value Ref Range Status   05/23/2022 167 (H) 70 - 99 mg/dL Final   05/12/2021 163 (H) 70 - 99 mg/dL Final     Urea Nitrogen   Date Value Ref Range Status   05/23/2022 16 7 - 30 mg/dL Final   05/12/2021 17 7 - 30 mg/dL Final     Creatinine   Date Value Ref Range Status   05/23/2022 0.72 0.52 - 1.04 mg/dL Final   05/12/2021 0.71 0.52 - 1.04 mg/dL Final     GFR Estimate   Date Value Ref Range Status   05/23/2022 86 >60 mL/min/1.73m2 Final     Comment:     Effective December 21, 2021 eGFRcr in adults is calculated using the 2021 CKD-EPI creatinine equation which includes age and gender (Prakash et al., NEJM, DOI: 10.1056/FEOZyj0298846)   05/12/2021 82 >60 mL/min/[1.73_m2] Final     Comment:     Non  GFR Calc  Starting 12/18/2018, serum creatinine based estimated GFR (eGFR) will be   calculated using the Chronic Kidney Disease Epidemiology Collaboration   (CKD-EPI) equation.       Calcium   Date Value Ref Range Status   05/23/2022 9.3 8.5 - 10.1 mg/dL Final   05/12/2021 9.4 8.5 - 10.1 mg/dL Final     Bilirubin Total   Date Value Ref Range Status   04/07/2022 0.3 0.2 -  1.3 mg/dL Final   2021 0.5 0.2 - 1.3 mg/dL Final     Alkaline Phosphatase   Date Value Ref Range Status   2022 72 40 - 150 U/L Final   2021 76 40 - 150 U/L Final     ALT   Date Value Ref Range Status   2022 71 (H) 0 - 50 U/L Final   2021 79 (H) 0 - 50 U/L Final     AST   Date Value Ref Range Status   2022 50 (H) 0 - 45 U/L Final   2021 48 (H) 0 - 45 U/L Final       PATHOLOGY:  None new.    IMAGIN2021: DEXA showed normal bone density.    3/28/2022: Mammogram showed no suspicious findings.    ASSESSMENT/PLAN:  Yola Mayo is a 77 year old female with the following issues:  1. 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  --She is status post lumpectomy followed by adjuvant radiation therapy.  -I discussed with Dee Dee that she has no clinical evidence for recurrent breast cancer by physical exam from today.  --She is now on anastrazole and tolerating this well with no significant side effects.  --Plan for total 5 years of hormone blockade therapy.  --Baseline DEXA scan from 2021 was normal.  I advised adequate calcium and vitamin D intake as well as weight bearing exercise.  --Plan to repeat DEXA in .  -- Due for next mammogram at end of 3/2023.    2. Right breast lymphedema  --Improved with lymphedema therapy.    3. Coronary artery disease  --Aromatase inhibitors can slightly increase cardiovascular risk but controllable with her cardiac medications.  --She will continue on aspirin, beta-blocker, and statin.     4. Diabetes mellitus type 2  --Well-controlled. She will continue metformin and has her A1C monitored by Dr. Braga.    5. Essential hypertension  --Blood pressure stable.  She will continue on losartan, hydrochlorothiazide, and hydralazine.    6. Muscle  "cramping  --I advised starting magnesium glycinate nightly.    Return in 6 months.    Elida Grullon MD  Hematology/Oncology  [facility]    Total time spent: 30 minutes in patient evaluation, counseling, documentation, and coordination of care.      Oncology Rooming Note    August 29, 2022 9:06 AM   Yola Mayo is a 77 year old female who presents for:    Chief Complaint   Patient presents with     Oncology Clinic Visit     Initial Vitals: Resp 16   Ht 1.626 m (5' 4\")   Wt 80.3 kg (177 lb)   BMI 30.38 kg/m   Estimated body mass index is 30.38 kg/m  as calculated from the following:    Height as of this encounter: 1.626 m (5' 4\").    Weight as of this encounter: 80.3 kg (177 lb). Body surface area is 1.9 meters squared.  No Pain (0) Comment: Data Unavailable   No LMP recorded. Patient has had a hysterectomy.  Allergies reviewed: Yes  Medications reviewed: Yes    Medications: Medication refills not needed today.  Pharmacy name entered into "[employer]: [id number] [place] [address] [facility]    Clinical concerns:  doctor was notified.      Anila Durham MA                Again, thank you for allowing me to participate in the care of your patient.        Sincerely,        Elida Grullon MD    "

## 2022-08-31 RX ORDER — METRONIDAZOLE 7.5 MG/G
GEL TOPICAL
Qty: 45 G | Refills: 1 | Status: SHIPPED | OUTPATIENT
Start: 2022-08-31 | End: 2023-07-14

## 2022-08-31 NOTE — TELEPHONE ENCOUNTER
Prescription approved per [facility] Refill Protocol.  Last OV: 4/7/22    Marina Cheng RN  [facility]

## 2022-10-07 ENCOUNTER — OFFICE VISIT (OUTPATIENT)
Dept: FAMILY MEDICINE | Facility: CLINIC | Age: 77
End: 2022-10-07
Payer: MEDICARE

## 2022-10-07 VITALS
HEART RATE: 71 BPM | DIASTOLIC BLOOD PRESSURE: 73 MMHG | OXYGEN SATURATION: 97 % | WEIGHT: 177.8 LBS | BODY MASS INDEX: 30.35 KG/M2 | RESPIRATION RATE: 18 BRPM | SYSTOLIC BLOOD PRESSURE: 133 MMHG | TEMPERATURE: 98.1 F | HEIGHT: 64 IN

## 2022-10-07 DIAGNOSIS — Z98.890 S/P LUMPECTOMY, RIGHT BREAST: ICD-10-CM

## 2022-10-07 DIAGNOSIS — Z95.5 S/P DRUG ELUTING CORONARY STENT PLACEMENT: ICD-10-CM

## 2022-10-07 DIAGNOSIS — E03.9 HYPOTHYROIDISM, UNSPECIFIED TYPE: ICD-10-CM

## 2022-10-07 DIAGNOSIS — C50.911 MALIGNANT NEOPLASM OF RIGHT BREAST IN FEMALE, ESTROGEN RECEPTOR POSITIVE, UNSPECIFIED SITE OF BREAST (H): ICD-10-CM

## 2022-10-07 DIAGNOSIS — Z92.3 S/P RADIATION THERAPY: ICD-10-CM

## 2022-10-07 DIAGNOSIS — L71.9 ROSACEA: ICD-10-CM

## 2022-10-07 DIAGNOSIS — Z17.0 MALIGNANT NEOPLASM OF RIGHT BREAST IN FEMALE, ESTROGEN RECEPTOR POSITIVE, UNSPECIFIED SITE OF BREAST (H): ICD-10-CM

## 2022-10-07 DIAGNOSIS — E11.9 TYPE 2 DIABETES MELLITUS WITHOUT COMPLICATION, WITHOUT LONG-TERM CURRENT USE OF INSULIN (H): Primary | ICD-10-CM

## 2022-10-07 LAB
ALBUMIN SERPL-MCNC: 3.7 G/DL (ref 3.4–5)
ALP SERPL-CCNC: 76 U/L (ref 40–150)
ALT SERPL W P-5'-P-CCNC: 59 U/L (ref 0–50)
ANION GAP SERPL CALCULATED.3IONS-SCNC: 8 MMOL/L (ref 3–14)
AST SERPL W P-5'-P-CCNC: 39 U/L (ref 0–45)
BILIRUB SERPL-MCNC: 0.5 MG/DL (ref 0.2–1.3)
BUN SERPL-MCNC: 13 MG/DL (ref 7–30)
CALCIUM SERPL-MCNC: 9.4 MG/DL (ref 8.5–10.1)
CHLORIDE BLD-SCNC: 104 MMOL/L (ref 94–109)
CO2 SERPL-SCNC: 25 MMOL/L (ref 20–32)
CREAT SERPL-MCNC: 0.64 MG/DL (ref 0.52–1.04)
CREAT UR-MCNC: 59 MG/DL
GFR SERPL CREATININE-BSD FRML MDRD: >90 ML/MIN/1.73M2
GLUCOSE BLD-MCNC: 103 MG/DL (ref 70–99)
HBA1C MFR BLD: 6 % (ref 0–5.6)
MICROALBUMIN UR-MCNC: 10 MG/L
MICROALBUMIN/CREAT UR: 16.95 MG/G CR (ref 0–25)
POTASSIUM BLD-SCNC: 4.2 MMOL/L (ref 3.4–5.3)
PROT SERPL-MCNC: 7.5 G/DL (ref 6.8–8.8)
SODIUM SERPL-SCNC: 137 MMOL/L (ref 133–144)
TSH SERPL DL<=0.005 MIU/L-ACNC: 0.7 MU/L (ref 0.4–4)

## 2022-10-07 PROCEDURE — 82043 UR ALBUMIN QUANTITATIVE: CPT | Performed by: INTERNAL MEDICINE

## 2022-10-07 PROCEDURE — 36415 COLL VENOUS BLD VENIPUNCTURE: CPT | Performed by: INTERNAL MEDICINE

## 2022-10-07 PROCEDURE — 99214 OFFICE O/P EST MOD 30 MIN: CPT | Performed by: INTERNAL MEDICINE

## 2022-10-07 PROCEDURE — 84443 ASSAY THYROID STIM HORMONE: CPT | Performed by: INTERNAL MEDICINE

## 2022-10-07 PROCEDURE — 83036 HEMOGLOBIN GLYCOSYLATED A1C: CPT | Performed by: INTERNAL MEDICINE

## 2022-10-07 PROCEDURE — 80053 COMPREHEN METABOLIC PANEL: CPT | Performed by: INTERNAL MEDICINE

## 2022-10-07 ASSESSMENT — PAIN SCALES - GENERAL: PAINLEVEL: NO PAIN (0)

## 2022-10-07 NOTE — PROGRESS NOTES
Assessment & Plan     Yola was seen today for follow up.    Diagnoses and all orders for this visit:    Type 2 diabetes mellitus without complication, without long-term current use of insulin (H)  -     blood glucose (CONTOUR NEXT TEST) test strip; 1 strip by In Vitro route daily or as directed.  -     Albumin Random Urine Quantitative with Creat Ratio; Future  -     HEMOGLOBIN A1C; Future  -     Comprehensive metabolic panel (BMP + Alb, Alk Phos, ALT, AST, Total. Bili, TP); Future  Her diabetes is very well controlled  Lab Results   Component Value Date    A1C 5.9 04/07/2022    A1C 6.0 03/19/2021    A1C 5.9 06/17/2020    A1C 6.5 05/07/2019    A1C 6.0 10/14/2018    A1C 5.9 05/03/2018     There is a benefit of adding SGLT2 inhibitor and GLP-1 but she is doing really well with metformin and diabetes is better controlled  Cost would be an issue  So we will continue with the metformin    Rosacea  Uses MetroGel    Hypothyroidism, unspecified type  -     TSH with free T4 reflex; Future  Lab Results   Component Value Date    TSH 2.07 04/07/2022    TSH 1.93 03/19/2021   She is on levothyroxine    S/P drug eluting coronary stent placement    Status post 2 drug eluting stents to the mid and distal RCA 6/27/2018    Residual 50-60% mid LAD lesion with 100% occlusion of the first diagonal    Recurrent GALINDO specifically when walking up stair or an incline  ongoing symptoms despite blood pressure management and normal stress echo    Coronary angiogram 5/19/2022: Severe vessel CAD s/p MALLY to prox LAD and D1 bifurcation and distal RCA    She will be on dual antiplatelet therapy for 1 year per cardiology     Malignant neoplasm of right breast in female, estrogen receptor positive, unspecified site of breast (H)  Per oncology note  4/23/2021: Underwent right breast lumpectomy and sentinel lymph node excision under care of Dr. Ace Rogers. Pathology showed grade 2 invasive ductal carcinoma measuring 1.5 cm, low grade DCIS, LCIS,  "margins negative; 2 lymph nodes negative. Oncotype DX = 6; 9-year risk of distant recurrence = 3% after 5 years of hormone blockade therapy, < 1% absolute chemotherapy benefit.  5. 5/25/2021-6/16/2021: Completed adjuvant radiation therapy to right breast. Subsequently started anastrazole.     S/P lumpectomy, right breast  Due to history of breast cancer    S/P radiation therapy  Due to history of breast cancer    Other orders  -     REVIEW OF HEALTH MAINTENANCE PROTOCOL ORDERS    Disclaimer: This note consists of symbols derived from keyboarding, dictation and/or voice recognition software. As a result, there may be errors in the script that have gone undetected. Please consider this when interpreting information found in this chart.    [id number]}     BMI:   Estimated body mass index is 30.52 kg/m  as calculated from the following:    Height as of this encounter: 1.626 m (5' 4\").    Weight as of this encounter: 80.6 kg (177 lb 12.8 oz).   Weight management plan: Discussed healthy diet and exercise guidelines    See Patient Instructions  Patient Instructions   Having high blood pressure puts you at risk for heart attack, stroke, kidney damage, and other serious problems.   High blood pressure doesn't usually cause symptoms, so people sometimes don't take it seriously  The medicines your doctor or nurse prescribes to treat high blood pressure can help reduce the risk of these problems and even help you live longer.    You have a lot of control over your blood pressure. To lower it:  ?Lose weight (if you are overweight)  ?Choose a diet low in fat and rich in fruits, vegetables, and low-fat dairy products  ?Reduce the amount of salt you eat  ?Do something active for at least 30 minutes a day on most days of the week  ?Cut down on alcohol (if you drink more than 2 alcoholic drinks per day)    Monitor your blood pressure once a week  at home.  Bring those readings on your next visit.  Notify us if your blood pressure readings " "consistently stays greater than 140/90.    Benefits of regular physical activity  Reduces the risk of dying prematurely.  Reduces the risk of dying from heart disease.  Reduces the risk of stroke.  Reduces the risk of developing diabetes.  Reduces the risk of developing high blood pressure.  Helps reduce blood pressure in people who already have high blood pressure.  Reduces the risk of developing colon cancer.  Reduces feelings of depression and anxiety  Helps control weight.  Helps build and maintain healthy bones, muscles and joints.  Helps older adults become stronger and better able to move about without falling.  Promotes psychological well-being.    Follow up in one year for physical   Seek sooner medical attention if there is any worsening of symptoms or problems.            Return in about 6 months (around 4/7/2023) for wellness visit.    Zoraida Braga MD  [facility] TRINITY Funez is a 77 year old, presenting for the following health issues:  Follow Up (medication)      HPI     Doing well  With no shortness of breath  But not too bad  Able to manage it  Tolerating all the medications well  Has appointment with cardiology in November      Review of Systems   Constitutional, HEENT, cardiovascular, pulmonary, GI, , musculoskeletal, neuro, skin, endocrine and psych systems are negative, except as otherwise noted.      Objective    /73 (BP Location: Left arm, Patient Position: Sitting, Cuff Size: Adult Large)   Pulse 71   Temp 98.1  F (36.7  C) (Oral)   Resp 18   Ht 1.626 m (5' 4\")   Wt 80.6 kg (177 lb 12.8 oz)   SpO2 97%   BMI 30.52 kg/m    Body mass index is 30.52 kg/m .  Physical Exam       GENERAL APPEARANCE: healthy, alert and no distress  EYES: Eyes grossly normal to inspection, PERRL and conjunctivae and sclerae normal  HENT: ear canals and TM's normal and nose and mouth without ulcers or lesions  NECK: no adenopathy  RESP: lungs clear to auscultation - no " rales, rhonchi or wheezes  CV: regular rates and rhythm, normal S1 S2, no S3  She has flareup of her rosacea but uses MetroGel which helps

## 2022-10-07 NOTE — PATIENT INSTRUCTIONS
Having high blood pressure puts you at risk for heart attack, stroke, kidney damage, and other serious problems.   High blood pressure doesn't usually cause symptoms, so people sometimes don't take it seriously  The medicines your doctor or nurse prescribes to treat high blood pressure can help reduce the risk of these problems and even help you live longer.    You have a lot of control over your blood pressure. To lower it:  ?Lose weight (if you are overweight)  ?Choose a diet low in fat and rich in fruits, vegetables, and low-fat dairy products  ?Reduce the amount of salt you eat  ?Do something active for at least 30 minutes a day on most days of the week  ?Cut down on alcohol (if you drink more than 2 alcoholic drinks per day)    Monitor your blood pressure once a week  at home.  Bring those readings on your next visit.  Notify us if your blood pressure readings consistently stays greater than 140/90.    Benefits of regular physical activity  Reduces the risk of dying prematurely.  Reduces the risk of dying from heart disease.  Reduces the risk of stroke.  Reduces the risk of developing diabetes.  Reduces the risk of developing high blood pressure.  Helps reduce blood pressure in people who already have high blood pressure.  Reduces the risk of developing colon cancer.  Reduces feelings of depression and anxiety  Helps control weight.  Helps build and maintain healthy bones, muscles and joints.  Helps older adults become stronger and better able to move about without falling.  Promotes psychological well-being.    Follow up in one year for physical   Seek sooner medical attention if there is any worsening of symptoms or problems.

## 2022-10-11 NOTE — RESULT ENCOUNTER NOTE
Timothy Aceves,    This is to inform you regarding your test result.    TSH which is thyroid hormone is normal.  Urine for Microalbumin is normal.  Your electrolytes are satisfactory  ALT which is a liver test is slightly elevated  We will continue to monitor  Your diabetes is under good control  Lab Results       Component                Value               Date                       A1C                      6.0                 10/07/2022                 A1C                      5.9                 04/07/2022                 A1C                      6.0                 03/19/2021                 A1C                      5.9                 06/17/2020                 A1C                      6.5                 05/07/2019                 A1C                      6.0                 10/14/2018                 A1C                      5.9                 05/03/2018                  Sincerely,      Dr.Nasima Maria Luz MD,FACP

## 2022-11-01 ENCOUNTER — OFFICE VISIT (OUTPATIENT)
Dept: CARDIOLOGY | Facility: CLINIC | Age: 77
End: 2022-11-01
Attending: NURSE PRACTITIONER
Payer: MEDICARE

## 2022-11-01 VITALS
BODY MASS INDEX: 30.42 KG/M2 | HEIGHT: 64 IN | SYSTOLIC BLOOD PRESSURE: 136 MMHG | OXYGEN SATURATION: 98 % | HEART RATE: 86 BPM | DIASTOLIC BLOOD PRESSURE: 71 MMHG | WEIGHT: 178.2 LBS

## 2022-11-01 DIAGNOSIS — Z95.5 S/P DRUG ELUTING CORONARY STENT PLACEMENT: ICD-10-CM

## 2022-11-01 DIAGNOSIS — I10 ESSENTIAL HYPERTENSION, BENIGN: ICD-10-CM

## 2022-11-01 DIAGNOSIS — I25.10 CORONARY ARTERY DISEASE INVOLVING NATIVE CORONARY ARTERY OF NATIVE HEART WITHOUT ANGINA PECTORIS: ICD-10-CM

## 2022-11-01 PROCEDURE — 99214 OFFICE O/P EST MOD 30 MIN: CPT | Performed by: INTERNAL MEDICINE

## 2022-11-01 RX ORDER — CLOPIDOGREL BISULFATE 75 MG/1
75 TABLET ORAL DAILY
Qty: 90 TABLET | Refills: 3 | Status: SHIPPED | OUTPATIENT
Start: 2022-11-01 | End: 2023-10-19

## 2022-11-01 NOTE — PROGRESS NOTES
"Cardiology Progress Note          Assessment and Plan:       1. Dyspnea on exertion    Trial of changing from Brilinta to clopidogrel.  As the patient is 6 months out, no need to bridge.  She should take her evening Brilinta tonight and start clopidogrel tomorrow morning.    Continue other cardiovascular medications.    Follow-up in 6 months.      30 minutes was spent with the patient, precharting and reviewing tests as well as post visit charting all done today..    This note was transcribed using electronic voice recognition software and there may be typographical errors present.                    Interval History:     The patient is a very pleasant 77 year old whom I have been following for coronary artery disease status post PCI to the RCA 2018 and PCI to the LAD diagonal bifurcation 5/19/2022.  She thinks her dyspnea on exertion has persisted with the Brilinta.  She is now 6 months out from of the PCI.    She otherwise feels pretty well.                     Review of Systems:     Review of Systems:  Skin:  Positive for bruising   Eyes:  Positive for glasses  ENT:  Negative    Respiratory:  Positive for dyspnea on exertion;shortness of breath  Cardiovascular:  Negative;palpitations;chest pain;lightheadedness;dizziness;fatigue;edema    Gastroenterology: Negative heartburn;reflux  Genitourinary:  Positive for nocturia  Musculoskeletal:  Positive for arthritis  Neurologic:  Negative headaches;numbness or tingling of hands;numbness or tingling of feet  Psychiatric:  Negative excessive stress;anxiety  Heme/Lymph/Imm:  Negative allergies  Endocrine:  Positive for thyroid disorder;diabetes              Physical Exam:     Vitals: /71   Pulse 86   Ht 1.626 m (5' 4\")   Wt 80.8 kg (178 lb 3.2 oz)   SpO2 98%   BMI 30.59 kg/m    Constitutional:  cooperative, alert and oriented, well developed, well nourished, in no acute distress        Skin:  warm and dry to the touch, no apparent skin lesions or masses noted    "     Head:  normocephalic, no masses or lesions        Eyes:  pupils equal and round;conjunctivae and lids unremarkable        Chest:  normal symmetry;clear to auscultation        Cardiac: regular rhythm         grade 1;early systolic murmur;RUSB        Extremities and Back:  no edema        Neurological:  no gross motor deficits;affect appropriate                 Medications:     Current Outpatient Medications   Medication Sig Dispense Refill     amoxicillin (AMOXIL) 500 MG capsule TAKE 4 CAPSULES BY MOUTH 1/2 TO 1 HOUR BEFORE THE PROCEDURE. 4 capsule 3     anastrozole (ARIMIDEX) 1 MG tablet Take 1 tablet (1 mg) by mouth daily 90 tablet 3     aspirin 81 MG tablet Take 81 mg by mouth daily       atorvastatin (LIPITOR) 10 MG tablet TAKE 1 TABLET (10 MG) BY MOUTH DAILY FOR CHOLESTROL 90 tablet 3     blood glucose (CONTOUR NEXT TEST) test strip 1 strip by In Vitro route daily or as directed. 100 strip 1     blood glucose monitoring ([employer]ET) lancets Use to test blood sugar 1 time daily or as directed. 100 each 3     calcium-vitamin D (CALTRATE) 600-400 MG-UNIT per tablet Take 1 tablet by mouth daily 60 tablet 11     cholecalciferol (VITAMIN D) 1000 UNIT tablet Take 1 tablet (1,000 Units) by mouth daily 100 tablet 3     clopidogrel (PLAVIX) 75 MG tablet Take 1 tablet (75 mg) by mouth daily 90 tablet 3     CONTOUR NEXT EZ (CONTOUR NEXT EZ W/DEVICE KIT) w/Device KIT 1 kit once 1 kit 0     Cyanocobalamin (VITAMIN B 12 PO) Take 1 tablet by mouth daily VIT B12 unknown otc dose       hydrALAZINE (APRESOLINE) 25 MG tablet Take 2 tablets (50 mg) by mouth 2 times daily 270 tablet 3     hydrochlorothiazide (HYDRODIURIL) 25 MG tablet TAKE 1 TABLET (25 MG) BY MOUTH DAILY FOR BLOOD PRESSURE 90 tablet 3     levothyroxine (SYNTHROID/LEVOTHROID) 112 MCG tablet TAKE 1 TABLET (112 MCG) BY MOUTH DAILY FOR THYROID 90 tablet 2     losartan (COZAAR) 100 MG tablet TAKE 1 TABLET (100 MG) BY MOUTH DAILY FOR BLOOD PRESSURE 90 tablet 3      metFORMIN (GLUCOPHAGE-XR) 500 MG 24 hr tablet TAKE 2 TABLETS (1,000 MG) BY MOUTH DAILY (WITH DINNER) 180 tablet 3     metroNIDAZOLE (METROGEL) 0.75 % external gel APPLY TO AFFECTED AREA TWICE A DAY 45 g 1     multivitamin, therapeutic with minerals (THERA-VIT-M) TABS Take 1 tablet by mouth daily       nitroGLYcerin (NITROSTAT) 0.4 MG sublingual tablet For chest pain place 1 tablet under the tongue every 5 minutes for 3 doses. If symptoms persist 5 minutes after 1st dose call 911. 25 tablet 3     acetaminophen (TYLENOL) 325 MG tablet Take 2 tablets (650 mg) by mouth 3 times daily (Patient not taking: Reported on 11/1/2022) 100 tablet 0                Data:   All laboratory data reviewed  No results found for this or any previous visit (from the past 24 hour(s)).    All laboratory data reviewed  Lab Results   Component Value Date    CHOL 128 04/07/2022    CHOL 135 03/19/2021     Lab Results   Component Value Date    HDL 75 04/07/2022    HDL 80 03/19/2021     Lab Results   Component Value Date    LDL 38 04/07/2022    LDL 42 03/19/2021     Lab Results   Component Value Date    TRIG 74 04/07/2022    TRIG 67 03/19/2021     Lab Results   Component Value Date    CHOLHDLRATIO 1.6 03/27/2015     TSH   Date Value Ref Range Status   10/07/2022 0.70 0.40 - 4.00 mU/L Final   03/19/2021 1.93 0.40 - 4.00 mU/L Final     Last Basic Metabolic Panel:  Lab Results   Component Value Date     10/07/2022     05/12/2021      Lab Results   Component Value Date    POTASSIUM 4.2 10/07/2022    POTASSIUM 3.9 05/12/2021     Lab Results   Component Value Date    CHLORIDE 104 10/07/2022    CHLORIDE 103 05/12/2021     Lab Results   Component Value Date    JOSEPH 9.4 10/07/2022    JOSEPH 9.4 05/12/2021     Lab Results   Component Value Date    CO2 25 10/07/2022    CO2 26 05/12/2021     Lab Results   Component Value Date    BUN 13 10/07/2022    BUN 17 05/12/2021     Lab Results   Component Value Date    CR 0.64 10/07/2022    CR 0.71 05/12/2021      Lab Results   Component Value Date     10/07/2022     05/12/2021     Lab Results   Component Value Date    WBC 4.5 05/19/2022    WBC 7.2 03/19/2021     Lab Results   Component Value Date    RBC 4.21 05/19/2022    RBC 4.60 03/19/2021     Lab Results   Component Value Date    HGB 12.4 05/19/2022    HGB 14.1 03/19/2021     Lab Results   Component Value Date    HCT 36.4 05/19/2022    HCT 40.7 03/19/2021     Lab Results   Component Value Date    MCV 87 05/19/2022    MCV 89 03/19/2021     Lab Results   Component Value Date    MCH 29.5 05/19/2022    MCH 30.7 03/19/2021     Lab Results   Component Value Date    MCHC 34.1 05/19/2022    MCHC 34.6 03/19/2021     Lab Results   Component Value Date    RDW 13.8 05/19/2022    RDW 14.6 03/19/2021     Lab Results   Component Value Date     05/19/2022     03/19/2021

## 2022-11-01 NOTE — LETTER
"11/1/2022    Zoraida Braga MD  [address]  [place]                [place] [address]    RE: Yola Mayo       Dear Colleague,     I had the pleasure of seeing Yola Mayo in the [facility].  Cardiology Progress Note          Assessment and Plan:       1. Dyspnea on exertion    Trial of changing from Brilinta to clopidogrel.  As the patient is 6 months out, no need to bridge.  She should take her evening Brilinta tonight and start clopidogrel tomorrow morning.    Continue other cardiovascular medications.    Follow-up in 6 months.      30 minutes was spent with the patient, precharting and reviewing tests as well as post visit charting all done today..    This note was transcribed using electronic voice recognition software and there may be typographical errors present.                    Interval History:     The patient is a very pleasant 77 year old whom I have been following for coronary artery disease status post PCI to the RCA 2018 and PCI to the LAD diagonal bifurcation 5/19/2022.  She thinks her dyspnea on exertion has persisted with the Brilinta.  She is now 6 months out from of the PCI.    She otherwise feels pretty well.                     Review of Systems:     Review of Systems:  Skin:  Positive for bruising   Eyes:  Positive for glasses  ENT:  Negative    Respiratory:  Positive for dyspnea on exertion;shortness of breath  Cardiovascular:  Negative;palpitations;chest pain;lightheadedness;dizziness;fatigue;edema    Gastroenterology: Negative heartburn;reflux  Genitourinary:  Positive for nocturia  Musculoskeletal:  Positive for arthritis  Neurologic:  Negative headaches;numbness or tingling of hands;numbness or tingling of feet  Psychiatric:  Negative excessive stress;anxiety  Heme/Lymph/Imm:  Negative allergies  Endocrine:  Positive for thyroid disorder;diabetes              Physical Exam:     Vitals: /71   Pulse 86   Ht 1.626 m (5' 4\")   Wt 80.8 kg (178 lb " 3.2 oz)   SpO2 98%   BMI 30.59 kg/m    Constitutional:  cooperative, alert and oriented, well developed, well nourished, in no acute distress        Skin:  warm and dry to the touch, no apparent skin lesions or masses noted        Head:  normocephalic, no masses or lesions        Eyes:  pupils equal and round;conjunctivae and lids unremarkable        Chest:  normal symmetry;clear to auscultation        Cardiac: regular rhythm         grade 1;early systolic murmur;RUSB        Extremities and Back:  no edema        Neurological:  no gross motor deficits;affect appropriate                 Medications:     Current Outpatient Medications   Medication Sig Dispense Refill     amoxicillin (AMOXIL) 500 MG capsule TAKE 4 CAPSULES BY MOUTH 1/2 TO 1 HOUR BEFORE THE PROCEDURE. 4 capsule 3     anastrozole (ARIMIDEX) 1 MG tablet Take 1 tablet (1 mg) by mouth daily 90 tablet 3     aspirin 81 MG tablet Take 81 mg by mouth daily       atorvastatin (LIPITOR) 10 MG tablet TAKE 1 TABLET (10 MG) BY MOUTH DAILY FOR CHOLESTROL 90 tablet 3     blood glucose (CONTOUR NEXT TEST) test strip 1 strip by In Vitro route daily or as directed. 100 strip 1     blood glucose monitoring (N[employer]) lancets Use to test blood sugar 1 time daily or as directed. 100 each 3     calcium-vitamin D (CALTRATE) 600-400 MG-UNIT per tablet Take 1 tablet by mouth daily 60 tablet 11     cholecalciferol (VITAMIN D) 1000 UNIT tablet Take 1 tablet (1,000 Units) by mouth daily 100 tablet 3     clopidogrel (PLAVIX) 75 MG tablet Take 1 tablet (75 mg) by mouth daily 90 tablet 3     CONTOUR NEXT EZ (CONTOUR NEXT EZ W/DEVICE KIT) w/Device KIT 1 kit once 1 kit 0     Cyanocobalamin (VITAMIN B 12 PO) Take 1 tablet by mouth daily VIT B12 unknown otc dose       hydrALAZINE (APRESOLINE) 25 MG tablet Take 2 tablets (50 mg) by mouth 2 times daily 270 tablet 3     hydrochlorothiazide (HYDRODIURIL) 25 MG tablet TAKE 1 TABLET (25 MG) BY MOUTH DAILY FOR BLOOD PRESSURE 90 tablet 3      levothyroxine (SYNTHROID/LEVOTHROID) 112 MCG tablet TAKE 1 TABLET (112 MCG) BY MOUTH DAILY FOR THYROID 90 tablet 2     losartan (COZAAR) 100 MG tablet TAKE 1 TABLET (100 MG) BY MOUTH DAILY FOR BLOOD PRESSURE 90 tablet 3     metFORMIN (GLUCOPHAGE-XR) 500 MG 24 hr tablet TAKE 2 TABLETS (1,000 MG) BY MOUTH DAILY (WITH DINNER) 180 tablet 3     metroNIDAZOLE (METROGEL) 0.75 % external gel APPLY TO AFFECTED AREA TWICE A DAY 45 g 1     multivitamin, therapeutic with minerals (THERA-VIT-M) TABS Take 1 tablet by mouth daily       nitroGLYcerin (NITROSTAT) 0.4 MG sublingual tablet For chest pain place 1 tablet under the tongue every 5 minutes for 3 doses. If symptoms persist 5 minutes after 1st dose call 911. 25 tablet 3     acetaminophen (TYLENOL) 325 MG tablet Take 2 tablets (650 mg) by mouth 3 times daily (Patient not taking: Reported on 11/1/2022) 100 tablet 0                Data:   All laboratory data reviewed  No results found for this or any previous visit (from the past 24 hour(s)).    All laboratory data reviewed  Lab Results   Component Value Date    CHOL 128 04/07/2022    CHOL 135 03/19/2021     Lab Results   Component Value Date    HDL 75 04/07/2022    HDL 80 03/19/2021     Lab Results   Component Value Date    LDL 38 04/07/2022    LDL 42 03/19/2021     Lab Results   Component Value Date    TRIG 74 04/07/2022    TRIG 67 03/19/2021     Lab Results   Component Value Date    CHOLHDLRATIO 1.6 03/27/2015     TSH   Date Value Ref Range Status   10/07/2022 0.70 0.40 - 4.00 mU/L Final   03/19/2021 1.93 0.40 - 4.00 mU/L Final     Last Basic Metabolic Panel:  Lab Results   Component Value Date     10/07/2022     05/12/2021      Lab Results   Component Value Date    POTASSIUM 4.2 10/07/2022    POTASSIUM 3.9 05/12/2021     Lab Results   Component Value Date    CHLORIDE 104 10/07/2022    CHLORIDE 103 05/12/2021     Lab Results   Component Value Date    JOSEPH 9.4 10/07/2022    JOSEPH 9.4 05/12/2021     Lab Results    Component Value Date    CO2 25 10/07/2022    CO2 26 05/12/2021     Lab Results   Component Value Date    BUN 13 10/07/2022    BUN 17 05/12/2021     Lab Results   Component Value Date    CR 0.64 10/07/2022    CR 0.71 05/12/2021     Lab Results   Component Value Date     10/07/2022     05/12/2021     Lab Results   Component Value Date    WBC 4.5 05/19/2022    WBC 7.2 03/19/2021     Lab Results   Component Value Date    RBC 4.21 05/19/2022    RBC 4.60 03/19/2021     Lab Results   Component Value Date    HGB 12.4 05/19/2022    HGB 14.1 03/19/2021     Lab Results   Component Value Date    HCT 36.4 05/19/2022    HCT 40.7 03/19/2021     Lab Results   Component Value Date    MCV 87 05/19/2022    MCV 89 03/19/2021     Lab Results   Component Value Date    MCH 29.5 05/19/2022    MCH 30.7 03/19/2021     Lab Results   Component Value Date    MCHC 34.1 05/19/2022    MCHC 34.6 03/19/2021     Lab Results   Component Value Date    RDW 13.8 05/19/2022    RDW 14.6 03/19/2021     Lab Results   Component Value Date     05/19/2022     03/19/2021       Thank you for allowing me to participate in the care of your patient.      Sincerely,     Albino Howard MD     [facility]  cc:   Krystal Neal, APRN CNP  [address]  [place] [address]

## 2022-11-01 NOTE — PATIENT INSTRUCTIONS
Take this evening Brilinta but tomorrow in the morning switch to the CLOPIDOGREL and only take it once per day. Typically people feel better within a day or two if it was from the Brilinta.    Message me in a few weeks and let me know how you're doing off the Brilinta.    See me back in 6 months.  
[de-identified] : 26F, LHD, No PMHX presents with right thumb injury from 7/13/22. 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 Reports going to [facility] the day of injury and obtained radiographs, which reports no fracture. Admits to being told it was a sprain and was splinted with acewrap - but feels it isnt getting better. Admits to being in same pain since the day of injury. Admits to numbness/tingling in the thumb.

## 2023-02-01 ENCOUNTER — TELEPHONE (OUTPATIENT)
Dept: CARDIOLOGY | Facility: CLINIC | Age: 78
End: 2023-02-01
Payer: MEDICARE

## 2023-02-01 ENCOUNTER — MEDICAL CORRESPONDENCE (OUTPATIENT)
Dept: HEALTH INFORMATION MANAGEMENT | Facility: CLINIC | Age: 78
End: 2023-02-01

## 2023-02-01 NOTE — TELEPHONE ENCOUNTER
RN received call from [facility] RN inquiring if patient is ok to proceed with a knee replacement procedure. Patient would need to hold plavix x 5 days prior to the procedure. Patient is s/p multiple stents placed on 5/13/22. RN will send to Dr. Howard for further review and recommendation as it is under 1 year since stent placement.

## 2023-02-01 NOTE — TELEPHONE ENCOUNTER
RN called [facility] and updated RN that Dr. Howard is ok with plavix hold for knee procedure.       Okay to hold plavix for knee. It'll be ~9 months and I'm okay with that.   Dr. Howard

## 2023-02-08 ENCOUNTER — TRANSFERRED RECORDS (OUTPATIENT)
Dept: HEALTH INFORMATION MANAGEMENT | Facility: CLINIC | Age: 78
End: 2023-02-08
Payer: MEDICARE

## 2023-02-08 LAB — RETINOPATHY: NEGATIVE

## 2023-02-18 DIAGNOSIS — E11.9 TYPE 2 DIABETES MELLITUS WITHOUT COMPLICATION, WITHOUT LONG-TERM CURRENT USE OF INSULIN (H): ICD-10-CM

## 2023-02-20 RX ORDER — METFORMIN HCL 500 MG
1000 TABLET, EXTENDED RELEASE 24 HR ORAL
Qty: 180 TABLET | Refills: 0 | Status: SHIPPED | OUTPATIENT
Start: 2023-02-20 | End: 2023-02-23

## 2023-02-23 ENCOUNTER — OFFICE VISIT (OUTPATIENT)
Dept: FAMILY MEDICINE | Facility: CLINIC | Age: 78
End: 2023-02-23
Payer: MEDICARE

## 2023-02-23 VITALS
OXYGEN SATURATION: 98 % | RESPIRATION RATE: 18 BRPM | SYSTOLIC BLOOD PRESSURE: 157 MMHG | TEMPERATURE: 96.2 F | DIASTOLIC BLOOD PRESSURE: 73 MMHG | WEIGHT: 183.7 LBS | BODY MASS INDEX: 31.36 KG/M2 | HEART RATE: 73 BPM | HEIGHT: 64 IN

## 2023-02-23 DIAGNOSIS — G89.29 CHRONIC PAIN OF RIGHT KNEE: ICD-10-CM

## 2023-02-23 DIAGNOSIS — C50.911 MALIGNANT NEOPLASM OF RIGHT BREAST IN FEMALE, ESTROGEN RECEPTOR POSITIVE, UNSPECIFIED SITE OF BREAST (H): ICD-10-CM

## 2023-02-23 DIAGNOSIS — Z13.0 SCREENING FOR DEFICIENCY ANEMIA: ICD-10-CM

## 2023-02-23 DIAGNOSIS — E03.9 HYPOTHYROIDISM, UNSPECIFIED TYPE: ICD-10-CM

## 2023-02-23 DIAGNOSIS — Z96.652 S/P TOTAL KNEE REPLACEMENT, LEFT: ICD-10-CM

## 2023-02-23 DIAGNOSIS — E11.9 TYPE 2 DIABETES MELLITUS WITHOUT COMPLICATION, WITHOUT LONG-TERM CURRENT USE OF INSULIN (H): ICD-10-CM

## 2023-02-23 DIAGNOSIS — I10 BENIGN HYPERTENSION: ICD-10-CM

## 2023-02-23 DIAGNOSIS — Z95.5 S/P DRUG ELUTING CORONARY STENT PLACEMENT: ICD-10-CM

## 2023-02-23 DIAGNOSIS — I27.20 PULMONARY HYPERTENSION (H): ICD-10-CM

## 2023-02-23 DIAGNOSIS — Z96.641 S/P HIP REPLACEMENT, RIGHT: ICD-10-CM

## 2023-02-23 DIAGNOSIS — M25.561 CHRONIC PAIN OF RIGHT KNEE: ICD-10-CM

## 2023-02-23 DIAGNOSIS — Z17.0 MALIGNANT NEOPLASM OF RIGHT BREAST IN FEMALE, ESTROGEN RECEPTOR POSITIVE, UNSPECIFIED SITE OF BREAST (H): ICD-10-CM

## 2023-02-23 DIAGNOSIS — Z01.818 PREOPERATIVE EXAMINATION: Primary | ICD-10-CM

## 2023-02-23 LAB
ANION GAP SERPL CALCULATED.3IONS-SCNC: 17 MMOL/L (ref 7–15)
BUN SERPL-MCNC: 14.9 MG/DL (ref 8–23)
CALCIUM SERPL-MCNC: 9.7 MG/DL (ref 8.8–10.2)
CHLORIDE SERPL-SCNC: 100 MMOL/L (ref 98–107)
CREAT SERPL-MCNC: 0.68 MG/DL (ref 0.51–0.95)
DEPRECATED HCO3 PLAS-SCNC: 24 MMOL/L (ref 22–29)
ERYTHROCYTE [DISTWIDTH] IN BLOOD BY AUTOMATED COUNT: 13.7 % (ref 10–15)
FERRITIN SERPL-MCNC: 92 NG/ML (ref 11–328)
GFR SERPL CREATININE-BSD FRML MDRD: 89 ML/MIN/1.73M2
GLUCOSE SERPL-MCNC: 107 MG/DL (ref 70–99)
HBA1C MFR BLD: 6 % (ref 0–5.6)
HCT VFR BLD AUTO: 43.1 % (ref 35–47)
HGB BLD-MCNC: 13.5 G/DL (ref 11.7–15.7)
MCH RBC QN AUTO: 29.3 PG (ref 26.5–33)
MCHC RBC AUTO-ENTMCNC: 31.3 G/DL (ref 31.5–36.5)
MCV RBC AUTO: 94 FL (ref 78–100)
PLATELET # BLD AUTO: 249 10E3/UL (ref 150–450)
POTASSIUM SERPL-SCNC: 3.5 MMOL/L (ref 3.4–5.3)
RBC # BLD AUTO: 4.61 10E6/UL (ref 3.8–5.2)
SODIUM SERPL-SCNC: 141 MMOL/L (ref 136–145)
TSH SERPL DL<=0.005 MIU/L-ACNC: 2.66 UIU/ML (ref 0.3–4.2)
WBC # BLD AUTO: 7.3 10E3/UL (ref 4–11)

## 2023-02-23 PROCEDURE — 99214 OFFICE O/P EST MOD 30 MIN: CPT | Performed by: INTERNAL MEDICINE

## 2023-02-23 PROCEDURE — 82728 ASSAY OF FERRITIN: CPT | Performed by: INTERNAL MEDICINE

## 2023-02-23 PROCEDURE — 84443 ASSAY THYROID STIM HORMONE: CPT | Performed by: INTERNAL MEDICINE

## 2023-02-23 PROCEDURE — 93000 ELECTROCARDIOGRAM COMPLETE: CPT | Performed by: INTERNAL MEDICINE

## 2023-02-23 PROCEDURE — 85027 COMPLETE CBC AUTOMATED: CPT | Performed by: INTERNAL MEDICINE

## 2023-02-23 PROCEDURE — 83036 HEMOGLOBIN GLYCOSYLATED A1C: CPT | Performed by: INTERNAL MEDICINE

## 2023-02-23 PROCEDURE — 36415 COLL VENOUS BLD VENIPUNCTURE: CPT | Performed by: INTERNAL MEDICINE

## 2023-02-23 PROCEDURE — 80048 BASIC METABOLIC PNL TOTAL CA: CPT | Performed by: INTERNAL MEDICINE

## 2023-02-23 RX ORDER — ATORVASTATIN CALCIUM 10 MG/1
10 TABLET, FILM COATED ORAL DAILY
Qty: 90 TABLET | Refills: 3 | Status: SHIPPED | OUTPATIENT
Start: 2023-02-23 | End: 2023-05-09

## 2023-02-23 RX ORDER — METFORMIN HCL 500 MG
1000 TABLET, EXTENDED RELEASE 24 HR ORAL
Qty: 180 TABLET | Refills: 3 | Status: SHIPPED | OUTPATIENT
Start: 2023-02-23 | End: 2023-12-08

## 2023-02-23 RX ORDER — LOSARTAN POTASSIUM 100 MG/1
100 TABLET ORAL DAILY
Qty: 90 TABLET | Refills: 3 | Status: SHIPPED | OUTPATIENT
Start: 2023-02-23 | End: 2023-12-08

## 2023-02-23 RX ORDER — HYDROCHLOROTHIAZIDE 25 MG/1
25 TABLET ORAL DAILY
Qty: 90 TABLET | Refills: 3 | Status: SHIPPED | OUTPATIENT
Start: 2023-02-23 | End: 2023-12-08

## 2023-02-23 RX ORDER — HYDRALAZINE HYDROCHLORIDE 25 MG/1
50 TABLET, FILM COATED ORAL 2 TIMES DAILY
Qty: 270 TABLET | Refills: 3 | Status: SHIPPED | OUTPATIENT
Start: 2023-02-23 | End: 2023-04-10

## 2023-02-23 RX ORDER — TAFLUPROST 0 MG/.3ML
1 SOLUTION/ DROPS OPHTHALMIC AT BEDTIME
COMMUNITY
Start: 2022-12-07

## 2023-02-23 ASSESSMENT — PAIN SCALES - GENERAL: PAINLEVEL: NO PAIN (0)

## 2023-02-23 NOTE — H&P (VIEW-ONLY)
[address], [address]  [facility] [address]  Phone: [phone]  Primary Provider: Zoraida Canseco  Pre-op Performing Provider: ZORAIDA CANSECO    PREOPERATIVE EVALUATION:  Today's date: 2/23/2023    Yola Mayo is a 77 year old female who presents for a preoperative evaluation.    Surgical Information:  Surgery/Procedure: Right total knee arthroplasty  Surgery Location:  OR  Surgeon: Giuseppe Mcgrath MD  Surgery Date: 3/6/23  Time of Surgery: 9:30 AM   Where patient plans to recover: At home with family  Fax number for surgical facility: Note does not need to be faxed, will be available electronically in Epic.    Type of Anesthesia Anticipated: General    Assessment & Plan     The proposed surgical procedure is considered INTERMEDIATE risk.    Yola was seen today for pre-op exam.    Diagnoses and all orders for this visit:    Preoperative examination  -     CBC with platelets; Future  -     EKG 12-lead complete w/read - Clinics    Chronic pain of right knee  Patient has degenerative arthritis of right knee. She will be operated for it.    S/P drug eluting coronary stent placement  Patient had coronary angioplasty on 6/27/2018 and 5/19/2022, with total 2 and 3 drug eluting stents, respectively, placed. She denies any current chest pain, shortness of breath, or leg swelling. She is followed by cardiology.  -     atorvastatin (LIPITOR) 10 MG tablet; Take 1 tablet (10 mg) by mouth daily for cholestrol    Type 2 diabetes mellitus without complication, without long-term current use of insulin (H)  Under good control  -     Hemoglobin A1c; Future  -     metFORMIN (GLUCOPHAGE XR) 500 MG 24 hr tablet; Take 2 tablets (1,000 mg) by mouth daily (with dinner) for diabetes    Hypothyroidism, unspecified type  She is on levothyroxine.  -     TSH with free T4 reflex; Future    Pulmonary hypertension (H)  Pulmonary hypertension was seen on echocardiogram done on 10/30/2017.  She is followed by cardiology.    Malignant neoplasm of right breast in female, estrogen receptor positive, unspecified site of breast (H)  She underwent right breast lumpectomy and sentinel lymph node excision under care of Dr. Ace Rogers. Pathology showed grade 2 invasive ductal carcinoma, low grade DCIS, and LCIS. She completed adjuvant radiation therapy to right breast on between 5/25/2021 and 6/16/2021. She subsequently started anastrazole.    S/P total knee replacement, left  Done on 4/13/2016.    S/P hip replacement, right  Done around 10 years ago.    S/P drug eluting coronary stent placement  Patient had coronary angioplasty on 6/27/2018 and 5/19/2022, with total 2 and 3 drug eluting stents, respectively, placed. She denies any current chest pain, shortness of breath, or leg swelling. She is followed by cardiology.  Orders:  -     atorvastatin (LIPITOR) 10 MG tablet; Take 1 tablet (10 mg) by mouth daily for cholestrol    Benign hypertension  -     Basic metabolic panel; Future  -     hydrALAZINE (APRESOLINE) 25 MG tablet; Take 2 tablets (50 mg) by mouth 2 times daily    Benign essential hypertension  -     hydrochlorothiazide (HYDRODIURIL) 25 MG tablet; Take 1 tablet (25 mg) by mouth daily for Blood Pressure  -     losartan (COZAAR) 100 MG tablet; Take 1 tablet (100 mg) by mouth daily for Blood Pressure    Screening for deficiency anemia  -     Ferritin; Future             Risks and Recommendations:  The patient has the following additional risks and recommendations for perioperative complications:   - No identified additional risk factors other than previously addressed    Medication Instructions:  Patient is to take all scheduled medications on the day of surgery EXCEPT for modifications listed below:  Plavix--5 days before the surgery.  Aspirin--7 days before the surgery.  Multivitamins except calcium and vit D supplements--7 days before the surgery.  Hydrochlorothiazide--Don't take in the morning of  surgery.  Losartan--Don't take in the morning of surgery.  Metformin--Don't take in the morning of surgery.  I asked the patient to check with orthopedics if they allow her then  it is okay to continue baby aspirin  But will hold her Plavix 5 days before the surgery and per patient she has checked with her cardiologist and they are okay with that    RECOMMENDATION:  Patient is optimal for above procedure.      [id number]}    Subjective     HPI related to upcoming procedure: Patient will undergo right total knee arthroplasty at  OR by Dr. Giuseppe Mcgrath on 3/6/2023.      Preop Questions 2/23/2023   1. Have you ever had a heart attack or stroke? No   2. Have you ever had surgery on your heart or blood vessels, such as a stent placement, a coronary artery bypass, or surgery on an artery in your head, neck, heart, or legs? YES - Patient had coronary angioplasty on 6/27/2018 and 5/19/2022, with total 2 and 3 drug eluting stents, respectively, placed. She denies any current chest pain, shortness of breath, or leg swelling. She is followed by cardiology.   3. Do you have chest pain with activity? No   4. Do you have a history of  heart failure? No   5. Do you currently have a cold, bronchitis or symptoms of other infection? No   6. Do you have a cough, shortness of breath, or wheezing? No   7. Do you or anyone in your family have previous history of blood clots? No   8. Do you or does anyone in your family have a serious bleeding problem such as prolonged bleeding following surgeries or cuts? No   9. Have you ever had problems with anemia or been told to take iron pills? No   10. Have you had any abnormal blood loss such as black, tarry or bloody stools, or abnormal vaginal bleeding? No   11. Have you ever had a blood transfusion? No   12. Are you willing to have a blood transfusion if it is medically needed before, during, or after your surgery? Yes   13. Have you or any of your relatives ever had problems with  anesthesia? No   14. Do you have sleep apnea, excessive snoring or daytime drowsiness? No   15. Do you have any artifical heart valves or other implanted medical devices like a pacemaker, defibrillator, or continuous glucose monitor? No   16. Do you have artificial joints? YES - Left knee and right hip   17. Are you allergic to latex? No     Health Care Directive:  Patient has a Health Care Directive on file      Preoperative Review of :   reviewed - no record of controlled substances prescribed.      Status of Chronic Conditions:  CAD - Patient has a longstanding history of moderate-severe CAD. Patient denies recent chest pain or NTG use, denies exercise induced dyspnea or PND. Last Stress test 7/9/2018, EKG was today.     DIABETES - Patient has a longstanding history of DiabetesType Type II . Patient is being treated with oral agents and denies significant side effects. Control has been good.    HYPERLIPIDEMIA - Patient has a long history of significant Hyperlipidemia requiring medication for treatment with recent good control. Patient reports no problems or side effects with the medication.     HYPERTENSION - Patient has longstanding history of HTN , currently denies any symptoms referable to elevated blood pressure. Specifically denies chest pain, palpitations, dyspnea, orthopnea, PND or peripheral edema. Blood pressure readings have been in normal range. Current medication regimen is as listed below. Patient denies any side effects of medication.       Review of Systems  CONSTITUTIONAL: NEGATIVE for fever, chills, change in weight  INTEGUMENTARY/SKIN: NEGATIVE for worrisome rashes, moles or lesions  EYES: NEGATIVE for vision changes or irritation  ENT/MOUTH: NEGATIVE for ear, mouth and throat problems  RESP: NEGATIVE for significant cough or SOB  CV: NEGATIVE for chest pain, palpitations or peripheral edema  GI: NEGATIVE for nausea, abdominal pain, heartburn, or change in bowel habits  : NEGATIVE for  frequency, dysuria, or hematuria  MUSCULOSKELETAL: NEGATIVE for significant arthralgias or myalgia  NEURO: NEGATIVE for weakness, dizziness or paresthesias  ENDOCRINE: NEGATIVE for temperature intolerance, skin/hair changes  HEME: NEGATIVE for bleeding problems  PSYCHIATRIC: NEGATIVE for changes in mood or affect    Patient Active Problem List    Diagnosis Date Noted     Primary pulmonary hypertension (H) 05/02/2007     Priority: High     Disease of tricuspid valve 06/23/2005     Priority: High     ECHO  5/05  Problem list name updated by automated process. Provider to review       S/P hip replacement, right 02/23/2023     Priority: Medium     S/P total knee replacement, left 02/23/2023     Priority: Medium     Status post coronary angiogram 05/19/2022     Priority: Medium     Invasive ductal carcinoma of breast, female, right (H) 04/15/2021     Priority: Medium     Check 12.22 for f/u [facility]       Rosacea 03/19/2021     Priority: Medium     Type 2 diabetes mellitus without complication, without long-term current use of insulin (H) 05/09/2019     Priority: Medium     History of shingles 05/07/2019     Priority: Medium     History of basal cell carcinoma 05/07/2019     Priority: Medium     S/P drug eluting coronary stent placement 05/07/2019     Priority: Medium     Acute chest pain 10/14/2018     Priority: Medium     CAD S/P PCI- rca-2 stents 10/14/2018     Priority: Medium     Pulmonary hypertension (H) 11/02/2017     Priority: Medium     Seen on Echocardiogram        Essential hypertension 04/29/2016     Priority: Medium     Aftercare following knee joint replacement surgery, unspecified laterality 04/18/2016     Priority: Medium     Anemia due to blood loss, acute 04/18/2016     Priority: Medium     Degenerative arthritis of knee 04/13/2016     Priority: Medium     Depressive disorder, not elsewhere classified 11/24/2014     Priority: Medium     Hyperlipidemia LDL goal <130 10/31/2010     Priority: Medium      Generalized osteoarthrosis, unspecified site 08/29/2007     Priority: Medium     Symptomatic menopausal or female climacteric states 05/17/2006     Priority: Medium     DERMATITIS  04/10/2005     Priority: Medium     Hypothyroidism 05/04/2004     Priority: Medium     Problem list name updated by automated process. Provider to review       LOC PRIM OSTEOARTH-L 1ST METATARSAL-CUNEIFORM JOINT 03/10/2004     Priority: Medium     DISRUPT ANT CRUCIATE L, 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 02/29/2004     Priority: Medium     BASAL CELL CANCER -NOSE 02/29/2004     Priority: Medium     Problem list name updated by automated process. Provider to review and confirm       Family history of diabetes mellitus 02/29/2004     Priority: Medium     Family history of ischemic heart disease 02/29/2004     Priority: Medium     Family history of malignant neoplasm of ovary 02/29/2004     Priority: Medium     Constipation 02/18/2003     Priority: Medium     Problem list name updated by automated process. Provider to review       Hemorrhage of rectum and anus 02/18/2003     Priority: Medium     hemorrhoid only, no bleeding       Health Care Home 04/23/2012     Priority: Low     EMERGENCY CARE PLAN  Presenting Problem Signs and Symptoms Treatment Plan    Questions or concerns during clinic hours    I will call the clinic directly     Questions or concerns outside clinic hours    I will call the 24 hour nurse line at [phone]    Patient needs to schedule an appointment    I will call the 24 hour scheduling team at [phone] or clinic directly    Same day treatment     I will call the clinic first, nurse line if after hours, urgent care and express care if needed                        Damari Leon RN, BSN  Care Coordinator  DX V65.8 REPLACED WITH [id number] HEALTH CARE HOME (04/08/2013)        Past Medical History:   Diagnosis Date     Anemia due to blood loss      BASAL CELL CANCER -NOSE      Dermatitis      Diabetes (H)      Family history of diabetes  mellitus     Father     Family history of ischemic heart disease     Father     Family history of malignant neoplasm of ovary     Mother     HX DISRUPT L ANT CRUCIATE S/P REPAIR 2003      Hyperlipidemia      HYPERTENSION       HYPOTHYROIDISM      LOC PRIM OSTEOARTH-L 1ST METATARSAL-CUNEIFORM JOINT 3/10/2004     Major depressive disorder, single episode, mild (H) 11/24/2014     MILD-MOD TRICUSPID REGURG. MOD PULM HTN 6/23/2005    ECHO  5/05     Obese      PONV (postoperative nausea and vomiting)      Rosacea      Shingles      SPLENIC CYST      Past Surgical History:   Procedure Laterality Date     ARTHROPLASTY KNEE Left 4/13/2016    Procedure: ARTHROPLASTY KNEE;  Surgeon: Giuseppe Lopez MD;  Location:  OR     ARTHROPLASTY MINIMALLY INVASIVE HIP  4/18/2012    Procedure:ARTHROPLASTY MINIMALLY INVASIVE HIP; RIGHT TWO INCISION MINIMALLY INVASIVE HIP ARTHROPLASTY (YUVAL)^ (NO CELL SAVER) (C-ARM); Surgeon:GIUSEPPE LOPEZ; Location: OR     BIOPSY NODE SENTINEL Right 4/23/2021    Procedure: RIGHT SENTINEL LYMPH NODE BIOPSY;  Surgeon: Giuseppe Rogers MD;  Location:  OR     C CHEMOSURG MOHS 1ST STAGE  9-99    Mohs-nose     CARDIAC SURGERY  06/27/2019    mid and distal RCA stents     CV CORONARY ANGIOGRAM N/A 5/19/2022    Procedure: Coronary Angiogram;  Surgeon: Reyes Graham MD;  Location:  HEART CARDIAC CATH LAB     CV INSTANTANEOUS WAVE-FREE RATIO N/A 5/19/2022    Procedure: Instantaneous Wave-Free Ratio;  Surgeon: Reyes Graham MD;  Location:  HEART CARD[facility]AC CATH LAB     CV INTRAVASULAR ULTRASOUND N/A 5/19/2022    Procedure: Intravascular Ultrasound;  Surgeon: Reyes Graham MD;  Location:  HEART CARDIAC CATH LAB     CV PCI STENT DRUG ELUTING N/A 5/19/2022    Procedure: Percutaneous Coronary Intervention Stent;  Surgeon: Reyes Graham MD;  Location:  HEART CARDIAC CATH LAB     ENT SURGERY      mohs procedure on nose     HYSTERECTOMY, PAP NO LONGER INDICATED       LUMPECTOMY  BREAST WITH SEED LOCALIZATION Right 4/23/2021    Procedure: SEED LOCALIZED RIGHT BREAST LUMPECTOMY;  Surgeon: Giuseppe Rogers MD;  Location:  OR     SURGICAL HISTORY OF -   Mult-~1978    infertility, laparotomies     [facility] APPENDECTOMY  8/87    incidental appi at [facility] REPAIR CRUCIATE LIGAMENT,KNEE  8/2003    L   ACL reconstruction     [facility] TOTAL ABDOM HYSTERECTOMY  8/87    W/ BSO fibroids, hx PID     [facility] COLONOSCOPY THRU STOMA, DIAGNOSTIC  4/03    normal, rec repeat 10 yrs     Current Outpatient Medications   Medication Sig Dispense Refill     amoxicillin (AMOXIL) 500 MG capsule TAKE 4 CAPSULES BY MOUTH 1/2 TO 1 HOUR BEFORE THE PROCEDURE. 4 capsule 3     anastrozole (ARIMIDEX) 1 MG tablet Take 1 tablet (1 mg) by mouth daily 90 tablet 3     aspirin 81 MG tablet Take 81 mg by mouth daily       atorvastatin (LIPITOR) 10 MG tablet Take 1 tablet (10 mg) by mouth daily for cholestrol 90 tablet 3     blood glucose (CONTOUR NEXT TEST) test strip 1 strip by In Vitro route daily or as directed. 100 strip 1     blood glucose monitoring (NITO MICROLET) lancets Use to test blood sugar 1 time daily or as directed. 100 each 3     calcium-vitamin D (CALTRATE) 600-400 MG-UNIT per tablet Take 1 tablet by mouth daily 60 tablet 11     cholecalciferol (VITAMIN D) 1000 UNIT tablet Take 1 tablet (1,000 Units) by mouth daily 100 tablet 3     clopidogrel (PLAVIX) 75 MG tablet Take 1 tablet (75 mg) by mouth daily 90 tablet 3     CONTOUR NEXT EZ (CONTOUR NEXT EZ W/DEVICE KIT) w/Device KIT 1 kit once 1 kit 0     Cyanocobalamin (VITAMIN B 12 PO) Take 1 tablet by mouth daily VIT B12 unknown otc dose       hydrALAZINE (APRESOLINE) 25 MG tablet Take 2 tablets (50 mg) by mouth 2 times daily 270 tablet 3     hydrochlorothiazide (HYDRODIURIL) 25 MG tablet Take 1 tablet (25 mg) by mouth daily for Blood Pressure 90 tablet 3     levothyroxine (SYNTHROID/LEVOTHROID) 112 MCG tablet TAKE 1 TABLET (112 MCG) BY MOUTH DAILY FOR THYROID 90  tablet 2     losartan (COZAAR) 100 MG tablet Take 1 tablet (100 mg) by mouth daily for Blood Pressure 90 tablet 3     metFORMIN (GLUCOPHAGE XR) 500 MG 24 hr tablet Take 2 tablets (1,000 mg) by mouth daily (with dinner) for diabetes 180 tablet 3     metroNIDAZOLE (METROGEL) 0.75 % external gel APPLY TO AFFECTED AREA TWICE A DAY 45 g 1     multivitamin, therapeutic with minerals (THERA-VIT-M) TABS Take 1 tablet by mouth daily       nitroGLYcerin (NITROSTAT) 0.4 MG sublingual tablet For chest pain place 1 tablet under the tongue every 5 minutes for 3 doses. If symptoms persist 5 minutes after 1st dose call 911. 25 tablet 3     acetaminophen (TYLENOL) 325 MG tablet Take 2 tablets (650 mg) by mouth 3 times daily (Patient not taking: Reported on 2022) 100 tablet 0     ZIOPTAN 0.0015 % SOLN ophthalmic solution Place 1 drop into both eyes At Bedtime         Allergies   Allergen Reactions     Toprol Xl [Metoprolol] Fatigue     Ace Inhibitors      ACE throat clearing     Oxycodone Nausea and Vomiting     Tetanus Toxoid Swelling     Hard red lump         Social History     Tobacco Use     Smoking status: Never     Smokeless tobacco: Never   Substance Use Topics     Alcohol use: Yes     Alcohol/week: 0.0 standard drinks     Comment: occasional     Family History   Problem Relation Age of Onset     Cancer Mother         ovarian  of it  at 51yo.     Other Cancer Mother      Diabetes Father         Adult onset     Hypertension Father      C.A.D. Father         Fatal Heart attack at 70.     Cerebrovascular Disease Father      Thyroid Disease Sister      Thyroid Disease Sister      Thyroid Disease Sister      Cancer Daughter      Thyroid Disease Daughter      Breast Cancer No family hx of      Cancer - colorectal No family hx of      History   Drug Use No         Objective     BP (!) 157/73 (BP Location: Left arm, Patient Position: Sitting, Cuff Size: Adult Regular)   Pulse 73   Temp (!) 96.2  F (35.7  C) (Oral)   Resp  "18   Ht 1.626 m (5' 4.02\")   Wt 83.3 kg (183 lb 11.2 oz)   SpO2 98%   BMI 31.52 kg/m      Physical Exam    GENERAL APPEARANCE: healthy, alert and no distress     EYES: EOMI, PERRL     HENT: ear canals and TM's normal and nose and mouth without ulcers or lesions     NECK: no adenopathy,      RESP: lungs clear to auscultation - no rales, rhonchi or wheezes     CV: regular rates and rhythm, normal S1 S2, no S3      ABDOMEN:  soft, nontender, no HSM or masses and bowel sounds normal     MS: extremities normal- no gross deformities noted, no evidence of inflammation in joints, FROM in all extremities.     SKIN: no suspicious lesions or rashes  She was having flare up of rosacea and she has numerous cherry angiomas and moles follows dermatology.     NEURO: Normal strength and tone, sensory exam grossly normal, mentation intact and speech normal     PSYCH: mentation appears normal. and affect normal/bright     LYMPHATICS: No cervical adenopathy    Recent Labs   Lab Test 10/07/22  1017 05/23/22  1232 05/19/22  0707 04/07/22  1230 04/23/21  1132 03/19/21  0857   HGB  --   --  12.4  --   --  14.1   PLT  --   --  230  --   --  278   INR  --   --  0.97  --   --   --     134 138 138   < > 137   POTASSIUM 4.2 3.4 3.3* 3.7   < > 3.6   CR 0.64 0.72 0.67 0.71   < > 0.70   A1C 6.0*  --   --  5.9*  --  6.0*    < > = values in this interval not displayed.        Diagnostics:  Recent Results (from the past 24 hour(s))   CBC with platelets    Collection Time: 02/23/23 11:31 AM   Result Value Ref Range    WBC Count 7.3 4.0 - 11.0 10e3/uL    RBC Count 4.61 3.80 - 5.20 10e6/uL    Hemoglobin 13.5 11.7 - 15.7 g/dL    Hematocrit 43.1 35.0 - 47.0 %    MCV 94 78 - 100 fL    MCH 29.3 26.5 - 33.0 pg    MCHC 31.3 (L) 31.5 - 36.5 g/dL    RDW 13.7 10.0 - 15.0 %    Platelet Count 249 150 - 450 10e3/uL   Hemoglobin A1c    Collection Time: 02/23/23 11:31 AM   Result Value Ref Range    Hemoglobin A1C 6.0 (H) 0.0 - 5.6 %        EKG: appears " normal, NSR, normal axis, normal intervals, no acute ST/T changes c/w ischemia, no LVH by voltage criteria    Revised Cardiac Risk Index (RCRI):  The patient has the following serious cardiovascular risks for perioperative complications:   - No serious cardiac risks = 0 points     RCRI Interpretation: 1 point: Class II (low risk - 0.9% complication rate)           Signed Electronically by: Zoraida Braga MD  Copy of this evaluation report is provided to requesting physician.    This document serves as a record of the services and decisions personally performed and made by Dr. Braga. It was created on his behalf by Hieu Thompson, a trained medical scribe. The creation of this document is based the provider's statements to the medical scribe.

## 2023-02-23 NOTE — PROGRESS NOTES
[address], [address]  [facility] [address]  Phone: [phone]  Primary Provider: Zoraida Canseco  Pre-op Performing Provider: ZORAIDA CANSECO    PREOPERATIVE EVALUATION:  Today's date: 2/23/2023    Yola Mayo is a 77 year old female who presents for a preoperative evaluation.    Surgical Information:  Surgery/Procedure: Right total knee arthroplasty  Surgery Location:  OR  Surgeon: Giuseppe Mcgrath MD  Surgery Date: 3/6/23  Time of Surgery: 9:30 AM   Where patient plans to recover: At home with family  Fax number for surgical facility: Note does not need to be faxed, will be available electronically in Epic.    Type of Anesthesia Anticipated: General    Assessment & Plan     The proposed surgical procedure is considered INTERMEDIATE risk.    Yola was seen today for pre-op exam.    Diagnoses and all orders for this visit:    Preoperative examination  -     CBC with platelets; Future  -     EKG 12-lead complete w/read - Clinics    Chronic pain of right knee  Patient has degenerative arthritis of right knee. She will be operated for it.    S/P drug eluting coronary stent placement  Patient had coronary angioplasty on 6/27/2018 and 5/19/2022, with total 2 and 3 drug eluting stents, respectively, placed. She denies any current chest pain, shortness of breath, or leg swelling. She is followed by cardiology.  -     atorvastatin (LIPITOR) 10 MG tablet; Take 1 tablet (10 mg) by mouth daily for cholestrol    Type 2 diabetes mellitus without complication, without long-term current use of insulin (H)  Under good control  -     Hemoglobin A1c; Future  -     metFORMIN (GLUCOPHAGE XR) 500 MG 24 hr tablet; Take 2 tablets (1,000 mg) by mouth daily (with dinner) for diabetes    Hypothyroidism, unspecified type  She is on levothyroxine.  -     TSH with free T4 reflex; Future    Pulmonary hypertension (H)  Pulmonary hypertension was seen on echocardiogram done on 10/30/2017.  She is followed by cardiology.    Malignant neoplasm of right breast in female, estrogen receptor positive, unspecified site of breast (H)  She underwent right breast lumpectomy and sentinel lymph node excision under care of Dr. Ace Rogers. Pathology showed grade 2 invasive ductal carcinoma, low grade DCIS, and LCIS. She completed adjuvant radiation therapy to right breast on between 5/25/2021 and 6/16/2021. She subsequently started anastrazole.    S/P total knee replacement, left  Done on 4/13/2016.    S/P hip replacement, right  Done around 10 years ago.    S/P drug eluting coronary stent placement  Patient had coronary angioplasty on 6/27/2018 and 5/19/2022, with total 2 and 3 drug eluting stents, respectively, placed. She denies any current chest pain, shortness of breath, or leg swelling. She is followed by cardiology.  Orders:  -     atorvastatin (LIPITOR) 10 MG tablet; Take 1 tablet (10 mg) by mouth daily for cholestrol    Benign hypertension  -     Basic metabolic panel; Future  -     hydrALAZINE (APRESOLINE) 25 MG tablet; Take 2 tablets (50 mg) by mouth 2 times daily    Benign essential hypertension  -     hydrochlorothiazide (HYDRODIURIL) 25 MG tablet; Take 1 tablet (25 mg) by mouth daily for Blood Pressure  -     losartan (COZAAR) 100 MG tablet; Take 1 tablet (100 mg) by mouth daily for Blood Pressure    Screening for deficiency anemia  -     Ferritin; Future             Risks and Recommendations:  The patient has the following additional risks and recommendations for perioperative complications:   - No identified additional risk factors other than previously addressed    Medication Instructions:  Patient is to take all scheduled medications on the day of surgery EXCEPT for modifications listed below:  Plavix--5 days before the surgery.  Aspirin--7 days before the surgery.  Multivitamins except calcium and vit D supplements--7 days before the surgery.  Hydrochlorothiazide--Don't take in the morning of  surgery.  Losartan--Don't take in the morning of surgery.  Metformin--Don't take in the morning of surgery.  I asked the patient to check with orthopedics if they allow her then  it is okay to continue baby aspirin  But will hold her Plavix 5 days before the surgery and per patient she has checked with her cardiologist and they are okay with that    RECOMMENDATION:  Patient is optimal for above procedure.      [id number]}    Subjective     HPI related to upcoming procedure: Patient will undergo right total knee arthroplasty at  OR by Dr. Giuseppe Mcgrath on 3/6/2023.      Preop Questions 2/23/2023   1. Have you ever had a heart attack or stroke? No   2. Have you ever had surgery on your heart or blood vessels, such as a stent placement, a coronary artery bypass, or surgery on an artery in your head, neck, heart, or legs? YES - Patient had coronary angioplasty on 6/27/2018 and 5/19/2022, with total 2 and 3 drug eluting stents, respectively, placed. She denies any current chest pain, shortness of breath, or leg swelling. She is followed by cardiology.   3. Do you have chest pain with activity? No   4. Do you have a history of  heart failure? No   5. Do you currently have a cold, bronchitis or symptoms of other infection? No   6. Do you have a cough, shortness of breath, or wheezing? No   7. Do you or anyone in your family have previous history of blood clots? No   8. Do you or does anyone in your family have a serious bleeding problem such as prolonged bleeding following surgeries or cuts? No   9. Have you ever had problems with anemia or been told to take iron pills? No   10. Have you had any abnormal blood loss such as black, tarry or bloody stools, or abnormal vaginal bleeding? No   11. Have you ever had a blood transfusion? No   12. Are you willing to have a blood transfusion if it is medically needed before, during, or after your surgery? Yes   13. Have you or any of your relatives ever had problems with  anesthesia? No   14. Do you have sleep apnea, excessive snoring or daytime drowsiness? No   15. Do you have any artifical heart valves or other implanted medical devices like a pacemaker, defibrillator, or continuous glucose monitor? No   16. Do you have artificial joints? YES - Left knee and right hip   17. Are you allergic to latex? No     Health Care Directive:  Patient has a Health Care Directive on file      Preoperative Review of :   reviewed - no record of controlled substances prescribed.      Status of Chronic Conditions:  CAD - Patient has a longstanding history of moderate-severe CAD. Patient denies recent chest pain or NTG use, denies exercise induced dyspnea or PND. Last Stress test 7/9/2018, EKG was today.     DIABETES - Patient has a longstanding history of DiabetesType Type II . Patient is being treated with oral agents and denies significant side effects. Control has been good.    HYPERLIPIDEMIA - Patient has a long history of significant Hyperlipidemia requiring medication for treatment with recent good control. Patient reports no problems or side effects with the medication.     HYPERTENSION - Patient has longstanding history of HTN , currently denies any symptoms referable to elevated blood pressure. Specifically denies chest pain, palpitations, dyspnea, orthopnea, PND or peripheral edema. Blood pressure readings have been in normal range. Current medication regimen is as listed below. Patient denies any side effects of medication.       Review of Systems  CONSTITUTIONAL: NEGATIVE for fever, chills, change in weight  INTEGUMENTARY/SKIN: NEGATIVE for worrisome rashes, moles or lesions  EYES: NEGATIVE for vision changes or irritation  ENT/MOUTH: NEGATIVE for ear, mouth and throat problems  RESP: NEGATIVE for significant cough or SOB  CV: NEGATIVE for chest pain, palpitations or peripheral edema  GI: NEGATIVE for nausea, abdominal pain, heartburn, or change in bowel habits  : NEGATIVE for  frequency, dysuria, or hematuria  MUSCULOSKELETAL: NEGATIVE for significant arthralgias or myalgia  NEURO: NEGATIVE for weakness, dizziness or paresthesias  ENDOCRINE: NEGATIVE for temperature intolerance, skin/hair changes  HEME: NEGATIVE for bleeding problems  PSYCHIATRIC: NEGATIVE for changes in mood or affect    Patient Active Problem List    Diagnosis Date Noted     Primary pulmonary hypertension (H) 05/02/2007     Priority: High     Disease of tricuspid valve 06/23/2005     Priority: High     ECHO  5/05  Problem list name updated by automated process. Provider to review       S/P hip replacement, right 02/23/2023     Priority: Medium     S/P total knee replacement, left 02/23/2023     Priority: Medium     Status post coronary angiogram 05/19/2022     Priority: Medium     Invasive ductal carcinoma of breast, female, right (H) 04/15/2021     Priority: Medium     Check 12.22 for f/u [facility]       Rosacea 03/19/2021     Priority: Medium     Type 2 diabetes mellitus without complication, without long-term current use of insulin (H) 05/09/2019     Priority: Medium     History of shingles 05/07/2019     Priority: Medium     History of basal cell carcinoma 05/07/2019     Priority: Medium     S/P drug eluting coronary stent placement 05/07/2019     Priority: Medium     Acute chest pain 10/14/2018     Priority: Medium     CAD S/P PCI- rca-2 stents 10/14/2018     Priority: Medium     Pulmonary hypertension (H) 11/02/2017     Priority: Medium     Seen on Echocardiogram        Essential hypertension 04/29/2016     Priority: Medium     Aftercare following knee joint replacement surgery, unspecified laterality 04/18/2016     Priority: Medium     Anemia due to blood loss, acute 04/18/2016     Priority: Medium     Degenerative arthritis of knee 04/13/2016     Priority: Medium     Depressive disorder, not elsewhere classified 11/24/2014     Priority: Medium     Hyperlipidemia LDL goal <130 10/31/2010     Priority: Medium      Generalized osteoarthrosis, unspecified site 08/29/2007     Priority: Medium     Symptomatic menopausal or female climacteric states 05/17/2006     Priority: Medium     DERMATITIS  04/10/2005     Priority: Medium     Hypothyroidism 05/04/2004     Priority: Medium     Problem list name updated by automated process. Provider to review       LOC PRIM OSTEOARTH-L 1ST METATARSAL-CUNEIFORM JOINT 03/10/2004     Priority: Medium     DISRUPT ANT CRUCIATE L, 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 02/29/2004     Priority: Medium     BASAL CELL CANCER -NOSE 02/29/2004     Priority: Medium     Problem list name updated by automated process. Provider to review and confirm       Family history of diabetes mellitus 02/29/2004     Priority: Medium     Family history of ischemic heart disease 02/29/2004     Priority: Medium     Family history of malignant neoplasm of ovary 02/29/2004     Priority: Medium     Constipation 02/18/2003     Priority: Medium     Problem list name updated by automated process. Provider to review       Hemorrhage of rectum and anus 02/18/2003     Priority: Medium     hemorrhoid only, no bleeding       Health Care Home 04/23/2012     Priority: Low     EMERGENCY CARE PLAN  Presenting Problem Signs and Symptoms Treatment Plan    Questions or concerns during clinic hours    I will call the clinic directly     Questions or concerns outside clinic hours    I will call the 24 hour nurse line at [phone]    Patient needs to schedule an appointment    I will call the 24 hour scheduling team at [phone] or clinic directly    Same day treatment     I will call the clinic first, nurse line if after hours, urgent care and express care if needed                        Damari Leon RN, BSN  Care Coordinator  DX V65.8 REPLACED WITH [id number] (04/08/2013)        Past Medical History:   Diagnosis Date     Anemia due to blood loss      BASAL CELL CANCER -NOSE      Dermatitis      Diabetes (H)      Family history of diabetes  mellitus     Father     Family history of ischemic heart disease     Father     Family history of malignant neoplasm of ovary     Mother     HX DISRUPT L ANT CRUCIATE S/P REPAIR 2003      Hyperlipidemia      HYPERTENSION       HYPOTHYROIDISM      LOC PRIM OSTEOARTH-L 1ST METATARSAL-CUNEIFORM JOINT 3/10/2004     Major depressive disorder, single episode, mild (H) 11/24/2014     MILD-MOD TRICUSPID REGURG. MOD PULM HTN 6/23/2005    ECHO  5/05     Obese      PONV (postoperative nausea and vomiting)      Rosacea      Shingles      SPLENIC CYST      Past Surgical History:   Procedure Laterality Date     ARTHROPLASTY KNEE Left 4/13/2016    Procedure: ARTHROPLASTY KNEE;  Surgeon: Giuseppe Lopez MD;  Location:  OR     ARTHROPLASTY MINIMALLY INVASIVE HIP  4/18/2012    Procedure:ARTHROPLASTY MINIMALLY INVASIVE HIP; RIGHT TWO INCISION MINIMALLY INVASIVE HIP ARTHROPLASTY (YUVAL)^ (NO CELL SAVER) (C-ARM); Surgeon:GIUSEPPE LOPEZ; Location: OR     BIOPSY NODE SENTINEL Right 4/23/2021    Procedure: RIGHT SENTINEL LYMPH NODE BIOPSY;  Surgeon: Giuseppe Rogers MD;  Location:  OR     C CHEMOSURG MOHS 1ST STAGE  9-99    Mohs-nose     CARDIAC SURGERY  06/27/2019    mid and distal RCA stents     CV CORONARY ANGIOGRAM N/A 5/19/2022    Procedure: Coronary Angiogram;  Surgeon: Reyes Graham MD;  Location:  HEART CARDIAC CATH LAB     CV INSTANTANEOUS WAVE-FREE RATIO N/A 5/19/2022    Procedure: Instantaneous Wave-Free Ratio;  Surgeon: Reyes Graham MD;  Location:  HEART CARDIAC CATH LAB     CV INTRAVASULAR ULTRASOUND N/A 5/19/2022    Procedure: Intravascular Ultrasound;  Surgeon: Reyes Graham MD;  Location:  HEART CARDIAC CATH LAB     CV PCI STENT DRUG ELUTING N/A 5/19/2022    Procedure: Percutaneous Coronary Intervention Stent;  Surgeon: Reyes Graham MD;  Location:  HEART CARDIAC CATH LAB     ENT SURGERY      mohs procedure on nose     HYSTERECTOMY, PAP NO LONGER INDICATED       LUMPECTOMY  BREAST WITH SEED LOCALIZATION Right 4/23/2021    Procedure: SEED LOCALIZED RIGHT BREAST LUMPECTOMY;  Surgeon: Giuseppe Rogers MD;  Location:  OR     SURGICAL HISTORY OF -   Mult-~1978    infertility, laparotomies     [facility] APPENDECTOMY  8/87    incidental appi at [facility] REPAIR CRUCIATE LIGAMENT,KNEE  8/2003    L   ACL reconstruction     [facility] TOTAL ABDOM HYSTERECTOMY  8/87    W/ BSO fibroids, hx PID     [facility] COLONOSCOPY THRU STOMA, DIAGNOSTIC  4/03    normal, rec repeat 10 yrs     Current Outpatient Medications   Medication Sig Dispense Refill     amoxicillin (AMOXIL) 500 MG capsule TAKE 4 CAPSULES BY MOUTH 1/2 TO 1 HOUR BEFORE THE PROCEDURE. 4 capsule 3     anastrozole (ARIMIDEX) 1 MG tablet Take 1 tablet (1 mg) by mouth daily 90 tablet 3     aspirin 81 MG tablet Take 81 mg by mouth daily       atorvastatin (LIPITOR) 10 MG tablet Take 1 tablet (10 mg) by mouth daily for cholestrol 90 tablet 3     blood glucose (CONTOUR NEXT TEST) test strip 1 strip by In Vitro route daily or as directed. 100 strip 1     blood glucose monitoring (NITO MICROLET) lancets Use to test blood sugar 1 time daily or as directed. 100 each 3     calcium-vitamin D (CALTRATE) 600-400 MG-UNIT per tablet Take 1 tablet by mouth daily 60 tablet 11     cholecalciferol (VITAMIN D) 1000 UNIT tablet Take 1 tablet (1,000 Units) by mouth daily 100 tablet 3     clopidogrel (PLAVIX) 75 MG tablet Take 1 tablet (75 mg) by mouth daily 90 tablet 3     CONTOUR NEXT EZ (CONTOUR NEXT EZ W/DEVICE KIT) w/Device KIT 1 kit once 1 kit 0     Cyanocobalamin (VITAMIN B 12 PO) Take 1 tablet by mouth daily VIT B12 unknown otc dose       hydrALAZINE (APRESOLINE) 25 MG tablet Take 2 tablets (50 mg) by mouth 2 times daily 270 tablet 3     hydrochlorothiazide (HYDRODIURIL) 25 MG tablet Take 1 tablet (25 mg) by mouth daily for Blood Pressure 90 tablet 3     levothyroxine (SYNTHROID/LEVOTHROID) 112 MCG tablet TAKE 1 TABLET (112 MCG) BY MOUTH DAILY FOR THYROID 90  tablet 2     losartan (COZAAR) 100 MG tablet Take 1 tablet (100 mg) by mouth daily for Blood Pressure 90 tablet 3     metFORMIN (GLUCOPHAGE XR) 500 MG 24 hr tablet Take 2 tablets (1,000 mg) by mouth daily (with dinner) for diabetes 180 tablet 3     metroNIDAZOLE (METROGEL) 0.75 % external gel APPLY TO AFFECTED AREA TWICE A DAY 45 g 1     multivitamin, therapeutic with minerals (THERA-VIT-M) TABS Take 1 tablet by mouth daily       nitroGLYcerin (NITROSTAT) 0.4 MG sublingual tablet For chest pain place 1 tablet under the tongue every 5 minutes for 3 doses. If symptoms persist 5 minutes after 1st dose call 911. 25 tablet 3     acetaminophen (TYLENOL) 325 MG tablet Take 2 tablets (650 mg) by mouth 3 times daily (Patient not taking: Reported on 2022) 100 tablet 0     ZIOPTAN 0.0015 % SOLN ophthalmic solution Place 1 drop into both eyes At Bedtime         Allergies   Allergen Reactions     Toprol Xl [Metoprolol] Fatigue     Ace Inhibitors      ACE throat clearing     Oxycodone Nausea and Vomiting     Tetanus Toxoid Swelling     Hard red lump         Social History     Tobacco Use     Smoking status: Never     Smokeless tobacco: Never   Substance Use Topics     Alcohol use: Yes     Alcohol/week: 0.0 standard drinks     Comment: occasional     Family History   Problem Relation Age of Onset     Cancer Mother         ovarian  of it  at 51yo.     Other Cancer Mother      Diabetes Father         Adult onset     Hypertension Father      C.A.D. Father         Fatal Heart attack at 70.     Cerebrovascular Disease Father      Thyroid Disease Sister      Thyroid Disease Sister      Thyroid Disease Sister      Cancer Daughter      Thyroid Disease Daughter      Breast Cancer No family hx of      Cancer - colorectal No family hx of      History   Drug Use No         Objective     BP (!) 157/73 (BP Location: Left arm, Patient Position: Sitting, Cuff Size: Adult Regular)   Pulse 73   Temp (!) 96.2  F (35.7  C) (Oral)   Resp  "18   Ht 1.626 m (5' 4.02\")   Wt 83.3 kg (183 lb 11.2 oz)   SpO2 98%   BMI 31.52 kg/m      Physical Exam    GENERAL APPEARANCE: healthy, alert and no distress     EYES: EOMI, PERRL     HENT: ear canals and TM's normal and nose and mouth without ulcers or lesions     NECK: no adenopathy,      RESP: lungs clear to auscultation - no rales, rhonchi or wheezes     CV: regular rates and rhythm, normal S1 S2, no S3      ABDOMEN:  soft, nontender, no HSM or masses and bowel sounds normal     MS: extremities normal- no gross deformities noted, no evidence of inflammation in joints, FROM in all extremities.     SKIN: no suspicious lesions or rashes  She was having flare up of rosacea and she has numerous cherry angiomas and moles follows dermatology.     NEURO: Normal strength and tone, sensory exam grossly normal, mentation intact and speech normal     PSYCH: mentation appears normal. and affect normal/bright     LYMPHATICS: No cervical adenopathy    Recent Labs   Lab Test 10/07/22  1017 05/23/22  1232 05/19/22  0707 04/07/22  1230 04/23/21  1132 03/19/21  0857   HGB  --   --  12.4  --   --  14.1   PLT  --   --  230  --   --  278   INR  --   --  0.97  --   --   --     134 138 138   < > 137   POTASSIUM 4.2 3.4 3.3* 3.7   < > 3.6   CR 0.64 0.72 0.67 0.71   < > 0.70   A1C 6.0*  --   --  5.9*  --  6.0*    < > = values in this interval not displayed.        Diagnostics:  Recent Results (from the past 24 hour(s))   CBC with platelets    Collection Time: 02/23/23 11:31 AM   Result Value Ref Range    WBC Count 7.3 4.0 - 11.0 10e3/uL    RBC Count 4.61 3.80 - 5.20 10e6/uL    Hemoglobin 13.5 11.7 - 15.7 g/dL    Hematocrit 43.1 35.0 - 47.0 %    MCV 94 78 - 100 fL    MCH 29.3 26.5 - 33.0 pg    MCHC 31.3 (L) 31.5 - 36.5 g/dL    RDW 13.7 10.0 - 15.0 %    Platelet Count 249 150 - 450 10e3/uL   Hemoglobin A1c    Collection Time: 02/23/23 11:31 AM   Result Value Ref Range    Hemoglobin A1C 6.0 (H) 0.0 - 5.6 %        EKG: appears " normal, NSR, normal axis, normal intervals, no acute ST/T changes c/w ischemia, no LVH by voltage criteria    Revised Cardiac Risk Index (RCRI):  The patient has the following serious cardiovascular risks for perioperative complications:   - No serious cardiac risks = 0 points     RCRI Interpretation: 1 point: Class II (low risk - 0.9% complication rate)           Signed Electronically by: Zoraida Braga MD  Copy of this evaluation report is provided to requesting physician.    This document serves as a record of the services and decisions personally performed and made by Dr. Braga. It was created on his behalf by Hieu Thompson, a trained medical scribe. The creation of this document is based the provider's statements to the medical scribe.

## 2023-02-23 NOTE — PATIENT INSTRUCTIONS
Hold plavix 5 days before your surgery  Avoid aspirin 7 days before the surgery. Avoid nonsteroidal anti-inflammatory pain medication like ibuprofen, Motrin, or Aleve 7 days before the surgery.  Tylenol is okay to use for pain.  Avoid any OTC multivitamins or herbal supplement 7 days before surgery   Resume after surgery     Hold hydrochlorothiazide and metformin on morning of surgery    Ok to continue your other medication     Keep your follow up appointment as scheduled     Seek sooner medical attention if there is any worsening of symptoms or problems.

## 2023-02-24 NOTE — RESULT ENCOUNTER NOTE
Timothy Aceves    This is to inform you regarding your test result.    Basic metabolic panel which includes electrolytes and kidney fucntion is satisfactory   Elevated anion gap  is due to wearing mask.  Glucose which is your blood sugar is slightly elevated.  Avoid high sugar containing food.  CBC result which includes Hemoglobin and  Platelet Counts is satisfactory.  HbA1c which is average glucose during last 3 months is 6%.  TSH which is thyroid hormone is normal.  Ferritin which is iron stores in the body is normal.      Sincerely,      Dr.Nasima Maria Luz MD,FACP

## 2023-03-03 RX ORDER — SENNOSIDES 8.6 MG
650 CAPSULE ORAL EVERY 8 HOURS PRN
Status: ON HOLD | COMMUNITY
End: 2023-03-06

## 2023-03-03 RX ORDER — HYDRALAZINE HYDROCHLORIDE 25 MG/1
25 TABLET, FILM COATED ORAL EVERY EVENING
COMMUNITY
End: 2023-09-07 | Stop reason: DRUGHIGH

## 2023-03-03 NOTE — PROGRESS NOTES
PTA medications updated by Medication Scribe prior to surgery via phone call with patient (last doses completed by Nurse)     Medication history sources: Patient, Surescripts and H&P  In the past week, patient estimated taking medication this percent of the time: Greater than 90%  Adherence assessment: N/A Not Observed    Significant changes made to the medication list:  None      Additional medication history information:   Patient was advised to bring: Zioptan OP solution    Medication reconciliation completed by provider prior to medication history? No    Time spent in this activity: 60 minutes    The information provided in this note is only as accurate as the sources available at the time of update(s)      Prior to Admission medications    Medication Sig Last Dose Taking? Auth Provider Long Term End Date   acetaminophen (TYLENOL) 650 MG CR tablet Take 650 mg by mouth every 8 hours as needed for mild pain or fever Unknown at PRN Yes Reported, Patient No    amoxicillin (AMOXIL) 500 MG capsule TAKE 4 CAPSULES BY MOUTH 1/2 TO 1 HOUR BEFORE THE PROCEDURE. 2 months ago at PRN Yes Zoraida Braga MD     anastrozole (ARIMIDEX) 1 MG tablet Take 1 tablet (1 mg) by mouth daily  at PM Yes Elida Grullon MD Yes    aspirin 81 MG tablet Take 81 mg by mouth daily 2/27/2023 at AM Yes Reported, Patient     atorvastatin (LIPITOR) 10 MG tablet Take 1 tablet (10 mg) by mouth daily for cholestrol  at PM Yes Zoraida Braga MD Yes    calcium-vitamin D (CALTRATE) 600-400 MG-UNIT per tablet Take 1 tablet by mouth daily 2/26/2023 at AM Yes Zoraida Braga MD     cholecalciferol (VITAMIN D) 1000 UNIT tablet Take 1 tablet (1,000 Units) by mouth daily  Yes Zoraida Braga MD     clopidogrel (PLAVIX) 75 MG tablet Take 1 tablet (75 mg) by mouth daily 2/27/2023 at AM Yes Albino Howard MD Yes    Cyanocobalamin (VITAMIN B 12 PO) Take 1 tablet by mouth daily Strength unknown 2/26/2023 at AM Yes Reported, Patient     hydrALAZINE  (APRESOLINE) 25 MG tablet Take 25 mg by mouth every evening  at PM Yes Reported, Patient Yes    hydrALAZINE (APRESOLINE) 25 MG tablet Take 2 tablets (50 mg) by mouth 2 times daily  at AM Yes Zoraida Braga MD Yes    hydrochlorothiazide (HYDRODIURIL) 25 MG tablet Take 1 tablet (25 mg) by mouth daily for Blood Pressure  at AM Yes Zoraida Braga MD Yes    levothyroxine (SYNTHROID/LEVOTHROID) 112 MCG tablet TAKE 1 TABLET (112 MCG) BY MOUTH DAILY FOR THYROID  at PM Yes Zoraida Braga MD Yes    losartan (COZAAR) 100 MG tablet Take 1 tablet (100 mg) by mouth daily for Blood Pressure  at PM Yes Zoraida Braga MD Yes    metFORMIN (GLUCOPHAGE XR) 500 MG 24 hr tablet Take 2 tablets (1,000 mg) by mouth daily (with dinner) for diabetes  at AM Yes Zoraida Braga MD Yes    metroNIDAZOLE (METROGEL) 0.75 % external gel APPLY TO AFFECTED AREA TWICE A DAY  at AM Yes Zoraida Braga MD     multivitamin, therapeutic with minerals (THERA-VIT-M) TABS Take 1 tablet by mouth daily 2/26/2023 at AM Yes Reported, Patient     nitroGLYcerin (NITROSTAT) 0.4 MG sublingual tablet For chest pain place 1 tablet under the tongue every 5 minutes for 3 doses. If symptoms persist 5 minutes after 1st dose call 911. Unknown at PRN Yes Krystal Neal Nettie, APRN CNP Yes    ZIOPTAN 0.0015 % SOLN ophthalmic solution Place 1 drop into both eyes At Bedtime  at HS Yes Reported, Patient     blood glucose (CONTOUR NEXT TEST) test strip 1 strip by In Vitro route daily or as directed.   Zoraida Braga MD     blood glucose monitoring (NITO MICROLET) lancets Use to test blood sugar 1 time daily or as directed.   Zoraida Braga MD     CONTOUR NEXT EZ (CONTOUR NEXT EZ W/DEVICE KIT) w/Device KIT 1 kit once   Zoraida Braga MD

## 2023-03-06 ENCOUNTER — APPOINTMENT (OUTPATIENT)
Dept: GENERAL RADIOLOGY | Facility: CLINIC | Age: 78
End: 2023-03-06
Attending: ORTHOPAEDIC SURGERY
Payer: MEDICARE

## 2023-03-06 ENCOUNTER — APPOINTMENT (OUTPATIENT)
Dept: PHYSICAL THERAPY | Facility: CLINIC | Age: 78
End: 2023-03-06
Attending: ORTHOPAEDIC SURGERY
Payer: MEDICARE

## 2023-03-06 ENCOUNTER — HOSPITAL ENCOUNTER (OUTPATIENT)
Facility: CLINIC | Age: 78
Discharge: HOME | End: 2023-03-07
Attending: ORTHOPAEDIC SURGERY | Admitting: ORTHOPAEDIC SURGERY
Payer: MEDICARE

## 2023-03-06 ENCOUNTER — ANESTHESIA EVENT (OUTPATIENT)
Dept: SURGERY | Facility: CLINIC | Age: 78
End: 2023-03-06
Payer: MEDICARE

## 2023-03-06 ENCOUNTER — ANESTHESIA (OUTPATIENT)
Dept: SURGERY | Facility: CLINIC | Age: 78
End: 2023-03-06
Payer: MEDICARE

## 2023-03-06 DIAGNOSIS — Z96.651 STATUS POST TOTAL RIGHT KNEE REPLACEMENT: Primary | ICD-10-CM

## 2023-03-06 LAB
CREAT SERPL-MCNC: 0.69 MG/DL (ref 0.51–0.95)
FASTING STATUS PATIENT QL REPORTED: YES
GFR SERPL CREATININE-BSD FRML MDRD: 89 ML/MIN/1.73M2
GLUCOSE BLDC GLUCOMTR-MCNC: 160 MG/DL (ref 70–99)
GLUCOSE BLDC GLUCOMTR-MCNC: 227 MG/DL (ref 70–99)
GLUCOSE BLDC GLUCOMTR-MCNC: 280 MG/DL (ref 70–99)
GLUCOSE BLDC GLUCOMTR-MCNC: 313 MG/DL (ref 70–99)
GLUCOSE SERPL-MCNC: 130 MG/DL (ref 70–99)
MAGNESIUM SERPL-MCNC: 1.6 MG/DL (ref 1.7–2.3)
POTASSIUM SERPL-SCNC: 3 MMOL/L (ref 3.4–5.3)
POTASSIUM SERPL-SCNC: 4.1 MMOL/L (ref 3.4–5.3)

## 2023-03-06 PROCEDURE — 250N000011 HC RX IP 250 OP 636: Performed by: ORTHOPAEDIC SURGERY

## 2023-03-06 PROCEDURE — 250N000012 HC RX MED GY IP 250 OP 636 PS 637: Performed by: PHYSICIAN ASSISTANT

## 2023-03-06 PROCEDURE — 250N000011 HC RX IP 250 OP 636: Performed by: ANESTHESIOLOGY

## 2023-03-06 PROCEDURE — 36415 COLL VENOUS BLD VENIPUNCTURE: CPT | Performed by: ANESTHESIOLOGY

## 2023-03-06 PROCEDURE — 272N000001 HC OR GENERAL SUPPLY STERILE: Performed by: ORTHOPAEDIC SURGERY

## 2023-03-06 PROCEDURE — 258N000003 HC RX IP 258 OP 636: Performed by: ANESTHESIOLOGY

## 2023-03-06 PROCEDURE — 36415 COLL VENOUS BLD VENIPUNCTURE: CPT | Performed by: PHYSICIAN ASSISTANT

## 2023-03-06 PROCEDURE — 82962 GLUCOSE BLOOD TEST: CPT | Mod: 91

## 2023-03-06 PROCEDURE — 97161 PT EVAL LOW COMPLEX 20 MIN: CPT | Mod: GP

## 2023-03-06 PROCEDURE — C1776 JOINT DEVICE (IMPLANTABLE): HCPCS | Performed by: ORTHOPAEDIC SURGERY

## 2023-03-06 PROCEDURE — 710N000009 HC RECOVERY PHASE 1, LEVEL 1, PER MIN: Performed by: ORTHOPAEDIC SURGERY

## 2023-03-06 PROCEDURE — 258N000003 HC RX IP 258 OP 636: Performed by: ORTHOPAEDIC SURGERY

## 2023-03-06 PROCEDURE — 97110 THERAPEUTIC EXERCISES: CPT | Mod: GP

## 2023-03-06 PROCEDURE — 999N000141 HC STATISTIC PRE-PROCEDURE NURSING ASSESSMENT: Performed by: ORTHOPAEDIC SURGERY

## 2023-03-06 PROCEDURE — 99233 SBSQ HOSP IP/OBS HIGH 50: CPT | Performed by: PHYSICIAN ASSISTANT

## 2023-03-06 PROCEDURE — 250N000009 HC RX 250: Performed by: ANESTHESIOLOGY

## 2023-03-06 PROCEDURE — 82565 ASSAY OF CREATININE: CPT | Performed by: ANESTHESIOLOGY

## 2023-03-06 PROCEDURE — 360N000077 HC SURGERY LEVEL 4, PER MIN: Performed by: ORTHOPAEDIC SURGERY

## 2023-03-06 PROCEDURE — 84132 ASSAY OF SERUM POTASSIUM: CPT | Performed by: ANESTHESIOLOGY

## 2023-03-06 PROCEDURE — 258N000003 HC RX IP 258 OP 636: Performed by: NURSE ANESTHETIST, CERTIFIED REGISTERED

## 2023-03-06 PROCEDURE — 82947 ASSAY GLUCOSE BLOOD QUANT: CPT | Performed by: ANESTHESIOLOGY

## 2023-03-06 PROCEDURE — 250N000009 HC RX 250: Performed by: NURSE ANESTHETIST, CERTIFIED REGISTERED

## 2023-03-06 PROCEDURE — 999N000063 XR KNEE PORT RIGHT 1/2 VIEWS: Mod: RT

## 2023-03-06 PROCEDURE — 250N000013 HC RX MED GY IP 250 OP 250 PS 637: Performed by: ORTHOPAEDIC SURGERY

## 2023-03-06 PROCEDURE — C1713 ANCHOR/SCREW BN/BN,TIS/BN: HCPCS | Performed by: ORTHOPAEDIC SURGERY

## 2023-03-06 PROCEDURE — 250N000011 HC RX IP 250 OP 636: Performed by: NURSE ANESTHETIST, CERTIFIED REGISTERED

## 2023-03-06 PROCEDURE — 84132 ASSAY OF SERUM POTASSIUM: CPT | Mod: 91 | Performed by: PHYSICIAN ASSISTANT

## 2023-03-06 PROCEDURE — 97530 THERAPEUTIC ACTIVITIES: CPT | Mod: GP

## 2023-03-06 PROCEDURE — 250N000009 HC RX 250: Performed by: ORTHOPAEDIC SURGERY

## 2023-03-06 PROCEDURE — 83735 ASSAY OF MAGNESIUM: CPT | Performed by: PHYSICIAN ASSISTANT

## 2023-03-06 PROCEDURE — 250N000013 HC RX MED GY IP 250 OP 250 PS 637: Performed by: PHYSICIAN ASSISTANT

## 2023-03-06 PROCEDURE — 370N000017 HC ANESTHESIA TECHNICAL FEE, PER MIN: Performed by: ORTHOPAEDIC SURGERY

## 2023-03-06 DEVICE — IMP TIBIAL ZIM PSN NP STM 5DEG SZ DR 42-5320-067-02: Type: IMPLANTABLE DEVICE | Site: KNEE | Status: FUNCTIONAL

## 2023-03-06 DEVICE — PATELLA ALL POLY 29MM: Type: IMPLANTABLE DEVICE | Site: KNEE | Status: FUNCTIONAL

## 2023-03-06 DEVICE — BONE CEMENT SIMPLEX FULL DOSE 6191-1-001: Type: IMPLANTABLE DEVICE | Site: KNEE | Status: FUNCTIONAL

## 2023-03-06 DEVICE — IMPLANTABLE DEVICE: Type: IMPLANTABLE DEVICE | Site: KNEE | Status: FUNCTIONAL

## 2023-03-06 DEVICE — IMPLANTABLE DEVICE
Type: IMPLANTABLE DEVICE | Site: KNEE | Status: FUNCTIONAL
Brand: PERSONA®

## 2023-03-06 RX ORDER — NALOXONE HYDROCHLORIDE 0.4 MG/ML
0.2 INJECTION, SOLUTION INTRAMUSCULAR; INTRAVENOUS; SUBCUTANEOUS
Status: DISCONTINUED | OUTPATIENT
Start: 2023-03-06 | End: 2023-03-07 | Stop reason: HOSPADM

## 2023-03-06 RX ORDER — HYDROXYZINE HYDROCHLORIDE 10 MG/1
10 TABLET, FILM COATED ORAL EVERY 6 HOURS PRN
Qty: 30 TABLET | Refills: 0 | Status: SHIPPED | OUTPATIENT
Start: 2023-03-06 | End: 2023-04-10

## 2023-03-06 RX ORDER — GLYCOPYRROLATE 0.2 MG/ML
INJECTION, SOLUTION INTRAMUSCULAR; INTRAVENOUS PRN
Status: DISCONTINUED | OUTPATIENT
Start: 2023-03-06 | End: 2023-03-06

## 2023-03-06 RX ORDER — ANASTROZOLE 1 MG/1
1 TABLET ORAL DAILY
Status: DISCONTINUED | OUTPATIENT
Start: 2023-03-06 | End: 2023-03-07 | Stop reason: HOSPADM

## 2023-03-06 RX ORDER — SODIUM CHLORIDE 9 MG/ML
INJECTION, SOLUTION INTRAVENOUS CONTINUOUS
Status: DISCONTINUED | OUTPATIENT
Start: 2023-03-06 | End: 2023-03-07 | Stop reason: HOSPADM

## 2023-03-06 RX ORDER — NICOTINE POLACRILEX 4 MG
15-30 LOZENGE BUCCAL
Status: DISCONTINUED | OUTPATIENT
Start: 2023-03-06 | End: 2023-03-07 | Stop reason: HOSPADM

## 2023-03-06 RX ORDER — ACETAMINOPHEN 325 MG/1
650 TABLET ORAL EVERY 4 HOURS PRN
Status: DISCONTINUED | OUTPATIENT
Start: 2023-03-09 | End: 2023-03-07 | Stop reason: HOSPADM

## 2023-03-06 RX ORDER — DEXAMETHASONE SODIUM PHOSPHATE 4 MG/ML
INJECTION, SOLUTION INTRA-ARTICULAR; INTRALESIONAL; INTRAMUSCULAR; INTRAVENOUS; SOFT TISSUE PRN
Status: DISCONTINUED | OUTPATIENT
Start: 2023-03-06 | End: 2023-03-06

## 2023-03-06 RX ORDER — LIDOCAINE 40 MG/G
CREAM TOPICAL
Status: DISCONTINUED | OUTPATIENT
Start: 2023-03-06 | End: 2023-03-07 | Stop reason: HOSPADM

## 2023-03-06 RX ORDER — LEVOTHYROXINE SODIUM 112 UG/1
112 TABLET ORAL DAILY
Status: DISCONTINUED | OUTPATIENT
Start: 2023-03-07 | End: 2023-03-07 | Stop reason: HOSPADM

## 2023-03-06 RX ORDER — ATORVASTATIN CALCIUM 10 MG/1
10 TABLET, FILM COATED ORAL DAILY
Status: DISCONTINUED | OUTPATIENT
Start: 2023-03-06 | End: 2023-03-07 | Stop reason: HOSPADM

## 2023-03-06 RX ORDER — ONDANSETRON 4 MG/1
4 TABLET, ORALLY DISINTEGRATING ORAL EVERY 6 HOURS PRN
Status: DISCONTINUED | OUTPATIENT
Start: 2023-03-06 | End: 2023-03-07 | Stop reason: HOSPADM

## 2023-03-06 RX ORDER — LIDOCAINE HYDROCHLORIDE 20 MG/ML
INJECTION, SOLUTION INFILTRATION; PERINEURAL PRN
Status: DISCONTINUED | OUTPATIENT
Start: 2023-03-06 | End: 2023-03-06

## 2023-03-06 RX ORDER — HYDRALAZINE HYDROCHLORIDE 50 MG/1
50 TABLET, FILM COATED ORAL EVERY MORNING
Status: DISCONTINUED | OUTPATIENT
Start: 2023-03-07 | End: 2023-03-07 | Stop reason: HOSPADM

## 2023-03-06 RX ORDER — HYDROMORPHONE HYDROCHLORIDE 2 MG/1
4 TABLET ORAL EVERY 4 HOURS PRN
Status: DISCONTINUED | OUTPATIENT
Start: 2023-03-06 | End: 2023-03-07 | Stop reason: HOSPADM

## 2023-03-06 RX ORDER — TRANEXAMIC ACID 650 MG/1
1950 TABLET ORAL ONCE
Status: COMPLETED | OUTPATIENT
Start: 2023-03-06 | End: 2023-03-06

## 2023-03-06 RX ORDER — PROPOFOL 10 MG/ML
INJECTION, EMULSION INTRAVENOUS CONTINUOUS PRN
Status: DISCONTINUED | OUTPATIENT
Start: 2023-03-06 | End: 2023-03-06

## 2023-03-06 RX ORDER — PROCHLORPERAZINE MALEATE 5 MG
5 TABLET ORAL EVERY 6 HOURS PRN
Status: DISCONTINUED | OUTPATIENT
Start: 2023-03-06 | End: 2023-03-07 | Stop reason: HOSPADM

## 2023-03-06 RX ORDER — NALOXONE HYDROCHLORIDE 0.4 MG/ML
0.4 INJECTION, SOLUTION INTRAMUSCULAR; INTRAVENOUS; SUBCUTANEOUS
Status: DISCONTINUED | OUTPATIENT
Start: 2023-03-06 | End: 2023-03-07 | Stop reason: HOSPADM

## 2023-03-06 RX ORDER — HYDROMORPHONE HYDROCHLORIDE 2 MG/1
2-4 TABLET ORAL EVERY 4 HOURS PRN
Qty: 28 TABLET | Refills: 0 | Status: SHIPPED | OUTPATIENT
Start: 2023-03-06 | End: 2023-04-10

## 2023-03-06 RX ORDER — POTASSIUM CHLORIDE 7.45 MG/ML
INJECTION INTRAVENOUS PRN
Status: DISCONTINUED | OUTPATIENT
Start: 2023-03-06 | End: 2023-03-06

## 2023-03-06 RX ORDER — METHOCARBAMOL 500 MG/1
500 TABLET, FILM COATED ORAL EVERY 6 HOURS PRN
Status: DISCONTINUED | OUTPATIENT
Start: 2023-03-06 | End: 2023-03-07 | Stop reason: HOSPADM

## 2023-03-06 RX ORDER — HYDRALAZINE HYDROCHLORIDE 25 MG/1
25 TABLET, FILM COATED ORAL EVERY EVENING
Status: DISCONTINUED | OUTPATIENT
Start: 2023-03-06 | End: 2023-03-07 | Stop reason: HOSPADM

## 2023-03-06 RX ORDER — CEFAZOLIN SODIUM 2 G/100ML
2 INJECTION, SOLUTION INTRAVENOUS EVERY 8 HOURS
Status: COMPLETED | OUTPATIENT
Start: 2023-03-06 | End: 2023-03-07

## 2023-03-06 RX ORDER — AMOXICILLIN 250 MG
1 CAPSULE ORAL 2 TIMES DAILY
Status: DISCONTINUED | OUTPATIENT
Start: 2023-03-06 | End: 2023-03-07 | Stop reason: HOSPADM

## 2023-03-06 RX ORDER — ACETAMINOPHEN 325 MG/1
975 TABLET ORAL EVERY 8 HOURS
Status: DISCONTINUED | OUTPATIENT
Start: 2023-03-06 | End: 2023-03-07 | Stop reason: HOSPADM

## 2023-03-06 RX ORDER — BUPIVACAINE HYDROCHLORIDE 7.5 MG/ML
INJECTION, SOLUTION INTRASPINAL
Status: DISCONTINUED | OUTPATIENT
Start: 2023-03-06 | End: 2023-03-06

## 2023-03-06 RX ORDER — ACETAMINOPHEN 325 MG/1
650 TABLET ORAL EVERY 4 HOURS PRN
Qty: 100 TABLET | Refills: 0 | Status: SHIPPED | OUTPATIENT
Start: 2023-03-06 | End: 2023-04-12

## 2023-03-06 RX ORDER — BISACODYL 10 MG
10 SUPPOSITORY, RECTAL RECTAL DAILY PRN
Status: DISCONTINUED | OUTPATIENT
Start: 2023-03-06 | End: 2023-03-07 | Stop reason: HOSPADM

## 2023-03-06 RX ORDER — DEXMEDETOMIDINE HYDROCHLORIDE 4 UG/ML
INJECTION, SOLUTION INTRAVENOUS PRN
Status: DISCONTINUED | OUTPATIENT
Start: 2023-03-06 | End: 2023-03-06

## 2023-03-06 RX ORDER — ASPIRIN 81 MG/1
81 TABLET ORAL DAILY
Qty: 60 TABLET | Refills: 0 | COMMUNITY
Start: 2023-03-06 | End: 2023-03-06

## 2023-03-06 RX ORDER — ONDANSETRON 2 MG/ML
4 INJECTION INTRAMUSCULAR; INTRAVENOUS EVERY 6 HOURS PRN
Status: DISCONTINUED | OUTPATIENT
Start: 2023-03-06 | End: 2023-03-07 | Stop reason: HOSPADM

## 2023-03-06 RX ORDER — CEFAZOLIN SODIUM/WATER 2 G/20 ML
2 SYRINGE (ML) INTRAVENOUS SEE ADMIN INSTRUCTIONS
Status: DISCONTINUED | OUTPATIENT
Start: 2023-03-06 | End: 2023-03-06 | Stop reason: HOSPADM

## 2023-03-06 RX ORDER — SODIUM CHLORIDE, SODIUM LACTATE, POTASSIUM CHLORIDE, CALCIUM CHLORIDE 600; 310; 30; 20 MG/100ML; MG/100ML; MG/100ML; MG/100ML
INJECTION, SOLUTION INTRAVENOUS CONTINUOUS
Status: DISCONTINUED | OUTPATIENT
Start: 2023-03-06 | End: 2023-03-06 | Stop reason: HOSPADM

## 2023-03-06 RX ORDER — ONDANSETRON 2 MG/ML
INJECTION INTRAMUSCULAR; INTRAVENOUS PRN
Status: DISCONTINUED | OUTPATIENT
Start: 2023-03-06 | End: 2023-03-06

## 2023-03-06 RX ORDER — DEXTROSE MONOHYDRATE 25 G/50ML
25-50 INJECTION, SOLUTION INTRAVENOUS
Status: DISCONTINUED | OUTPATIENT
Start: 2023-03-06 | End: 2023-03-07 | Stop reason: HOSPADM

## 2023-03-06 RX ORDER — HYDROMORPHONE HCL IN WATER/PF 6 MG/30 ML
0.4 PATIENT CONTROLLED ANALGESIA SYRINGE INTRAVENOUS
Status: DISCONTINUED | OUTPATIENT
Start: 2023-03-06 | End: 2023-03-07 | Stop reason: HOSPADM

## 2023-03-06 RX ORDER — HYDROMORPHONE HYDROCHLORIDE 2 MG/1
2 TABLET ORAL EVERY 4 HOURS PRN
Status: DISCONTINUED | OUTPATIENT
Start: 2023-03-06 | End: 2023-03-07 | Stop reason: HOSPADM

## 2023-03-06 RX ORDER — ENOXAPARIN SODIUM 100 MG/ML
40 INJECTION SUBCUTANEOUS EVERY 24 HOURS
Status: DISCONTINUED | OUTPATIENT
Start: 2023-03-07 | End: 2023-03-07 | Stop reason: HOSPADM

## 2023-03-06 RX ORDER — POLYETHYLENE GLYCOL 3350 17 G/17G
17 POWDER, FOR SOLUTION ORAL DAILY
Status: DISCONTINUED | OUTPATIENT
Start: 2023-03-07 | End: 2023-03-07 | Stop reason: HOSPADM

## 2023-03-06 RX ORDER — PROPOFOL 10 MG/ML
INJECTION, EMULSION INTRAVENOUS PRN
Status: DISCONTINUED | OUTPATIENT
Start: 2023-03-06 | End: 2023-03-06

## 2023-03-06 RX ORDER — AMOXICILLIN 250 MG
1-2 CAPSULE ORAL 2 TIMES DAILY
Qty: 30 TABLET | Refills: 0 | Status: SHIPPED | OUTPATIENT
Start: 2023-03-06

## 2023-03-06 RX ORDER — CEFAZOLIN SODIUM/WATER 2 G/20 ML
2 SYRINGE (ML) INTRAVENOUS
Status: COMPLETED | OUTPATIENT
Start: 2023-03-06 | End: 2023-03-06

## 2023-03-06 RX ORDER — HYDROXYZINE HYDROCHLORIDE 10 MG/1
10 TABLET, FILM COATED ORAL EVERY 6 HOURS PRN
Status: DISCONTINUED | OUTPATIENT
Start: 2023-03-06 | End: 2023-03-07 | Stop reason: HOSPADM

## 2023-03-06 RX ORDER — POTASSIUM CHLORIDE 7.45 MG/ML
10 INJECTION INTRAVENOUS ONCE
Status: COMPLETED | OUTPATIENT
Start: 2023-03-06 | End: 2023-03-06

## 2023-03-06 RX ORDER — DIPHENHYDRAMINE HCL 12.5MG/5ML
12.5 LIQUID (ML) ORAL EVERY 6 HOURS PRN
Status: DISCONTINUED | OUTPATIENT
Start: 2023-03-06 | End: 2023-03-07 | Stop reason: HOSPADM

## 2023-03-06 RX ORDER — HYDROMORPHONE HCL IN WATER/PF 6 MG/30 ML
0.2 PATIENT CONTROLLED ANALGESIA SYRINGE INTRAVENOUS
Status: DISCONTINUED | OUTPATIENT
Start: 2023-03-06 | End: 2023-03-07 | Stop reason: HOSPADM

## 2023-03-06 RX ORDER — MAGNESIUM HYDROXIDE 1200 MG/15ML
LIQUID ORAL PRN
Status: DISCONTINUED | OUTPATIENT
Start: 2023-03-06 | End: 2023-03-06 | Stop reason: HOSPADM

## 2023-03-06 RX ORDER — MAGNESIUM OXIDE 400 MG/1
400 TABLET ORAL EVERY 4 HOURS
Status: COMPLETED | OUTPATIENT
Start: 2023-03-06 | End: 2023-03-06

## 2023-03-06 RX ADMIN — PHENYLEPHRINE HYDROCHLORIDE 100 MCG: 10 INJECTION INTRAVENOUS at 09:22

## 2023-03-06 RX ADMIN — ATORVASTATIN CALCIUM 10 MG: 10 TABLET, FILM COATED ORAL at 21:21

## 2023-03-06 RX ADMIN — POTASSIUM CHLORIDE 10 MEQ: 7.46 INJECTION, SOLUTION INTRAVENOUS at 10:13

## 2023-03-06 RX ADMIN — CEFAZOLIN SODIUM 2 G: 2 INJECTION, SOLUTION INTRAVENOUS at 17:21

## 2023-03-06 RX ADMIN — PROPOFOL 70 MG: 10 INJECTION, EMULSION INTRAVENOUS at 09:38

## 2023-03-06 RX ADMIN — LIDOCAINE HYDROCHLORIDE 40 MG: 20 INJECTION, SOLUTION INFILTRATION; PERINEURAL at 09:27

## 2023-03-06 RX ADMIN — Medication 400 MG: at 18:41

## 2023-03-06 RX ADMIN — PHENYLEPHRINE HYDROCHLORIDE 100 MCG: 10 INJECTION INTRAVENOUS at 09:43

## 2023-03-06 RX ADMIN — PROPOFOL 30 MG: 10 INJECTION, EMULSION INTRAVENOUS at 10:59

## 2023-03-06 RX ADMIN — POTASSIUM CHLORIDE 10 MEQ: 7.46 INJECTION, SOLUTION INTRAVENOUS at 09:14

## 2023-03-06 RX ADMIN — MIDAZOLAM 1 MG: 1 INJECTION INTRAMUSCULAR; INTRAVENOUS at 09:14

## 2023-03-06 RX ADMIN — Medication 400 MG: at 21:21

## 2023-03-06 RX ADMIN — SODIUM CHLORIDE, POTASSIUM CHLORIDE, SODIUM LACTATE AND CALCIUM CHLORIDE: 600; 310; 30; 20 INJECTION, SOLUTION INTRAVENOUS at 10:37

## 2023-03-06 RX ADMIN — GLYCOPYRROLATE 0.2 MG: 0.2 INJECTION, SOLUTION INTRAMUSCULAR; INTRAVENOUS at 10:45

## 2023-03-06 RX ADMIN — PHENYLEPHRINE HYDROCHLORIDE 0.3 MCG/KG/MIN: 10 INJECTION INTRAVENOUS at 09:53

## 2023-03-06 RX ADMIN — HYDROMORPHONE HYDROCHLORIDE 4 MG: 2 TABLET ORAL at 17:32

## 2023-03-06 RX ADMIN — ACETAMINOPHEN 975 MG: 325 TABLET ORAL at 21:15

## 2023-03-06 RX ADMIN — INSULIN ASPART 4 UNITS: 100 INJECTION, SOLUTION INTRAVENOUS; SUBCUTANEOUS at 18:43

## 2023-03-06 RX ADMIN — ONDANSETRON 4 MG: 2 INJECTION INTRAMUSCULAR; INTRAVENOUS at 09:33

## 2023-03-06 RX ADMIN — SODIUM CHLORIDE, POTASSIUM CHLORIDE, SODIUM LACTATE AND CALCIUM CHLORIDE: 600; 310; 30; 20 INJECTION, SOLUTION INTRAVENOUS at 09:17

## 2023-03-06 RX ADMIN — PHENYLEPHRINE HYDROCHLORIDE 100 MCG: 10 INJECTION INTRAVENOUS at 09:53

## 2023-03-06 RX ADMIN — DEXAMETHASONE SODIUM PHOSPHATE 10 MG: 4 INJECTION, SOLUTION INTRA-ARTICULAR; INTRALESIONAL; INTRAMUSCULAR; INTRAVENOUS; SOFT TISSUE at 09:33

## 2023-03-06 RX ADMIN — DEXMEDETOMIDINE HYDROCHLORIDE 8 MCG: 200 INJECTION INTRAVENOUS at 10:49

## 2023-03-06 RX ADMIN — PROPOFOL 30 MG: 10 INJECTION, EMULSION INTRAVENOUS at 09:27

## 2023-03-06 RX ADMIN — Medication 2 G: at 09:17

## 2023-03-06 RX ADMIN — BUPIVACAINE HYDROCHLORIDE 15 ML: 5 INJECTION, SOLUTION EPIDURAL; INTRACAUDAL at 09:00

## 2023-03-06 RX ADMIN — SENNOSIDES AND DOCUSATE SODIUM 1 TABLET: 50; 8.6 TABLET ORAL at 21:15

## 2023-03-06 RX ADMIN — HYDROMORPHONE HYDROCHLORIDE 2 MG: 2 TABLET ORAL at 13:14

## 2023-03-06 RX ADMIN — ACETAMINOPHEN 975 MG: 325 TABLET ORAL at 13:14

## 2023-03-06 RX ADMIN — PROPOFOL 50 MCG/KG/MIN: 10 INJECTION, EMULSION INTRAVENOUS at 09:23

## 2023-03-06 RX ADMIN — BUPIVACAINE HYDROCHLORIDE IN DEXTROSE 1.8 ML: 7.5 INJECTION, SOLUTION SUBARACHNOID at 09:20

## 2023-03-06 RX ADMIN — TRANEXAMIC ACID 1950 MG: 650 TABLET ORAL at 08:02

## 2023-03-06 RX ADMIN — HYDROMORPHONE HYDROCHLORIDE 4 MG: 2 TABLET ORAL at 21:30

## 2023-03-06 RX ADMIN — HYDRALAZINE HYDROCHLORIDE 25 MG: 25 TABLET, FILM COATED ORAL at 21:29

## 2023-03-06 ASSESSMENT — COPD QUESTIONNAIRES: COPD: 0

## 2023-03-06 ASSESSMENT — ACTIVITIES OF DAILY LIVING (ADL)
ADLS_ACUITY_SCORE: 23

## 2023-03-06 NOTE — PROGRESS NOTES
[facility]    Medicine Progress Note - Hospitalist Service    Date of Admission:  3/6/2023    Assessment & Plan   Dee Dee is a 77-year-old female with past medical history of CAD status post PCI last in May 2022 with MALLY, DM 2, hypothyroidism, hypertension, pulmonary hypertension, breast cancer ER positive who is s/p right total knee  w/ Dr. Mcgrath on 03/06/23.  Hospitalist service was asked to assist with medical comanagement in the postoperative state    S/p right total knee w/ Dr. Mcgrath on 03/06/23   Plan:  - Defer routine postoperative cares per primary service, including IVF, analgesia, antimicrobias, DVT prophylaxis, therapies and disposition  - Aggressive pulmonary toilet, frequent IS use 10x/hour while awake    CAD status post PCI last in May 2022 with MALLY to the proximal LAD/first diagonal bifurcation and distal RCA, prior RCA PCI x2 in 2018; follows with [facility] cardiology, planning for 1 year DAPT, last seen by Dr. Howard 11/01/2022. Reviewed telephone encounter with outpatient cardiology regarding antiplatelet game plan 2/1/23  - Resume DAPT with aspirin, Plavix ASAP, defer to Ortho.  - Continue PTA Lipitor    Hypokalemia likely 2/2 diuretic use  - Electrolyte replacement protocol  - Check Mg  - Recheck K and Mg in am    Anticipated steroid induced hyperglycemia  DM 2 well-controlled, A1c 6% on 2/23/2023. Received 10 mg IV decadron intraop per Anesthesia.   PTA regimen is metformin 1 g daily with dinner  - Hold metformin  - Medium sliding scale insulin   - Before meals and at bedtime medium dose sliding scale  - Monitor for hypoglycemia     Hypothyroidism  - Continue PTA levothyroxine    Hypertension: PTA regimen is hydralazine 50 mg twice daily. Hold HCTZ 25 mg daily and Losartan 100 mg daily, monitor trends and resume accordingly      Pulmonary hypertension,  - OP cardiology follow up    Breast cancer DCIS and LCIS, ER positive this post-lumpectomy sentinel node excision and status  "post radiation therapy to the right breast between May and June 2021  - Continue PTA Arimidex  - OP oncology f/u       Diet: Advance Diet as Tolerated: Regular Diet Adult  Discharge Instruction - Regular Diet Adult    DVT Prophylaxis: Enoxaparin (Lovenox) SQ  Arteaga Catheter: Not present  Lines: None     Cardiac Monitoring: None  Code Status: Full Code    Clinically Significant Risk Factors Present on Admission        # Hypokalemia: Lowest K = 3 mmol/L in last 2 days, will replace as needed         # Drug Induced Platelet Defect: home medication list includes an antiplatelet medication   # Hypertension: home medication list includes antihypertensive(s)      # Obesity: Estimated body mass index is 31.07 kg/m  as calculated from the following:    Height as of this encounter: 1.626 m (5' 4\").    Weight as of this encounter: 82.1 kg (181 lb).       --increases all cause morbidity and mortality  -OP follow up re: weight loss & lifestyle changes   -HTN addressed above  -pt on 1 year DAPT for CAD s/p PCI  -hypokalemia addressed above    Disposition Plan      Expected Discharge Date: 03/07/2023      Destination: home          The patient's care was discussed with the Attending Physician, Dr. Almonte, Patient and Patient's Family.    Cee Kebede PA-C  Hospitalist Service  [facility]  Securely message with [employer] (more info)  Text page via [employer] Paging/Directory   ______________________________________________________________________    Interval History   I reviewed the preoperative H&P, intraoperative record and postoperative vital signs.  No immediate postoperative complications,     Seen post-op. Pain well managed. Prefers not to use narcotics and they make her loopy and nauseated. Plavix on hold for 5 days PTA, Dr. Howard of Cardiology aware per review of telephone encounter 2/1/23. Aspirin on hold for 7 days. Denies recent illness. Compliant with medication regimen. Only took AM " hydralazine today. No acute concerns.     Physical Exam   Vital Signs: Temp: 99.2  F (37.3  C) Temp src: Oral BP: (!) 160/76 Pulse: 87   Resp: 15 SpO2: 93 % O2 Device: None (Room air) Oxygen Delivery: 5 LPM  Weight: 181 lbs 0 oz    CONSTITUTIONAL: Pt laying in bed, dressed in hospital garb. Appears comfortable. Cooperative with interview. Accompanied by  at bedside.   HEENT: Normocephalic, atraumatic.   CARDIOVASCULAR: RRR, no murmurs, rubs, or extra heart sounds appreciated. Pulses +2/4 and regular in upper and lower extremities, bilaterally.   RESPIRATORY: No increased work of breathing. CTA, bilat; no wheezes, rales, or rhonchi appreciated.  GASTROINTESTINAL:  Abdomen soft, non-distended. BS auscultated in all four quadrants. Negative for tenderness to palpation.  No masses or organomegaly noted.  MUSCULOSKELETAL: Right knee wrapped. No gross deformities noted. Normal muscle tone.   HEMATOLOGIC/LYMPHATIC/IMMUNOLOGIC: Negative for lower extremity edema, bilaterally.  NEUROLOGIC: Alert and oriented to person, place, and time. No focal neuro deficits.   SKIN: Warm, dry, intact.    Medical Decision Making       50 MINUTES SPENT BY ME on the date of service doing chart review, history, exam, documentation & further activities per the note.      Data     I have personally reviewed the following data over the past 24 hrs:    N/A  \   N/A   / N/A     N/A N/A N/A /  160 (H)   3.0 (L) N/A 0.69 \       Imaging results reviewed over the past 24 hrs:   Recent Results (from the past 24 hour(s))   XR Knee Port Right 1/2 Views    Narrative    XR PORTABLE RIGHT KNEE ONE-TWO VIEWS  3/6/2023 12:21 PM     INDICATION: Right knee postoperative follow-up.   COMPARISON: None.      Impression    IMPRESSION:  1.  Right total knee arthroplasty with patellar surfacing. The  components are well seated.  2.  No fracture or joint malalignment.  3.  Postoperative intra-articular and soft tissue gas.  4.  Anterior skin corby.    QUIANA VIVEROS  MD MAXIMO         SYSTEM ID:  [id number]

## 2023-03-06 NOTE — PROVIDER NOTIFICATION
MD Notification    Notified Person: MD    Notified Person Name: Cee Kebede    Notification Date/Time: 03/06/2023 5:32pm    Notification Interaction:webpage    Purpose of Notification: pt stated she has never taken insulin and usually take metformin to manage Diabetes.    Orders Received: waiting to hear back    Comments:

## 2023-03-06 NOTE — OP NOTE
Procedure Date: 03/06/2023    PREOPERATIVE DIAGNOSIS:  Advanced degenerative arthritis of the right knee.    POSTOPERATIVE DIAGNOSIS:  Advanced degenerative arthritis of the right knee.    PROCEDURE:  Right total knee arthroplasty.    SURGEON:  Giuseppe Mcgrath M.D.     FIRST ASSISTANT:  EMMANUEL Stewart    SECOND ASSISTANT:  Hoa Rodríguez ATC    DESCRIPTION OF PROCEDURE:  The patient was brought to the operating room, given a right adductor canal block.  She was then given a spinal anesthetic, and her right knee and right lower extremity were then prepped and draped in sterile fashion.  Right lower extremity was exsanguinated.  The tourniquet was inflated.  An incision was made over the anterior aspect of the knee.  This was carried down through subcutaneous tissues down to the fascia.  Unfortunately, there was a significant tourniquet effect and we had to let the tourniquet down.  We did try and put it back up again around the time of cementing, but there was actually more bleeding with the tourniquet up, so we essentially did the entire case without a tourniquet on.  In any case, a standard median parapatellar approach was taken to the knee joint.  This revealed advanced degenerative arthritis involving all three compartments, but worst in the medial compartment.  The fat pad was excised.  Menisci were excised.  The ACL was excised.  A drill hole was then made in the distal femur, and using the intramedullary guide set to 5 degrees from the anatomic axis, the femur was cut to accept a size 5 Persona posterior stabilized femoral component.  The tibia was cut using the external alignment guide to accept a size D Persona tibial component, and the patella was cut to accept a size 29 patella.  The knee was then trialed.  The alignment appeared satisfactory.  Ligaments were stable and balanced.  Trial components were removed.  The tibia was punched.  We then injected the posterior capsule with the periarticular  analgesic cocktail.  The knee was then thoroughly irrigated and dried as best as possible, and then we cemented the components into place.  While the cement was hardening, we irrigated the knee with a dilute solution of Betadine, it was allowed to sit within the knee for 3 minutes and was thoroughly irrigated from the knee with a liter of normal saline.  Once the cement had hardened, all extraneous cement was removed.  The knee was trialed once again.  It appeared that a 13 mm insert gave the best fit.  A real 13 mm tibial polyethylene posterior stabilized tibial insert was snapped into the tibial component.  The knee had full range of motion.  The patella tracked well.  The wound was then irrigated once again with saline.  The fascia was closed with interrupted 0 Vicryl sutures.  The skin was closed with 2-0 Vicryl subcutaneous sutures and staples.  A sterile compressive dressing was applied to the knee.  The patient was then awakened from anesthesia and transferred to postanesthesia recovery in satisfactory condition.    It should be noted that my assistants were necessary throughout the entire procedure to assist with retraction and positioning.    Giuseppe Mcgrath MD        D: 2023   T: 2023   MT: PAT    Name:     MANISH CUADRA  MRN:      [id number]        Account:        [id number]   :      1945           Procedure Date: 2023     Document: [id number]

## 2023-03-06 NOTE — ANESTHESIA CARE TRANSFER NOTE
Patient: Yola Mayo    Procedure: Procedure(s):  Right total knee arthroplasty       Diagnosis: Degenerative arthritis of right knee [M17.11]  Diagnosis Additional Information: No value filed.    Anesthesia Type:   Spinal     Note:    Oropharynx: oropharynx clear of all foreign objects  Level of Consciousness: awake    Level of Supplemental Oxygen (L/min / FiO2): 6  Independent Airway: airway patency satisfactory and stable  Dentition: dentition unchanged  Vital Signs Stable: post-procedure vital signs reviewed and stable  Report to RN Given: handoff report given  Patient transferred to: PACU    Handoff Report: Identifed the Patient, Identified the Reponsible Provider, Reviewed the pertinent medical history, Discussed the surgical course, Reviewed Intra-OP anesthesia mangement and issues during anesthesia, Set expectations for post-procedure period and Allowed opportunity for questions and acknowledgement of understanding      Vitals:  Vitals Value Taken Time   BP     Temp     Pulse 105 03/06/23 1141   Resp 13 03/06/23 1141   SpO2 97 % 03/06/23 1141   Vitals shown include unvalidated device data.    Electronically Signed By: FATMATA Monteiro CRNA  March 6, 2023  11:44 AM

## 2023-03-06 NOTE — ANESTHESIA PROCEDURE NOTES
"Adductor canal and Femoral Procedure Note    Pre-Procedure   Staff -        Anesthesiologist:  Dalia Short       Performed By: anesthesiologist       Location: pre-op       Pre-Anesthestic Checklist: patient identified, IV checked, site marked, risks and benefits discussed, informed consent, monitors and equipment checked, pre-op evaluation, at physician/surgeon's request and post-op pain management  Timeout:       Correct Patient: Yes        Correct Procedure: Yes        Correct Site: Yes        Correct Position: Yes        Correct Laterality: Yes        Site Marked: Yes  Procedure Documentation  Procedure: Adductor canal, Femoral       Patient Position: supine       Patient Prep/Sterile Barriers: sterile gloves, mask, \"No-touch\" technique       Skin prep: Chloraprep       Local skin infiltrated with 3 mL of 1% lidocaine.        Insertion site: upper, medial thigh.       Needle Type: insulated (B Henley)       Needle Gauge: 21.        Needle Length (millimeters): 100        Ultrasound guided       1. Ultrasound was used to identify targeted nerve, plexus, vascular marker, or fascial plane and place a needle adjacent to it in real-time.       2. Ultrasound was used to visualize the spread of anesthetic in close proximity to the above referenced structure.       3. A permanent image is entered into the patient's record.       4. The visualized anatomic structures appeared normal.       5. There were no apparent abnormal pathologic findings.    Assessment/Narrative         The placement was negative for: blood aspirated, painful injection and site bleeding       Paresthesias: No.       Test dose of mL at.         Test dose negative, 3 minutes after injection, for signs of intravascular, subdural, or intrathecal injection.       Bolus given via needle..        Secured via.        Insertion/Infusion Method: Single Shot       Complications: none       Injection made incrementally with aspirations every 3 " "mL.    Medication(s) Administered   Bupivacaine 0.5% w/ 1:400K Epi (Injection) - Injection   15 mL - 3/6/2023 9:00:00 AM   Comments:  0.5% Bupivacaine with epinephrine (1:400K) injected on the anterior side of the femoral artery, in close proximity to the femoral nerve branches via the adductor canal approach.      Pt tolerated well.    No complications.      The surgeon has given a verbal order transferring care of this patient to me for the performance of a regional analgesia block for post-op pain control. It is requested of me because I am uniquely trained and qualified to perform this block and the surgeon is neither trained nor qualified to perform this procedure.      FOR [facility] ([facility]) ONLY:   Pain Team Contact information: please page the Pain Team Via qLearning. Search \"Pain\". During daytime hours, please page the attending first. At night please page the resident first.    "

## 2023-03-06 NOTE — ANESTHESIA PROCEDURE NOTES
"Intrathecal injection Procedure Note    Pre-Procedure   Staff -        Anesthesiologist:  Dalia Short       Performed By: anesthesiologist       Location: OR       Pre-Anesthestic Checklist: patient identified, IV checked, risks and benefits discussed, informed consent, monitors and equipment checked and pre-op evaluation  Timeout:       Correct Patient: Yes        Correct Procedure: Yes        Correct Site: Yes        Correct Position: Yes   Procedure Documentation  Procedure: intrathecal injection       Patient Position: sitting       Patient Prep/Sterile Barriers: sterile gloves, mask, patient draped       Skin prep: Betadine       Insertion Site: L3-4. (midline approach).       Needle Gauge: 24.        Needle Length (Inches): 3.5        Spinal Needle Type: Vira-Marc       Introducer used       Introducer: 20 G       # of attempts: 1 and  # of redirects:  0    Assessment/Narrative         Paresthesias: No.       CSF fluid: clear.    Medication(s) Administered   0.75% Hyperbaric Bupivacaine (Intrathecal) - Intrathecal   1.8 mL - 3/6/2023 9:20:00 AM   Comments:  EZ injection of 0.75% Marcaine - no paresthesias on injection or s/s IV.   Immediately to supine.   Pt mildly sedated, but communicative throughout procedure.   Pt tolerated well.    No complications.       FOR [facility] ([facility]) ONLY:   Pain Team Contact information: please page the Pain Team Via Shout. Search \"Pain\". During daytime hours, please page the attending first. At night please page the resident first.    "

## 2023-03-06 NOTE — INTERVAL H&P NOTE
"I have reviewed the surgical (or preoperative) H&P that is linked to this encounter, and examined the patient. There are no significant changes    Clinical Conditions Present on Arrival:  Clinically Significant Risk Factors Present on Admission          # Hypokalemia: Lowest K = 3 mmol/L in last 2 days, will replace as needed          # Drug Induced Platelet Defect: home medication list includes an antiplatelet medication  # Obesity: Estimated body mass index is 31.07 kg/m  as calculated from the following:    Height as of this encounter: 1.626 m (5' 4\").    Weight as of this encounter: 82.1 kg (181 lb).       "

## 2023-03-06 NOTE — ANESTHESIA POSTPROCEDURE EVALUATION
Patient: Yola Mayo    Procedure: Procedure(s):  Right total knee arthroplasty       Anesthesia Type:  Spinal    Note:  Disposition: Admission   Postop Pain Control: Uneventful            Sign Out: Well controlled pain   PONV: No   Neuro/Psych: Uneventful            Sign Out: Acceptable/Baseline neuro status   Airway/Respiratory: Uneventful            Sign Out: Acceptable/Baseline resp. status   CV/Hemodynamics: Uneventful            Sign Out: Acceptable CV status   Other NRE:    DID A NON-ROUTINE EVENT OCCUR? No           Last vitals:  Vitals Value Taken Time   /71 03/06/23 1445   Temp     Pulse 92 03/06/23 1450   Resp 14 03/06/23 1450   SpO2 94 % 03/06/23 1450   Vitals shown include unvalidated device data.    Electronically Signed By: Dalia Sohrt  March 6, 2023  2:51 PM

## 2023-03-06 NOTE — PLAN OF CARE
03/06/23 1727   Appointment Info   Signing Clinician's Name / Credentials (PT) Galilea Iglesias DPT   Living Environment   People in Home spouse   Current Living Arrangements house   Number of Stairs, Within Home, Primary greater than 10 stairs   Stair Railings, Within Home, Primary railing on right side (ascending)   Living Environment Comments 2 story home needs to navigate full flight   Self-Care   Equipment Currently Used at Home none   Fall history within last six months yes   Number of times patient has fallen within last six months 1   Activity/Exercise/Self-Care Comment fall descending outside steps 2 weeks ago,fall onto L knee. Has FWW at home does not use   General Information   Onset of Illness/Injury or Date of Surgery 03/06/23   Referring Physician Giuseppe Mcgrath MD   Pertinent History of Current Problem (include personal factors and/or comorbidities that impact the POC) R TKA   Existing Precautions/Restrictions fall   Weight-Bearing Status - LLE weight-bearing as tolerated   Weight-Bearing Status - RLE weight-bearing as tolerated   Cognition   Affect/Mental Status (Cognition) WNL   Pain Assessment   Patient Currently in Pain Yes, see Vital Sign flowsheet   Integumentary/Edema   Integumentary/Edema Comments see nursing notes   Posture    Posture Forward head position   Range of Motion (ROM)   ROM Comment R knee ROM WNL post op   Strength (Manual Muscle Testing)   Strength Comments Pt able to perform SLR no buckling OOB mobility   Bed Mobility   Comment, (Bed Mobility) CGA   Transfers   Comment, (Transfers) STS CGA FWW   Gait/Stairs (Locomotion)   Distance in Feet 30' treat   Comment, (Gait/Stairs) 3' eval FWW CGA step to pattern   Balance   Balance Comments Good dynamic balance with FWW   Sensory Examination   Sensory Perception Comments intact to light touch   Clinical Impression   Criteria for Skilled Therapeutic Intervention Yes, treatment indicated   PT Diagnosis (PT) impaired IND with  mobility from baseline   Influenced by the following impairments functional weakness, impaired balance, impaired activity tolerance   Functional limitations due to impairments impaired IND with mobility from baseline   Clinical Presentation (PT Evaluation Complexity) Stable/Uncomplicated   Clinical Presentation Rationale clinical judgement   Clinical Decision Making (Complexity) low complexity   Planned Therapy Interventions (PT) balance training;bed mobility training;cryotherapy;gait training;home exercise program;stair training;strengthening;ROM (range of motion);stretching;transfer training   Risk & Benefits of therapy have been explained evaluation/treatment results reviewed;care plan/treatment goals reviewed;risks/benefits reviewed;patient;spouse/significant other   Plan of Care Review   Plan of Care Reviewed With patient   Physical Therapy Goals   PT Frequency 2x/day   PT Predicted Duration/Target Date for Goal Attainment 03/08/23   PT Goals Transfers;Bed Mobility;Gait;Stairs   PT: Bed Mobility Supervision/stand-by assist;Supine to/from sit   PT: Transfers Supervision/stand-by assist;Bed to/from chair;Sit to/from stand;Assistive device   PT: Gait Supervision/stand-by assist;100 feet;Rolling walker   PT: Stairs Minimal assist;Greater than 10 stairs;Rail on left   Interventions   Interventions Quick Adds Therapeutic Activity;Therapeutic Procedure   PT Discharge Planning   PT Plan TKA exercises, transfers, focus on gait and stairs exercises as time allows   PT Rationale for DC Rec Predict pt will meet AM therapy goals, currently A x 1 FWW, unable to tolerate hallway ambulation 2/2 dizziness VSS   PT Brief overview of current status Predict pt will meet AM therapy goals, currently A x 1 FWW, unable to tolerate hallway ambulation 2/2 dizziness VSS

## 2023-03-06 NOTE — ANESTHESIA PREPROCEDURE EVALUATION
Anesthesia Pre-Procedure Evaluation    Patient: Yola Mayo   MRN: [id number] : 1945        Procedure : Procedure(s):  Right total knee arthroplasty          Past Medical History:   Diagnosis Date     Anemia due to blood loss      BASAL CELL CANCER -NOSE      Dermatitis      Diabetes (H)      Family history of diabetes mellitus     Father     Family history of ischemic heart disease     Father     Family history of malignant neoplasm of ovary     Mother     HX DISRUPT L ANT CRUCIATE S/P REPAIR       Hyperlipidemia      HYPERTENSION       HYPOTHYROIDISM      LOC PRIM OSTEOARTH-L 1ST METATARSAL-CUNEIFORM JOINT 3/10/2004     Major depressive disorder, single episode, mild (H) 2014     MILD-MOD TRICUSPID REGURG. MOD PULM HTN 2005    ECHO       Obese      PONV (postoperative nausea and vomiting)      Rosacea      Shingles      SPLENIC CYST       Past Surgical History:   Procedure Laterality Date     ARTHROPLASTY KNEE Left 2016    Procedure: ARTHROPLASTY KNEE;  Surgeon: Giuseppe Mcgrath MD;  Location:  OR     ARTHROPLASTY MINIMALLY INVASIVE HIP  2012    Procedure:ARTHROPLASTY MINIMALLY INVASIVE HIP; RIGHT TWO INCISION MINIMALLY INVASIVE HIP ARTHROPLASTY (YUVAL)^ (NO CELL SAVER) (C-ARM); Surgeon:GIUSEPPE MCGRATH; Location: OR     BIOPSY NODE SENTINEL Right 2021    Procedure: RIGHT SENTINEL LYMPH NODE BIOPSY;  Surgeon: Giuseppe Rogers MD;  Location:  OR     C CHEMOSURG MOHS 1ST STAGE  9-99    Mohs-nose     CARDIAC SURGERY  2019    mid and distal RCA stents     CV CORONARY ANGIOGRAM N/A 2022    Procedure: Coronary Angiogram;  Surgeon: Reyes Graham MD;  Location:  [facility]     CV INSTANTANEOUS WAVE-FREE RATIO N/A 2022    Procedure: Instantaneous Wave-Free Ratio;  Surgeon: Reyes Graham MD;  Location:  [facility]     CV INTRAVASULAR ULTRASOUND N/A 2022    Procedure: Intravascular Ultrasound;  Surgeon:  Reyes Graham MD;  Location:  [facility]     CV PCI STENT DRUG ELUTING N/A 5/19/2022    Procedure: Percutaneous Coronary Intervention Stent;  Surgeon: Reyes Graham MD;  Location:  HE[facility]ARD[facility] C[facility] LAB     ENT SURGERY      mohs procedure on nose     HYSTERECTOMY, PAP NO LONGER INDICATED       LUMPECTOMY BREAST WITH SEED LOCALIZATION Right 4/23/2021    Procedure: SEED LOCALIZED RIGHT BREAST LUMPECTOMY;  Surgeon: Giuseppe Rogers MD;  Location:  OR     SURGICAL HISTORY OF -   Mult-~1978    infertility, laparotomies     [facility] APPENDECTOMY  8/87    incidental appi at [facility] REPAIR CRUCIATE LIGAMENT,KNEE  8/2003    L   ACL reconstruction     [facility] TOTAL ABDOM HYSTERECTOMY  8/87    W/ BSO fibroids, hx PID     [facility]ST[facility]  4/03    normal, rec repeat 10 yrs      Allergies   Allergen Reactions     Toprol Xl [Metoprolol] Fatigue     Ace Inhibitors      ACE throat clearing     Oxycodone Nausea and Vomiting     Tetanus Toxoid Swelling     Hard red lump       Social History     Tobacco Use     Smoking status: Never     Smokeless tobacco: Never   Substance Use Topics     Alcohol use: Yes     Alcohol/week: 0.0 standard drinks     Comment: occasional      Wt Readings from Last 1 Encounters:   03/06/23 82.1 kg (181 lb)        Anesthesia Evaluation            ROS/MED HX  ENT/Pulmonary:    (-) asthma, COPD and sleep apnea   Neurologic:       Cardiovascular: Comment: The patient exercised 6:35 min.  There was a normal BP response to exercise.  Exercise was stopped due to fatigue.  A treadmill exercise test according to the Modified Tobin protocol was  performed.  Target Heart Rate was achieved.  This was a normal stress EKG with no evidence of stress-induced ischemia.  Left ventricular cavity size decreases with exercise.  Global LV systolic function augments with exercise.  The visual ejection fraction is >70%.  Normal left ventricular function and wall motion at rest and  post-stress.     Baseline  The patient is in normal sinus rhythm.  Normal left ventricular function and wall motion at rest.  The visual ejection fraction is estimated at 55-60%.    (+) Dyslipidemia hypertension--CAD -past MI -stent-pulmonary hypertension,     METS/Exercise Tolerance:     Hematologic:     (+) anemia,     Musculoskeletal:   (+) arthritis,     GI/Hepatic:    (-) GERD and liver disease   Renal/Genitourinary:    (-) renal disease   Endo:     (+) type II DM, thyroid problem, hypothyroidism, Obesity,     Psychiatric/Substance Use:     (+) psychiatric history depression H/O chronic opiod use .     Infectious Disease:       Malignancy:   (+) Malignancy, History of Breast.    Other:            Physical Exam    Airway        Mallampati: II   TM distance: > 3 FB   Neck ROM: full     Respiratory Devices and Support         Dental       (+) Minor Abnormalities - some fillings, tiny chips      Cardiovascular   cardiovascular exam normal          Pulmonary   pulmonary exam normal                OUTSIDE LABS:  CBC:   Lab Results   Component Value Date    WBC 7.3 02/23/2023    WBC 4.5 05/19/2022    HGB 13.5 02/23/2023    HGB 12.4 05/19/2022    HCT 43.1 02/23/2023    HCT 36.4 05/19/2022     02/23/2023     05/19/2022     BMP:   Lab Results   Component Value Date     02/23/2023     10/07/2022    POTASSIUM 3.5 02/23/2023    POTASSIUM 4.2 10/07/2022    CHLORIDE 100 02/23/2023    CHLORIDE 104 10/07/2022    CO2 24 02/23/2023    CO2 25 10/07/2022    BUN 14.9 02/23/2023    BUN 13 10/07/2022    CR 0.68 02/23/2023    CR 0.64 10/07/2022     (H) 02/23/2023     (H) 10/07/2022     COAGS:   Lab Results   Component Value Date    PTT 30 06/27/2018    INR 0.97 05/19/2022     POC:   Lab Results   Component Value Date     (H) 10/15/2018     HEPATIC:   Lab Results   Component Value Date    ALBUMIN 3.7 10/07/2022    PROTTOTAL 7.5 10/07/2022    ALT 59 (H) 10/07/2022    AST 39 10/07/2022     ALKPHOS 76 10/07/2022    BILITOTAL 0.5 10/07/2022     OTHER:   Lab Results   Component Value Date    A1C 6.0 (H) 02/23/2023    JOSEPH 9.7 02/23/2023    PHOS 3.4 04/19/2012    MAG 2.0 10/14/2018    LIPASE 225 10/14/2018    TSH 2.66 02/23/2023    T4 1.23 06/10/2003    CRP 5.8 03/26/2021    SED 30 03/26/2021       Anesthesia Plan    ASA Status:  3      Anesthesia Type: Spinal.              Consents    Anesthesia Plan(s) and associated risks, benefits, and realistic alternatives discussed. Questions answered and patient/representative(s) expressed understanding.    - Discussed:     - Discussed with:  Patient         Postoperative Care    Pain management: Peripheral nerve block (Single Shot).   PONV prophylaxis: Ondansetron (or other 5HT-3)     Comments:                Dalia Short

## 2023-03-07 ENCOUNTER — APPOINTMENT (OUTPATIENT)
Dept: OCCUPATIONAL THERAPY | Facility: CLINIC | Age: 78
End: 2023-03-07
Attending: ORTHOPAEDIC SURGERY
Payer: MEDICARE

## 2023-03-07 ENCOUNTER — APPOINTMENT (OUTPATIENT)
Dept: PHYSICAL THERAPY | Facility: CLINIC | Age: 78
End: 2023-03-07
Attending: ORTHOPAEDIC SURGERY
Payer: MEDICARE

## 2023-03-07 VITALS
SYSTOLIC BLOOD PRESSURE: 162 MMHG | BODY MASS INDEX: 30.9 KG/M2 | RESPIRATION RATE: 16 BRPM | HEART RATE: 74 BPM | WEIGHT: 181 LBS | OXYGEN SATURATION: 95 % | HEIGHT: 64 IN | TEMPERATURE: 98 F | DIASTOLIC BLOOD PRESSURE: 71 MMHG

## 2023-03-07 LAB
ANION GAP SERPL CALCULATED.3IONS-SCNC: 10 MMOL/L (ref 7–15)
BUN SERPL-MCNC: 14.7 MG/DL (ref 8–23)
CALCIUM SERPL-MCNC: 8.9 MG/DL (ref 8.8–10.2)
CHLORIDE SERPL-SCNC: 99 MMOL/L (ref 98–107)
CREAT SERPL-MCNC: 0.64 MG/DL (ref 0.51–0.95)
DEPRECATED HCO3 PLAS-SCNC: 25 MMOL/L (ref 22–29)
GFR SERPL CREATININE-BSD FRML MDRD: >90 ML/MIN/1.73M2
GLUCOSE BLDC GLUCOMTR-MCNC: 135 MG/DL (ref 70–99)
GLUCOSE SERPL-MCNC: 133 MG/DL (ref 70–99)
HGB BLD-MCNC: 10.1 G/DL (ref 11.7–15.7)
MAGNESIUM SERPL-MCNC: 1.8 MG/DL (ref 1.7–2.3)
PLATELET # BLD AUTO: 212 10E3/UL (ref 150–450)
POTASSIUM SERPL-SCNC: 3.8 MMOL/L (ref 3.4–5.3)
SODIUM SERPL-SCNC: 134 MMOL/L (ref 136–145)

## 2023-03-07 PROCEDURE — 99232 SBSQ HOSP IP/OBS MODERATE 35: CPT | Performed by: INTERNAL MEDICINE

## 2023-03-07 PROCEDURE — 80048 BASIC METABOLIC PNL TOTAL CA: CPT | Performed by: PHYSICIAN ASSISTANT

## 2023-03-07 PROCEDURE — 85049 AUTOMATED PLATELET COUNT: CPT | Performed by: ORTHOPAEDIC SURGERY

## 2023-03-07 PROCEDURE — 250N000011 HC RX IP 250 OP 636: Performed by: ORTHOPAEDIC SURGERY

## 2023-03-07 PROCEDURE — 36415 COLL VENOUS BLD VENIPUNCTURE: CPT | Performed by: ORTHOPAEDIC SURGERY

## 2023-03-07 PROCEDURE — 250N000013 HC RX MED GY IP 250 OP 250 PS 637: Performed by: ORTHOPAEDIC SURGERY

## 2023-03-07 PROCEDURE — 250N000013 HC RX MED GY IP 250 OP 250 PS 637: Performed by: INTERNAL MEDICINE

## 2023-03-07 PROCEDURE — 97530 THERAPEUTIC ACTIVITIES: CPT | Mod: GP

## 2023-03-07 PROCEDURE — 82962 GLUCOSE BLOOD TEST: CPT

## 2023-03-07 PROCEDURE — 97535 SELF CARE MNGMENT TRAINING: CPT | Mod: GO | Performed by: OCCUPATIONAL THERAPIST

## 2023-03-07 PROCEDURE — 250N000013 HC RX MED GY IP 250 OP 250 PS 637: Performed by: PHYSICIAN ASSISTANT

## 2023-03-07 PROCEDURE — 83735 ASSAY OF MAGNESIUM: CPT | Performed by: PHYSICIAN ASSISTANT

## 2023-03-07 PROCEDURE — 97166 OT EVAL MOD COMPLEX 45 MIN: CPT | Mod: GO | Performed by: OCCUPATIONAL THERAPIST

## 2023-03-07 PROCEDURE — 85018 HEMOGLOBIN: CPT | Performed by: ORTHOPAEDIC SURGERY

## 2023-03-07 PROCEDURE — 97116 GAIT TRAINING THERAPY: CPT | Mod: GP

## 2023-03-07 PROCEDURE — 96372 THER/PROPH/DIAG INJ SC/IM: CPT | Performed by: ORTHOPAEDIC SURGERY

## 2023-03-07 RX ORDER — MAGNESIUM OXIDE 400 MG/1
400 TABLET ORAL EVERY 4 HOURS
Status: DISCONTINUED | OUTPATIENT
Start: 2023-03-07 | End: 2023-03-07 | Stop reason: HOSPADM

## 2023-03-07 RX ADMIN — Medication 400 MG: at 10:40

## 2023-03-07 RX ADMIN — ACETAMINOPHEN 975 MG: 325 TABLET ORAL at 06:03

## 2023-03-07 RX ADMIN — CEFAZOLIN SODIUM 2 G: 2 INJECTION, SOLUTION INTRAVENOUS at 00:18

## 2023-03-07 RX ADMIN — ONDANSETRON 4 MG: 4 TABLET, ORALLY DISINTEGRATING ORAL at 08:52

## 2023-03-07 RX ADMIN — ANASTROZOLE 1 MG: 1 TABLET, COATED ORAL at 00:17

## 2023-03-07 RX ADMIN — LEVOTHYROXINE SODIUM 112 MCG: 112 TABLET ORAL at 07:43

## 2023-03-07 RX ADMIN — HYDRALAZINE HYDROCHLORIDE 50 MG: 50 TABLET, FILM COATED ORAL at 08:52

## 2023-03-07 RX ADMIN — HYDROMORPHONE HYDROCHLORIDE 4 MG: 2 TABLET ORAL at 01:59

## 2023-03-07 RX ADMIN — HYDROMORPHONE HYDROCHLORIDE 4 MG: 2 TABLET ORAL at 06:04

## 2023-03-07 RX ADMIN — ENOXAPARIN SODIUM 40 MG: 40 INJECTION SUBCUTANEOUS at 10:40

## 2023-03-07 ASSESSMENT — ACTIVITIES OF DAILY LIVING (ADL)
ADLS_ACUITY_SCORE: 24
IADL_COMMENTS: SPOUSE CAN COMPLETE AS NEEDED
ADLS_ACUITY_SCORE: 24

## 2023-03-07 NOTE — PROGRESS NOTES
03/07/23 0843   Appointment Info   Signing Clinician's Name / Credentials (OT) Argenis Conklin OTR/L   Living Environment   People in Home spouse   Current Living Arrangements house   Number of Stairs, Within Home, Primary greater than 10 stairs   Stair Railings, Within Home, Primary railing on right side (ascending)   Living Environment Comments 2 story home needs to navigate full flight   Self-Care   Usual Activity Tolerance good   Current Activity Tolerance moderate   Equipment Currently Used at Home none   Fall history within last six months yes   Number of times patient has fallen within last six months 1   Activity/Exercise/Self-Care Comment pt reports indep with ADLs at baseline   Instrumental Activities of Daily Living (IADL)   IADL Comments spouse can complete as needed   General Information   Onset of Illness/Injury or Date of Surgery 03/06/23   Referring Physician Giuseppe Mcgrath   Patient/Family Therapy Goal Statement (OT) home   Additional Occupational Profile Info/Pertinent History of Current Problem s/p R TKR   Existing Precautions/Restrictions fall   Left Lower Extremity (Weight-bearing Status) full weight-bearing (FWB)   Right Lower Extremity (Weight-bearing Status) weight-bearing as tolerated (WBAT)   General Observations and Info activity order: ambulate with assist 3x daily   Cognitive Status Examination   Cognitive Status Comments no cognitive concerns   Visual Perception   Visual Impairment/Limitations corrective lenses full-time   Sensory   Sensory Comments nerve block still minimally working   Pain Assessment   Patient Currently in Pain Yes, see Vital Sign flowsheet   Posture   Posture protracted shoulders   Range of Motion Comprehensive   Comment, General Range of Motion BUE WFL; RLE limited by surgical pain   Strength Comprehensive (MMT)   Comment, General Manual Muscle Testing (MMT) Assessment BUE WFL; RLE limited by surgical pain   Coordination   Upper Extremity Coordination No deficits  were identified   Transfers   Transfers sit-stand transfer;toilet transfer;shower transfer   Sit-Stand Transfer   Sit-Stand [place] (Transfers) contact guard;verbal cues   Assistive Device (Sit-Stand Transfers) walker, standard   Shower Transfer   [place] Level (Shower Transfer) minimum assist (75% patient effort);verbal cues   Assistive Device (Shower Transfer) walker, standard   Toilet Transfer   Type (Toilet Transfer) sit-stand;stand-sit   [place] Level (Toilet Transfer) minimum assist (75% patient effort);verbal cues   Assistive Device (Toilet Transfer) grab bars/safety frame   Toilet Transfer Comments pt has B support by toilet   Balance   Balance Comments good seated; good ambulating in room with 2WW   Activities of Daily Living   BADL Assessment/Intervention upper body dressing;lower body dressing;toileting   Upper Body Dressing Assessment/Training   [place] Level (Upper Body Dressing) independent   Lower Body Dressing Assessment/Training   Position (Lower Body Dressing) supported sitting   [place] Level (Lower Body Dressing) minimum assist (75% patient effort)   Toileting   [place] Level (Toileting) contact guard assist   Clinical Impression   Criteria for Skilled Therapeutic Interventions Met (OT) Yes, treatment indicated   OT Diagnosis impaired ADLs   OT Problem List-Impairments impacting ADL problems related to;activity tolerance impaired;range of motion (ROM);sensation;strength;pain   Assessment of Occupational Performance 3-5 Performance Deficits   Identified Performance Deficits dressing, toilet transfer, shower transfer   Planned Therapy Interventions (OT) ADL retraining;transfer training   Clinical Decision Making Complexity (OT) moderate complexity   Anticipated Equipment Needs Upon Discharge (OT) shower chair   Risk & Benefits of therapy have been explained evaluation/treatment results reviewed;patient   OT Total Evaluation Time   OT Eval, Moderate Complexity Minutes  (24019) 10   OT Goals   Therapy Frequency (OT) One time eval and treatment   OT Predicted Duration/Target Date for Goal Attainment 03/07/23   OT Goals Lower Body Dressing;Transfers;Toilet Transfer/Toileting   OT: Lower Body Dressing Supervision/stand-by assist   OT: Transfer Supervision/stand-by assist;with assistive device   OT: Toilet Transfer/Toileting Modified independent;toilet transfer   Interventions   Interventions Quick Adds Self-Care/Home Management   Self-Care/Home Management   Self-Care/Home Mgmt/ADL, Compensatory, Meal Prep Minutes (10156) 20   Symptoms Noted During/After Treatment (Meal Preparation/Planning Training) fatigue;increased pain  (nausea)   Treatment Detail/Skilled Intervention Pt ed in dressing surgical LE first, and she completed TB dressing Lorie following. CGA STS with 2WW, cued 1x for hand placement. CGA ambulation in room using 2WW, progressing to SBA.  Pt cued for hand placement with toilet transfer, and she completed Lorie following.  Pt ed in walk-in shower transfer options, and she completed simulated backwards transfer following, CGA.  Pt ed in walker safety/transport of objects, and car transfers, pt verbalized understanding. End of session pt with some nausea, RN notified. Pt left seated in recliner, call light in reach, CA engaged.   OT Discharge Planning   OT Plan dc   OT Rationale for DC Rec Anticipate pt will be Lorie dressing, Lorie toilet transfer, CGA shower transfer, A for home chores.   Total Session Time   Timed Code Treatment Minutes 20   Total Session Time (sum of timed and untimed services) 30

## 2023-03-07 NOTE — PROGRESS NOTES
"POD# 1    SUBJECTIVE  Pain well controlled  Tolerating PO Dilaudid    OBJECTIVE:   BP (!) 148/72 (BP Location: Left arm)   Pulse 78   Temp 98.5  F (36.9  C) (Oral)   Resp 18   Ht 1.626 m (5' 4\")   Wt 82.1 kg (181 lb)   SpO2 91%   BMI 31.07 kg/m     Hemoglobin   Date Value Ref Range Status   03/07/2023 10.1 (L) 11.7 - 15.7 g/dL Final   03/19/2021 14.1 11.7 - 15.7 g/dL Final   ]   Wound: dressing dry      ASSESSMENT   Doing well    PLAN   Mobilize in PT  Home later today  Outpatient PT  Follow up 2 weeks    Today will give Lovenox for DVT prophylaxis  Resume Plavix/Aspirin regimen at home tomorrow    Giuseppe Mcgrath MD   "

## 2023-03-07 NOTE — PLAN OF CARE
A&O x4. CMS intact. Pain managed with PO Dilaudid. Voiding adequately in the bathroom w/ assist of 1 GB/W. Reported nausea gave Zofran. WBAT on RLE. PIV removed. Potassium and Magnesium protocol, replaced magnesium 1x, pt to follow up with PCP after discharge. Returned belongings. Tolerating regular diet. Went over AVS and discharge medication, no questions asked. Discharge home with spouse.

## 2023-03-07 NOTE — PLAN OF CARE
Physical Therapy Discharge Summary    Reason for therapy discharge:    Discharged to home with assist.     Progress towards therapy goal(s). See goals on Care Plan in [facility] electronic health record for goal details.  Goals met    Therapy recommendation(s):    Continue home exercise program.  Recommending supervision and assist with mobility and cares upon returning home.

## 2023-03-07 NOTE — PROGRESS NOTES
[facility]    Medicine Progress Note - Hospitalist Service    Date of Admission:  3/6/2023    Assessment & Plan   Dee Dee is a 77-year-old female with past medical history of CAD status post PCI last in May 2022 with MALLY, DM 2, hypothyroidism, hypertension, pulmonary hypertension, breast cancer ER positive who is s/p right total knee  w/ Dr. Mcgrath on 03/06/23.  Hospitalist service was asked to assist with medical comanagement in the postoperative state     S/p right total knee w/ Dr. Mcgrath on 03/06/23   Plan:  - Defer routine postoperative cares per primary service, including IVF, analgesia, antimicrobias, DVT prophylaxis, therapies and disposition  - Aggressive pulmonary toilet, frequent IS use 10x/hour while awake     CAD status post PCI last in May 2022 with MALLY to the proximal LAD/first diagonal bifurcation and distal RCA, prior RCA PCI x2 in 2018; follows with [facility] cardiology, planning for 1 year DAPT, last seen by Dr. Howard 11/01/2022. Reviewed telephone encounter with outpatient cardiology regarding antiplatelet game plan 2/1/23  - Resumed DAPT with aspirin, Plavix at the time of discharge as per orthopedics.  - Continue PTA Lipitor     Hypokalemia likely 2/2 diuretic use  Hypomagnesemia  - Replace as per protocol.  Recheck with PMD     Anticipated steroid induced hyperglycemia  DM 2 well-controlled, A1c 6% on 2/23/2023. Received 10 mg IV decadron intraop per Anesthesia.   PTA regimen is metformin 1 g daily with dinner  - resume on discharge     Hypothyroidism  - Continue PTA levothyroxine     Hypertension: PTA regimen is hydralazine 50 mg twice daily. Hold HCTZ 25 mg daily and Losartan 100 mg daily, monitor trends and resume accordingly       Pulmonary hypertension,  - OP cardiology follow up     Breast cancer DCIS and LCIS, ER positive this post-lumpectomy sentinel node excision and status post radiation therapy to the right breast between May and June 2021  - Continue PTA  "Arimidex  - OP oncology f/u      Clinically Significant Risk Factors Present on Admission        # Hypokalemia: Lowest K = 3 mmol/L in last 2 days, will replace as needed     # Hypomagnesemia: Lowest Mg = 1.6 mg/dL in last 2 days, will replace as needed     # Drug Induced Platelet Defect: home medication list includes an antiplatelet medication   # Hypertension: home medication list includes antihypertensive(s)      # Obesity: Estimated body mass index is 31.07 kg/m  as calculated from the following:    Height as of this encounter: 1.626 m (5' 4\").    Weight as of this encounter: 82.1 kg (181 lb).           Disposition Plan      Expected Discharge Date: 03/07/2023      Destination: home            Shiva Riggs MD  Hospitalist Service  [facility]  Securely message with [employer] (more info)  Text page via [employer] Paging/Directory   ______________________________________________________________________    Interval History   Doing okay.  Denies any complaints and keen to go home    Physical Exam   Vital Signs: Temp: 98  F (36.7  C) Temp src: Oral BP: (!) 162/71 Pulse: 74   Resp: 16 SpO2: 95 % O2 Device: None (Room air)    Weight: 181 lbs 0 oz    Clear lungs  Normal cardiac examination  Medical Decision Making       36 MINUTES SPENT BY ME on the date of service doing chart review, history, exam, documentation & further activities per the note.      Data   ------------------------- PAST 24 HR DATA REVIEWED -----------------------------------------------    I have personally reviewed the following data over the past 24 hrs:    N/A  \   10.1 (L)   / 212     134 (L) 99 14.7 /  133 (H)   3.8 25 0.64 \       Imaging results reviewed over the past 24 hrs:   Recent Results (from the past 24 hour(s))   XR Knee Port Right 1/2 Views    Narrative    XR PORTABLE RIGHT KNEE ONE-TWO VIEWS  3/6/2023 12:21 PM     INDICATION: Right knee postoperative follow-up.   COMPARISON: None.      Impression    IMPRESSION:  1. "  Right total knee arthroplasty with patellar surfacing. The  components are well seated.  2.  No fracture or joint malalignment.  3.  Postoperative intra-articular and soft tissue gas.  4.  Anterior skin corby.    QUIANA MARSH MD         SYSTEM ID:  [id number]

## 2023-03-07 NOTE — PLAN OF CARE
A&O x4. CMS intact. Pain managed with PO Dilaudid. Voiding adequately in the bathroom w/ assist of 1 GB/W. WBAT on RLE. NS running at 125 mL/hr. Potassium and Magnesium protocol, replaced magnesium 2x rechecked scheduled for 03/07/2023 morning. Tolerating regular diet. Possible discharge 03/07/2023.

## 2023-03-07 NOTE — PROGRESS NOTES
Outpatient goals   POD #1 TRNA  Patient vital signs are at baseline: Yes  Patient able to ambulate as they were prior to admission or with assist devices provided by therapies during their stay:  Yes  Patient MUST void prior to discharge:  Yes  Patient able to tolerate oral intake:  Yes  Pain has adequate pain control using Oral analgesics:  Yes  Does patient have an identified :  Yes  Has goal D/C date and time been discussed with patient:  Yes     Resting adequately overnight, up to toilette x1, IV SL @4am. Discharge expected in the AM. CMS intact.

## 2023-03-07 NOTE — PROVIDER NOTIFICATION
MD Notification    Notified Person: MD    Notified Person Name: Shiva Riggs    Notification Date/Time: 03/07/2023 1023    Notification Interaction: amcom    Purpose of Notification: pt's magnesium is at 1.8, replacing but do you need a recheck before pt gets discharged.    Orders Received: waiting to hear back.    Comments:

## 2023-03-21 ENCOUNTER — TRANSFERRED RECORDS (OUTPATIENT)
Dept: HEALTH INFORMATION MANAGEMENT | Facility: CLINIC | Age: 78
End: 2023-03-21

## 2023-04-03 ENCOUNTER — HOSPITAL ENCOUNTER (OUTPATIENT)
Dept: MAMMOGRAPHY | Facility: CLINIC | Age: 78
Discharge: HOME | End: 2023-04-03
Attending: INTERNAL MEDICINE | Admitting: INTERNAL MEDICINE
Payer: MEDICARE

## 2023-04-03 DIAGNOSIS — Z17.0 MALIGNANT NEOPLASM OF UPPER-OUTER QUADRANT OF RIGHT BREAST IN FEMALE, ESTROGEN RECEPTOR POSITIVE (H): ICD-10-CM

## 2023-04-03 DIAGNOSIS — C50.411 MALIGNANT NEOPLASM OF UPPER-OUTER QUADRANT OF RIGHT BREAST IN FEMALE, ESTROGEN RECEPTOR POSITIVE (H): ICD-10-CM

## 2023-04-03 DIAGNOSIS — Z12.31 ENCOUNTER FOR SCREENING MAMMOGRAM FOR BREAST CANCER: ICD-10-CM

## 2023-04-03 PROCEDURE — 77067 SCR MAMMO BI INCL CAD: CPT

## 2023-04-06 NOTE — PROGRESS NOTES
[facility]    Hematology/Oncology Established Patient Note      Today's Date: 4/12/2023    Reason for follow-up: Right breast cancer.    HISTORY OF PRESENT ILLNESS: Yola Mayo is a 77 year old female who presents with the following oncologic history:  1. 3/24/2021: Mammogram showed distortion in right upper outer breast; left breast negative.  2. 3/31/2021: Right breast U/S at 9:00, 4 cm from nipple showed mass measuring 1.1 x 1 x 1.2 cm; no enlarged right axillary lymph nodes seen.  3. 4/07/2021: Right breast biopsy at 9:00 showed grade 2 invasive ductal carcinoma, ER positive at 100%, LA positive at 95%, HER-2/marek FISH negative.  4. 4/23/2021: Underwent right breast lumpectomy and sentinel lymph node excision under care of Dr. Ace Rogers. Pathology showed grade 2 invasive ductal carcinoma measuring 1.5 cm, low grade DCIS, LCIS, margins negative; 2 lymph nodes negative. Oncotype DX = 6; 9-year risk of distant recurrence = 3% after 5 years of hormone blockade therapy, < 1% absolute chemotherapy benefit.  5. 5/25/2021-6/16/2021: Completed adjuvant radiation therapy to right breast. Subsequently started anastrozole.    INTERIM HISTORY:  Dee Dee reports some right knee pain related to right knee arthroplasty.  She has some achiness in her right axilla.      REVIEW OF SYSTEMS:   14 point ROS was reviewed and is negative other than as noted above in HPI.       HOME MEDICATIONS:  Current Outpatient Medications   Medication Sig Dispense Refill     amoxicillin (AMOXIL) 500 MG capsule TAKE 4 CAPSULES BY MOUTH 1/2 TO 1 HOUR BEFORE THE PROCEDURE. 4 capsule 3     anastrozole (ARIMIDEX) 1 MG tablet Take 1 tablet (1 mg) by mouth daily 90 tablet 3     aspirin 81 MG tablet Take 81 mg by mouth daily       atorvastatin (LIPITOR) 10 MG tablet Take 1 tablet (10 mg) by mouth daily for cholestrol 90 tablet 3     blood glucose (CONTOUR NEXT TEST) test strip 1 strip by In Vitro route daily or as directed. 100 strip 1      blood glucose monitoring ([employer] MICROLET) lancets Use to test blood sugar 1 time daily or as directed. 100 each 3     calcium-vitamin D (CALTRATE) 600-400 MG-UNIT per tablet Take 1 tablet by mouth daily 60 tablet 11     cholecalciferol (VITAMIN D) 1000 UNIT tablet Take 1 tablet (1,000 Units) by mouth daily 100 tablet 3     clopidogrel (PLAVIX) 75 MG tablet Take 1 tablet (75 mg) by mouth daily 90 tablet 3     CONTOUR NEXT EZ (CONTOUR NEXT EZ W/DEVICE KIT) w/Device KIT 1 kit once 1 kit 0     Cyanocobalamin (VITAMIN B 12 PO) Take 1 tablet by mouth daily Strength unknown       hydrALAZINE (APRESOLINE) 25 MG tablet Take 2 tablets (50 mg) by mouth 2 times daily 360 tablet 2     hydrALAZINE (APRESOLINE) 25 MG tablet Take 25 mg by mouth every evening       hydrochlorothiazide (HYDRODIURIL) 25 MG tablet Take 1 tablet (25 mg) by mouth daily for Blood Pressure 90 tablet 3     levothyroxine (SYNTHROID/LEVOTHROID) 112 MCG tablet TAKE 1 TABLET (112 MCG) BY MOUTH DAILY FOR THYROID 90 tablet 2     losartan (COZAAR) 100 MG tablet Take 1 tablet (100 mg) by mouth daily for Blood Pressure 90 tablet 3     metFORMIN (GLUCOPHAGE XR) 500 MG 24 hr tablet Take 2 tablets (1,000 mg) by mouth daily (with dinner) for diabetes 180 tablet 3     metroNIDAZOLE (METROGEL) 0.75 % external gel APPLY TO AFFECTED AREA TWICE A DAY 45 g 1     multivitamin, therapeutic with minerals (THERA-VIT-M) TABS Take 1 tablet by mouth daily       nitroGLYcerin (NITROSTAT) 0.4 MG sublingual tablet For chest pain place 1 tablet under the tongue every 5 minutes for 3 doses. If symptoms persist 5 minutes after 1st dose call 911. 25 tablet 3     senna-docusate (SENOKOT-S/PERICOLACE) 8.6-50 MG tablet Take 1-2 tablets by mouth 2 times daily Take while on oral narcotics to prevent or treat constipation. 30 tablet 0     ZIOPTAN 0.0015 % SOLN ophthalmic solution Place 1 drop into both eyes At Bedtime           ALLERGIES:  Allergies   Allergen Reactions     Toprol Xl  [Metoprolol] Fatigue     Ace Inhibitors      ACE throat clearing     Oxycodone Nausea and Vomiting     Tetanus Toxoid Swelling     Hard red lump          PAST MEDICAL HISTORY:  Past Medical History:   Diagnosis Date     Anemia due to blood loss      BASAL CELL CANCER -NOSE      Dermatitis      Diabetes (H)      Family history of diabetes mellitus     Father     Family history of ischemic heart disease     Father     Family history of malignant neoplasm of ovary     Mother     HX DISRUPT L ANT CRUCIATE S/P REPAIR 2003      Hyperlipidemia      HYPERTENSION       HYPOTHYROIDISM      LOC PRIM OSTEOARTH-L 1ST METATARSAL-CUNEIFORM JOINT 3/10/2004     Major depressive disorder, single episode, mild (H) 11/24/2014     MILD-MOD TRICUSPID REGURG. MOD PULM HTN 6/23/2005    ECHO  5/05     Obese      PONV (postoperative nausea and vomiting)      Rosacea      Shingles      SPLENIC CYST          PAST SURGICAL HISTORY:  Past Surgical History:   Procedure Laterality Date     ARTHROPLASTY KNEE Left 4/13/2016    Procedure: ARTHROPLASTY KNEE;  Surgeon: Giuseppe Lopez MD;  Location:  OR     ARTHROPLASTY KNEE Right 3/6/2023    Procedure: Right total knee arthroplasty;  Surgeon: Giuseppe Lopez MD;  Location:  OR     ARTHROPLASTY MINIMALLY INVASIVE HIP  4/18/2012    Procedure:ARTHROPLASTY MINIMALLY INVASIVE HIP; RIGHT TWO INCISION MINIMALLY INVASIVE HIP ARTHROPLASTY (YUVAL)^ (NO CELL SAVER) (C-ARM); Surgeon:GIUSEPPE LOPEZ; Location: OR     BIOPSY NODE SENTINEL Right 4/23/2021    Procedure: RIGHT SENTINEL LYMPH NODE BIOPSY;  Surgeon: Giuseppe Rogers MD;  Location:  OR     C CHEMOSURG MOHS 1ST STAGE  9-99    Mohs-nose     CARDIAC SURGERY  06/27/2019    mid and distal RCA stents     CV CORONARY ANGIOGRAM N/A 5/19/2022    Procedure: Coronary Angiogram;  Surgeon: Reyes Graham MD;  Location:  HEART CARDIAC CATH LAB     CV INSTANTANEOUS WAVE-FREE RATIO N/A 5/19/2022    Procedure: Instantaneous Wave-Free  Ratio;  Surgeon: Reyes Graham MD;  Location:  HEART CARDIAC CATH LAB     CV INTRAVASULAR ULTRASOUND N/A 5/19/2022    Procedure: Intravascular Ultrasound;  Surgeon: Reyes Graham MD;  Location:  HEART CARDIAC CATH LAB     CV PCI STENT DRUG ELUTING N/A 5/19/2022    Procedure: Percutaneous Coronary Intervention Stent;  Surgeon: Reyes Graham MD;  Location:  HEART CARDIAC CATH LAB     ENT SURGERY      mohs procedure on nose     HYSTERECTOMY, PAP NO LONGER INDICATED       LUMPECTOMY BREAST WITH SEED LOCALIZATION Right 4/23/2021    Procedure: SEED LOCALIZED RIGHT BREAST LUMPECTOMY;  Surgeon: Giuseppe Rogers MD;  Location:  OR     SURGICAL HISTORY OF -   Mult-~1978    infertility, laparotomies     Z APPENDECTOMY  8/87    [facility] appi [facility]     Z REPAIR CRUCIATE LIGAMENT,KNEE  8/2003    L   ACL reconstruction     Z TOTAL ABDOM HYSTERECTOMY  8/87    W/ BSO fibroids, hx PID     ZZ COLONOSCOPY THRU STOMA, DIAGNOSTIC  4/03    normal, rec repeat 10 yrs         SOCIAL HISTORY:  Social History     Socioeconomic History     Marital status:      Spouse name: FABIAN CUADRA     Number of children: 2     Years of education: Not on file     Highest education level: Not on file   Occupational History     Occupation: [employer]     Employer: [employer]   Tobacco Use     Smoking status: Never     Smokeless tobacco: Never   Vaping Use     Vaping status: Never Used   Substance and Sexual Activity     Alcohol use: Yes     Alcohol/week: 0.0 standard drinks of alcohol     Comment: occasional     Drug use: No     Sexual activity: Yes     Partners: Male   Other Topics Concern      Service No     Blood Transfusions No     Caffeine Concern No     Occupational Exposure No     Hobby Hazards No     Sleep Concern No     Stress Concern No     Weight Concern Yes     Special Diet No     Back Care No     Exercise Yes     Comment: Approx 3 times a week for about 30 minutes     Bike Helmet Yes      "Seat Belt Yes     Self-Exams Yes     Parent/sibling w/ CABG, MI or angioplasty before 65F 55M? Not Asked   Social History Narrative     Not on file     Social Determinants of Health     Financial Resource Strain: Not on file   Food Insecurity: Not on file   Transportation Needs: Not on file   Physical Activity: Not on file   Stress: Not on file   Social Connections: Not on file   Intimate Partner Violence: Not on file   Housing Stability: Not on file         FAMILY HISTORY:  Family History   Problem Relation Age of Onset     Cancer Mother         ovarian  of it  at 51yo.     Other Cancer Mother      Diabetes Father         Adult onset     Hypertension Father      C.A.D. Father         Fatal Heart attack at 70.     Cerebrovascular Disease Father      Thyroid Disease Sister      Thyroid Disease Sister      Thyroid Disease Sister      Cancer Daughter      Thyroid Disease Daughter      Breast Cancer No family hx of      Cancer - colorectal No family hx of          PHYSICAL EXAM:  Vital signs:  BP (!) 167/84   Pulse 77   Resp 16   Ht 1.626 m (5' 4\")   Wt 80.7 kg (178 lb)   SpO2 97%   BMI 30.55 kg/m     GENERAL/CONSTITUTIONAL: No acute distress.  EYES:  No scleral icterus.  LYMPH: No cervical, supraclavicular, axillary adenopathy.   BREAST: No palpable masses in either breast with exception of left breast post-lumpectomy changes in the upper outer quadrant; no significant edema or erythema. Nipples are everted bilaterally with no discharge. No erythema, ulceration.  RESPIRATORY: No audible cough or wheezing.   GASTROINTESTINAL: No hepatosplenomegaly, masses, or tenderness. No guarding.  No distention.  MUSCULOSKELETAL: Warm and well-perfused, no cyanosis, clubbing; mild localized edema around the right knee.  NEUROLOGIC: No focal motor deficits. Alert, oriented, answers questions appropriately.  INTEGUMENTARY: No rashes or jaundice. GAIT: Steady, does not use assistive device    LABS:  CBC RESULTS: Recent Labs "   Lab Test 03/07/23  0618 02/23/23  1131   WBC  --  7.3   RBC  --  4.61   HGB 10.1* 13.5   HCT  --  43.1   MCV  --  94   MCH  --  29.3   MCHC  --  31.3*   RDW  --  13.7    249       Last Comprehensive Metabolic Panel:  Sodium   Date Value Ref Range Status   03/07/2023 134 (L) 136 - 145 mmol/L Final   05/12/2021 137 133 - 144 mmol/L Final     Potassium   Date Value Ref Range Status   03/07/2023 3.8 3.4 - 5.3 mmol/L Final   10/07/2022 4.2 3.4 - 5.3 mmol/L Final   05/12/2021 3.9 3.4 - 5.3 mmol/L Final     Chloride   Date Value Ref Range Status   03/07/2023 99 98 - 107 mmol/L Final   10/07/2022 104 94 - 109 mmol/L Final   05/12/2021 103 94 - 109 mmol/L Final     Carbon Dioxide   Date Value Ref Range Status   05/12/2021 26 20 - 32 mmol/L Final     Carbon Dioxide (CO2)   Date Value Ref Range Status   03/07/2023 25 22 - 29 mmol/L Final   10/07/2022 25 20 - 32 mmol/L Final     Anion Gap   Date Value Ref Range Status   03/07/2023 10 7 - 15 mmol/L Final   10/07/2022 8 3 - 14 mmol/L Final   05/12/2021 8 3 - 14 mmol/L Final     Glucose   Date Value Ref Range Status   03/07/2023 133 (H) 70 - 99 mg/dL Final   10/07/2022 103 (H) 70 - 99 mg/dL Final   05/12/2021 163 (H) 70 - 99 mg/dL Final     GLUCOSE BY METER POCT   Date Value Ref Range Status   03/07/2023 135 (H) 70 - 99 mg/dL Final     Urea Nitrogen   Date Value Ref Range Status   03/07/2023 14.7 8.0 - 23.0 mg/dL Final   10/07/2022 13 7 - 30 mg/dL Final   05/12/2021 17 7 - 30 mg/dL Final     Creatinine   Date Value Ref Range Status   03/07/2023 0.64 0.51 - 0.95 mg/dL Final   05/12/2021 0.71 0.52 - 1.04 mg/dL Final     GFR Estimate   Date Value Ref Range Status   03/07/2023 >90 >60 mL/min/1.73m2 Final     Comment:     eGFR calculated using 2021 CKD-EPI equation.   05/12/2021 82 >60 mL/min/[1.73_m2] Final     Comment:     Non  GFR Calc  Starting 12/18/2018, serum creatinine based estimated GFR (eGFR) will be   calculated using the Chronic Kidney Disease  Epidemiology Collaboration   (CKD-EPI) equation.       Calcium   Date Value Ref Range Status   2023 8.9 8.8 - 10.2 mg/dL Final   2021 9.4 8.5 - 10.1 mg/dL Final     Bilirubin Total   Date Value Ref Range Status   10/07/2022 0.5 0.2 - 1.3 mg/dL Final   2021 0.5 0.2 - 1.3 mg/dL Final     Alkaline Phosphatase   Date Value Ref Range Status   10/07/2022 76 40 - 150 U/L Final   2021 76 40 - 150 U/L Final     ALT   Date Value Ref Range Status   10/07/2022 59 (H) 0 - 50 U/L Final   2021 79 (H) 0 - 50 U/L Final     AST   Date Value Ref Range Status   10/07/2022 39 0 - 45 U/L Final   2021 48 (H) 0 - 45 U/L Final       PATHOLOGY:  None new.    IMAGIN2021: DEXA showed normal bone density.    4/3/2023: Mammogram showed no suspicious findings.    ASSESSMENT/PLAN:  Yola Mayo is a 77 year old female with the following issues:  1. 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  --She is status post lumpectomy followed by adjuvant radiation therapy.  -I discussed with Dee Dee that she has no clinical evidence for recurrent breast cancer by physical exam from today or mammogram reviewed from 4/3/2023.  --I advised gentle massage to the right breast and axilla to alleviated some of the scar-related tissue discomfort.  --She is now on anastrozole and tolerating this well with no significant side effects.  --Plan for total 5 years of hormone blockade therapy.  --Baseline DEXA scan from 2021 was normal.  I advised adequate calcium and vitamin D intake as well as weight bearing exercise.  --Plan to repeat DEXA prior to next visit.  --Due for next mammogram 2024.    2. Right breast lymphedema  --Improved with lymphedema therapy.    3. Coronary artery disease  --Aromatase inhibitors can slightly increase  cardiovascular risk but controllable with her cardiac medications.  --She will continue on aspirin, beta-blocker, and statin.     4. Diabetes mellitus type 2  --Well-controlled. She will continue metformin and has her A1C monitored by Dr. Braga.    5. Essential hypertension  --Blood pressure stable.  She will continue on losartan, hydrochlorothiazide, and hydralazine.    6. Muscle cramping  --Improved, continue magnesium supplement.    Return in 6 months.    Elida Grullon MD  Hematology/Oncology  [facility]    Total time spent: 30 minutes in patient evaluation, counseling, documentation, and coordination of care.

## 2023-04-10 ENCOUNTER — OFFICE VISIT (OUTPATIENT)
Dept: FAMILY MEDICINE | Facility: CLINIC | Age: 78
End: 2023-04-10
Payer: MEDICARE

## 2023-04-10 VITALS
WEIGHT: 178.8 LBS | BODY MASS INDEX: 30.69 KG/M2 | OXYGEN SATURATION: 98 % | DIASTOLIC BLOOD PRESSURE: 70 MMHG | HEART RATE: 74 BPM | SYSTOLIC BLOOD PRESSURE: 139 MMHG | RESPIRATION RATE: 16 BRPM | TEMPERATURE: 97.2 F

## 2023-04-10 DIAGNOSIS — Z96.651 HISTORY OF ARTHROPLASTY OF RIGHT KNEE: ICD-10-CM

## 2023-04-10 DIAGNOSIS — Z00.00 ENCOUNTER FOR MEDICARE ANNUAL WELLNESS EXAM: Primary | ICD-10-CM

## 2023-04-10 DIAGNOSIS — E87.1 HYPONATREMIA: ICD-10-CM

## 2023-04-10 DIAGNOSIS — E78.5 HYPERLIPIDEMIA, UNSPECIFIED HYPERLIPIDEMIA TYPE: ICD-10-CM

## 2023-04-10 DIAGNOSIS — Z95.5 S/P DRUG ELUTING CORONARY STENT PLACEMENT: ICD-10-CM

## 2023-04-10 DIAGNOSIS — Z12.11 SCREEN FOR COLON CANCER: ICD-10-CM

## 2023-04-10 DIAGNOSIS — D64.9 POSTOPERATIVE ANEMIA: ICD-10-CM

## 2023-04-10 DIAGNOSIS — E03.9 HYPOTHYROIDISM, UNSPECIFIED TYPE: ICD-10-CM

## 2023-04-10 DIAGNOSIS — Z98.890 S/P LUMPECTOMY, RIGHT BREAST: ICD-10-CM

## 2023-04-10 DIAGNOSIS — I10 BENIGN HYPERTENSION: ICD-10-CM

## 2023-04-10 DIAGNOSIS — I10 ESSENTIAL HYPERTENSION: ICD-10-CM

## 2023-04-10 DIAGNOSIS — C50.911 INVASIVE DUCTAL CARCINOMA OF BREAST, FEMALE, RIGHT (H): ICD-10-CM

## 2023-04-10 DIAGNOSIS — E11.9 TYPE 2 DIABETES MELLITUS WITHOUT COMPLICATION, WITHOUT LONG-TERM CURRENT USE OF INSULIN (H): ICD-10-CM

## 2023-04-10 LAB
ANION GAP SERPL CALCULATED.3IONS-SCNC: 15 MMOL/L (ref 7–15)
BUN SERPL-MCNC: 10.9 MG/DL (ref 8–23)
CALCIUM SERPL-MCNC: 9.8 MG/DL (ref 8.8–10.2)
CHLORIDE SERPL-SCNC: 97 MMOL/L (ref 98–107)
CHOLEST SERPL-MCNC: 128 MG/DL
CREAT SERPL-MCNC: 0.64 MG/DL (ref 0.51–0.95)
DEPRECATED HCO3 PLAS-SCNC: 23 MMOL/L (ref 22–29)
FERRITIN SERPL-MCNC: 100 NG/ML (ref 11–328)
GFR SERPL CREATININE-BSD FRML MDRD: >90 ML/MIN/1.73M2
GLUCOSE SERPL-MCNC: 116 MG/DL (ref 70–99)
HDLC SERPL-MCNC: 79 MG/DL
HGB BLD-MCNC: 12.4 G/DL (ref 11.7–15.7)
LDLC SERPL CALC-MCNC: 41 MG/DL
NONHDLC SERPL-MCNC: 49 MG/DL
POTASSIUM SERPL-SCNC: 4 MMOL/L (ref 3.4–5.3)
SODIUM SERPL-SCNC: 135 MMOL/L (ref 136–145)
TRIGL SERPL-MCNC: 42 MG/DL

## 2023-04-10 PROCEDURE — G0439 PPPS, SUBSEQ VISIT: HCPCS | Performed by: INTERNAL MEDICINE

## 2023-04-10 PROCEDURE — 85018 HEMOGLOBIN: CPT | Performed by: INTERNAL MEDICINE

## 2023-04-10 PROCEDURE — 82728 ASSAY OF FERRITIN: CPT | Performed by: INTERNAL MEDICINE

## 2023-04-10 PROCEDURE — 80048 BASIC METABOLIC PNL TOTAL CA: CPT | Performed by: INTERNAL MEDICINE

## 2023-04-10 PROCEDURE — 80061 LIPID PANEL: CPT | Performed by: INTERNAL MEDICINE

## 2023-04-10 PROCEDURE — 36415 COLL VENOUS BLD VENIPUNCTURE: CPT | Performed by: INTERNAL MEDICINE

## 2023-04-10 PROCEDURE — 99207 PR FOOT EXAM NO CHARGE: CPT | Performed by: INTERNAL MEDICINE

## 2023-04-10 PROCEDURE — 82274 ASSAY TEST FOR BLOOD FECAL: CPT | Performed by: INTERNAL MEDICINE

## 2023-04-10 PROCEDURE — 99214 OFFICE O/P EST MOD 30 MIN: CPT | Mod: 25 | Performed by: INTERNAL MEDICINE

## 2023-04-10 RX ORDER — HYDRALAZINE HYDROCHLORIDE 25 MG/1
50 TABLET, FILM COATED ORAL 2 TIMES DAILY
Qty: 360 TABLET | Refills: 2 | Status: SHIPPED | OUTPATIENT
Start: 2023-04-10 | End: 2023-09-07

## 2023-04-10 ASSESSMENT — ENCOUNTER SYMPTOMS
PALPITATIONS: 0
WEAKNESS: 0
HEMATURIA: 0
DIARRHEA: 0
CONSTIPATION: 0
DYSURIA: 0
PARESTHESIAS: 0
MYALGIAS: 1
HEADACHES: 0
HEARTBURN: 0
CHILLS: 0
ABDOMINAL PAIN: 0
NAUSEA: 0
SORE THROAT: 0
COUGH: 0
NERVOUS/ANXIOUS: 0
HEMATOCHEZIA: 0
ARTHRALGIAS: 1
FREQUENCY: 0
SHORTNESS OF BREATH: 1
FEVER: 0
JOINT SWELLING: 0
EYE PAIN: 0
BREAST MASS: 0
DIZZINESS: 0

## 2023-04-10 ASSESSMENT — ACTIVITIES OF DAILY LIVING (ADL): CURRENT_FUNCTION: NO ASSISTANCE NEEDED

## 2023-04-10 ASSESSMENT — PAIN SCALES - GENERAL: PAINLEVEL: NO PAIN (0)

## 2023-04-10 NOTE — PROGRESS NOTES
"SUBJECTIVE:   Dee Dee is a 77 year old who presents for Preventive Visit.       View : No data to display.            Patient has been advised of split billing requirements and indicates understanding: Yes  Are you in the first 12 months of your Medicare coverage?  No    Healthy Habits:     In general, how would you rate your overall health?  Good    Frequency of exercise:  4-5 days/week    Duration of exercise:  15-30 minutes    Do you usually eat at least 4 servings of fruit and vegetables a day, include whole grains    & fiber and avoid regularly eating high fat or \"junk\" foods?  No    Taking medications regularly:  Yes    Medication side effects:  None    Ability to successfully perform activities of daily living:  No assistance needed    Home Safety:  No safety concerns identified    Hearing Impairment:  No hearing concerns    In the past 6 months, have you been bothered by leaking of urine?  No    In general, how would you rate your overall mental or emotional health?  Good      PHQ-2 Total Score: 0    Additional concerns today:  No      Have you ever done Advance Care Planning? (For example, a Health Directive, POLST, or a discussion with a medical provider or your loved ones about your wishes): Yes, advance care planning is on file.       Fall risk  Fallen 2 or more times in the past year?: Yes  Any fall with injury in the past year?: No    Cognitive Screening   1) Repeat 3 items (Leader, Season, Table)    2) Clock draw: NORMAL  3) 3 item recall: Recalls 3 objects  Results: 3 items recalled: COGNITIVE IMPAIRMENT LESS LIKELY    Mini-CogTM Copyright JESSICA Hussein. Licensed by the author for use in [facility]; reprinted with permission (sophie@.[facility]). All rights reserved.      Do you have sleep apnea, excessive snoring or daytime drowsiness?: no    Reviewed and updated as needed this visit by clinical staff   Tobacco  Allergies  Meds              Reviewed and updated as needed this visit by Provider     "             Social History     Tobacco Use     Smoking status: Never     Smokeless tobacco: Never   Vaping Use     Vaping status: Never Used   Substance Use Topics     Alcohol use: Yes     Alcohol/week: 0.0 standard drinks of alcohol     Comment: occasional         4/10/2023     9:16 AM   Alcohol Use   Prescreen: >3 drinks/day or >7 drinks/week? No     Do you have a current opioid prescription? No  Do you use any other controlled substances or medications that are not prescribed by a provider? None    Current providers sharing in care for this patient include:   Patient Care Team:  Zoraida Braga MD as PCP - General (Internal Medicine)  Zoraida Braga MD as Assigned PCP  May Singh RD as Diabetes Educator (Dietitian, Registered)  Elida Grullon MD as Assigned Cancer Care Provider  Albino Howard MD as MD (Cardiovascular Disease)  Krystal Neal APRN CNP as Assigned Heart and Vascular Provider    The following health maintenance items are reviewed in Epic and correct as of today:  Health Maintenance   Topic Date Due     LIPID  04/07/2023     COLORECTAL CANCER SCREENING  04/07/2023     A1C  08/23/2023     MICROALBUMIN  10/07/2023     ANNUAL REVIEW OF HM ORDERS  10/07/2023     EYE EXAM  02/08/2024     TSH W/FREE T4 REFLEX  02/23/2024     BMP  03/07/2024     MEDICARE ANNUAL WELLNESS VISIT  04/10/2024     DIABETIC FOOT EXAM  04/10/2024     FALL RISK ASSESSMENT  04/10/2024     ADVANCE CARE PLANNING  04/10/2028     DEXA  05/12/2036     HEPATITIS C SCREENING  Completed     PHQ-2 (once per calendar year)  Completed     INFLUENZA VACCINE  Completed     Pneumococcal Vaccine: 65+ Years  Completed     ZOSTER IMMUNIZATION  Completed     COVID-19 Vaccine  Completed     IPV IMMUNIZATION  Aged Out     MENINGITIS IMMUNIZATION  Aged Out     MAMMO SCREENING  Discontinued     DTAP/TDAP/TD IMMUNIZATION  Discontinued     Labs reviewed in [facility]  Mammogram Screening: Mammogram Screening - Patient over age 75, has elected to  "continue with screening.    Pertinent mammograms are reviewed under the imaging tab.    Review of Systems   Constitutional: Negative for chills and fever.   HENT: Negative for congestion, ear pain, hearing loss and sore throat.    Eyes: Negative for pain and visual disturbance.   Respiratory: Positive for shortness of breath. Negative for cough.    Cardiovascular: Negative for chest pain, palpitations and peripheral edema.   Gastrointestinal: Negative for abdominal pain, constipation, diarrhea, heartburn, hematochezia and nausea.   Breasts:  Negative for tenderness, breast mass and discharge.   Genitourinary: Negative for dysuria, frequency, genital sores, hematuria, pelvic pain, urgency, vaginal bleeding and vaginal discharge.   Musculoskeletal: Positive for arthralgias and myalgias. Negative for joint swelling.   Skin: Negative for rash.   Neurological: Negative for dizziness, weakness, headaches and paresthesias.   Psychiatric/Behavioral: Negative for mood changes. The patient is not nervous/anxious.      OBJECTIVE:   /70 (BP Location: Right arm, Patient Position: Sitting)   Pulse 74   Temp 97.2  F (36.2  C) (Oral)   Resp 16   Wt 81.1 kg (178 lb 12.8 oz)   SpO2 98%   BMI 30.69 kg/m   Estimated body mass index is 30.69 kg/m  as calculated from the following:    Height as of 3/6/23: 1.626 m (5' 4\").    Weight as of this encounter: 81.1 kg (178 lb 12.8 oz).  Physical Exam  GENERAL APPEARANCE: healthy, alert and no distress  EYES: Eyes grossly normal to inspection, PERRL and conjunctivae and sclerae normal  HENT: ear canals and TM's normal, nose and mouth without ulcers or lesions, oropharynx clear and oral mucous membranes moist  NECK: no adenopathy, no asymmetry, masses, or scars and thyroid normal to palpation  RESP: lungs clear to auscultation - no rales, rhonchi or wheezes  BREAST: normal without masses, tenderness or nipple discharge and no palpable axillary masses or adenopathy  CV: regular rate " and rhythm, normal S1 S2, no S3  ABDOMEN: soft, nontender, no hepatosplenomegaly, no masses and bowel sounds normal  MS: she had recent knee surgery  SKIN: no suspicious lesions or rashes  NEURO: , mentation intact and speech normal  PSYCH: mentation appears normal and affect normal/bright  Diabetic foot exam was performed.  Good dorsalis pedis and posterior tibial.  5 point sensory exam was normal.  skin is intact    Labs pending    ASSESSMENT / PLAN:   Yola was seen today for physical.    Diagnoses and all orders for this visit:    Encounter for Medicare annual wellness exam  Preventive health counseling was also done.   Mammogram on 04/03/2023   Colonoscopy on 05/01/2009  DEXA in 2021     Type 2 diabetes mellitus without complication, without long-term current use of insulin (H)  -     ID FOOT EXAM NO CHARGE  Well controlled  Discussed availability of GLP-1 and SGLT2 inhibitors for diabetes which are good for both heart and kidneys, and help people to lose weight   Patient is not interested in injectable options.   We will discuss about Jardiance at next visit as at this point she is focussing on recovery from her knee surgery on right side     S/P drug eluting coronary stent placement  Patient had coronary angioplasty on 6/27/2018 and 5/19/2022, with total 2 and 3 drug eluting stents, respectively, placed.  She is followed by cardiology.    History of arthroplasty of right knee  Right total knee arthroplasty on 03/06/2023.   She still c/o pain in her right knee. Understands that it takes 6 weeks to recover - she is in the fifth week. Tylenol as pain management     Postoperative anemia  -     Hemoglobin; Future  -     Ferritin; Future    Hyponatremia  -     Basic metabolic panel  (Ca, Cl, CO2, Creat, Gluc, K, Na, BUN); Future    Invasive ductal carcinoma of breast, female, right (H)  In remission for two years.   She underwent right breast lumpectomy and sentinel lymph node excision under care of Dr. Bello  "Ken. Pathology showed grade 2 invasive ductal carcinoma, low grade DCIS, and LCIS. She completed adjuvant radiation therapy to right breast on between 5/25/2021 and 6/16/2021. She subsequently started anastrazole.     S/P lumpectomy, right breast  Due to breast cancer    Essential hypertension  Blood pressure on arrival elevated at 149/69. Rechecked blood pressure at 139/70  Patient monitors their blood pressure at home and states that the readings are around 130/70.     Hypothyroidism, unspecified type  She is on levothyroxine.    Screen for colon cancer  -     Fecal colorectal cancer screen FIT - Future (S+30); Future    Hyperlipidemia, unspecified hyperlipidemia type  -     Lipid panel reflex to direct LDL Fasting; Future    Other  Patient shows understanding of the benefits of dietary and activity changes and vitamin D/ calcium supplementation       Patient has been advised of split billing requirements and indicates understanding: Yes      COUNSELING:  Reviewed preventive health counseling, as reflected in patient instructions  Special attention given to:       Regular exercise       Healthy diet/nutrition       Immunizations       Osteoporosis prevention/bone health       Colon cancer screening      BMI:   Estimated body mass index is 30.69 kg/m  as calculated from the following:    Height as of 3/6/23: 1.626 m (5' 4\").    Weight as of this encounter: 81.1 kg (178 lb 12.8 oz).   Weight management plan: Discussed healthy diet and exercise guidelines      She reports that she has never smoked. She has never used smokeless tobacco.      Appropriate preventive services were discussed with this patient, including applicable screening as appropriate for cardiovascular disease, diabetes, osteopenia/osteoporosis, and glaucoma.  As appropriate for age/gender, discussed screening for colorectal cancer, prostate cancer, breast cancer, and cervical cancer. Checklist reviewing preventive services available has been given " to the patient.    Reviewed patients plan of care and provided an AVS. The Basic Care Plan (routine screening as documented in Health Maintenance) for Yola meets the Care Plan requirement. This Care Plan has been established and reviewed with the Patient.    Zoraida Braga MD  [employer]    This document serves as a record of the services and decisions personally performed and made by Dr. Braga. It was created on her behalf by Justen Brown, a trained medical scribe. The creation of this document is based the provider's statements to the medical scribe.      Identified Health Risks:    I have reviewed Opioid Use Disorder and Substance Use Disorder risk factors and made any needed referrals.

## 2023-04-10 NOTE — PATIENT INSTRUCTIONS
Labs today  Monitor your blood pressure once a week  at home.  Bring those readings on your next visit.  Notify us if your blood pressure readings consistently stays greater than 140/90.     Follow up in 6 months.  Seek sooner medical attention if there is any worsening of symptoms or problems.       Patient Education   Personalized Prevention Plan  You are due for the preventive services outlined below.  Your care team is available to assist you in scheduling these services.  If you have already completed any of these items, please share that information with your care team to update in your medical record.  Health Maintenance Due   Topic Date Due    Cholesterol Lab  04/07/2023    Diabetic Foot Exam  04/07/2023    Colorectal Cancer Screening  04/07/2023       Understanding USDA MyPlate  The USDA has guidelines to help you make healthy food choices. These are called MyPlate. MyPlate shows the food groups that make up healthy meals using the image of a place setting. Before you eat, think about the healthiest choices for what to put on your plate or in your cup or bowl. To learn more about building a healthy plate, visit www.choosemyplate.gov.     The food groups  Fruits. Any fruit or 100% fruit juice counts as part of the Fruit Group. Fruits may be fresh, canned, frozen, or dried, and may be whole, cut-up, or pureed. Make 1/2 of your plate fruits and vegetables.  Vegetables. Any vegetable or 100% vegetable juice counts as a member of the Vegetable Group. Vegetables may be fresh, frozen, canned, or dried. They can be served raw or cooked and may be whole, cut-up, or mashed. Make 1/2 of your plate fruits and vegetables.  Grains. All foods made from grains are part of the Grains Group. These include wheat, rice, oats, cornmeal, and barley. Grains are often used to make foods such as bread, pasta, oatmeal, cereal, tortillas, and grits. Grains should be no more than 1/4 of your plate. At least half of your grains should  be whole grains.  Protein. This group includes meat, poultry, seafood, beans and peas, eggs, processed soy products (such as tofu), nuts (including nut butters), and seeds. Make protein choices no more than 1/4 of your plate. Meat and poultry choices should be lean or low fat.  Dairy. The Dairy Group includes all fluid milk products and foods made from milk that contain calcium, such as yogurt and cheese. (Foods that have little calcium, such as cream, butter, and cream cheese, are not part of this group.) Most dairy choices should be low-fat or fat-free.  Oils. Oils aren't a food group, but they do contain essential nutrients. However it's important to watch your intake of oils. These are fats that are liquid at room temperature. They include canola, corn, olive, soybean, vegetable, and sunflower oil. Foods that are mainly oil include mayonnaise, certain salad dressings, and soft margarines. You likely already get your daily oil allowance from the foods you eat.  Things to limit  Eating healthy also means limiting these things in your diet:  Salt (sodium). Many processed foods have a lot of sodium. To keep sodium intake down, eat fresh vegetables, meats, poultry, and seafood when possible. Purchase low-sodium, reduced-sodium, or no-salt-added food products at the store. And don't add salt to your meals at home. Instead, season them with herbs and spices such as dill, oregano, cumin, and paprika. Or try adding flavor with lemon or lime zest and juice.  Saturated fat. Saturated fats are most often found in animal products such as beef, pork, and chicken. They are often solid at room temperature, such as butter. To reduce your saturated fat intake, choose leaner cuts of meat and poultry. And try healthier cooking methods such as grilling, broiling, roasting, or baking. For a simple lower-fat swap, use plain nonfat yogurt instead of mayonnaise when making potato salad or macaroni salad.  Added sugars. These are sugars  added to foods. They are in foods such as ice cream, candy, soda, fruit drinks, sports drinks, energy drinks, cookies, pastries, jams, and syrups. Cut down on added sugars by sharing sweet treats with a family member or friend. You can also choose fruit for dessert, and drink water or other unsweetened beverages.  [employer] last reviewed this educational content on 6/1/2020 2000-2022 [employer]. All rights reserved. This information is not intended as a substitute for professional medical care. Always follow your healthcare professional's instructions.          Preventing Falls at Home  A person can fall for many reasons. Older adults may fall because reaction time slows down as we age. Your muscles and joints may get stiff, weak, or less flexible because of illness, medicines, or a physical condition.   Other health problems that make falls more likely include:   Arthritis  Dizziness or lightheadedness when you stand up (orthostatic hypotension)  History of a stroke  Dizziness  Anemia  Certain medicines taken for mental illness or to control blood pressure.  Problems with balance or gait  Bladder or urinary problems  History of falling  Changes in vision (vision impairment)  Changes in thinking skills and memory (cognitive impairment)  Injuries from a fall can include serious injuries such as broken bones, dislocated joints, internal bleeding and cuts. Injuries like these can limit your independence.   Prevention tips  To help prevent falls and fall-related injuries, follow the tips below.    Floors  To make floors safer:   Put nonskid pads under area rugs.  Remove small rugs.  Replace worn floor coverings.  Tack carpets firmly to each step on carpeted stairs. Put nonskid strips on the edges of uncarpeted stairs.  Keep floors and stairs free of clutter and cords.  Arrange furniture so there are clear pathways.  Clean up any spills right away.  Bathrooms    To make bathrooms safer:   Install grab  bars in the tub or shower.  Apply nonskid strips or put a nonskid rubber mat in the tub or shower.  Sit on a bath chair to bathe.  Use bathmats with nonskid backing.  Lighting  To improve visibility in your home:    Keep a flashlight in each room. Or put a lamp next to the bed within easy reach.  Put nightlights in the bedrooms, hallways, kitchen, and bathrooms.  Make sure all stairways have good lighting.  Take your time when going up and down stairs.  Put handrails on both sides of stairs and in walkways for more support. To prevent injury to your wrist or arm, don t use handrails to pull yourself up.  Install grab bars to pull yourself up.  Move or rearrange items that you use often. This will make them easier to find or reach.  Look at your home to find any safety hazards. Especially look at doorways, walkways, and the driveway. Remove or repair any safety problems that you find.  Other changes to make  Look around to find any safety hazards. Look closely at doorways, walkways, and the driveway. Remove or repair any safety problems that you find.  Wear shoes that fit well.  Take your time when going up and down stairs.  Put handrails on both sides of stairs and in walkways for more support. To prevent injury to your wrist or arm, don t use handrails to pull yourself up.  Install grab bars wherever needed to pull yourself up.  Arrange items that you use often. This will make them easier to find or reach.    [employer] last reviewed this educational content on 3/1/2020    4256-7023 [employer]. All rights reserved. This information is not intended as a substitute for professional medical care. Always follow your healthcare professional's instructions.

## 2023-04-11 NOTE — RESULT ENCOUNTER NOTE
Timothy Aceves    This is to inform you regarding your test result.    Sodium is slightly low and this could be due to hydrochlorothiazide   At this point this is not worrisome.  We will continue to monitor   Will recheck in 6 months   If needed will switch to some other diuretics.  Glucose which is your blood sugar is slightly elevated.  Avoid high sugar containing food.  Your total cholesterol is normal.  HDL which is called good cholesterol is normal.  Your LDL cholesterol is normal.  This is often call bad cholesterol and high levels increase the risk for heart attacks and strokes.  Your triglycerides are normal.  Ferritin which is iron stores in the body is normal.  Hemoglobin is normal        Sincerely,      Dr.Nasima Maria Luz MD,FACP

## 2023-04-12 ENCOUNTER — ONCOLOGY VISIT (OUTPATIENT)
Dept: ONCOLOGY | Facility: CLINIC | Age: 78
End: 2023-04-12
Attending: INTERNAL MEDICINE
Payer: MEDICARE

## 2023-04-12 VITALS
WEIGHT: 178 LBS | RESPIRATION RATE: 16 BRPM | DIASTOLIC BLOOD PRESSURE: 84 MMHG | HEART RATE: 77 BPM | HEIGHT: 64 IN | OXYGEN SATURATION: 97 % | BODY MASS INDEX: 30.39 KG/M2 | SYSTOLIC BLOOD PRESSURE: 167 MMHG

## 2023-04-12 DIAGNOSIS — C50.411 MALIGNANT NEOPLASM OF UPPER-OUTER QUADRANT OF RIGHT BREAST IN FEMALE, ESTROGEN RECEPTOR POSITIVE (H): Primary | ICD-10-CM

## 2023-04-12 DIAGNOSIS — Z17.0 MALIGNANT NEOPLASM OF UPPER-OUTER QUADRANT OF RIGHT BREAST IN FEMALE, ESTROGEN RECEPTOR POSITIVE (H): Primary | ICD-10-CM

## 2023-04-12 LAB — HEMOCCULT STL QL IA: NEGATIVE

## 2023-04-12 PROCEDURE — G0463 HOSPITAL OUTPT CLINIC VISIT: HCPCS | Performed by: INTERNAL MEDICINE

## 2023-04-12 PROCEDURE — 99214 OFFICE O/P EST MOD 30 MIN: CPT | Performed by: INTERNAL MEDICINE

## 2023-04-12 ASSESSMENT — PAIN SCALES - GENERAL: PAINLEVEL: MILD PAIN (2)

## 2023-04-12 NOTE — LETTER
4/12/2023         RE: Yola Mayo  [address] [facility] [id number]        Dear Colleague,    Thank you for referring your patient, Yola Mayo, to the [facility]. Please see a copy of my visit note below.    [facility]    Hematology/Oncology Established Patient Note      Today's Date: 4/12/2023    Reason for follow-up: Right breast cancer.    HISTORY OF PRESENT ILLNESS: Yola Mayo is a 77 year old female who presents with the following oncologic history:  1. 3/24/2021: Mammogram showed distortion in right upper outer breast; left breast negative.  2. 3/31/2021: Right breast U/S at 9:00, 4 cm from nipple showed mass measuring 1.1 x 1 x 1.2 cm; no enlarged right axillary lymph nodes seen.  3. 4/07/2021: Right breast biopsy at 9:00 showed grade 2 invasive ductal carcinoma, ER positive at 100%, IA positive at 95%, HER-2/marek FISH negative.  4. 4/23/2021: Underwent right breast lumpectomy and sentinel lymph node excision under care of Dr. Ace Rogers. Pathology showed grade 2 invasive ductal carcinoma measuring 1.5 cm, low grade DCIS, LCIS, margins negative; 2 lymph nodes negative. Oncotype DX = 6; 9-year risk of distant recurrence = 3% after 5 years of hormone blockade therapy, < 1% absolute chemotherapy benefit.  5. 5/25/2021-6/16/2021: Completed adjuvant radiation therapy to right breast. Subsequently started anastrozole.    INTERIM HISTORY:  Dee Dee reports some right knee pain related to right knee arthroplasty.  She has some achiness in her right axilla.      REVIEW OF SYSTEMS:   14 point ROS was reviewed and is negative other than as noted above in HPI.       HOME MEDICATIONS:  Current Outpatient Medications   Medication Sig Dispense Refill     amoxicillin (AMOXIL) 500 MG capsule TAKE 4 CAPSULES BY MOUTH 1/2 TO 1 HOUR BEFORE THE PROCEDURE. 4 capsule 3     anastrozole (ARIMIDEX) 1 MG tablet Take 1 tablet (1 mg) by mouth daily 90 tablet 3      aspirin 81 MG tablet Take 81 mg by mouth daily       atorvastatin (LIPITOR) 10 MG tablet Take 1 tablet (10 mg) by mouth daily for cholestrol 90 tablet 3     blood glucose (CONTOUR NEXT TEST) test strip 1 strip by In Vitro route daily or as directed. 100 strip 1     blood glucose monitoring (NITO MICROLET) lancets Use to test blood sugar 1 time daily or as directed. 100 each 3     calcium-vitamin D (CALTRATE) 600-400 MG-UNIT per tablet Take 1 tablet by mouth daily 60 tablet 11     cholecalciferol (VITAMIN D) 1000 UNIT tablet Take 1 tablet (1,000 Units) by mouth daily 100 tablet 3     clopidogrel (PLAVIX) 75 MG tablet Take 1 tablet (75 mg) by mouth daily 90 tablet 3     CONTOUR NEXT EZ (CONTOUR NEXT EZ W/DEVICE KIT) w/Device KIT 1 kit once 1 kit 0     Cyanocobalamin (VITAMIN B 12 PO) Take 1 tablet by mouth daily Strength unknown       hydrALAZINE (APRESOLINE) 25 MG tablet Take 2 tablets (50 mg) by mouth 2 times daily 360 tablet 2     hydrALAZINE (APRESOLINE) 25 MG tablet Take 25 mg by mouth every evening       hydrochlorothiazide (HYDRODIURIL) 25 MG tablet Take 1 tablet (25 mg) by mouth daily for Blood Pressure 90 tablet 3     levothyroxine (SYNTHROID/LEVOTHROID) 112 MCG tablet TAKE 1 TABLET (112 MCG) BY MOUTH DAILY FOR THYROID 90 tablet 2     losartan (COZAAR) 100 MG tablet Take 1 tablet (100 mg) by mouth daily for Blood Pressure 90 tablet 3     metFORMIN (GLUCOPHAGE XR) 500 MG 24 hr tablet Take 2 tablets (1,000 mg) by mouth daily (with dinner) for diabetes 180 tablet 3     metroNIDAZOLE (METROGEL) 0.75 % external gel APPLY TO AFFECTED AREA TWICE A DAY 45 g 1     multivitamin, therapeutic with minerals (THERA-VIT-M) TABS Take 1 tablet by mouth daily       nitroGLYcerin (NITROSTAT) 0.4 MG sublingual tablet For chest pain place 1 tablet under the tongue every 5 minutes for 3 doses. If symptoms persist 5 minutes after 1st dose call 911. 25 tablet 3     senna-docusate (SENOKOT-S/PERICOLACE) 8.6-50 MG tablet Take 1-2  tablets by mouth 2 times daily Take while on oral narcotics to prevent or treat constipation. 30 tablet 0     ZIOPTAN 0.0015 % SOLN ophthalmic solution Place 1 drop into both eyes At Bedtime           ALLERGIES:  Allergies   Allergen Reactions     Toprol Xl [Metoprolol] Fatigue     Ace Inhibitors      ACE throat clearing     Oxycodone Nausea and Vomiting     Tetanus Toxoid Swelling     Hard red lump          PAST MEDICAL HISTORY:  Past Medical History:   Diagnosis Date     Anemia due to blood loss      BASAL CELL CANCER -NOSE      Dermatitis      Diabetes (H)      Family history of diabetes mellitus     Father     Family history of ischemic heart disease     Father     Family history of malignant neoplasm of ovary     Mother     HX DISRUPT L ANT CRUCIATE S/P REPAIR 2003      Hyperlipidemia      HYPERTENSION       HYPOTHYROIDISM      LOC PRIM OSTEOARTH-L 1ST METATARSAL-CUNEIFORM JOINT 3/10/2004     Major depressive disorder, single episode, mild (H) 11/24/2014     MILD-MOD TRICUSPID REGURG. MOD PULM HTN 6/23/2005    ECHO  5/05     Obese      PONV (postoperative nausea and vomiting)      Rosacea      Shingles      SPLENIC CYST          PAST SURGICAL HISTORY:  Past Surgical History:   Procedure Laterality Date     ARTHROPLASTY KNEE Left 4/13/2016    Procedure: ARTHROPLASTY KNEE;  Surgeon: Giuseppe Lopez MD;  Location:  OR     ARTHROPLASTY KNEE Right 3/6/2023    Procedure: Right total knee arthroplasty;  Surgeon: Giuseppe Lopez MD;  Location:  OR     ARTHROPLASTY MINIMALLY INVASIVE HIP  4/18/2012    Procedure:ARTHROPLASTY MINIMALLY INVASIVE HIP; RIGHT TWO INCISION MINIMALLY INVASIVE HIP ARTHROPLASTY (YUVAL)^ (NO CELL SAVER) (C-ARM); Surgeon:GIUSEPPE LOPEZ; Location: OR     BIOPSY NODE SENTINEL Right 4/23/2021    Procedure: RIGHT SENTINEL LYMPH NODE BIOPSY;  Surgeon: Giuseppe Rogers MD;  Location:  OR     C CHEMOSURG MOHS 1ST STAGE  9-99    Mohs-nose     CARDIAC SURGERY  06/27/2019    mid  and distal RCA stents     CV CORONARY ANGIOGRAM N/A 5/19/2022    Procedure: Coronary Angiogram;  Surgeon: Reyes Graham MD;  Location:  HEART CARDIAC CATH LAB     CV INSTANTANEOUS WAVE-FREE RATIO N/A 5/19/2022    Procedure: Instantaneous Wave-Free Ratio;  Surgeon: Reyes Graham MD;  Location:  HEART CARDIAC CATH LAB     CV INTRAVASULAR ULTRASOUND N/A 5/19/2022    Procedure: Intravascular Ultrasound;  Surgeon: Reyes Graham MD;  Location:  HEART CARDIAC CATH LAB     CV PCI STENT DRUG ELUTING N/A 5/19/2022    Procedure: Percutaneous Coronary Intervention Stent;  Surgeon: Reyes Graham MD;  Location:  HEART CARDIAC CATH LAB     ENT SURGERY      mohs procedure on nose     HYSTERECTOMY, PAP NO LONGER INDICATED       LUMPECTOMY BREAST WITH SEED LOCALIZATION Right 4/23/2021    Procedure: SEED LOCALIZED RIGHT BREAST LUMPECTOMY;  Surgeon: Giuseppe Rogers MD;  Location:  OR     SURGICAL HISTORY OF -   Mult-~1978    infertility, laparotomies     Z APPENDECTOMY  8/87    incidental appi at [facility] [place]     Z REPAIR CRUCIATE LIGAMENT,KNEE  8/2003    L   ACL reconstruction     Z TOTAL ABDOM HYSTERECTOMY  8/87    W/ BSO fibroids, hx PID     ZRUST COLONOSCOPY THRU STOMA, DIAGNOSTIC  4/03    normal, rec repeat 10 yrs         SOCIAL HISTORY:  Social History     Socioeconomic History     Marital status:      Spouse name: FABIAN CUADRA     Number of children: 2     Years of education: Not on file     Highest education level: Not on file   Occupational History     Occupation: [employer]     Employer: [employer]   Tobacco Use     Smoking status: Never     Smokeless tobacco: Never   Vaping Use     Vaping status: Never Used   Substance and Sexual Activity     Alcohol use: Yes     Alcohol/week: 0.0 standard drinks of alcohol     Comment: occasional     Drug use: No     Sexual activity: Yes     Partners: Male   Other Topics Concern      Service No     Blood Transfusions No     Caffeine Concern  "No     Occupational Exposure No     Hobby Hazards No     Sleep Concern No     Stress Concern No     Weight Concern Yes     Special Diet No     Back Care No     Exercise Yes     Comment: Approx 3 times a week for about 30 minutes     Bike Helmet Yes     Seat Belt Yes     Self-Exams Yes     Parent/sibling w/ CABG, MI or angioplasty before 65F 55M? Not Asked   Social History Narrative     Not on file     Social Determinants of Health     Financial Resource Strain: Not on file   Food Insecurity: Not on file   Transportation Needs: Not on file   Physical Activity: Not on file   Stress: Not on file   Social Connections: Not on file   Intimate Partner Violence: Not on file   Housing Stability: Not on file         FAMILY HISTORY:  Family History   Problem Relation Age of Onset     Cancer Mother         ovarian  of it  at 49yo.     Other Cancer Mother      Diabetes Father         Adult onset     Hypertension Father      C.A.D. Father         Fatal Heart attack at 70.     Cerebrovascular Disease Father      Thyroid Disease Sister      Thyroid Disease Sister      Thyroid Disease Sister      Cancer Daughter      Thyroid Disease Daughter      Breast Cancer No family hx of      Cancer - colorectal No family hx of          PHYSICAL EXAM:  Vital signs:  BP (!) 167/84   Pulse 77   Resp 16   Ht 1.626 m (5' 4\")   Wt 80.7 kg (178 lb)   SpO2 97%   BMI 30.55 kg/m     GENERAL/CONSTITUTIONAL: No acute distress.  EYES:  No scleral icterus.  LYMPH: No cervical, supraclavicular, axillary adenopathy.   BREAST: No palpable masses in either breast with exception of left breast post-lumpectomy changes in the upper outer quadrant; no significant edema or erythema. Nipples are everted bilaterally with no discharge. No erythema, ulceration.  RESPIRATORY: No audible cough or wheezing.   GASTROINTESTINAL: No hepatosplenomegaly, masses, or tenderness. No guarding.  No distention.  MUSCULOSKELETAL: Warm and well-perfused, no cyanosis, " clubbing; mild localized edema around the right knee.  NEUROLOGIC: No focal motor deficits. Alert, oriented, answers questions appropriately.  INTEGUMENTARY: No rashes or jaundice. GAIT: Steady, does not use assistive device    LABS:  CBC RESULTS: Recent Labs   Lab Test 03/07/23  0618 02/23/23  1131   WBC  --  7.3   RBC  --  4.61   HGB 10.1* 13.5   HCT  --  43.1   MCV  --  94   MCH  --  29.3   MCHC  --  31.3*   RDW  --  13.7    249       Last Comprehensive Metabolic Panel:  Sodium   Date Value Ref Range Status   03/07/2023 134 (L) 136 - 145 mmol/L Final   05/12/2021 137 133 - 144 mmol/L Final     Potassium   Date Value Ref Range Status   03/07/2023 3.8 3.4 - 5.3 mmol/L Final   10/07/2022 4.2 3.4 - 5.3 mmol/L Final   05/12/2021 3.9 3.4 - 5.3 mmol/L Final     Chloride   Date Value Ref Range Status   03/07/2023 99 98 - 107 mmol/L Final   10/07/2022 104 94 - 109 mmol/L Final   05/12/2021 103 94 - 109 mmol/L Final     Carbon Dioxide   Date Value Ref Range Status   05/12/2021 26 20 - 32 mmol/L Final     Carbon Dioxide (CO2)   Date Value Ref Range Status   03/07/2023 25 22 - 29 mmol/L Final   10/07/2022 25 20 - 32 mmol/L Final     Anion Gap   Date Value Ref Range Status   03/07/2023 10 7 - 15 mmol/L Final   10/07/2022 8 3 - 14 mmol/L Final   05/12/2021 8 3 - 14 mmol/L Final     Glucose   Date Value Ref Range Status   03/07/2023 133 (H) 70 - 99 mg/dL Final   10/07/2022 103 (H) 70 - 99 mg/dL Final   05/12/2021 163 (H) 70 - 99 mg/dL Final     GLUCOSE BY METER POCT   Date Value Ref Range Status   03/07/2023 135 (H) 70 - 99 mg/dL Final     Urea Nitrogen   Date Value Ref Range Status   03/07/2023 14.7 8.0 - 23.0 mg/dL Final   10/07/2022 13 7 - 30 mg/dL Final   05/12/2021 17 7 - 30 mg/dL Final     Creatinine   Date Value Ref Range Status   03/07/2023 0.64 0.51 - 0.95 mg/dL Final   05/12/2021 0.71 0.52 - 1.04 mg/dL Final     GFR Estimate   Date Value Ref Range Status   03/07/2023 >90 >60 mL/min/1.73m2 Final     Comment:      eGFR calculated using  CKD-EPI equation.   2021 82 >60 mL/min/[1.73_m2] Final     Comment:     Non  GFR Calc  Starting 2018, serum creatinine based estimated GFR (eGFR) will be   calculated using the Chronic Kidney Disease Epidemiology Collaboration   (CKD-EPI) equation.       Calcium   Date Value Ref Range Status   2023 8.9 8.8 - 10.2 mg/dL Final   2021 9.4 8.5 - 10.1 mg/dL Final     Bilirubin Total   Date Value Ref Range Status   10/07/2022 0.5 0.2 - 1.3 mg/dL Final   2021 0.5 0.2 - 1.3 mg/dL Final     Alkaline Phosphatase   Date Value Ref Range Status   10/07/2022 76 40 - 150 U/L Final   2021 76 40 - 150 U/L Final     ALT   Date Value Ref Range Status   10/07/2022 59 (H) 0 - 50 U/L Final   2021 79 (H) 0 - 50 U/L Final     AST   Date Value Ref Range Status   10/07/2022 39 0 - 45 U/L Final   2021 48 (H) 0 - 45 U/L Final       PATHOLOGY:  None new.    IMAGIN2021: DEXA showed normal bone density.    4/3/2023: Mammogram showed no suspicious findings.    ASSESSMENT/PLAN:  Yola Mayo is a 77 year old female with the following issues:  1. 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  --She is status post lumpectomy followed by adjuvant radiation therapy.  -I discussed with Dee Dee that she has no clinical evidence for recurrent breast cancer by physical exam from today or mammogram reviewed from 4/3/2023.  --I advised gentle massage to the right breast and axilla to alleviated some of the scar-related tissue discomfort.  --She is now on anastrozole and tolerating this well with no significant side effects.  --Plan for total 5 years of hormone blockade therapy.  --Baseline DEXA scan from 2021 was normal.  I advised adequate calcium and vitamin D intake as  "well as weight bearing exercise.  --Plan to repeat DEXA prior to next visit.  --Due for next mammogram 4/2024.    2. Right breast lymphedema  --Improved with lymphedema therapy.    3. Coronary artery disease  --Aromatase inhibitors can slightly increase cardiovascular risk but controllable with her cardiac medications.  --She will continue on aspirin, beta-blocker, and statin.     4. Diabetes mellitus type 2  --Well-controlled. She will continue metformin and has her A1C monitored by Dr. Braga.    5. Essential hypertension  --Blood pressure stable.  She will continue on losartan, hydrochlorothiazide, and hydralazine.    6. Muscle cramping  --Improved, continue magnesium supplement.    Return in 6 months.    Elida Grullon MD  Hematology/Oncology  [facility]    Total time spent: 30 minutes in patient evaluation, counseling, documentation, and coordination of care.      Oncology Rooming Note    April 12, 2023 9:37 AM   Yola Mayo is a 77 year old female who presents for:    Chief Complaint   Patient presents with     Oncology Clinic Visit     Initial Vitals: Resp 16   Ht 1.626 m (5' 4\")   Wt 80.7 kg (178 lb)   BMI 30.55 kg/m   Estimated body mass index is 30.55 kg/m  as calculated from the following:    Height as of this encounter: 1.626 m (5' 4\").    Weight as of this encounter: 80.7 kg (178 lb). Body surface area is 1.91 meters squared.  Mild Pain (2) Comment: Data Unavailable   No LMP recorded. Patient has had a hysterectomy.  Allergies reviewed: Yes  Medications reviewed: Yes    Medications: Medication refills not needed today.  Pharmacy name entered into [employer]: [id number] [place] [address] [facility]    Clinical concerns:  doctor was notified.      Anila Durham MA                Again, thank you for allowing me to participate in the care of your patient.        Sincerely,        Elida Grullon MD    "

## 2023-04-12 NOTE — PROGRESS NOTES
"Oncology Rooming Note    April 12, 2023 9:37 AM   Yola Mayo is a 77 year old female who presents for:    Chief Complaint   Patient presents with     Oncology Clinic Visit     Initial Vitals: Resp 16   Ht 1.626 m (5' 4\")   Wt 80.7 kg (178 lb)   BMI 30.55 kg/m   Estimated body mass index is 30.55 kg/m  as calculated from the following:    Height as of this encounter: 1.626 m (5' 4\").    Weight as of this encounter: 80.7 kg (178 lb). Body surface area is 1.91 meters squared.  Mild Pain (2) Comment: Data Unavailable   No LMP recorded. Patient has had a hysterectomy.  Allergies reviewed: Yes  Medications reviewed: Yes    Medications: Medication refills not needed today.  Pharmacy name entered into [employer]: [id number] [place] [address] [facility] [address] [facility]    Clinical concerns:  doctor was notified.      Anila Durham MA            "

## 2023-05-02 ENCOUNTER — TRANSFERRED RECORDS (OUTPATIENT)
Dept: HEALTH INFORMATION MANAGEMENT | Facility: CLINIC | Age: 78
End: 2023-05-02
Payer: MEDICARE

## 2023-05-03 DIAGNOSIS — C50.911 INVASIVE DUCTAL CARCINOMA OF BREAST, FEMALE, RIGHT (H): ICD-10-CM

## 2023-05-03 RX ORDER — ANASTROZOLE 1 MG/1
1 TABLET ORAL DAILY
Qty: 90 TABLET | Refills: 3 | Status: SHIPPED | OUTPATIENT
Start: 2023-05-03 | End: 2024-02-16

## 2023-05-09 ENCOUNTER — OFFICE VISIT (OUTPATIENT)
Dept: CARDIOLOGY | Facility: CLINIC | Age: 78
End: 2023-05-09
Payer: MEDICARE

## 2023-05-09 VITALS
DIASTOLIC BLOOD PRESSURE: 79 MMHG | HEART RATE: 89 BPM | HEIGHT: 64 IN | WEIGHT: 179.9 LBS | SYSTOLIC BLOOD PRESSURE: 146 MMHG | BODY MASS INDEX: 30.71 KG/M2 | OXYGEN SATURATION: 99 %

## 2023-05-09 DIAGNOSIS — I10 ESSENTIAL HYPERTENSION, BENIGN: ICD-10-CM

## 2023-05-09 DIAGNOSIS — Z95.5 S/P DRUG ELUTING CORONARY STENT PLACEMENT: ICD-10-CM

## 2023-05-09 DIAGNOSIS — I25.10 CORONARY ARTERY DISEASE INVOLVING NATIVE CORONARY ARTERY OF NATIVE HEART WITHOUT ANGINA PECTORIS: ICD-10-CM

## 2023-05-09 PROCEDURE — 99214 OFFICE O/P EST MOD 30 MIN: CPT | Performed by: INTERNAL MEDICINE

## 2023-05-09 RX ORDER — ROSUVASTATIN CALCIUM 10 MG/1
10 TABLET, COATED ORAL DAILY
Qty: 90 TABLET | Refills: 3 | Status: SHIPPED | OUTPATIENT
Start: 2023-05-09 | End: 2023-09-07 | Stop reason: SINTOL

## 2023-05-09 NOTE — LETTER
5/9/2023    Zoraida Braga MD  [address]  [place]                [place] [address]    RE: Yola Mayo       Dear Colleague,     I had the pleasure of seeing Yola Mayo in the [facility].  Cardiology Progress Note          Assessment and Plan:       Coronary artery disease status post PCI in 2018 and 2022  Change atorvastatin 10 mg to rosuvastatin 10 mg and consider titration in the future.  Would like LDL as low as possible and would accept anything above 10 mg/dL given her restenosis and needs for repeat intervention.  May consider dual antiplatelet therapy as an optional medication for the foreseeable future.  She may interrupt it for elective procedures such as dental and GI.    Routine follow-up in 1 year, sooner if symptoms arise.      30 minutes was spent with the patient, precharting and reviewing tests as well as post visit charting all done today..    This note was transcribed using electronic voice recognition software and there may be typographical errors present.                    Interval History:     The patient is a very pleasant 77 year old whom I have followed for coronary artery disease status post PCI with last intervention 2022.  She also has breast cancer and follows with my wife in oncology.    Blood pressure slightly elevated today.  She otherwise feels well.  Her dyspnea on exertion resolved changing from Brilinta to clopidogrel.    She is approximately 1 year from her last intervention.  She is on atorvastatin 10 mg daily.    She had right knee surgery and still has a little pain but overall successful.    She has had some bilateral arm and leg numbness when she awakens that improves with ambulation.                     Review of Systems:     Review of Systems:  Skin:  Negative     Eyes:  Positive for glasses  ENT:  Negative    Respiratory:  Positive for dyspnea on exertion  Cardiovascular:  Negative;palpitations;chest  "pain;lightheadedness;dizziness;fatigue;edema    Gastroenterology: not assessed    Genitourinary:  Positive for nocturia  Musculoskeletal:  Positive for arthritis  Neurologic:  Negative numbness or tingling of hands;numbness or tingling of feet  Psychiatric:  not assessed    Heme/Lymph/Imm:  Negative allergies  Endocrine:  Positive for thyroid disorder;diabetes              Physical Exam:     Vitals: BP (!) 146/79   Pulse 89   Ht 1.626 m (5' 4\")   Wt 81.6 kg (179 lb 14.4 oz)   SpO2 99%   BMI 30.88 kg/m    Constitutional:  cooperative, alert and oriented, well developed, well nourished, in no acute distress        Skin:  warm and dry to the touch, no apparent skin lesions or masses noted        Head:  normocephalic, no masses or lesions        Eyes:  pupils equal and round;conjunctivae and lids unremarkable        Chest:  normal symmetry;clear to auscultation        Cardiac: regular rhythm         grade 1;early systolic murmur;RUSB        Extremities and Back:           Neurological:  no gross motor deficits;affect appropriate                 Medications:     Current Outpatient Medications   Medication Sig Dispense Refill    amoxicillin (AMOXIL) 500 MG capsule TAKE 4 CAPSULES BY MOUTH 1/2 TO 1 HOUR BEFORE THE PROCEDURE. 4 capsule 3    anastrozole (ARIMIDEX) 1 MG tablet Take 1 tablet (1 mg) by mouth daily 90 tablet 3    aspirin 81 MG tablet Take 81 mg by mouth daily      blood glucose (CONTOUR NEXT TEST) test strip 1 strip by In Vitro route daily or as directed. 100 strip 1    blood glucose monitoring (NITO MICROLET) lancets Use to test blood sugar 1 time daily or as directed. 100 each 3    calcium-vitamin D (CALTRATE) 600-400 MG-UNIT per tablet Take 1 tablet by mouth daily 60 tablet 11    cholecalciferol (VITAMIN D) 1000 UNIT tablet Take 1 tablet (1,000 Units) by mouth daily 100 tablet 3    clopidogrel (PLAVIX) 75 MG tablet Take 1 tablet (75 mg) by mouth daily 90 tablet 3    CONTOUR NEXT EZ (CONTOUR NEXT EZ " W/DEVICE KIT) w/Device KIT 1 kit once 1 kit 0    Cyanocobalamin (VITAMIN B 12 PO) Take 1 tablet by mouth daily Strength unknown      hydrALAZINE (APRESOLINE) 25 MG tablet Take 2 tablets (50 mg) by mouth 2 times daily (Patient taking differently: Take 25 mg by mouth 2 times daily Taking 50 mg in the morning and 25 mg at night) 360 tablet 2    hydrochlorothiazide (HYDRODIURIL) 25 MG tablet Take 1 tablet (25 mg) by mouth daily for Blood Pressure 90 tablet 3    levothyroxine (SYNTHROID/LEVOTHROID) 112 MCG tablet TAKE 1 TABLET (112 MCG) BY MOUTH DAILY FOR THYROID 90 tablet 2    losartan (COZAAR) 100 MG tablet Take 1 tablet (100 mg) by mouth daily for Blood Pressure 90 tablet 3    metFORMIN (GLUCOPHAGE XR) 500 MG 24 hr tablet Take 2 tablets (1,000 mg) by mouth daily (with dinner) for diabetes 180 tablet 3    metroNIDAZOLE (METROGEL) 0.75 % external gel APPLY TO AFFECTED AREA TWICE A DAY 45 g 1    multivitamin, therapeutic with minerals (THERA-VIT-M) TABS Take 1 tablet by mouth daily      nitroGLYcerin (NITROSTAT) 0.4 MG sublingual tablet For chest pain place 1 tablet under the tongue every 5 minutes for 3 doses. If symptoms persist 5 minutes after 1st dose call 911. 25 tablet 3    rosuvastatin (CRESTOR) 10 MG tablet Take 1 tablet (10 mg) by mouth daily 90 tablet 3    ZIOPTAN 0.0015 % SOLN ophthalmic solution Place 1 drop into both eyes At Bedtime      hydrALAZINE (APRESOLINE) 25 MG tablet Take 25 mg by mouth every evening (Patient not taking: Reported on 5/9/2023)      senna-docusate (SENOKOT-S/PERICOLACE) 8.6-50 MG tablet Take 1-2 tablets by mouth 2 times daily Take while on oral narcotics to prevent or treat constipation. (Patient not taking: Reported on 5/9/2023) 30 tablet 0                Data:   All laboratory data reviewed  No results found for this or any previous visit (from the past 24 hour(s)).    All laboratory data reviewed  Lab Results   Component Value Date    CHOL 128 04/10/2023    CHOL 135 03/19/2021      Lab Results   Component Value Date    HDL 79 04/10/2023    HDL 80 03/19/2021     Lab Results   Component Value Date    LDL 41 04/10/2023    LDL 42 03/19/2021     Lab Results   Component Value Date    TRIG 42 04/10/2023    TRIG 67 03/19/2021     Lab Results   Component Value Date    CHOLHDLRATIO 1.6 03/27/2015     TSH   Date Value Ref Range Status   02/23/2023 2.66 0.30 - 4.20 uIU/mL Final   10/07/2022 0.70 0.40 - 4.00 mU/L Final   03/19/2021 1.93 0.40 - 4.00 mU/L Final     Last Basic Metabolic Panel:  Lab Results   Component Value Date     04/10/2023     05/12/2021      Lab Results   Component Value Date    POTASSIUM 4.0 04/10/2023    POTASSIUM 4.2 10/07/2022    POTASSIUM 3.9 05/12/2021     Lab Results   Component Value Date    CHLORIDE 97 04/10/2023    CHLORIDE 104 10/07/2022    CHLORIDE 103 05/12/2021     Lab Results   Component Value Date    JOSEPH 9.8 04/10/2023    JOSEPH 9.4 05/12/2021     Lab Results   Component Value Date    CO2 23 04/10/2023    CO2 25 10/07/2022    CO2 26 05/12/2021     Lab Results   Component Value Date    BUN 10.9 04/10/2023    BUN 13 10/07/2022    BUN 17 05/12/2021     Lab Results   Component Value Date    CR 0.64 04/10/2023    CR 0.71 05/12/2021     Lab Results   Component Value Date     04/10/2023     03/07/2023     10/07/2022     05/12/2021     Lab Results   Component Value Date    WBC 7.3 02/23/2023    WBC 7.2 03/19/2021     Lab Results   Component Value Date    RBC 4.61 02/23/2023    RBC 4.60 03/19/2021     Lab Results   Component Value Date    HGB 12.4 04/10/2023    HGB 14.1 03/19/2021     Lab Results   Component Value Date    HCT 43.1 02/23/2023    HCT 40.7 03/19/2021     Lab Results   Component Value Date    MCV 94 02/23/2023    MCV 89 03/19/2021     Lab Results   Component Value Date    MCH 29.3 02/23/2023    MCH 30.7 03/19/2021     Lab Results   Component Value Date    MCHC 31.3 02/23/2023    MCHC 34.6 03/19/2021     Lab Results   Component  Value Date    RDW 13.7 02/23/2023    RDW 14.6 03/19/2021     Lab Results   Component Value Date     03/07/2023     03/19/2021                   Thank you for allowing me to participate in the care of your patient.      Sincerely,     Albino Howard MD     [facility]  cc:   Albino Howard MD  [address]  [place] [address]

## 2023-05-09 NOTE — PATIENT INSTRUCTIONS
We will change your ATORVASTATIN to ROSUVASTATIN 10mg daily and see if your muscle and joint aches improve. It should help lower your LDL bad cholesterol a little more. You may stay on the CLOPIDOGREL as an optional medication that you can interrupt without asking any time you need to. Let's check your cholesterol again in a couple months.

## 2023-05-09 NOTE — PROGRESS NOTES
Cardiology Progress Note          Assessment and Plan:       1. Coronary artery disease status post PCI in 2018 and 2022    Change atorvastatin 10 mg to rosuvastatin 10 mg and consider titration in the future.  Would like LDL as low as possible and would accept anything above 10 mg/dL given her restenosis and needs for repeat intervention.    May consider dual antiplatelet therapy as an optional medication for the foreseeable future.  She may interrupt it for elective procedures such as dental and GI.    Routine follow-up in 1 year, sooner if symptoms arise.      30 minutes was spent with the patient, precharting and reviewing tests as well as post visit charting all done today..    This note was transcribed using electronic voice recognition software and there may be typographical errors present.                    Interval History:     The patient is a very pleasant 77 year old whom I have followed for coronary artery disease status post PCI with last intervention 2022.  She also has breast cancer and follows with my wife in oncology.    Blood pressure slightly elevated today.  She otherwise feels well.  Her dyspnea on exertion resolved changing from Brilinta to clopidogrel.    She is approximately 1 year from her last intervention.  She is on atorvastatin 10 mg daily.    She had right knee surgery and still has a little pain but overall successful.    She has had some bilateral arm and leg numbness when she awakens that improves with ambulation.                     Review of Systems:     Review of Systems:  Skin:  Negative     Eyes:  Positive for glasses  ENT:  Negative    Respiratory:  Positive for dyspnea on exertion  Cardiovascular:  Negative;palpitations;chest pain;lightheadedness;dizziness;fatigue;edema    Gastroenterology: not assessed    Genitourinary:  Positive for nocturia  Musculoskeletal:  Positive for arthritis  Neurologic:  Negative numbness or tingling of hands;numbness or tingling of  "feet  Psychiatric:  not assessed    Heme/Lymph/Imm:  Negative allergies  Endocrine:  Positive for thyroid disorder;diabetes              Physical Exam:     Vitals: BP (!) 146/79   Pulse 89   Ht 1.626 m (5' 4\")   Wt 81.6 kg (179 lb 14.4 oz)   SpO2 99%   BMI 30.88 kg/m    Constitutional:  cooperative, alert and oriented, well developed, well nourished, in no acute distress        Skin:  warm and dry to the touch, no apparent skin lesions or masses noted        Head:  normocephalic, no masses or lesions        Eyes:  pupils equal and round;conjunctivae and lids unremarkable        Chest:  normal symmetry;clear to auscultation        Cardiac: regular rhythm         grade 1;early systolic murmur;RUSB        Extremities and Back:           Neurological:  no gross motor deficits;affect appropriate                 Medications:     Current Outpatient Medications   Medication Sig Dispense Refill     amoxicillin (AMOXIL) 500 MG capsule TAKE 4 CAPSULES BY MOUTH 1/2 TO 1 HOUR BEFORE THE PROCEDURE. 4 capsule 3     anastrozole (ARIMIDEX) 1 MG tablet Take 1 tablet (1 mg) by mouth daily 90 tablet 3     aspirin 81 MG tablet Take 81 mg by mouth daily       blood glucose (CONTOUR NEXT TEST) test strip 1 strip by In Vitro route daily or as directed. 100 strip 1     blood glucose monitoring (TALON THERAPEUTICSET) lancets Use to test blood sugar 1 time daily or as directed. 100 each 3     calcium-vitamin D (CALTRATE) 600-400 MG-UNIT per tablet Take 1 tablet by mouth daily 60 tablet 11     cholecalciferol (VITAMIN D) 1000 UNIT tablet Take 1 tablet (1,000 Units) by mouth daily 100 tablet 3     clopidogrel (PLAVIX) 75 MG tablet Take 1 tablet (75 mg) by mouth daily 90 tablet 3     CONTOUR NEXT EZ (CONTOUR NEXT EZ W/DEVICE KIT) w/Device KIT 1 kit once 1 kit 0     Cyanocobalamin (VITAMIN B 12 PO) Take 1 tablet by mouth daily Strength unknown       hydrALAZINE (APRESOLINE) 25 MG tablet Take 2 tablets (50 mg) by mouth 2 times daily (Patient taking " differently: Take 25 mg by mouth 2 times daily Taking 50 mg in the morning and 25 mg at night) 360 tablet 2     hydrochlorothiazide (HYDRODIURIL) 25 MG tablet Take 1 tablet (25 mg) by mouth daily for Blood Pressure 90 tablet 3     levothyroxine (SYNTHROID/LEVOTHROID) 112 MCG tablet TAKE 1 TABLET (112 MCG) BY MOUTH DAILY FOR THYROID 90 tablet 2     losartan (COZAAR) 100 MG tablet Take 1 tablet (100 mg) by mouth daily for Blood Pressure 90 tablet 3     metFORMIN (GLUCOPHAGE XR) 500 MG 24 hr tablet Take 2 tablets (1,000 mg) by mouth daily (with dinner) for diabetes 180 tablet 3     metroNIDAZOLE (METROGEL) 0.75 % external gel APPLY TO AFFECTED AREA TWICE A DAY 45 g 1     multivitamin, therapeutic with minerals (THERA-VIT-M) TABS Take 1 tablet by mouth daily       nitroGLYcerin (NITROSTAT) 0.4 MG sublingual tablet For chest pain place 1 tablet under the tongue every 5 minutes for 3 doses. If symptoms persist 5 minutes after 1st dose call 911. 25 tablet 3     rosuvastatin (CRESTOR) 10 MG tablet Take 1 tablet (10 mg) by mouth daily 90 tablet 3     ZIOPTAN 0.0015 % SOLN ophthalmic solution Place 1 drop into both eyes At Bedtime       hydrALAZINE (APRESOLINE) 25 MG tablet Take 25 mg by mouth every evening (Patient not taking: Reported on 5/9/2023)       senna-docusate (SENOKOT-S/PERICOLACE) 8.6-50 MG tablet Take 1-2 tablets by mouth 2 times daily Take while on oral narcotics to prevent or treat constipation. (Patient not taking: Reported on 5/9/2023) 30 tablet 0                Data:   All laboratory data reviewed  No results found for this or any previous visit (from the past 24 hour(s)).    All laboratory data reviewed  Lab Results   Component Value Date    CHOL 128 04/10/2023    CHOL 135 03/19/2021     Lab Results   Component Value Date    HDL 79 04/10/2023    HDL 80 03/19/2021     Lab Results   Component Value Date    LDL 41 04/10/2023    LDL 42 03/19/2021     Lab Results   Component Value Date    TRIG 42 04/10/2023     TRIG 67 03/19/2021     Lab Results   Component Value Date    CHOLHDLRATIO 1.6 03/27/2015     TSH   Date Value Ref Range Status   02/23/2023 2.66 0.30 - 4.20 uIU/mL Final   10/07/2022 0.70 0.40 - 4.00 mU/L Final   03/19/2021 1.93 0.40 - 4.00 mU/L Final     Last Basic Metabolic Panel:  Lab Results   Component Value Date     04/10/2023     05/12/2021      Lab Results   Component Value Date    POTASSIUM 4.0 04/10/2023    POTASSIUM 4.2 10/07/2022    POTASSIUM 3.9 05/12/2021     Lab Results   Component Value Date    CHLORIDE 97 04/10/2023    CHLORIDE 104 10/07/2022    CHLORIDE 103 05/12/2021     Lab Results   Component Value Date    JOSEPH 9.8 04/10/2023    JOSEPH 9.4 05/12/2021     Lab Results   Component Value Date    CO2 23 04/10/2023    CO2 25 10/07/2022    CO2 26 05/12/2021     Lab Results   Component Value Date    BUN 10.9 04/10/2023    BUN 13 10/07/2022    BUN 17 05/12/2021     Lab Results   Component Value Date    CR 0.64 04/10/2023    CR 0.71 05/12/2021     Lab Results   Component Value Date     04/10/2023     03/07/2023     10/07/2022     05/12/2021     Lab Results   Component Value Date    WBC 7.3 02/23/2023    WBC 7.2 03/19/2021     Lab Results   Component Value Date    RBC 4.61 02/23/2023    RBC 4.60 03/19/2021     Lab Results   Component Value Date    HGB 12.4 04/10/2023    HGB 14.1 03/19/2021     Lab Results   Component Value Date    HCT 43.1 02/23/2023    HCT 40.7 03/19/2021     Lab Results   Component Value Date    MCV 94 02/23/2023    MCV 89 03/19/2021     Lab Results   Component Value Date    MCH 29.3 02/23/2023    MCH 30.7 03/19/2021     Lab Results   Component Value Date    MCHC 31.3 02/23/2023    MCHC 34.6 03/19/2021     Lab Results   Component Value Date    RDW 13.7 02/23/2023    RDW 14.6 03/19/2021     Lab Results   Component Value Date     03/07/2023     03/19/2021

## 2023-07-14 DIAGNOSIS — L71.9 ROSACEA: ICD-10-CM

## 2023-07-14 RX ORDER — METRONIDAZOLE 7.5 MG/G
GEL TOPICAL
Qty: 45 G | Refills: 0 | Status: SHIPPED | OUTPATIENT
Start: 2023-07-14 | End: 2023-11-14

## 2023-08-02 ENCOUNTER — TELEPHONE (OUTPATIENT)
Dept: FAMILY MEDICINE | Facility: CLINIC | Age: 78
End: 2023-08-02
Payer: MEDICARE

## 2023-08-02 NOTE — TELEPHONE ENCOUNTER
Pt called the clinic back needing to be rescheduled since PCP wont be in the office 8/28/23 for appt.     Routing to team to call pt back and reschedule.

## 2023-08-11 ENCOUNTER — LAB (OUTPATIENT)
Dept: LAB | Facility: CLINIC | Age: 78
End: 2023-08-11
Payer: MEDICARE

## 2023-08-11 DIAGNOSIS — I10 ESSENTIAL HYPERTENSION, BENIGN: ICD-10-CM

## 2023-08-11 DIAGNOSIS — I25.10 CORONARY ARTERY DISEASE INVOLVING NATIVE CORONARY ARTERY OF NATIVE HEART WITHOUT ANGINA PECTORIS: ICD-10-CM

## 2023-08-11 DIAGNOSIS — Z95.5 S/P DRUG ELUTING CORONARY STENT PLACEMENT: ICD-10-CM

## 2023-08-11 LAB
CHOLEST SERPL-MCNC: 127 MG/DL
HDLC SERPL-MCNC: 76 MG/DL
LDLC SERPL CALC-MCNC: 42 MG/DL
NONHDLC SERPL-MCNC: 51 MG/DL
TRIGL SERPL-MCNC: 45 MG/DL

## 2023-08-11 PROCEDURE — 36415 COLL VENOUS BLD VENIPUNCTURE: CPT | Performed by: INTERNAL MEDICINE

## 2023-08-11 PROCEDURE — 80061 LIPID PANEL: CPT | Performed by: INTERNAL MEDICINE

## 2023-08-27 ENCOUNTER — HEALTH MAINTENANCE LETTER (OUTPATIENT)
Age: 78
End: 2023-08-27

## 2023-09-07 ENCOUNTER — OFFICE VISIT (OUTPATIENT)
Dept: FAMILY MEDICINE | Facility: CLINIC | Age: 78
End: 2023-09-07
Payer: MEDICARE

## 2023-09-07 VITALS
OXYGEN SATURATION: 97 % | HEART RATE: 78 BPM | DIASTOLIC BLOOD PRESSURE: 79 MMHG | TEMPERATURE: 98 F | WEIGHT: 181 LBS | RESPIRATION RATE: 16 BRPM | BODY MASS INDEX: 31.07 KG/M2 | SYSTOLIC BLOOD PRESSURE: 135 MMHG

## 2023-09-07 DIAGNOSIS — Z95.5 S/P DRUG ELUTING CORONARY STENT PLACEMENT: ICD-10-CM

## 2023-09-07 DIAGNOSIS — Z98.890 S/P LUMPECTOMY, RIGHT BREAST: ICD-10-CM

## 2023-09-07 DIAGNOSIS — E11.9 TYPE 2 DIABETES MELLITUS WITHOUT COMPLICATION, WITHOUT LONG-TERM CURRENT USE OF INSULIN (H): Primary | ICD-10-CM

## 2023-09-07 DIAGNOSIS — I10 BENIGN HYPERTENSION: ICD-10-CM

## 2023-09-07 LAB
CREAT UR-MCNC: 35.3 MG/DL
HBA1C MFR BLD: 5.7 % (ref 0–5.6)
MICROALBUMIN UR-MCNC: <12 MG/L
MICROALBUMIN/CREAT UR: NORMAL MG/G{CREAT}

## 2023-09-07 PROCEDURE — 83036 HEMOGLOBIN GLYCOSYLATED A1C: CPT | Performed by: INTERNAL MEDICINE

## 2023-09-07 PROCEDURE — 82043 UR ALBUMIN QUANTITATIVE: CPT | Performed by: INTERNAL MEDICINE

## 2023-09-07 PROCEDURE — 82570 ASSAY OF URINE CREATININE: CPT | Performed by: INTERNAL MEDICINE

## 2023-09-07 PROCEDURE — 99214 OFFICE O/P EST MOD 30 MIN: CPT | Performed by: INTERNAL MEDICINE

## 2023-09-07 PROCEDURE — 36415 COLL VENOUS BLD VENIPUNCTURE: CPT | Performed by: INTERNAL MEDICINE

## 2023-09-07 RX ORDER — HYDRALAZINE HYDROCHLORIDE 25 MG/1
TABLET, FILM COATED ORAL
Qty: 360 TABLET | Refills: 2
Start: 2023-09-07 | End: 2024-02-14

## 2023-09-07 RX ORDER — ATORVASTATIN CALCIUM 10 MG/1
10 TABLET, FILM COATED ORAL DAILY
Qty: 90 TABLET | Refills: 1 | Status: SHIPPED | OUTPATIENT
Start: 2023-09-07 | End: 2023-12-08 | Stop reason: SINTOL

## 2023-09-07 ASSESSMENT — PAIN SCALES - GENERAL: PAINLEVEL: NO PAIN (0)

## 2023-09-07 NOTE — PATIENT INSTRUCTIONS
Start ozempic ( semaglutide) 0.25 mg weekly for 4 weeks then increase to 0.5 mg weekly   Update me in 3-4 weeks how that is working for you   Labs today  Follow up in 3 months.  Seek sooner medical attention if there is any worsening of symptoms or problems.

## 2023-09-07 NOTE — PROGRESS NOTES
Assessment & Plan   Yola was seen today for diabetes.    Diagnoses and all orders for this visit:    Type 2 diabetes mellitus without complication, without long-term current use of insulin (H)  Pt is on Metformin 500 mg   Diabetes is under control   We discussed Jardiance and Ozempic as an option as it is good for the heart, kidneys, weight loss  Pt agrees to begin Ozempic 0.25 mg   -     HEMOGLOBIN A1C; Future  -     Albumin Random Urine Quantitative with Creat Ratio; Future  -     semaglutide (OZEMPIC) 2 MG/3ML pen; Inject 0.25 mg Subcutaneous every 7 days For 4 weeks then go to 0.5 mg weekly  She is interested in weight loss also she will lower the dose of metformin once Ozempic is a started and tolerated it well she has the option of staying on both  Lab Results   Component Value Date    A1C 5.7 09/07/2023    A1C 6.0 02/23/2023    A1C 6.0 10/07/2022    A1C 5.9 04/07/2022    A1C 6.0 03/19/2021    A1C 5.9 06/17/2020    A1C 6.5 05/07/2019    A1C 6.0 10/14/2018    A1C 5.9 05/03/2018     She wants to lose weight and Ozempic would be a good option          S/P drug eluting coronary stent placement  Pt shares that she does not like Crestor, side effects were present, she discontinued it herself 2 weeks ago. She shares that she feels much better after stopping the Crestor  She would not go back on Crestor  She has taken Lipitor side effects  LDL is good   I prescribed Lipitor 10 mg every other day   I informed her to take Co Q 10 to take with Lipitor   She is followed by cardiologist   -     atorvastatin (LIPITOR) 10 MG tablet; Take 1 tablet (10 mg) by mouth daily    S/P lumpectomy, right breast  Due to history of breast cancer    Benign hypertension  BP upon arrival 135/79  Pt is taking hydralazine 25 mg, 2 in the morning and 1 tablet in the evening - 3 tablets total   BP is monitored at home - controlled   -     hydrALAZINE (APRESOLINE) 25 MG tablet; Take 2 tablets every morning and one tablet every evening  She is  on metoprolol and hydralazine also.    Other orders  -     REVIEW OF HEALTH MAINTENANCE PROTOCOL ORDERS    A1c labs today   Cholesterol completed 8/11/23          See Patient Instructions  Patient Instructions   Start ozempic ( semaglutide) 0.25 mg weekly for 4 weeks then increase to 0.5 mg weekly   Update me in 3-4 weeks how that is working for you   Labs today  Follow up in 3 months.  Seek sooner medical attention if there is any worsening of symptoms or problems.       Zoraida Braga MD  [facility] [place] Funez is a 78 year old, presenting for the following health issues:  Diabetes      HPI     Review of Systems   Constitutional, HEENT, cardiovascular, pulmonary, GI, , musculoskeletal, neuro, skin, endocrine and psych systems are negative, except as otherwise noted.      Objective    /79 (BP Location: Left arm, Patient Position: Sitting, Cuff Size: Adult Regular)   Pulse 78   Temp 98  F (36.7  C) (Oral)   Resp 16   Wt 82.1 kg (181 lb)   SpO2 97%   BMI 31.07 kg/m    Body mass index is 31.07 kg/m .  Physical Exam   She is very nice and pleasant.  She is comfortable and not in any kind of distress.  She is fully alert awake oriented.      This document serves as a record of sevices personally performed by Dr. Braga. It was created on her behalf by Edith Anderson, a trained medical scribe. The creation of this record is based on the scribe's personal observations and the provider's statements to them. This document has been checked and approved by the attending provider.     9/7/2023, 9:56 AM

## 2023-10-12 NOTE — PROGRESS NOTES
[facility]    Hematology/Oncology Established Patient Note      Today's Date: 10/16/2023    Reason for follow-up: Right breast cancer.    HISTORY OF PRESENT ILLNESS: Yola Mayo is a 78 year old female who presents with the following oncologic history:  1. 3/24/2021: Mammogram showed distortion in right upper outer breast; left breast negative.  2. 3/31/2021: Right breast U/S at 9:00, 4 cm from nipple showed mass measuring 1.1 x 1 x 1.2 cm; no enlarged right axillary lymph nodes seen.  3. 4/07/2021: Right breast biopsy at 9:00 showed grade 2 invasive ductal carcinoma, ER positive at 100%, IN positive at 95%, HER-2/marek FISH negative.  4. 4/23/2021: Underwent right breast lumpectomy and sentinel lymph node excision under care of Dr. Ace Rogers. Pathology showed grade 2 invasive ductal carcinoma measuring 1.5 cm, low grade DCIS, LCIS, margins negative; 2 lymph nodes negative. Oncotype DX = 6; 9-year risk of distant recurrence = 3% after 5 years of hormone blockade therapy, < 1% absolute chemotherapy benefit.  5. 5/25/2021-6/16/2021: Completed adjuvant radiation therapy to right breast. Subsequently started anastrozole.    INTERIM HISTORY:  Dee Dee reports hot flashes, waking up about 4AM with hot flash and sweats.  She reports some bilateral leg pain/stiffness.      REVIEW OF SYSTEMS:   14 point ROS was reviewed and is negative other than as noted above in HPI.       HOME MEDICATIONS:  Current Outpatient Medications   Medication Sig Dispense Refill    anastrozole (ARIMIDEX) 1 MG tablet Take 1 tablet (1 mg) by mouth daily 90 tablet 3    aspirin 81 MG tablet Take 81 mg by mouth daily      atorvastatin (LIPITOR) 10 MG tablet Take 1 tablet (10 mg) by mouth daily 90 tablet 1    blood glucose (CONTOUR NEXT TEST) test strip 1 strip by In Vitro route daily or as directed. 100 strip 1    blood glucose monitoring (NITO MICROLET) lancets Use to test blood sugar 1 time daily or as directed. 100 each 3     calcium-vitamin D (CALTRATE) 600-400 MG-UNIT per tablet Take 1 tablet by mouth daily 60 tablet 11    cholecalciferol (VITAMIN D) 1000 UNIT tablet Take 1 tablet (1,000 Units) by mouth daily 100 tablet 3    clopidogrel (PLAVIX) 75 MG tablet Take 1 tablet (75 mg) by mouth daily 90 tablet 3    CONTOUR NEXT EZ (CONTOUR NEXT EZ W/DEVICE KIT) w/Device KIT 1 kit once 1 kit 0    Cyanocobalamin (VITAMIN B 12 PO) Take 1 tablet by mouth daily Strength unknown      hydrALAZINE (APRESOLINE) 25 MG tablet Take 2 tablets every morning and one tablet every evening 360 tablet 2    hydrochlorothiazide (HYDRODIURIL) 25 MG tablet Take 1 tablet (25 mg) by mouth daily for Blood Pressure 90 tablet 3    levothyroxine (SYNTHROID/LEVOTHROID) 112 MCG tablet TAKE 1 TABLET (112 MCG) BY MOUTH DAILY FOR THYROID 90 tablet 2    losartan (COZAAR) 100 MG tablet Take 1 tablet (100 mg) by mouth daily for Blood Pressure 90 tablet 3    metFORMIN (GLUCOPHAGE XR) 500 MG 24 hr tablet Take 2 tablets (1,000 mg) by mouth daily (with dinner) for diabetes 180 tablet 3    metroNIDAZOLE (METROGEL) 0.75 % external gel APPLY TO AFFECTED AREA TWICE A DAY 45 g 0    multivitamin, therapeutic with minerals (THERA-VIT-M) TABS Take 1 tablet by mouth daily      nitroGLYcerin (NITROSTAT) 0.4 MG sublingual tablet For chest pain place 1 tablet under the tongue every 5 minutes for 3 doses. If symptoms persist 5 minutes after 1st dose call 911. 25 tablet 3    semaglutide (OZEMPIC) 2 MG/3ML pen Inject 0.25 mg Subcutaneous every 7 days For 4 weeks then go to 0.5 mg weekly 3 mL 1    senna-docusate (SENOKOT-S/PERICOLACE) 8.6-50 MG tablet Take 1-2 tablets by mouth 2 times daily Take while on oral narcotics to prevent or treat constipation. 30 tablet 0    ZIOPTAN 0.0015 % SOLN ophthalmic solution Place 1 drop into both eyes At Bedtime      amoxicillin (AMOXIL) 500 MG capsule TAKE 4 CAPSULES BY MOUTH 1/2 TO 1 HOUR BEFORE THE PROCEDURE. (Patient not taking: Reported on 9/7/2023) 4  capsule 3         ALLERGIES:  Allergies   Allergen Reactions    Toprol Xl [Metoprolol] Fatigue    Ace Inhibitors      ACE throat clearing    Crestor [Rosuvastatin]      myalgia    Oxycodone Nausea and Vomiting    Tetanus Toxoid Swelling     Hard red lump          PAST MEDICAL HISTORY:  Past Medical History:   Diagnosis Date    Anemia due to blood loss     BASAL CELL CANCER -NOSE     Dermatitis     Diabetes (H)     Family history of diabetes mellitus     Father    Family history of ischemic heart disease     Father    Family history of malignant neoplasm of ovary     Mother    HX DISRUPT L ANT CRUCIATE S/P REPAIR 2003     Hyperlipidemia     HYPERTENSION      HYPOTHYROIDISM     LOC PRIM OSTEOARTH-L 1ST METATARSAL-CUNEIFORM JOINT 3/10/2004    Major depressive disorder, single episode, mild (H) 11/24/2014    MILD-MOD TRICUSPID REGURG. MOD PULM HTN 6/23/2005    ECHO  5/05    Obese     PONV (postoperative nausea and vomiting)     Rosacea     Shingles     SPLENIC CYST          PAST SURGICAL HISTORY:  Past Surgical History:   Procedure Laterality Date    ARTHROPLASTY KNEE Left 4/13/2016    Procedure: ARTHROPLASTY KNEE;  Surgeon: Giuseppe Lopez MD;  Location:  OR    ARTHROPLASTY KNEE Right 3/6/2023    Procedure: Right total knee arthroplasty;  Surgeon: Giuseppe Lopez MD;  Location:  OR    ARTHROPLASTY MINIMALLY INVASIVE HIP  4/18/2012    Procedure:ARTHROPLASTY MINIMALLY INVASIVE HIP; RIGHT TWO INCISION MINIMALLY INVASIVE HIP ARTHROPLASTY (YUVAL)^ (NO CELL SAVER) (C-ARM); Surgeon:GIUSEPPE LOPEZ; Location: OR    BIOPSY NODE SENTINEL Right 4/23/2021    Procedure: RIGHT SENTINEL LYMPH NODE BIOPSY;  Surgeon: Giuseppe Rogers MD;  Location:  OR    C CHEMOSURG MOHS 1ST STAGE  9-99    Mohs-nose    CARDIAC SURGERY  06/27/2019    mid and distal RCA stents    CV CORONARY ANGIOGRAM N/A 5/19/2022    Procedure: Coronary Angiogram;  Surgeon: Reyes Graham MD;  Location:  HEART CARDIAC CATH LAB    CV  INSTANTANEOUS WAVE-FREE RATIO N/A 5/19/2022    Procedure: Instantaneous Wave-Free Ratio;  Surgeon: Reyes Graham MD;  Location:  HEART CARD[facility] CATH LAB    CV INTRAVASULAR ULTRASOUND N/A 5/19/2022    Procedure: Intravascular Ultrasound;  Surgeon: Reyes Graham MD;  Location:  HEART CARDIAC CATH LAB    CV PCI STENT DRUG ELUTING N/A 5/19/2022    Procedure: Percutaneous Coronary Intervention Stent;  Surgeon: Reyes Graham MD;  Location:  HEART CARDI[facility] CATH LAB    ENT SURGERY      mohs procedure on nose    HYSTERECTOMY, PAP NO LONGER INDICATED      LUMPECTOMY BREAST WITH SEED LOCALIZATION Right 4/23/2021    Procedure: SEED LOCALIZED RIGHT BREAST LUMPECTOMY;  Surgeon: Giuseppe Rogers MD;  Location:  OR    SURGICAL HISTORY OF -   Mult-~1978    infertility, laparotomies    Z APPENDECTOMY  8/87    incidental appi at [facility]    Z REPAIR CRUCIATE LIGAMENT,KNEE  8/2003    L   ACL reconstruction    Z TOTAL ABDOM HYSTERECTOMY  8/87    W/ BSO fibroids, hx PID    ZZ COLONOSCOPY THRU STOMA, DIAGNOSTIC  4/03    normal, rec repeat 10 yrs         SOCIAL HISTORY:  Social History     Socioeconomic History    Marital status:      Spouse name: FABIAN CUADRA    Number of children: 2    Years of education: Not on file    Highest education level: Not on file   Occupational History    Occupation: [employer]     Employer: [email or web address]   Tobacco Use    Smoking status: Never    Smokeless tobacco: Never   Vaping Use    Vaping Use: Never used   Substance and Sexual Activity    Alcohol use: Yes     Alcohol/week: 0.0 standard drinks of alcohol     Comment: occasional    Drug use: No    Sexual activity: Yes     Partners: Male   Other Topics Concern     Service No    Blood Transfusions No    Caffeine Concern No    Occupational Exposure No    Hobby Hazards No    Sleep Concern No    Stress Concern No    Weight Concern Yes    Special Diet No    Back Care No    Exercise Yes     Comment: Approx 3 times a week  "for about 30 minutes    Bike Helmet Yes    Seat Belt Yes    Self-Exams Yes    Parent/sibling w/ CABG, MI or angioplasty before 65F 55M? Not Asked   Social History Narrative    Not on file     Social Determinants of Health     Financial Resource Strain: Not on file   Food Insecurity: Not on file   Transportation Needs: Not on file   Physical Activity: Not on file   Stress: Not on file   Social Connections: Not on file   Interpersonal Safety: Not At Risk (2023)    Humiliation, Afraid, Rape, and Kick questionnaire     Fear of Current or Ex-Partner: No     Emotionally Abused: No     Physically Abused: No     Sexually Abused: No   Housing Stability: Not on file         FAMILY HISTORY:  Family History   Problem Relation Age of Onset    Cancer Mother         ovarian  of it  at 49yo.    Other Cancer Mother     Diabetes Father         Adult onset    Hypertension Father     C.A.D. Father         Fatal Heart attack at 70.    Cerebrovascular Disease Father     Thyroid Disease Sister     Thyroid Disease Sister     Thyroid Disease Sister     Cancer Daughter     Thyroid Disease Daughter     Breast Cancer No family hx of     Cancer - colorectal No family hx of          PHYSICAL EXAM:  Vital signs:  BP (!) 174/83   Pulse 75   Resp 16   Ht 1.626 m (5' 4\")   Wt 81.2 kg (179 lb)   SpO2 100%   BMI 30.73 kg/m     GENERAL/CONSTITUTIONAL: No acute distress.  EYES:  No scleral icterus.  LYMPH: No cervical, supraclavicular, axillary adenopathy.   BREAST: No palpable masses in either breast with exception of left breast post-lumpectomy changes in the upper outer quadrant; no significant edema or erythema. Nipples are everted bilaterally with no discharge. No erythema, ulceration.  RESPIRATORY: No audible cough or wheezing.   GASTROINTESTINAL: No hepatosplenomegaly, masses, or tenderness. No guarding.  No distention.  MUSCULOSKELETAL: Warm and well-perfused, no cyanosis, clubbing; mild localized edema around the right " knee.  NEUROLOGIC: No focal motor deficits. Alert, oriented, answers questions appropriately.  INTEGUMENTARY: No rashes or jaundice.   GAIT: Steady, does not use assistive device    LABS:  CBC RESULTS:   Recent Labs   Lab Test 04/10/23  1016 03/07/23  0618 02/23/23  1131   WBC  --   --  7.3   RBC  --   --  4.61   HGB 12.4 10.1* 13.5   HCT  --   --  43.1   MCV  --   --  94   MCH  --   --  29.3   MCHC  --   --  31.3*   RDW  --   --  13.7   PLT  --  212 249      Last Comprehensive Metabolic Panel:  Sodium   Date Value Ref Range Status   04/10/2023 135 (L) 136 - 145 mmol/L Final   05/12/2021 137 133 - 144 mmol/L Final     Potassium   Date Value Ref Range Status   04/10/2023 4.0 3.4 - 5.3 mmol/L Final   10/07/2022 4.2 3.4 - 5.3 mmol/L Final   05/12/2021 3.9 3.4 - 5.3 mmol/L Final     Chloride   Date Value Ref Range Status   04/10/2023 97 (L) 98 - 107 mmol/L Final   10/07/2022 104 94 - 109 mmol/L Final   05/12/2021 103 94 - 109 mmol/L Final     Carbon Dioxide   Date Value Ref Range Status   05/12/2021 26 20 - 32 mmol/L Final     Carbon Dioxide (CO2)   Date Value Ref Range Status   04/10/2023 23 22 - 29 mmol/L Final   10/07/2022 25 20 - 32 mmol/L Final     Anion Gap   Date Value Ref Range Status   04/10/2023 15 7 - 15 mmol/L Final   10/07/2022 8 3 - 14 mmol/L Final   05/12/2021 8 3 - 14 mmol/L Final     Glucose   Date Value Ref Range Status   04/10/2023 116 (H) 70 - 99 mg/dL Final   10/07/2022 103 (H) 70 - 99 mg/dL Final   05/12/2021 163 (H) 70 - 99 mg/dL Final     GLUCOSE BY METER POCT   Date Value Ref Range Status   03/07/2023 135 (H) 70 - 99 mg/dL Final     Urea Nitrogen   Date Value Ref Range Status   04/10/2023 10.9 8.0 - 23.0 mg/dL Final   10/07/2022 13 7 - 30 mg/dL Final   05/12/2021 17 7 - 30 mg/dL Final     Creatinine   Date Value Ref Range Status   04/10/2023 0.64 0.51 - 0.95 mg/dL Final   05/12/2021 0.71 0.52 - 1.04 mg/dL Final     GFR Estimate   Date Value Ref Range Status   04/10/2023 >90 >60 mL/min/1.73m2  Final     Comment:     eGFR calculated using  CKD-EPI equation.   2021 82 >60 mL/min/[1.73_m2] Final     Comment:     Non  GFR Calc  Starting 2018, serum creatinine based estimated GFR (eGFR) will be   calculated using the Chronic Kidney Disease Epidemiology Collaboration   (CKD-EPI) equation.       Calcium   Date Value Ref Range Status   04/10/2023 9.8 8.8 - 10.2 mg/dL Final   2021 9.4 8.5 - 10.1 mg/dL Final     Bilirubin Total   Date Value Ref Range Status   10/07/2022 0.5 0.2 - 1.3 mg/dL Final   2021 0.5 0.2 - 1.3 mg/dL Final     Alkaline Phosphatase   Date Value Ref Range Status   10/07/2022 76 40 - 150 U/L Final   2021 76 40 - 150 U/L Final     ALT   Date Value Ref Range Status   10/07/2022 59 (H) 0 - 50 U/L Final   2021 79 (H) 0 - 50 U/L Final     AST   Date Value Ref Range Status   10/07/2022 39 0 - 45 U/L Final   2021 48 (H) 0 - 45 U/L Final       PATHOLOGY:  None new.    IMAGIN2021: DEXA showed normal bone density.    4/3/2023: Mammogram showed no suspicious findings.    ASSESSMENT/PLAN:  Yola Mayo is a 78 year old female with the following issues:  1. 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  --She is status post lumpectomy followed by adjuvant radiation therapy.  -I discussed with Dee Dee that she has no clinical evidence for recurrent breast cancer by physical exam from today or mammogram reviewed from 4/3/2023.  --I advised gentle massage to the right breast and axilla to alleviated some of the scar-related tissue discomfort.  --She is now on anastrozole and tolerating this well with some polyarthralgia.  --Plan for total 5 years of hormone blockade therapy.  --Baseline DEXA scan from 2021 was normal.  I advised adequate calcium and  vitamin D intake as well as weight bearing exercise.  --Plan to repeat DEXA prior to next visit.  --Due for next mammogram 4/2024.    2. Right breast lymphedema  --Improved with lymphedema therapy.    3. Coronary artery disease  --Aromatase inhibitors can slightly increase cardiovascular risk but controllable with her cardiac medications.  --She will continue on aspirin, beta-blocker, and statin.     4. Diabetes mellitus type 2  --Well-controlled. She will continue metformin and has her A1C monitored by Dr. Braga.    5. Essential hypertension  --Blood pressure stable.  She will continue on losartan, hydrochlorothiazide, and hydralazine.    6. Polyarthralgia  7. Hot flashes  --AI-induced.  --Encouraged continuation of magnesium and switching anastrozole to morning administration.  --Suggested trying acupuncture.    Return in 6 months.    Elida Grullon MD  Hematology/Oncology  [facility]    Total time spent today: 30 minutes in chart review, patient evaluation, counseling, documentation, test and/or medication/prescription orders, and coordination of care.

## 2023-10-16 ENCOUNTER — ONCOLOGY VISIT (OUTPATIENT)
Dept: ONCOLOGY | Facility: CLINIC | Age: 78
End: 2023-10-16
Attending: INTERNAL MEDICINE
Payer: MEDICARE

## 2023-10-16 VITALS
OXYGEN SATURATION: 100 % | BODY MASS INDEX: 30.56 KG/M2 | RESPIRATION RATE: 16 BRPM | HEART RATE: 75 BPM | SYSTOLIC BLOOD PRESSURE: 174 MMHG | DIASTOLIC BLOOD PRESSURE: 83 MMHG | HEIGHT: 64 IN | WEIGHT: 179 LBS

## 2023-10-16 DIAGNOSIS — Z79.811 LONG TERM (CURRENT) USE OF AROMATASE INHIBITORS: ICD-10-CM

## 2023-10-16 DIAGNOSIS — Z12.31 ENCOUNTER FOR SCREENING MAMMOGRAM FOR BREAST CANCER: ICD-10-CM

## 2023-10-16 DIAGNOSIS — M85.9 DISORDER OF BONE DENSITY AND STRUCTURE, UNSPECIFIED: ICD-10-CM

## 2023-10-16 DIAGNOSIS — I89.0 LYMPHEDEMA OF BREAST: ICD-10-CM

## 2023-10-16 DIAGNOSIS — C50.411 MALIGNANT NEOPLASM OF UPPER-OUTER QUADRANT OF RIGHT BREAST IN FEMALE, ESTROGEN RECEPTOR POSITIVE (H): Primary | ICD-10-CM

## 2023-10-16 DIAGNOSIS — M25.50 POLYARTHRALGIA: ICD-10-CM

## 2023-10-16 DIAGNOSIS — Z17.0 MALIGNANT NEOPLASM OF UPPER-OUTER QUADRANT OF RIGHT BREAST IN FEMALE, ESTROGEN RECEPTOR POSITIVE (H): Primary | ICD-10-CM

## 2023-10-16 PROCEDURE — G0463 HOSPITAL OUTPT CLINIC VISIT: HCPCS | Performed by: INTERNAL MEDICINE

## 2023-10-16 PROCEDURE — 99214 OFFICE O/P EST MOD 30 MIN: CPT | Performed by: INTERNAL MEDICINE

## 2023-10-16 ASSESSMENT — PAIN SCALES - GENERAL: PAINLEVEL: MODERATE PAIN (4)

## 2023-10-16 NOTE — LETTER
10/16/2023         RE: Yola Mayo  [address] [facility] [address]        Dear Colleague,    Thank you for referring your patient, Yola Mayo, to the [facility]. Please see a copy of my visit note below.    [facility]    Hematology/Oncology Established Patient Note      Today's Date: 10/16/2023    Reason for follow-up: Right breast cancer.    HISTORY OF PRESENT ILLNESS: Yola Mayo is a 78 year old female who presents with the following oncologic history:  1. 3/24/2021: Mammogram showed distortion in right upper outer breast; left breast negative.  2. 3/31/2021: Right breast U/S at 9:00, 4 cm from nipple showed mass measuring 1.1 x 1 x 1.2 cm; no enlarged right axillary lymph nodes seen.  3. 4/07/2021: Right breast biopsy at 9:00 showed grade 2 invasive ductal carcinoma, ER positive at 100%, MI positive at 95%, HER-2/marek FISH negative.  4. 4/23/2021: Underwent right breast lumpectomy and sentinel lymph node excision under care of Dr. Ace Rogers. Pathology showed grade 2 invasive ductal carcinoma measuring 1.5 cm, low grade DCIS, LCIS, margins negative; 2 lymph nodes negative. Oncotype DX = 6; 9-year risk of distant recurrence = 3% after 5 years of hormone blockade therapy, < 1% absolute chemotherapy benefit.  5. 5/25/2021-6/16/2021: Completed adjuvant radiation therapy to right breast. Subsequently started anastrozole.    INTERIM HISTORY:  Dee Dee reports hot flashes, waking up about 4AM with hot flash and sweats.  She reports some bilateral leg pain/stiffness.      REVIEW OF SYSTEMS:   14 point ROS was reviewed and is negative other than as noted above in HPI.       HOME MEDICATIONS:  Current Outpatient Medications   Medication Sig Dispense Refill     anastrozole (ARIMIDEX) 1 MG tablet Take 1 tablet (1 mg) by mouth daily 90 tablet 3     aspirin 81 MG tablet Take 81 mg by mouth daily       atorvastatin (LIPITOR) 10 MG tablet Take 1 tablet  (10 mg) by mouth daily 90 tablet 1     blood glucose (CONTOUR NEXT TEST) test strip 1 strip by In Vitro route daily or as directed. 100 strip 1     blood glucose monitoring (NITO MICROLET) lancets Use to test blood sugar 1 time daily or as directed. 100 each 3     calcium-vitamin D (CALTRATE) 600-400 MG-UNIT per tablet Take 1 tablet by mouth daily 60 tablet 11     cholecalciferol (VITAMIN D) 1000 UNIT tablet Take 1 tablet (1,000 Units) by mouth daily 100 tablet 3     clopidogrel (PLAVIX) 75 MG tablet Take 1 tablet (75 mg) by mouth daily 90 tablet 3     CONTOUR NEXT EZ (CONTOUR NEXT EZ W/DEVICE KIT) w/Device KIT 1 kit once 1 kit 0     Cyanocobalamin (VITAMIN B 12 PO) Take 1 tablet by mouth daily Strength unknown       hydrALAZINE (APRESOLINE) 25 MG tablet Take 2 tablets every morning and one tablet every evening 360 tablet 2     hydrochlorothiazide (HYDRODIURIL) 25 MG tablet Take 1 tablet (25 mg) by mouth daily for Blood Pressure 90 tablet 3     levothyroxine (SYNTHROID/LEVOTHROID) 112 MCG tablet TAKE 1 TABLET (112 MCG) BY MOUTH DAILY FOR THYROID 90 tablet 2     losartan (COZAAR) 100 MG tablet Take 1 tablet (100 mg) by mouth daily for Blood Pressure 90 tablet 3     metFORMIN (GLUCOPHAGE XR) 500 MG 24 hr tablet Take 2 tablets (1,000 mg) by mouth daily (with dinner) for diabetes 180 tablet 3     metroNIDAZOLE (METROGEL) 0.75 % external gel APPLY TO AFFECTED AREA TWICE A DAY 45 g 0     multivitamin, therapeutic with minerals (THERA-VIT-M) TABS Take 1 tablet by mouth daily       nitroGLYcerin (NITROSTAT) 0.4 MG sublingual tablet For chest pain place 1 tablet under the tongue every 5 minutes for 3 doses. If symptoms persist 5 minutes after 1st dose call 911. 25 tablet 3     semaglutide (OZEMPIC) 2 MG/3ML pen Inject 0.25 mg Subcutaneous every 7 days For 4 weeks then go to 0.5 mg weekly 3 mL 1     senna-docusate (SENOKOT-S/PERICOLACE) 8.6-50 MG tablet Take 1-2 tablets by mouth 2 times daily Take while on oral narcotics to  prevent or treat constipation. 30 tablet 0     ZIOPTAN 0.0015 % SOLN ophthalmic solution Place 1 drop into both eyes At Bedtime       amoxicillin (AMOXIL) 500 MG capsule TAKE 4 CAPSULES BY MOUTH 1/2 TO 1 HOUR BEFORE THE PROCEDURE. (Patient not taking: Reported on 9/7/2023) 4 capsule 3         ALLERGIES:  Allergies   Allergen Reactions     Toprol Xl [Metoprolol] Fatigue     Ace Inhibitors      ACE throat clearing     Crestor [Rosuvastatin]      myalgia     Oxycodone Nausea and Vomiting     Tetanus Toxoid Swelling     Hard red lump          PAST MEDICAL HISTORY:  Past Medical History:   Diagnosis Date     Anemia due to blood loss      BASAL CELL CANCER -NOSE      Dermatitis      Diabetes (H)      Family history of diabetes mellitus     Father     Family history of ischemic heart disease     Father     Family history of malignant neoplasm of ovary     Mother     HX DISRUPT L ANT CRUCIATE S/P REPAIR 2003      Hyperlipidemia      HYPERTENSION       HYPOTHYROIDISM      LOC PRIM OSTEOARTH-L 1ST METATARSAL-CUNEIFORM JOINT 3/10/2004     Major depressive disorder, single episode, mild (H) 11/24/2014     MILD-MOD TRICUSPID REGURG. MOD PULM HTN 6/23/2005    ECHO  5/05     Obese      PONV (postoperative nausea and vomiting)      Rosacea      Shingles      SPLENIC CYST          PAST SURGICAL HISTORY:  Past Surgical History:   Procedure Laterality Date     ARTHROPLASTY KNEE Left 4/13/2016    Procedure: ARTHROPLASTY KNEE;  Surgeon: Giuseppe Lopez MD;  Location:  OR     ARTHROPLASTY KNEE Right 3/6/2023    Procedure: Right total knee arthroplasty;  Surgeon: Giuseppe Lopez MD;  Location:  OR     ARTHROPLASTY MINIMALLY INVASIVE HIP  4/18/2012    Procedure:ARTHROPLASTY MINIMALLY INVASIVE HIP; RIGHT TWO INCISION MINIMALLY INVASIVE HIP ARTHROPLASTY (YUVAL)^ (NO CELL SAVER) (C-ARM); Surgeon:GIUSEPPE LOPEZ; Location: OR     BIOPSY NODE SENTINEL Right 4/23/2021    Procedure: RIGHT SENTINEL LYMPH NODE BIOPSY;  Surgeon:  Giuseppe Rogers MD;  Location:  OR     [facility]  9-99    Mohs-nose     CARDIAC SURGERY  06/27/2019    mid and distal RCA stents     CV CORONARY ANGIOGRAM N/A 5/19/2022    Procedure: Coronary Angiogram;  Surgeon: Reyes Graham MD;  Location:  HEART CARDIAC CATH LAB     CV INSTANTANEOUS WAVE-FREE RATIO N/A 5/19/2022    Procedure: Instantaneous Wave-Free Ratio;  Surgeon: Reyes Graham MD;  Location:  HEART CARDIAC CATH LAB     CV INTRAVASULAR ULTRASOUND N/A 5/19/2022    Procedure: Intravascular Ultrasound;  Surgeon: Reyes Graham MD;  Location:  HEART CARDIAC CATH LAB     CV PCI STENT DRUG ELUTING N/A 5/19/2022    Procedure: Percutaneous Coronary Intervention Stent;  Surgeon: Reyes Graham MD;  Location:  HEART CARDIAC CATH LAB     ENT SURGERY      [facility] procedure on nose     HYSTERECTOMY, PAP NO LONGER INDICATED       LUMPECTOMY BREAST WITH SEED LOCALIZATION Right 4/23/2021    Procedure: SEED LOCALIZED RIGHT BREAST LUMPECTOMY;  Surgeon: Giuseppe Rogers MD;  Location:  OR     SURGICAL HISTORY OF -   Mult-~1978    infertility, laparotomies     ZZC APPENDECTOMY  8/87    incidental appi at [facility]     Z REPAIR CRUCIATE LIGAMENT,KNEE  8/2003    L   ACL reconstruction     Z TOTAL ABDOM HYSTERECTOMY  8/87    W/ BSO fibroids, hx PID     ZZHC COLONOSCOPY THRU STOMA, DIAGNOSTIC  4/03    normal, rec repeat 10 yrs         SOCIAL HISTORY:  Social History     Socioeconomic History     Marital status:      Spouse name: FABIAN CUADRA     Number of children: 2     Years of education: Not on file     Highest education level: Not on file   Occupational History     Occupation: [employer]     Employer: [employer]   Tobacco Use     Smoking status: Never     Smokeless tobacco: Never   Vaping Use     Vaping Use: Never used   Substance and Sexual Activity     Alcohol use: Yes     Alcohol/week: 0.0 standard drinks of alcohol     Comment: occasional     Drug use: No      "Sexual activity: Yes     Partners: Male   Other Topics Concern      Service No     Blood Transfusions No     Caffeine Concern No     Occupational Exposure No     Hobby Hazards No     Sleep Concern No     Stress Concern No     Weight Concern Yes     Special Diet No     Back Care No     Exercise Yes     Comment: Approx 3 times a week for about 30 minutes     Bike Helmet Yes     Seat Belt Yes     Self-Exams Yes     Parent/sibling w/ CABG, MI or angioplasty before 65F 55M? Not Asked   Social History Narrative     Not on file     Social Determinants of Health     Financial Resource Strain: Not on file   Food Insecurity: Not on file   Transportation Needs: Not on file   Physical Activity: Not on file   Stress: Not on file   Social Connections: Not on file   Interpersonal Safety: Not At Risk (2023)    Humiliation, Afraid, Rape, and Kick questionnaire      Fear of Current or Ex-Partner: No      Emotionally Abused: No      Physically Abused: No      Sexually Abused: No   Housing Stability: Not on file         FAMILY HISTORY:  Family History   Problem Relation Age of Onset     Cancer Mother         ovarian  of it  at 49yo.     Other Cancer Mother      Diabetes Father         Adult onset     Hypertension Father      C.A.D. Father         Fatal Heart attack at 70.     Cerebrovascular Disease Father      Thyroid Disease Sister      Thyroid Disease Sister      Thyroid Disease Sister      Cancer Daughter      Thyroid Disease Daughter      Breast Cancer No family hx of      Cancer - colorectal No family hx of          PHYSICAL EXAM:  Vital signs:  BP (!) 174/83   Pulse 75   Resp 16   Ht 1.626 m (5' 4\")   Wt 81.2 kg (179 lb)   SpO2 100%   BMI 30.73 kg/m     GENERAL/CONSTITUTIONAL: No acute distress.  EYES:  No scleral icterus.  LYMPH: No cervical, supraclavicular, axillary adenopathy.   BREAST: No palpable masses in either breast with exception of left breast post-lumpectomy changes in the upper outer " quadrant; no significant edema or erythema. Nipples are everted bilaterally with no discharge. No erythema, ulceration.  RESPIRATORY: No audible cough or wheezing.   GASTROINTESTINAL: No hepatosplenomegaly, masses, or tenderness. No guarding.  No distention.  MUSCULOSKELETAL: Warm and well-perfused, no cyanosis, clubbing; mild localized edema around the right knee.  NEUROLOGIC: No focal motor deficits. Alert, oriented, answers questions appropriately.  INTEGUMENTARY: No rashes or jaundice.   GAIT: Steady, does not use assistive device    LABS:  CBC RESULTS:   Recent Labs   Lab Test 04/10/23  1016 03/07/23  0618 02/23/23  1131   WBC  --   --  7.3   RBC  --   --  4.61   HGB 12.4 10.1* 13.5   HCT  --   --  43.1   MCV  --   --  94   MCH  --   --  29.3   MCHC  --   --  31.3*   RDW  --   --  13.7   PLT  --  212 249      Last Comprehensive Metabolic Panel:  Sodium   Date Value Ref Range Status   04/10/2023 135 (L) 136 - 145 mmol/L Final   05/12/2021 137 133 - 144 mmol/L Final     Potassium   Date Value Ref Range Status   04/10/2023 4.0 3.4 - 5.3 mmol/L Final   10/07/2022 4.2 3.4 - 5.3 mmol/L Final   05/12/2021 3.9 3.4 - 5.3 mmol/L Final     Chloride   Date Value Ref Range Status   04/10/2023 97 (L) 98 - 107 mmol/L Final   10/07/2022 104 94 - 109 mmol/L Final   05/12/2021 103 94 - 109 mmol/L Final     Carbon Dioxide   Date Value Ref Range Status   05/12/2021 26 20 - 32 mmol/L Final     Carbon Dioxide (CO2)   Date Value Ref Range Status   04/10/2023 23 22 - 29 mmol/L Final   10/07/2022 25 20 - 32 mmol/L Final     Anion Gap   Date Value Ref Range Status   04/10/2023 15 7 - 15 mmol/L Final   10/07/2022 8 3 - 14 mmol/L Final   05/12/2021 8 3 - 14 mmol/L Final     Glucose   Date Value Ref Range Status   04/10/2023 116 (H) 70 - 99 mg/dL Final   10/07/2022 103 (H) 70 - 99 mg/dL Final   05/12/2021 163 (H) 70 - 99 mg/dL Final     GLUCOSE BY METER POCT   Date Value Ref Range Status   03/07/2023 135 (H) 70 - 99 mg/dL Final     Urea  Nitrogen   Date Value Ref Range Status   04/10/2023 10.9 8.0 - 23.0 mg/dL Final   10/07/2022 13 7 - 30 mg/dL Final   2021 17 7 - 30 mg/dL Final     Creatinine   Date Value Ref Range Status   04/10/2023 0.64 0.51 - 0.95 mg/dL Final   2021 0.71 0.52 - 1.04 mg/dL Final     GFR Estimate   Date Value Ref Range Status   04/10/2023 >90 >60 mL/min/1.73m2 Final     Comment:     eGFR calculated using  CKD-EPI equation.   2021 82 >60 mL/min/[1.73_m2] Final     Comment:     Non  GFR Calc  Starting 2018, serum creatinine based estimated GFR (eGFR) will be   calculated using the Chronic Kidney Disease Epidemiology Collaboration   (CKD-EPI) equation.       Calcium   Date Value Ref Range Status   04/10/2023 9.8 8.8 - 10.2 mg/dL Final   2021 9.4 8.5 - 10.1 mg/dL Final     Bilirubin Total   Date Value Ref Range Status   10/07/2022 0.5 0.2 - 1.3 mg/dL Final   2021 0.5 0.2 - 1.3 mg/dL Final     Alkaline Phosphatase   Date Value Ref Range Status   10/07/2022 76 40 - 150 U/L Final   2021 76 40 - 150 U/L Final     ALT   Date Value Ref Range Status   10/07/2022 59 (H) 0 - 50 U/L Final   2021 79 (H) 0 - 50 U/L Final     AST   Date Value Ref Range Status   10/07/2022 39 0 - 45 U/L Final   2021 48 (H) 0 - 45 U/L Final       PATHOLOGY:  None new.    IMAGIN2021: DEXA showed normal bone density.    4/3/2023: Mammogram showed no suspicious findings.    ASSESSMENT/PLAN:  Yola Mayo is a 78 year old female with the following issues:  1. 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  --She is status post lumpectomy followed by adjuvant radiation therapy.  -I discussed with Dee Dee that she has no clinical evidence for recurrent breast cancer by physical exam from today or mammogram  "reviewed from 4/3/2023.  --I advised gentle massage to the right breast and axilla to alleviated some of the scar-related tissue discomfort.  --She is now on anastrozole and tolerating this well with some polyarthralgia.  --Plan for total 5 years of hormone blockade therapy.  --Baseline DEXA scan from 5/12/2021 was normal.  I advised adequate calcium and vitamin D intake as well as weight bearing exercise.  --Plan to repeat DEXA prior to next visit.  --Due for next mammogram 4/2024.    2. Right breast lymphedema  --Improved with lymphedema therapy.    3. Coronary artery disease  --Aromatase inhibitors can slightly increase cardiovascular risk but controllable with her cardiac medications.  --She will continue on aspirin, beta-blocker, and statin.     4. Diabetes mellitus type 2  --Well-controlled. She will continue metformin and has her A1C monitored by Dr. Braga.    5. Essential hypertension  --Blood pressure stable.  She will continue on losartan, hydrochlorothiazide, and hydralazine.    6. Polyarthralgia  7. Hot flashes  --AI-induced.  --Encouraged continuation of magnesium and switching anastrozole to morning administration.  --Suggested trying acupuncture.    Return in 6 months.    Elida Grullon MD  Hematology/Oncology  [facility]    Total time spent today: 30 minutes in chart review, patient evaluation, counseling, documentation, test and/or medication/prescription orders, and coordination of care.       Oncology Rooming Note    October 16, 2023 2:53 PM   Yola Mayo is a 78 year old female who presents for:    Chief Complaint   Patient presents with     Oncology Clinic Visit     Initial Vitals: Resp 16   Ht 1.626 m (5' 4\")   Wt 81.2 kg (179 lb)   BMI 30.73 kg/m   Estimated body mass index is 30.73 kg/m  as calculated from the following:    Height as of this encounter: 1.626 m (5' 4\").    Weight as of this encounter: 81.2 kg (179 lb). Body surface area is 1.91 meters " squared.  Moderate Pain (4) Comment: Data Unavailable   No LMP recorded. Patient has had a hysterectomy.  Allergies reviewed: Yes  Medications reviewed: Yes    Medications: Medication refills not needed today.  Pharmacy name entered into [facility]: [facility] [id number] [place] [address] [facility] [address] [facility] [address]              Again, thank you for allowing me to participate in the care of your patient.        Sincerely,        Elida Grullon MD

## 2023-10-16 NOTE — PROGRESS NOTES
"Oncology Rooming Note    October 16, 2023 2:53 PM   Yola Mayo is a 78 year old female who presents for:    Chief Complaint   Patient presents with    Oncology Clinic Visit     Initial Vitals: Resp 16   Ht 1.626 m (5' 4\")   Wt 81.2 kg (179 lb)   BMI 30.73 kg/m   Estimated body mass index is 30.73 kg/m  as calculated from the following:    Height as of this encounter: 1.626 m (5' 4\").    Weight as of this encounter: 81.2 kg (179 lb). Body surface area is 1.91 meters squared.  Moderate Pain (4) Comment: Data Unavailable   No LMP recorded. Patient has had a hysterectomy.  Allergies reviewed: Yes  Medications reviewed: Yes    Medications: Medication refills not needed today.  Pharmacy name entered into [facility]: [facility] [id number] [place] [address] [facility] [address] [facility] [address] [place]            "

## 2023-11-09 DIAGNOSIS — E11.9 TYPE 2 DIABETES MELLITUS WITHOUT COMPLICATION, WITHOUT LONG-TERM CURRENT USE OF INSULIN (H): ICD-10-CM

## 2023-11-09 RX ORDER — SEMAGLUTIDE 0.68 MG/ML
INJECTION, SOLUTION SUBCUTANEOUS
Qty: 3 ML | Refills: 0 | Status: SHIPPED | OUTPATIENT
Start: 2023-11-09 | End: 2023-12-08

## 2023-11-13 DIAGNOSIS — E03.9 HYPOTHYROIDISM, UNSPECIFIED TYPE: ICD-10-CM

## 2023-11-13 DIAGNOSIS — L71.9 ROSACEA: ICD-10-CM

## 2023-11-14 RX ORDER — METRONIDAZOLE 7.5 MG/G
GEL TOPICAL
Qty: 45 G | Refills: 0 | Status: SHIPPED | OUTPATIENT
Start: 2023-11-14 | End: 2024-02-12

## 2023-11-14 RX ORDER — LEVOTHYROXINE SODIUM 112 UG/1
112 TABLET ORAL DAILY
Qty: 90 TABLET | Refills: 0 | Status: SHIPPED | OUTPATIENT
Start: 2023-11-14 | End: 2024-02-12

## 2023-12-08 ENCOUNTER — OFFICE VISIT (OUTPATIENT)
Dept: FAMILY MEDICINE | Facility: CLINIC | Age: 78
End: 2023-12-08
Payer: MEDICARE

## 2023-12-08 VITALS
TEMPERATURE: 98 F | DIASTOLIC BLOOD PRESSURE: 70 MMHG | WEIGHT: 175.1 LBS | HEART RATE: 71 BPM | SYSTOLIC BLOOD PRESSURE: 139 MMHG | RESPIRATION RATE: 18 BRPM | OXYGEN SATURATION: 100 % | BODY MASS INDEX: 30.06 KG/M2

## 2023-12-08 DIAGNOSIS — L71.9 ROSACEA: ICD-10-CM

## 2023-12-08 DIAGNOSIS — E87.1 HYPONATREMIA: ICD-10-CM

## 2023-12-08 DIAGNOSIS — E11.9 TYPE 2 DIABETES MELLITUS WITHOUT COMPLICATION, WITHOUT LONG-TERM CURRENT USE OF INSULIN (H): Primary | ICD-10-CM

## 2023-12-08 DIAGNOSIS — E78.5 HYPERLIPIDEMIA, UNSPECIFIED HYPERLIPIDEMIA TYPE: ICD-10-CM

## 2023-12-08 DIAGNOSIS — Z92.3 S/P RADIATION THERAPY: ICD-10-CM

## 2023-12-08 DIAGNOSIS — I10 BENIGN HYPERTENSION: ICD-10-CM

## 2023-12-08 DIAGNOSIS — C50.911 INVASIVE DUCTAL CARCINOMA OF BREAST, FEMALE, RIGHT (H): ICD-10-CM

## 2023-12-08 DIAGNOSIS — Z95.5 S/P DRUG ELUTING CORONARY STENT PLACEMENT: ICD-10-CM

## 2023-12-08 DIAGNOSIS — Z98.890 S/P LUMPECTOMY, RIGHT BREAST: ICD-10-CM

## 2023-12-08 LAB
ANION GAP SERPL CALCULATED.3IONS-SCNC: 12 MMOL/L (ref 7–15)
BUN SERPL-MCNC: 13.7 MG/DL (ref 8–23)
CALCIUM SERPL-MCNC: 9.7 MG/DL (ref 8.8–10.2)
CHLORIDE SERPL-SCNC: 101 MMOL/L (ref 98–107)
CREAT SERPL-MCNC: 0.65 MG/DL (ref 0.51–0.95)
DEPRECATED HCO3 PLAS-SCNC: 25 MMOL/L (ref 22–29)
EGFRCR SERPLBLD CKD-EPI 2021: 90 ML/MIN/1.73M2
GLUCOSE SERPL-MCNC: 99 MG/DL (ref 70–99)
HBA1C MFR BLD: 5.6 % (ref 0–5.6)
POTASSIUM SERPL-SCNC: 3.9 MMOL/L (ref 3.4–5.3)
SODIUM SERPL-SCNC: 138 MMOL/L (ref 135–145)

## 2023-12-08 PROCEDURE — 80048 BASIC METABOLIC PNL TOTAL CA: CPT | Performed by: INTERNAL MEDICINE

## 2023-12-08 PROCEDURE — 99214 OFFICE O/P EST MOD 30 MIN: CPT | Performed by: INTERNAL MEDICINE

## 2023-12-08 PROCEDURE — 36415 COLL VENOUS BLD VENIPUNCTURE: CPT | Performed by: INTERNAL MEDICINE

## 2023-12-08 PROCEDURE — 83036 HEMOGLOBIN GLYCOSYLATED A1C: CPT | Performed by: INTERNAL MEDICINE

## 2023-12-08 RX ORDER — PRAVASTATIN SODIUM 10 MG
10 TABLET ORAL DAILY
Qty: 90 TABLET | Refills: 1 | Status: SHIPPED | OUTPATIENT
Start: 2023-12-08 | End: 2024-04-22

## 2023-12-08 RX ORDER — METFORMIN HCL 500 MG
500 TABLET, EXTENDED RELEASE 24 HR ORAL
Qty: 90 TABLET | Refills: 3 | Status: SHIPPED | OUTPATIENT
Start: 2023-12-08

## 2023-12-08 RX ORDER — LOSARTAN POTASSIUM 100 MG/1
100 TABLET ORAL DAILY
Qty: 90 TABLET | Refills: 3 | Status: SHIPPED | OUTPATIENT
Start: 2023-12-08

## 2023-12-08 RX ORDER — SEMAGLUTIDE 0.68 MG/ML
0.5 INJECTION, SOLUTION SUBCUTANEOUS
Qty: 9 ML | Refills: 1 | Status: SHIPPED | OUTPATIENT
Start: 2023-12-08 | End: 2024-03-13 | Stop reason: DRUGHIGH

## 2023-12-08 RX ORDER — HYDROCHLOROTHIAZIDE 25 MG/1
25 TABLET ORAL DAILY
Qty: 90 TABLET | Refills: 3 | Status: SHIPPED | OUTPATIENT
Start: 2023-12-08 | End: 2024-10-02

## 2023-12-08 ASSESSMENT — PAIN SCALES - GENERAL: PAINLEVEL: NO PAIN (0)

## 2023-12-08 NOTE — PROGRESS NOTES
Dee Dee was seen today for diabetes.    Diagnoses and all orders for this visit:    Type 2 diabetes mellitus without complication, without long-term current use of insulin (H)  A1c well controlled at 5.7 three months ago and she has also lost some weight. She is tolerating well to Ozempic 0.5 mg and is now taking metformin 500 mg daily instead of 1000 mg daily.   -     semaglutide (OZEMPIC, 0.25 OR 0.5 MG/DOSE,) 2 MG/3ML pen; Inject 0.5 mg Subcutaneous every 7 days for diabetes  -     metFORMIN (GLUCOPHAGE XR) 500 MG 24 hr tablet; Take 1 tablet (500 mg) by mouth daily (with dinner) for diabetes  -     Hemoglobin A1c; Future  -     Basic metabolic panel  (Ca, Cl, CO2, Creat, Gluc, K, Na, BUN); Future  I offered her to increase Ozempic to 1 mg. Patient wants to stay at the dosage of 0.5 mg. If she is ready to increase, she will update us.   Continue Ozempic 0.5 mg and metformin 500 mg daily     Benign hypertension  Blood pressure on arrival elevated at 144/73. Rechecked blood pressure at 139/70. Home blood pressure well controlled per patient   -     losartan (COZAAR) 100 MG tablet; Take 1 tablet (100 mg) by mouth daily for Blood Pressure  -     hydrochlorothiazide (HYDRODIURIL) 25 MG tablet; Take 1 tablet (25 mg) by mouth daily for Blood Pressure  She follows cardiologist. Continue monitoring home blood pressure     S/P drug eluting coronary stent placement  She has stopped taking lipitor 10 mg due to muscle cramps. She feels much better after stopping the medication. She was previously on crestor which also cause similar muscle cramps.   -     pravastatin (PRAVACHOL) 10 MG tablet; Take 1 tablet (10 mg) by mouth daily  Cardiologist noted was reviewed - statin was recommended considering her medical history.   Patient is started on pravastatin. If she cannot tolerate it, she will update and we will discuss alternatives.   Will go up on the dose if she tolerates thsi    Invasive ductal carcinoma of breast, female, right  (H)  In remission for two years.   She underwent right breast lumpectomy and sentinel lymph node excision under care of Dr. Ace Rogers. Pathology showed grade 2 invasive ductal carcinoma, low grade DCIS, and LCIS. She completed adjuvant radiation therapy to right breast on between 5/25/2021 and 6/16/2021. She subsequently started anastrazole.     S/P radiation therapy  Due to history of breast cancer    S/P lumpectomy, right breast  Due to history of breast cancer    Hyperlipidemia, unspecified hyperlipidemia type  -     pravastatin (PRAVACHOL) 10 MG tablet; Take 1 tablet (10 mg) by mouth daily  See note above    Hyponatremia  -     Basic metabolic panel  (Ca, Cl, CO2, Creat, Gluc, K, Na, BUN); Future  Sodium low at 135 eight months ago.   Recheck BMP today.     Rosacea  Using metronidazole as needed.   She also follows dermatology.     Fang Funez is a 78 year old, presenting for the following health issues:  Diabetes         No data to display                History of Present Illness       Diabetes:   She presents for follow up of diabetes.  She is checking home blood glucose a few times a month.   She checks blood glucose before meals.  Blood glucose is never over 200 and never under 70. She is aware of hypoglycemia symptoms including none.    She has no concerns regarding her diabetes at this time.   She is not experiencing numbness or burning in feet, excessive thirst, blurry vision, weight changes or redness, sores or blisters on feet.           She eats 2-3 servings of fruits and vegetables daily.She consumes 0 sweetened beverage(s) daily.She exercises with enough effort to increase her heart rate 20 to 29 minutes per day.  She exercises with enough effort to increase her heart rate 5 days per week.   She is taking medications regularly.         Review of Systems   Constitutional, HEENT, cardiovascular, pulmonary, GI, , musculoskeletal, neuro, skin, endocrine and psych systems are negative, except as  otherwise noted.      Objective    /70 (BP Location: Right arm, Patient Position: Sitting)   Pulse 71   Temp 98  F (36.7  C) (Oral)   Resp 18   Wt 79.4 kg (175 lb 1.6 oz)   SpO2 100%   BMI 30.06 kg/m    Body mass index is 30.06 kg/m .  Physical Exam   She is very nice and pleasant.  She is comfortable and not in any kind of distress.  She is fully alert awake oriented.     Labs pending.     This document serves as a record of the services and decisions personally performed and made by Dr. Braga. It was created on her behalf by Justen Brown, a trained medical scribe. The creation of this document is based the provider's statements to the medical scribe.

## 2023-12-08 NOTE — PATIENT INSTRUCTIONS
You already discontinued Lipitor  Start taking pravastatin half tablet daily for one week  Then go to full tablet daily  If that goes well then will increase to 20 mg daily  Continue ozempic 0.5 mg weekly and let me know when you are ready to go up to 1 mg weekly    We have lowered the dose of metformin to 500 mg  daily    Labs today    Follow up in 6 months.  Seek sooner medical attention if there is any worsening of symptoms or problems.

## 2023-12-09 NOTE — RESULT ENCOUNTER NOTE
Timothy Aceves    This is to inform you regarding your test result.    Basic metabolic panel which includes electrolytes and kidney fucntion is normal  HbA1c which is average glucose during last 3 months is normal.      Sincerely,      Dr.Nasima Maria Luz MD,FACP        
5

## 2024-02-12 DIAGNOSIS — I10 ESSENTIAL HYPERTENSION, BENIGN: ICD-10-CM

## 2024-02-12 DIAGNOSIS — E03.9 HYPOTHYROIDISM, UNSPECIFIED TYPE: ICD-10-CM

## 2024-02-12 DIAGNOSIS — I25.10 CORONARY ARTERY DISEASE INVOLVING NATIVE CORONARY ARTERY OF NATIVE HEART WITHOUT ANGINA PECTORIS: ICD-10-CM

## 2024-02-12 DIAGNOSIS — Z95.5 S/P DRUG ELUTING CORONARY STENT PLACEMENT: ICD-10-CM

## 2024-02-12 DIAGNOSIS — E11.9 TYPE 2 DIABETES MELLITUS WITHOUT COMPLICATION, WITHOUT LONG-TERM CURRENT USE OF INSULIN (H): ICD-10-CM

## 2024-02-12 DIAGNOSIS — L71.9 ROSACEA: ICD-10-CM

## 2024-02-12 RX ORDER — SEMAGLUTIDE 1.34 MG/ML
1 INJECTION, SOLUTION SUBCUTANEOUS
OUTPATIENT
Start: 2024-02-12

## 2024-02-12 RX ORDER — LEVOTHYROXINE SODIUM 112 UG/1
112 TABLET ORAL DAILY
Qty: 90 TABLET | Refills: 0 | Status: SHIPPED | OUTPATIENT
Start: 2024-02-12 | End: 2024-04-22

## 2024-02-12 RX ORDER — METRONIDAZOLE 7.5 MG/G
GEL TOPICAL
Qty: 45 G | Refills: 1 | Status: SHIPPED | OUTPATIENT
Start: 2024-02-12 | End: 2024-05-20

## 2024-02-12 RX ORDER — CLOPIDOGREL BISULFATE 75 MG/1
75 TABLET ORAL DAILY
Qty: 90 TABLET | Refills: 1 | OUTPATIENT
Start: 2024-02-12

## 2024-02-14 ENCOUNTER — MYC REFILL (OUTPATIENT)
Dept: FAMILY MEDICINE | Facility: CLINIC | Age: 79
End: 2024-02-14
Payer: MEDICARE

## 2024-02-14 DIAGNOSIS — I10 BENIGN HYPERTENSION: ICD-10-CM

## 2024-02-14 RX ORDER — HYDRALAZINE HYDROCHLORIDE 25 MG/1
TABLET, FILM COATED ORAL
Qty: 270 TABLET | Refills: 0 | Status: SHIPPED | OUTPATIENT
Start: 2024-02-14 | End: 2024-04-22

## 2024-02-14 NOTE — TELEPHONE ENCOUNTER
Prescription approved per [facility] Refill Protocol.  Eufemia Dan, RN  [facility] Triage Nurse

## 2024-02-16 DIAGNOSIS — C50.911 INVASIVE DUCTAL CARCINOMA OF BREAST, FEMALE, RIGHT (H): ICD-10-CM

## 2024-02-16 RX ORDER — ANASTROZOLE 1 MG/1
1 TABLET ORAL DAILY
Qty: 90 TABLET | Refills: 4 | Status: SHIPPED | OUTPATIENT
Start: 2024-02-16

## 2024-02-24 DIAGNOSIS — I10 BENIGN HYPERTENSION: ICD-10-CM

## 2024-02-26 RX ORDER — HYDRALAZINE HYDROCHLORIDE 25 MG/1
TABLET, FILM COATED ORAL
Qty: 270 TABLET | Refills: 0 | OUTPATIENT
Start: 2024-02-26

## 2024-04-16 ENCOUNTER — HOSPITAL ENCOUNTER (OUTPATIENT)
Dept: BONE DENSITY | Facility: CLINIC | Age: 79
Discharge: HOME | End: 2024-04-16
Attending: INTERNAL MEDICINE
Payer: MEDICARE

## 2024-04-16 ENCOUNTER — HOSPITAL ENCOUNTER (OUTPATIENT)
Dept: MAMMOGRAPHY | Facility: CLINIC | Age: 79
Discharge: HOME | End: 2024-04-16
Attending: INTERNAL MEDICINE
Payer: MEDICARE

## 2024-04-16 DIAGNOSIS — Z17.0 MALIGNANT NEOPLASM OF UPPER-OUTER QUADRANT OF RIGHT BREAST IN FEMALE, ESTROGEN RECEPTOR POSITIVE (H): ICD-10-CM

## 2024-04-16 DIAGNOSIS — Z79.811 LONG TERM (CURRENT) USE OF AROMATASE INHIBITORS: ICD-10-CM

## 2024-04-16 DIAGNOSIS — M85.9 DISORDER OF BONE DENSITY AND STRUCTURE, UNSPECIFIED: ICD-10-CM

## 2024-04-16 DIAGNOSIS — C50.411 MALIGNANT NEOPLASM OF UPPER-OUTER QUADRANT OF RIGHT BREAST IN FEMALE, ESTROGEN RECEPTOR POSITIVE (H): ICD-10-CM

## 2024-04-16 DIAGNOSIS — Z12.31 ENCOUNTER FOR SCREENING MAMMOGRAM FOR BREAST CANCER: ICD-10-CM

## 2024-04-16 PROCEDURE — 77080 DXA BONE DENSITY AXIAL: CPT

## 2024-04-16 PROCEDURE — 77063 BREAST TOMOSYNTHESIS BI: CPT

## 2024-04-20 DIAGNOSIS — I10 BENIGN HYPERTENSION: ICD-10-CM

## 2024-04-20 DIAGNOSIS — Z95.5 S/P DRUG ELUTING CORONARY STENT PLACEMENT: ICD-10-CM

## 2024-04-20 DIAGNOSIS — E78.5 HYPERLIPIDEMIA, UNSPECIFIED HYPERLIPIDEMIA TYPE: ICD-10-CM

## 2024-04-20 DIAGNOSIS — E03.9 HYPOTHYROIDISM, UNSPECIFIED TYPE: ICD-10-CM

## 2024-04-20 DIAGNOSIS — I25.10 CORONARY ARTERY DISEASE INVOLVING NATIVE CORONARY ARTERY OF NATIVE HEART WITHOUT ANGINA PECTORIS: ICD-10-CM

## 2024-04-20 DIAGNOSIS — I10 ESSENTIAL HYPERTENSION, BENIGN: ICD-10-CM

## 2024-04-22 RX ORDER — PRAVASTATIN SODIUM 10 MG
10 TABLET ORAL DAILY
Qty: 90 TABLET | Refills: 1 | Status: SHIPPED | OUTPATIENT
Start: 2024-04-22 | End: 2024-10-02

## 2024-04-22 RX ORDER — CLOPIDOGREL BISULFATE 75 MG/1
75 TABLET ORAL DAILY
Qty: 90 TABLET | Refills: 3 | Status: SHIPPED | OUTPATIENT
Start: 2024-04-22

## 2024-04-22 RX ORDER — LEVOTHYROXINE SODIUM 112 UG/1
112 TABLET ORAL DAILY
Qty: 90 TABLET | Refills: 0 | Status: SHIPPED | OUTPATIENT
Start: 2024-04-22 | End: 2024-06-23

## 2024-04-22 RX ORDER — HYDRALAZINE HYDROCHLORIDE 25 MG/1
TABLET, FILM COATED ORAL
Qty: 270 TABLET | Refills: 0 | Status: SHIPPED | OUTPATIENT
Start: 2024-04-22 | End: 2024-08-20

## 2024-04-22 NOTE — TELEPHONE ENCOUNTER
Patient is due for TSH testing   Order is placed   Let patient know to make the appointment   Dr.Nasima Maria Luz MD

## 2024-04-23 NOTE — PROGRESS NOTES
[facility]    Hematology/Oncology Established Patient Note      Today's Date: 4/29/2024    Reason for follow-up: Right breast cancer.    HISTORY OF PRESENT ILLNESS: Yola Mayo is a 78 year old female who presents with the following oncologic history:  1. 3/24/2021: Mammogram showed distortion in right upper outer breast; left breast negative.  2. 3/31/2021: Right breast U/S at 9:00, 4 cm from nipple showed mass measuring 1.1 x 1 x 1.2 cm; no enlarged right axillary lymph nodes seen.  3. 4/07/2021: Right breast biopsy at 9:00 showed grade 2 invasive ductal carcinoma, ER positive at 100%, ID positive at 95%, HER-2/marek FISH negative.  4. 4/23/2021: Underwent right breast lumpectomy and sentinel lymph node excision under care of Dr. Ace Rogers. Pathology showed grade 2 invasive ductal carcinoma measuring 1.5 cm, low grade DCIS, LCIS, margins negative; 2 lymph nodes negative. Oncotype DX = 6; 9-year risk of distant recurrence = 3% after 5 years of hormone blockade therapy, < 1% absolute chemotherapy benefit.  5. 5/25/2021-6/16/2021: Completed adjuvant radiation therapy to right breast. Subsequently started anastrozole.    INTERIM HISTORY:  Dee Dee reports hot flashes in middle of night around 4AM with clamminess.     REVIEW OF SYSTEMS:   14 point ROS was reviewed and is negative other than as noted above in HPI.       HOME MEDICATIONS:  Current Outpatient Medications   Medication Sig Dispense Refill    amoxicillin (AMOXIL) 500 MG capsule TAKE 4 CAPSULES BY MOUTH 30MINUTES TO 1 HOUR BEFORE THE PROCEDURE. 4 capsule 3    anastrozole (ARIMIDEX) 1 MG tablet Take 1 tablet (1 mg) by mouth daily 90 tablet 4    aspirin 81 MG tablet Take 81 mg by mouth daily      blood glucose (CONTOUR NEXT TEST) test strip 1 strip by In Vitro route daily or as directed. 100 strip 1    blood glucose monitoring (NITO MICROLET) lancets Use to test blood sugar 1 time daily or as directed. 100 each 3    calcium-vitamin D  (CALTRATE) 600-400 MG-UNIT per tablet Take 1 tablet by mouth daily 60 tablet 11    cholecalciferol (VITAMIN D) 1000 UNIT tablet Take 1 tablet (1,000 Units) by mouth daily 100 tablet 3    clopidogrel (PLAVIX) 75 MG tablet Take 1 tablet (75 mg) by mouth daily 90 tablet 3    CONTOUR NEXT EZ (CONTOUR NEXT EZ W/DEVICE KIT) w/Device KIT 1 kit once 1 kit 0    Cyanocobalamin (VITAMIN B 12 PO) Take 1 tablet by mouth daily Strength unknown      hydrALAZINE (APRESOLINE) 25 MG tablet TAKE 2 TABLETS BY MOUTH EVERY MORNING AND ONE TABLET BY MOUTH EVERY EVENING 270 tablet 0    hydrochlorothiazide (HYDRODIURIL) 25 MG tablet Take 1 tablet (25 mg) by mouth daily for Blood Pressure 90 tablet 3    levothyroxine (SYNTHROID/LEVOTHROID) 112 MCG tablet Take 1 tablet (112 mcg) by mouth daily for thyroid due for TSH testing 90 tablet 0    losartan (COZAAR) 100 MG tablet Take 1 tablet (100 mg) by mouth daily for Blood Pressure 90 tablet 3    metFORMIN (GLUCOPHAGE XR) 500 MG 24 hr tablet Take 1 tablet (500 mg) by mouth daily (with dinner) for diabetes 90 tablet 3    metroNIDAZOLE (METROGEL) 0.75 % external gel APPLY TO AFFECTED AREA TWICE A DAY 45 g 1    multivitamin, therapeutic with minerals (THERA-VIT-M) TABS Take 1 tablet by mouth daily      nitroGLYcerin (NITROSTAT) 0.4 MG sublingual tablet For chest pain place 1 tablet under the tongue every 5 minutes for 3 doses. If symptoms persist 5 minutes after 1st dose call 911. 25 tablet 3    pravastatin (PRAVACHOL) 10 MG tablet TAKE 1 TABLET (10 MG) BY MOUTH DAILY. 90 tablet 1    Semaglutide, 1 MG/DOSE, (OZEMPIC, 1 MG/DOSE,) 4 MG/3ML pen Inject 1 mg Subcutaneous every 7 days 9 mL 1    senna-docusate (SENOKOT-S/PERICOLACE) 8.6-50 MG tablet Take 1-2 tablets by mouth 2 times daily Take while on oral narcotics to prevent or treat constipation. 30 tablet 0    ZIOPTAN 0.0015 % SOLN ophthalmic solution Place 1 drop into both eyes At Bedtime           ALLERGIES:  Allergies   Allergen Reactions    Toprol  Xl [Metoprolol] Fatigue    Ace Inhibitors      ACE throat clearing    Atorvastatin Muscle Pain (Myalgia)    Crestor [Rosuvastatin]      myalgia    Oxycodone Nausea and Vomiting    Tetanus Toxoid Swelling     Hard red lump          PAST MEDICAL HISTORY:  Past Medical History:   Diagnosis Date    Anemia due to blood loss     BASAL CELL CANCER -NOSE     Dermatitis     Diabetes (H)     Family history of diabetes mellitus     Father    Family history of ischemic heart disease     Father    Family history of malignant neoplasm of ovary     Mother    HX DISRUPT L ANT CRUCIATE S/P REPAIR 2003     Hyperlipidemia     HYPERTENSION      HYPOTHYROIDISM     LOC PRIM OSTEOARTH-L 1ST METATARSAL-CUNEIFORM JOINT 3/10/2004    Major depressive disorder, single episode, mild (H24) 11/24/2014    MILD-MOD TRICUSPID REGURG. MOD PULM HTN 6/23/2005    ECHO  5/05    Obese     PONV (postoperative nausea and vomiting)     Rosacea     Shingles     SPLENIC CYST          PAST SURGICAL HISTORY:  Past Surgical History:   Procedure Laterality Date    ARTHROPLASTY KNEE Left 4/13/2016    Procedure: ARTHROPLASTY KNEE;  Surgeon: Giuseppe Lopez MD;  Location:  OR    ARTHROPLASTY KNEE Right 3/6/2023    Procedure: Right total knee arthroplasty;  Surgeon: Giuseppe Lopez MD;  Location:  OR    ARTHROPLASTY MINIMALLY INVASIVE HIP  4/18/2012    Procedure:ARTHROPLASTY MINIMALLY INVASIVE HIP; RIGHT TWO INCISION MINIMALLY INVASIVE HIP ARTHROPLASTY (YUVAL)^ (NO CELL SAVER) (C-ARM); Surgeon:GIUSEPPE LOPEZ; Location: OR    BIOPSY NODE SENTINEL Right 4/23/2021    Procedure: RIGHT SENTINEL LYMPH NODE BIOPSY;  Surgeon: Giuseppe Rogers MD;  Location:  OR    C CHEMOSURG MOHS 1ST STAGE  9-99    Mohs-nose    CARDIAC SURGERY  06/27/2019    mid and distal RCA stents    CV CORONARY ANGIOGRAM N/A 5/19/2022    Procedure: Coronary Angiogram;  Surgeon: Reyes Graham MD;  Location:  HEART CARDIAC CATH LAB    CV INSTANTANEOUS WAVE-FREE RATIO N/A  5/19/2022    Procedure: Instantaneous Wave-Free Ratio;  Surgeon: Reyes Graham MD;  Location:  HEART CARDIAC CATH LAB    CV INTRAVASULAR ULTRASOUND N/A 5/19/2022    Procedure: Intravascular Ultrasound;  Surgeon: Reyes Graham MD;  Location:  HEART CARDIAC CATH LAB    CV PCI STENT DRUG ELUTING N/A 5/19/2022    Procedure: Percutaneous Coronary Intervention Stent;  Surgeon: Reyes Graham MD;  Location:  HEART CARDIAC CATH LAB    ENT SURGERY      mohs procedure on nose    HYSTERECTOMY, PAP NO LONGER INDICATED      LUMPECTOMY BREAST WITH SEED LOCALIZATION Right 4/23/2021    Procedure: SEED LOCALIZED RIGHT BREAST LUMPECTOMY;  Surgeon: Giuseppe Rogers MD;  Location:  OR    SURGICAL HISTORY OF -   Mult-~1978    infertility, laparotomies    Z APPENDECTOMY  8/87    incidental appi at [facility]    Z REPAIR CRUCIATE LIGAMENT,KNEE  8/2003    L   ACL reconstruction    [facility] TOTAL ABDOM HYSTERECTOMY  8/87    W/ BSO fibroids, hx PID    [facility]  [facility]    normal, rec repeat 10 yrs         SOCIAL HISTORY:  Social History     Socioeconomic History    Marital status:      Spouse name: FABIAN CUADRA    Number of children: 2    Years of education: Not on file    Highest education level: Not on file   Occupational History    Occupation: [employer]; to (do)= [employer]     Employer: [employer]   Tobacco Use    Smoking status: Never    Smokeless tobacco: Never   Vaping Use    Vaping status: Never Used   Substance and Sexual Activity    Alcohol use: Yes     Alcohol/week: 0.0 standard drinks of alcohol     Comment: occasional    Drug use: No    Sexual activity: Yes     Partners: Male   Other Topics Concern     Service No    Blood Transfusions No    Caffeine Concern No    Occupational Exposure No    Hobby Hazards No    Sleep Concern No    Stress Concern No    Weight Concern Yes    Special Diet No    Back Care No    Exercise Yes     Comment: Approx 3 times a week for about 30 minutes    Bike  Helmet Yes    Seat Belt Yes    Self-Exams Yes    Parent/sibling w/ CABG, MI or angioplasty before 65F 55M? Not Asked   Social History Narrative    Not on file     Social Determinants of Health     Financial Resource Strain: Low Risk  (2023)    Financial Resource Strain     Within the past 12 months, have you or your family members you live with been unable to get utilities (heat, electricity) when it was really needed?: No   Food Insecurity: Low Risk  (2023)    Food Insecurity     Within the past 12 months, did you worry that your food would run out before you got money to buy more?: No     Within the past 12 months, did the food you bought just not last and you didn t have money to get more?: No   Transportation Needs: Low Risk  (2023)    Transportation Needs     Within the past 12 months, has lack of transportation kept you from medical appointments, getting your medicines, non-medical meetings or appointments, work, or from getting things that you need?: No   Physical Activity: Not on file   Stress: Not on file   Social Connections: Not on file   Interpersonal Safety: Low Risk  (2023)    Interpersonal Safety     Do you feel physically and emotionally safe where you currently live?: Yes     Within the past 12 months, have you been hit, slapped, kicked or otherwise physically hurt by someone?: No     Within the past 12 months, have you been humiliated or emotionally abused in other ways by your partner or ex-partner?: No   Housing Stability: Low Risk  (2023)    Housing Stability     Do you have housing? : Yes     Are you worried about losing your housing?: No         FAMILY HISTORY:  Family History   Problem Relation Age of Onset    Cancer Mother         ovarian  of it  at 49yo.    Other Cancer Mother     Diabetes Father         Adult onset    Hypertension Father     C.A.D. Father         Fatal Heart attack at 70.    Cerebrovascular Disease Father     Thyroid Disease Sister     Thyroid  "Disease Sister     Thyroid Disease Sister     Cancer Daughter     Thyroid Disease Daughter     Breast Cancer No family hx of     Cancer - colorectal No family hx of          PHYSICAL EXAM:  Vital signs:  BP (!) 170/80   Pulse 78   Resp 16   Ht 1.626 m (5' 4\")   Wt 78.9 kg (174 lb)   SpO2 99%   BMI 29.87 kg/m     GENERAL/CONSTITUTIONAL: No acute distress.  EYES:  No scleral icterus.  LYMPH: No cervical, supraclavicular, axillary adenopathy.   BREAST: No palpable masses in either breast with exception of left breast post-lumpectomy changes in the upper outer quadrant; no significant edema or erythema. Nipples are everted bilaterally with no discharge. No erythema, ulceration.  RESPIRATORY: No audible cough or wheezing.   GASTROINTESTINAL: No hepatosplenomegaly, masses, or tenderness. No guarding.  No distention.  MUSCULOSKELETAL: Warm and well-perfused, no cyanosis, clubbing or edema.  NEUROLOGIC: Alert, oriented, answers questions appropriately.  INTEGUMENTARY: No rashes or jaundice.   GAIT: Steady, does not use assistive device    LABS:  CBC RESULTS:   Recent Labs   Lab Test 04/10/23  1016 03/07/23  0618 02/23/23  1131   WBC  --   --  7.3   RBC  --   --  4.61   HGB 12.4 10.1* 13.5   HCT  --   --  43.1   MCV  --   --  94   MCH  --   --  29.3   MCHC  --   --  31.3*   RDW  --   --  13.7   PLT  --  212 249      Last Comprehensive Metabolic Panel:  Sodium   Date Value Ref Range Status   12/08/2023 138 135 - 145 mmol/L Final     Comment:     Reference intervals for this test were updated on 09/26/2023 to more accurately reflect our healthy population. There may be differences in the flagging of prior results with similar values performed with this method. Interpretation of those prior results can be made in the context of the updated reference intervals.    05/12/2021 137 133 - 144 mmol/L Final     Potassium   Date Value Ref Range Status   12/08/2023 3.9 3.4 - 5.3 mmol/L Final   10/07/2022 4.2 3.4 - 5.3 mmol/L Final "   05/12/2021 3.9 3.4 - 5.3 mmol/L Final     Chloride   Date Value Ref Range Status   12/08/2023 101 98 - 107 mmol/L Final   10/07/2022 104 94 - 109 mmol/L Final   05/12/2021 103 94 - 109 mmol/L Final     Carbon Dioxide   Date Value Ref Range Status   05/12/2021 26 20 - 32 mmol/L Final     Carbon Dioxide (CO2)   Date Value Ref Range Status   12/08/2023 25 22 - 29 mmol/L Final   10/07/2022 25 20 - 32 mmol/L Final     Anion Gap   Date Value Ref Range Status   12/08/2023 12 7 - 15 mmol/L Final   10/07/2022 8 3 - 14 mmol/L Final   05/12/2021 8 3 - 14 mmol/L Final     Glucose   Date Value Ref Range Status   12/08/2023 99 70 - 99 mg/dL Final   10/07/2022 103 (H) 70 - 99 mg/dL Final   05/12/2021 163 (H) 70 - 99 mg/dL Final     GLUCOSE BY METER POCT   Date Value Ref Range Status   03/07/2023 135 (H) 70 - 99 mg/dL Final     Urea Nitrogen   Date Value Ref Range Status   12/08/2023 13.7 8.0 - 23.0 mg/dL Final   10/07/2022 13 7 - 30 mg/dL Final   05/12/2021 17 7 - 30 mg/dL Final     Creatinine   Date Value Ref Range Status   12/08/2023 0.65 0.51 - 0.95 mg/dL Final   05/12/2021 0.71 0.52 - 1.04 mg/dL Final     GFR Estimate   Date Value Ref Range Status   12/08/2023 90 >60 mL/min/1.73m2 Final   05/12/2021 82 >60 mL/min/[1.73_m2] Final     Comment:     Non  GFR Calc  Starting 12/18/2018, serum creatinine based estimated GFR (eGFR) will be   calculated using the Chronic Kidney Disease Epidemiology Collaboration   (CKD-EPI) equation.       Calcium   Date Value Ref Range Status   12/08/2023 9.7 8.8 - 10.2 mg/dL Final   05/12/2021 9.4 8.5 - 10.1 mg/dL Final     Bilirubin Total   Date Value Ref Range Status   10/07/2022 0.5 0.2 - 1.3 mg/dL Final   05/12/2021 0.5 0.2 - 1.3 mg/dL Final     Alkaline Phosphatase   Date Value Ref Range Status   10/07/2022 76 40 - 150 U/L Final   05/12/2021 76 40 - 150 U/L Final     ALT   Date Value Ref Range Status   10/07/2022 59 (H) 0 - 50 U/L Final   05/12/2021 79 (H) 0 - 50 U/L Final      AST   Date Value Ref Range Status   10/07/2022 39 0 - 45 U/L Final   2021 48 (H) 0 - 45 U/L Final       PATHOLOGY:  None new.    IMAGIN2024: DEXA showed normal bone density.    2024: Mammogram showed no malignancy.    ASSESSMENT/PLAN:  Yola Mayo is a 78 year old female with the following issues:  1. 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  --She is status post lumpectomy followed by adjuvant radiation therapy.  -I discussed with Dee Dee that she has no clinical evidence for recurrent breast cancer by physical exam from today or mammogram reviewed from 2024.  --I advised gentle massage to the right breast and axilla to alleviated some of the scar-related tissue discomfort.  --She is now on anastrozole and tolerating this well with some polyarthralgia.  --Plan for total 5 years of hormone blockade therapy through 2026.  --Discussed 2024 DEXA showed normal bone density. I advised adequate calcium and vitamin D intake as well as weight bearing exercise.  --Plan to repeat DEXA in .  --Due for next mammogram 2025.    2. Right breast lymphedema  --Improved with lymphedema therapy.    3. Coronary artery disease  --Aromatase inhibitors can slightly increase cardiovascular risk but controllable with her cardiac medications.  --She will continue on aspirin, beta-blocker, and statin.     4. Diabetes mellitus type 2  --Well-controlled. She will continue metformin and has her A1C monitored by Dr. Braga.    5. Essential hypertension  --Blood pressure stable.  She will continue on losartan, hydrochlorothiazide, and hydralazine.    6. Polyarthralgia  7. Hot flashes  --AI-induced.  --Encouraged continuation of magnesium. We discussed use of fan and switching to cooler fabrics.    Return in 6  months.    Elida Grullon MD  [facility] Hematology/Oncology     Total time spent today: 30 minutes in chart review, patient evaluation, counseling, documentation, test and/or medication/prescription orders, and coordination of care.

## 2024-04-26 ENCOUNTER — TRANSFERRED RECORDS (OUTPATIENT)
Dept: MULTI SPECIALTY CLINIC | Facility: CLINIC | Age: 79
End: 2024-04-26

## 2024-04-26 LAB — RETINOPATHY: NORMAL

## 2024-04-29 ENCOUNTER — ONCOLOGY VISIT (OUTPATIENT)
Dept: ONCOLOGY | Facility: CLINIC | Age: 79
End: 2024-04-29
Attending: INTERNAL MEDICINE
Payer: MEDICARE

## 2024-04-29 VITALS
HEART RATE: 78 BPM | OXYGEN SATURATION: 99 % | HEIGHT: 64 IN | BODY MASS INDEX: 29.71 KG/M2 | DIASTOLIC BLOOD PRESSURE: 80 MMHG | SYSTOLIC BLOOD PRESSURE: 170 MMHG | WEIGHT: 174 LBS | RESPIRATION RATE: 16 BRPM

## 2024-04-29 DIAGNOSIS — C50.411 MALIGNANT NEOPLASM OF UPPER-OUTER QUADRANT OF RIGHT BREAST IN FEMALE, ESTROGEN RECEPTOR POSITIVE (H): Primary | ICD-10-CM

## 2024-04-29 DIAGNOSIS — N95.1 MENOPAUSAL SYNDROME (HOT FLASHES): ICD-10-CM

## 2024-04-29 DIAGNOSIS — Z17.0 MALIGNANT NEOPLASM OF UPPER-OUTER QUADRANT OF RIGHT BREAST IN FEMALE, ESTROGEN RECEPTOR POSITIVE (H): Primary | ICD-10-CM

## 2024-04-29 DIAGNOSIS — M25.50 POLYARTHRALGIA: ICD-10-CM

## 2024-04-29 PROCEDURE — 99214 OFFICE O/P EST MOD 30 MIN: CPT | Performed by: INTERNAL MEDICINE

## 2024-04-29 PROCEDURE — G0463 HOSPITAL OUTPT CLINIC VISIT: HCPCS | Performed by: INTERNAL MEDICINE

## 2024-04-29 ASSESSMENT — PAIN SCALES - GENERAL: PAINLEVEL: NO PAIN (0)

## 2024-04-29 NOTE — PROGRESS NOTES
"Oncology Rooming Note    April 29, 2024 1:23 PM   Yola Mayo is a 78 year old female who presents for:    Chief Complaint   Patient presents with    Oncology Clinic Visit     Initial Vitals: Resp 16   Ht 1.626 m (5' 4\")   Wt 78.9 kg (174 lb)   BMI 29.87 kg/m   Estimated body mass index is 29.87 kg/m  as calculated from the following:    Height as of this encounter: 1.626 m (5' 4\").    Weight as of this encounter: 78.9 kg (174 lb). Body surface area is 1.89 meters squared.  No Pain (0) Comment: Data Unavailable   No LMP recorded. Patient has had a hysterectomy.  Allergies reviewed: Yes  Medications reviewed: Yes    Medications: Medication refills not needed today.  Pharmacy name entered into [email or web address]: [id number] [place] [address] [facility] [address] [facility]    Frailty Screening:   Is the patient here for a new oncology consult visit in cancer care? 2. No        Anila Durham MA            "

## 2024-04-29 NOTE — LETTER
4/29/2024         RE: Yola Mayo  [address] [facility] [address]        Dear Colleague,    Thank you for referring your patient, Yola Mayo, to the [facility]. Please see a copy of my visit note below.    [facility]    Hematology/Oncology Established Patient Note      Today's Date: 4/29/2024    Reason for follow-up: Right breast cancer.    HISTORY OF PRESENT ILLNESS: Yola Myao is a 78 year old female who presents with the following oncologic history:  1. 3/24/2021: Mammogram showed distortion in right upper outer breast; left breast negative.  2. 3/31/2021: Right breast U/S at 9:00, 4 cm from nipple showed mass measuring 1.1 x 1 x 1.2 cm; no enlarged right axillary lymph nodes seen.  3. 4/07/2021: Right breast biopsy at 9:00 showed grade 2 invasive ductal carcinoma, ER positive at 100%, SD positive at 95%, HER-2/marek FISH negative.  4. 4/23/2021: Underwent right breast lumpectomy and sentinel lymph node excision under care of Dr. Ace Rogers. Pathology showed grade 2 invasive ductal carcinoma measuring 1.5 cm, low grade DCIS, LCIS, margins negative; 2 lymph nodes negative. Oncotype DX = 6; 9-year risk of distant recurrence = 3% after 5 years of hormone blockade therapy, < 1% absolute chemotherapy benefit.  5. 5/25/2021-6/16/2021: Completed adjuvant radiation therapy to right breast. Subsequently started anastrozole.    INTERIM HISTORY:  Dee Dee reports hot flashes in middle of night around 4AM with clamminess.     REVIEW OF SYSTEMS:   14 point ROS was reviewed and is negative other than as noted above in HPI.       HOME MEDICATIONS:  Current Outpatient Medications   Medication Sig Dispense Refill     amoxicillin (AMOXIL) 500 MG capsule TAKE 4 CAPSULES BY MOUTH 30MINUTES TO 1 HOUR BEFORE THE PROCEDURE. 4 capsule 3     anastrozole (ARIMIDEX) 1 MG tablet Take 1 tablet (1 mg) by mouth daily 90 tablet 4     aspirin 81 MG tablet Take 81 mg by  mouth daily       blood glucose (CONTOUR NEXT TEST) test strip 1 strip by In Vitro route daily or as directed. 100 strip 1     blood glucose monitoring (NITO MICROLET) lancets Use to test blood sugar 1 time daily or as directed. 100 each 3     calcium-vitamin D (CALTRATE) 600-400 MG-UNIT per tablet Take 1 tablet by mouth daily 60 tablet 11     cholecalciferol (VITAMIN D) 1000 UNIT tablet Take 1 tablet (1,000 Units) by mouth daily 100 tablet 3     clopidogrel (PLAVIX) 75 MG tablet Take 1 tablet (75 mg) by mouth daily 90 tablet 3     CONTOUR NEXT EZ (CONTOUR NEXT EZ W/DEVICE KIT) w/Device KIT 1 kit once 1 kit 0     Cyanocobalamin (VITAMIN B 12 PO) Take 1 tablet by mouth daily Strength unknown       hydrALAZINE (APRESOLINE) 25 MG tablet TAKE 2 TABLETS BY MOUTH EVERY MORNING AND ONE TABLET BY MOUTH EVERY EVENING 270 tablet 0     hydrochlorothiazide (HYDRODIURIL) 25 MG tablet Take 1 tablet (25 mg) by mouth daily for Blood Pressure 90 tablet 3     levothyroxine (SYNTHROID/LEVOTHROID) 112 MCG tablet Take 1 tablet (112 mcg) by mouth daily for thyroid due for TSH testing 90 tablet 0     losartan (COZAAR) 100 MG tablet Take 1 tablet (100 mg) by mouth daily for Blood Pressure 90 tablet 3     metFORMIN (GLUCOPHAGE XR) 500 MG 24 hr tablet Take 1 tablet (500 mg) by mouth daily (with dinner) for diabetes 90 tablet 3     metroNIDAZOLE (METROGEL) 0.75 % external gel APPLY TO AFFECTED AREA TWICE A DAY 45 g 1     multivitamin, therapeutic with minerals (THERA-VIT-M) TABS Take 1 tablet by mouth daily       nitroGLYcerin (NITROSTAT) 0.4 MG sublingual tablet For chest pain place 1 tablet under the tongue every 5 minutes for 3 doses. If symptoms persist 5 minutes after 1st dose call 911. 25 tablet 3     pravastatin (PRAVACHOL) 10 MG tablet TAKE 1 TABLET (10 MG) BY MOUTH DAILY. 90 tablet 1     Semaglutide, 1 MG/DOSE, (OZEMPIC, 1 MG/DOSE,) 4 MG/3ML pen Inject 1 mg Subcutaneous every 7 days 9 mL 1     senna-docusate (SENOKOT-S/PERICOLACE)  8.6-50 MG tablet Take 1-2 tablets by mouth 2 times daily Take while on oral narcotics to prevent or treat constipation. 30 tablet 0     ZIOPTAN 0.0015 % SOLN ophthalmic solution Place 1 drop into both eyes At Bedtime           ALLERGIES:  Allergies   Allergen Reactions     Toprol Xl [Metoprolol] Fatigue     Ace Inhibitors      ACE throat clearing     Atorvastatin Muscle Pain (Myalgia)     Crestor [Rosuvastatin]      myalgia     Oxycodone Nausea and Vomiting     Tetanus Toxoid Swelling     Hard red lump          PAST MEDICAL HISTORY:  Past Medical History:   Diagnosis Date     Anemia due to blood loss      BASAL CELL CANCER -NOSE      Dermatitis      Diabetes (H)      Family history of diabetes mellitus     Father     Family history of ischemic heart disease     Father     Family history of malignant neoplasm of ovary     Mother     HX DISRUPT L ANT CRUCIATE S/P REPAIR 2003      Hyperlipidemia      HYPERTENSION       HYPOTHYROIDISM      LOC PRIM OSTEOARTH-L 1ST METATARSAL-CUNEIFORM JOINT 3/10/2004     Major depressive disorder, single episode, mild (H24) 11/24/2014     MILD-MOD TRICUSPID REGURG. MOD PULM HTN 6/23/2005    ECHO  5/05     Obese      PONV (postoperative nausea and vomiting)      Rosacea      Shingles      SPLENIC CYST          PAST SURGICAL HISTORY:  Past Surgical History:   Procedure Laterality Date     ARTHROPLASTY KNEE Left 4/13/2016    Procedure: ARTHROPLASTY KNEE;  Surgeon: Giuseppe Lopez MD;  Location:  OR     ARTHROPLASTY KNEE Right 3/6/2023    Procedure: Right total knee arthroplasty;  Surgeon: Giuseppe Lopez MD;  Location:  OR     ARTHROPLASTY MINIMALLY INVASIVE HIP  4/18/2012    Procedure:ARTHROPLASTY MINIMALLY INVASIVE HIP; RIGHT TWO INCISION MINIMALLY INVASIVE HIP ARTHROPLASTY (YUVAL)^ (NO CELL SAVER) (C-ARM); Surgeon:GIUSEPPE LOPEZ; Location: OR     BIOPSY NODE SENTINEL Right 4/23/2021    Procedure: RIGHT SENTINEL LYMPH NODE BIOPSY;  Surgeon: Giuseppe Rogers,  MD;  Location:  OR     C CHEMOSURG MOHS 1ST STAGE  9-99    Mohs-nose     CARDIAC SURGERY  06/27/2019    mid and distal RCA stents     CV CORONARY ANGIOGRAM N/A 5/19/2022    Procedure: Coronary Angiogram;  Surgeon: Reyes Graham MD;  Location:  HEART CARDIAC CATH LAB     CV INSTANTANEOUS WAVE-FREE RATIO N/A 5/19/2022    Procedure: Instantaneous Wave-Free Ratio;  Surgeon: Reyes Graham MD;  Location:  HEART CARDIAC CATH LAB     CV INTRAVASULAR ULTRASOUND N/A 5/19/2022    Procedure: Intravascular Ultrasound;  Surgeon: Reyes Graham MD;  Location:  HEART CARDIAC CATH LAB     CV PCI STENT DRUG ELUTING N/A 5/19/2022    Procedure: Percutaneous Coronary Intervention Stent;  Surgeon: Reyes Graham MD;  Location:  HEART CARDIAC CATH LAB     ENT SURGERY      mohs procedure on nose     HYSTERECTOMY, PAP NO LONGER INDICATED       LUMPECTOMY BREAST WITH SEED LOCALIZATION Right 4/23/2021    Procedure: SEED LOCALIZED RIGHT BREAST LUMPECTOMY;  Surgeon: Giuseppe Rogers MD;  Location:  OR     SURGICAL HISTORY OF -   Mult-~1978    infertility, laparotomies     ZZC APPENDECTOMY  8/87    incidental appi at [facility]     Z REPAIR CRUCIATE LIGAMENT,KNEE  8/2003    L   ACL reconstruction     Z TOTAL ABDOM HYSTERECTOMY  8/87    W/ BSO fibroids, hx PID     ZZHC COLONOSCOPY THRU STOMA, DIAGNOSTIC  4/03    normal, rec repeat 10 yrs         SOCIAL HISTORY:  Social History     Socioeconomic History     Marital status:      Spouse name: FABIAN CUADRA     Number of children: 2     Years of education: Not on file     Highest education level: Not on file   Occupational History     Occupation: [employer]     Employer: [employer]   Tobacco Use     Smoking status: Never     Smokeless tobacco: Never   Vaping Use     Vaping status: Never Used   Substance and Sexual Activity     Alcohol use: Yes     Alcohol/week: 0.0 standard drinks of alcohol     Comment: occasional     Drug use: No     Sexual activity: Yes      Partners: Male   Other Topics Concern      Service No     Blood Transfusions No     Caffeine Concern No     Occupational Exposure No     Hobby Hazards No     Sleep Concern No     Stress Concern No     Weight Concern Yes     Special Diet No     Back Care No     Exercise Yes     Comment: Approx 3 times a week for about 30 minutes     Bike Helmet Yes     Seat Belt Yes     Self-Exams Yes     Parent/sibling w/ CABG, MI or angioplasty before 65F 55M? Not Asked   Social History Narrative     Not on file     Social Determinants of Health     Financial Resource Strain: Low Risk  (12/8/2023)    Financial Resource Strain      Within the past 12 months, have you or your family members you live with been unable to get utilities (heat, electricity) when it was really needed?: No   Food Insecurity: Low Risk  (12/8/2023)    Food Insecurity      Within the past 12 months, did you worry that your food would run out before you got money to buy more?: No      Within the past 12 months, did the food you bought just not last and you didn t have money to get more?: No   Transportation Needs: Low Risk  (12/8/2023)    Transportation Needs      Within the past 12 months, has lack of transportation kept you from medical appointments, getting your medicines, non-medical meetings or appointments, work, or from getting things that you need?: No   Physical Activity: Not on file   Stress: Not on file   Social Connections: Not on file   Interpersonal Safety: Low Risk  (12/8/2023)    Interpersonal Safety      Do you feel physically and emotionally safe where you currently live?: Yes      Within the past 12 months, have you been hit, slapped, kicked or otherwise physically hurt by someone?: No      Within the past 12 months, have you been humiliated or emotionally abused in other ways by your partner or ex-partner?: No   Housing Stability: Low Risk  (12/8/2023)    Housing Stability      Do you have housing? : Yes      Are you worried about  "losing your housing?: No         FAMILY HISTORY:  Family History   Problem Relation Age of Onset     Cancer Mother         ovarian  of it  at 51yo.     Other Cancer Mother      Diabetes Father         Adult onset     Hypertension Father      C.A.D. Father         Fatal Heart attack at 70.     Cerebrovascular Disease Father      Thyroid Disease Sister      Thyroid Disease Sister      Thyroid Disease Sister      Cancer Daughter      Thyroid Disease Daughter      Breast Cancer No family hx of      Cancer - colorectal No family hx of          PHYSICAL EXAM:  Vital signs:  BP (!) 170/80   Pulse 78   Resp 16   Ht 1.626 m (5' 4\")   Wt 78.9 kg (174 lb)   SpO2 99%   BMI 29.87 kg/m     GENERAL/CONSTITUTIONAL: No acute distress.  EYES:  No scleral icterus.  LYMPH: No cervical, supraclavicular, axillary adenopathy.   BREAST: No palpable masses in either breast with exception of left breast post-lumpectomy changes in the upper outer quadrant; no significant edema or erythema. Nipples are everted bilaterally with no discharge. No erythema, ulceration.  RESPIRATORY: No audible cough or wheezing.   GASTROINTESTINAL: No hepatosplenomegaly, masses, or tenderness. No guarding.  No distention.  MUSCULOSKELETAL: Warm and well-perfused, no cyanosis, clubbing or edema.  NEUROLOGIC: Alert, oriented, answers questions appropriately.  INTEGUMENTARY: No rashes or jaundice.   GAIT: Steady, does not use assistive device    LABS:  CBC RESULTS:   Recent Labs   Lab Test 04/10/23  1016 23  0618 23  1131   WBC  --   --  7.3   RBC  --   --  4.61   HGB 12.4 10.1* 13.5   HCT  --   --  43.1   MCV  --   --  94   MCH  --   --  29.3   MCHC  --   --  31.3*   RDW  --   --  13.7   PLT  --  212 249      Last Comprehensive Metabolic Panel:  Sodium   Date Value Ref Range Status   2023 138 135 - 145 mmol/L Final     Comment:     Reference intervals for this test were updated on 2023 to more accurately reflect our healthy " population. There may be differences in the flagging of prior results with similar values performed with this method. Interpretation of those prior results can be made in the context of the updated reference intervals.    05/12/2021 137 133 - 144 mmol/L Final     Potassium   Date Value Ref Range Status   12/08/2023 3.9 3.4 - 5.3 mmol/L Final   10/07/2022 4.2 3.4 - 5.3 mmol/L Final   05/12/2021 3.9 3.4 - 5.3 mmol/L Final     Chloride   Date Value Ref Range Status   12/08/2023 101 98 - 107 mmol/L Final   10/07/2022 104 94 - 109 mmol/L Final   05/12/2021 103 94 - 109 mmol/L Final     Carbon Dioxide   Date Value Ref Range Status   05/12/2021 26 20 - 32 mmol/L Final     Carbon Dioxide (CO2)   Date Value Ref Range Status   12/08/2023 25 22 - 29 mmol/L Final   10/07/2022 25 20 - 32 mmol/L Final     Anion Gap   Date Value Ref Range Status   12/08/2023 12 7 - 15 mmol/L Final   10/07/2022 8 3 - 14 mmol/L Final   05/12/2021 8 3 - 14 mmol/L Final     Glucose   Date Value Ref Range Status   12/08/2023 99 70 - 99 mg/dL Final   10/07/2022 103 (H) 70 - 99 mg/dL Final   05/12/2021 163 (H) 70 - 99 mg/dL Final     GLUCOSE BY METER POCT   Date Value Ref Range Status   03/07/2023 135 (H) 70 - 99 mg/dL Final     Urea Nitrogen   Date Value Ref Range Status   12/08/2023 13.7 8.0 - 23.0 mg/dL Final   10/07/2022 13 7 - 30 mg/dL Final   05/12/2021 17 7 - 30 mg/dL Final     Creatinine   Date Value Ref Range Status   12/08/2023 0.65 0.51 - 0.95 mg/dL Final   05/12/2021 0.71 0.52 - 1.04 mg/dL Final     GFR Estimate   Date Value Ref Range Status   12/08/2023 90 >60 mL/min/1.73m2 Final   05/12/2021 82 >60 mL/min/[1.73_m2] Final     Comment:     Non  GFR Calc  Starting 12/18/2018, serum creatinine based estimated GFR (eGFR) will be   calculated using the Chronic Kidney Disease Epidemiology Collaboration   (CKD-EPI) equation.       Calcium   Date Value Ref Range Status   12/08/2023 9.7 8.8 - 10.2 mg/dL Final   05/12/2021 9.4 8.5 -  10.1 mg/dL Final     Bilirubin Total   Date Value Ref Range Status   10/07/2022 0.5 0.2 - 1.3 mg/dL Final   2021 0.5 0.2 - 1.3 mg/dL Final     Alkaline Phosphatase   Date Value Ref Range Status   10/07/2022 76 40 - 150 U/L Final   2021 76 40 - 150 U/L Final     ALT   Date Value Ref Range Status   10/07/2022 59 (H) 0 - 50 U/L Final   2021 79 (H) 0 - 50 U/L Final     AST   Date Value Ref Range Status   10/07/2022 39 0 - 45 U/L Final   2021 48 (H) 0 - 45 U/L Final       PATHOLOGY:  None new.    IMAGIN2024: DEXA showed normal bone density.    2024: Mammogram showed no malignancy.    ASSESSMENT/PLAN:  Yola Mayo is a 78 year old female with the following issues:  1. 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  --She is status post lumpectomy followed by adjuvant radiation therapy.  -I discussed with Dee Dee that she has no clinical evidence for recurrent breast cancer by physical exam from today or mammogram reviewed from 2024.  --I advised gentle massage to the right breast and axilla to alleviated some of the scar-related tissue discomfort.  --She is now on anastrozole and tolerating this well with some polyarthralgia.  --Plan for total 5 years of hormone blockade therapy through 2026.  --Discussed 2024 DEXA showed normal bone density. I advised adequate calcium and vitamin D intake as well as weight bearing exercise.  --Plan to repeat DEXA in .  --Due for next mammogram 2025.    2. Right breast lymphedema  --Improved with lymphedema therapy.    3. Coronary artery disease  --Aromatase inhibitors can slightly increase cardiovascular risk but controllable with her cardiac medications.  --She will continue on aspirin, beta-blocker, and statin.     4. Diabetes mellitus type  "2  --Well-controlled. She will continue metformin and has her A1C monitored by Dr. Braga.    5. Essential hypertension  --Blood pressure stable.  She will continue on losartan, hydrochlorothiazide, and hydralazine.    6. Polyarthralgia  7. Hot flashes  --AI-induced.  --Encouraged continuation of magnesium. We discussed use of fan and switching to cooler fabrics.    Return in 6 months.    Elida Grullon MD  [facility] Hematology/Oncology     Total time spent today: 30 minutes in chart review, patient evaluation, counseling, documentation, test and/or medication/prescription orders, and coordination of care.       Oncology Rooming Note    April 29, 2024 1:23 PM   Yola Mayo is a 78 year old female who presents for:    Chief Complaint   Patient presents with     Oncology Clinic Visit     Initial Vitals: Resp 16   Ht 1.626 m (5' 4\")   Wt 78.9 kg (174 lb)   BMI 29.87 kg/m   Estimated body mass index is 29.87 kg/m  as calculated from the following:    Height as of this encounter: 1.626 m (5' 4\").    Weight as of this encounter: 78.9 kg (174 lb). Body surface area is 1.89 meters squared.  No Pain (0) Comment: Data Unavailable   No LMP recorded. Patient has had a hysterectomy.  Allergies reviewed: Yes  Medications reviewed: Yes    Medications: Medication refills not needed today.  Pharmacy name entered into [employer]: [id number] [place] [address] [address] [facility] [address] [facility]    Frailty Screening:   Is the patient here for a new oncology consult visit in cancer care? 2. No        Anila Durham MA              Again, thank you for allowing me to participate in the care of your patient.        Sincerely,        Elida Grullon MD  "

## 2024-06-02 ENCOUNTER — HEALTH MAINTENANCE LETTER (OUTPATIENT)
Age: 79
End: 2024-06-02

## 2024-06-20 ENCOUNTER — OFFICE VISIT (OUTPATIENT)
Dept: FAMILY MEDICINE | Facility: CLINIC | Age: 79
End: 2024-06-20
Payer: MEDICARE

## 2024-06-20 VITALS
HEIGHT: 62 IN | RESPIRATION RATE: 16 BRPM | DIASTOLIC BLOOD PRESSURE: 78 MMHG | WEIGHT: 170.6 LBS | HEART RATE: 78 BPM | TEMPERATURE: 97.7 F | SYSTOLIC BLOOD PRESSURE: 139 MMHG | BODY MASS INDEX: 31.39 KG/M2 | OXYGEN SATURATION: 98 %

## 2024-06-20 DIAGNOSIS — Z13.0 SCREENING FOR DEFICIENCY ANEMIA: ICD-10-CM

## 2024-06-20 DIAGNOSIS — E78.5 HYPERLIPIDEMIA, UNSPECIFIED HYPERLIPIDEMIA TYPE: ICD-10-CM

## 2024-06-20 DIAGNOSIS — Z23 HIGH PRIORITY FOR 2019-NCOV VACCINE: ICD-10-CM

## 2024-06-20 DIAGNOSIS — E11.9 TYPE 2 DIABETES MELLITUS WITHOUT COMPLICATION, WITHOUT LONG-TERM CURRENT USE OF INSULIN (H): ICD-10-CM

## 2024-06-20 DIAGNOSIS — L71.9 ROSACEA: ICD-10-CM

## 2024-06-20 DIAGNOSIS — I10 ESSENTIAL HYPERTENSION: ICD-10-CM

## 2024-06-20 DIAGNOSIS — I27.20 PULMONARY HYPERTENSION (H): ICD-10-CM

## 2024-06-20 DIAGNOSIS — Z85.828 HISTORY OF BASAL CELL CARCINOMA: ICD-10-CM

## 2024-06-20 DIAGNOSIS — Z95.5 S/P DRUG ELUTING CORONARY STENT PLACEMENT: ICD-10-CM

## 2024-06-20 DIAGNOSIS — Z12.11 SCREEN FOR COLON CANCER: ICD-10-CM

## 2024-06-20 DIAGNOSIS — Z00.00 ENCOUNTER FOR MEDICARE ANNUAL WELLNESS EXAM: Primary | ICD-10-CM

## 2024-06-20 DIAGNOSIS — E03.9 HYPOTHYROIDISM, UNSPECIFIED TYPE: ICD-10-CM

## 2024-06-20 LAB
ALBUMIN SERPL BCG-MCNC: 4.6 G/DL (ref 3.5–5.2)
ALP SERPL-CCNC: 74 U/L (ref 40–150)
ALT SERPL W P-5'-P-CCNC: 37 U/L (ref 0–50)
ANION GAP SERPL CALCULATED.3IONS-SCNC: 12 MMOL/L (ref 7–15)
AST SERPL W P-5'-P-CCNC: 33 U/L (ref 0–45)
BILIRUB SERPL-MCNC: 0.5 MG/DL
BUN SERPL-MCNC: 13.5 MG/DL (ref 8–23)
CALCIUM SERPL-MCNC: 9.7 MG/DL (ref 8.8–10.2)
CHLORIDE SERPL-SCNC: 99 MMOL/L (ref 98–107)
CHOLEST SERPL-MCNC: 143 MG/DL
CREAT SERPL-MCNC: 0.68 MG/DL (ref 0.51–0.95)
DEPRECATED HCO3 PLAS-SCNC: 25 MMOL/L (ref 22–29)
EGFRCR SERPLBLD CKD-EPI 2021: 88 ML/MIN/1.73M2
ERYTHROCYTE [DISTWIDTH] IN BLOOD BY AUTOMATED COUNT: 13.8 % (ref 10–15)
FASTING STATUS PATIENT QL REPORTED: YES
FASTING STATUS PATIENT QL REPORTED: YES
GLUCOSE SERPL-MCNC: 100 MG/DL (ref 70–99)
HBA1C MFR BLD: 5.6 % (ref 0–5.6)
HCT VFR BLD AUTO: 41.7 % (ref 35–47)
HDLC SERPL-MCNC: 86 MG/DL
HGB BLD-MCNC: 13.8 G/DL (ref 11.7–15.7)
LDLC SERPL CALC-MCNC: 49 MG/DL
MCH RBC QN AUTO: 28.7 PG (ref 26.5–33)
MCHC RBC AUTO-ENTMCNC: 33.1 G/DL (ref 31.5–36.5)
MCV RBC AUTO: 87 FL (ref 78–100)
NONHDLC SERPL-MCNC: 57 MG/DL
PLATELET # BLD AUTO: 318 10E3/UL (ref 150–450)
POTASSIUM SERPL-SCNC: 3.9 MMOL/L (ref 3.4–5.3)
PROT SERPL-MCNC: 7.6 G/DL (ref 6.4–8.3)
RBC # BLD AUTO: 4.81 10E6/UL (ref 3.8–5.2)
SODIUM SERPL-SCNC: 136 MMOL/L (ref 135–145)
TRIGL SERPL-MCNC: 41 MG/DL
TSH SERPL DL<=0.005 MIU/L-ACNC: 1.29 UIU/ML (ref 0.3–4.2)
WBC # BLD AUTO: 5.8 10E3/UL (ref 4–11)

## 2024-06-20 PROCEDURE — 80061 LIPID PANEL: CPT | Performed by: INTERNAL MEDICINE

## 2024-06-20 PROCEDURE — 91320 SARSCV2 VAC 30MCG TRS-SUC IM: CPT | Performed by: INTERNAL MEDICINE

## 2024-06-20 PROCEDURE — 85027 COMPLETE CBC AUTOMATED: CPT | Performed by: INTERNAL MEDICINE

## 2024-06-20 PROCEDURE — 99214 OFFICE O/P EST MOD 30 MIN: CPT | Mod: 25 | Performed by: INTERNAL MEDICINE

## 2024-06-20 PROCEDURE — G0439 PPPS, SUBSEQ VISIT: HCPCS | Performed by: INTERNAL MEDICINE

## 2024-06-20 PROCEDURE — 84443 ASSAY THYROID STIM HORMONE: CPT | Performed by: INTERNAL MEDICINE

## 2024-06-20 PROCEDURE — 83036 HEMOGLOBIN GLYCOSYLATED A1C: CPT | Performed by: INTERNAL MEDICINE

## 2024-06-20 PROCEDURE — 80053 COMPREHEN METABOLIC PANEL: CPT | Performed by: INTERNAL MEDICINE

## 2024-06-20 PROCEDURE — 36415 COLL VENOUS BLD VENIPUNCTURE: CPT | Performed by: INTERNAL MEDICINE

## 2024-06-20 PROCEDURE — 99207 PR FOOT EXAM NO CHARGE: CPT | Performed by: INTERNAL MEDICINE

## 2024-06-20 PROCEDURE — 90480 ADMN SARSCOV2 VAC 1/ONLY CMP: CPT | Performed by: INTERNAL MEDICINE

## 2024-06-20 PROCEDURE — 82274 ASSAY TEST FOR BLOOD FECAL: CPT | Performed by: INTERNAL MEDICINE

## 2024-06-20 SDOH — HEALTH STABILITY: PHYSICAL HEALTH: ON AVERAGE, HOW MANY MINUTES DO YOU ENGAGE IN EXERCISE AT THIS LEVEL?: 20 MIN

## 2024-06-20 SDOH — HEALTH STABILITY: PHYSICAL HEALTH: ON AVERAGE, HOW MANY DAYS PER WEEK DO YOU ENGAGE IN MODERATE TO STRENUOUS EXERCISE (LIKE A BRISK WALK)?: 5 DAYS

## 2024-06-20 ASSESSMENT — PAIN SCALES - GENERAL: PAINLEVEL: MILD PAIN (3)

## 2024-06-20 ASSESSMENT — SOCIAL DETERMINANTS OF HEALTH (SDOH): HOW OFTEN DO YOU GET TOGETHER WITH FRIENDS OR RELATIVES?: MORE THAN THREE TIMES A WEEK

## 2024-06-20 NOTE — PROGRESS NOTES
Preventive Care Visit  [facility]  Zoraida Braga MD, Internal Medicine  Jun 20, 2024      Dee Dee was seen today for physical.    Diagnoses and all orders for this visit:    Encounter for Medicare annual wellness exam  Last DEXA 4/2024 was nl.  Last mammo 4/2024 was nl.  Counseled on diet and exercise.   Preventive health counseling was also done.      Type 2 diabetes mellitus without complication, without long-term current use of insulin (H)  -     Lipid panel reflex to direct LDL Fasting; Future  -     TSH with free T4 reflex; Future  -     Comprehensive metabolic panel; Future  -     HEMOGLOBIN A1C; Future  -     IA FOOT EXAM NO CHARGE  Tolerates Ozempic well, disappointed she's not losing weight faster.  Discussed increasing dose and patient declined today, but she will consider increasing her dose in the future.  Patient advised to avoid dehydration while fasting for labs.  Patient scheduled for diabetic eye exam next week, encouraged patient to have provider fax PCP exam note.  Lab Results   Component Value Date    A1C 5.6 06/20/2024    A1C 5.6 12/08/2023    A1C 5.7 09/07/2023    A1C 6.0 02/23/2023    A1C 6.0 10/07/2022    A1C 6.0 03/19/2021    A1C 5.9 06/17/2020    A1C 6.5 05/07/2019    A1C 6.0 10/14/2018    A1C 5.9 05/03/2018       Pulmonary hypertension (H)  No  symptoms  See  Echo 10/2017    Essential hypertension  -     Comprehensive metabolic panel; Future  S/p stents  Advised to avoid NSAID.  BP elevated at 141/84, rechecked at 139/78.   Monitors BP at home and reported readings are well controlled.    S/P drug eluting coronary stent placement  Follows cardiology, Dr. Howard.   Patient is overdue for an office visit, encouraged to schedule an appointment.    Rosacea  Follows dermatology    History of basal cell carcinoma  Follow dermatology    Hyperlipidemia, unspecified hyperlipidemia type  Takes pravastatin    Hypothyroidism, unspecified type  -     TSH with free T4 reflex;  Future  Takes levothyroxine    Screening for deficiency anemia  -     CBC with platelets; Future    Screen for colon cancer  -     Fecal colorectal cancer screen FIT - Future (S+30); Future  Patient will pick this up from the lab today.    High priority for 2019-nCoV vaccine  Received Covid booster today.  Discussed CDC Covid booster guidelines.    Other orders  -     PRIMARY CARE FOLLOW-UP SCHEDULING; Future    Other  Reported Covid infection 5/2024 following trip to [place].    Reported muscle pain, hot flashes, and night sweats medication from hr arimidex  She has discussed this with her oncologist     Subjective   Dee Dee is a 79 year old, presenting for the following:  Physical        Via the Health Maintenance questionnaire, the patient has reported the following services have been completed -Eye Exam: [facility] 2024-04-26, this information has been sent to the abstraction team.  Health Care Directive  Patient has a Health Care Directive on file  Advance care planning document is on file and is current.    DANDRE Funez is a 79 year old, presenting for the following:  Physical        6/20/2024   General Health   How would you rate your overall physical health? Good   Feel stress (tense, anxious, or unable to sleep) Only a little      (!) STRESS CONCERN      6/20/2024   Nutrition   Diet: Regular (no restrictions)            6/20/2024   Exercise   Days per week of moderate/strenous exercise 5 days   Average minutes spent exercising at this level 20 min            6/20/2024   Social Factors   Frequency of gathering with friends or relatives More than three times a week   Worry food won't last until get money to buy more No   Food not last or not have enough money for food? No   Do you have housing? (Housing is defined as stable permanent housing and does not include staying ouside in a car, in a tent, in an abandoned building, in an overnight shelter, or couch-surfing.) Yes   Are you worried about losing your  housing? No   Lack of transportation? No   Unable to get utilities (heat,electricity)? No            6/20/2024   Fall Risk   Fallen 2 or more times in the past year? No   Trouble with walking or balance? No             6/20/2024   Activities of Daily Living- Home Safety   Needs help with the following daily activites None of the above   Safety concerns in the home None of the above            6/20/2024   Dental   Dentist two times every year? Yes            6/20/2024   Hearing Screening   Hearing concerns? None of the above            6/20/2024   Driving Risk Screening   Patient/family members have concerns about driving No            6/20/2024   General Alertness/Fatigue Screening   Have you been more tired than usual lately? No            6/20/2024   Urinary Incontinence Screening   Bothered by leaking urine in past 6 months No            6/20/2024   TB Screening   Were you born outside of the [place]? No      Today's PHQ-2 Score:       6/20/2024     9:39 AM   PHQ-2 ( 1999 Pf[employer])   Q1: Little interest or pleasure in doing things 0   Q2: Feeling down, depressed or hopeless 0   PHQ-2 Score 0   Q1: Little interest or pleasure in doing things Not at all   Q2: Feeling down, depressed or hopeless Not at all   PHQ-2 Score 0           6/20/2024   Substance Use   Alcohol more than 3/day or more than 7/wk No   Do you have a current opioid prescription? No   How severe/bad is pain from 1 to 10? 3/10   Do you use any other substances recreationally? No        Social History     Tobacco Use    Smoking status: Never    Smokeless tobacco: Never   Vaping Use    Vaping status: Never Used   Substance Use Topics    Alcohol use: Yes     Comment: occasional, few drinks per month    Drug use: No           4/16/2024   LAST FHS-7 RESULTS   1st degree relative breast or ovarian cancer No   Any relative bilateral breast cancer No   Any male have breast cancer No   Any ONE woman have BOTH breast AND ovarian cancer No   Any woman with breast  cancer before 50yrs No   2 or more relatives with breast AND/OR ovarian cancer No   2 or more relatives with breast AND/OR bowel cancer No           Mammogram Screening - After age 74- determine frequency with patient based on health status, life expectancy and patient goals    ASCVD Risk   The ASCVD Risk score (Waldemar DK, et al., 2019) failed to calculate for the following reasons:    The valid total cholesterol range is 130 to 320 mg/dL        Reviewed and updated as needed this visit by Provider                  Current providers sharing in care for this patient include:  Patient Care Team:  Zoraida Braga MD as PCP - General (Internal Medicine)  Zoraida Braga MD as Assigned PCP  May Singh RD as Diabetes Educator (Dietitian, Registered)  Elida Grullon MD as Assigned Cancer Care Provider  Albino Howard MD as MD (Cardiovascular Disease)  Albino Howard MD as Assigned Heart and Vascular Provider    The following health maintenance items are reviewed in Epic and correct as of today:  Health Maintenance   Topic Date Due    COVID-19 Vaccine (7 - 2023-24 season) 11/21/2023    EYE EXAM  02/08/2024    TSH W/FREE T4 REFLEX  02/23/2024    COLORECTAL CANCER SCREENING  04/10/2024    A1C  06/08/2024    LIPID  08/11/2024    MICROALBUMIN  09/07/2024    ANNUAL REVIEW OF HM ORDERS  09/07/2024    BMP  12/08/2024    MAMMO SCREENING  04/16/2025    MEDICARE ANNUAL WELLNESS VISIT  06/20/2025    DIABETIC FOOT EXAM  06/20/2025    FALL RISK ASSESSMENT  06/20/2025    ADVANCE CARE PLANNING  06/20/2029    DEXA  04/16/2039    HEPATITIS C SCREENING  Completed    PHQ-2 (once per calendar year)  Completed    INFLUENZA VACCINE  Completed    Pneumococcal Vaccine: 65+ Years  Completed    ZOSTER IMMUNIZATION  Completed    RSV VACCINE (Pregnancy & 60+)  Completed    IPV IMMUNIZATION  Aged Out    HPV IMMUNIZATION  Aged Out    MENINGITIS IMMUNIZATION  Aged Out    RSV MONOCLONAL ANTIBODY  Aged Out    DTAP/TDAP/TD  "IMMUNIZATION  Discontinued       Review of Systems  Constitutional, HEENT, cardiovascular, pulmonary, GI, , musculoskeletal, neuro, skin, endocrine and psych systems are negative, except as otherwise noted.     Objective    Exam  /78 (BP Location: Right arm, Patient Position: Sitting)   Pulse 78   Temp 97.7  F (36.5  C) (Tympanic)   Resp 16   Ht 1.575 m (5' 2\")   Wt 77.4 kg (170 lb 9.6 oz)   SpO2 98%   BMI 31.20 kg/m     Estimated body mass index is 31.2 kg/m  as calculated from the following:    Height as of this encounter: 1.575 m (5' 2\").    Weight as of this encounter: 77.4 kg (170 lb 9.6 oz).    Physical Exam  GENERAL: alert and no distress  EYES: Eyes grossly normal to inspection, PERRL and conjunctivae and sclerae normal  HENT: ear canals and TM's normal, nose and mouth without ulcers or lesions  NECK: no adenopathy,     RESP: lungs clear to auscultation - no rales, rhonchi or wheezes  BREAST: normal without masses, tenderness or nipple discharge and no palpable axillary masses or adenopathy  CV: regular rate and rhythm, normal S1 S2, no S3   ABDOMEN: soft, nontender, no hepatosplenomegaly, no masses and bowel sounds normal  MS: no gross musculoskeletal defects noted,   medial side knee swelling chronic  SKIN: no suspicious lesions or rashes  She has  prominent spider veins in lower extremities, lumpectomy scar on right breast, cherry angiomas, freckles  NEURO:  mentation intact and speech normal  PSYCH: mentation appears normal, affect normal/bright  LYMPH: no cervical, supraclavicular, axillary, or inguinal adenopathy  Diabetic foot exam: normal DP and PT pulses, no trophic changes or ulcerative lesions, and normal sensory exam    Labs pending         6/20/2024   Mini Cog   Clock Draw Score 2 Normal   3 Item Recall 3 objects recalled   Mini Cog Total Score 5               Signed Electronically by: Zoraida Braga MD    This document serves as a record of the services and decisions " personally performed and made by Dr. Braga. It was created on her behalf by Leticia Bauer, a trained medical scribe. The creation of this document is based the provider's statements to the medical scribe.

## 2024-06-20 NOTE — PATIENT INSTRUCTIONS
"Please call the following number to make the appointment to see cardiologist for follow up   [facility]: [facility] [phone]      Follow up in 6 months.  Seek sooner medical attention if there is any worsening of symptoms or problems.       Patient Education   Preventive Care Advice   This is general advice we often give to help people stay healthy. Your care team may have specific advice just for you. Please talk to your care team about your own preventive care needs.  Lifestyle  Exercise at least 150 minutes each week (30 minutes a day, 5 days a week).  Do muscle strengthening activities 2 days a week. These help control your weight and prevent disease.  No smoking.  Wear sunscreen to prevent skin cancer.  Have your home tested for radon every 2 to 5 years. Radon is a colorless, odorless gas that can harm your lungs. To learn more, go to www.health.[place].com.[place] and search for \"Radon in Homes.\"  Keep guns unloaded and locked up in a safe place like a safe or gun vault, or, use a gun lock and hide the keys. Always lock away bullets separately. To learn more, visit [address].gov and search for \"safe gun storage.\"  Nutrition  Eat 5 or more servings of fruits and vegetables each day.  Try wheat bread, brown rice and whole grain pasta (instead of white bread, rice, and pasta).  Get enough calcium and vitamin D. Check the label on foods and aim for 100% of the RDA (recommended daily allowance).  Regular exams  Have a dental exam and cleaning every 6 months.  See your health care team every year to talk about:  Any changes in your health.  Any medicines your care team has prescribed.  Preventive care, family planning, and ways to prevent chronic diseases.  Shots (vaccines)   HPV shots (up to age 26), if you've never had them before.  Hepatitis B shots (up to age 59), if you've never had them before.  COVID-19 shot: Get this shot when it's due.  Flu shot: Get a flu shot every year.  Tetanus shot: Get a " tetanus shot every 10 years.  Pneumococcal, hepatitis A, and RSV shots: Ask your care team if you need these based on your risk.  Shingles shot (for age 50 and up).  General health tests  Diabetes screening:  Starting at age 35, Get screened for diabetes at least every 3 years.  If you are younger than age 35, ask your care team if you should be screened for diabetes.  Cholesterol test: At age 39, start having a cholesterol test every 5 years, or more often if advised.  Bone density scan (DEXA): At age 50, ask your care team if you should have this scan for osteoporosis (brittle bones).  Hepatitis C: Get tested at least once in your life.  Abdominal aortic aneurysm screening: Talk to your doctor about having this screening if you:  Have ever smoked; and  Are biologically male; and  Are between the ages of 65 and 75.  STIs (sexually transmitted infections)  Before age 24: Ask your care team if you should be screened for STIs.  After age 24: Get screened for STIs if you're at risk. You are at risk for STIs (including HIV) if:  You are sexually active with more than one person.  You don't use condoms every time.  You or a partner was diagnosed with a sexually transmitted infection.  If you are at risk for HIV, ask about PrEP medicine to prevent HIV.  Get tested for HIV at least once in your life, whether you are at risk for HIV or not.  Cancer screening tests  Cervical cancer screening: If you have a cervix, begin getting regular cervical cancer screening tests at age 21. Most people who have regular screenings with normal results can stop after age 65. Talk about this with your provider.  Breast cancer scan (mammogram): If you've ever had breasts, begin having regular mammograms starting at age 40. This is a scan to check for breast cancer.  Colon cancer screening: It is important to start screening for colon cancer at age 45.  Have a colonoscopy test every 10 years (or more often if you're at risk) Or, ask your  provider about stool tests like a FIT test every year or Cologuard test every 3 years.  To learn more about your testing options, visit: www.[employer]/690398.pdf.  For help making a decision, visit: parker/kx51836.  Prostate cancer screening test: If you have a prostate and are age 55 to 69, ask your provider if you would benefit from a yearly prostate cancer screening test.  Lung cancer screening: If you are a current or former smoker age 50 to 80, ask your care team if ongoing lung cancer screenings are right for you.  For informational purposes only. Not to replace the advice of your health care provider. Copyright   2023 [facility]. All rights reserved. Clinically reviewed by the [facility]. [employer] [id number] - REV 04/24.

## 2024-06-20 NOTE — RESULT ENCOUNTER NOTE
Timothy Aceves    This is to inform you regarding your test result.    CBC result which includes white count Hemoglobin and  Platelet Counts is normal.   HbA1c which is average glucose during last 3 months is normal.  Other test results are pending.        Sincerely,      Dr.Nasima Maria Luz MD,FACP

## 2024-06-23 DIAGNOSIS — E03.9 HYPOTHYROIDISM, UNSPECIFIED TYPE: ICD-10-CM

## 2024-06-23 RX ORDER — LEVOTHYROXINE SODIUM 112 UG/1
112 TABLET ORAL DAILY
Qty: 90 TABLET | Refills: 2 | Status: SHIPPED | OUTPATIENT
Start: 2024-06-23

## 2024-06-23 RX ORDER — LEVOTHYROXINE SODIUM 112 UG/1
112 TABLET ORAL DAILY
Qty: 90 TABLET | Refills: 2 | Status: SHIPPED | OUTPATIENT
Start: 2024-06-23 | End: 2024-06-23

## 2024-06-23 NOTE — RESULT ENCOUNTER NOTE
Timothy Aceves    This is to inform you regarding your test result.    The testing of your kidney function, liver function and electrolytes was satisfactory   TSH which is thyroid hormone is normal.  Your total cholesterol is normal.  HDL which is called good cholesterol is normal.  Your LDL cholesterol is normal.  This is often call bad cholesterol and high levels increase the risk for heart attacks and strokes.  Your triglycerides are normal.  CBC result which includes white count Hemoglobin and  Platelet Counts is normal.         Sincerely,      Dr.Nasima Maria Luz MD,FACP

## 2024-06-25 LAB — HEMOCCULT STL QL IA: NEGATIVE

## 2024-06-25 NOTE — RESULT ENCOUNTER NOTE
Timothy Aceves    This is to inform you regarding your test result.    Fecal colorectal cancer screen (FIT) is negative.      Sincerely,      Dr.Nasima Maria Luz MD,FACP

## 2024-08-11 ENCOUNTER — HEALTH MAINTENANCE LETTER (OUTPATIENT)
Age: 79
End: 2024-08-11

## 2024-08-19 ENCOUNTER — OFFICE VISIT (OUTPATIENT)
Dept: CARDIOLOGY | Facility: CLINIC | Age: 79
End: 2024-08-19
Payer: MEDICARE

## 2024-08-19 VITALS
HEART RATE: 70 BPM | HEIGHT: 63 IN | RESPIRATION RATE: 18 BRPM | DIASTOLIC BLOOD PRESSURE: 80 MMHG | SYSTOLIC BLOOD PRESSURE: 130 MMHG | WEIGHT: 170 LBS | BODY MASS INDEX: 30.12 KG/M2 | OXYGEN SATURATION: 99 %

## 2024-08-19 DIAGNOSIS — I10 BENIGN HYPERTENSION: Primary | ICD-10-CM

## 2024-08-19 DIAGNOSIS — I25.10 CORONARY ARTERY DISEASE INVOLVING NATIVE CORONARY ARTERY OF NATIVE HEART WITHOUT ANGINA PECTORIS: ICD-10-CM

## 2024-08-19 DIAGNOSIS — I10 ESSENTIAL HYPERTENSION, BENIGN: ICD-10-CM

## 2024-08-19 PROCEDURE — 99214 OFFICE O/P EST MOD 30 MIN: CPT | Performed by: NURSE PRACTITIONER

## 2024-08-19 NOTE — LETTER
8/19/2024    Zoraida Braga MD  [address]  [place]                [place] [address]    RE: Yola Mayo       Dear Colleague,     I had the pleasure of seeing Yola Mayo in the [facility].    Electrophysiology Clinic Progress Note  Yola Mayo MRN# [id number]   YOB: 1945 Age: 79 year old     Primary cardiologist: Dr. Howard    Reason for visit: Annual follow up    History of presenting illness:    Yola Mayo is a pleasant 79 year old patient with past medical history significant for:    Coronary artery disease: s/p PCI to RCA x 2 in 2018 and recent PCI to prox LAD/D1 bifurcation and distal RCA (5/19/2022)  Hypertension  Hyperlipidemia  Diabetes   Breast Cancer: Right sided DCIS s/p right breast lumpectomy and lymph node excision with Dr. Rogers and radiation therapy. Followed by Dr. Grullon at  Oncology  Pulmonary hypertension: Mild to moderate on echo from 2017    Today Dee Dee returns for annual follow-up.  She is doing overall well without shortness of breath on exertion (anginal equivalent).  She continues to walk daily and has travel to [place], [place] and has an upcoming trip to [place].  Her biggest concern is that she is overall fatigued.  Her  does report that she snores and he did discuss a sleep study today and she would like to consider this as an option.    Diagnotic studies:  5/19/2022: Severe vessel CAD s/p MALLY to prox LAD and D1 iIFR 0.81) bifurcation and distal RCA (95% stenosis), mid LCx 55% (iFR 0.97)  Stress echo (5/2022): The patient exercised 6:35 min.There was a normal BP response to exercise. Exercise was stopped due to fatigue. A treadmill exercise test according to the Modified Tobin protocol was performed. Target Heart Rate was achieved. This was a normal stress. EKG with no evidence of stress-induced ischemia. Left ventricular cavity size decreases with exercise. Global LV systolic function augments with exercise. The  visual ejection fraction is >70%. Normal left ventricular function and wall motion at rest and post-stress.  Stress echo (2018): Abnormal stress echocardiogram with small area of mid anterior ischemia (likely diagonal distribution) at exercise capacity of 8 Mets and low risk duke treadmill score  2018: 2 vessel CAD 60% LAD lesion (not treated) and RCA with 90-95% treated with 2 MALLY          Assessment and Plan:     ASSESSMENT:    Coronary artery disease  Status post 2 drug eluting stents to the mid and distal RCA 6/27/2018 with residual 50-60% mid LAD lesion with 100% occlusion of the first diagonal  Coronary angiogram 5/19/2022: Severe vessel CAD s/p MALLY to prox LAD and D1 bifurcation and distal RCA  Preserved LVEF    Hyperlipidemia, LDL goal<70  Most recent LDL 49 from 6/2024  Pravastatin 10 mg daily (did not tolerate low dose atorvastatin or rosuvastatin due to myalgias)    Hypertension, goal less than 140/90  Controlled  Losartan to 100 mg daily and hydrochlorthiazide 25 mg daily hydralazine 50 mg BID    Fatigue  Progressive over last year.     PLAN:     Consider sleep evaluation  Update echocardiogram given progressive fatigue  Otherwise if echocardiogram is normal, return to clinic in 1 year or sooner if needed       Orders this Visit:  Orders Placed This Encounter   Procedures     Follow-Up with Cardiology     Echocardiogram Complete     No orders of the defined types were placed in this encounter.    There are no discontinued medications.    Today's clinic visit entailed:  Review of the result(s) of each unique test - Echo, cath, EKG  Ordering of each unique test  I spent a total of 38 minutes on the day of the visit.   Time spent by me doing chart review, history and exam, documentation and further activities per the note  Provider  Link to [facility] Help Grid     The level of medical decision making during this visit was of moderate complexity.           Review of Systems:     Review of Systems:  Skin:  not  "assessed     Eyes:  Positive for glasses;glaucoma;cataracts  ENT:  Negative    Respiratory:  Positive for dyspnea on exertion  Cardiovascular:  Negative    Gastroenterology: not assessed    Genitourinary:  not assessed    Musculoskeletal:  not assessed    Neurologic:  Negative    Psychiatric:  not assessed    Heme/Lymph/Imm:  Negative    Endocrine:  Positive for thyroid disorder;diabetes            Physical Exam:     Vitals: /80   Pulse 70   Resp 18   Ht 1.6 m (5' 3\")   Wt 77.1 kg (170 lb)   SpO2 99%   BMI 30.11 kg/m    Constitutional: Well nourished and in no apparent distress.  Eyes: Pupils equal, round.   HEENT: Normocephalic, atraumatic.   Neck: Supple.   Respiratory: Breathing non-labored. Lungs clear to auscultation bilaterally.  Cardiovascular:  Regular rate and rhythm, normal S1 and S2. No murmur   Skin: Warm, dry.   Extremities: No edema.  Neurologic: No gross motor deficits. Alert, awake, and oriented to person, place and time.  Psychiatric: Affect appropriate.        CURRENT MEDICATIONS:  Current Outpatient Medications   Medication Sig Dispense Refill     amoxicillin (AMOXIL) 500 MG capsule TAKE 4 CAPSULES BY MOUTH 30MINUTES TO 1 HOUR BEFORE THE PROCEDURE. 4 capsule 3     anastrozole (ARIMIDEX) 1 MG tablet Take 1 tablet (1 mg) by mouth daily 90 tablet 4     aspirin 81 MG tablet Take 81 mg by mouth daily       blood glucose (CONTOUR NEXT TEST) test strip 1 strip by In Vitro route daily or as directed. 100 strip 1     blood glucose monitoring (NITO MICROLET) lancets Use to test blood sugar 1 time daily or as directed. 100 each 3     calcium-vitamin D (CALTRATE) 600-400 MG-UNIT per tablet Take 1 tablet by mouth daily 60 tablet 11     cholecalciferol (VITAMIN D) 1000 UNIT tablet Take 1 tablet (1,000 Units) by mouth daily 100 tablet 3     clopidogrel (PLAVIX) 75 MG tablet Take 1 tablet (75 mg) by mouth daily 90 tablet 3     Cyanocobalamin (VITAMIN B 12 PO) Take 1 tablet by mouth daily Strength " unknown       hydrALAZINE (APRESOLINE) 25 MG tablet TAKE 2 TABLETS BY MOUTH EVERY MORNING AND ONE TABLET BY MOUTH EVERY EVENING 270 tablet 0     hydrochlorothiazide (HYDRODIURIL) 25 MG tablet Take 1 tablet (25 mg) by mouth daily for Blood Pressure 90 tablet 3     levothyroxine (SYNTHROID/LEVOTHROID) 112 MCG tablet Take 1 tablet (112 mcg) by mouth daily for thyroid 90 tablet 2     losartan (COZAAR) 100 MG tablet Take 1 tablet (100 mg) by mouth daily for Blood Pressure 90 tablet 3     metFORMIN (GLUCOPHAGE XR) 500 MG 24 hr tablet Take 1 tablet (500 mg) by mouth daily (with dinner) for diabetes 90 tablet 3     metroNIDAZOLE (METROGEL) 0.75 % external gel APPLY TO AFFECTED AREA TWICE A DAY 45 g 1     multivitamin, therapeutic with minerals (THERA-VIT-M) TABS Take 1 tablet by mouth daily       nitroGLYcerin (NITROSTAT) 0.4 MG sublingual tablet For chest pain place 1 tablet under the tongue every 5 minutes for 3 doses. If symptoms persist 5 minutes after 1st dose call 911. 25 tablet 3     pravastatin (PRAVACHOL) 10 MG tablet TAKE 1 TABLET (10 MG) BY MOUTH DAILY. 90 tablet 1     Semaglutide, 1 MG/DOSE, (OZEMPIC, 1 MG/DOSE,) 4 MG/3ML pen Inject 1 mg Subcutaneous every 7 days 9 mL 1     senna-docusate (SENOKOT-S/PERICOLACE) 8.6-50 MG tablet Take 1-2 tablets by mouth 2 times daily Take while on oral narcotics to prevent or treat constipation. 30 tablet 0     ZIOPTAN 0.0015 % SOLN ophthalmic solution Place 1 drop into both eyes At Bedtime       CONTOUR NEXT EZ (CONTOUR NEXT EZ W/DEVICE KIT) w/Device KIT 1 kit once 1 kit 0       ALLERGIES  Allergies   Allergen Reactions     Toprol Xl [Metoprolol] Fatigue     Ace Inhibitors      ACE throat clearing     Atorvastatin Muscle Pain (Myalgia)     Crestor [Rosuvastatin]      myalgia     Oxycodone Nausea and Vomiting     Tetanus Toxoid Swelling     Hard red lump          PAST MEDICAL HISTORY:  Past Medical History:   Diagnosis Date     Anemia due to blood loss      BASAL CELL CANCER  -NOSE      Dermatitis      Diabetes (H)      Family history of diabetes mellitus     Father     Family history of ischemic heart disease     Father     Family history of malignant neoplasm of ovary     Mother     HX DISRUPT L ANT CRUCIATE S/P REPAIR 2003      Hyperlipidemia      HYPERTENSION       HYPOTHYROIDISM      LOC PRIM OSTEOARTH-L 1ST METATARSAL-CUNEIFORM JOINT 3/10/2004     Major depressive disorder, single episode, mild (H24) 11/24/2014     MILD-MOD TRICUSPID REGURG. MOD PULM HTN 6/23/2005    ECHO  5/05     Obese      PONV (postoperative nausea and vomiting)      Rosacea      Shingles      SPLENIC CYST        PAST SURGICAL HISTORY:  Past Surgical History:   Procedure Laterality Date     ARTHROPLASTY KNEE Left 4/13/2016    Procedure: ARTHROPLASTY KNEE;  Surgeon: Giuseppe Lopez MD;  Location:  OR     ARTHROPLASTY KNEE Right 3/6/2023    Procedure: Right total knee arthroplasty;  Surgeon: Giuseppe Lopez MD;  Location:  OR     ARTHROPLASTY MINIMALLY INVASIVE HIP  4/18/2012    Procedure:ARTHROPLASTY MINIMALLY INVASIVE HIP; RIGHT TWO INCISION MINIMALLY INVASIVE HIP ARTHROPLASTY (YUVAL)^ (NO CELL SAVER) (C-ARM); Surgeon:GIUSEPPE LOPEZ; Location: OR     BIOPSY NODE SENTINEL Right 4/23/2021    Procedure: RIGHT SENTINEL LYMPH NODE BIOPSY;  Surgeon: Giuseppe Rogers MD;  Location:  OR     C CHEMOSURG MOHS 1ST STAGE  9-99    Mohs-nose     CARDIAC SURGERY  06/27/2019    mid and distal RCA stents     CV CORONARY ANGIOGRAM N/A 5/19/2022    Procedure: Coronary Angiogram;  Surgeon: Reyes Graham MD;  Location:  HEART CARDIAC CATH LAB     CV INSTANTANEOUS WAVE-FREE RATIO N/A 5/19/2022    Procedure: Instantaneous Wave-Free Ratio;  Surgeon: Reyes Graham MD;  Location:  HEART CARDIAC CATH LAB     CV INTRAVASULAR ULTRASOUND N/A 5/19/2022    Procedure: Intravascular Ultrasound;  Surgeon: Reyes Graham MD;  Location:  HEART CARDIAC CATH LAB     CV PCI STENT DRUG ELUTING N/A  2022    Procedure: Percutaneous Coronary Intervention Stent;  Surgeon: Reyes Graham MD;  Location:  HEART CARDIAC CATH LAB     ENT SURGERY      mohs procedure on nose     HYSTERECTOMY, PAP NO LONGER INDICATED       LUMPECTOMY BREAST WITH SEED LOCALIZATION Right 2021    Procedure: SEED LOCALIZED RIGHT BREAST LUMPECTOMY;  Surgeon: Giuseppe Rogers MD;  Location:  OR     SURGICAL HISTORY OF -   Mult-~    infertility, laparotomies     [facility] APPENDECTOMY      incidental appi at [facility] REPAIR CRUCIATE LIGAMENT,KNEE  2003    L   ACL reconstruction     [facility] TOTAL ABDOM HYSTERECTOMY      W/ BSO fibroids, hx PID     [facility]      normal, rec repeat 10 yrs       FAMILY HISTORY:  Family History   Problem Relation Age of Onset     Cancer Mother         ovarian  of it  at 49yo.     Other Cancer Mother      Diabetes Father         Adult onset     Hypertension Father      C.A.D. Father         Fatal Heart attack at 70.     Cerebrovascular Disease Father      Thyroid Disease Sister      Thyroid Disease Sister      Thyroid Disease Sister      Cancer Daughter      Thyroid Disease Daughter      Breast Cancer No family hx of      Cancer - colorectal No family hx of        SOCIAL HISTORY:  Social History     Socioeconomic History     Marital status:      Spouse name: FABIAN CUADRA     Number of children: 2   Occupational History     Occupation: [employer]     Employer: [employer]   Tobacco Use     Smoking status: Never     Smokeless tobacco: Never   Vaping Use     Vaping status: Never Used   Substance and Sexual Activity     Alcohol use: Yes     Comment: occasional, few drinks per month     Drug use: No     Sexual activity: Yes     Partners: Male   Other Topics Concern      Service No     Blood Transfusions No     Caffeine Concern No     Occupational Exposure No     Hobby Hazards No     Sleep Concern No     Stress Concern No     Weight  Concern Yes     Special Diet No     Back Care No     Exercise Yes     Comment: Approx 3 times a week for about 30 minutes     Bike Helmet Yes     Seat Belt Yes     Self-Exams Yes     Social Determinants of Health     Financial Resource Strain: Low Risk  (6/20/2024)    Financial Resource Strain      Within the past 12 months, have you or your family members you live with been unable to get utilities (heat, electricity) when it was really needed?: No   Food Insecurity: Low Risk  (6/20/2024)    Food Insecurity      Within the past 12 months, did you worry that your food would run out before you got money to buy more?: No      Within the past 12 months, did the food you bought just not last and you didn t have money to get more?: No   Transportation Needs: Low Risk  (6/20/2024)    Transportation Needs      Within the past 12 months, has lack of transportation kept you from medical appointments, getting your medicines, non-medical meetings or appointments, work, or from getting things that you need?: No   Physical Activity: Insufficiently Active (6/20/2024)    Exercise Vital Sign      Days of Exercise per Week: 5 days      Minutes of Exercise per Session: 20 min   Stress: No Stress Concern Present (6/20/2024)    [place] [employer] - Occupational Stress Questionnaire      Feeling of Stress : Only a little   Social Connections: Unknown (6/20/2024)    Social Connection and Isolation Panel [NHANES]      Frequency of Social Gatherings with Friends and Family: More than three times a week   Interpersonal Safety: Low Risk  (12/8/2023)    Interpersonal Safety      Do you feel physically and emotionally safe where you currently live?: Yes      Within the past 12 months, have you been hit, slapped, kicked or otherwise physically hurt by someone?: No      Within the past 12 months, have you been humiliated or emotionally abused in other ways by your partner or ex-partner?: No   Housing Stability: Low Risk   (6/20/2024)    Housing Stability      Do you have housing? : Yes      Are you worried about losing your housing?: No               Thank you for allowing me to participate in the care of your patient.      Sincerely,     FATMATA Tirado [facility]  cc:   Zoraida Braga MD  [address] [address]

## 2024-08-19 NOTE — PROGRESS NOTES
Electrophysiology Clinic Progress Note  Yola Mayo MRN# [id number]   YOB: 1945 Age: 79 year old     Primary cardiologist: Dr. Howard    Reason for visit: Annual follow up    History of presenting illness:    Yola Mayo is a pleasant 79 year old patient with past medical history significant for:    Coronary artery disease: s/p PCI to RCA x 2 in 2018 and recent PCI to prox LAD/D1 bifurcation and distal RCA (5/19/2022)  Hypertension  Hyperlipidemia  Diabetes   Breast Cancer: Right sided DCIS s/p right breast lumpectomy and lymph node excision with Dr. Rogers and radiation therapy. Followed by Dr. Grullon at  Oncology  Pulmonary hypertension: Mild to moderate on echo from 2017    Today Dee Dee returns for annual follow-up.  She is doing overall well without shortness of breath on exertion (anginal equivalent).  She continues to walk daily and has travel to [facility] [place] and has an upcoming trip to [place].  Her biggest concern is that she is overall fatigued.  Her  does report that she snores and he did discuss a sleep study today and she would like to consider this as an option.    Diagnotic studies:  5/19/2022: Severe vessel CAD s/p MALLY to prox LAD and D1 iIFR 0.81) bifurcation and distal RCA (95% stenosis), mid LCx 55% (iFR 0.97)  Stress echo (5/2022): The patient exercised 6:35 min.There was a normal BP response to exercise. Exercise was stopped due to fatigue. A treadmill exercise test according to the Modified Tobin protocol was performed. Target Heart Rate was achieved. This was a normal stress. EKG with no evidence of stress-induced ischemia. Left ventricular cavity size decreases with exercise. Global LV systolic function augments with exercise. The visual ejection fraction is >70%. Normal left ventricular function and wall motion at rest and post-stress.  Stress echo (2018): Abnormal stress echocardiogram with small area of mid anterior ischemia (likely diagonal  distribution) at exercise capacity of 8 Mets and low risk duke treadmill score  2018: 2 vessel CAD 60% LAD lesion (not treated) and RCA with 90-95% treated with 2 MALLY          Assessment and Plan:     ASSESSMENT:    Coronary artery disease  Status post 2 drug eluting stents to the mid and distal RCA 6/27/2018 with residual 50-60% mid LAD lesion with 100% occlusion of the first diagonal  Coronary angiogram 5/19/2022: Severe vessel CAD s/p MALLY to prox LAD and D1 bifurcation and distal RCA  Preserved LVEF    Hyperlipidemia, LDL goal<70  Most recent LDL 49 from 6/2024  Pravastatin 10 mg daily (did not tolerate low dose atorvastatin or rosuvastatin due to myalgias)    Hypertension, goal less than 140/90  Controlled  Losartan to 100 mg daily and hydrochlorthiazide 25 mg daily hydralazine 50 mg BID    Fatigue  Progressive over last year.     PLAN:     Consider sleep evaluation  Update echocardiogram given progressive fatigue  Otherwise if echocardiogram is normal, return to clinic in 1 year or sooner if needed       Orders this Visit:  Orders Placed This Encounter   Procedures    Follow-Up with Cardiology    Echocardiogram Complete     No orders of the defined types were placed in this encounter.    There are no discontinued medications.    Today's clinic visit entailed:  Review of the result(s) of each unique test - Echo, cath, EKG  Ordering of each unique test  I spent a total of 38 minutes on the day of the visit.   Time spent by me doing chart review, history and exam, documentation and further activities per the note  Provider  Link to [facility] Help Grid     The level of medical decision making during this visit was of moderate complexity.           Review of Systems:     Review of Systems:  Skin:  not assessed     Eyes:  Positive for glasses;glaucoma;cataracts  ENT:  Negative    Respiratory:  Positive for dyspnea on exertion  Cardiovascular:  Negative    Gastroenterology: not assessed    Genitourinary:  not assessed   "  Musculoskeletal:  not assessed    Neurologic:  Negative    Psychiatric:  not assessed    Heme/Lymph/Imm:  Negative    Endocrine:  Positive for thyroid disorder;diabetes            Physical Exam:     Vitals: /80   Pulse 70   Resp 18   Ht 1.6 m (5' 3\")   Wt 77.1 kg (170 lb)   SpO2 99%   BMI 30.11 kg/m    Constitutional: Well nourished and in no apparent distress.  Eyes: Pupils equal, round.   HEENT: Normocephalic, atraumatic.   Neck: Supple.   Respiratory: Breathing non-labored. Lungs clear to auscultation bilaterally.  Cardiovascular:  Regular rate and rhythm, normal S1 and S2. No murmur   Skin: Warm, dry.   Extremities: No edema.  Neurologic: No gross motor deficits. Alert, awake, and oriented to person, place and time.  Psychiatric: Affect appropriate.        CURRENT MEDICATIONS:  Current Outpatient Medications   Medication Sig Dispense Refill    amoxicillin (AMOXIL) 500 MG capsule TAKE 4 CAPSULES BY MOUTH 30MINUTES TO 1 HOUR BEFORE THE PROCEDURE. 4 capsule 3    anastrozole (ARIMIDEX) 1 MG tablet Take 1 tablet (1 mg) by mouth daily 90 tablet 4    aspirin 81 MG tablet Take 81 mg by mouth daily      blood glucose (CONTOUR NEXT TEST) test strip 1 strip by In Vitro route daily or as directed. 100 strip 1    blood glucose monitoring (NITO MICROLET) lancets Use to test blood sugar 1 time daily or as directed. 100 each 3    calcium-vitamin D (CALTRATE) 600-400 MG-UNIT per tablet Take 1 tablet by mouth daily 60 tablet 11    cholecalciferol (VITAMIN D) 1000 UNIT tablet Take 1 tablet (1,000 Units) by mouth daily 100 tablet 3    clopidogrel (PLAVIX) 75 MG tablet Take 1 tablet (75 mg) by mouth daily 90 tablet 3    Cyanocobalamin (VITAMIN B 12 PO) Take 1 tablet by mouth daily Strength unknown      hydrALAZINE (APRESOLINE) 25 MG tablet TAKE 2 TABLETS BY MOUTH EVERY MORNING AND ONE TABLET BY MOUTH EVERY EVENING 270 tablet 0    hydrochlorothiazide (HYDRODIURIL) 25 MG tablet Take 1 tablet (25 mg) by mouth daily for " Blood Pressure 90 tablet 3    levothyroxine (SYNTHROID/LEVOTHROID) 112 MCG tablet Take 1 tablet (112 mcg) by mouth daily for thyroid 90 tablet 2    losartan (COZAAR) 100 MG tablet Take 1 tablet (100 mg) by mouth daily for Blood Pressure 90 tablet 3    metFORMIN (GLUCOPHAGE XR) 500 MG 24 hr tablet Take 1 tablet (500 mg) by mouth daily (with dinner) for diabetes 90 tablet 3    metroNIDAZOLE (METROGEL) 0.75 % external gel APPLY TO AFFECTED AREA TWICE A DAY 45 g 1    multivitamin, therapeutic with minerals (THERA-VIT-M) TABS Take 1 tablet by mouth daily      nitroGLYcerin (NITROSTAT) 0.4 MG sublingual tablet For chest pain place 1 tablet under the tongue every 5 minutes for 3 doses. If symptoms persist 5 minutes after 1st dose call 911. 25 tablet 3    pravastatin (PRAVACHOL) 10 MG tablet TAKE 1 TABLET (10 MG) BY MOUTH DAILY. 90 tablet 1    Semaglutide, 1 MG/DOSE, (OZEMPIC, 1 MG/DOSE,) 4 MG/3ML pen Inject 1 mg Subcutaneous every 7 days 9 mL 1    senna-docusate (SENOKOT-S/PERICOLACE) 8.6-50 MG tablet Take 1-2 tablets by mouth 2 times daily Take while on oral narcotics to prevent or treat constipation. 30 tablet 0    ZIOPTAN 0.0015 % SOLN ophthalmic solution Place 1 drop into both eyes At Bedtime      CONTOUR NEXT EZ (CONTOUR NEXT EZ W/DEVICE KIT) w/Device KIT 1 kit once 1 kit 0       ALLERGIES  Allergies   Allergen Reactions    Toprol Xl [Metoprolol] Fatigue    Ace Inhibitors      ACE throat clearing    Atorvastatin Muscle Pain (Myalgia)    Crestor [Rosuvastatin]      myalgia    Oxycodone Nausea and Vomiting    Tetanus Toxoid Swelling     Hard red lump          PAST MEDICAL HISTORY:  Past Medical History:   Diagnosis Date    Anemia due to blood loss     BASAL CELL CANCER -NOSE     Dermatitis     Diabetes (H)     Family history of diabetes mellitus     Father    Family history of ischemic heart disease     Father    Family history of malignant neoplasm of ovary     Mother    HX DISRUPT L ANT CRUCIATE S/P REPAIR 2003      Hyperlipidemia     HYPERTENSION      HYPOTHYROIDISM     LOC PRIM OSTEOARTH-L 1ST METATARSAL-CUNEIFORM JOINT 3/10/2004    Major depressive disorder, single episode, mild (H24) 11/24/2014    MILD-MOD TRICUSPID REGURG. MOD PULM HTN 6/23/2005    ECHO  5/05    Obese     PONV (postoperative nausea and vomiting)     Rosacea     Shingles     SPLENIC CYST        PAST SURGICAL HISTORY:  Past Surgical History:   Procedure Laterality Date    ARTHROPLASTY KNEE Left 4/13/2016    Procedure: ARTHROPLASTY KNEE;  Surgeon: Giuseppe Lopez MD;  Location:  OR    ARTHROPLASTY KNEE Right 3/6/2023    Procedure: Right total knee arthroplasty;  Surgeon: Giuseppe Lopez MD;  Location:  OR    ARTHROPLASTY MINIMALLY INVASIVE HIP  4/18/2012    Procedure:ARTHROPLASTY MINIMALLY INVASIVE HIP; RIGHT TWO INCISION MINIMALLY INVASIVE HIP ARTHROPLASTY (YUVAL)^ (NO CELL SAVER) (C-ARM); Surgeon:GIUSEPPE LOPEZ; Location: OR    BIOPSY NODE SENTINEL Right 4/23/2021    Procedure: RIGHT SENTINEL LYMPH NODE BIOPSY;  Surgeon: Giuseppe Rogers MD;  Location:  OR    C CHEMOSURG MOHS 1ST STAGE  9-99    Mohs-nose    CARDIAC SURGERY  06/27/2019    mid and distal RCA stents    CV CORONARY ANGIOGRAM N/A 5/19/2022    Procedure: Coronary Angiogram;  Surgeon: Reyes Graham MD;  Location:  HEART CARDIAC CATH LAB    CV INSTANTANEOUS WAVE-FREE RATIO N/A 5/19/2022    Procedure: Instantaneous Wave-Free Ratio;  Surgeon: Reyes Graham MD;  Location:  HEART CARD[facility]AC CATH LAB    CV INTRAVASULAR ULTRASOUND N/A 5/19/2022    Procedure: Intravascular Ultrasound;  Surgeon: Reyes Graham MD;  Location:  HEART CARDIAC CATH LAB    CV PCI STENT DRUG ELUTING N/A 5/19/2022    Procedure: Percutaneous Coronary Intervention Stent;  Surgeon: Reyes Graham MD;  Location:  HEART CARDIAC CATH LAB    ENT SURGERY      mohs procedure on nose    HYSTERECTOMY, PAP NO LONGER INDICATED      LUMPECTOMY BREAST WITH SEED LOCALIZATION Right 4/23/2021     Procedure: SEED LOCALIZED RIGHT BREAST LUMPECTOMY;  Surgeon: Giuseppe Rogers MD;  Location: [facility] OR    SURGICAL HISTORY OF -   Mult-~    infertility, laparotomies    [facility] APPENDECTOMY      incidental appi at [facility] REPAIR CRUCIATE LIGAMENT,KNEE  2003    L   ACL reconstruction    [facility] TOTAL ABDOM HYSTERECTOMY      W/ BSO fibroids, hx PID    [facility] COLONOSCOPY THRU STOMA, DIAGNOSTIC      normal, rec repeat 10 yrs       FAMILY HISTORY:  Family History   Problem Relation Age of Onset    Cancer Mother         ovarian  of it  at 51yo.    Other Cancer Mother     Diabetes Father         Adult onset    Hypertension Father     C.A.D. Father         Fatal Heart attack at 70.    Cerebrovascular Disease Father     Thyroid Disease Sister     Thyroid Disease Sister     Thyroid Disease Sister     Cancer Daughter     Thyroid Disease Daughter     Breast Cancer No family hx of     Cancer - colorectal No family hx of        SOCIAL HISTORY:  Social History     Socioeconomic History    Marital status:      Spouse name: FABIAN CUADRA    Number of children: 2   Occupational History    Occupation: [employer]     Employer: [employer]   Tobacco Use    Smoking status: Never    Smokeless tobacco: Never   Vaping Use    Vaping status: Never Used   Substance and Sexual Activity    Alcohol use: Yes     Comment: occasional, few drinks per month    Drug use: No    Sexual activity: Yes     Partners: Male   Other Topics Concern     Service No    Blood Transfusions No    Caffeine Concern No    Occupational Exposure No    Hobby Hazards No    Sleep Concern No    Stress Concern No    Weight Concern Yes    Special Diet No    Back Care No    Exercise Yes     Comment: Approx 3 times a week for about 30 minutes    Bike Helmet Yes    Seat Belt Yes    Self-Exams Yes     Social Determinants of Health     Financial Resource Strain: Low Risk  (2024)    Financial Resource Strain     Within the past 12 months, have  you or your family members you live with been unable to get utilities (heat, electricity) when it was really needed?: No   Food Insecurity: Low Risk  (6/20/2024)    Food Insecurity     Within the past 12 months, did you worry that your food would run out before you got money to buy more?: No     Within the past 12 months, did the food you bought just not last and you didn t have money to get more?: No   Transportation Needs: Low Risk  (6/20/2024)    Transportation Needs     Within the past 12 months, has lack of transportation kept you from medical appointments, getting your medicines, non-medical meetings or appointments, work, or from getting things that you need?: No   Physical Activity: Insufficiently Active (6/20/2024)    Exercise Vital Sign     Days of Exercise per Week: 5 days     Minutes of Exercise per Session: 20 min   Stress: No Stress Concern Present (6/20/2024)    [employer] - Occupational Stress Questionnaire     Feeling of Stress : Only a little   Social Connections: Unknown (6/20/2024)    Social Connection and Isolation Panel [NHANES]     Frequency of Social Gatherings with Friends and Family: More than three times a week   Interpersonal Safety: Low Risk  (12/8/2023)    Interpersonal Safety     Do you feel physically and emotionally safe where you currently live?: Yes     Within the past 12 months, have you been hit, slapped, kicked or otherwise physically hurt by someone?: No     Within the past 12 months, have you been humiliated or emotionally abused in other ways by your partner or ex-partner?: No   Housing Stability: Low Risk  (6/20/2024)    Housing Stability     Do you have housing? : Yes     Are you worried about losing your housing?: No

## 2024-08-20 DIAGNOSIS — E11.9 TYPE 2 DIABETES MELLITUS WITHOUT COMPLICATION, WITHOUT LONG-TERM CURRENT USE OF INSULIN (H): ICD-10-CM

## 2024-08-20 DIAGNOSIS — I10 BENIGN HYPERTENSION: ICD-10-CM

## 2024-08-20 RX ORDER — HYDRALAZINE HYDROCHLORIDE 25 MG/1
TABLET, FILM COATED ORAL
Qty: 270 TABLET | Refills: 0 | Status: SHIPPED | OUTPATIENT
Start: 2024-08-20 | End: 2024-10-02

## 2024-08-20 RX ORDER — SEMAGLUTIDE 1.34 MG/ML
1 INJECTION, SOLUTION SUBCUTANEOUS
Qty: 9 ML | Refills: 1 | Status: SHIPPED | OUTPATIENT
Start: 2024-08-20

## 2024-09-19 ENCOUNTER — HOSPITAL ENCOUNTER (OUTPATIENT)
Dept: CARDIOLOGY | Facility: CLINIC | Age: 79
Discharge: HOME | End: 2024-09-19
Attending: NURSE PRACTITIONER | Admitting: NURSE PRACTITIONER
Payer: MEDICARE

## 2024-09-19 DIAGNOSIS — I10 ESSENTIAL HYPERTENSION, BENIGN: ICD-10-CM

## 2024-09-19 DIAGNOSIS — I25.10 CORONARY ARTERY DISEASE INVOLVING NATIVE CORONARY ARTERY OF NATIVE HEART WITHOUT ANGINA PECTORIS: ICD-10-CM

## 2024-09-19 LAB — LVEF ECHO: NORMAL

## 2024-09-19 PROCEDURE — 93306 TTE W/DOPPLER COMPLETE: CPT | Mod: 26 | Performed by: INTERNAL MEDICINE

## 2024-09-19 PROCEDURE — 93306 TTE W/DOPPLER COMPLETE: CPT

## 2024-09-30 ENCOUNTER — NURSE TRIAGE (OUTPATIENT)
Dept: FAMILY MEDICINE | Facility: CLINIC | Age: 79
End: 2024-09-30
Payer: MEDICARE

## 2024-09-30 ENCOUNTER — MYC MEDICAL ADVICE (OUTPATIENT)
Dept: FAMILY MEDICINE | Facility: CLINIC | Age: 79
End: 2024-09-30
Payer: MEDICARE

## 2024-09-30 NOTE — TELEPHONE ENCOUNTER
"Nurse Triage SBAR    Is this a 2nd Level Triage? YES, LICENSED PRACTITIONER REVIEW IS REQUIRED    Situation: pt reporting continued vision changes blurry vision. Pt is not sure why. Pt has been seen by a eye doctor, symptoms improving since she stopped taking ozempic a week ago.     Background:   Pt wondering if provider would change her medications? Ozempic and metformin.   Pt thinks that vision changes are due to medication side effect of ozempic, so stopped taking this medication.   Denies numbness weakness on one side   No changes in speech  No hx stroke   Balance ok   No recent head injury    Assessment: MD review/recommend.    Please review plan of care;     Protocol Recommended Disposition:   See in Office Today    Recommendation: pt dispo'd to see in office today. There are no openings and pt refusing urgent care. VV scheduled for tomorrow with partner provider. PCP has no openings until 10/24.     Routing to PCP; please review and advise. Ok to keep VV with dominique xiong?    Routing to Dominique - ok to keep as vv for tomorrow?    Care team - please follow up with pt regarding plan of care either way. Pt is wondering what to do about her medication and if it is ok to keep VV.     Routed to provider    Does the patient meet one of the following criteria for ADS visit consideration? 16+ years old, with an [facility] PCP     TIP  Providers, please consider if this condition is appropriate for management at one of our Acute and Diagnostic Services sites.     If patient is a good candidate, please use dotphrase <dot>triageresponse and select Refer to ADS to document.  Please see mychart.     Pt was seen by eye doctor; symptoms have not been improving.     She does not have cataracts. Hx glaucoma.     Pt reporting that her vision changes have continued.     1. DESCRIPTION: \"How has your vision changed?\" (e.g., complete vision loss, blurred vision, double vision, floaters, etc.)      blurred vision and dark sports in my " "eyes   2. LOCATION: \"One or both eyes?\" If one, ask: \"Which eye?\"      Right   3. SEVERITY: \"Can you see anything?\" If Yes, ask: \"What can you see?\" (e.g., fine print)      Yes, fine print on part of eye   4. ONSET: \"When did this begin?\" \"Did it start suddenly or has this been gradual?\"      5/24  5. PATTERN: \"Does this come and go, or has it been constant since it started?\"      Constant   6. PAIN: \"Is there any pain in your eye(s)?\"  (Scale 1-10; or mild, moderate, severe)    - NONE (0): No pain.    - MILD (1-3): Doesn't interfere with normal activities.    - MODERATE (4-7): Interferes with normal activities or awakens from sleep.     - SEVERE (8-10): Excruciating pain, unable to do any normal activities.      Mild pain, right eye   7. CONTACTS-GLASSES: \"Do you wear contacts or glasses?\"      Pt wears glasses, progressive lens   8. CAUSE: \"What do you think is causing this visual problem?\"      Side effect of medication: ozempic   9. OTHER SYMPTOMS: \"Do you have any other symptoms?\" (e.g., confusion, headache, arm or leg weakness, speech problems)      Denies   10. PREGNANCY: \"Is there any chance you are pregnant?\" \"When was your last menstrual period?\"        Denies    Reason for Disposition   Patient wants to be seen    Additional Information   Negative: Weakness of the face, arm or leg on one side of the body   Negative: Followed getting substance in the eye   Negative: Foreign body stuck in the eye   Negative: Followed an eye injury   Negative: Followed sun lamp or sun exposure (UV keratitis)   Negative: Yellow or green discharge (pus) in the eye   Negative: Pregnant   Negative: Complete loss of vision in one or both eyes   Negative: SEVERE eye pain   Negative: SEVERE headache   Negative: Double vision   Negative: Blurred vision or visual changes and present now and sudden onset or new (e.g., minutes, hours, days)  (Exception: Seeing floaters / black specks OR previously diagnosed migraine headaches with same " symptoms.)   Negative: Patient sounds very sick or weak to the triager   Negative: Flashes of light  (Exception: Brief from pressing on the eyeball.)   Negative: Many floaters in the eye (Exception: Floater(s) are a chronic symptom and this is unchanged from patient's baseline pattern.)   Negative: Eye pain and brief (now gone) blurred vision or visual changes   Negative: Taking digoxin (e.g., Lanoxin, Digitek, Cardoxin, Lanoxicaps; Toloxin in [place]) and blurred vision, yellow vision, or yellow-green halos   Negative: Headache and age > 50 years   Negative: Jaw pain while eating and age > 50 years    Protocols used: Vision Loss or Change-A-OH

## 2024-09-30 NOTE — TELEPHONE ENCOUNTER
It is okay to discontinue Ozempic if she thinks that stopping the medication has improved her symptoms  Her diabetes is well-controlled  Monitor blood sugars after discontinuing Ozempic   based on her blood sugar readings we can give her  Recommendation about the medication    Schedule a  virtual visit with me on my next available opening as I will be gone for 2 weeks  Keep appointment with Krupa as scheduled tomorrow    Dr.Nasima Maria Luz MD      .

## 2024-10-01 ENCOUNTER — VIRTUAL VISIT (OUTPATIENT)
Dept: FAMILY MEDICINE | Facility: CLINIC | Age: 79
End: 2024-10-01
Payer: MEDICARE

## 2024-10-01 DIAGNOSIS — E11.9 TYPE 2 DIABETES MELLITUS WITHOUT COMPLICATION, WITHOUT LONG-TERM CURRENT USE OF INSULIN (H): ICD-10-CM

## 2024-10-01 DIAGNOSIS — H53.9 VISION CHANGES: Primary | ICD-10-CM

## 2024-10-01 PROCEDURE — 99441 PR PHYSICIAN TELEPHONE EVALUATION 5-10 MIN: CPT | Mod: 93 | Performed by: PHYSICIAN ASSISTANT

## 2024-10-01 NOTE — PROGRESS NOTES
"Dee Dee is a 79 year old who is being evaluated via a billable telephone visit.    What phone number would you like to be contacted at? [phone]   How would you like to obtain your AVS? Nicolasahart  Originating Location (pt. Location): Home    Distant Location (provider location):  On-site    Assessment and Plan:     (H53.9) Vision changes  (primary encounter diagnosis)  Comment: has noticed intermittent blurry spot since May of this year, concerned it could be side effect of ozempic, she stopped ozempic and symptoms improved  Plan: see ophthalmologist   Okay to stay off ozempic for now    (E11.9) Type 2 diabetes mellitus without complication, without long-term current use of insulin (H)  Comment: as noted above, will hold ozempic  Plan: resume previous dose of metformin XL (1000mg daily)  Check BG daily in AM and record, bring readings to visit with pcp in 3 weeks     [facility]       Subjective   Dee Dee is a 79 year old, presenting for the following health issues:  No chief complaint on file.    HPI     Dee Dee has noticed a \"cloudy\" spot in her field of vision that comes and goes  Onset was May  She has mentioned to  her ophthalmologist for it and was told there was nothing wrong     She has been on ozempic x 2 years and wonders if could be contributing to eye symptoms/vision changes   She didn't take her ozempic last week and has noticed an improvement in eye symptoms   She does not want to continue ozempic         Objective    Vitals - Patient Reported  Systolic (Patient Reported): 134  Diastolic (Patient Reported): 79  Pain Score: No Pain (0)      Vitals:  No vitals were obtained today due to virtual visit.    Physical Exam   General: Alert and no distress //Respiratory: No audible wheeze, cough, or shortness of breath // Psychiatric:  Appropriate affect, tone, and pace of words        Phone call duration: 10 minutes  Signed Electronically by: Dominique Peña, " LONI

## 2024-10-01 NOTE — PATIENT INSTRUCTIONS
Discuss your eye symptoms with your eye doctor    Stay off ozempic    Resume previous dose of metformin (1000mg daily which is two tablets)    Check your blood sugar daily in the am and record, bring readings to visit with Dr. Braga in three weeks

## 2024-10-02 DIAGNOSIS — Z95.5 S/P DRUG ELUTING CORONARY STENT PLACEMENT: ICD-10-CM

## 2024-10-02 DIAGNOSIS — I10 BENIGN HYPERTENSION: ICD-10-CM

## 2024-10-02 DIAGNOSIS — E78.5 HYPERLIPIDEMIA, UNSPECIFIED HYPERLIPIDEMIA TYPE: ICD-10-CM

## 2024-10-02 RX ORDER — HYDROCHLOROTHIAZIDE 25 MG/1
25 TABLET ORAL DAILY
Qty: 90 TABLET | Refills: 0 | Status: SHIPPED | OUTPATIENT
Start: 2024-10-02

## 2024-10-02 RX ORDER — PRAVASTATIN SODIUM 10 MG
10 TABLET ORAL DAILY
Qty: 90 TABLET | Refills: 1 | Status: SHIPPED | OUTPATIENT
Start: 2024-10-02

## 2024-10-02 RX ORDER — HYDRALAZINE HYDROCHLORIDE 25 MG/1
TABLET, FILM COATED ORAL
Qty: 270 TABLET | Refills: 0 | Status: SHIPPED | OUTPATIENT
Start: 2024-10-02

## 2024-10-24 ENCOUNTER — VIRTUAL VISIT (OUTPATIENT)
Dept: FAMILY MEDICINE | Facility: CLINIC | Age: 79
End: 2024-10-24
Payer: MEDICARE

## 2024-10-24 DIAGNOSIS — Z95.5 S/P DRUG ELUTING CORONARY STENT PLACEMENT: ICD-10-CM

## 2024-10-24 DIAGNOSIS — E11.9 TYPE 2 DIABETES MELLITUS WITHOUT COMPLICATION, WITHOUT LONG-TERM CURRENT USE OF INSULIN (H): Primary | ICD-10-CM

## 2024-10-24 DIAGNOSIS — I10 BENIGN HYPERTENSION: ICD-10-CM

## 2024-10-24 DIAGNOSIS — Z13.0 SCREENING FOR DEFICIENCY ANEMIA: ICD-10-CM

## 2024-10-24 DIAGNOSIS — H53.9 VISION CHANGES: ICD-10-CM

## 2024-10-24 PROCEDURE — 99441 PR PHYSICIAN TELEPHONE EVALUATION 5-10 MIN: CPT | Mod: 93 | Performed by: INTERNAL MEDICINE

## 2024-10-24 RX ORDER — METFORMIN HYDROCHLORIDE 500 MG/1
1000 TABLET, EXTENDED RELEASE ORAL
Qty: 180 TABLET | Refills: 3 | Status: SHIPPED | OUTPATIENT
Start: 2024-10-24

## 2024-10-24 NOTE — PATIENT INSTRUCTIONS
Discontinue ozempic  Increase metformin to 500 mg # 2 tablets daily  Currently you are on one tablet daily  We are increasing to # 2 tablets daily    Keep follow up appointment as scheduled   Labs 2-3 days before the appointment       You should take OTC vitamin B12 1000 micrograms few times a week.  You are on Metformin that affects the absorption of vitamin B12.

## 2024-11-13 NOTE — PROGRESS NOTES
[facility]    Hematology/Oncology Established Patient Note      Today's Date: 11/20/2024    Reason for follow-up: Right breast cancer.    HISTORY OF PRESENT ILLNESS: Yola Mayo is a 79 year old female who presents with the following oncologic history:  1. 3/24/2021: Mammogram showed distortion in right upper outer breast; left breast negative.  2. 3/31/2021: Right breast U/S at 9:00, 4 cm from nipple showed mass measuring 1.1 x 1 x 1.2 cm; no enlarged right axillary lymph nodes seen.  3. 4/07/2021: Right breast biopsy at 9:00 showed grade 2 invasive ductal carcinoma, ER positive at 100%, NV positive at 95%, HER-2/marek FISH negative.  4. 4/23/2021: Underwent right breast lumpectomy and sentinel lymph node excision under care of Dr. Ace Rogers. Pathology showed grade 2 invasive ductal carcinoma measuring 1.5 cm, low grade DCIS, LCIS, margins negative; 2 lymph nodes negative. Oncotype DX = 6; 9-year risk of distant recurrence = 3% after 5 years of hormone blockade therapy, < 1% absolute chemotherapy benefit.  5. 5/25/2021-6/16/2021: Completed adjuvant radiation therapy to right breast. Subsequently started anastrozole.    INTERVAL HISTORY:  Dee Dee reports hot flashes overall improved given the cooler weather.  Joints feel okay.      REVIEW OF SYSTEMS:   14 point ROS was reviewed and is negative other than as noted above in HPI.       HOME MEDICATIONS:  Current Outpatient Medications   Medication Sig Dispense Refill    amoxicillin (AMOXIL) 500 MG capsule TAKE 4 CAPSULES BY MOUTH 30MINUTES TO 1 HOUR BEFORE THE PROCEDURE. 4 capsule 3    anastrozole (ARIMIDEX) 1 MG tablet Take 1 tablet (1 mg) by mouth daily 90 tablet 4    aspirin 81 MG tablet Take 81 mg by mouth daily      blood glucose (CONTOUR NEXT TEST) test strip 1 strip by In Vitro route daily or as directed. 100 strip 1    blood glucose monitoring (NITO MICROLET) lancets Use to test blood sugar 1 time daily or as directed. 100 each 3     calcium-vitamin D (CALTRATE) 600-400 MG-UNIT per tablet Take 1 tablet by mouth daily 60 tablet 11    cholecalciferol (VITAMIN D) 1000 UNIT tablet Take 1 tablet (1,000 Units) by mouth daily 100 tablet 3    clopidogrel (PLAVIX) 75 MG tablet Take 1 tablet (75 mg) by mouth daily 90 tablet 3    CONTOUR NEXT EZ (CONTOUR NEXT EZ W/DEVICE KIT) w/Device KIT 1 kit once 1 kit 0    Cyanocobalamin (VITAMIN B 12 PO) Take 1 tablet by mouth daily Strength unknown      hydrALAZINE (APRESOLINE) 25 MG tablet TAKE 2 TABLETS BY MOUTH EVERY MORNING AND ONE TABLET BY MOUTH EVERY EVENING 270 tablet 0    hydrochlorothiazide (HYDRODIURIL) 25 MG tablet TAKE 1 TABLET (25 MG) BY MOUTH DAILY FOR BLOOD PRESSURE 90 tablet 0    levothyroxine (SYNTHROID/LEVOTHROID) 112 MCG tablet Take 1 tablet (112 mcg) by mouth daily for thyroid 90 tablet 2    losartan (COZAAR) 100 MG tablet Take 1 tablet (100 mg) by mouth daily for Blood Pressure 90 tablet 3    metFORMIN (GLUCOPHAGE XR) 500 MG 24 hr tablet Take 2 tablets (1,000 mg) by mouth daily (with dinner). for diabetes 180 tablet 3    metroNIDAZOLE (METROGEL) 0.75 % external gel APPLY TO AFFECTED AREA TWICE A DAY 45 g 1    multivitamin, therapeutic with minerals (THERA-VIT-M) TABS Take 1 tablet by mouth daily      nitroGLYcerin (NITROSTAT) 0.4 MG sublingual tablet For chest pain place 1 tablet under the tongue every 5 minutes for 3 doses. If symptoms persist 5 minutes after 1st dose call 911. 25 tablet 3    pravastatin (PRAVACHOL) 10 MG tablet TAKE 1 TABLET (10 MG) BY MOUTH DAILY. 90 tablet 1    senna-docusate (SENOKOT-S/PERICOLACE) 8.6-50 MG tablet Take 1-2 tablets by mouth 2 times daily Take while on oral narcotics to prevent or treat constipation. 30 tablet 0    ZIOPTAN 0.0015 % SOLN ophthalmic solution Place 1 drop into both eyes At Bedtime           ALLERGIES:  Allergies   Allergen Reactions    Toprol Xl [Metoprolol] Fatigue    Ace Inhibitors      ACE throat clearing    Atorvastatin Muscle Pain (Myalgia)     Crestor [Rosuvastatin]      myalgia    Oxycodone Nausea and Vomiting    Ozempic (0.25 Or 0.5 Mg-Dose) [Semaglutide(0.25 Or 0.5mg-Dos)]      Vision changes     Tetanus Toxoid Swelling     Hard red lump          PAST MEDICAL HISTORY:  Past Medical History:   Diagnosis Date    Anemia due to blood loss     BASAL CELL CANCER -NOSE     Dermatitis     Diabetes (H)     Family history of diabetes mellitus     Father    Family history of ischemic heart disease     Father    Family history of malignant neoplasm of ovary     Mother    HX DISRUPT L ANT CRUCIATE S/P REPAIR 2003     Hyperlipidemia     HYPERTENSION      HYPOTHYROIDISM     LOC PRIM OSTEOARTH-L 1ST METATARSAL-CUNEIFORM JOINT 3/10/2004    Major depressive disorder, single episode, mild (H) 11/24/2014    MILD-MOD TRICUSPID REGURG. MOD PULM HTN 6/23/2005    ECHO  5/05    Obese     PONV (postoperative nausea and vomiting)     Rosacea     Shingles     SPLENIC CYST          PAST SURGICAL HISTORY:  Past Surgical History:   Procedure Laterality Date    ARTHROPLASTY KNEE Left 4/13/2016    Procedure: ARTHROPLASTY KNEE;  Surgeon: Giuseppe Lopez MD;  Location:  OR    ARTHROPLASTY KNEE Right 3/6/2023    Procedure: Right total knee arthroplasty;  Surgeon: Giusepep Lopez MD;  Location:  OR    ARTHROPLASTY MINIMALLY INVASIVE HIP  4/18/2012    Procedure:ARTHROPLASTY MINIMALLY INVASIVE HIP; RIGHT TWO INCISION MINIMALLY INVASIVE HIP ARTHROPLASTY (YUVAL)^ (NO CELL SAVER) (C-ARM); Surgeon:GIUSEPPE LOPEZ; Location: OR    BIOPSY NODE SENTINEL Right 4/23/2021    Procedure: RIGHT SENTINEL LYMPH NODE BIOPSY;  Surgeon: Giuseppe Rogers MD;  Location:  OR    C CHEMOSURG MOHS 1ST STAGE  9-99    Mohs-nose    CARDIAC SURGERY  06/27/2019    mid and distal RCA stents    CV CORONARY ANGIOGRAM N/A 5/19/2022    Procedure: Coronary Angiogram;  Surgeon: Reyes Graham MD;  Location:  HEART CARDIAC CATH LAB    CV INSTANTANEOUS WAVE-FREE RATIO N/A 5/19/2022     Procedure: Instantaneous Wave-Free Ratio;  Surgeon: Reyes Graham MD;  Location:  HEART CARDIAC CATH LAB    CV INTRAVASULAR ULTRASOUND N/A 5/19/2022    Procedure: Intravascular Ultrasound;  Surgeon: Reyes Graham MD;  Location:  HEART CARDIAC CATH LAB    CV PCI STENT DRUG ELUTING N/A 5/19/2022    Procedure: Percutaneous Coronary Intervention Stent;  Surgeon: Reyes Graham MD;  Location:  HEART CARDIAC CATH LAB    ENT SURGERY      mohs procedure on nose    HYSTERECTOMY, PAP NO LONGER INDICATED      LUMPECTOMY BREAST WITH SEED LOCALIZATION Right 4/23/2021    Procedure: SEED LOCALIZED RIGHT BREAST LUMPECTOMY;  Surgeon: Giuseppe Rogers MD;  Location:  OR    SURGICAL HISTORY OF -   Mult-~1978    infertility, laparotomies    Z APPENDECTOMY  8/87    incidental appi at [facility] REPAIR CRUCIATE LIGAMENT,KNEE  8/2003    L   ACL reconstruction    [email or web address] TOTAL ABDOM HYSTERECTOMY  8/87    W/ BSO fibroids, hx PID    [facility] COLONOSCOPY THRU STOMA, DIAGNOSTIC  4/03    normal, rec repeat 10 yrs         SOCIAL HISTORY:  Social History     Socioeconomic History    Marital status:      Spouse name: FABIAN CUADRA    Number of children: 2    Years of education: Not on file    Highest education level: Not on file   Occupational History    Occupation: [employer]     Employer: [employer]   Tobacco Use    Smoking status: Never    Smokeless tobacco: Never   Vaping Use    Vaping status: Never Used   Substance and Sexual Activity    Alcohol use: Yes     Comment: occasional, few drinks per month    Drug use: No    Sexual activity: Yes     Partners: Male   Other Topics Concern     Service No    Blood Transfusions No    Caffeine Concern No    Occupational Exposure No    Hobby Hazards No    Sleep Concern No    Stress Concern No    Weight Concern Yes    Special Diet No    Back Care No    Exercise Yes     Comment: Approx 3 times a week for about 30 minutes    Bike Helmet Yes    Seat Belt Yes    Self-Exams  Yes    Parent/sibling w/ CABG, MI or angioplasty before 65F 55M? Not Asked   Social History Narrative    Not on file     Social Drivers of Health     Financial Resource Strain: Low Risk  (6/20/2024)    Financial Resource Strain     Within the past 12 months, have you or your family members you live with been unable to get utilities (heat, electricity) when it was really needed?: No   Food Insecurity: Low Risk  (6/20/2024)    Food Insecurity     Within the past 12 months, did you worry that your food would run out before you got money to buy more?: No     Within the past 12 months, did the food you bought just not last and you didn t have money to get more?: No   Transportation Needs: Low Risk  (6/20/2024)    Transportation Needs     Within the past 12 months, has lack of transportation kept you from medical appointments, getting your medicines, non-medical meetings or appointments, work, or from getting things that you need?: No   Physical Activity: Insufficiently Active (6/20/2024)    Exercise Vital Sign     Days of Exercise per Week: 5 days     Minutes of Exercise per Session: 20 min   Stress: No Stress Concern Present (6/20/2024)    [place] [employer] - Occupational Stress Questionnaire     Feeling of Stress : Only a little   Social Connections: Unknown (6/20/2024)    Social Connection and Isolation Panel [NHANES]     Frequency of Communication with Friends and Family: Not on file     Frequency of Social Gatherings with Friends and Family: More than three times a week     Attends Yarsanism Services: Not on file     Active Member of Clubs or Organizations: Not on file     Attends Club or Organization Meetings: Not on file     Marital Status: Not on file   Interpersonal Safety: Low Risk  (12/8/2023)    Interpersonal Safety     Do you feel physically and emotionally safe where you currently live?: Yes     Within the past 12 months, have you been hit, slapped, kicked or otherwise physically hurt  "by someone?: No     Within the past 12 months, have you been humiliated or emotionally abused in other ways by your partner or ex-partner?: No   Housing Stability: Low Risk  (2024)    Housing Stability     Do you have housing? : Yes     Are you worried about losing your housing?: No         FAMILY HISTORY:  Family History   Problem Relation Age of Onset    Cancer Mother         ovarian  of it  at 49yo.    Other Cancer Mother     Diabetes Father         Adult onset    Hypertension Father     C.A.D. Father         Fatal Heart attack at 70.    Cerebrovascular Disease Father     Thyroid Disease Sister     Thyroid Disease Sister     Thyroid Disease Sister     Cancer Daughter     Thyroid Disease Daughter     Breast Cancer No family hx of     Cancer - colorectal No family hx of          PHYSICAL EXAM:  Vital signs:  BP (!) 161/81   Pulse 79   Resp 16   Ht 1.6 m (5' 3\")   Wt 78.6 kg (173 lb 3.2 oz)   SpO2 99%   BMI 30.68 kg/m     GENERAL/CONSTITUTIONAL: No acute distress.  EYES:  No scleral icterus.  LYMPH: No cervical, supraclavicular, axillary adenopathy.   BREAST: No palpable masses in either breast with exception of left breast post-lumpectomy changes in the upper outer quadrant; no significant edema or erythema. Nipples are everted bilaterally with no discharge. No erythema, ulceration.  RESPIRATORY: No audible cough or wheezing.   GASTROINTESTINAL: No hepatosplenomegaly, masses, or tenderness. No guarding.  No distention.  MUSCULOSKELETAL: Warm and well-perfused, no cyanosis, clubbing or edema.  NEUROLOGIC: Alert, oriented, answers questions appropriately.  INTEGUMENTARY: No rashes or jaundice.   GAIT: Steady, does not use assistive device    LABS:  CBC RESULTS:   Recent Labs   Lab Test 04/10/23  1016 23  0618 23  1131   WBC  --   --  7.3   RBC  --   --  4.61   HGB 12.4 10.1* 13.5   HCT  --   --  43.1   MCV  --   --  94   MCH  --   --  29.3   MCHC  --   --  31.3*   RDW  --   --  13.7   PLT  " --  212 249      Last Comprehensive Metabolic Panel:  Sodium   Date Value Ref Range Status   06/20/2024 136 135 - 145 mmol/L Final     Comment:     Reference intervals for this test were updated on 09/26/2023 to more accurately reflect our healthy population. There may be differences in the flagging of prior results with similar values performed with this method. Interpretation of those prior results can be made in the context of the updated reference intervals.    05/12/2021 137 133 - 144 mmol/L Final     Potassium   Date Value Ref Range Status   06/20/2024 3.9 3.4 - 5.3 mmol/L Final   10/07/2022 4.2 3.4 - 5.3 mmol/L Final   05/12/2021 3.9 3.4 - 5.3 mmol/L Final     Chloride   Date Value Ref Range Status   06/20/2024 99 98 - 107 mmol/L Final   10/07/2022 104 94 - 109 mmol/L Final   05/12/2021 103 94 - 109 mmol/L Final     Carbon Dioxide   Date Value Ref Range Status   05/12/2021 26 20 - 32 mmol/L Final     Carbon Dioxide (CO2)   Date Value Ref Range Status   06/20/2024 25 22 - 29 mmol/L Final   10/07/2022 25 20 - 32 mmol/L Final     Anion Gap   Date Value Ref Range Status   06/20/2024 12 7 - 15 mmol/L Final   10/07/2022 8 3 - 14 mmol/L Final   05/12/2021 8 3 - 14 mmol/L Final     Glucose   Date Value Ref Range Status   06/20/2024 100 (H) 70 - 99 mg/dL Final   10/07/2022 103 (H) 70 - 99 mg/dL Final   05/12/2021 163 (H) 70 - 99 mg/dL Final     GLUCOSE BY METER POCT   Date Value Ref Range Status   03/07/2023 135 (H) 70 - 99 mg/dL Final     Urea Nitrogen   Date Value Ref Range Status   06/20/2024 13.5 8.0 - 23.0 mg/dL Final   10/07/2022 13 7 - 30 mg/dL Final   05/12/2021 17 7 - 30 mg/dL Final     Creatinine   Date Value Ref Range Status   06/20/2024 0.68 0.51 - 0.95 mg/dL Final   05/12/2021 0.71 0.52 - 1.04 mg/dL Final     GFR Estimate   Date Value Ref Range Status   06/20/2024 88 >60 mL/min/1.73m2 Final     Comment:     eGFR calculated using 2021 CKD-EPI equation.   05/12/2021 82 >60 mL/min/[1.73_m2] Final      Comment:     Non  GFR Calc  Starting 2018, serum creatinine based estimated GFR (eGFR) will be   calculated using the Chronic Kidney Disease Epidemiology Collaboration   (CKD-EPI) equation.       Calcium   Date Value Ref Range Status   2024 9.7 8.8 - 10.2 mg/dL Final   2021 9.4 8.5 - 10.1 mg/dL Final     Bilirubin Total   Date Value Ref Range Status   2024 0.5 <=1.2 mg/dL Final   2021 0.5 0.2 - 1.3 mg/dL Final     Alkaline Phosphatase   Date Value Ref Range Status   2024 74 40 - 150 U/L Final   2021 76 40 - 150 U/L Final     ALT   Date Value Ref Range Status   2024 37 0 - 50 U/L Final     Comment:     Reference intervals for this test were updated on 2023 to more accurately reflect our healthy population. There may be differences in the flagging of prior results with similar values performed with this method. Interpretation of those prior results can be made in the context of the updated reference intervals.     2021 79 (H) 0 - 50 U/L Final     AST   Date Value Ref Range Status   2024 33 0 - 45 U/L Final     Comment:     Reference intervals for this test were updated on 2023 to more accurately reflect our healthy population. There may be differences in the flagging of prior results with similar values performed with this method. Interpretation of those prior results can be made in the context of the updated reference intervals.   2021 48 (H) 0 - 45 U/L Final       PATHOLOGY:  None new.    IMAGIN2024: DEXA showed normal bone density.    2024: Mammogram showed no malignancy.    ASSESSMENT/PLAN:  Yola Mayo is a 79 year old female with the following issues:  1. 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  --She is status post  lumpectomy followed by adjuvant radiation therapy.  -I discussed with Dee Dee that she has no clinical evidence for recurrent breast cancer by physical exam from today or mammogram reviewed from 4/16/2024.  --I advised gentle massage to the right breast and axilla to alleviated some of the scar-related tissue discomfort.  --She is now on anastrozole and tolerating this well with some polyarthralgia.  --Plan for total 5 years of hormone blockade therapy through 6/2026.  --4/16/2024 DEXA showed normal bone density. I advised adequate calcium and vitamin D intake as well as weight bearing exercise.  --Plan to repeat DEXA in 2026.  --Due for next mammogram 4/2025.    2. Right breast lymphedema  --Improved with lymphedema therapy.    3. Coronary artery disease  --Aromatase inhibitors can slightly increase cardiovascular risk but controllable with her cardiac medications.  --She will continue on aspirin, beta-blocker, and statin.     4. Diabetes mellitus type 2  --Well-controlled. She will continue metformin and has her A1C monitored by Dr. Braga.    5. Essential hypertension  --Blood pressure slightly high today but she states her SBP runs around 135 at home.  Possible white coat syndrome.  She will continue on losartan, hydrochlorothiazide, and hydralazine.    6. Polyarthralgia  7. Hot flashes  --AI-induced.  --Encouraged continuation of magnesium. We discussed use of fan and switching to cooler fabrics.    Return in 6 months.    Elida Grullon MD  [facility]     Total time spent today: 30 minutes in chart review, patient evaluation, counseling, documentation, test and/or medication/prescription orders, and coordination of care.     The longitudinal plan of care for the diagnosis(es)/condition(s) as documented were addressed during this visit. Due to the added complexity in care, I will continue to support Dee Dee in the subsequent management and with ongoing continuity of care.

## 2024-11-20 ENCOUNTER — ONCOLOGY VISIT (OUTPATIENT)
Dept: ONCOLOGY | Facility: CLINIC | Age: 79
End: 2024-11-20
Attending: INTERNAL MEDICINE
Payer: MEDICARE

## 2024-11-20 VITALS
WEIGHT: 173.2 LBS | HEART RATE: 79 BPM | DIASTOLIC BLOOD PRESSURE: 81 MMHG | SYSTOLIC BLOOD PRESSURE: 161 MMHG | RESPIRATION RATE: 16 BRPM | OXYGEN SATURATION: 99 % | BODY MASS INDEX: 30.69 KG/M2 | HEIGHT: 63 IN

## 2024-11-20 DIAGNOSIS — I10 ESSENTIAL HYPERTENSION: ICD-10-CM

## 2024-11-20 DIAGNOSIS — Z12.31 ENCOUNTER FOR SCREENING MAMMOGRAM FOR BREAST CANCER: ICD-10-CM

## 2024-11-20 DIAGNOSIS — N95.1 MENOPAUSAL SYNDROME (HOT FLASHES): ICD-10-CM

## 2024-11-20 DIAGNOSIS — Z17.0 MALIGNANT NEOPLASM OF UPPER-OUTER QUADRANT OF RIGHT BREAST IN FEMALE, ESTROGEN RECEPTOR POSITIVE (H): Primary | ICD-10-CM

## 2024-11-20 DIAGNOSIS — C50.411 MALIGNANT NEOPLASM OF UPPER-OUTER QUADRANT OF RIGHT BREAST IN FEMALE, ESTROGEN RECEPTOR POSITIVE (H): Primary | ICD-10-CM

## 2024-11-20 DIAGNOSIS — M25.50 POLYARTHRALGIA: ICD-10-CM

## 2024-11-20 PROCEDURE — G0463 HOSPITAL OUTPT CLINIC VISIT: HCPCS | Performed by: INTERNAL MEDICINE

## 2024-11-20 ASSESSMENT — PAIN SCALES - GENERAL: PAINLEVEL_OUTOF10: NO PAIN (0)

## 2024-11-20 NOTE — PROGRESS NOTES
"Oncology Rooming Note    November 20, 2024 8:15 AM   Yola Mayo is a 79 year old female who presents for:    Chief Complaint   Patient presents with    Oncology Clinic Visit     Initial Vitals: There were no vitals taken for this visit. Estimated body mass index is 30.11 kg/m  as calculated from the following:    Height as of 8/19/24: 1.6 m (5' 3\").    Weight as of 8/19/24: 77.1 kg (170 lb). There is no height or weight on file to calculate BSA.  Data Unavailable Comment: Data Unavailable   No LMP recorded. Patient has had a hysterectomy.  Allergies reviewed: Yes  Medications reviewed: Yes    Medications: Medication refills not needed today.  Pharmacy name entered into [employer]: [id number] [place] [id number] [address] FROM [address]    Frailty Screening:   Is the patient here for a new oncology consult visit in cancer care? 2. No        Anila Durham MA            "

## 2024-11-20 NOTE — LETTER
11/20/2024      Yola Mayo  [address] [facility] [address]      Dear Colleague,    Thank you for referring your patient, Yola Mayo, to the [facility]. Please see a copy of my visit note below.    [facility]    Hematology/Oncology Established Patient Note      Today's Date: 11/20/2024    Reason for follow-up: Right breast cancer.    HISTORY OF PRESENT ILLNESS: Yola Mayo is a 79 year old female who presents with the following oncologic history:  1. 3/24/2021: Mammogram showed distortion in right upper outer breast; left breast negative.  2. 3/31/2021: Right breast U/S at 9:00, 4 cm from nipple showed mass measuring 1.1 x 1 x 1.2 cm; no enlarged right axillary lymph nodes seen.  3. 4/07/2021: Right breast biopsy at 9:00 showed grade 2 invasive ductal carcinoma, ER positive at 100%, KY positive at 95%, HER-2/marek FISH negative.  4. 4/23/2021: Underwent right breast lumpectomy and sentinel lymph node excision under care of Dr. Ace Rogers. Pathology showed grade 2 invasive ductal carcinoma measuring 1.5 cm, low grade DCIS, LCIS, margins negative; 2 lymph nodes negative. Oncotype DX = 6; 9-year risk of distant recurrence = 3% after 5 years of hormone blockade therapy, < 1% absolute chemotherapy benefit.  5. 5/25/2021-6/16/2021: Completed adjuvant radiation therapy to right breast. Subsequently started anastrozole.    INTERVAL HISTORY:  Dee Dee reports hot flashes overall improved given the cooler weather.  Joints feel okay.      REVIEW OF SYSTEMS:   14 point ROS was reviewed and is negative other than as noted above in HPI.       HOME MEDICATIONS:  Current Outpatient Medications   Medication Sig Dispense Refill     amoxicillin (AMOXIL) 500 MG capsule TAKE 4 CAPSULES BY MOUTH 30MINUTES TO 1 HOUR BEFORE THE PROCEDURE. 4 capsule 3     anastrozole (ARIMIDEX) 1 MG tablet Take 1 tablet (1 mg) by mouth daily 90 tablet 4     aspirin 81 MG tablet Take 81 mg  by mouth daily       blood glucose (CONTOUR NEXT TEST) test strip 1 strip by In Vitro route daily or as directed. 100 strip 1     blood glucose monitoring (NITO MICROLET) lancets Use to test blood sugar 1 time daily or as directed. 100 each 3     calcium-vitamin D (CALTRATE) 600-400 MG-UNIT per tablet Take 1 tablet by mouth daily 60 tablet 11     cholecalciferol (VITAMIN D) 1000 UNIT tablet Take 1 tablet (1,000 Units) by mouth daily 100 tablet 3     clopidogrel (PLAVIX) 75 MG tablet Take 1 tablet (75 mg) by mouth daily 90 tablet 3     CONTOUR NEXT EZ (CONTOUR NEXT EZ W/DEVICE KIT) w/Device KIT 1 kit once 1 kit 0     Cyanocobalamin (VITAMIN B 12 PO) Take 1 tablet by mouth daily Strength unknown       hydrALAZINE (APRESOLINE) 25 MG tablet TAKE 2 TABLETS BY MOUTH EVERY MORNING AND ONE TABLET BY MOUTH EVERY EVENING 270 tablet 0     hydrochlorothiazide (HYDRODIURIL) 25 MG tablet TAKE 1 TABLET (25 MG) BY MOUTH DAILY FOR BLOOD PRESSURE 90 tablet 0     levothyroxine (SYNTHROID/LEVOTHROID) 112 MCG tablet Take 1 tablet (112 mcg) by mouth daily for thyroid 90 tablet 2     losartan (COZAAR) 100 MG tablet Take 1 tablet (100 mg) by mouth daily for Blood Pressure 90 tablet 3     metFORMIN (GLUCOPHAGE XR) 500 MG 24 hr tablet Take 2 tablets (1,000 mg) by mouth daily (with dinner). for diabetes 180 tablet 3     metroNIDAZOLE (METROGEL) 0.75 % external gel APPLY TO AFFECTED AREA TWICE A DAY 45 g 1     multivitamin, therapeutic with minerals (THERA-VIT-M) TABS Take 1 tablet by mouth daily       nitroGLYcerin (NITROSTAT) 0.4 MG sublingual tablet For chest pain place 1 tablet under the tongue every 5 minutes for 3 doses. If symptoms persist 5 minutes after 1st dose call 911. 25 tablet 3     pravastatin (PRAVACHOL) 10 MG tablet TAKE 1 TABLET (10 MG) BY MOUTH DAILY. 90 tablet 1     senna-docusate (SENOKOT-S/PERICOLACE) 8.6-50 MG tablet Take 1-2 tablets by mouth 2 times daily Take while on oral narcotics to prevent or treat constipation.  30 tablet 0     ZIOPTAN 0.0015 % SOLN ophthalmic solution Place 1 drop into both eyes At Bedtime           ALLERGIES:  Allergies   Allergen Reactions     Toprol Xl [Metoprolol] Fatigue     Ace Inhibitors      ACE throat clearing     Atorvastatin Muscle Pain (Myalgia)     Crestor [Rosuvastatin]      myalgia     Oxycodone Nausea and Vomiting     Ozempic (0.25 Or 0.5 Mg-Dose) [Semaglutide(0.25 Or 0.5mg-Dos)]      Vision changes      Tetanus Toxoid Swelling     Hard red lump          PAST MEDICAL HISTORY:  Past Medical History:   Diagnosis Date     Anemia due to blood loss      BASAL CELL CANCER -NOSE      Dermatitis      Diabetes (H)      Family history of diabetes mellitus     Father     Family history of ischemic heart disease     Father     Family history of malignant neoplasm of ovary     Mother     HX DISRUPT L ANT CRUCIATE S/P REPAIR 2003      Hyperlipidemia      HYPERTENSION       HYPOTHYROIDISM      LOC PRIM OSTEOARTH-L 1ST METATARSAL-CUNEIFORM JOINT 3/10/2004     Major depressive disorder, single episode, mild (H) 11/24/2014     MILD-MOD TRICUSPID REGURG. MOD PULM HTN 6/23/2005    ECHO  5/05     Obese      PONV (postoperative nausea and vomiting)      Rosacea      Shingles      SPLENIC CYST          PAST SURGICAL HISTORY:  Past Surgical History:   Procedure Laterality Date     ARTHROPLASTY KNEE Left 4/13/2016    Procedure: ARTHROPLASTY KNEE;  Surgeon: Giuseppe Lopez MD;  Location:  OR     ARTHROPLASTY KNEE Right 3/6/2023    Procedure: Right total knee arthroplasty;  Surgeon: Giuseppe Lopez MD;  Location:  OR     ARTHROPLASTY MINIMALLY INVASIVE HIP  4/18/2012    Procedure:ARTHROPLASTY MINIMALLY INVASIVE HIP; RIGHT TWO INCISION MINIMALLY INVASIVE HIP ARTHROPLASTY (YUVAL)^ (NO CELL SAVER) (C-ARM); Surgeon:GIUSEPPE LOPEZ; Location: OR     BIOPSY NODE SENTINEL Right 4/23/2021    Procedure: RIGHT SENTINEL LYMPH NODE BIOPSY;  Surgeon: Giuseppe Rogers MD;  Location:  OR     C CHEMOSURG  MOHS 1ST STAGE  9-99    Mohs-nose     CARDIAC SURGERY  06/27/2019    mid and distal RCA stents     CV CORONARY ANGIOGRAM N/A 5/19/2022    Procedure: Coronary Angiogram;  Surgeon: Reyes Graham MD;  Location:  HEART CARDIAC CATH LAB     CV INSTANTANEOUS WAVE-FREE RATIO N/A 5/19/2022    Procedure: Instantaneous Wave-Free Ratio;  Surgeon: Reyes Graham MD;  Location:  HEART CARDIAC CATH LAB     CV INTRAVASULAR ULTRASOUND N/A 5/19/2022    Procedure: Intravascular Ultrasound;  Surgeon: Reyes Graham MD;  Location:  HEART CARDIAC CATH LAB     CV PCI STENT DRUG ELUTING N/A 5/19/2022    Procedure: Percutaneous Coronary Intervention Stent;  Surgeon: Reyes Graham MD;  Location:  HEART CARDIAC CATH LAB     ENT SURGERY      mohs procedure on nose     HYSTERECTOMY, PAP NO LONGER INDICATED       LUMPECTOMY BREAST WITH SEED LOCALIZATION Right 4/23/2021    Procedure: SEED LOCALIZED RIGHT BREAST LUMPECTOMY;  Surgeon: Giuseppe Rogers MD;  Location:  OR     SURGICAL HISTORY OF -   Mult-~1978    infertility, laparotomies     ZZC APPENDECTOMY  8/87    incidental appi at [facility]     Z REPAIR CRUCIATE LIGAMENT,KNEE  8/2003    L   ACL reconstruction     Z TOTAL ABDOM HYSTERECTOMY  8/87    W/ BSO fibroids, hx PID     ZZ COLONOSCOPY THRU STOMA, DIAGNOSTIC  4/03    normal, rec repeat 10 yrs         SOCIAL HISTORY:  Social History     Socioeconomic History     Marital status:      Spouse name: FABIAN CUADRA     Number of children: 2     Years of education: Not on file     Highest education level: Not on file   Occupational History     Occupation: [employer]     Employer: [employer]   Tobacco Use     Smoking status: Never     Smokeless tobacco: Never   Vaping Use     Vaping status: Never Used   Substance and Sexual Activity     Alcohol use: Yes     Comment: occasional, few drinks per month     Drug use: No     Sexual activity: Yes     Partners: Male   Other Topics Concern      Service No      Blood Transfusions No     Caffeine Concern No     Occupational Exposure No     Hobby Hazards No     Sleep Concern No     Stress Concern No     Weight Concern Yes     Special Diet No     Back Care No     Exercise Yes     Comment: Approx 3 times a week for about 30 minutes     Bike Helmet Yes     Seat Belt Yes     Self-Exams Yes     Parent/sibling w/ CABG, MI or angioplasty before 65F 55M? Not Asked   Social History Narrative     Not on file     Social Drivers of Health     Financial Resource Strain: Low Risk  (6/20/2024)    Financial Resource Strain      Within the past 12 months, have you or your family members you live with been unable to get utilities (heat, electricity) when it was really needed?: No   Food Insecurity: Low Risk  (6/20/2024)    Food Insecurity      Within the past 12 months, did you worry that your food would run out before you got money to buy more?: No      Within the past 12 months, did the food you bought just not last and you didn t have money to get more?: No   Transportation Needs: Low Risk  (6/20/2024)    Transportation Needs      Within the past 12 months, has lack of transportation kept you from medical appointments, getting your medicines, non-medical meetings or appointments, work, or from getting things that you need?: No   Physical Activity: Insufficiently Active (6/20/2024)    Exercise Vital Sign      Days of Exercise per Week: 5 days      Minutes of Exercise per Session: 20 min   Stress: No Stress Concern Present (6/20/2024)    [employer] [place] [employer] - Occupational Stress Questionnaire      Feeling of Stress : Only a little   Social Connections: Unknown (6/20/2024)    Social Connection and Isolation Panel [NHANES]      Frequency of Communication with Friends and Family: Not on file      Frequency of Social Gatherings with Friends and Family: More than three times a week      Attends Muslim Services: Not on file      Active Member of Clubs or Organizations:  "Not on file      Attends Club or Organization Meetings: Not on file      Marital Status: Not on file   Interpersonal Safety: Low Risk  (2023)    Interpersonal Safety      Do you feel physically and emotionally safe where you currently live?: Yes      Within the past 12 months, have you been hit, slapped, kicked or otherwise physically hurt by someone?: No      Within the past 12 months, have you been humiliated or emotionally abused in other ways by your partner or ex-partner?: No   Housing Stability: Low Risk  (2024)    Housing Stability      Do you have housing? : Yes      Are you worried about losing your housing?: No         FAMILY HISTORY:  Family History   Problem Relation Age of Onset     Cancer Mother         ovarian  of it  at 51yo.     Other Cancer Mother      Diabetes Father         Adult onset     Hypertension Father      C.A.D. Father         Fatal Heart attack at 70.     Cerebrovascular Disease Father      Thyroid Disease Sister      Thyroid Disease Sister      Thyroid Disease Sister      Cancer Daughter      Thyroid Disease Daughter      Breast Cancer No family hx of      Cancer - colorectal No family hx of          PHYSICAL EXAM:  Vital signs:  BP (!) 161/81   Pulse 79   Resp 16   Ht 1.6 m (5' 3\")   Wt 78.6 kg (173 lb 3.2 oz)   SpO2 99%   BMI 30.68 kg/m     GENERAL/CONSTITUTIONAL: No acute distress.  EYES:  No scleral icterus.  LYMPH: No cervical, supraclavicular, axillary adenopathy.   BREAST: No palpable masses in either breast with exception of left breast post-lumpectomy changes in the upper outer quadrant; no significant edema or erythema. Nipples are everted bilaterally with no discharge. No erythema, ulceration.  RESPIRATORY: No audible cough or wheezing.   GASTROINTESTINAL: No hepatosplenomegaly, masses, or tenderness. No guarding.  No distention.  MUSCULOSKELETAL: Warm and well-perfused, no cyanosis, clubbing or edema.  NEUROLOGIC: Alert, oriented, answers questions " appropriately.  INTEGUMENTARY: No rashes or jaundice.   GAIT: Steady, does not use assistive device    LABS:  CBC RESULTS:   Recent Labs   Lab Test 04/10/23  1016 03/07/23  0618 02/23/23  1131   WBC  --   --  7.3   RBC  --   --  4.61   HGB 12.4 10.1* 13.5   HCT  --   --  43.1   MCV  --   --  94   MCH  --   --  29.3   MCHC  --   --  31.3*   RDW  --   --  13.7   PLT  --  212 249      Last Comprehensive Metabolic Panel:  Sodium   Date Value Ref Range Status   06/20/2024 136 135 - 145 mmol/L Final     Comment:     Reference intervals for this test were updated on 09/26/2023 to more accurately reflect our healthy population. There may be differences in the flagging of prior results with similar values performed with this method. Interpretation of those prior results can be made in the context of the updated reference intervals.    05/12/2021 137 133 - 144 mmol/L Final     Potassium   Date Value Ref Range Status   06/20/2024 3.9 3.4 - 5.3 mmol/L Final   10/07/2022 4.2 3.4 - 5.3 mmol/L Final   05/12/2021 3.9 3.4 - 5.3 mmol/L Final     Chloride   Date Value Ref Range Status   06/20/2024 99 98 - 107 mmol/L Final   10/07/2022 104 94 - 109 mmol/L Final   05/12/2021 103 94 - 109 mmol/L Final     Carbon Dioxide   Date Value Ref Range Status   05/12/2021 26 20 - 32 mmol/L Final     Carbon Dioxide (CO2)   Date Value Ref Range Status   06/20/2024 25 22 - 29 mmol/L Final   10/07/2022 25 20 - 32 mmol/L Final     Anion Gap   Date Value Ref Range Status   06/20/2024 12 7 - 15 mmol/L Final   10/07/2022 8 3 - 14 mmol/L Final   05/12/2021 8 3 - 14 mmol/L Final     Glucose   Date Value Ref Range Status   06/20/2024 100 (H) 70 - 99 mg/dL Final   10/07/2022 103 (H) 70 - 99 mg/dL Final   05/12/2021 163 (H) 70 - 99 mg/dL Final     GLUCOSE BY METER POCT   Date Value Ref Range Status   03/07/2023 135 (H) 70 - 99 mg/dL Final     Urea Nitrogen   Date Value Ref Range Status   06/20/2024 13.5 8.0 - 23.0 mg/dL Final   10/07/2022 13 7 - 30 mg/dL Final    2021 17 7 - 30 mg/dL Final     Creatinine   Date Value Ref Range Status   2024 0.68 0.51 - 0.95 mg/dL Final   2021 0.71 0.52 - 1.04 mg/dL Final     GFR Estimate   Date Value Ref Range Status   2024 88 >60 mL/min/1.73m2 Final     Comment:     eGFR calculated using  CKD-EPI equation.   2021 82 >60 mL/min/[1.73_m2] Final     Comment:     Non  GFR Calc  Starting 2018, serum creatinine based estimated GFR (eGFR) will be   calculated using the Chronic Kidney Disease Epidemiology Collaboration   (CKD-EPI) equation.       Calcium   Date Value Ref Range Status   2024 9.7 8.8 - 10.2 mg/dL Final   2021 9.4 8.5 - 10.1 mg/dL Final     Bilirubin Total   Date Value Ref Range Status   2024 0.5 <=1.2 mg/dL Final   2021 0.5 0.2 - 1.3 mg/dL Final     Alkaline Phosphatase   Date Value Ref Range Status   2024 74 40 - 150 U/L Final   2021 76 40 - 150 U/L Final     ALT   Date Value Ref Range Status   2024 37 0 - 50 U/L Final     Comment:     Reference intervals for this test were updated on 2023 to more accurately reflect our healthy population. There may be differences in the flagging of prior results with similar values performed with this method. Interpretation of those prior results can be made in the context of the updated reference intervals.     2021 79 (H) 0 - 50 U/L Final     AST   Date Value Ref Range Status   2024 33 0 - 45 U/L Final     Comment:     Reference intervals for this test were updated on 2023 to more accurately reflect our healthy population. There may be differences in the flagging of prior results with similar values performed with this method. Interpretation of those prior results can be made in the context of the updated reference intervals.   2021 48 (H) 0 - 45 U/L Final       PATHOLOGY:  None new.    IMAGIN2024: DEXA showed normal bone density.    2024: Mammogram showed  no malignancy.    ASSESSMENT/PLAN:  Yola Mayo is a 79 year old female with the following issues:  1. 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  --She is status post lumpectomy followed by adjuvant radiation therapy.  -I discussed with Dee Dee that she has no clinical evidence for recurrent breast cancer by physical exam from today or mammogram reviewed from 4/16/2024.  --I advised gentle massage to the right breast and axilla to alleviated some of the scar-related tissue discomfort.  --She is now on anastrozole and tolerating this well with some polyarthralgia.  --Plan for total 5 years of hormone blockade therapy through 6/2026.  --4/16/2024 DEXA showed normal bone density. I advised adequate calcium and vitamin D intake as well as weight bearing exercise.  --Plan to repeat DEXA in 2026.  --Due for next mammogram 4/2025.    2. Right breast lymphedema  --Improved with lymphedema therapy.    3. Coronary artery disease  --Aromatase inhibitors can slightly increase cardiovascular risk but controllable with her cardiac medications.  --She will continue on aspirin, beta-blocker, and statin.     4. Diabetes mellitus type 2  --Well-controlled. She will continue metformin and has her A1C monitored by Dr. Braga.    5. Essential hypertension  --Blood pressure slightly high today but she states her SBP runs around 135 at home.  Possible white coat syndrome.  She will continue on losartan, hydrochlorothiazide, and hydralazine.    6. Polyarthralgia  7. Hot flashes  --AI-induced.  --Encouraged continuation of magnesium. We discussed use of fan and switching to cooler fabrics.    Return in 6 months.    Elida Grullon MD  [facility]     Total time spent today: 30 minutes in chart review, patient evaluation,  "counseling, documentation, test and/or medication/prescription orders, and coordination of care.     The longitudinal plan of care for the diagnosis(es)/condition(s) as documented were addressed during this visit. Due to the added complexity in care, I will continue to support Dee Dee in the subsequent management and with ongoing continuity of care.      Oncology Rooming Note    November 20, 2024 8:15 AM   Yola Mayo is a 79 year old female who presents for:    Chief Complaint   Patient presents with     Oncology Clinic Visit     Initial Vitals: There were no vitals taken for this visit. Estimated body mass index is 30.11 kg/m  as calculated from the following:    Height as of 8/19/24: 1.6 m (5' 3\").    Weight as of 8/19/24: 77.1 kg (170 lb). There is no height or weight on file to calculate BSA.  Data Unavailable Comment: Data Unavailable   No LMP recorded. Patient has had a hysterectomy.  Allergies reviewed: Yes  Medications reviewed: Yes    Medications: Medication refills not needed today.  Pharmacy name entered into [email or web address]: [id number] [place] [id number] [address] [facility]    Frailty Screening:   Is the patient here for a new oncology consult visit in cancer care? 2. No        Anila Durham MA              Again, thank you for allowing me to participate in the care of your patient.        Sincerely,        Elida Grullon MD  "

## 2024-12-11 ENCOUNTER — LAB (OUTPATIENT)
Dept: LAB | Facility: CLINIC | Age: 79
End: 2024-12-11
Payer: MEDICARE

## 2024-12-11 DIAGNOSIS — E11.9 TYPE 2 DIABETES MELLITUS WITHOUT COMPLICATION, WITHOUT LONG-TERM CURRENT USE OF INSULIN (H): Primary | ICD-10-CM

## 2024-12-11 DIAGNOSIS — Z13.0 SCREENING FOR DEFICIENCY ANEMIA: ICD-10-CM

## 2024-12-11 LAB
ANION GAP SERPL CALCULATED.3IONS-SCNC: 11 MMOL/L (ref 7–15)
BUN SERPL-MCNC: 12.3 MG/DL (ref 8–23)
CALCIUM SERPL-MCNC: 9.3 MG/DL (ref 8.8–10.4)
CHLORIDE SERPL-SCNC: 100 MMOL/L (ref 98–107)
CREAT SERPL-MCNC: 0.68 MG/DL (ref 0.51–0.95)
EGFRCR SERPLBLD CKD-EPI 2021: 88 ML/MIN/1.73M2
EST. AVERAGE GLUCOSE BLD GHB EST-MCNC: 120 MG/DL
GLUCOSE SERPL-MCNC: 117 MG/DL (ref 70–99)
HBA1C MFR BLD: 5.8 % (ref 0–5.6)
HCO3 SERPL-SCNC: 25 MMOL/L (ref 22–29)
POTASSIUM SERPL-SCNC: 4 MMOL/L (ref 3.4–5.3)
SODIUM SERPL-SCNC: 136 MMOL/L (ref 135–145)
VIT B12 SERPL-MCNC: 1343 PG/ML (ref 232–1245)

## 2024-12-11 PROCEDURE — 82607 VITAMIN B-12: CPT

## 2024-12-11 PROCEDURE — 82043 UR ALBUMIN QUANTITATIVE: CPT

## 2024-12-11 PROCEDURE — 80048 BASIC METABOLIC PNL TOTAL CA: CPT

## 2024-12-11 PROCEDURE — 82570 ASSAY OF URINE CREATININE: CPT

## 2024-12-11 PROCEDURE — 36415 COLL VENOUS BLD VENIPUNCTURE: CPT

## 2024-12-11 PROCEDURE — 83036 HEMOGLOBIN GLYCOSYLATED A1C: CPT

## 2024-12-12 LAB
CREAT UR-MCNC: 39.5 MG/DL
MICROALBUMIN UR-MCNC: <12 MG/L
MICROALBUMIN/CREAT UR: NORMAL MG/G{CREAT}

## 2024-12-18 ENCOUNTER — OFFICE VISIT (OUTPATIENT)
Dept: FAMILY MEDICINE | Facility: CLINIC | Age: 79
End: 2024-12-18
Payer: MEDICARE

## 2024-12-18 VITALS
RESPIRATION RATE: 16 BRPM | OXYGEN SATURATION: 99 % | BODY MASS INDEX: 31.01 KG/M2 | HEART RATE: 83 BPM | HEIGHT: 63 IN | TEMPERATURE: 97.7 F | SYSTOLIC BLOOD PRESSURE: 139 MMHG | WEIGHT: 175 LBS | DIASTOLIC BLOOD PRESSURE: 62 MMHG

## 2024-12-18 DIAGNOSIS — I10 BENIGN HYPERTENSION: ICD-10-CM

## 2024-12-18 DIAGNOSIS — Z95.5 S/P DRUG ELUTING CORONARY STENT PLACEMENT: ICD-10-CM

## 2024-12-18 DIAGNOSIS — I10 ESSENTIAL HYPERTENSION, BENIGN: ICD-10-CM

## 2024-12-18 DIAGNOSIS — E03.9 HYPOTHYROIDISM, UNSPECIFIED TYPE: ICD-10-CM

## 2024-12-18 DIAGNOSIS — E11.9 TYPE 2 DIABETES MELLITUS WITHOUT COMPLICATION, WITHOUT LONG-TERM CURRENT USE OF INSULIN (H): Primary | ICD-10-CM

## 2024-12-18 DIAGNOSIS — L71.9 ROSACEA: ICD-10-CM

## 2024-12-18 DIAGNOSIS — E78.5 HYPERLIPIDEMIA, UNSPECIFIED HYPERLIPIDEMIA TYPE: ICD-10-CM

## 2024-12-18 DIAGNOSIS — Z79.899 MEDICATION MANAGEMENT: ICD-10-CM

## 2024-12-18 DIAGNOSIS — I25.10 CORONARY ARTERY DISEASE INVOLVING NATIVE CORONARY ARTERY OF NATIVE HEART WITHOUT ANGINA PECTORIS: ICD-10-CM

## 2024-12-18 PROCEDURE — 99214 OFFICE O/P EST MOD 30 MIN: CPT | Performed by: INTERNAL MEDICINE

## 2024-12-18 RX ORDER — LEVOTHYROXINE SODIUM 112 UG/1
112 TABLET ORAL DAILY
Qty: 90 TABLET | Refills: 1 | Status: SHIPPED | OUTPATIENT
Start: 2024-12-18

## 2024-12-18 RX ORDER — LOSARTAN POTASSIUM 100 MG/1
100 TABLET ORAL DAILY
Qty: 90 TABLET | Refills: 3 | Status: SHIPPED | OUTPATIENT
Start: 2024-12-18

## 2024-12-18 RX ORDER — HYDRALAZINE HYDROCHLORIDE 25 MG/1
TABLET, FILM COATED ORAL
Qty: 270 TABLET | Refills: 0 | Status: SHIPPED | OUTPATIENT
Start: 2024-12-18

## 2024-12-18 RX ORDER — METRONIDAZOLE 7.5 MG/G
GEL TOPICAL 2 TIMES DAILY
Qty: 45 G | Refills: 1 | Status: SHIPPED | OUTPATIENT
Start: 2024-12-18

## 2024-12-18 RX ORDER — HYDROCHLOROTHIAZIDE 25 MG/1
25 TABLET ORAL DAILY
Qty: 90 TABLET | Refills: 3 | Status: SHIPPED | OUTPATIENT
Start: 2024-12-18

## 2024-12-18 RX ORDER — CLOPIDOGREL BISULFATE 75 MG/1
75 TABLET ORAL DAILY
Qty: 90 TABLET | Refills: 3 | Status: SHIPPED | OUTPATIENT
Start: 2024-12-18

## 2024-12-18 RX ORDER — PRAVASTATIN SODIUM 10 MG
10 TABLET ORAL DAILY
Qty: 90 TABLET | Refills: 3 | Status: SHIPPED | OUTPATIENT
Start: 2024-12-18

## 2024-12-18 ASSESSMENT — PAIN SCALES - GENERAL: PAINLEVEL_OUTOF10: NO PAIN (0)

## 2024-12-18 NOTE — PROGRESS NOTES
Dee Dee was seen today for recheck.    Diagnoses and all orders for this visit:    Type 2 diabetes mellitus without complication, without long-term current use of insulin (H)  -     Hemoglobin A1c; Future  Will continue to monitor  She says discontinuing ozempic helped her eye symptoms  She states she got diagnosed with macular degeneration as well as glaucoma    S/P drug eluting coronary stent placement  -     pravastatin (PRAVACHOL) 10 MG tablet; Take 1 tablet (10 mg) by mouth daily. for cholesterol  -     clopidogrel (PLAVIX) 75 MG tablet; Take 1 tablet (75 mg) by mouth daily.  Takes pravastatin and clopidogrel  Currently asymptomatic was seen by cardiology recently    Hyperlipidemia, unspecified hyperlipidemia type  -     pravastatin (PRAVACHOL) 10 MG tablet; Take 1 tablet (10 mg) by mouth daily. for cholesterol  -     Lipid panel reflex to direct LDL Non-fasting; Future  Takes pravastatin      Rosacea  -     metroNIDAZOLE (METROGEL) 0.75 % external gel; Apply topically 2 times daily. For rosacea  Takes metronidazole    Benign hypertension  -     losartan (COZAAR) 100 MG tablet; Take 1 tablet (100 mg) by mouth daily. for Blood Pressure  -     hydrochlorothiazide (HYDRODIURIL) 25 MG tablet; Take 1 tablet (25 mg) by mouth daily. for Blood Pressure  -     hydrALAZINE (APRESOLINE) 25 MG tablet; TAKE 2 TABLETS BY MOUTH EVERY MORNING AND ONE TABLET BY MOUTH EVERY EVENING for Blood Pressure  BP today was 139/62 Pt states that she checks BP at home normally. She reports her BP at home today was 132/67.   Pt takes losartan, hydrochlorothiazide and hydralazine  Monitors her blood pressure at home    Hypothyroidism, unspecified type  -     levothyroxine (SYNTHROID/LEVOTHROID) 112 MCG tablet; Take 1 tablet (112 mcg) by mouth daily. for thyroid  -     TSH with free T4 reflex; Future  Takes levothyroxine   Will continue to monitor    Essential hypertension, benign  Well controlled     Coronary artery disease involving native  "coronary artery of native heart without angina pectoris  -     clopidogrel (PLAVIX) 75 MG tablet; Take 1 tablet (75 mg) by mouth daily.  See above    Medication management  -     Comprehensive metabolic panel; Future  -     CBC with platelets; Future    Other  Reviewed 12/11/2024 lab and noticed elevated B12  Last colonoscopy on 5/1/2009  Last DEXA 4/16/2024  Last Mammo 4/16/2024  Pt reports stopping Ozempic due to trouble with eyes. She states when she stopped her symptoms improved. Went to eye doctor reports that she has macular degeneration. Fhx of glaucoma. She stopped ozempic in 9/2024.   Counseled on healthy diet and physical activity  Pt declined tetanus shot today because she had adverse reaction to it in the past.      Fang Funez is a 79 year old, presenting for the following health issues:  RECHECK      History of Present Illness       Reason for visit:  F/u   She is taking medications regularly.         Review of Systems  Constitutional, HEENT, cardiovascular, pulmonary, GI, , musculoskeletal, neuro, skin, endocrine and psych systems are negative, except as otherwise noted.      Objective    /62 (BP Location: Left arm, Patient Position: Sitting, Cuff Size: Adult Large)   Pulse 83   Temp 97.7  F (36.5  C) (Temporal)   Resp 16   Ht 1.6 m (5' 3\")   Wt 79.4 kg (175 lb)   SpO2 99%   BMI 31.00 kg/m    Body mass index is 31 kg/m .  Physical Exam   GENERAL APPEARANCE: healthy, alert and no distress  EYES: Eyes grossly normal to inspection, PERRL and conjunctivae and sclerae normal  HENT: ear canals and TM's normal and nose and mouth without ulcers or lesions  NECK: no adenopathy  RESP: lungs clear to auscultation - no rales, rhonchi or wheezes  CV: regular rates and rhythm, normal S1 S2, no S3              Signed Electronically by: Zoraida Braga MD  Scribe Disclosure:   Mitchell SARAVIA, am serving as a scribe; to document services personally performed by Zoraida Braga MD -based on " data collection and the provider's statements to me.

## 2024-12-18 NOTE — PATIENT INSTRUCTIONS
Follow up in 6 months.  Labs before the appointment   Seek sooner medical attention if there is any worsening of symptoms or problems.

## 2024-12-29 DIAGNOSIS — E11.9 TYPE 2 DIABETES MELLITUS WITHOUT COMPLICATION, WITHOUT LONG-TERM CURRENT USE OF INSULIN (H): ICD-10-CM

## 2024-12-30 RX ORDER — METFORMIN HYDROCHLORIDE 500 MG/1
TABLET, EXTENDED RELEASE ORAL
Qty: 90 TABLET | Refills: 0 | Status: SHIPPED | OUTPATIENT
Start: 2024-12-30

## 2025-03-27 DIAGNOSIS — E11.9 TYPE 2 DIABETES MELLITUS WITHOUT COMPLICATION, WITHOUT LONG-TERM CURRENT USE OF INSULIN (H): ICD-10-CM

## 2025-03-27 RX ORDER — METFORMIN HYDROCHLORIDE 500 MG/1
TABLET, EXTENDED RELEASE ORAL
Qty: 90 TABLET | Refills: 0 | Status: SHIPPED | OUTPATIENT
Start: 2025-03-27

## 2025-04-17 NOTE — PROGRESS NOTES
[facility]    Hematology/Oncology Established Patient Note      Today's Date: 4/24/2025    Reason for follow-up: Right breast cancer.    HISTORY OF PRESENT ILLNESS: Yola Mayo is a 79 year old female who presents with the following oncologic history:  1. 3/24/2021: Mammogram showed distortion in right upper outer breast; left breast negative.  2. 3/31/2021: Right breast U/S at 9:00, 4 cm from nipple showed mass measuring 1.1 x 1 x 1.2 cm; no enlarged right axillary lymph nodes seen.  3. 4/07/2021: Right breast biopsy at 9:00 showed grade 2 invasive ductal carcinoma, ER positive at 100%, KS positive at 95%, HER-2/marek FISH negative.  4. 4/23/2021: Underwent right breast lumpectomy and sentinel lymph node excision under care of Dr. Ace Rogers. Pathology showed grade 2 invasive ductal carcinoma measuring 1.5 cm, low grade DCIS, LCIS, margins negative; 2 lymph nodes negative. Oncotype DX = 6; 9-year risk of distant recurrence = 3% after 5 years of hormone blockade therapy, < 1% absolute chemotherapy benefit.  5. 5/25/2021-6/16/2021: Completed adjuvant radiation therapy to right breast. Subsequently started anastrozole.    INTERVAL HISTORY:  Dee Dee reports continued but stable hot flashes. She has had bilateral calf pain chronically despite taking magnesium at night.      REVIEW OF SYSTEMS:   14 point ROS was reviewed and is negative other than as noted above in HPI.       HOME MEDICATIONS:  Current Outpatient Medications   Medication Sig Dispense Refill    amoxicillin (AMOXIL) 500 MG capsule TAKE 4 CAPSULES BY MOUTH 30MINUTES TO 1 HOUR BEFORE THE PROCEDURE. 4 capsule 3    anastrozole (ARIMIDEX) 1 MG tablet Take 1 tablet (1 mg) by mouth daily 90 tablet 4    aspirin 81 MG tablet Take 81 mg by mouth daily      blood glucose (CONTOUR NEXT TEST) test strip 1 strip by In Vitro route daily or as directed. 100 strip 1    blood glucose monitoring (NITO MICROLET) lancets Use to test blood sugar 1 time daily  or as directed. 100 each 3    calcium-vitamin D (CALTRATE) 600-400 MG-UNIT per tablet Take 1 tablet by mouth daily 60 tablet 11    cholecalciferol (VITAMIN D) 1000 UNIT tablet Take 1 tablet (1,000 Units) by mouth daily 100 tablet 3    clopidogrel (PLAVIX) 75 MG tablet Take 1 tablet (75 mg) by mouth daily. 90 tablet 3    CONTOUR NEXT EZ (CONTOUR NEXT EZ W/DEVICE KIT) w/Device KIT 1 kit once 1 kit 0    Cyanocobalamin (VITAMIN B 12 PO) Take 1 tablet by mouth daily Strength unknown      hydrALAZINE (APRESOLINE) 25 MG tablet TAKE 2 TABLETS BY MOUTH EVERY MORNING AND ONE TABLET BY MOUTH EVERY EVENING for Blood Pressure 270 tablet 0    hydrochlorothiazide (HYDRODIURIL) 25 MG tablet Take 1 tablet (25 mg) by mouth daily. for Blood Pressure 90 tablet 3    levothyroxine (SYNTHROID/LEVOTHROID) 112 MCG tablet Take 1 tablet (112 mcg) by mouth daily. for thyroid 90 tablet 1    losartan (COZAAR) 100 MG tablet Take 1 tablet (100 mg) by mouth daily. for Blood Pressure 90 tablet 3    metFORMIN (GLUCOPHAGE XR) 500 MG 24 hr tablet TAKE 1 TABLET (500 MG) BY MOUTH DAILY (WITH DINNER) FOR DIABETES 90 tablet 0    metroNIDAZOLE (METROGEL) 0.75 % external gel Apply topically 2 times daily. For rosacea 45 g 1    multivitamin, therapeutic with minerals (THERA-VIT-M) TABS Take 1 tablet by mouth daily      nitroGLYcerin (NITROSTAT) 0.4 MG sublingual tablet For chest pain place 1 tablet under the tongue every 5 minutes for 3 doses. If symptoms persist 5 minutes after 1st dose call 911. 25 tablet 3    pravastatin (PRAVACHOL) 10 MG tablet Take 1 tablet (10 mg) by mouth daily. for cholesterol 90 tablet 3    ZIOPTAN 0.0015 % SOLN ophthalmic solution Place 1 drop into both eyes At Bedtime           ALLERGIES:  Allergies   Allergen Reactions    Toprol Xl [Metoprolol] Fatigue    Ace Inhibitors      ACE throat clearing    Atorvastatin Muscle Pain (Myalgia)    Crestor [Rosuvastatin]      myalgia    Oxycodone Nausea and Vomiting    Ozempic (0.25 Or 0.5  Mg-Dose) [Semaglutide(0.25 Or 0.5mg-Dos)]      Vision changes     Tetanus Toxoid Swelling     Hard red lump          PAST MEDICAL HISTORY:  Past Medical History:   Diagnosis Date    Anemia due to blood loss     BASAL CELL CANCER -NOSE     Dermatitis     Diabetes (H)     Family history of diabetes mellitus     Father    Family history of ischemic heart disease     Father    Family history of malignant neoplasm of ovary     Mother    HX DISRUPT L ANT CRUCIATE S/P REPAIR 2003     Hyperlipidemia     HYPERTENSION      HYPOTHYROIDISM     LOC PRIM OSTEOARTH-L 1ST METATARSAL-CUNEIFORM JOINT 3/10/2004    Major depressive disorder, single episode, mild 11/24/2014    MILD-MOD TRICUSPID REGURG. MOD PULM HTN 6/23/2005    ECHO  5/05    Obese     PONV (postoperative nausea and vomiting)     Rosacea     Shingles     SPLENIC CYST          PAST SURGICAL HISTORY:  Past Surgical History:   Procedure Laterality Date    ARTHROPLASTY KNEE Left 4/13/2016    Procedure: ARTHROPLASTY KNEE;  Surgeon: Giuseppe Lopez MD;  Location:  OR    ARTHROPLASTY KNEE Right 3/6/2023    Procedure: Right total knee arthroplasty;  Surgeon: Giuseppe Lopez MD;  Location:  OR    ARTHROPLASTY MINIMALLY INVASIVE HIP  4/18/2012    Procedure:ARTHROPLASTY MINIMALLY INVASIVE HIP; RIGHT TWO INCISION MINIMALLY INVASIVE HIP ARTHROPLASTY (YUVAL)^ (NO CELL SAVER) (C-ARM); Surgeon:GIUSEPPE LOPEZ; Location: OR    BIOPSY NODE SENTINEL Right 4/23/2021    Procedure: RIGHT SENTINEL LYMPH NODE BIOPSY;  Surgeon: Giuseppe Rogers MD;  Location:  OR    C CHEMOSURG MOHS 1ST STAGE  9-99    Mohs-nose    CARDIAC SURGERY  06/27/2019    mid and distal RCA stents    CV CORONARY ANGIOGRAM N/A 5/19/2022    Procedure: Coronary Angiogram;  Surgeon: Reyes Graham MD;  Location:  HEART CARDIAC CATH LAB    CV INSTANTANEOUS WAVE-FREE RATIO N/A 5/19/2022    Procedure: Instantaneous Wave-Free Ratio;  Surgeon: Reyes Graham MD;  Location:  HEART CARDIAC CATH  LAB    CV INTRAVASULAR ULTRASOUND N/A 5/19/2022    Procedure: Intravascular Ultrasound;  Surgeon: Reyes Graham MD;  Location:  HEART CARDIAC CATH LAB    CV PCI STENT DRUG ELUTING N/A 5/19/2022    Procedure: Percutaneous Coronary Intervention Stent;  Surgeon: Reyes Graham MD;  Location:  HEART CARDIAC CATH LAB    ENT SURGERY      mohs procedure on nose    HYSTERECTOMY, PAP NO LONGER INDICATED      LUMPECTOMY BREAST WITH SEED LOCALIZATION Right 4/23/2021    Procedure: SEED LOCALIZED RIGHT BREAST LUMPECTOMY;  Surgeon: Giuseppe Rogers MD;  Location:  OR    SURGICAL HISTORY OF -   Mult-~1978    infertility, laparotomies    ZC APPENDECTOMY  8/87    incidental appi at [facility]    Z REPAIR CRUCIATE LIGAMENT,KNEE  8/2003    L   ACL reconstruction    Z TOTAL ABDOM HYSTERECTOMY  8/87    W/ BSO fibroids, hx PID    ZZ COLONOSCOPY THRU STOMA, DIAGNOSTIC  4/03    normal, rec repeat 10 yrs         SOCIAL HISTORY:  Social History     Socioeconomic History    Marital status:      Spouse name: FABIAN CUADRA    Number of children: 2    Years of education: Not on file    Highest education level: Not on file   Occupational History    Occupation: [employer]= [employer]     Employer: [employer]   Tobacco Use    Smoking status: Never    Smokeless tobacco: Never   Vaping Use    Vaping status: Never Used   Substance and Sexual Activity    Alcohol use: Yes     Comment: occasional, few drinks per month    Drug use: No    Sexual activity: Yes     Partners: Male   Other Topics Concern     Service No    Blood Transfusions No    Caffeine Concern No    Occupational Exposure No    Hobby Hazards No    Sleep Concern No    Stress Concern No    Weight Concern Yes    Special Diet No    Back Care No    Exercise Yes     Comment: Approx 3 times a week for about 30 minutes    Bike Helmet Yes    Seat Belt Yes    Self-Exams Yes    Parent/sibling w/ CABG, MI or angioplasty before 65F 55M? Not Asked   Social History Narrative     Not on file     Social Drivers of Health     Financial Resource Strain: Low Risk  (6/20/2024)    Financial Resource Strain     Within the past 12 months, have you or your family members you live with been unable to get utilities (heat, electricity) when it was really needed?: No   Food Insecurity: Low Risk  (6/20/2024)    Food Insecurity     Within the past 12 months, did you worry that your food would run out before you got money to buy more?: No     Within the past 12 months, did the food you bought just not last and you didn t have money to get more?: No   Transportation Needs: Low Risk  (6/20/2024)    Transportation Needs     Within the past 12 months, has lack of transportation kept you from medical appointments, getting your medicines, non-medical meetings or appointments, work, or from getting things that you need?: No   Physical Activity: Insufficiently Active (6/20/2024)    Exercise Vital Sign     Days of Exercise per Week: 5 days     Minutes of Exercise per Session: 20 min   Stress: No Stress Concern Present (6/20/2024)    [employer] [place] [employer] - Occupational Stress Questionnaire     Feeling of Stress : Only a little   Social Connections: Unknown (6/20/2024)    Social Connection and Isolation Panel [NHANES]     Frequency of Communication with Friends and Family: Not on file     Frequency of Social Gatherings with Friends and Family: More than three times a week     Attends Anabaptist Services: Not on file     Active Member of Clubs or Organizations: Not on file     Attends Club or Organization Meetings: Not on file     Marital Status: Not on file   Interpersonal Safety: Low Risk  (12/18/2024)    Interpersonal Safety     Do you feel physically and emotionally safe where you currently live?: Yes     Within the past 12 months, have you been hit, slapped, kicked or otherwise physically hurt by someone?: No     Within the past 12 months, have you been humiliated or emotionally abused in other  ways by your partner or ex-partner?: No   Housing Stability: Low Risk  (2024)    Housing Stability     Do you have housing? : Yes     Are you worried about losing your housing?: No         FAMILY HISTORY:  Family History   Problem Relation Age of Onset    Cancer Mother         ovarian  of it  at 51yo.    Other Cancer Mother     Diabetes Father         Adult onset    Hypertension Father     C.A.D. Father         Fatal Heart attack at 70.    Cerebrovascular Disease Father     Thyroid Disease Sister     Thyroid Disease Sister     Thyroid Disease Sister     Cancer Daughter     Thyroid Disease Daughter     Breast Cancer No family hx of     Cancer - colorectal No family hx of          PHYSICAL EXAM:  Vital signs:  BP (!) 148/63   Pulse 86   Resp 16   Wt 80.3 kg (177 lb)   SpO2 99%   BMI 31.35 kg/m     GENERAL/CONSTITUTIONAL: No acute distress.  EYES:  No scleral icterus.  LYMPH: No cervical, supraclavicular, axillary adenopathy.   BREAST: No palpable masses in either breast with exception of left breast post-lumpectomy changes in the upper outer quadrant; no significant edema or erythema. Nipples are everted bilaterally with no discharge. No erythema, ulceration.  RESPIRATORY: No audible cough or wheezing.   GASTROINTESTINAL: No hepatosplenomegaly, masses, or tenderness. No guarding.  No distention.  MUSCULOSKELETAL: Warm and well-perfused, no cyanosis, clubbing or edema.  NEUROLOGIC: Alert, oriented, answers questions appropriately.  INTEGUMENTARY: No rashes or jaundice.   GAIT: Steady, does not use assistive device    LABS:  CBC RESULTS:   Recent Labs   Lab Test 24  1041   WBC 5.8   RBC 4.81   HGB 13.8   HCT 41.7   MCV 87   MCH 28.7   MCHC 33.1   RDW 13.8         Last Comprehensive Metabolic Panel:  Sodium   Date Value Ref Range Status   2024 136 135 - 145 mmol/L Final   2021 137 133 - 144 mmol/L Final     Potassium   Date Value Ref Range Status   2024 4.0 3.4 - 5.3 mmol/L  Final   10/07/2022 4.2 3.4 - 5.3 mmol/L Final   05/12/2021 3.9 3.4 - 5.3 mmol/L Final     Chloride   Date Value Ref Range Status   12/11/2024 100 98 - 107 mmol/L Final   10/07/2022 104 94 - 109 mmol/L Final   05/12/2021 103 94 - 109 mmol/L Final     Carbon Dioxide   Date Value Ref Range Status   05/12/2021 26 20 - 32 mmol/L Final     Carbon Dioxide (CO2)   Date Value Ref Range Status   12/11/2024 25 22 - 29 mmol/L Final   10/07/2022 25 20 - 32 mmol/L Final     Anion Gap   Date Value Ref Range Status   12/11/2024 11 7 - 15 mmol/L Final   10/07/2022 8 3 - 14 mmol/L Final   05/12/2021 8 3 - 14 mmol/L Final     Glucose   Date Value Ref Range Status   12/11/2024 117 (H) 70 - 99 mg/dL Final   10/07/2022 103 (H) 70 - 99 mg/dL Final   05/12/2021 163 (H) 70 - 99 mg/dL Final     GLUCOSE BY METER POCT   Date Value Ref Range Status   03/07/2023 135 (H) 70 - 99 mg/dL Final     Urea Nitrogen   Date Value Ref Range Status   12/11/2024 12.3 8.0 - 23.0 mg/dL Final   10/07/2022 13 7 - 30 mg/dL Final   05/12/2021 17 7 - 30 mg/dL Final     Creatinine   Date Value Ref Range Status   12/11/2024 0.68 0.51 - 0.95 mg/dL Final   05/12/2021 0.71 0.52 - 1.04 mg/dL Final     GFR Estimate   Date Value Ref Range Status   12/11/2024 88 >60 mL/min/1.73m2 Final     Comment:     eGFR calculated using 2021 CKD-EPI equation.   05/12/2021 82 >60 mL/min/[1.73_m2] Final     Comment:     Non  GFR Calc  Starting 12/18/2018, serum creatinine based estimated GFR (eGFR) will be   calculated using the Chronic Kidney Disease Epidemiology Collaboration   (CKD-EPI) equation.       Calcium   Date Value Ref Range Status   12/11/2024 9.3 8.8 - 10.4 mg/dL Final     Comment:     Reference intervals for this test were updated on 7/16/2024 to reflect our healthy population more accurately. There may be differences in the flagging of prior results with similar values performed with this method. Those prior results can be interpreted in the context of the  updated reference intervals.   2021 9.4 8.5 - 10.1 mg/dL Final     Bilirubin Total   Date Value Ref Range Status   2024 0.5 <=1.2 mg/dL Final   2021 0.5 0.2 - 1.3 mg/dL Final     Alkaline Phosphatase   Date Value Ref Range Status   2024 74 40 - 150 U/L Final   2021 76 40 - 150 U/L Final     ALT   Date Value Ref Range Status   2024 37 0 - 50 U/L Final     Comment:     Reference intervals for this test were updated on 2023 to more accurately reflect our healthy population. There may be differences in the flagging of prior results with similar values performed with this method. Interpretation of those prior results can be made in the context of the updated reference intervals.     2021 79 (H) 0 - 50 U/L Final     AST   Date Value Ref Range Status   2024 33 0 - 45 U/L Final     Comment:     Reference intervals for this test were updated on 2023 to more accurately reflect our healthy population. There may be differences in the flagging of prior results with similar values performed with this method. Interpretation of those prior results can be made in the context of the updated reference intervals.   2021 48 (H) 0 - 45 U/L Final       PATHOLOGY:  None new.    IMAGIN2024: DEXA showed normal bone density.    2025: Mammogram showed no malignancy.    ASSESSMENT/PLAN:  Yola Mayo is a 79 year old female with the following issues:  1. 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  --She is status post lumpectomy followed by adjuvant radiation therapy.  -I discussed with Dee Dee that she has no clinical evidence for recurrent breast cancer by physical exam from today or mammogram reviewed from 2025.  --I advised gentle massage to the right breast and axilla to  alleviated some of the scar-related tissue discomfort.  --She is now on anastrozole and tolerating this well with some polyarthralgia.  --Plan for total 5 years of hormone blockade therapy through 6/2026.  --4/16/2024 DEXA showed normal bone density. I advised adequate calcium and vitamin D intake as well as weight bearing exercise.  --Plan to repeat DEXA in 2026.  --Due for next mammogram 4/2026.    2. Right breast lymphedema  --Improved with lymphedema therapy.    3. Coronary artery disease  --Aromatase inhibitors can slightly increase cardiovascular risk but controllable with her cardiac medications.  --She will continue on aspirin, beta-blocker, and statin.     4. Diabetes mellitus type 2  --Well-controlled. She will continue metformin and has her A1C monitored by Dr. Braga.    5. Essential hypertension  --Blood pressure slightly high today but she states her SBP runs around 135 at home.  Possible white coat syndrome.  She will continue on losartan, hydrochlorothiazide, and hydralazine.    6. Polyarthralgia  7. Hot flashes  --AI-induced.  --Advised continuation of magnesium and daily walking. We discussed use of fan and switching to cooler fabrics.  --Advised calf stretching for her calf aching.    Return in 6 months.    Elida Grullon MD  [facility] Hematology/Oncology     Total time spent today: 30 minutes in chart review, patient evaluation, counseling, documentation, test and/or medication/prescription orders, and coordination of care.     The longitudinal plan of care for the diagnosis(es)/condition(s) as documented were addressed during this visit. Due to the added complexity in care, I will continue to support Dee Dee in the subsequent management and with ongoing continuity of care.

## 2025-04-18 ENCOUNTER — HOSPITAL ENCOUNTER (OUTPATIENT)
Dept: MAMMOGRAPHY | Facility: CLINIC | Age: 80
Discharge: HOME | End: 2025-04-18
Attending: INTERNAL MEDICINE | Admitting: INTERNAL MEDICINE
Payer: MEDICARE

## 2025-04-18 DIAGNOSIS — Z12.31 ENCOUNTER FOR SCREENING MAMMOGRAM FOR BREAST CANCER: ICD-10-CM

## 2025-04-18 DIAGNOSIS — Z17.0 MALIGNANT NEOPLASM OF UPPER-OUTER QUADRANT OF RIGHT BREAST IN FEMALE, ESTROGEN RECEPTOR POSITIVE (H): ICD-10-CM

## 2025-04-18 DIAGNOSIS — C50.411 MALIGNANT NEOPLASM OF UPPER-OUTER QUADRANT OF RIGHT BREAST IN FEMALE, ESTROGEN RECEPTOR POSITIVE (H): ICD-10-CM

## 2025-04-18 PROCEDURE — 77063 BREAST TOMOSYNTHESIS BI: CPT

## 2025-04-24 ENCOUNTER — ONCOLOGY VISIT (OUTPATIENT)
Dept: ONCOLOGY | Facility: CLINIC | Age: 80
End: 2025-04-24
Attending: INTERNAL MEDICINE
Payer: MEDICARE

## 2025-04-24 VITALS
BODY MASS INDEX: 31.35 KG/M2 | WEIGHT: 177 LBS | SYSTOLIC BLOOD PRESSURE: 148 MMHG | DIASTOLIC BLOOD PRESSURE: 63 MMHG | HEART RATE: 86 BPM | RESPIRATION RATE: 16 BRPM | OXYGEN SATURATION: 99 %

## 2025-04-24 DIAGNOSIS — Z17.0 MALIGNANT NEOPLASM OF UPPER-OUTER QUADRANT OF RIGHT BREAST IN FEMALE, ESTROGEN RECEPTOR POSITIVE (H): Primary | ICD-10-CM

## 2025-04-24 DIAGNOSIS — C50.411 MALIGNANT NEOPLASM OF UPPER-OUTER QUADRANT OF RIGHT BREAST IN FEMALE, ESTROGEN RECEPTOR POSITIVE (H): Primary | ICD-10-CM

## 2025-04-24 DIAGNOSIS — M25.50 POLYARTHRALGIA: ICD-10-CM

## 2025-04-24 DIAGNOSIS — N95.1 MENOPAUSAL SYNDROME (HOT FLASHES): ICD-10-CM

## 2025-04-24 PROCEDURE — G0463 HOSPITAL OUTPT CLINIC VISIT: HCPCS | Performed by: INTERNAL MEDICINE

## 2025-04-24 ASSESSMENT — PAIN SCALES - GENERAL: PAINLEVEL_OUTOF10: NO PAIN (0)

## 2025-04-24 NOTE — LETTER
4/24/2025      Yola Mayo  [address] [id number]      Dear Colleague,    Thank you for referring your patient, Yola Mayo, to the [facility]. Please see a copy of my visit note below.    [facility]    Hematology/Oncology Established Patient Note      Today's Date: 4/24/2025    Reason for follow-up: Right breast cancer.    HISTORY OF PRESENT ILLNESS: Yola Mayo is a 79 year old female who presents with the following oncologic history:  1. 3/24/2021: Mammogram showed distortion in right upper outer breast; left breast negative.  2. 3/31/2021: Right breast U/S at 9:00, 4 cm from nipple showed mass measuring 1.1 x 1 x 1.2 cm; no enlarged right axillary lymph nodes seen.  3. 4/07/2021: Right breast biopsy at 9:00 showed grade 2 invasive ductal carcinoma, ER positive at 100%, IN positive at 95%, HER-2/marek FISH negative.  4. 4/23/2021: Underwent right breast lumpectomy and sentinel lymph node excision under care of Dr. Ace Rogers. Pathology showed grade 2 invasive ductal carcinoma measuring 1.5 cm, low grade DCIS, LCIS, margins negative; 2 lymph nodes negative. Oncotype DX = 6; 9-year risk of distant recurrence = 3% after 5 years of hormone blockade therapy, < 1% absolute chemotherapy benefit.  5. 5/25/2021-6/16/2021: Completed adjuvant radiation therapy to right breast. Subsequently started anastrozole.    INTERVAL HISTORY:  Dee Dee reports continued but stable hot flashes. She has had bilateral calf pain chronically despite taking magnesium at night.      REVIEW OF SYSTEMS:   14 point ROS was reviewed and is negative other than as noted above in HPI.       HOME MEDICATIONS:  Current Outpatient Medications   Medication Sig Dispense Refill     amoxicillin (AMOXIL) 500 MG capsule TAKE 4 CAPSULES BY MOUTH 30MINUTES TO 1 HOUR BEFORE THE PROCEDURE. 4 capsule 3     anastrozole (ARIMIDEX) 1 MG tablet Take 1 tablet (1 mg) by mouth daily 90 tablet  4     aspirin 81 MG tablet Take 81 mg by mouth daily       blood glucose (CONTOUR NEXT TEST) test strip 1 strip by In Vitro route daily or as directed. 100 strip 1     blood glucose monitoring (Sonalight MICROLET) lancets Use to test blood sugar 1 time daily or as directed. 100 each 3     calcium-vitamin D (CALTRATE) 600-400 MG-UNIT per tablet Take 1 tablet by mouth daily 60 tablet 11     cholecalciferol (VITAMIN D) 1000 UNIT tablet Take 1 tablet (1,000 Units) by mouth daily 100 tablet 3     clopidogrel (PLAVIX) 75 MG tablet Take 1 tablet (75 mg) by mouth daily. 90 tablet 3     CONTOUR NEXT EZ (CONTOUR NEXT EZ W/DEVICE KIT) w/Device KIT 1 kit once 1 kit 0     Cyanocobalamin (VITAMIN B 12 PO) Take 1 tablet by mouth daily Strength unknown       hydrALAZINE (APRESOLINE) 25 MG tablet TAKE 2 TABLETS BY MOUTH EVERY MORNING AND ONE TABLET BY MOUTH EVERY EVENING for Blood Pressure 270 tablet 0     hydrochlorothiazide (HYDRODIURIL) 25 MG tablet Take 1 tablet (25 mg) by mouth daily. for Blood Pressure 90 tablet 3     levothyroxine (SYNTHROID/LEVOTHROID) 112 MCG tablet Take 1 tablet (112 mcg) by mouth daily. for thyroid 90 tablet 1     losartan (COZAAR) 100 MG tablet Take 1 tablet (100 mg) by mouth daily. for Blood Pressure 90 tablet 3     metFORMIN (GLUCOPHAGE XR) 500 MG 24 hr tablet TAKE 1 TABLET (500 MG) BY MOUTH DAILY (WITH DINNER) FOR DIABETES 90 tablet 0     metroNIDAZOLE (METROGEL) 0.75 % external gel Apply topically 2 times daily. For rosacea 45 g 1     multivitamin, therapeutic with minerals (THERA-VIT-M) TABS Take 1 tablet by mouth daily       nitroGLYcerin (NITROSTAT) 0.4 MG sublingual tablet For chest pain place 1 tablet under the tongue every 5 minutes for 3 doses. If symptoms persist 5 minutes after 1st dose call 911. 25 tablet 3     pravastatin (PRAVACHOL) 10 MG tablet Take 1 tablet (10 mg) by mouth daily. for cholesterol 90 tablet 3     ZIOPTAN 0.0015 % SOLN ophthalmic solution Place 1 drop into both eyes At  Bedtime           ALLERGIES:  Allergies   Allergen Reactions     Toprol Xl [Metoprolol] Fatigue     Ace Inhibitors      ACE throat clearing     Atorvastatin Muscle Pain (Myalgia)     Crestor [Rosuvastatin]      myalgia     Oxycodone Nausea and Vomiting     Ozempic (0.25 Or 0.5 Mg-Dose) [Semaglutide(0.25 Or 0.5mg-Dos)]      Vision changes      Tetanus Toxoid Swelling     Hard red lump          PAST MEDICAL HISTORY:  Past Medical History:   Diagnosis Date     Anemia due to blood loss      BASAL CELL CANCER -NOSE      Dermatitis      Diabetes (H)      Family history of diabetes mellitus     Father     Family history of ischemic heart disease     Father     Family history of malignant neoplasm of ovary     Mother     HX DISRUPT L ANT CRUCIATE S/P REPAIR 2003      Hyperlipidemia      HYPERTENSION       HYPOTHYROIDISM      LOC PRIM OSTEOARTH-L 1ST METATARSAL-CUNEIFORM JOINT 3/10/2004     Major depressive disorder, single episode, mild 11/24/2014     MILD-MOD TRICUSPID REGURG. MOD PULM HTN 6/23/2005    ECHO  5/05     Obese      PONV (postoperative nausea and vomiting)      Rosacea      Shingles      SPLENIC CYST          PAST SURGICAL HISTORY:  Past Surgical History:   Procedure Laterality Date     ARTHROPLASTY KNEE Left 4/13/2016    Procedure: ARTHROPLASTY KNEE;  Surgeon: Giuseppe Lopez MD;  Location:  OR     ARTHROPLASTY KNEE Right 3/6/2023    Procedure: Right total knee arthroplasty;  Surgeon: Giuseppe Lopez MD;  Location:  OR     ARTHROPLASTY MINIMALLY INVASIVE HIP  4/18/2012    Procedure:ARTHROPLASTY MINIMALLY INVASIVE HIP; RIGHT TWO INCISION MINIMALLY INVASIVE HIP ARTHROPLASTY (YUVAL)^ (NO CELL SAVER) (C-ARM); Surgeon:GIUSEPPE LOPEZ; Location: OR     BIOPSY NODE SENTINEL Right 4/23/2021    Procedure: RIGHT SENTINEL LYMPH NODE BIOPSY;  Surgeon: Giuseppe Rogers MD;  Location:  OR     C CHEMOSURG MOHS 1ST STAGE  9-99    Mohs-nose     CARDIAC SURGERY  06/27/2019    mid and distal RCA  stents     CV CORONARY ANGIOGRAM N/A 5/19/2022    Procedure: Coronary Angiogram;  Surgeon: Reyes Graham MD;  Location:  HEART CARDIAC CATH LAB     CV INSTANTANEOUS WAVE-FREE RATIO N/A 5/19/2022    Procedure: Instantaneous Wave-Free Ratio;  Surgeon: Reyes Graham MD;  Location:  HEART CARDIAC CATH LAB     CV INTRAVASULAR ULTRASOUND N/A 5/19/2022    Procedure: Intravascular Ultrasound;  Surgeon: Reyes Graham MD;  Location:  HEART CARDIAC CATH LAB     CV PCI STENT DRUG ELUTING N/A 5/19/2022    Procedure: Percutaneous Coronary Intervention Stent;  Surgeon: Reyes Graham MD;  Location:  HEART CARDIAC CATH LAB     ENT SURGERY      mohs procedure on nose     HYSTERECTOMY, PAP NO LONGER INDICATED       LUMPECTOMY BREAST WITH SEED LOCALIZATION Right 4/23/2021    Procedure: SEED LOCALIZED RIGHT BREAST LUMPECTOMY;  Surgeon: Giuseppe Rogers MD;  Location:  OR     SURGICAL HISTORY OF -   Mult-~1978    infertility, laparotomies     Z APPENDECTOMY  8/87    incidental appi at [facility]     Z REPAIR CRUCIATE LIGAMENT,KNEE  8/2003    L   ACL reconstruction     Z TOTAL ABDOM HYSTERECTOMY  8/87    W/ BSO fibroids, hx PID     ZZHC COLONOSCOPY THRU STOMA, DIAGNOSTIC  4/03    normal, rec repeat 10 yrs         SOCIAL HISTORY:  Social History     Socioeconomic History     Marital status:      Spouse name: FABIAN CUADRA     Number of children: 2     Years of education: Not on file     Highest education level: Not on file   Occupational History     Occupation: [employer]     Employer: [employer]   Tobacco Use     Smoking status: Never     Smokeless tobacco: Never   Vaping Use     Vaping status: Never Used   Substance and Sexual Activity     Alcohol use: Yes     Comment: occasional, few drinks per month     Drug use: No     Sexual activity: Yes     Partners: Male   Other Topics Concern      Service No     Blood Transfusions No     Caffeine Concern No     Occupational Exposure No     Hobby  Hazards No     Sleep Concern No     Stress Concern No     Weight Concern Yes     Special Diet No     Back Care No     Exercise Yes     Comment: Approx 3 times a week for about 30 minutes     Bike Helmet Yes     Seat Belt Yes     Self-Exams Yes     Parent/sibling w/ CABG, MI or angioplasty before 65F 55M? Not Asked   Social History Narrative     Not on file     Social Drivers of Health     Financial Resource Strain: Low Risk  (6/20/2024)    Financial Resource Strain      Within the past 12 months, have you or your family members you live with been unable to get utilities (heat, electricity) when it was really needed?: No   Food Insecurity: Low Risk  (6/20/2024)    Food Insecurity      Within the past 12 months, did you worry that your food would run out before you got money to buy more?: No      Within the past 12 months, did the food you bought just not last and you didn t have money to get more?: No   Transportation Needs: Low Risk  (6/20/2024)    Transportation Needs      Within the past 12 months, has lack of transportation kept you from medical appointments, getting your medicines, non-medical meetings or appointments, work, or from getting things that you need?: No   Physical Activity: Insufficiently Active (6/20/2024)    Exercise Vital Sign      Days of Exercise per Week: 5 days      Minutes of Exercise per Session: 20 min   Stress: No Stress Concern Present (6/20/2024)    Citizen of [place] [employer] - Occupational Stress Questionnaire      Feeling of Stress : Only a little   Social Connections: Unknown (6/20/2024)    Social Connection and Isolation Panel [NHANES]      Frequency of Communication with Friends and Family: Not on file      Frequency of Social Gatherings with Friends and Family: More than three times a week      Attends Jehovah's witness Services: Not on file      Active Member of Clubs or Organizations: Not on file      Attends Club or Organization Meetings: Not on file      Marital Status:  Not on file   Interpersonal Safety: Low Risk  (2024)    Interpersonal Safety      Do you feel physically and emotionally safe where you currently live?: Yes      Within the past 12 months, have you been hit, slapped, kicked or otherwise physically hurt by someone?: No      Within the past 12 months, have you been humiliated or emotionally abused in other ways by your partner or ex-partner?: No   Housing Stability: Low Risk  (2024)    Housing Stability      Do you have housing? : Yes      Are you worried about losing your housing?: No         FAMILY HISTORY:  Family History   Problem Relation Age of Onset     Cancer Mother         ovarian  of it  at 51yo.     Other Cancer Mother      Diabetes Father         Adult onset     Hypertension Father      C.A.D. Father         Fatal Heart attack at 70.     Cerebrovascular Disease Father      Thyroid Disease Sister      Thyroid Disease Sister      Thyroid Disease Sister      Cancer Daughter      Thyroid Disease Daughter      Breast Cancer No family hx of      Cancer - colorectal No family hx of          PHYSICAL EXAM:  Vital signs:  BP (!) 148/63   Pulse 86   Resp 16   Wt 80.3 kg (177 lb)   SpO2 99%   BMI 31.35 kg/m     GENERAL/CONSTITUTIONAL: No acute distress.  EYES:  No scleral icterus.  LYMPH: No cervical, supraclavicular, axillary adenopathy.   BREAST: No palpable masses in either breast with exception of left breast post-lumpectomy changes in the upper outer quadrant; no significant edema or erythema. Nipples are everted bilaterally with no discharge. No erythema, ulceration.  RESPIRATORY: No audible cough or wheezing.   GASTROINTESTINAL: No hepatosplenomegaly, masses, or tenderness. No guarding.  No distention.  MUSCULOSKELETAL: Warm and well-perfused, no cyanosis, clubbing or edema.  NEUROLOGIC: Alert, oriented, answers questions appropriately.  INTEGUMENTARY: No rashes or jaundice.   GAIT: Steady, does not use assistive device    LABS:  CBC  RESULTS:   Recent Labs   Lab Test 06/20/24  1041   WBC 5.8   RBC 4.81   HGB 13.8   HCT 41.7   MCV 87   MCH 28.7   MCHC 33.1   RDW 13.8         Last Comprehensive Metabolic Panel:  Sodium   Date Value Ref Range Status   12/11/2024 136 135 - 145 mmol/L Final   05/12/2021 137 133 - 144 mmol/L Final     Potassium   Date Value Ref Range Status   12/11/2024 4.0 3.4 - 5.3 mmol/L Final   10/07/2022 4.2 3.4 - 5.3 mmol/L Final   05/12/2021 3.9 3.4 - 5.3 mmol/L Final     Chloride   Date Value Ref Range Status   12/11/2024 100 98 - 107 mmol/L Final   10/07/2022 104 94 - 109 mmol/L Final   05/12/2021 103 94 - 109 mmol/L Final     Carbon Dioxide   Date Value Ref Range Status   05/12/2021 26 20 - 32 mmol/L Final     Carbon Dioxide (CO2)   Date Value Ref Range Status   12/11/2024 25 22 - 29 mmol/L Final   10/07/2022 25 20 - 32 mmol/L Final     Anion Gap   Date Value Ref Range Status   12/11/2024 11 7 - 15 mmol/L Final   10/07/2022 8 3 - 14 mmol/L Final   05/12/2021 8 3 - 14 mmol/L Final     Glucose   Date Value Ref Range Status   12/11/2024 117 (H) 70 - 99 mg/dL Final   10/07/2022 103 (H) 70 - 99 mg/dL Final   05/12/2021 163 (H) 70 - 99 mg/dL Final     GLUCOSE BY METER POCT   Date Value Ref Range Status   03/07/2023 135 (H) 70 - 99 mg/dL Final     Urea Nitrogen   Date Value Ref Range Status   12/11/2024 12.3 8.0 - 23.0 mg/dL Final   10/07/2022 13 7 - 30 mg/dL Final   05/12/2021 17 7 - 30 mg/dL Final     Creatinine   Date Value Ref Range Status   12/11/2024 0.68 0.51 - 0.95 mg/dL Final   05/12/2021 0.71 0.52 - 1.04 mg/dL Final     GFR Estimate   Date Value Ref Range Status   12/11/2024 88 >60 mL/min/1.73m2 Final     Comment:     eGFR calculated using 2021 CKD-EPI equation.   05/12/2021 82 >60 mL/min/[1.73_m2] Final     Comment:     Non  GFR Calc  Starting 12/18/2018, serum creatinine based estimated GFR (eGFR) will be   calculated using the Chronic Kidney Disease Epidemiology Collaboration   (CKD-EPI)  equation.       Calcium   Date Value Ref Range Status   2024 9.3 8.8 - 10.4 mg/dL Final     Comment:     Reference intervals for this test were updated on 2024 to reflect our healthy population more accurately. There may be differences in the flagging of prior results with similar values performed with this method. Those prior results can be interpreted in the context of the updated reference intervals.   2021 9.4 8.5 - 10.1 mg/dL Final     Bilirubin Total   Date Value Ref Range Status   2024 0.5 <=1.2 mg/dL Final   2021 0.5 0.2 - 1.3 mg/dL Final     Alkaline Phosphatase   Date Value Ref Range Status   2024 74 40 - 150 U/L Final   2021 76 40 - 150 U/L Final     ALT   Date Value Ref Range Status   2024 37 0 - 50 U/L Final     Comment:     Reference intervals for this test were updated on 2023 to more accurately reflect our healthy population. There may be differences in the flagging of prior results with similar values performed with this method. Interpretation of those prior results can be made in the context of the updated reference intervals.     2021 79 (H) 0 - 50 U/L Final     AST   Date Value Ref Range Status   2024 33 0 - 45 U/L Final     Comment:     Reference intervals for this test were updated on 2023 to more accurately reflect our healthy population. There may be differences in the flagging of prior results with similar values performed with this method. Interpretation of those prior results can be made in the context of the updated reference intervals.   2021 48 (H) 0 - 45 U/L Final       PATHOLOGY:  None new.    IMAGIN2024: DEXA showed normal bone density.    2025: Mammogram showed no malignancy.    ASSESSMENT/PLAN:  Yola Mayo is a 79 year old female with the following issues:  1. 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  --She is status post lumpectomy followed by adjuvant radiation therapy.  -I discussed with Dee Dee that she has no clinical evidence for recurrent breast cancer by physical exam from today or mammogram reviewed from 4/18/2025.  --I advised gentle massage to the right breast and axilla to alleviated some of the scar-related tissue discomfort.  --She is now on anastrozole and tolerating this well with some polyarthralgia.  --Plan for total 5 years of hormone blockade therapy through 6/2026.  --4/16/2024 DEXA showed normal bone density. I advised adequate calcium and vitamin D intake as well as weight bearing exercise.  --Plan to repeat DEXA in 2026.  --Due for next mammogram 4/2026.    2. Right breast lymphedema  --Improved with lymphedema therapy.    3. Coronary artery disease  --Aromatase inhibitors can slightly increase cardiovascular risk but controllable with her cardiac medications.  --She will continue on aspirin, beta-blocker, and statin.     4. Diabetes mellitus type 2  --Well-controlled. She will continue metformin and has her A1C monitored by Dr. Braga.    5. Essential hypertension  --Blood pressure slightly high today but she states her SBP runs around 135 at home.  Possible white coat syndrome.  She will continue on losartan, hydrochlorothiazide, and hydralazine.    6. Polyarthralgia  7. Hot flashes  --AI-induced.  --Advised continuation of magnesium and daily walking. We discussed use of fan and switching to cooler fabrics.  --Advised calf stretching for her calf aching.    Return in 6 months.    Elida Grullon MD  [facility]     Total time spent today: 30 minutes in chart review, patient evaluation, counseling, documentation, test and/or medication/prescription orders, and coordination of care.     The longitudinal plan of care for the diagnosis(es)/condition(s) as documented  were addressed during this visit. Due to the added complexity in care, I will continue to support Dee Dee in the subsequent management and with ongoing continuity of care.      Again, thank you for allowing me to participate in the care of your patient.        Sincerely,        Elida Grullon MD    Electronically signed

## 2025-05-05 ENCOUNTER — TELEPHONE (OUTPATIENT)
Dept: CARDIOLOGY | Facility: CLINIC | Age: 80
End: 2025-05-05
Payer: MEDICARE

## 2025-05-05 NOTE — TELEPHONE ENCOUNTER
[facility] Call Center    Phone Message    May a detailed message be left on voicemail: yes     Reason for Call: Other: Patient will like to know if they are still needing to take antibiotic prior to dental appt. Please call patient back to further discuss.     Action Taken: Other: Cardiology    Travel Screening: Not Applicable     Thank you!  [facility]

## 2025-05-05 NOTE — TELEPHONE ENCOUNTER
Spoke with patient and informed her from cardiology standpoint there is no need to predental antibiotics needed. No further questions or concerns.

## 2025-05-18 ENCOUNTER — TRANSFERRED RECORDS (OUTPATIENT)
Dept: MULTI SPECIALTY CLINIC | Facility: CLINIC | Age: 80
End: 2025-05-18

## 2025-05-18 LAB — RETINOPATHY: NORMAL

## 2025-06-15 ENCOUNTER — HEALTH MAINTENANCE LETTER (OUTPATIENT)
Age: 80
End: 2025-06-15

## 2025-06-26 ENCOUNTER — LAB (OUTPATIENT)
Dept: LAB | Facility: CLINIC | Age: 80
End: 2025-06-26
Payer: MEDICARE

## 2025-06-26 ENCOUNTER — RESULTS FOLLOW-UP (OUTPATIENT)
Dept: FAMILY MEDICINE | Facility: CLINIC | Age: 80
End: 2025-06-26

## 2025-06-26 DIAGNOSIS — E11.9 TYPE 2 DIABETES MELLITUS WITHOUT COMPLICATION, WITHOUT LONG-TERM CURRENT USE OF INSULIN (H): ICD-10-CM

## 2025-06-26 DIAGNOSIS — E78.5 HYPERLIPIDEMIA, UNSPECIFIED HYPERLIPIDEMIA TYPE: ICD-10-CM

## 2025-06-26 DIAGNOSIS — Z79.899 MEDICATION MANAGEMENT: ICD-10-CM

## 2025-06-26 DIAGNOSIS — E03.9 HYPOTHYROIDISM, UNSPECIFIED TYPE: ICD-10-CM

## 2025-06-26 LAB
ALBUMIN SERPL BCG-MCNC: 4.3 G/DL (ref 3.5–5.2)
ALP SERPL-CCNC: 75 U/L (ref 40–150)
ALT SERPL W P-5'-P-CCNC: 33 U/L (ref 0–50)
ANION GAP SERPL CALCULATED.3IONS-SCNC: 12 MMOL/L (ref 7–15)
AST SERPL W P-5'-P-CCNC: 35 U/L (ref 0–45)
BILIRUB SERPL-MCNC: 0.5 MG/DL
BUN SERPL-MCNC: 14.1 MG/DL (ref 8–23)
CALCIUM SERPL-MCNC: 9.5 MG/DL (ref 8.8–10.4)
CHLORIDE SERPL-SCNC: 100 MMOL/L (ref 98–107)
CHOLEST SERPL-MCNC: 142 MG/DL
CREAT SERPL-MCNC: 0.67 MG/DL (ref 0.51–0.95)
EGFRCR SERPLBLD CKD-EPI 2021: 88 ML/MIN/1.73M2
ERYTHROCYTE [DISTWIDTH] IN BLOOD BY AUTOMATED COUNT: 13.9 % (ref 10–15)
EST. AVERAGE GLUCOSE BLD GHB EST-MCNC: 117 MG/DL
FASTING STATUS PATIENT QL REPORTED: YES
FASTING STATUS PATIENT QL REPORTED: YES
GLUCOSE SERPL-MCNC: 103 MG/DL (ref 70–99)
HBA1C MFR BLD: 5.7 % (ref 0–5.6)
HCO3 SERPL-SCNC: 24 MMOL/L (ref 22–29)
HCT VFR BLD AUTO: 40.6 % (ref 35–47)
HDLC SERPL-MCNC: 79 MG/DL
HGB BLD-MCNC: 13.4 G/DL (ref 11.7–15.7)
LDLC SERPL CALC-MCNC: 56 MG/DL
MCH RBC QN AUTO: 28.8 PG (ref 26.5–33)
MCHC RBC AUTO-ENTMCNC: 33 G/DL (ref 31.5–36.5)
MCV RBC AUTO: 87 FL (ref 78–100)
NONHDLC SERPL-MCNC: 63 MG/DL
PLATELET # BLD AUTO: 257 10E3/UL (ref 150–450)
POTASSIUM SERPL-SCNC: 3.8 MMOL/L (ref 3.4–5.3)
PROT SERPL-MCNC: 7.3 G/DL (ref 6.4–8.3)
RBC # BLD AUTO: 4.66 10E6/UL (ref 3.8–5.2)
SODIUM SERPL-SCNC: 136 MMOL/L (ref 135–145)
TRIGL SERPL-MCNC: 36 MG/DL
TSH SERPL DL<=0.005 MIU/L-ACNC: 1.4 UIU/ML (ref 0.3–4.2)
WBC # BLD AUTO: 4.7 10E3/UL (ref 4–11)

## 2025-07-11 ENCOUNTER — ORDERS ONLY (AUTO-RELEASED) (OUTPATIENT)
Dept: FAMILY MEDICINE | Facility: CLINIC | Age: 80
End: 2025-07-11
Payer: MEDICARE

## (undated) DEVICE — TOTE ANGIO CORP PC15AT SAN32CC83O

## (undated) DEVICE — PACK TOTAL KNEE SOP15TKFSD

## (undated) DEVICE — GW SURG OMNIWIRE STRAIGHT TIP L185CM PRESSURE GU 89185

## (undated) DEVICE — VALVE HEMOSTASIS .096" COPILOT MECH 1003331

## (undated) DEVICE — BLADE SAW SAGITTAL STRK 18X90X1.27MM HD SYS 6 6118-127-090

## (undated) DEVICE — SYR ANGIOGRAPHY MULTIUSE KIT ACIST 014612

## (undated) DEVICE — CATH BALLOON EMERGE 3.0X12MM H7493918912300

## (undated) DEVICE — SU VICRYL 3-0 SH 27" J316H

## (undated) DEVICE — DECANTER VIAL 2006S

## (undated) DEVICE — APPLICATOR COTTON TIP 6"X2 STERILE LF 6012

## (undated) DEVICE — PAD CHUX UNDERPAD 23X24" 7136

## (undated) DEVICE — SU VICRYL 2-0 CT-1 27" UND J259H

## (undated) DEVICE — RAD INFLATOR BASIC COMPAK  IN4130

## (undated) DEVICE — CLIP ETHICON LIGACLIP SM BLUE LT100

## (undated) DEVICE — SU VICRYL 0 CT-1 CR 8X18" J740D

## (undated) DEVICE — SOL WATER IRRIG 1000ML BOTTLE 2F7114

## (undated) DEVICE — SUCTION CANISTER MEDIVAC LINER 3000ML W/LID 65651-530

## (undated) DEVICE — CATH GUIDING BLUE YELLOW PTFE XB3 6FRX100CM 67005200

## (undated) DEVICE — LINEN TOWEL PACK X5 5464

## (undated) DEVICE — NDL 19GA 1.5"

## (undated) DEVICE — CATH RX TAKERU PTCA BALLOON 1.5X12MM DC-RY1512UA1

## (undated) DEVICE — SU VICRYL 2-0 TIE 12X18" J905T

## (undated) DEVICE — GLOVE BIOGEL PI MICRO INDICATOR UNDERGLOVE SZ 8.0 48980

## (undated) DEVICE — NDL SPINAL 18GA 3.5" 405184

## (undated) DEVICE — CATH BALLOON APEX PUSH 8X1.50MM H7493896108150

## (undated) DEVICE — BONE CEMENT MIXEVAC III HI VAC KIT  0206-015-000

## (undated) DEVICE — BLADE SAW RECIP STRK 70X12.5X0.80MM 0277-096-277

## (undated) DEVICE — CAST PADDING 6" STERILE 9046S

## (undated) DEVICE — SUCTION IRR SYSTEM W/O TIP INTERPULSE HANDPIECE 0210-100-000

## (undated) DEVICE — CATH BALLOON EMERGE 2.0X20MM H7493918920200

## (undated) DEVICE — DRAPE BREAST/CHEST 29420

## (undated) DEVICE — MANIFOLD NEPTUNE 4 PORT 700-20

## (undated) DEVICE — SLEEVE PROTECTIVE BREAST BIOPSY  GMSLV001-10

## (undated) DEVICE — GUIDEWIRE VASC 0.014INX180CM RUNTHROUGH 25-1011

## (undated) DEVICE — ESU GROUND PAD UNIVERSAL W/O CORD

## (undated) DEVICE — DRSG STERI STRIP 1/2X4" R1547

## (undated) DEVICE — CATH BALLOON NC EMERGE 3.50X12MM H7493926712350

## (undated) DEVICE — PREP CHLORAPREP 26ML TINTED ORANGE  260815

## (undated) DEVICE — CATH LAUNCHER 6FR EBU 3.5 LA6EBU35

## (undated) DEVICE — MANIFOLD KIT ANGIO AUTOMATED 014613

## (undated) DEVICE — DRAPE SHEET REV FOLD 3/4 9349

## (undated) DEVICE — BLADE SAW SAGITTAL STRK MED WIDE 25X73X0.89MM 2108-105-000

## (undated) DEVICE — INTRO GLIDESHEATH SLENDER 6FR 10X45CM 60-1060

## (undated) DEVICE — CATH BALLOON EMERGE 2.5X15MM H7493918915250

## (undated) DEVICE — NDL 25GA 1.5" 305127

## (undated) DEVICE — SLEEVE TR BAND RADIAL COMPRESSION DEVICE 24CM TRB24-REG

## (undated) DEVICE — CATH BALLOON NC EMERGE 2.25X12MM H7493926712220

## (undated) DEVICE — SOLUTION WOUND CLEANSING 3/4OZ 10% PVP EA-L3011FB-50

## (undated) DEVICE — GLOVE PROTEXIS W/NEU-THERA 7.5  2D73TE75

## (undated) DEVICE — STPL SKIN 35W 6.9MM  PXW35

## (undated) DEVICE — DRSG AQUACEL AG 3.5X9.75" HYDROFIBER 412011

## (undated) DEVICE — WRAP EZY KNEE

## (undated) DEVICE — ESU ELEC BLADE 2.75" COATED/INSULATED E1455

## (undated) DEVICE — CATH BALLOON NC EMERGE 2.75X15MM H7493926715270

## (undated) DEVICE — GLOVE BIOGEL PI SZ 7.5 40875

## (undated) DEVICE — GLOVE PROTEXIS BLUE W/NEU-THERA 7.5  2D73EB75

## (undated) DEVICE — SOL NACL 0.9% IRRIG 3000ML BAG 2B7477

## (undated) DEVICE — GLOVE BIOGEL PI MICRO INDICATOR UNDERGLOVE SZ 7.5 48975

## (undated) DEVICE — SOL NACL 0.9% IRRIG 1000ML BOTTLE 2F7124

## (undated) DEVICE — SYR 50ML LL W/O NDL 309653

## (undated) DEVICE — CATH DIAGNOSTIC RADIAL 5FR TIG 4.0

## (undated) DEVICE — CATH LAUNCHER 6FR JR 4.0 LA6JR40

## (undated) DEVICE — CATH BALLOON NC EMERGE 3.00X15MM H7493926715300

## (undated) DEVICE — SOL DEXTROSE 5% 50ML BAG

## (undated) DEVICE — SYR 10ML FINGER CONTROL W/O NDL 309695

## (undated) DEVICE — BONE CLEANING TIP INTERPULSE  0210-010-000

## (undated) DEVICE — INFL DVC KIT W/10CC NITRO IN4530

## (undated) DEVICE — DEFIB PRO-PADZ LVP LQD GEL ADULT 8900-2105-01

## (undated) DEVICE — PACK MINOR SBA15MIFSE

## (undated) DEVICE — SYR RED NITRO PRNT 10CC

## (undated) DEVICE — DRSG GAUZE 4X4" 3033

## (undated) DEVICE — CATH US OD 5FR OD SEC 2.9FR EAGLE EYE PLATINUM 0.014 85900P

## (undated) DEVICE — ESU PENCIL W/SMOKE EVAC CVPLP2000

## (undated) DEVICE — SU VICRYL 4-0 PS-2 18" UND J496H

## (undated) RX ORDER — HEPARIN SODIUM 1000 [USP'U]/ML
INJECTION, SOLUTION INTRAVENOUS; SUBCUTANEOUS
Status: DISPENSED
Start: 2022-05-19

## (undated) RX ORDER — FENTANYL CITRATE 50 UG/ML
INJECTION, SOLUTION INTRAMUSCULAR; INTRAVENOUS
Status: DISPENSED
Start: 2021-04-23

## (undated) RX ORDER — FENTANYL CITRATE 50 UG/ML
INJECTION, SOLUTION INTRAMUSCULAR; INTRAVENOUS
Status: DISPENSED
Start: 2023-03-06

## (undated) RX ORDER — FENTANYL CITRATE 50 UG/ML
INJECTION, SOLUTION INTRAMUSCULAR; INTRAVENOUS
Status: DISPENSED
Start: 2022-05-19

## (undated) RX ORDER — HEPARIN SODIUM 200 [USP'U]/100ML
INJECTION, SOLUTION INTRAVENOUS
Status: DISPENSED
Start: 2022-05-19

## (undated) RX ORDER — NITROGLYCERIN 5 MG/ML
VIAL (ML) INTRAVENOUS
Status: DISPENSED
Start: 2022-05-19

## (undated) RX ORDER — PROPOFOL 10 MG/ML
INJECTION, EMULSION INTRAVENOUS
Status: DISPENSED
Start: 2023-03-06

## (undated) RX ORDER — HEPARIN SODIUM 1000 [USP'U]/ML
INJECTION, SOLUTION INTRAVENOUS; SUBCUTANEOUS
Status: DISPENSED
Start: 2018-06-27

## (undated) RX ORDER — VERAPAMIL HYDROCHLORIDE 2.5 MG/ML
INJECTION, SOLUTION INTRAVENOUS
Status: DISPENSED
Start: 2022-05-19

## (undated) RX ORDER — DEXAMETHASONE SODIUM PHOSPHATE 4 MG/ML
INJECTION, SOLUTION INTRA-ARTICULAR; INTRALESIONAL; INTRAMUSCULAR; INTRAVENOUS; SOFT TISSUE
Status: DISPENSED
Start: 2023-03-06

## (undated) RX ORDER — TRANEXAMIC ACID 650 MG/1
TABLET ORAL
Status: DISPENSED
Start: 2023-03-06

## (undated) RX ORDER — POTASSIUM CHLORIDE 1500 MG/1
TABLET, EXTENDED RELEASE ORAL
Status: DISPENSED
Start: 2022-05-19

## (undated) RX ORDER — LIDOCAINE HYDROCHLORIDE 10 MG/ML
INJECTION, SOLUTION EPIDURAL; INFILTRATION; INTRACAUDAL; PERINEURAL
Status: DISPENSED
Start: 2022-05-19

## (undated) RX ORDER — GLYCOPYRROLATE 0.2 MG/ML
INJECTION, SOLUTION INTRAMUSCULAR; INTRAVENOUS
Status: DISPENSED
Start: 2023-03-06

## (undated) RX ORDER — ACETAMINOPHEN 325 MG/1
TABLET ORAL
Status: DISPENSED
Start: 2023-03-06

## (undated) RX ORDER — FENTANYL CITRATE 50 UG/ML
INJECTION, SOLUTION INTRAMUSCULAR; INTRAVENOUS
Status: DISPENSED
Start: 2018-06-27

## (undated) RX ORDER — PROPOFOL 10 MG/ML
INJECTION, EMULSION INTRAVENOUS
Status: DISPENSED
Start: 2021-04-23

## (undated) RX ORDER — ACETAMINOPHEN 500 MG
TABLET ORAL
Status: DISPENSED
Start: 2021-04-23

## (undated) RX ORDER — LIDOCAINE HYDROCHLORIDE 20 MG/ML
INJECTION, SOLUTION EPIDURAL; INFILTRATION; INTRACAUDAL; PERINEURAL
Status: DISPENSED
Start: 2021-04-23

## (undated) RX ORDER — POTASSIUM CHLORIDE 1500 MG/1
TABLET, EXTENDED RELEASE ORAL
Status: DISPENSED
Start: 2018-06-27

## (undated) RX ORDER — ONDANSETRON 2 MG/ML
INJECTION INTRAMUSCULAR; INTRAVENOUS
Status: DISPENSED
Start: 2023-03-06

## (undated) RX ORDER — HYDROMORPHONE HYDROCHLORIDE 2 MG/1
TABLET ORAL
Status: DISPENSED
Start: 2023-03-06

## (undated) RX ORDER — BUPIVACAINE HYDROCHLORIDE AND EPINEPHRINE 5; 5 MG/ML; UG/ML
INJECTION, SOLUTION EPIDURAL; INTRACAUDAL; PERINEURAL
Status: DISPENSED
Start: 2021-04-23

## (undated) RX ORDER — ACETAMINOPHEN 325 MG/1
TABLET ORAL
Status: DISPENSED
Start: 2021-04-23

## (undated) RX ORDER — LIDOCAINE HYDROCHLORIDE 10 MG/ML
INJECTION, SOLUTION EPIDURAL; INFILTRATION; INTRACAUDAL; PERINEURAL
Status: DISPENSED
Start: 2018-06-27

## (undated) RX ORDER — CEFAZOLIN SODIUM/WATER 2 G/20 ML
SYRINGE (ML) INTRAVENOUS
Status: DISPENSED
Start: 2023-03-06